# Patient Record
Sex: MALE | Race: WHITE | NOT HISPANIC OR LATINO | Employment: OTHER | URBAN - METROPOLITAN AREA
[De-identification: names, ages, dates, MRNs, and addresses within clinical notes are randomized per-mention and may not be internally consistent; named-entity substitution may affect disease eponyms.]

---

## 2017-01-09 ENCOUNTER — TRANSCRIBE ORDERS (OUTPATIENT)
Dept: ADMINISTRATIVE | Facility: HOSPITAL | Age: 52
End: 2017-01-09

## 2017-01-09 DIAGNOSIS — R91.1 LUNG NODULE: Primary | ICD-10-CM

## 2017-01-13 ENCOUNTER — HOSPITAL ENCOUNTER (OUTPATIENT)
Dept: RADIOLOGY | Age: 52
Discharge: HOME | End: 2017-01-13
Payer: COMMERCIAL

## 2017-01-13 DIAGNOSIS — R91.1 LUNG NODULE: ICD-10-CM

## 2017-01-13 PROCEDURE — 71250 CT THORAX DX C-: CPT

## 2017-05-17 ENCOUNTER — TRANSCRIBE ORDERS (OUTPATIENT)
Dept: ADMINISTRATIVE | Facility: HOSPITAL | Age: 52
End: 2017-05-17

## 2017-05-17 DIAGNOSIS — G43.809 LOWER HALF MIGRAINE: Primary | ICD-10-CM

## 2017-05-21 ENCOUNTER — HOSPITAL ENCOUNTER (OUTPATIENT)
Dept: RADIOLOGY | Facility: HOSPITAL | Age: 52
Discharge: HOME | End: 2017-05-21
Payer: COMMERCIAL

## 2017-05-21 DIAGNOSIS — G43.809 LOWER HALF MIGRAINE: ICD-10-CM

## 2017-05-21 PROCEDURE — 70551 MRI BRAIN STEM W/O DYE: CPT

## 2019-05-14 ENCOUNTER — HOSPITAL ENCOUNTER (OUTPATIENT)
Facility: HOSPITAL | Age: 54
Setting detail: OBSERVATION
Discharge: HOME | End: 2019-05-16
Attending: EMERGENCY MEDICINE | Admitting: STUDENT IN AN ORGANIZED HEALTH CARE EDUCATION/TRAINING PROGRAM
Payer: COMMERCIAL

## 2019-05-14 DIAGNOSIS — I21.4 NSTEMI (NON-ST ELEVATED MYOCARDIAL INFARCTION) (HCC): Primary | ICD-10-CM

## 2019-05-14 DIAGNOSIS — Z72.0 NICOTINE ABUSE: ICD-10-CM

## 2019-05-14 PROCEDURE — 99285 EMERGENCY DEPT VISIT HI MDM: CPT

## 2019-05-15 ENCOUNTER — APPOINTMENT (OUTPATIENT)
Dept: NON INVASIVE DIAGNOSTICS | Facility: HOSPITAL | Age: 54
End: 2019-05-15
Payer: COMMERCIAL

## 2019-05-15 ENCOUNTER — APPOINTMENT (EMERGENCY)
Dept: RADIOLOGY | Facility: HOSPITAL | Age: 54
End: 2019-05-15
Payer: COMMERCIAL

## 2019-05-15 PROBLEM — E87.1 HYPONATREMIA: Status: ACTIVE | Noted: 2019-05-15

## 2019-05-15 PROBLEM — I21.4 NSTEMI (NON-ST ELEVATED MYOCARDIAL INFARCTION) (HCC): Status: ACTIVE | Noted: 2019-05-15

## 2019-05-15 PROBLEM — F31.9 BIPOLAR DISORDER (HCC): Status: ACTIVE | Noted: 2019-05-15

## 2019-05-15 LAB
ALBUMIN SERPL BCP-MCNC: 3.1 G/DL (ref 3.5–5)
ALP SERPL-CCNC: 92 U/L (ref 46–116)
ALT SERPL W P-5'-P-CCNC: 65 U/L (ref 12–78)
ANION GAP SERPL CALCULATED.3IONS-SCNC: 10 MMOL/L (ref 4–13)
ANION GAP SERPL CALCULATED.3IONS-SCNC: 7 MMOL/L (ref 4–13)
AST SERPL W P-5'-P-CCNC: 29 U/L (ref 5–45)
BASOPHILS # BLD AUTO: 0.04 THOUSANDS/ΜL (ref 0–0.1)
BASOPHILS # BLD AUTO: 0.07 THOUSANDS/ΜL (ref 0–0.1)
BASOPHILS NFR BLD AUTO: 1 % (ref 0–1)
BASOPHILS NFR BLD AUTO: 1 % (ref 0–1)
BILIRUB SERPL-MCNC: 0.2 MG/DL (ref 0.2–1)
BUN SERPL-MCNC: 7 MG/DL (ref 5–25)
BUN SERPL-MCNC: 8 MG/DL (ref 5–25)
CALCIUM SERPL-MCNC: 8 MG/DL (ref 8.3–10.1)
CALCIUM SERPL-MCNC: 8.5 MG/DL (ref 8.3–10.1)
CHLORIDE SERPL-SCNC: 101 MMOL/L (ref 100–108)
CHLORIDE SERPL-SCNC: 98 MMOL/L (ref 100–108)
CHOLEST SERPL-MCNC: 178 MG/DL (ref 50–200)
CO2 SERPL-SCNC: 25 MMOL/L (ref 21–32)
CO2 SERPL-SCNC: 25 MMOL/L (ref 21–32)
CREAT SERPL-MCNC: 0.88 MG/DL (ref 0.6–1.3)
CREAT SERPL-MCNC: 1.18 MG/DL (ref 0.6–1.3)
EOSINOPHIL # BLD AUTO: 0.2 THOUSAND/ΜL (ref 0–0.61)
EOSINOPHIL # BLD AUTO: 0.24 THOUSAND/ΜL (ref 0–0.61)
EOSINOPHIL NFR BLD AUTO: 2 % (ref 0–6)
EOSINOPHIL NFR BLD AUTO: 3 % (ref 0–6)
ERYTHROCYTE [DISTWIDTH] IN BLOOD BY AUTOMATED COUNT: 12.8 % (ref 11.6–15.1)
ERYTHROCYTE [DISTWIDTH] IN BLOOD BY AUTOMATED COUNT: 12.9 % (ref 11.6–15.1)
GFR SERPL CREATININE-BSD FRML MDRD: 70 ML/MIN/1.73SQ M
GFR SERPL CREATININE-BSD FRML MDRD: 98 ML/MIN/1.73SQ M
GLUCOSE SERPL-MCNC: 190 MG/DL (ref 65–140)
GLUCOSE SERPL-MCNC: 73 MG/DL (ref 65–140)
HCT VFR BLD AUTO: 42.5 % (ref 36.5–49.3)
HCT VFR BLD AUTO: 45 % (ref 36.5–49.3)
HDLC SERPL-MCNC: 37 MG/DL (ref 40–60)
HGB BLD-MCNC: 13.8 G/DL (ref 12–17)
HGB BLD-MCNC: 14.7 G/DL (ref 12–17)
IMM GRANULOCYTES # BLD AUTO: 0.06 THOUSAND/UL (ref 0–0.2)
IMM GRANULOCYTES # BLD AUTO: 0.06 THOUSAND/UL (ref 0–0.2)
IMM GRANULOCYTES NFR BLD AUTO: 1 % (ref 0–2)
IMM GRANULOCYTES NFR BLD AUTO: 1 % (ref 0–2)
LDLC SERPL CALC-MCNC: 118 MG/DL (ref 0–100)
LYMPHOCYTES # BLD AUTO: 1.64 THOUSANDS/ΜL (ref 0.6–4.47)
LYMPHOCYTES # BLD AUTO: 2.62 THOUSANDS/ΜL (ref 0.6–4.47)
LYMPHOCYTES NFR BLD AUTO: 20 % (ref 14–44)
LYMPHOCYTES NFR BLD AUTO: 28 % (ref 14–44)
MCH RBC QN AUTO: 29.9 PG (ref 26.8–34.3)
MCH RBC QN AUTO: 30 PG (ref 26.8–34.3)
MCHC RBC AUTO-ENTMCNC: 32.5 G/DL (ref 31.4–37.4)
MCHC RBC AUTO-ENTMCNC: 32.7 G/DL (ref 31.4–37.4)
MCV RBC AUTO: 92 FL (ref 82–98)
MCV RBC AUTO: 92 FL (ref 82–98)
MONOCYTES # BLD AUTO: 0.43 THOUSAND/ΜL (ref 0.17–1.22)
MONOCYTES # BLD AUTO: 0.81 THOUSAND/ΜL (ref 0.17–1.22)
MONOCYTES NFR BLD AUTO: 5 % (ref 4–12)
MONOCYTES NFR BLD AUTO: 9 % (ref 4–12)
NEUTROPHILS # BLD AUTO: 5.6 THOUSANDS/ΜL (ref 1.85–7.62)
NEUTROPHILS # BLD AUTO: 6.03 THOUSANDS/ΜL (ref 1.85–7.62)
NEUTS SEG NFR BLD AUTO: 58 % (ref 43–75)
NEUTS SEG NFR BLD AUTO: 71 % (ref 43–75)
NONHDLC SERPL-MCNC: 141 MG/DL
NRBC BLD AUTO-RTO: 0 /100 WBCS
NRBC BLD AUTO-RTO: 0 /100 WBCS
PLATELET # BLD AUTO: 189 THOUSANDS/UL (ref 149–390)
PLATELET # BLD AUTO: 204 THOUSANDS/UL (ref 149–390)
PMV BLD AUTO: 10.1 FL (ref 8.9–12.7)
PMV BLD AUTO: 9.4 FL (ref 8.9–12.7)
POTASSIUM SERPL-SCNC: 3.5 MMOL/L (ref 3.5–5.3)
POTASSIUM SERPL-SCNC: 4 MMOL/L (ref 3.5–5.3)
PROT SERPL-MCNC: 6.7 G/DL (ref 6.4–8.2)
RBC # BLD AUTO: 4.6 MILLION/UL (ref 3.88–5.62)
RBC # BLD AUTO: 4.91 MILLION/UL (ref 3.88–5.62)
SODIUM SERPL-SCNC: 133 MMOL/L (ref 136–145)
SODIUM SERPL-SCNC: 133 MMOL/L (ref 136–145)
TRIGL SERPL-MCNC: 116 MG/DL
TROPONIN I SERPL-MCNC: 0.07 NG/ML
TROPONIN I SERPL-MCNC: 0.08 NG/ML
TROPONIN I SERPL-MCNC: 0.08 NG/ML
WBC # BLD AUTO: 8.4 THOUSAND/UL (ref 4.31–10.16)
WBC # BLD AUTO: 9.4 THOUSAND/UL (ref 4.31–10.16)

## 2019-05-15 PROCEDURE — 84484 ASSAY OF TROPONIN QUANT: CPT | Performed by: STUDENT IN AN ORGANIZED HEALTH CARE EDUCATION/TRAINING PROGRAM

## 2019-05-15 PROCEDURE — 85025 COMPLETE CBC W/AUTO DIFF WBC: CPT | Performed by: EMERGENCY MEDICINE

## 2019-05-15 PROCEDURE — 99219 PR INITIAL OBSERVATION CARE/DAY 50 MINUTES: CPT | Performed by: STUDENT IN AN ORGANIZED HEALTH CARE EDUCATION/TRAINING PROGRAM

## 2019-05-15 PROCEDURE — 99152 MOD SED SAME PHYS/QHP 5/>YRS: CPT | Performed by: INTERNAL MEDICINE

## 2019-05-15 PROCEDURE — 80053 COMPREHEN METABOLIC PANEL: CPT | Performed by: EMERGENCY MEDICINE

## 2019-05-15 PROCEDURE — 84484 ASSAY OF TROPONIN QUANT: CPT | Performed by: EMERGENCY MEDICINE

## 2019-05-15 PROCEDURE — 96361 HYDRATE IV INFUSION ADD-ON: CPT

## 2019-05-15 PROCEDURE — 80048 BASIC METABOLIC PNL TOTAL CA: CPT | Performed by: STUDENT IN AN ORGANIZED HEALTH CARE EDUCATION/TRAINING PROGRAM

## 2019-05-15 PROCEDURE — 36415 COLL VENOUS BLD VENIPUNCTURE: CPT | Performed by: EMERGENCY MEDICINE

## 2019-05-15 PROCEDURE — C1769 GUIDE WIRE: HCPCS

## 2019-05-15 PROCEDURE — 85025 COMPLETE CBC W/AUTO DIFF WBC: CPT | Performed by: STUDENT IN AN ORGANIZED HEALTH CARE EDUCATION/TRAINING PROGRAM

## 2019-05-15 PROCEDURE — 80061 LIPID PANEL: CPT | Performed by: NURSE PRACTITIONER

## 2019-05-15 PROCEDURE — 96374 THER/PROPH/DIAG INJ IV PUSH: CPT

## 2019-05-15 PROCEDURE — C1894 INTRO/SHEATH, NON-LASER: HCPCS

## 2019-05-15 PROCEDURE — 93458 L HRT ARTERY/VENTRICLE ANGIO: CPT

## 2019-05-15 PROCEDURE — 99244 OFF/OP CNSLTJ NEW/EST MOD 40: CPT | Performed by: INTERNAL MEDICINE

## 2019-05-15 PROCEDURE — 93458 L HRT ARTERY/VENTRICLE ANGIO: CPT | Performed by: INTERNAL MEDICINE

## 2019-05-15 PROCEDURE — 71045 X-RAY EXAM CHEST 1 VIEW: CPT

## 2019-05-15 PROCEDURE — 87081 CULTURE SCREEN ONLY: CPT | Performed by: STUDENT IN AN ORGANIZED HEALTH CARE EDUCATION/TRAINING PROGRAM

## 2019-05-15 PROCEDURE — 93005 ELECTROCARDIOGRAM TRACING: CPT

## 2019-05-15 RX ORDER — PALIPERIDONE 9 MG/1
6 TABLET, EXTENDED RELEASE ORAL 2 TIMES DAILY
COMMUNITY
End: 2020-03-05

## 2019-05-15 RX ORDER — RIZATRIPTAN BENZOATE 10 MG/1
10 TABLET ORAL ONCE AS NEEDED
Status: ON HOLD | COMMUNITY
End: 2020-07-16 | Stop reason: CLARIF

## 2019-05-15 RX ORDER — NITROGLYCERIN 0.4 MG/1
0.4 TABLET SUBLINGUAL ONCE
Status: COMPLETED | OUTPATIENT
Start: 2019-05-15 | End: 2019-05-15

## 2019-05-15 RX ORDER — CLONAZEPAM 2 MG/1
2 TABLET ORAL
COMMUNITY
End: 2019-06-20 | Stop reason: SDUPTHER

## 2019-05-15 RX ORDER — HEPARIN SODIUM 1000 [USP'U]/ML
INJECTION, SOLUTION INTRAVENOUS; SUBCUTANEOUS CODE/TRAUMA/SEDATION MEDICATION
Status: COMPLETED | OUTPATIENT
Start: 2019-05-15 | End: 2019-05-15

## 2019-05-15 RX ORDER — PALIPERIDONE 3 MG/1
9 TABLET, EXTENDED RELEASE ORAL 2 TIMES DAILY
Status: DISCONTINUED | OUTPATIENT
Start: 2019-05-15 | End: 2019-05-16 | Stop reason: HOSPADM

## 2019-05-15 RX ORDER — ASPIRIN 81 MG/1
81 TABLET ORAL DAILY
Status: DISCONTINUED | OUTPATIENT
Start: 2019-05-16 | End: 2019-05-16 | Stop reason: HOSPADM

## 2019-05-15 RX ORDER — ASPIRIN 81 MG/1
324 TABLET, CHEWABLE ORAL ONCE
Status: COMPLETED | OUTPATIENT
Start: 2019-05-15 | End: 2019-05-15

## 2019-05-15 RX ORDER — NORTRIPTYLINE HYDROCHLORIDE 25 MG/1
150 CAPSULE ORAL
Status: DISCONTINUED | OUTPATIENT
Start: 2019-05-15 | End: 2019-05-16 | Stop reason: HOSPADM

## 2019-05-15 RX ORDER — ACETAMINOPHEN 325 MG/1
650 TABLET ORAL ONCE AS NEEDED
Status: COMPLETED | OUTPATIENT
Start: 2019-05-15 | End: 2019-05-15

## 2019-05-15 RX ORDER — MIDAZOLAM HYDROCHLORIDE 1 MG/ML
INJECTION INTRAMUSCULAR; INTRAVENOUS CODE/TRAUMA/SEDATION MEDICATION
Status: COMPLETED | OUTPATIENT
Start: 2019-05-15 | End: 2019-05-15

## 2019-05-15 RX ORDER — CLONAZEPAM 1 MG/1
2 TABLET ORAL
Status: DISCONTINUED | OUTPATIENT
Start: 2019-05-15 | End: 2019-05-16 | Stop reason: HOSPADM

## 2019-05-15 RX ORDER — VERAPAMIL HCL 2.5 MG/ML
AMPUL (ML) INTRAVENOUS CODE/TRAUMA/SEDATION MEDICATION
Status: COMPLETED | OUTPATIENT
Start: 2019-05-15 | End: 2019-05-15

## 2019-05-15 RX ORDER — NITROGLYCERIN 20 MG/100ML
INJECTION INTRAVENOUS CODE/TRAUMA/SEDATION MEDICATION
Status: COMPLETED | OUTPATIENT
Start: 2019-05-15 | End: 2019-05-15

## 2019-05-15 RX ORDER — NORTRIPTYLINE HYDROCHLORIDE 75 MG/1
150 CAPSULE ORAL
COMMUNITY
End: 2019-06-11

## 2019-05-15 RX ORDER — NAPROXEN 500 MG/1
500 TABLET ORAL 2 TIMES DAILY WITH MEALS
COMMUNITY
End: 2019-05-23 | Stop reason: ALTCHOICE

## 2019-05-15 RX ORDER — TRAZODONE HYDROCHLORIDE 100 MG/1
300 TABLET ORAL
Status: DISCONTINUED | OUTPATIENT
Start: 2019-05-15 | End: 2019-05-15 | Stop reason: CLARIF

## 2019-05-15 RX ORDER — CLONAZEPAM 1 MG/1
TABLET ORAL AS NEEDED
COMMUNITY
End: 2020-11-02 | Stop reason: SDUPTHER

## 2019-05-15 RX ORDER — OXCARBAZEPINE 150 MG/1
300 TABLET, FILM COATED ORAL 2 TIMES DAILY
Status: DISCONTINUED | OUTPATIENT
Start: 2019-05-15 | End: 2019-05-16 | Stop reason: HOSPADM

## 2019-05-15 RX ORDER — OXCARBAZEPINE 150 MG/1
150 TABLET, FILM COATED ORAL 2 TIMES DAILY
COMMUNITY
End: 2020-11-18 | Stop reason: SDUPTHER

## 2019-05-15 RX ORDER — SODIUM CHLORIDE 9 MG/ML
50 INJECTION, SOLUTION INTRAVENOUS CONTINUOUS
Status: DISPENSED | OUTPATIENT
Start: 2019-05-15 | End: 2019-05-15

## 2019-05-15 RX ORDER — TRAZODONE HYDROCHLORIDE 50 MG/1
300 TABLET ORAL
Status: DISCONTINUED | OUTPATIENT
Start: 2019-05-15 | End: 2019-05-16 | Stop reason: HOSPADM

## 2019-05-15 RX ORDER — CLONAZEPAM 0.5 MG/1
0.5 TABLET ORAL DAILY
Status: DISCONTINUED | OUTPATIENT
Start: 2019-05-15 | End: 2019-05-16 | Stop reason: HOSPADM

## 2019-05-15 RX ORDER — TRAZODONE HYDROCHLORIDE 300 MG/1
300 TABLET ORAL
COMMUNITY
End: 2020-12-09 | Stop reason: SDUPTHER

## 2019-05-15 RX ORDER — HEPARIN SODIUM 5000 [USP'U]/ML
5000 INJECTION, SOLUTION INTRAVENOUS; SUBCUTANEOUS EVERY 8 HOURS SCHEDULED
Status: DISCONTINUED | OUTPATIENT
Start: 2019-05-15 | End: 2019-05-15

## 2019-05-15 RX ORDER — BUTALBITAL, ACETAMINOPHEN AND CAFFEINE 50; 325; 40 MG/1; MG/1; MG/1
1 TABLET ORAL EVERY 4 HOURS PRN
Status: DISCONTINUED | OUTPATIENT
Start: 2019-05-15 | End: 2019-05-16 | Stop reason: HOSPADM

## 2019-05-15 RX ORDER — FENTANYL CITRATE 50 UG/ML
INJECTION, SOLUTION INTRAMUSCULAR; INTRAVENOUS CODE/TRAUMA/SEDATION MEDICATION
Status: COMPLETED | OUTPATIENT
Start: 2019-05-15 | End: 2019-05-15

## 2019-05-15 RX ORDER — BUTALBITAL/ASPIRIN/CAFFEINE 50-325-40
1 CAPSULE ORAL EVERY 4 HOURS PRN
Status: ON HOLD | COMMUNITY
End: 2020-07-16 | Stop reason: CLARIF

## 2019-05-15 RX ORDER — LIDOCAINE HYDROCHLORIDE 10 MG/ML
INJECTION, SOLUTION INFILTRATION; PERINEURAL CODE/TRAUMA/SEDATION MEDICATION
Status: COMPLETED | OUTPATIENT
Start: 2019-05-15 | End: 2019-05-15

## 2019-05-15 RX ORDER — ATORVASTATIN CALCIUM 40 MG/1
40 TABLET, FILM COATED ORAL
Status: DISCONTINUED | OUTPATIENT
Start: 2019-05-15 | End: 2019-05-16 | Stop reason: HOSPADM

## 2019-05-15 RX ORDER — SODIUM CHLORIDE 9 MG/ML
INJECTION, SOLUTION INTRAVENOUS
Status: COMPLETED | OUTPATIENT
Start: 2019-05-15 | End: 2019-05-15

## 2019-05-15 RX ADMIN — NITROGLYCERIN 0.4 MG: 0.4 TABLET SUBLINGUAL at 00:14

## 2019-05-15 RX ADMIN — VERAPAMIL HYDROCHLORIDE 2.5 MG: 2.5 INJECTION, SOLUTION INTRAVENOUS at 15:40

## 2019-05-15 RX ADMIN — SODIUM CHLORIDE 50 ML/HR: 0.9 INJECTION, SOLUTION INTRAVENOUS at 02:19

## 2019-05-15 RX ADMIN — SODIUM CHLORIDE 50 ML/HR: 0.9 INJECTION, SOLUTION INTRAVENOUS at 14:07

## 2019-05-15 RX ADMIN — ACETAMINOPHEN 650 MG: 325 TABLET, FILM COATED ORAL at 05:55

## 2019-05-15 RX ADMIN — METOPROLOL TARTRATE 25 MG: 25 TABLET, FILM COATED ORAL at 21:53

## 2019-05-15 RX ADMIN — ATORVASTATIN CALCIUM 40 MG: 40 TABLET, FILM COATED ORAL at 17:00

## 2019-05-15 RX ADMIN — CLONAZEPAM 2 MG: 1 TABLET ORAL at 21:53

## 2019-05-15 RX ADMIN — NITROGLYCERIN 1 INCH: 20 OINTMENT TOPICAL at 00:52

## 2019-05-15 RX ADMIN — LIDOCAINE HYDROCHLORIDE 2 ML: 10 INJECTION, SOLUTION INFILTRATION; PERINEURAL at 15:35

## 2019-05-15 RX ADMIN — MIDAZOLAM HYDROCHLORIDE 1 MG: 1 INJECTION, SOLUTION INTRAMUSCULAR; INTRAVENOUS at 15:36

## 2019-05-15 RX ADMIN — FENTANYL CITRATE 50 MCG: 50 INJECTION, SOLUTION INTRAMUSCULAR; INTRAVENOUS at 15:36

## 2019-05-15 RX ADMIN — ENOXAPARIN SODIUM 135 MG: 150 INJECTION SUBCUTANEOUS at 01:35

## 2019-05-15 RX ADMIN — MIDAZOLAM HYDROCHLORIDE 1 MG: 1 INJECTION, SOLUTION INTRAMUSCULAR; INTRAVENOUS at 15:42

## 2019-05-15 RX ADMIN — OXCARBAZEPINE 300 MG: 150 TABLET ORAL at 17:00

## 2019-05-15 RX ADMIN — MORPHINE SULFATE 2 MG: 2 INJECTION, SOLUTION INTRAMUSCULAR; INTRAVENOUS at 00:52

## 2019-05-15 RX ADMIN — IOHEXOL 80 ML: 350 INJECTION, SOLUTION INTRAVENOUS at 16:04

## 2019-05-15 RX ADMIN — ASPIRIN 81 MG 324 MG: 81 TABLET ORAL at 00:51

## 2019-05-15 RX ADMIN — TRAZODONE HYDROCHLORIDE 300 MG: 50 TABLET ORAL at 21:54

## 2019-05-15 RX ADMIN — BUTALBITAL, ACETAMINOPHEN AND CAFFEINE 1 TABLET: 50; 325; 40 TABLET ORAL at 09:00

## 2019-05-15 RX ADMIN — NITROGLYCERIN 200 MCG: 20 INJECTION INTRAVENOUS at 15:40

## 2019-05-15 RX ADMIN — TRAZODONE HYDROCHLORIDE 300 MG: 50 TABLET ORAL at 02:47

## 2019-05-15 RX ADMIN — SODIUM CHLORIDE 1000 ML: 0.9 INJECTION, SOLUTION INTRAVENOUS at 00:43

## 2019-05-15 RX ADMIN — HEPARIN SODIUM 300 UNITS: 1000 INJECTION, SOLUTION INTRAVENOUS; SUBCUTANEOUS at 15:40

## 2019-05-15 RX ADMIN — OXCARBAZEPINE 300 MG: 150 TABLET ORAL at 08:45

## 2019-05-15 RX ADMIN — NICOTINE 1 PATCH: 7 PATCH TRANSDERMAL at 08:44

## 2019-05-15 RX ADMIN — FENTANYL CITRATE 50 MCG: 50 INJECTION, SOLUTION INTRAMUSCULAR; INTRAVENOUS at 15:42

## 2019-05-15 RX ADMIN — METOPROLOL TARTRATE 25 MG: 25 TABLET, FILM COATED ORAL at 10:56

## 2019-05-15 RX ADMIN — CLONAZEPAM 0.5 MG: 0.5 TABLET ORAL at 08:45

## 2019-05-15 RX ADMIN — NORTRIPTYLINE HYDROCHLORIDE 150 MG: 25 CAPSULE ORAL at 13:09

## 2019-05-16 VITALS
SYSTOLIC BLOOD PRESSURE: 133 MMHG | OXYGEN SATURATION: 93 % | BODY MASS INDEX: 40.26 KG/M2 | HEIGHT: 72 IN | WEIGHT: 297.2 LBS | DIASTOLIC BLOOD PRESSURE: 74 MMHG | HEART RATE: 79 BPM | RESPIRATION RATE: 18 BRPM | TEMPERATURE: 98.4 F

## 2019-05-16 LAB — MRSA NOSE QL CULT: NORMAL

## 2019-05-16 PROCEDURE — 99217 PR OBSERVATION CARE DISCHARGE MANAGEMENT: CPT | Performed by: INTERNAL MEDICINE

## 2019-05-16 RX ORDER — ASPIRIN 81 MG/1
81 TABLET ORAL DAILY
Qty: 30 TABLET | Refills: 0 | Status: SHIPPED | OUTPATIENT
Start: 2019-05-17 | End: 2020-02-13 | Stop reason: ALTCHOICE

## 2019-05-16 RX ADMIN — CLONAZEPAM 0.5 MG: 0.5 TABLET ORAL at 08:58

## 2019-05-16 RX ADMIN — ASPIRIN 81 MG: 81 TABLET, COATED ORAL at 08:58

## 2019-05-16 RX ADMIN — OXCARBAZEPINE 300 MG: 150 TABLET ORAL at 08:58

## 2019-05-16 RX ADMIN — METOPROLOL TARTRATE 25 MG: 25 TABLET, FILM COATED ORAL at 08:58

## 2019-05-16 RX ADMIN — NICOTINE 1 PATCH: 7 PATCH TRANSDERMAL at 09:00

## 2019-05-17 LAB
ATRIAL RATE: 96 BPM
P AXIS: 24 DEGREES
PR INTERVAL: 148 MS
QRS AXIS: 138 DEGREES
QRSD INTERVAL: 136 MS
QT INTERVAL: 426 MS
QTC INTERVAL: 538 MS
T WAVE AXIS: 69 DEGREES
VENTRICULAR RATE: 96 BPM

## 2019-05-17 PROCEDURE — 93010 ELECTROCARDIOGRAM REPORT: CPT | Performed by: INTERNAL MEDICINE

## 2019-05-23 ENCOUNTER — OFFICE VISIT (OUTPATIENT)
Dept: CARDIOLOGY CLINIC | Facility: CLINIC | Age: 54
End: 2019-05-23
Payer: COMMERCIAL

## 2019-05-23 VITALS
HEIGHT: 72 IN | SYSTOLIC BLOOD PRESSURE: 150 MMHG | WEIGHT: 302.8 LBS | BODY MASS INDEX: 41.01 KG/M2 | DIASTOLIC BLOOD PRESSURE: 90 MMHG | HEART RATE: 101 BPM

## 2019-05-23 DIAGNOSIS — Q24.5 MYOCARDIAL BRIDGE: ICD-10-CM

## 2019-05-23 DIAGNOSIS — E78.5 HYPERLIPIDEMIA, UNSPECIFIED HYPERLIPIDEMIA TYPE: ICD-10-CM

## 2019-05-23 DIAGNOSIS — I25.10 CORONARY ARTERY DISEASE INVOLVING NATIVE HEART WITHOUT ANGINA PECTORIS, UNSPECIFIED VESSEL OR LESION TYPE: Primary | ICD-10-CM

## 2019-05-23 DIAGNOSIS — R07.9 CHEST PAIN, UNSPECIFIED TYPE: ICD-10-CM

## 2019-05-23 DIAGNOSIS — Z72.0 TOBACCO ABUSE: ICD-10-CM

## 2019-05-23 PROCEDURE — 1111F DSCHRG MED/CURRENT MED MERGE: CPT | Performed by: INTERNAL MEDICINE

## 2019-05-23 PROCEDURE — 99214 OFFICE O/P EST MOD 30 MIN: CPT | Performed by: INTERNAL MEDICINE

## 2019-05-23 PROCEDURE — 93000 ELECTROCARDIOGRAM COMPLETE: CPT | Performed by: INTERNAL MEDICINE

## 2019-05-23 RX ORDER — PRAVASTATIN SODIUM 40 MG
40 TABLET ORAL DAILY
Qty: 30 TABLET | Refills: 3 | Status: SHIPPED | OUTPATIENT
Start: 2019-05-23 | End: 2019-06-11

## 2019-05-23 RX ORDER — METOPROLOL SUCCINATE 25 MG/1
25 TABLET, EXTENDED RELEASE ORAL DAILY
Qty: 30 TABLET | Refills: 1 | Status: SHIPPED | OUTPATIENT
Start: 2019-05-23 | End: 2019-07-14 | Stop reason: SDUPTHER

## 2019-06-11 ENCOUNTER — OFFICE VISIT (OUTPATIENT)
Dept: FAMILY MEDICINE CLINIC | Facility: CLINIC | Age: 54
End: 2019-06-11
Payer: COMMERCIAL

## 2019-06-11 VITALS
DIASTOLIC BLOOD PRESSURE: 88 MMHG | WEIGHT: 294 LBS | HEIGHT: 72 IN | TEMPERATURE: 96.7 F | BODY MASS INDEX: 39.82 KG/M2 | RESPIRATION RATE: 16 BRPM | HEART RATE: 64 BPM | SYSTOLIC BLOOD PRESSURE: 120 MMHG

## 2019-06-11 DIAGNOSIS — E66.01 SEVERE OBESITY (BMI 35.0-39.9) WITH COMORBIDITY (HCC): ICD-10-CM

## 2019-06-11 DIAGNOSIS — R91.8 LUNG MASS: ICD-10-CM

## 2019-06-11 DIAGNOSIS — F17.200 NICOTINE DEPENDENCE WITH CURRENT USE: ICD-10-CM

## 2019-06-11 DIAGNOSIS — I25.110 CORONARY ARTERY DISEASE INVOLVING NATIVE CORONARY ARTERY OF NATIVE HEART WITH UNSTABLE ANGINA PECTORIS (HCC): Primary | ICD-10-CM

## 2019-06-11 DIAGNOSIS — E78.2 MIXED HYPERLIPIDEMIA: ICD-10-CM

## 2019-06-11 DIAGNOSIS — I21.4 NSTEMI (NON-ST ELEVATED MYOCARDIAL INFARCTION) (HCC): ICD-10-CM

## 2019-06-11 DIAGNOSIS — E87.1 HYPONATREMIA: ICD-10-CM

## 2019-06-11 DIAGNOSIS — F31.62 BIPOLAR DISORDER, CURRENT EPISODE MIXED, MODERATE (HCC): ICD-10-CM

## 2019-06-11 DIAGNOSIS — E66.9 OBESITY (BMI 35.0-39.9 WITHOUT COMORBIDITY): ICD-10-CM

## 2019-06-11 DIAGNOSIS — G47.33 OSA (OBSTRUCTIVE SLEEP APNEA): ICD-10-CM

## 2019-06-11 PROBLEM — G43.009 MIGRAINE WITHOUT AURA AND WITHOUT STATUS MIGRAINOSUS, NOT INTRACTABLE: Status: ACTIVE | Noted: 2019-06-11

## 2019-06-11 PROBLEM — G43.109 MIGRAINE WITH AURA AND WITHOUT STATUS MIGRAINOSUS, NOT INTRACTABLE: Status: ACTIVE | Noted: 2019-06-11

## 2019-06-11 PROCEDURE — 99214 OFFICE O/P EST MOD 30 MIN: CPT | Performed by: FAMILY MEDICINE

## 2019-06-11 RX ORDER — VENLAFAXINE HYDROCHLORIDE 75 MG/1
150 CAPSULE, EXTENDED RELEASE ORAL DAILY
Refills: 3 | COMMUNITY
Start: 2019-05-29 | End: 2020-02-13 | Stop reason: ALTCHOICE

## 2019-06-11 RX ORDER — ATORVASTATIN CALCIUM 40 MG/1
40 TABLET, FILM COATED ORAL DAILY
Qty: 90 TABLET | Refills: 3 | Status: SHIPPED | OUTPATIENT
Start: 2019-06-11 | End: 2020-03-11

## 2019-06-18 ENCOUNTER — APPOINTMENT (OUTPATIENT)
Dept: LAB | Age: 54
End: 2019-06-18
Payer: COMMERCIAL

## 2019-06-18 ENCOUNTER — TRANSCRIBE ORDERS (OUTPATIENT)
Dept: ADMINISTRATIVE | Age: 54
End: 2019-06-18

## 2019-06-18 DIAGNOSIS — Z79.899 ENCOUNTER FOR LONG-TERM (CURRENT) USE OF OTHER MEDICATIONS: Primary | ICD-10-CM

## 2019-06-18 DIAGNOSIS — R53.83 FATIGUE, UNSPECIFIED TYPE: ICD-10-CM

## 2019-06-18 DIAGNOSIS — E55.9 AVITAMINOSIS D: ICD-10-CM

## 2019-06-18 DIAGNOSIS — Z79.899 ENCOUNTER FOR LONG-TERM (CURRENT) USE OF OTHER MEDICATIONS: ICD-10-CM

## 2019-06-18 DIAGNOSIS — I25.10 CORONARY ARTERY DISEASE INVOLVING NATIVE HEART WITHOUT ANGINA PECTORIS, UNSPECIFIED VESSEL OR LESION TYPE: ICD-10-CM

## 2019-06-18 DIAGNOSIS — Q24.5 MYOCARDIAL BRIDGE: ICD-10-CM

## 2019-06-18 LAB
25(OH)D3 SERPL-MCNC: 11.5 NG/ML (ref 30–100)
ALBUMIN SERPL BCP-MCNC: 3.9 G/DL (ref 3.5–5)
ALP SERPL-CCNC: 75 U/L (ref 46–116)
ALT SERPL W P-5'-P-CCNC: 54 U/L (ref 12–78)
ANION GAP SERPL CALCULATED.3IONS-SCNC: 7 MMOL/L (ref 4–13)
AST SERPL W P-5'-P-CCNC: 26 U/L (ref 5–45)
BASOPHILS # BLD AUTO: 0.06 THOUSANDS/ΜL (ref 0–0.1)
BASOPHILS NFR BLD AUTO: 1 % (ref 0–1)
BILIRUB DIRECT SERPL-MCNC: 0.12 MG/DL (ref 0–0.2)
BILIRUB SERPL-MCNC: 0.37 MG/DL (ref 0.2–1)
BUN SERPL-MCNC: 16 MG/DL (ref 5–25)
CALCIUM SERPL-MCNC: 8.4 MG/DL (ref 8.3–10.1)
CHLORIDE SERPL-SCNC: 101 MMOL/L (ref 100–108)
CHOLEST SERPL-MCNC: 116 MG/DL (ref 50–200)
CO2 SERPL-SCNC: 25 MMOL/L (ref 21–32)
CREAT SERPL-MCNC: 0.83 MG/DL (ref 0.6–1.3)
EOSINOPHIL # BLD AUTO: 0.15 THOUSAND/ΜL (ref 0–0.61)
EOSINOPHIL NFR BLD AUTO: 2 % (ref 0–6)
ERYTHROCYTE [DISTWIDTH] IN BLOOD BY AUTOMATED COUNT: 12.7 % (ref 11.6–15.1)
EST. AVERAGE GLUCOSE BLD GHB EST-MCNC: 114 MG/DL
GFR SERPL CREATININE-BSD FRML MDRD: 100 ML/MIN/1.73SQ M
GLUCOSE P FAST SERPL-MCNC: 72 MG/DL (ref 65–99)
HBA1C MFR BLD: 5.6 % (ref 4.2–6.3)
HCT VFR BLD AUTO: 40.7 % (ref 36.5–49.3)
HDLC SERPL-MCNC: 41 MG/DL (ref 40–60)
HGB BLD-MCNC: 13.5 G/DL (ref 12–17)
IMM GRANULOCYTES # BLD AUTO: 0.04 THOUSAND/UL (ref 0–0.2)
IMM GRANULOCYTES NFR BLD AUTO: 0 % (ref 0–2)
LDLC SERPL CALC-MCNC: 63 MG/DL (ref 0–100)
LYMPHOCYTES # BLD AUTO: 2.31 THOUSANDS/ΜL (ref 0.6–4.47)
LYMPHOCYTES NFR BLD AUTO: 23 % (ref 14–44)
MCH RBC QN AUTO: 30.1 PG (ref 26.8–34.3)
MCHC RBC AUTO-ENTMCNC: 33.2 G/DL (ref 31.4–37.4)
MCV RBC AUTO: 91 FL (ref 82–98)
MONOCYTES # BLD AUTO: 0.75 THOUSAND/ΜL (ref 0.17–1.22)
MONOCYTES NFR BLD AUTO: 8 % (ref 4–12)
NEUTROPHILS # BLD AUTO: 6.6 THOUSANDS/ΜL (ref 1.85–7.62)
NEUTS SEG NFR BLD AUTO: 66 % (ref 43–75)
NONHDLC SERPL-MCNC: 75 MG/DL
NRBC BLD AUTO-RTO: 0 /100 WBCS
PLATELET # BLD AUTO: 198 THOUSANDS/UL (ref 149–390)
PMV BLD AUTO: 10.8 FL (ref 8.9–12.7)
POTASSIUM SERPL-SCNC: 3.6 MMOL/L (ref 3.5–5.3)
PROT SERPL-MCNC: 6.7 G/DL (ref 6.4–8.2)
RBC # BLD AUTO: 4.48 MILLION/UL (ref 3.88–5.62)
SODIUM SERPL-SCNC: 133 MMOL/L (ref 136–145)
T3 SERPL-MCNC: 0.8 NG/ML (ref 0.6–1.8)
T4 SERPL-MCNC: 7.8 UG/DL (ref 4.7–13.3)
TRIGL SERPL-MCNC: 62 MG/DL
TSH SERPL DL<=0.05 MIU/L-ACNC: 1.61 UIU/ML (ref 0.36–3.74)
WBC # BLD AUTO: 9.91 THOUSAND/UL (ref 4.31–10.16)

## 2019-06-18 PROCEDURE — 84480 ASSAY TRIIODOTHYRONINE (T3): CPT

## 2019-06-18 PROCEDURE — 82306 VITAMIN D 25 HYDROXY: CPT

## 2019-06-18 PROCEDURE — 84436 ASSAY OF TOTAL THYROXINE: CPT

## 2019-06-18 PROCEDURE — 84443 ASSAY THYROID STIM HORMONE: CPT

## 2019-06-18 PROCEDURE — 85025 COMPLETE CBC W/AUTO DIFF WBC: CPT

## 2019-06-18 PROCEDURE — 80061 LIPID PANEL: CPT

## 2019-06-18 PROCEDURE — 36415 COLL VENOUS BLD VENIPUNCTURE: CPT

## 2019-06-18 PROCEDURE — 84479 ASSAY OF THYROID (T3 OR T4): CPT

## 2019-06-18 PROCEDURE — 80053 COMPREHEN METABOLIC PANEL: CPT

## 2019-06-18 PROCEDURE — 82248 BILIRUBIN DIRECT: CPT

## 2019-06-18 PROCEDURE — 83036 HEMOGLOBIN GLYCOSYLATED A1C: CPT

## 2019-06-19 LAB — T3RU NFR SERPL: 24 % (ref 24–39)

## 2019-06-20 ENCOUNTER — OFFICE VISIT (OUTPATIENT)
Dept: CARDIOLOGY CLINIC | Facility: CLINIC | Age: 54
End: 2019-06-20
Payer: COMMERCIAL

## 2019-06-20 ENCOUNTER — TELEPHONE (OUTPATIENT)
Dept: CARDIOLOGY CLINIC | Facility: CLINIC | Age: 54
End: 2019-06-20

## 2019-06-20 VITALS
HEART RATE: 76 BPM | HEIGHT: 72 IN | BODY MASS INDEX: 39.82 KG/M2 | SYSTOLIC BLOOD PRESSURE: 118 MMHG | DIASTOLIC BLOOD PRESSURE: 60 MMHG | OXYGEN SATURATION: 94 % | WEIGHT: 294 LBS

## 2019-06-20 DIAGNOSIS — I25.110 CORONARY ARTERY DISEASE INVOLVING NATIVE CORONARY ARTERY OF NATIVE HEART WITH UNSTABLE ANGINA PECTORIS (HCC): ICD-10-CM

## 2019-06-20 DIAGNOSIS — K21.9 GASTROESOPHAGEAL REFLUX DISEASE WITHOUT ESOPHAGITIS: ICD-10-CM

## 2019-06-20 DIAGNOSIS — E66.9 OBESITY (BMI 35.0-39.9 WITHOUT COMORBIDITY): ICD-10-CM

## 2019-06-20 DIAGNOSIS — F17.200 NICOTINE DEPENDENCE WITH CURRENT USE: ICD-10-CM

## 2019-06-20 DIAGNOSIS — G47.33 OSA (OBSTRUCTIVE SLEEP APNEA): ICD-10-CM

## 2019-06-20 DIAGNOSIS — R07.9 CHEST PAIN, UNSPECIFIED TYPE: Primary | ICD-10-CM

## 2019-06-20 PROCEDURE — 99215 OFFICE O/P EST HI 40 MIN: CPT | Performed by: NURSE PRACTITIONER

## 2019-06-20 RX ORDER — ISOSORBIDE MONONITRATE 30 MG/1
30 TABLET, EXTENDED RELEASE ORAL DAILY
Qty: 30 TABLET | Refills: 3 | Status: SHIPPED | OUTPATIENT
Start: 2019-06-20 | End: 2019-10-16 | Stop reason: SDUPTHER

## 2019-06-20 RX ORDER — FAMOTIDINE 40 MG/1
40 TABLET, FILM COATED ORAL
Qty: 30 TABLET | Refills: 3 | Status: SHIPPED | OUTPATIENT
Start: 2019-06-20 | End: 2019-06-20 | Stop reason: SDUPTHER

## 2019-06-20 RX ORDER — NAPROXEN SODIUM 550 MG/1
TABLET ORAL
Refills: 3 | COMMUNITY
Start: 2019-06-12 | End: 2019-09-11 | Stop reason: SDDI

## 2019-06-20 RX ORDER — FAMOTIDINE 40 MG/1
40 TABLET, FILM COATED ORAL DAILY
Qty: 30 TABLET | Refills: 3 | Status: SHIPPED | OUTPATIENT
Start: 2019-06-20 | End: 2019-09-11 | Stop reason: SDDI

## 2019-06-21 ENCOUNTER — HOSPITAL ENCOUNTER (OUTPATIENT)
Dept: NON INVASIVE DIAGNOSTICS | Facility: CLINIC | Age: 54
Discharge: HOME | End: 2019-06-21
Payer: COMMERCIAL

## 2019-06-21 DIAGNOSIS — I25.10 CORONARY ARTERY DISEASE INVOLVING NATIVE HEART WITHOUT ANGINA PECTORIS, UNSPECIFIED VESSEL OR LESION TYPE: ICD-10-CM

## 2019-06-21 DIAGNOSIS — Q24.5 MYOCARDIAL BRIDGE: ICD-10-CM

## 2019-06-21 PROCEDURE — 93306 TTE W/DOPPLER COMPLETE: CPT | Performed by: INTERNAL MEDICINE

## 2019-06-21 PROCEDURE — 93306 TTE W/DOPPLER COMPLETE: CPT

## 2019-07-05 ENCOUNTER — OFFICE VISIT (OUTPATIENT)
Dept: FAMILY MEDICINE CLINIC | Facility: CLINIC | Age: 54
End: 2019-07-05
Payer: COMMERCIAL

## 2019-07-05 ENCOUNTER — TELEPHONE (OUTPATIENT)
Dept: FAMILY MEDICINE CLINIC | Facility: CLINIC | Age: 54
End: 2019-07-05

## 2019-07-05 VITALS
DIASTOLIC BLOOD PRESSURE: 62 MMHG | BODY MASS INDEX: 40.5 KG/M2 | HEART RATE: 72 BPM | RESPIRATION RATE: 16 BRPM | TEMPERATURE: 96.2 F | WEIGHT: 299 LBS | HEIGHT: 72 IN | SYSTOLIC BLOOD PRESSURE: 110 MMHG

## 2019-07-05 DIAGNOSIS — H81.13 BENIGN PAROXYSMAL POSITIONAL VERTIGO DUE TO BILATERAL VESTIBULAR DISORDER: Primary | ICD-10-CM

## 2019-07-05 DIAGNOSIS — M54.6 THORACIC SPINE PAIN: ICD-10-CM

## 2019-07-05 PROCEDURE — 99213 OFFICE O/P EST LOW 20 MIN: CPT | Performed by: FAMILY MEDICINE

## 2019-07-05 RX ORDER — MECLIZINE HYDROCHLORIDE 25 MG/1
25 TABLET ORAL 3 TIMES DAILY PRN
Qty: 30 TABLET | Refills: 1 | Status: ON HOLD | OUTPATIENT
Start: 2019-07-05 | End: 2020-07-16 | Stop reason: CLARIF

## 2019-07-05 RX ORDER — TRAMADOL HYDROCHLORIDE 50 MG/1
50 TABLET ORAL EVERY 6 HOURS PRN
Qty: 20 TABLET | Refills: 0 | OUTPATIENT
Start: 2019-07-05 | End: 2019-07-25 | Stop reason: SDUPTHER

## 2019-07-05 RX ORDER — ERGOCALCIFEROL 1.25 MG/1
50000 CAPSULE ORAL WEEKLY
Refills: 0 | COMMUNITY
Start: 2019-06-19 | End: 2019-08-22 | Stop reason: ALTCHOICE

## 2019-07-05 NOTE — PROGRESS NOTES
Assessment/Plan:    Problem List Items Addressed This Visit        Nervous and Auditory    Benign paroxysmal positional vertigo due to bilateral vestibular disorder - Primary    Relevant Medications    meclizine (ANTIVERT) 25 mg tablet    Other Relevant Orders    Ambulatory referral to Physical Therapy      Other Visit Diagnoses     Thoracic spine pain        Relevant Medications    traMADol (ULTRAM) 50 mg tablet          BMI Counseling: Body mass index is 40 55 kg/m²  Discussed the patient's BMI with him  The BMI is above average  BMI counseling and education was provided to the patient  Nutrition recommendations include reducing portion sizes  There are no Patient Instructions on file for this visit  No follow-ups on file  Subjective:      Patient ID: Whitney Dodd is a 49 y o  male  Chief Complaint   Patient presents with    Dizziness     prcma       Pt is here fopr a same day   Pt states he has vertigo  States this is his first time  Pt states he cant stand up  States he falls down  He has a ringing noise  The ringing started yesterday  No pain in the earif he chnges position the room spins on him    Pt states while he is here he sneezed and he pulled a muscle under his left shoulder blade has been taking 800 mg motrin and that is not helping a lot      The following portions of the patient's history were reviewed and updated as appropriate: allergies, current medications, past family history, past medical history, past social history, past surgical history and problem list     Review of Systems   Constitutional: Negative for activity change, appetite change, chills, diaphoresis, fatigue, fever and unexpected weight change  HENT: Negative for congestion, dental problem, ear pain, mouth sores, sinus pressure, sinus pain, sore throat and trouble swallowing  Eyes: Negative for photophobia, discharge and itching  Respiratory: Negative for apnea, chest tightness and shortness of breath  Cardiovascular: Negative for chest pain, palpitations and leg swelling  Gastrointestinal: Negative for abdominal distention, abdominal pain, blood in stool, nausea and vomiting  Endocrine: Negative for cold intolerance, heat intolerance, polydipsia, polyphagia and polyuria  Genitourinary: Negative for difficulty urinating  Musculoskeletal: Negative for arthralgias  Skin: Negative for color change and wound  Neurological: Positive for dizziness  Negative for syncope, speech difficulty and headaches  Hematological: Negative for adenopathy  Psychiatric/Behavioral: Negative for agitation and behavioral problems           Current Outpatient Medications   Medication Sig Dispense Refill    aspirin (ECOTRIN LOW STRENGTH) 81 mg EC tablet Take 1 tablet (81 mg total) by mouth daily 30 tablet 0    atorvastatin (LIPITOR) 40 mg tablet Take 1 tablet (40 mg total) by mouth daily 90 tablet 3    butalbital-acetaminophen-caffeine-codeine (FIORICET WITH CODEINE) -04-30 MG per capsule Take 1 capsule by mouth every 4 (four) hours as needed for headaches      clonazePAM (KlonoPIN) 1 mg tablet 1 mg daily       ergocalciferol (VITAMIN D2) 50,000 units Take 50,000 Units by mouth once a week  0    famotidine (PEPCID) 40 MG tablet Take 1 tablet (40 mg total) by mouth daily 30 tablet 3    isosorbide mononitrate (IMDUR) 30 mg 24 hr tablet Take 1 tablet (30 mg total) by mouth daily 30 tablet 3    metoprolol succinate (TOPROL-XL) 25 mg 24 hr tablet Take 1 tablet (25 mg total) by mouth daily 30 tablet 1    naproxen sodium (ANAPROX) 550 mg tablet TAKE 1 TABLET BY MOUTH ONCE DAILY AS NEEDED FOR HEADACHE (MAY REPEAT 1 TIME AFTER 4 HOURS)  3    OXcarbazepine (TRILEPTAL) 600 mg tablet Take 300 mg by mouth 2 (two) times a day      paliperidone (INVEGA) 9 MG 24 hr tablet Take 6 mg by mouth 2 (two) times a day       rizatriptan (MAXALT) 10 MG tablet Take 10 mg by mouth once as needed for migraine May repeat in 2 hours if needed      traZODone (DESYREL) 300 MG tablet Take 300 mg by mouth daily at bedtime      venlafaxine (EFFEXOR-XR) 75 mg 24 hr capsule Take 150 mg by mouth daily   3    meclizine (ANTIVERT) 25 mg tablet Take 1 tablet (25 mg total) by mouth 3 (three) times a day as needed for dizziness 30 tablet 1    traMADol (ULTRAM) 50 mg tablet Take 1 tablet (50 mg total) by mouth every 6 (six) hours as needed for moderate pain 20 tablet 0     No current facility-administered medications for this visit  Objective:    /62   Pulse 72   Temp (!) 96 2 °F (35 7 °C)   Resp 16   Ht 6' (1 829 m)   Wt 136 kg (299 lb)   BMI 40 55 kg/m²        Physical Exam   Constitutional: He appears well-developed and well-nourished  No distress  HENT:   Head: Normocephalic and atraumatic  Right Ear: External ear normal    Left Ear: External ear normal    Nose: Nose normal    Mouth/Throat: Oropharynx is clear and moist  No oropharyngeal exudate  Eyes: Pupils are equal, round, and reactive to light  EOM are normal  Right eye exhibits no discharge  Left eye exhibits no discharge  No scleral icterus  Neck: No thyromegaly present  Cardiovascular: Normal rate and normal heart sounds  No murmur heard  Pulmonary/Chest: Effort normal and breath sounds normal  No respiratory distress  He has no wheezes  Abdominal: Soft  Bowel sounds are normal  He exhibits no distension and no mass  There is no tenderness  There is no rebound and no guarding  Musculoskeletal: Normal range of motion  Neurological: He is alert  He displays normal reflexes  Coordination normal    Room spinning with lying down and turning head to the left   Skin: Skin is warm and dry  No rash noted  He is not diaphoretic  No erythema  Psychiatric: He has a normal mood and affect  His behavior is normal    Nursing note and vitals reviewed               Min Butler DO

## 2019-07-06 ENCOUNTER — TELEPHONE (OUTPATIENT)
Dept: FAMILY MEDICINE CLINIC | Facility: CLINIC | Age: 54
End: 2019-07-06

## 2019-07-06 NOTE — TELEPHONE ENCOUNTER
I ran a  report on this patient  He receives Fioricet w/ Codeine for his migraines and Clonazepam, which is managed by psych  No other opioids listed   I think it would be okay to fill this, but pt should be instructed not to take the Fioricet with codeine or benzos (Clonazepam) while taking the Tramadol   Marelma Ahuja

## 2019-07-06 NOTE — TELEPHONE ENCOUNTER
[address] calling regarding tramadol  Wants to know if dr Ivet Gómez checked patient's history and other medications he has been taking  Per Pharmacist looks like he has seen a couple different doctors, some red flags  He wasn't sure if Dr Ivet Gómez looked into this      Can we call [facility] back     Thank you

## 2019-07-08 ENCOUNTER — CLINICAL SUPPORT (OUTPATIENT)
Dept: BARIATRICS | Facility: CLINIC | Age: 54
End: 2019-07-08

## 2019-07-08 VITALS — BODY MASS INDEX: 41.45 KG/M2 | WEIGHT: 296.1 LBS | HEIGHT: 71 IN

## 2019-07-08 DIAGNOSIS — E66.01 MORBID (SEVERE) OBESITY DUE TO EXCESS CALORIES (HCC): Primary | ICD-10-CM

## 2019-07-08 DIAGNOSIS — E66.01 MORBID OBESITY (HCC): Primary | ICD-10-CM

## 2019-07-08 PROCEDURE — RECHECK: Performed by: DIETITIAN, REGISTERED

## 2019-07-08 NOTE — PROGRESS NOTES
Bariatric Behavioral Health Evaluation    Presenting Problem: Patient wants to improve quality of life and wants to prevent/avoid further health issues  Patient had heart attack in May 2019  Is the patient seeking Bariatric Surgery Eval? Yes  If yes how long have you researched this surgery option  Patient has considered bariatric surgery for 2 years  Realizes Post- Op Requirements? No, patient has friend with bariatric surgery  Psychiatric/Psychological Treatment Diagnosis: In  patient diagnosed with bipolar  Patient meets quarterly with psychiatrist      Outpatient Counselor No     Psychiatrist Yes  Dr Anthony Pemberton  Psychiatrist Phone [phone]    Have you had Inpatient Treatment? No    Family Constellation: None reported     Mother  76years old, father is 68years old, 2 brothers   7 years; spouse supports surgery  Patient has 2 children and 2 step-children  Domestic Violence No    Abuse History: None reported     Additional comments/stressors related to family/relationships/peer support: none reported     Physical/Psychological Assessment:     Appearance: appropriate  Sociability: average  Affect: appropriate  Mood: calm  Thought Process: coherent  Speech: normal  Content: no impairment  Orientation: person  Yes   Insight: emotional  good    Risk Assessment:     none    Recommendations: Recommended for surgery  no  Patient must complete psychiatric clearance    Risk of Harm to Self or Others: None reported     Access to weapons yes     Weapons secured by; hunting and personal protection  Patient was a - retired in   Based on the previous information, the client presents the following risk of harm to self or others: low     Note   Completed Behavioral Health Assessment  Provided patient education as needed   Patient admits  to  Axis 1 diagnosis and is currently taking medication prescribed by  Psychiatrist  Recommendation is deferred until psychiatric clearance is received and reviewed   BARIATRIC SURGERY EDUCATION CHECKLIST    I have received education related to my bariatric surgery process and understand:    Patients may be required to complete a psychiatric evaluation and receive clearance for surgery from their psychiatrist     Patients who undergo weight loss surgery are at higher risk of increased mental health concerns and suicide attempts  Patients may be required to complete a full substance abuse evaluation and then complete all treatment recommendations prior to surgery  If diagnosis of abuse/dependence results, patient may be required to remain sober for one (1) year before having bariatric surgery  Patients on psychiatric medications should check with their provider to discuss psychiatric medications and the changes in absorption  Patient should discuss all time release medications with provider and take all medications as prescribed  The recommendation is that there is no use of  any tobacco products, Hookah or  vapes for the bariatric post-operation patient  Bariatric surgery patients should not consume alcohol as a post-operative patient as it may increase risk of numerous health conditions including but not limited to alcohol abuse and ulcers  There is a possibility of weight regain if patient does not follow all program guidelines and recommendations  Bariatric surgery patients should exercise thirty (30) to sixty (60) minutes per day to maintain post-surgical weight loss  Research indicates that bariatric patients are more successful when they see a therapist for up to two (2) years post-op  Patients will follow all medical and dietary recommendations provided  Patient will keep all scheduled appointments and follow up with their physician for a minimum of five (5) years  Patient will take all vitamins as recommended  Post-operative vitamins are life-long      Patient reviewed Bariatric Surgery Education Checklist and agrees they have received education on these issues  Met with patient to complete Behavioral Health Assessment and to identify pre-op weight loss goals  Patient will work on the following goals; complete psychiatric clearance and reduce night time eating  Patient diagnosed with bipolar and is treated by psychiatrist      Patient had heart attack in May 2019 and is motivated to make dietary and behavioral changes  Patient recently quit smoking, transitioned to vaping and has a quit date of 7/16/19  Patient reminded to consult with PCP for support with nicotine/tobacco cessation  Patient is a retired  from [employer] and currently works in [email or web address]

## 2019-07-08 NOTE — PROGRESS NOTES
Bariatric Nutrition Assessment Note    Type of surgery    Preop  Surgery Date: TBD  Surgeon: Dr Halle Molina  49 y o   male     [employer] with BMI of 25: 178 6lbs  Pre-Op Excess Wt: 117 5lbs  Ht 5' 11" (1 803 m)   Wt 134 kg (296 lb 1 6 oz)   BMI 41 30 kg/m²     MifflJoslynen Schoolcraft Equation:  2652 kcal/day= weight maintenance  1652 kcal/day= 2 lb/wk wt loss      Weight History   Onset of Obesity: Adult: 26years old started noticing weight gain  Family history of obesity: Yes  Wt Loss Attempts: High Protein/Low CHO diets (Atkins, Union, etc )  Self Created Diets (Portion Control, Healthy Food Choices, etc )  Patient has tried the above for 6 months or more with insufficient weight loss or weight regain, which is why patient has requested to be evaluated for weight loss surgery today  Maximum Wt Lost: 10-15#      Review of History and Medications   Past Medical History:   Diagnosis Date    Bipolar disorder ([facility] )     NSTEMI (non-ST elevation myocardial infarction) ([facility] )     Umbilical hernia     Resolved 9/23/2015     Ventral hernia     Resolved 9/23/2015      Past Surgical History:   Procedure Laterality Date    SKIN SURGERY      TONSILLECTOMY      TOOTH EXTRACTION       Social History     Socioeconomic History    Marital status: /Civil Union     Spouse name: Not on file    Number of children: Not on file    Years of education: Not on file    Highest education level: Not on file   Occupational History    Not on file   Social Needs    Financial resource strain: Not on file    Food insecurity:     Worry: Not on file     Inability: Not on file    Transportation needs:     Medical: Not on file     Non-medical: Not on file   Tobacco Use    Smoking status: Former Smoker    Smokeless tobacco: Former User    Tobacco comment: pt quit smoking 6 years ago but vapes everyday   Substance and Sexual Activity    Alcohol use: Never     Frequency: Never    Drug use: Never    Sexual activity: Not on file   Lifestyle    Physical activity:     Days per week: Not on file     Minutes per session: Not on file    Stress: Not on file   Relationships    Social connections:     Talks on phone: Not on file     Gets together: Not on file     Attends Jehovah's witness service: Not on file     Active member of club or organization: Not on file     Attends meetings of clubs or organizations: Not on file     Relationship status: Not on file    Intimate partner violence:     Fear of current or ex partner: Not on file     Emotionally abused: Not on file     Physically abused: Not on file     Forced sexual activity: Not on file   Other Topics Concern    Not on file   Social History Narrative    Former smoker - As per [employer]    Full-time employment       Current Outpatient Medications:     aspirin (ECOTRIN LOW STRENGTH) 81 mg EC tablet, Take 1 tablet (81 mg total) by mouth daily, Disp: 30 tablet, Rfl: 0    atorvastatin (LIPITOR) 40 mg tablet, Take 1 tablet (40 mg total) by mouth daily, Disp: 90 tablet, Rfl: 3    butalbital-acetaminophen-caffeine-codeine (FIORICET WITH CODEINE) -76-30 MG per capsule, Take 1 capsule by mouth every 4 (four) hours as needed for headaches, Disp: , Rfl:     clonazePAM (KlonoPIN) 1 mg tablet, 1 mg daily , Disp: , Rfl:     ergocalciferol (VITAMIN D2) 50,000 units, Take 50,000 Units by mouth once a week, Disp: , Rfl: 0    famotidine (PEPCID) 40 MG tablet, Take 1 tablet (40 mg total) by mouth daily, Disp: 30 tablet, Rfl: 3    isosorbide mononitrate (IMDUR) 30 mg 24 hr tablet, Take 1 tablet (30 mg total) by mouth daily, Disp: 30 tablet, Rfl: 3    meclizine (ANTIVERT) 25 mg tablet, Take 1 tablet (25 mg total) by mouth 3 (three) times a day as needed for dizziness, Disp: 30 tablet, Rfl: 1    metoprolol succinate (TOPROL-XL) 25 mg 24 hr tablet, Take 1 tablet (25 mg total) by mouth daily, Disp: 30 tablet, Rfl: 1    naproxen sodium (ANAPROX) 550 mg tablet, TAKE 1 TABLET BY MOUTH ONCE DAILY AS NEEDED FOR HEADACHE (MAY REPEAT 1 TIME AFTER 4 HOURS), Disp: , Rfl: 3    OXcarbazepine (TRILEPTAL) 600 mg tablet, Take 300 mg by mouth 2 (two) times a day, Disp: , Rfl:     paliperidone (INVEGA) 9 MG 24 hr tablet, Take 6 mg by mouth 2 (two) times a day , Disp: , Rfl:     rizatriptan (MAXALT) 10 MG tablet, Take 10 mg by mouth once as needed for migraine May repeat in 2 hours if needed, Disp: , Rfl:     traMADol (ULTRAM) 50 mg tablet, Take 1 tablet (50 mg total) by mouth every 6 (six) hours as needed for moderate pain, Disp: 20 tablet, Rfl: 0    traZODone (DESYREL) 300 MG tablet, Take 300 mg by mouth daily at bedtime, Disp: , Rfl:     venlafaxine (EFFEXOR-XR) 75 mg 24 hr capsule, Take 150 mg by mouth daily , Disp: , Rfl: 3  Food Intake and Lifestyle Assessment   Food Intake Assessment completed via food log brought by patient  Breakfast: 11:00pm: banana  Snack: none   Lunch: 3:00pm: turkey sandwich or leftovers  Snack: none  Dinner: 8:00am: leftovers: porkchops or chicken, vegetable, sometimes rice or PB+J [place]  Snack: 8:00am-12:00pn: chips or plain white bread  Beverage intake: water  Protein supplement: none  Estimated protein intake per day: 30-60g  Estimated fluid intake per day: 40 oz water x 3-4 at work, then 32 oz x 4-5 at home  Meals eaten away from home: less than 1 meal per week  Typical meal pattern: 3 meals per day and several snacks per day  Eating Behaviors: Consumption of high calorie/ high fat foods and Large portion sizes  Food allergies or intolerances: No Known Allergies  Cultural or Quaker considerations: no red meat, no added salt    Physical Assessment  Physical Activity  Types of exercise: work is very physically demanding, sedentary at home  Current physical limitations: none    Psychosocial Assessment   Support systems: spouse friend(s)  Socioeconomic factors: works [employer] 7 days per week      Nutrition Diagnosis  Diagnosis: Overweight / Obesity (NC-3 3) and Excessive energy intake (NI-1 5)  Related to: Physical inactivity and Excessive energy intake  As Evidenced by: BMI >25, Excessive energy intake and Unintentional weight gain     Nutrition Prescription: Recommend the following diet  Regular    Interventions and Teaching   Discussed pre-op and post-op nutrition guidelines  Patient educated and handouts provided  Surgical changes to stomach / GI  Capacity of post-surgery stomach  Diet progression  Adequate hydration  Sugar and fat restriction to decrease "dumping syndrome"  Expected weight loss  Weight loss plateaus/ possibility of weight regain  Exercise  Suggestions for pre-op diet  Nutrition considerations after surgery  Protein supplements  Meal planning and preparation  Appropriate carbohydrate, protein, and fat intake, and food/fluid choices to maximize safe weight loss, nutrient intake, and tolerance   Dietary and lifestyle changes  Possible problems with poor eating habits  Techniques for self monitoring and keeping daily food journal  Potential for food intolerance after surgery, and ways to deal with them including: lactose intolerance, nausea, reflux, vomiting, diarrhea, food intolerance, appetite changes, gas  Vitamin / Mineral supplementation of Multivitamin with minerals and Vitamin D    Patient provided with gym coupon for [employer] Assessment: No    Education provided to: patient    Barriers to learning: No barriers identified  Readiness to change: preparation and action    Prior research on procedure: pre-op class and friends or family    Comprehension: needs reinforcement and verbalizes understanding     Expected Compliance: good  Recommendations  Pt is an appropriate candidate for surgery   Yes    Evaluation / Monitoring  Dietitian to Monitor: Eating pattern as discussed Tolerance of nutrition prescription Body weight Lab values Physical activity    Goals  Food journal, Exercise 30 minutes 5 times per week, Complete lession plans 1-6, Eat 3 meals per day and Eliminate mindless snacking    Time Spent:   1 Hour

## 2019-07-14 DIAGNOSIS — Q24.5 MYOCARDIAL BRIDGE: ICD-10-CM

## 2019-07-15 RX ORDER — METOPROLOL SUCCINATE 25 MG/1
25 TABLET, EXTENDED RELEASE ORAL DAILY
Qty: 90 TABLET | Refills: 1 | Status: SHIPPED | OUTPATIENT
Start: 2019-07-15 | End: 2020-01-14 | Stop reason: SDUPTHER

## 2019-07-25 ENCOUNTER — TELEPHONE (OUTPATIENT)
Dept: FAMILY MEDICINE CLINIC | Facility: CLINIC | Age: 54
End: 2019-07-25

## 2019-07-25 DIAGNOSIS — M54.6 THORACIC SPINE PAIN: ICD-10-CM

## 2019-07-25 RX ORDER — TRAMADOL HYDROCHLORIDE 50 MG/1
50 TABLET ORAL EVERY 6 HOURS PRN
Qty: 20 TABLET | Refills: 0 | Status: CANCELLED | OUTPATIENT
Start: 2019-07-25

## 2019-07-25 RX ORDER — TRAMADOL HYDROCHLORIDE 50 MG/1
50 TABLET ORAL EVERY 6 HOURS PRN
Qty: 20 TABLET | Refills: 0 | OUTPATIENT
Start: 2019-07-25 | End: 2019-07-25 | Stop reason: SDUPTHER

## 2019-07-25 RX ORDER — TRAMADOL HYDROCHLORIDE 50 MG/1
50 TABLET ORAL EVERY 6 HOURS PRN
Qty: 20 TABLET | Refills: 0 | Status: SHIPPED | OUTPATIENT
Start: 2019-07-25 | End: 2019-10-18

## 2019-07-25 NOTE — TELEPHONE ENCOUNTER
[facility] pharmacy manager, will not refill pt's tramadol  States that because pt states clonazepam regularly they will not refill both medications for the pt  They would need to speak with Dr Roseanne Young regarding a treatment plan  Either Dr Roseanne Young calls [facility] to discuss or they will disregard the tramadol and not refill it    Dr Roseanne Young can call [facility] at [phone]  [facility]

## 2019-07-25 NOTE — TELEPHONE ENCOUNTER
Works for me, let the pharmacy to [address] [place] [address] the script   Let pt know that the pharm will not fill the med because of his clonazapam   Pt will need a follow up appt for a further refill and we will need to do a drug database lookup, etc at the point of care

## 2019-07-26 NOTE — TELEPHONE ENCOUNTER
Pt aware of Dr Haven Mccain notes and will call back to make an appointment once he knows his schedule     Fiona Horner MA

## 2019-07-29 ENCOUNTER — OFFICE VISIT (OUTPATIENT)
Dept: CARDIOLOGY CLINIC | Facility: CLINIC | Age: 54
End: 2019-07-29
Payer: COMMERCIAL

## 2019-07-29 VITALS
BODY MASS INDEX: 40.04 KG/M2 | SYSTOLIC BLOOD PRESSURE: 118 MMHG | WEIGHT: 286 LBS | HEART RATE: 86 BPM | HEIGHT: 71 IN | DIASTOLIC BLOOD PRESSURE: 70 MMHG

## 2019-07-29 DIAGNOSIS — E66.01 MORBID OBESITY (HCC): ICD-10-CM

## 2019-07-29 PROCEDURE — 99214 OFFICE O/P EST MOD 30 MIN: CPT | Performed by: INTERNAL MEDICINE

## 2019-07-29 NOTE — PROGRESS NOTES
Cardiology Follow Up    Karishma Page  1965  [id number]  [facility] [place] [address] [address]  [phone]    1  Morbid obesity ([facility] )  Ambulatory referral to Cardiology       Interval History: Followup for nonobstructive cad    DOing well  He now has no angina and feels very well       Problem List     NSTEMI (non-ST elevated myocardial infarction) ([facility])    Bipolar disorder, current episode mixed, moderate (HCC)    Hyponatremia    ROSI (obstructive sleep apnea)    Coronary artery disease involving native coronary artery of native heart with unstable angina pectoris ([facility])    Nicotine dependence with current use    Mixed hyperlipidemia    Migraine with aura and without status migrainosus, not intractable    Benign paroxysmal positional vertigo due to bilateral vestibular disorder        Past Medical History:   Diagnosis Date    [facility] disorder ([facility] )     Continuous positive airway pressure dependence     Heart disease     Hypercholesterolemia     Morbid obesity ([facility] )     [facility]-ST elevation [facility]     Sleep apnea     uses c pap    Umbilical hernia     Resolved 2015     Ventral hernia     Resolved 2015      Social History     Socioeconomic History    Marital status: /Civil Union     Spouse name: Not on file    Number of children: Not on file    Years of education: Not on file    Highest education level: Not on file   Occupational History    Not on file   Social Needs    Financial resource strain: Not on file    Food insecurity:     Worry: Not on file     Inability: Not on file    Transportation needs:     Medical: Not on file     Non-medical: Not on file   Tobacco Use    Smoking status: Former Smoker     Last attempt to quit: 2011     Years since quittin 5    Smokeless tobacco: Former User     Quit date: 2019    Tobacco comment: cigs Substance and Sexual Activity    Alcohol use: Not Currently     Frequency: Never    Drug use: Never    Sexual activity: Not on file   Lifestyle    Physical activity:     Days per week: Not on file     Minutes per session: Not on file    Stress: Not on file   Relationships    Social connections:     Talks on phone: Not on file     Gets together: Not on file     Attends [employer] service: Not on file     Active member of club or organization: Not on file     Attends meetings of clubs or organizations: Not on file     Relationship status: Not on file    Intimate partner violence:     Fear of current or ex partner: Not on file     Emotionally abused: Not on file     Physically abused: Not on file     Forced sexual activity: Not on file   Other Topics Concern    Not on file   Social History Narrative    Former smoker - As per [employer]    Full-time employment      Family History   Problem Relation Age of Onset    Lung cancer Mother     Brain cancer Mother     Diabetes Brother     Bipolar disorder Maternal Grandfather      Past Surgical History:   Procedure Laterality Date    SKIN SURGERY      TONSILLECTOMY      TOOTH EXTRACTION         Current Outpatient Medications:     aspirin (ECOTRIN LOW STRENGTH) 81 mg EC tablet, Take 1 tablet (81 mg total) by mouth daily, Disp: 30 tablet, Rfl: 0    atorvastatin (LIPITOR) 40 mg tablet, Take 1 tablet (40 mg total) by mouth daily, Disp: 90 tablet, Rfl: 3    butalbital-acetaminophen-caffeine-codeine (FIORICET WITH CODEINE) -97-30 MG per capsule, Take 1 capsule by mouth every 4 (four) hours as needed for headaches, Disp: , Rfl:     clonazePAM (KlonoPIN) 1 mg tablet, 1 mg 3 (three) times a day , Disp: , Rfl:     ergocalciferol (VITAMIN D2) 50,000 units, Take 50,000 Units by mouth once a week, Disp: , Rfl: 0    famotidine (PEPCID) 40 MG tablet, Take 1 tablet (40 mg total) by mouth daily, Disp: 30 tablet, Rfl: 3    isosorbide mononitrate (IMDUR) 30 mg 24 hr tablet, Take 1 tablet (30 mg total) by mouth daily, Disp: 30 tablet, Rfl: 3    meclizine (ANTIVERT) 25 mg tablet, Take 1 tablet (25 mg total) by mouth 3 (three) times a day as needed for dizziness, Disp: 30 tablet, Rfl: 1    metoprolol succinate (TOPROL-XL) 25 mg 24 hr tablet, Take 1 tablet (25 mg total) by mouth daily, Disp: 90 tablet, Rfl: 1    naproxen sodium (ANAPROX) 550 mg tablet, TAKE 1 TABLET BY MOUTH ONCE DAILY AS NEEDED FOR HEADACHE (MAY REPEAT 1 TIME AFTER 4 HOURS), Disp: , Rfl: 3    OXcarbazepine (TRILEPTAL) 600 mg tablet, Take 300 mg by mouth 2 (two) times a day, Disp: , Rfl:     paliperidone (INVEGA) 9 MG 24 hr tablet, Take 6 mg by mouth 2 (two) times a day , Disp: , Rfl:     rizatriptan (MAXALT) 10 MG tablet, Take 10 mg by mouth once as needed for migraine May repeat in 2 hours if needed, Disp: , Rfl:     traMADol (ULTRAM) 50 mg tablet, Take 1 tablet (50 mg total) by mouth every 6 (six) hours as needed for moderate pain, Disp: 20 tablet, Rfl: 0    traZODone (DESYREL) 300 MG tablet, Take 300 mg by mouth daily at bedtime, Disp: , Rfl:     venlafaxine (EFFEXOR-XR) 75 mg 24 hr capsule, Take 150 mg by mouth daily , Disp: , Rfl: 3  No Known Allergies    Labs:     Chemistry        Component Value Date/Time    K 3 6 06/18/2019 0750     06/18/2019 0750    CO2 25 06/18/2019 0750    BUN 16 06/18/2019 0750    BUN 17 06/01/2015 1333    CREATININE 0 83 06/18/2019 0750    CREATININE 0 88 06/01/2015 1333        Component Value Date/Time    CALCIUM 8 4 06/18/2019 0750    ALKPHOS 75 06/18/2019 0750    AST 26 06/18/2019 0750    ALT 54 06/18/2019 0750            No results found for: CHOL  Lab Results   Component Value Date    HDL 41 06/18/2019    HDL 37 (L) 05/15/2019     Lab Results   Component Value Date    LDLCALC 63 06/18/2019    LDLCALC 118 (H) 05/15/2019     Lab Results   Component Value Date    TRIG 62 06/18/2019    TRIG 116 05/15/2019     No results found for: CHOLHDL    Imaging: No results found        Review of Systems   Constitution: Negative  HENT: Negative  Eyes: Negative  Cardiovascular: Negative  Respiratory: Negative  Endocrine: Negative  Hematologic/Lymphatic: Negative  Skin: Negative  Musculoskeletal: Negative  Gastrointestinal: Negative  Genitourinary: Negative  Neurological: Negative  Psychiatric/Behavioral: Negative  Allergic/Immunologic: Negative  Vitals:    07/29/19 1108   BP: 118/70   Pulse: 86           Physical Exam   Constitutional: He is oriented to person, place, and time  No distress  HENT:   Mouth/Throat: No oropharyngeal exudate  Eyes: No scleral icterus  Neck: No JVD present  Cardiovascular: Normal rate and regular rhythm  Exam reveals no gallop and no friction rub  No murmur heard  Pulmonary/Chest: Effort normal and breath sounds normal  No stridor  No respiratory distress  He has no wheezes  Abdominal: Soft  Bowel sounds are normal  He exhibits no distension  There is no tenderness  There is no guarding  Musculoskeletal: He exhibits no edema  Neurological: He is alert and oriented to person, place, and time  Skin: Skin is warm and dry  He is not diaphoretic  Psychiatric: He has a normal mood and affect  His behavior is normal        Discussion/Summary:    Nonobstructive cad: He is now asymptomatic  He is on med rx for ? Microvascular dysfunction but he overall is feeling better  LDL is 63  HR and BP are stable  With regards to bariatric surgery, he is asymptomatic and overall doing well  Moderate risk but he is stable and no further preop testing is needed  The patient was counseled regarding diagnostic results, instructions for management, risk factor reductions, impressions  total time of encounter was 25 minutes and 15 minutes was spent counseling

## 2019-08-22 ENCOUNTER — OFFICE VISIT (OUTPATIENT)
Dept: BARIATRICS | Facility: CLINIC | Age: 54
End: 2019-08-22
Payer: COMMERCIAL

## 2019-08-22 ENCOUNTER — OFFICE VISIT (OUTPATIENT)
Dept: BARIATRICS | Facility: CLINIC | Age: 54
End: 2019-08-22

## 2019-08-22 VITALS
WEIGHT: 281.7 LBS | DIASTOLIC BLOOD PRESSURE: 70 MMHG | HEART RATE: 74 BPM | TEMPERATURE: 98.3 F | SYSTOLIC BLOOD PRESSURE: 130 MMHG | HEIGHT: 71 IN | BODY MASS INDEX: 39.44 KG/M2

## 2019-08-22 VITALS — BODY MASS INDEX: 39.29 KG/M2 | WEIGHT: 281.7 LBS

## 2019-08-22 DIAGNOSIS — F17.200 NICOTINE DEPENDENCE WITH CURRENT USE: Primary | ICD-10-CM

## 2019-08-22 DIAGNOSIS — E66.01 MORBID (SEVERE) OBESITY DUE TO EXCESS CALORIES (HCC): ICD-10-CM

## 2019-08-22 DIAGNOSIS — E66.01 MORBID (SEVERE) OBESITY DUE TO EXCESS CALORIES (HCC): Primary | ICD-10-CM

## 2019-08-22 PROCEDURE — RECHECK

## 2019-08-22 PROCEDURE — 99214 OFFICE O/P EST MOD 30 MIN: CPT | Performed by: SURGERY

## 2019-08-22 NOTE — LETTER
August 22, 2019     Valeria Griffin DO  [address] [address]    Patient: Davidson Carrasco   YOB: 1965   Date of Visit: 8/22/2019       Dear Dr Rosy Henning:    Thank you for referring Juan Jose Vann to me for evaluation  Below are my notes for this consultation  If you have questions, please do not hesitate to call me  I look forward to following your patient along with you  Sincerely,        Zacarias Norwood MD        CC: No Recipients  Zacarias Norwood MD  8/22/2019  2:43 PM  Sign at close encounter      BARIATRIC CONSULT-INITIAL - BARIATRIC [address] 49 y o  male MRN: [id number]  Unit/Bed#:  Encounter: [id number]      HPI:  Davidson Carrasco is a 49 y o  male who presents with morbid obesity to discuss weight loss options  Review of Systems   Constitutional: Negative  HENT: Negative  Eyes: Negative  Respiratory: Negative  Cardiovascular: Negative  Gastrointestinal: Negative  Endocrine: Negative  Genitourinary: Negative  Musculoskeletal: Negative  Skin: Negative  Neurological: Negative  Hematological: Negative  Psychiatric/Behavioral: Negative          Historical Information   Past Medical History:   Diagnosis Date    [facility] )     Continuous positive airway pressure dependence     Heart disease     Hypercholesterolemia     Morbid obesity (HCC)     [facility]     Sleep apnea     uses c pap    Umbilical hernia     Resolved 9/23/2015     Ventral hernia     Resolved 9/23/2015      Past Surgical History:   Procedure Laterality Date    SKIN SURGERY      TONSILLECTOMY      TOOTH EXTRACTION       Social History   Social History     Substance and Sexual Activity   Alcohol Use Not Currently    Frequency: Never     Social History     Substance and Sexual Activity   Drug Use Never     Social History     Tobacco Use   Smoking Status Former Smoker    Last attempt to quit: 2011    Years since quittin 6   Smokeless Tobacco Former User    Quit date: 2019   Tobacco Comment    cigs     Family History: non-contributory    Meds/Allergies   all medications and allergies reviewed  No Known Allergies    Objective       Current Vitals:   Blood Pressure: 130/70 (19 1430)  Pulse: 74 (19 1430)  Temperature: 98 3 °F (36 8 °C) (19)  Temp Source: Tympanic (19)  Height: 5' 11" (180 3 cm) (19)  Weight - Scale: 128 kg (281 lb 11 2 oz) (19)  Body mass index is 39 29 kg/m²  Invasive Devices     None                 Physical Exam   Constitutional: He is oriented to person, place, and time  He appears well-developed  HENT:   Head: Normocephalic and atraumatic  Eyes: Conjunctivae and EOM are normal    Neck: Normal range of motion  Neck supple  Cardiovascular: Normal rate, regular rhythm, normal heart sounds and intact distal pulses  Pulmonary/Chest: Effort normal and breath sounds normal    Abdominal: Soft  Bowel sounds are normal    Musculoskeletal: Normal range of motion  Neurological: He is alert and oriented to person, place, and time  He has normal reflexes  Skin: Skin is warm and dry  Psychiatric: He has a normal mood and affect  Lab Results: I have personally reviewed pertinent lab results  Imaging: I have personally reviewed pertinent reports  EKG, Pathology, and Other Studies: I have personally reviewed pertinent reports  Code Status: [unfilled]  Advance Directive and Living Will:      Power of :    POLST:      Assessment/PLAN:            Patient has a long history of morbid obesity and is presenting to discuss the surgical weight loss options  Despite the patient best efforts patient was unable to lose any meaningful or sustainable weight using nonsurgical means  We had a long discussion regarding all the surgical weight-loss options at our disposal at this point and reviewed the risks and benefits of each procedure in details as it relates to her age, BMI and medical conditions  Patient elected to undergo a gastric bypass    Risks and benefits were explained to the patient  We also discussed the importance and need of a preoperative workup to make sure that the patient can undergo the procedure safely  Preoperative workup includes sleep apnea screening, cardiac evaluation, nutrition/psych and preoperative EGD  Risks and benefits of all the preoperative diagnostic tests were discussed with the patient including but not limited to the upper endoscopy  Alternatives to surgery and alternative forms of surgery were also explained  Postsurgical commitment and aftercare programs were discussed and explained to the patient in details   In terms of comorbidities patient suffers mostly of   Past Medical History:   Diagnosis Date    B[facility] disorder ([facility] )     Continuous positive airway pressure dependence     Heart disease     Hypercholesterolemia     [facility]     NSTEMI (non-ST elevation myocardial infarction) ([facility] )     Sleep apnea     uses c pap    Umbilical hernia     Resolved 9/23/2015     Ventral hernia     Resolved 9/23/2015        I informed the patient that the rate of resolution of comorbid conditions following weight loss surgery is between 60 and 90% depending on the severity of the specific medical condition  I discussed and educated the patient regarding the different components of our multidisciplinary program and the importance of compliance and follow-up in the postoperative period  All questions answered  Patient understands risks and benefits  A videotape of the procedure was also shown to the patient  After showing the video we discussed all the technical aspects of the procedure and also the potential complications including but not limited to gastrointestinal perforation, leak, obstruction, stricture and hemorrhage   I spent 30 min with the patient more than 50% of the time was spent educating the patient and coordinating care

## 2019-08-22 NOTE — H&P (VIEW-ONLY)
BARIATRIC CONSULT-INITIAL - BARIATRIC SURGERY  Karishma Chapman 49 y o  male MRN: [id number]  Unit/Bed#:  Encounter: [id number]      HPI:  Karishma Chapman is a 49 y o  male who presents with morbid obesity to discuss weight loss options  Review of Systems   Constitutional: Negative  HENT: Negative  Eyes: Negative  Respiratory: Negative  Cardiovascular: Negative  Gastrointestinal: Negative  Endocrine: Negative  Genitourinary: Negative  Musculoskeletal: Negative  Skin: Negative  Neurological: Negative  Hematological: Negative  Psychiatric/Behavioral: Negative          Historical Information   Past Medical History:   Diagnosis Date    [facility]     Continuous positive airway pressure dependence     Heart disease     Hypercholesterolemia     Morbid obesity (HCC)     NSTEMI (non-ST elevation myocardial infarction) ([facility]     Sleep apnea     uses c pap    Umbilical hernia     Resolved 2015     Ventral hernia     Resolved 2015      Past Surgical History:   Procedure Laterality Date    SKIN SURGERY      TONSILLECTOMY      TOOTH EXTRACTION       Social History   Social History     Substance and Sexual Activity   Alcohol Use Not Currently    Frequency: Never     Social History     Substance and Sexual Activity   Drug Use Never     Social History     Tobacco Use   Smoking Status Former Smoker    Last attempt to quit: 2011    Years since quittin 6   Smokeless Tobacco Former User    Quit date: 2019   Tobacco Comment    cigs     Family History: non-contributory    Meds/Allergies   all medications and allergies reviewed  No Known Allergies    Objective       Current Vitals:   Blood Pressure: 130/70 (19 1430)  Pulse: 74 (19 1430)  Temperature: 98 3 °F (36 8 °C) (19 1430)  Temp Source: Tympanic (19 1430)  Height: 5' 11" (180 3 cm) (19 1430)  Weight - Scale: 128 kg (281 lb 11 2 oz) (19 1430)  Body mass index is 39 29 kg/m²  Invasive Devices     None                 Physical Exam   Constitutional: He is oriented to person, place, and time  He appears well-developed  HENT:   Head: Normocephalic and atraumatic  Eyes: Conjunctivae and EOM are normal    Neck: Normal range of motion  Neck supple  Cardiovascular: Normal rate, regular rhythm, normal heart sounds and intact distal pulses  Pulmonary/Chest: Effort normal and breath sounds normal    Abdominal: Soft  Bowel sounds are normal    Musculoskeletal: Normal range of motion  Neurological: He is alert and oriented to person, place, and time  He has normal reflexes  Skin: Skin is warm and dry  Psychiatric: He has a normal mood and affect  Lab Results: I have personally reviewed pertinent lab results  Imaging: I have personally reviewed pertinent reports  EKG, Pathology, and Other Studies: I have personally reviewed pertinent reports  Code Status: [unfilled]  Advance Directive and Living Will:      Power of :    POLST:      Assessment/PLAN:            Patient has a long history of morbid obesity and is presenting to discuss the surgical weight loss options  Despite the patient best efforts patient was unable to lose any meaningful or sustainable weight using nonsurgical means  We had a long discussion regarding all the surgical weight-loss options at our disposal at this point and reviewed the risks and benefits of each procedure in details as it relates to her age, BMI and medical conditions  Patient elected to undergo a gastric bypass    Risks and benefits were explained to the patient  We also discussed the importance and need of a preoperative workup to make sure that the patient can undergo the procedure safely  Preoperative workup includes sleep apnea screening, cardiac evaluation, nutrition/psych and preoperative EGD   Risks and benefits of all the preoperative diagnostic tests were discussed with the patient including but not limited to the upper endoscopy  Alternatives to surgery and alternative forms of surgery were also explained  Postsurgical commitment and aftercare programs were discussed and explained to the patient in details   In terms of comorbidities patient suffers mostly of   Past Medical History:   Diagnosis Date    [facility] disorder ([facility] )     Continuous positive airway pressure dependence     Heart disease     Hypercholesterolemia     Morbid obesity ([facility] )     NSTEMI (non-ST elevation myocardial infarction) ([facility] )     Sleep apnea     uses c pap    Umbilical hernia     Resolved 9/23/2015     Ventral hernia     Resolved 9/23/2015        I informed the patient that the rate of resolution of comorbid conditions following weight loss surgery is between 60 and 90% depending on the severity of the specific medical condition  I discussed and educated the patient regarding the different components of our multidisciplinary program and the importance of compliance and follow-up in the postoperative period  All questions answered  Patient understands risks and benefits  A videotape of the procedure was also shown to the patient  After showing the video we discussed all the technical aspects of the procedure and also the potential complications including but not limited to gastrointestinal perforation, leak, obstruction, stricture and hemorrhage  I spent 30 min with the patient more than 50% of the time was spent educating the patient and coordinating care

## 2019-08-22 NOTE — PROGRESS NOTES
WT CHK  Patient lost 14 lbs and met his GW for surgery  Patient works night shift as  for [employer]  Very active on job, lots of steps and physical work  Patient drinks adequate water  Son has provided protein drinks; patient has one when he gets home from work; works 11p - 7a, sleeps from 12:30 pm-9:30 pm then goes to work  Wife is providing support by preparing his meals and using portion containers  Reviewed workflow; all complete except labs; will meet with surgeon later today

## 2019-08-22 NOTE — PROGRESS NOTES
BARIATRIC CONSULT-INITIAL - BARIATRIC SURGERY  Jasper Nj 49 y o  male MRN: [id number]  Unit/Bed#:  Encounter: [id number]      HPI:  Jasper Nj is a 49 y o  male who presents with morbid obesity to discuss weight loss options  Review of Systems   Constitutional: Negative  HENT: Negative  Eyes: Negative  Respiratory: Negative  Cardiovascular: Negative  Gastrointestinal: Negative  Endocrine: Negative  Genitourinary: Negative  Musculoskeletal: Negative  Skin: Negative  Neurological: Negative  Hematological: Negative  Psychiatric/Behavioral: Negative          Historical Information   Past Medical History:   Diagnosis Date    [facility]     Continuous positive airway pressure dependence     Heart disease     Hypercholesterolemia     Morbid obesity (HCC)     NSTEMI (non-ST elevation myocardial infarction) ([facility] )     Sleep apnea     uses c pap    Umbilical hernia     Resolved 2015     Ventral hernia     Resolved 2015      Past Surgical History:   Procedure Laterality Date    SKIN SURGERY      TONSILLECTOMY      TOOTH EXTRACTION       Social History   Social History     Substance and Sexual Activity   Alcohol Use Not Currently    Frequency: Never     Social History     Substance and Sexual Activity   Drug Use Never     Social History     Tobacco Use   Smoking Status Former Smoker    Last attempt to quit: 2011    Years since quittin 6   Smokeless Tobacco Former User    Quit date: 2019   Tobacco Comment    cigs     Family History: non-contributory    Meds/Allergies   all medications and allergies reviewed  No Known Allergies    Objective       Current Vitals:   Blood Pressure: 130/70 (19 1430)  Pulse: 74 (19 1430)  Temperature: 98 3 °F (36 8 °C) (19 1430)  Temp Source: Tympanic (19 1430)  Height: 5' 11" (180 3 cm) (19 1430)  Weight - Scale: 128 kg (281 lb 11 2 oz) (19 1430)  Body mass index is 39 29 kg/m²  Invasive Devices     None                 Physical Exam   Constitutional: He is oriented to person, place, and time  He appears well-developed  HENT:   Head: Normocephalic and atraumatic  Eyes: Conjunctivae and EOM are normal    Neck: Normal range of motion  Neck supple  Cardiovascular: Normal rate, regular rhythm, normal heart sounds and intact distal pulses  Pulmonary/Chest: Effort normal and breath sounds normal    Abdominal: Soft  Bowel sounds are normal    Musculoskeletal: Normal range of motion  Neurological: He is alert and oriented to person, place, and time  He has normal reflexes  Skin: Skin is warm and dry  Psychiatric: He has a normal mood and affect  Lab Results: I have personally reviewed pertinent lab results  Imaging: I have personally reviewed pertinent reports  EKG, Pathology, and Other Studies: I have personally reviewed pertinent reports  Code Status: [unfilled]  Advance Directive and Living Will:      Power of :    POLST:      Assessment/PLAN:            Patient has a long history of morbid obesity and is presenting to discuss the surgical weight loss options  Despite the patient best efforts patient was unable to lose any meaningful or sustainable weight using nonsurgical means  We had a long discussion regarding all the surgical weight-loss options at our disposal at this point and reviewed the risks and benefits of each procedure in details as it relates to her age, BMI and medical conditions  Patient elected to undergo a gastric bypass    Risks and benefits were explained to the patient  We also discussed the importance and need of a preoperative workup to make sure that the patient can undergo the procedure safely  Preoperative workup includes sleep apnea screening, cardiac evaluation, nutrition/psych and preoperative EGD   Risks and benefits of all the preoperative diagnostic tests were discussed with the patient including but not limited to the upper endoscopy  Alternatives to surgery and alternative forms of surgery were also explained  Postsurgical commitment and aftercare programs were discussed and explained to the patient in details   In terms of comorbidities patient suffers mostly of   Past Medical History:   Diagnosis Date    [facility]ipolar disorder ([facility] )     Continuous positive airway pressure dependence     Heart disease     Hypercholesterolemia     Morbid obesity ([facility] )     NSTEMI (non-ST elevation myocardial infarction) ([facility] )     Sleep apnea     uses c pap    Umbilical hernia     Resolved 9/23/2015     Ventral hernia     Resolved 9/23/2015        I informed the patient that the rate of resolution of comorbid conditions following weight loss surgery is between 60 and 90% depending on the severity of the specific medical condition  I discussed and educated the patient regarding the different components of our multidisciplinary program and the importance of compliance and follow-up in the postoperative period  All questions answered  Patient understands risks and benefits  A videotape of the procedure was also shown to the patient  After showing the video we discussed all the technical aspects of the procedure and also the potential complications including but not limited to gastrointestinal perforation, leak, obstruction, stricture and hemorrhage  I spent 30 min with the patient more than 50% of the time was spent educating the patient and coordinating care

## 2019-08-31 ENCOUNTER — TRANSCRIBE ORDERS (OUTPATIENT)
Dept: ADMINISTRATIVE | Age: 54
End: 2019-08-31

## 2019-08-31 ENCOUNTER — APPOINTMENT (OUTPATIENT)
Dept: LAB | Age: 54
End: 2019-08-31
Payer: COMMERCIAL

## 2019-08-31 DIAGNOSIS — F17.200 NICOTINE DEPENDENCE WITH CURRENT USE: ICD-10-CM

## 2019-08-31 DIAGNOSIS — E55.9 VITAMIN D DEFICIENCY: ICD-10-CM

## 2019-08-31 DIAGNOSIS — E55.9 VITAMIN D DEFICIENCY: Primary | ICD-10-CM

## 2019-08-31 LAB — 25(OH)D3 SERPL-MCNC: 36.4 NG/ML (ref 30–100)

## 2019-08-31 PROCEDURE — 80323 ALKALOIDS NOS: CPT

## 2019-08-31 PROCEDURE — 82306 VITAMIN D 25 HYDROXY: CPT

## 2019-08-31 PROCEDURE — G0480 DRUG TEST DEF 1-7 CLASSES: HCPCS

## 2019-08-31 PROCEDURE — 36415 COLL VENOUS BLD VENIPUNCTURE: CPT

## 2019-09-04 LAB
COTININE SERPL-MCNC: 2.9 NG/ML
NICOTINE SERPL-MCNC: NORMAL NG/ML

## 2019-09-10 ENCOUNTER — ANESTHESIA EVENT (OUTPATIENT)
Dept: GASTROENTEROLOGY | Facility: HOSPITAL | Age: 54
End: 2019-09-10

## 2019-09-11 ENCOUNTER — ANESTHESIA (OUTPATIENT)
Dept: GASTROENTEROLOGY | Facility: HOSPITAL | Age: 54
End: 2019-09-11

## 2019-09-11 ENCOUNTER — HOSPITAL ENCOUNTER (OUTPATIENT)
Dept: GASTROENTEROLOGY | Facility: HOSPITAL | Age: 54
Setting detail: OUTPATIENT SURGERY
Discharge: HOME | End: 2019-09-11
Attending: SURGERY
Payer: COMMERCIAL

## 2019-09-11 VITALS
RESPIRATION RATE: 20 BRPM | HEART RATE: 68 BPM | DIASTOLIC BLOOD PRESSURE: 63 MMHG | HEIGHT: 72 IN | WEIGHT: 272 LBS | SYSTOLIC BLOOD PRESSURE: 108 MMHG | TEMPERATURE: 98.2 F | BODY MASS INDEX: 36.84 KG/M2 | OXYGEN SATURATION: 94 %

## 2019-09-11 DIAGNOSIS — E66.01 MORBID OBESITY (HCC): ICD-10-CM

## 2019-09-11 PROCEDURE — 88305 TISSUE EXAM BY PATHOLOGIST: CPT | Performed by: PATHOLOGY

## 2019-09-11 PROCEDURE — 88342 IMHCHEM/IMCYTCHM 1ST ANTB: CPT | Performed by: PATHOLOGY

## 2019-09-11 PROCEDURE — 43239 EGD BIOPSY SINGLE/MULTIPLE: CPT | Performed by: SURGERY

## 2019-09-11 RX ORDER — SODIUM CHLORIDE 9 MG/ML
125 INJECTION, SOLUTION INTRAVENOUS CONTINUOUS
Status: DISCONTINUED | OUTPATIENT
Start: 2019-09-11 | End: 2019-09-15 | Stop reason: HOSPADM

## 2019-09-11 RX ORDER — LIDOCAINE HYDROCHLORIDE 20 MG/ML
INJECTION, SOLUTION EPIDURAL; INFILTRATION; INTRACAUDAL; PERINEURAL AS NEEDED
Status: DISCONTINUED | OUTPATIENT
Start: 2019-09-11 | End: 2019-09-11 | Stop reason: SURG

## 2019-09-11 RX ORDER — PROPOFOL 10 MG/ML
INJECTION, EMULSION INTRAVENOUS AS NEEDED
Status: DISCONTINUED | OUTPATIENT
Start: 2019-09-11 | End: 2019-09-11 | Stop reason: SURG

## 2019-09-11 RX ADMIN — PROPOFOL 150 MG: 10 INJECTION, EMULSION INTRAVENOUS at 09:35

## 2019-09-11 RX ADMIN — LIDOCAINE HYDROCHLORIDE 60 MG: 20 INJECTION, SOLUTION EPIDURAL; INFILTRATION; INTRACAUDAL; PERINEURAL at 09:35

## 2019-09-11 RX ADMIN — PROPOFOL 50 MG: 10 INJECTION, EMULSION INTRAVENOUS at 09:38

## 2019-09-11 RX ADMIN — SODIUM CHLORIDE 125 ML/HR: 0.9 INJECTION, SOLUTION INTRAVENOUS at 09:10

## 2019-09-11 RX ADMIN — SODIUM CHLORIDE 125 ML/HR: 0.9 INJECTION, SOLUTION INTRAVENOUS at 07:47

## 2019-09-11 NOTE — ANESTHESIA PREPROCEDURE EVALUATION
Review of Systems/Medical History  Patient summary reviewed  Chart reviewed  No history of anesthetic complications     Cardiovascular  Hyperlipidemia, Past MI , CAD ,   Comment: 2019    LEFT VENTRICLE: Size was normal  Systolic function was normal by visual assessment  Ejection fraction was estimated to be 60 %  There were no regional wall motion abnormalities  Wall thickness was mildly increased  DOPPLER: There was an  increased relative contribution of atrial contraction to ventricular filling  Doppler parameters were consistent with abnormal left ventricular relaxation (grade 1 diastolic dysfunction)      RIGHT VENTRICLE: The size was normal  Systolic function was normal  DOPPLER: Systolic pressure was not estimated      LEFT ATRIUM: Size was normal      RIGHT ATRIUM: Size was normal      MITRAL VALVE: Valve structure was normal  There was normal leaflet separation  DOPPLER: The transmitral velocity was within the normal range  There was no evidence for stenosis  There was no regurgitation      AORTIC VALVE: The valve was trileaflet  Leaflets exhibited normal thickness, mild calcification, and normal cuspal separation  DOPPLER: Transaortic velocity was within the normal range  There was no evidence for stenosis   There was no  regurgitation    ,  Pulmonary  Smoker ex-smoker  , Sleep apnea CPAP,        GI/Hepatic  Negative GI/hepatic ROS          Negative  ROS        Endo/Other    Obesity  morbid obesity   GYN  Negative gynecology ROS          Hematology  Negative hematology ROS      Musculoskeletal    Arthritis     Neurology    Headaches,    Psychology   Psychiatric history, Depression , bipolar disorder,              Physical Exam    Airway    Mallampati score: II  TM Distance: >3 FB  Neck ROM: full     Dental   No notable dental hx     Cardiovascular  Rhythm: regular, Rate: normal, Cardiovascular exam normal    Pulmonary  Pulmonary exam normal Breath sounds clear to auscultation,     Other Findings        Anesthesia Plan  ASA Score- 3     Anesthesia Type- IV sedation with anesthesia and general with ASA Monitors  Additional Monitors:   Airway Plan:         Plan Factors-    Induction-     Postoperative Plan-     Informed Consent- Anesthetic plan and risks discussed with patient

## 2019-09-11 NOTE — INTERVAL H&P NOTE
H&P reviewed  After examining the patient I find no changes in the patients condition since the H&P had been written      Vitals:    09/11/19 0725   BP: 131/61   Pulse: 88   Resp: 16   Temp: 98 2 °F (36 8 °C)   SpO2: 93%

## 2019-09-11 NOTE — PROGRESS NOTES
D/C instructions given and prt verbalizes understanding  Dr Marsical Massed here to talk with pt and wife  OOb to ambulate, gait steady denies dizziness

## 2019-09-11 NOTE — DISCHARGE INSTRUCTIONS
Gastritis   WHAT YOU NEED TO KNOW:   Gastritis is inflammation or irritation of the lining of your stomach  DISCHARGE INSTRUCTIONS:   Call 911 for any of the following:   · You develop chest pain or shortness of breath  Seek care immediately if:   · You vomit blood  · You have black or bloody bowel movements  · You have severe stomach or back pain  Contact your healthcare provider if:   · You have a fever  · You have new or worsening symptoms, even after treatment  · You have questions or concerns about your condition or care  Medicines:   · Medicines  may be given to help treat a bacterial infection or decrease stomach acid  · Take your medicine as directed  Contact your healthcare provider if you think your medicine is not helping or if you have side effects  Tell him or her if you are allergic to any medicine  Keep a list of the medicines, vitamins, and herbs you take  Include the amounts, and when and why you take them  Bring the list or the pill bottles to follow-up visits  Carry your medicine list with you in case of an emergency  Manage or prevent gastritis:   · Do not smoke  Nicotine and other chemicals in cigarettes and cigars can make your symptoms worse and cause lung damage  Ask your healthcare provider for information if you currently smoke and need help to quit  E-cigarettes or smokeless tobacco still contain nicotine  Talk to your healthcare provider before you use these products  · Do not drink alcohol  Alcohol can prevent healing and make your gastritis worse  Talk to your healthcare provider if you need help to stop drinking  · Do not take NSAIDs or aspirin unless directed  These and similar medicines can cause irritation  If your healthcare provider says it is okay to take NSAIDs, take them with food  · Do not eat foods that cause irritation  Foods such as oranges and salsa can cause burning or pain  Eat a variety of healthy foods   Examples include fruits (not citrus), vegetables, low-fat dairy products, beans, whole-grain breads, and lean meats and fish  Try to eat small meals, and drink water with your meals  Do not eat for at least 3 hours before you go to bed  · Find ways to relax and decrease stress  Stress can increase stomach acid and make gastritis worse  Activities such as yoga, meditation, or listening to music can help you relax  Spend time with friends, or do things you enjoy  Follow up with your healthcare provider as directed: You may need ongoing tests or treatment, or referral to a gastroenterologist  Write down your questions so you remember to ask them during your visits  © 2017 [employer] Information is for End User's use only and may not be sold, redistributed or otherwise used for commercial purposes  All illustrations and images included in CareNotes® are the copyrighted property of A D [employer] M , Inc  or [employer]  The above information is an  only  It is not intended as medical advice for individual conditions or treatments  Talk to your doctor, nurse or pharmacist before following any medical regimen to see if it is safe and effective for you  Upper Endoscopy   WHAT YOU NEED TO KNOW:   An upper endoscopy is also called an upper gastrointestinal (GI) endoscopy, or an esophagogastroduodenoscopy (EGD)  You may feel bloated, gassy, or have some abdominal discomfort after your procedure  Your throat may be sore for 24 to 36 hours  You may burp or pass gas from air that is still inside your body  DISCHARGE INSTRUCTIONS:   Call 911 for any of the following:   · You have sudden chest pain or trouble breathing  Seek care immediately if:   · You feel dizzy or faint  · You have trouble swallowing  · Your bowel movements are very dark or black  · Your abdomen is hard and firm and you have severe pain  · You vomit blood    Contact your healthcare provider if:   · You feel full or bloated and cannot burp or pass gas  · You have not had a bowel movement for 3 days after your procedure  · You have neck pain  · You have a fever or chills  · You have nausea or are vomiting  · You have a rash or hives  · You have questions or concerns about your endoscopy  Relieve a sore throat:  Suck on throat lozenges or crushed ice  Gargle with a small amount of warm salt water  Mix 1 teaspoon of salt and 1 cup of warm water to make salt water  Relieve gas and discomfort from bloating:  Lie on your right side with a heating pad on your abdomen  Take short walks to help pass gas  Eat small meals until bloating is relieved  Rest after your procedure: You have been given medicine to relax you  Do not  drive or make important decisions until the day after your procedure  Return to your normal activity as directed  You can usually return to work the day after your procedure  Follow up with your healthcare provider as directed:  Write down your questions so you remember to ask them during your visits  © 2017 [employer] [employer] is for End User's use only and may not be sold, redistributed or otherwise used for commercial purposes  All illustrations and images included in CareNotes® are the copyrighted property of A D [employer] M [employer] Inc  or [employer]  The above information is an  only  It is not intended as medical advice for individual conditions or treatments  Talk to your doctor, nurse or pharmacist before following any medical regimen to see if it is safe and effective for you

## 2019-09-13 ENCOUNTER — OFFICE VISIT (OUTPATIENT)
Dept: FAMILY MEDICINE CLINIC | Facility: CLINIC | Age: 54
End: 2019-09-13
Payer: COMMERCIAL

## 2019-09-13 VITALS
HEIGHT: 72 IN | BODY MASS INDEX: 37.65 KG/M2 | HEART RATE: 68 BPM | WEIGHT: 278 LBS | RESPIRATION RATE: 16 BRPM | OXYGEN SATURATION: 93 % | TEMPERATURE: 97.3 F | DIASTOLIC BLOOD PRESSURE: 90 MMHG | SYSTOLIC BLOOD PRESSURE: 130 MMHG

## 2019-09-13 DIAGNOSIS — L03.116 CELLULITIS OF LEFT LOWER EXTREMITY: Primary | ICD-10-CM

## 2019-09-13 PROCEDURE — 99213 OFFICE O/P EST LOW 20 MIN: CPT | Performed by: FAMILY MEDICINE

## 2019-09-13 RX ORDER — FROVATRIPTAN SUCCINATE 2.5 MG/1
2.5 TABLET, FILM COATED ORAL ONCE AS NEEDED
Status: ON HOLD | COMMUNITY
Start: 2019-09-04 | End: 2020-07-16 | Stop reason: CLARIF

## 2019-09-13 RX ORDER — SULFAMETHOXAZOLE AND TRIMETHOPRIM 800; 160 MG/1; MG/1
1 TABLET ORAL EVERY 12 HOURS SCHEDULED
Qty: 20 TABLET | Refills: 0 | Status: SHIPPED | OUTPATIENT
Start: 2019-09-13 | End: 2019-09-23

## 2019-09-13 NOTE — PROGRESS NOTES
Assessment/Plan:     Diagnoses and all orders for this visit:    Cellulitis of left lower extremity  -     sulfamethoxazole-trimethoprim (BACTRIM DS) 800-160 mg per tablet; Take 1 tablet by mouth every 12 (twelve) hours for 10 days  - Pt told to return if symptoms worsen or do not improve  Subjective:      Patient ID: Anne Pascual is a 49 y o  male  HPI  Denver Marina is a 50 yo M who presents today for evaluation of redness, warmth, tenderness of his left lower extremity  Pt states he has had cellulitis in the past in the same location, occurs approx once a year and he is always treated with antibiotics  Denies fevers, chills, fatigue, weakness, nausea, vomiting, loss of sensation, weakness  The following portions of the patient's history were reviewed and updated as appropriate: allergies, current medications, past family history, past medical history, past social history, past surgical history and problem list     Review of Systems   Constitutional: Negative for activity change, appetite change, chills, diaphoresis, fatigue and fever  HENT: Negative for congestion, postnasal drip, rhinorrhea, sinus pressure, sneezing and sore throat  Eyes: Negative  Respiratory: Negative for cough, choking, chest tightness, shortness of breath and wheezing  Cardiovascular: Negative for chest pain and leg swelling  Gastrointestinal: Negative for abdominal distention, anal bleeding, blood in stool, constipation, diarrhea, nausea and vomiting  Genitourinary: Negative  Musculoskeletal: Negative for arthralgias, gait problem and myalgias  Skin: Positive for color change  Negative for pallor, rash and wound  Neurological: Negative for dizziness, tremors, syncope, weakness, light-headedness, numbness and headaches  Psychiatric/Behavioral: Negative            Objective:      /90   Pulse 68   Temp (!) 97 3 °F (36 3 °C)   Resp 16   Ht 6' (1 829 m)   Wt 126 kg (278 lb)   SpO2 93%   BMI 37 70 kg/m²          Physical Exam   Constitutional: He is oriented to person, place, and time  He appears well-developed and well-nourished  No distress  Cardiovascular: Normal rate, regular rhythm, normal heart sounds and intact distal pulses  Exam reveals no gallop and no friction rub  No murmur heard  Pulmonary/Chest: Effort normal and breath sounds normal  No stridor  No respiratory distress  He has no wheezes  He has no rales  He exhibits no tenderness  Neurological: He is alert and oriented to person, place, and time  Skin: He is not diaphoretic  Erythema, warmth and slight tenderness noted in the left lower extremity from the ankle to the shin  Minimal swelling  No obvious injury or discharge

## 2019-09-19 ENCOUNTER — TELEPHONE (OUTPATIENT)
Dept: FAMILY MEDICINE CLINIC | Facility: CLINIC | Age: 54
End: 2019-09-19

## 2019-09-19 NOTE — TELEPHONE ENCOUNTER
Chandler asking if Dr Oliva Lopez could write a note for his insurance company stating that bariatric surgery is necessary  His insurance company is denying surgery  Is this something dr Oliva Lopez can do for him?

## 2019-09-20 ENCOUNTER — TELEPHONE (OUTPATIENT)
Dept: CARDIOLOGY CLINIC | Facility: CLINIC | Age: 54
End: 2019-09-20

## 2019-09-20 NOTE — TELEPHONE ENCOUNTER
Pt insurance co denying Bariatric surgery  They are appealing it, but pt asking for a letter from cardiology stating how surgery would be beneficial for pt

## 2019-09-25 NOTE — TELEPHONE ENCOUNTER
Pt has called back three times  I explained Dr Laura Ovalles was on vacation and PCP already wrote a letter discussing his heart disease  Pt stated he is asking for a letter from every provider

## 2019-10-04 ENCOUNTER — OFFICE VISIT (OUTPATIENT)
Dept: FAMILY MEDICINE CLINIC | Facility: CLINIC | Age: 54
End: 2019-10-04
Payer: COMMERCIAL

## 2019-10-04 VITALS
TEMPERATURE: 98.1 F | OXYGEN SATURATION: 91 % | WEIGHT: 280 LBS | BODY MASS INDEX: 37.93 KG/M2 | RESPIRATION RATE: 16 BRPM | SYSTOLIC BLOOD PRESSURE: 118 MMHG | HEART RATE: 71 BPM | DIASTOLIC BLOOD PRESSURE: 70 MMHG | HEIGHT: 72 IN

## 2019-10-04 DIAGNOSIS — R32 URINARY INCONTINENCE, UNSPECIFIED TYPE: ICD-10-CM

## 2019-10-04 DIAGNOSIS — I25.110 CORONARY ARTERY DISEASE INVOLVING NATIVE CORONARY ARTERY OF NATIVE HEART WITH UNSTABLE ANGINA PECTORIS (HCC): ICD-10-CM

## 2019-10-04 DIAGNOSIS — M54.12 RADICULITIS INVOLVING UPPER EXTREMITY: ICD-10-CM

## 2019-10-04 DIAGNOSIS — M54.16 LUMBAR RADICULOPATHY: Primary | ICD-10-CM

## 2019-10-04 DIAGNOSIS — G43.109 MIGRAINE WITH AURA AND WITHOUT STATUS MIGRAINOSUS, NOT INTRACTABLE: ICD-10-CM

## 2019-10-04 PROCEDURE — 99214 OFFICE O/P EST MOD 30 MIN: CPT | Performed by: FAMILY MEDICINE

## 2019-10-04 RX ORDER — CYCLOBENZAPRINE HCL 10 MG
10 TABLET ORAL
Qty: 21 TABLET | Refills: 0 | Status: SHIPPED | OUTPATIENT
Start: 2019-10-04 | End: 2019-10-18

## 2019-10-04 RX ORDER — PREDNISONE 20 MG/1
TABLET ORAL
Qty: 32 TABLET | Refills: 0 | Status: SHIPPED | OUTPATIENT
Start: 2019-10-04 | End: 2019-10-18

## 2019-10-04 NOTE — PROGRESS NOTES
Assessment/Plan:    1  Lumbar radiculopathy  -     MRI lumbar spine wo contrast; Future; Expected date: 10/04/2019  -     Ambulatory referral to Physical Therapy; Future  -     predniSONE 20 mg tablet; 4 tabs for three days, 3 tabs for three days, 2 tabs for three days, 1 tab for three days, 1/2 tab for 4 days  -     cyclobenzaprine (FLEXERIL) 10 mg tablet; Take 1 tablet (10 mg total) by mouth daily at bedtime for 21 days    2  Urinary incontinence, unspecified type  -     MRI lumbar spine wo contrast; Future; Expected date: 10/04/2019    3  Radiculitis involving upper extremity  -     XR spine cervical complete 4 or 5 vw non injury; Future; Expected date: 10/04/2019    4  Migraine with aura and without status migrainosus, not intractable  Assessment & Plan:  Pt has called Dr Nayan Schmidt as he has been getting migrains daily and states he is calling him in new meds      5  Coronary artery disease involving native coronary artery of native heart with unstable angina pectoris [facility])  Assessment & Plan:  No chest pain at this time          BMI Counseling: Body mass index is 37 97 kg/m²  Discussed the patient's BMI with him  The BMI is above normal  Nutrition recommendations include reducing portion sizes  There are no Patient Instructions on file for this visit  Return in about 2 weeks (around 10/18/2019) for Recheck  Subjective:      Patient ID: Roger Fontana is a 49 y o  male  Chief Complaint   Patient presents with    Back Pain     vfrma       Pt has an appt for back pain  Pt states he hurt his back at work last Monday night - 2 weeks ago  PT states wed he was sent to the company doc, was placed on restricted duty  Pt states the company feels pt has arthritis in his back and they are saying his back pain is a pre existing condition and they want me to release him tyo go back to work when its time    Pt states he still has back pain as well as tightness across his lower back  PT states when he was seen by the company doc he was not given any meds, he was advised to take tylenol  When he was seen initially he could not walk, he can now walk wiothout dragging his leg, but he still has pain in his lower back wrapping around his Back B/L and goes devon the left leg on the back    Pt states he always has had trouble holding urine and it has been worse since the injury to the back  Pt has injured his back in the past over 30 years ago    Pt states while he is here for the last three weeks he has had numbness and tingling in the fingertips of his rt hand  Feels he may have a pinched nerve  Pt does not recall an inciting injury  This has made his migrains worse  Pt has tried to see david and gabriel get an appt till nov 1st        The following portions of the patient's history were reviewed and updated as appropriate: allergies, current medications, past family history, past medical history, past social history, past surgical history and problem list     Review of Systems   Constitutional: Negative for activity change, appetite change, chills, diaphoresis, fatigue, fever and unexpected weight change  HENT: Negative for congestion, dental problem, ear pain, mouth sores, sinus pressure, sinus pain, sore throat and trouble swallowing  Eyes: Negative for photophobia, discharge and itching  Respiratory: Negative for apnea, chest tightness and shortness of breath  Cardiovascular: Negative for chest pain, palpitations and leg swelling  Gastrointestinal: Negative for abdominal distention, abdominal pain, blood in stool, nausea and vomiting  Endocrine: Negative for cold intolerance, heat intolerance, polydipsia, polyphagia and polyuria  Genitourinary: Negative for difficulty urinating  Incontinance   Musculoskeletal: Positive for back pain, gait problem and myalgias  Negative for arthralgias  Skin: Negative for color change and wound  Neurological: Positive for numbness   Negative for dizziness, syncope, speech difficulty and headaches  Hematological: Negative for adenopathy  Psychiatric/Behavioral: Negative for agitation and behavioral problems           Current Outpatient Medications   Medication Sig Dispense Refill    atorvastatin (LIPITOR) 40 mg tablet Take 1 tablet (40 mg total) by mouth daily 90 tablet 3    butalbital-acetaminophen-caffeine-codeine (FIORICET WITH CODEINE) -50-30 MG per capsule Take 1 capsule by mouth every 4 (four) hours as needed for headaches      clonazePAM (KlonoPIN) 1 mg tablet 1 mg 3 (three) times a day       frovatriptan (FROVA) 2 5 MG tablet       isosorbide mononitrate (IMDUR) 30 mg 24 hr tablet Take 1 tablet (30 mg total) by mouth daily 30 tablet 3    meclizine (ANTIVERT) 25 mg tablet Take 1 tablet (25 mg total) by mouth 3 (three) times a day as needed for dizziness 30 tablet 1    metoprolol succinate (TOPROL-XL) 25 mg 24 hr tablet Take 1 tablet (25 mg total) by mouth daily 90 tablet 1    OXcarbazepine (TRILEPTAL) 600 mg tablet Take 300 mg by mouth 2 (two) times a day      paliperidone (INVEGA) 9 MG 24 hr tablet Take 6 mg by mouth 2 (two) times a day       rizatriptan (MAXALT) 10 MG tablet Take 10 mg by mouth once as needed for migraine May repeat in 2 hours if needed      traZODone (DESYREL) 300 MG tablet Take 300 mg by mouth daily at bedtime      venlafaxine (EFFEXOR-XR) 75 mg 24 hr capsule Take 150 mg by mouth daily   3    aspirin (ECOTRIN LOW STRENGTH) 81 mg EC tablet Take 1 tablet (81 mg total) by mouth daily (Patient not taking: Reported on 9/13/2019) 30 tablet 0    cyclobenzaprine (FLEXERIL) 10 mg tablet Take 1 tablet (10 mg total) by mouth daily at bedtime for 21 days 21 tablet 0    predniSONE 20 mg tablet 4 tabs for three days, 3 tabs for three days, 2 tabs for three days, 1 tab for three days, 1/2 tab for 4 days 32 tablet 0    traMADol (ULTRAM) 50 mg tablet Take 1 tablet (50 mg total) by mouth every 6 (six) hours as needed for moderate pain (Patient not taking: Reported on 10/4/2019) 20 tablet 0     No current facility-administered medications for this visit          Objective:    /70   Pulse 71   Temp 98 1 °F (36 7 °C)   Resp 16   Ht 6' (1 829 m)   Wt 127 kg (280 lb)   SpO2 91%   BMI 37 97 kg/m²        Physical Exam           Tomás Saini DO

## 2019-10-04 NOTE — ASSESSMENT & PLAN NOTE
Pt has called Dr Cheri Perez as he has been getting migrains daily and states he is calling him in new meds

## 2019-10-04 NOTE — LETTER
October 4, 2019     Patient: Deland Boeck   YOB: 1965   Date of Visit: 10/4/2019       To Whom it May Concern:    Meka Cha is under my professional care  He was seen in my office on 10/4/2019  He will remain at this time on the current duty status that the company doc has placed him on  Pt will start physical therapy and medication and will be reevaluated in 2 weeks  If you have any questions or concerns, please don't hesitate to call           Sincerely,          La Elizabeth DO        CC: No Recipients

## 2019-10-16 DIAGNOSIS — K21.9 GASTROESOPHAGEAL REFLUX DISEASE WITHOUT ESOPHAGITIS: ICD-10-CM

## 2019-10-16 RX ORDER — ISOSORBIDE MONONITRATE 30 MG/1
30 TABLET, EXTENDED RELEASE ORAL DAILY
Qty: 90 TABLET | Refills: 1 | OUTPATIENT
Start: 2019-10-16 | End: 2020-04-16 | Stop reason: SDUPTHER

## 2019-10-18 ENCOUNTER — OFFICE VISIT (OUTPATIENT)
Dept: FAMILY MEDICINE CLINIC | Facility: CLINIC | Age: 54
End: 2019-10-18
Payer: COMMERCIAL

## 2019-10-18 VITALS
WEIGHT: 295.2 LBS | RESPIRATION RATE: 18 BRPM | HEIGHT: 72 IN | TEMPERATURE: 98.2 F | HEART RATE: 76 BPM | OXYGEN SATURATION: 94 % | SYSTOLIC BLOOD PRESSURE: 112 MMHG | DIASTOLIC BLOOD PRESSURE: 72 MMHG | BODY MASS INDEX: 39.98 KG/M2

## 2019-10-18 DIAGNOSIS — M54.16 LUMBAR RADICULOPATHY, ACUTE: Primary | ICD-10-CM

## 2019-10-18 DIAGNOSIS — M54.16 LUMBAR RADICULITIS: ICD-10-CM

## 2019-10-18 PROCEDURE — 3008F BODY MASS INDEX DOCD: CPT | Performed by: FAMILY MEDICINE

## 2019-10-18 PROCEDURE — 99213 OFFICE O/P EST LOW 20 MIN: CPT | Performed by: FAMILY MEDICINE

## 2019-10-18 RX ORDER — METAXALONE 800 MG/1
800 TABLET ORAL 3 TIMES DAILY
Qty: 63 TABLET | Refills: 0 | Status: SHIPPED | OUTPATIENT
Start: 2019-10-18 | End: 2020-02-13 | Stop reason: ALTCHOICE

## 2019-10-18 RX ORDER — TRAMADOL HYDROCHLORIDE 50 MG/1
50 TABLET ORAL EVERY 6 HOURS PRN
Qty: 20 TABLET | Refills: 0 | Status: SHIPPED | OUTPATIENT
Start: 2019-10-18 | End: 2020-02-19

## 2019-10-18 RX ORDER — KETOROLAC TROMETHAMINE 10 MG/1
10 TABLET, FILM COATED ORAL EVERY 6 HOURS PRN
Qty: 9 TABLET | Refills: 0 | Status: SHIPPED | OUTPATIENT
Start: 2019-10-18 | End: 2020-02-13 | Stop reason: ALTCHOICE

## 2019-10-18 NOTE — PROGRESS NOTES
Assessment/Plan:    1  Lumbar radiculopathy, acute  -     metaxalone (SKELAXIN) 800 mg tablet; Take 1 tablet (800 mg total) by mouth 3 (three) times a day for 21 days  -     ketorolac (TORADOL) 10 mg tablet; Take 1 tablet (10 mg total) by mouth every 6 (six) hours as needed for moderate pain  -     traMADol (ULTRAM) 50 mg tablet; Take 1 tablet (50 mg total) by mouth every 6 (six) hours as needed for moderate pain    2  Lumbar radiculitis  -     metaxalone (SKELAXIN) 800 mg tablet; Take 1 tablet (800 mg total) by mouth 3 (three) times a day for 21 days  -     ketorolac (TORADOL) 10 mg tablet; Take 1 tablet (10 mg total) by mouth every 6 (six) hours as needed for moderate pain  -     traMADol (ULTRAM) 50 mg tablet; Take 1 tablet (50 mg total) by mouth every 6 (six) hours as needed for moderate pain          BMI Counseling: Body mass index is 40 04 kg/m²  Discussed the patient's BMI with him  The BMI is above normal  Nutrition recommendations include reducing portion sizes  There are no Patient Instructions on file for this visit  No follow-ups on file  Subjective:      Patient ID: Ericka Ramesh is a 49 y o  male  Chief Complaint   Patient presents with    Follow-up     [facility]       Pt states he is here for back pain  States his job told him to come here today  Pt states his back is only slightly better  Pt states he could not take the flexeril as it was prescribed at night because he was having urine accidents at night    Now he is taking it during the day  Because he was/is getting muscle spasms  No loss of bowel or bladder now    Pt did not get MRI pt will be scheduling it  Pt also did not start physical therapy          The following portions of the patient's history were reviewed and updated as appropriate: allergies, current medications, past family history, past medical history, past social history, past surgical history and problem list     Review of Systems   Constitutional: Negative for activity change, appetite change, chills, diaphoresis, fatigue, fever and unexpected weight change  HENT: Negative for congestion, dental problem, ear pain, mouth sores, sinus pressure, sinus pain, sore throat and trouble swallowing  Eyes: Negative for photophobia, discharge and itching  Respiratory: Negative for apnea, chest tightness and shortness of breath  Cardiovascular: Negative for chest pain, palpitations and leg swelling  Gastrointestinal: Negative for abdominal distention, abdominal pain, blood in stool, nausea and vomiting  Endocrine: Negative for cold intolerance, heat intolerance, polydipsia, polyphagia and polyuria  Genitourinary: Negative for difficulty urinating  Musculoskeletal: Positive for back pain and myalgias  Negative for arthralgias  Skin: Negative for color change and wound  Neurological: Negative for dizziness, syncope, speech difficulty and headaches  Hematological: Negative for adenopathy  Psychiatric/Behavioral: Negative for agitation and behavioral problems           Current Outpatient Medications   Medication Sig Dispense Refill    atorvastatin (LIPITOR) 40 mg tablet Take 1 tablet (40 mg total) by mouth daily 90 tablet 3    butalbital-acetaminophen-caffeine-codeine (FIORICET WITH CODEINE) -74-30 MG per capsule Take 1 capsule by mouth every 4 (four) hours as needed for headaches      clonazePAM (KlonoPIN) 1 mg tablet 1 mg 3 (three) times a day       frovatriptan (FROVA) 2 5 MG tablet       isosorbide mononitrate (IMDUR) 30 mg 24 hr tablet Take 1 tablet (30 mg total) by mouth daily 90 tablet 1    meclizine (ANTIVERT) 25 mg tablet Take 1 tablet (25 mg total) by mouth 3 (three) times a day as needed for dizziness 30 tablet 1    metoprolol succinate (TOPROL-XL) 25 mg 24 hr tablet Take 1 tablet (25 mg total) by mouth daily 90 tablet 1    OXcarbazepine (TRILEPTAL) 600 mg tablet Take 300 mg by mouth 2 (two) times a day      paliperidone (INVEGA) 9 MG 24 hr tablet Take 6 mg by mouth 2 (two) times a day       rizatriptan (MAXALT) 10 MG tablet Take 10 mg by mouth once as needed for migraine May repeat in 2 hours if needed      traZODone (DESYREL) 300 MG tablet Take 300 mg by mouth daily at bedtime      venlafaxine (EFFEXOR-XR) 75 mg 24 hr capsule Take 150 mg by mouth daily   3    aspirin (ECOTRIN LOW STRENGTH) 81 mg EC tablet Take 1 tablet (81 mg total) by mouth daily (Patient not taking: Reported on 9/13/2019) 30 tablet 0    ketorolac (TORADOL) 10 mg tablet Take 1 tablet (10 mg total) by mouth every 6 (six) hours as needed for moderate pain 9 tablet 0    metaxalone (SKELAXIN) 800 mg tablet Take 1 tablet (800 mg total) by mouth 3 (three) times a day for 21 days 63 tablet 0    traMADol (ULTRAM) 50 mg tablet Take 1 tablet (50 mg total) by mouth every 6 (six) hours as needed for moderate pain 20 tablet 0     No current facility-administered medications for this visit  Objective:    /72   Pulse 76   Temp 98 2 °F (36 8 °C)   Resp 18   Ht 6' (1 829 m)   Wt 134 kg (295 lb 3 2 oz)   SpO2 94%   BMI 40 04 kg/m²        Physical Exam   Constitutional: He appears well-developed and well-nourished  No distress  HENT:   Head: Normocephalic and atraumatic  Right Ear: External ear normal    Left Ear: External ear normal    Nose: Nose normal    Mouth/Throat: Oropharynx is clear and moist  No oropharyngeal exudate  Eyes: Pupils are equal, round, and reactive to light  EOM are normal  Right eye exhibits no discharge  Left eye exhibits no discharge  No scleral icterus  Neck: No thyromegaly present  Cardiovascular: Normal rate and normal heart sounds  No murmur heard  Pulmonary/Chest: Effort normal and breath sounds normal  No respiratory distress  He has no wheezes  Abdominal: Soft  Bowel sounds are normal  He exhibits no distension and no mass  There is no tenderness  There is no rebound and no guarding     Musculoskeletal: Normal range of motion  PVMS l spine   Neurological: He is alert  He displays normal reflexes  Coordination normal    Skin: Skin is warm and dry  No rash noted  He is not diaphoretic  No erythema  Psychiatric: He has a normal mood and affect  His behavior is normal    Nursing note and vitals reviewed               Bre Richards DO

## 2019-10-21 ENCOUNTER — TELEPHONE (OUTPATIENT)
Dept: FAMILY MEDICINE CLINIC | Facility: CLINIC | Age: 54
End: 2019-10-21

## 2019-10-21 NOTE — TELEPHONE ENCOUNTER
Pharmacy called concerned  With drug interactions between ketorolac & fioricet----and tramadol  & clonazepam--please call pharmacy  At [phone] with instructions--lj

## 2019-10-22 ENCOUNTER — TELEPHONE (OUTPATIENT)
Dept: BARIATRICS | Facility: CLINIC | Age: 54
End: 2019-10-22

## 2019-10-22 NOTE — TELEPHONE ENCOUNTER
Spoke with patient regarding bill he received from his EGD performed on 9/11/19  Patient states he received a bill because it stated it was for bariatric surgery  I let patient know that the only diagnoses I saw were for gastritis and morbid obesity  Per the account maintenance, it looks like insurance paid some of the bill and patient was left with a balance  Patient was directed to [facility] for any further questions

## 2019-12-20 ENCOUNTER — OFFICE VISIT (OUTPATIENT)
Dept: BARIATRICS | Facility: CLINIC | Age: 54
End: 2019-12-20

## 2019-12-20 VITALS — WEIGHT: 294.1 LBS | BODY MASS INDEX: 39.89 KG/M2

## 2019-12-20 DIAGNOSIS — I25.110 CORONARY ARTERY DISEASE INVOLVING NATIVE CORONARY ARTERY OF NATIVE HEART WITH UNSTABLE ANGINA PECTORIS (HCC): ICD-10-CM

## 2019-12-20 DIAGNOSIS — E78.2 MIXED HYPERLIPIDEMIA: ICD-10-CM

## 2019-12-20 DIAGNOSIS — G47.33 OSA (OBSTRUCTIVE SLEEP APNEA): Primary | ICD-10-CM

## 2019-12-20 DIAGNOSIS — F17.200 NICOTINE DEPENDENCE WITH CURRENT USE: ICD-10-CM

## 2019-12-20 DIAGNOSIS — E66.01 MORBID (SEVERE) OBESITY DUE TO EXCESS CALORIES (HCC): ICD-10-CM

## 2019-12-20 PROCEDURE — RECHECK: Performed by: DIETITIAN, REGISTERED

## 2019-12-20 NOTE — PROGRESS NOTES
Bariatric Follow Up Nutrition Note    Preop  Preop Program    Type of surgery  Preop  Surgery Date: TBD  Surgeon: Dr Dilan Esposito  49 y o   male  Wt 133 kg (294 lb 1 6 oz)   BMI 39 89 kg/m²     [facility] Equation:  2652 kcal/day= weight maintenance  1652 kcal/day= 2 lb/wk wt loss  Weight on Day of Weight Loss Surgery: 5%=14 8#= Day of surgery goal of 281 3#  Wt with BMI of 25: 178 6lbs  Pre-Op Excess Wt: 117 5lbs  12 4# wt regain since last office visit in August   Net 2# wt loss since initial visit in  8# wt loss needed yet prior to day of surgery      Review of History and Medications   Past Medical History:   Diagnosis Date    Bipolar disorder ([facility] )     Continuous positive airway pressure dependence     Heart disease     Hypercholesterolemia     [facility]bid obesity [facility] )     NSTEMI (non-ST elevation myocardial infarction) ([facility] )     Sleep apnea     uses c pap    Umbilical hernia     Resolved 2015     Ventral hernia     Resolved 2015      Past Surgical History:   Procedure Laterality Date    CARDIAC CATHETERIZATION      no stents     HERNIA REPAIR      umbilical hernia    SKIN SURGERY      cyst removed from back and thumb    TONSILLECTOMY      TOOTH EXTRACTION       Social History     Socioeconomic History    Marital status: /Civil Union     Spouse name: Not on file    Number of children: Not on file    Years of education: Not on file    Highest education level: Not on file   Occupational History    Not on file   Social Needs    Financial resource strain: Not on file    Food insecurity:     Worry: Not on file     Inability: Not on file    Transportation needs:     Medical: Not on file     Non-medical: Not on file   Tobacco Use    Smoking status: Former Smoker     Types: Cigarettes     Last attempt to quit:      Years since quittin 9    Smokeless tobacco: Current User     Last attempt to quit: 2019    Tobacco comment: quit cigarettes 2011, continues to vape   Substance and Sexual Activity    Alcohol use: Not Currently     Frequency: Never    Drug use: Never    Sexual activity: Not on file   Lifestyle    Physical activity:     Days per week: Not on file     Minutes per session: Not on file    Stress: Not on file   Relationships    Social connections:     Talks on phone: Not on file     Gets together: Not on file     Attends Buddhism service: Not on file     Active member of club or organization: Not on file     Attends meetings of clubs or organizations: Not on file     Relationship status: Not on file    Intimate partner violence:     Fear of current or ex partner: Not on file     Emotionally abused: Not on file     Physically abused: Not on file     Forced sexual activity: Not on file   Other Topics Concern    Not on file   Social History Narrative    Former smoker - As per [facility]    Full-time employment       Current Outpatient Medications:     aspirin (ECOTRIN LOW STRENGTH) 81 mg EC tablet, Take 1 tablet (81 mg total) by mouth daily (Patient not taking: Reported on 9/13/2019), Disp: 30 tablet, Rfl: 0    atorvastatin (LIPITOR) 40 mg tablet, Take 1 tablet (40 mg total) by mouth daily, Disp: 90 tablet, Rfl: 3    butalbital-acetaminophen-caffeine-codeine (FIORICET WITH CODEINE) -65-30 MG per capsule, Take 1 capsule by mouth every 4 (four) hours as needed for headaches, Disp: , Rfl:     clonazePAM (KlonoPIN) 1 mg tablet, 1 mg 3 (three) times a day , Disp: , Rfl:     frovatriptan (FROVA) 2 5 MG tablet, , Disp: , Rfl:     isosorbide mononitrate (IMDUR) 30 mg 24 hr tablet, Take 1 tablet (30 mg total) by mouth daily, Disp: 90 tablet, Rfl: 1    ketorolac (TORADOL) 10 mg tablet, Take 1 tablet (10 mg total) by mouth every 6 (six) hours as needed for moderate pain, Disp: 9 tablet, Rfl: 0    meclizine (ANTIVERT) 25 mg tablet, Take 1 tablet (25 mg total) by mouth 3 (three) times a day as needed for dizziness, Disp: 30 tablet, Rfl: 1    metaxalone (SKELAXIN) 800 mg tablet, Take 1 tablet (800 mg total) by mouth 3 (three) times a day for 21 days, Disp: 63 tablet, Rfl: 0    metoprolol succinate (TOPROL-XL) 25 mg 24 hr tablet, Take 1 tablet (25 mg total) by mouth daily, Disp: 90 tablet, Rfl: 1    OXcarbazepine (TRILEPTAL) 600 mg tablet, Take 300 mg by mouth 2 (two) times a day, Disp: , Rfl:     paliperidone (INVEGA) 9 MG 24 hr tablet, Take 6 mg by mouth 2 (two) times a day , Disp: , Rfl:     rizatriptan (MAXALT) 10 MG tablet, Take 10 mg by mouth once as needed for migraine May repeat in 2 hours if needed, Disp: , Rfl:     traMADol (ULTRAM) 50 mg tablet, Take 1 tablet (50 mg total) by mouth every 6 (six) hours as needed for moderate pain, Disp: 20 tablet, Rfl: 0    traZODone (DESYREL) 300 MG tablet, Take 300 mg by mouth daily at bedtime, Disp: , Rfl:     venlafaxine (EFFEXOR-XR) 75 mg 24 hr capsule, Take 150 mg by mouth daily , Disp: , Rfl: 3    Food Intake and Lifestyle Assessment   Food Intake Assessment completed via usual diet recall  Breakfast: 9:00am:  Protein drink:  Pt purchased whey protein isolate powder and mixes with a bag of frozen fruit and a small amount of water  Snack: 11:00am: Peanut butter and jelly sandwich on rye break  Works 3rd shift  Goes to bed at noon  Lunch: 11:00pm: banana  Snack: 3:00am: home made chili  Dinner: 5:00am:  Peaches or nectarines  Snack: none  Beverage intake: water  Diet texture/stage: regular  Protein supplement: Protein drink daily  Estimated protein intake per day: 60-90g  Estimated fluid intake per day: Pt reports he drinks three to four 40-oz water bottles at work and more fluids at home   80-100oz+ daily  Meals eaten away from home: rare  Typical meal pattern: 2 meals per day and 3 snacks per day  Eating Behaviors: Appropriate portion sizes, Does not drink with meals and waits 30-minutes after meal before resuming drinking and Frequent snacking/ grazing  Pt does not eat lunch during his lunch break at work  Pt waits until he gets home from work to eat  Pt enjoys eating fruit  Food allergies or intolerances: none  Cultural or Alevism considerations: no red meat, no added salt    Physical Assessment  Physical Activity  Types of exercise: work is very physically demanding, sedentary at home  Pt plans on joining his friend's gym  Pt plans on joining tomorrow  Current physical limitations: none     Psychosocial Assessment   Support systems: spouse, friend  Pt has good friend whom has had bariatric surgery at our program and is providing support  Pt reports his wife is Mynor and prepares pasta or potato with every meal   Socioeconomic factors: works [employer] 7 days per week  Pt recently started new job working in [employer]  Pt reports this is not a trigger for him      Nutrition Diagnosis- Continued/improving  Diagnosis: Overweight / Obesity (NC-3 3) and Excessive energy intake (NI-1 5)  Related to: Physical inactivity and Excessive energy intake  As Evidenced by: BMI >25, Excessive energy intake and Unintentional weight gain     Interventions and Teaching   Patient educated on post-op nutrition guidelines  Patient educated and handouts provided  Surgical changes to stomach / GI  Capacity of post-surgery stomach  Diet progression  Adequate hydration  Sugar and fat restriction to decrease "dumping syndrome"  Expected weight loss  Exercise  Suggestions for pre-op diet  Nutrition considerations after surgery  Protein supplements:  Provided pt with Protein Supplements suggestions handout and reviewed with pt  Meal planning and preparation  Appropriate carbohydrate, protein, and fat intake, and food/fluid choices to maximize safe weight loss, nutrient intake, and tolerance:  Brief discussion of two-week liver shrinking pre-op diet and importance of eliminating fruit/fruit juice from protein drink    Dietary and lifestyle changes: Provided pt with new copy of Pre-surgery Goals checklist and instructed pt to review prior to next appointment  Possible problems with poor eating habits  Techniques for self monitoring and keeping daily food journal  Potential for food intolerance after surgery, and ways to deal with them including: lactose intolerance, nausea, reflux, vomiting, diarrhea, food intolerance, appetite changes, gas    Education provided to: patient    Barriers to learning: No barriers identified    Readiness to change: action    Comprehension: demonstrated understanding and verbalizes understanding     Expected Compliance: excellent    Evaluation/Monitoring   Eating pattern as discussed Tolerance of nutrition prescription Body weight Lab values Physical activity    Goals  Food journal, Exercise 30 minutes 5 times per week, Complete lession plans 1-6 and Eat 3 meals per day    Remaining Workflow:  1) Nicotine test:  Completed (Negative) in August   Needs updated prior to surgery  Ordered today  2)  Bloodwork drawn 6/18/19  Also needs updated  Ordered today as well  Pt reports he will gets labs done next Saturday  3)  Weight loss:  12 4# wt regain since last office visit in August 12 8# wt loss needed yet prior to day of surgery  Time Spent:   30 Minutes   Follow-up scheduled for two weeks

## 2019-12-26 ENCOUNTER — TELEPHONE (OUTPATIENT)
Dept: PAIN MEDICINE | Facility: CLINIC | Age: 54
End: 2019-12-26

## 2019-12-26 NOTE — TELEPHONE ENCOUNTER
Lelo Whitt from [employer] called to reschedule patient with Dr De in [facility] for a sooner appointment for patient  Patient was scheduled with Dr Maria on 2/3/20  I rescheduled patient for 1/8/20 with Dr De  I in basket Sharlyne Body with update

## 2020-01-03 ENCOUNTER — OFFICE VISIT (OUTPATIENT)
Dept: BARIATRICS | Facility: CLINIC | Age: 55
End: 2020-01-03

## 2020-01-03 VITALS — WEIGHT: 296 LBS | BODY MASS INDEX: 40.14 KG/M2

## 2020-01-03 DIAGNOSIS — E66.01 MORBID (SEVERE) OBESITY DUE TO EXCESS CALORIES (HCC): Primary | ICD-10-CM

## 2020-01-03 PROCEDURE — RECHECK: Performed by: DIETITIAN, REGISTERED

## 2020-01-03 NOTE — PROGRESS NOTES
Bariatric Follow Up Nutrition Note    Preop  Preop Program    Type of surgery  Preop  Surgery Date: TBD  Surgeon: Dr Mariela Mosley  49 y o   male  Wt 134 kg (296 lb)   BMI 40 14 kg/m²     [address] Equation:  2652 kcal/day= weight maintenance  1652 kcal/day= 2 lb/wk wt loss  Weight on Day of Weight Loss Surgery: 5%=14 8#= Day of surgery goal of 281 3#  Wt with BMI of 25: 178 6lbs  Pre-Op Excess Wt: 117 5lbs  2# wt gain since last office visit  Net 0 1# wt loss since initial visit in  7# wt loss needed yet prior to day of surgery      Review of History and Medications   Past Medical History:   Diagnosis Date    Bipolar disorder ([facility] )     Continuous positive airway pressure dependence     Heart disease     Hypercholesterolemia     Morbid obesity ([facility] )     NSTEMI (non-ST elevation myocardial infarction) ([facility] )     Sleep apnea     uses c pap    Umbilical hernia     Resolved 2015     Ventral hernia     Resolved 2015      Past Surgical History:   Procedure Laterality Date    CARDIAC CATHETERIZATION      no stents     HERNIA REPAIR      umbilical hernia    SKIN SURGERY      cyst removed from back and thumb    TONSILLECTOMY      TOOTH EXTRACTION       Social History     Socioeconomic History    Marital status: /Civil Union     Spouse name: Not on file    Number of children: Not on file    Years of education: Not on file    Highest education level: Not on file   Occupational History    Not on file   Social Needs    Financial resource strain: Not on file    Food insecurity:     Worry: Not on file     Inability: Not on file    Transportation needs:     Medical: Not on file     Non-medical: Not on file   Tobacco Use    Smoking status: Former Smoker     Types: Cigarettes     Last attempt to quit: 2011     Years since quittin 0    Smokeless tobacco: Current User     Last attempt to quit: 2019    Tobacco comment: quit cigarettes 2011, continues to vape   Substance and Sexual Activity    Alcohol use: Not Currently     Frequency: Never    Drug use: Never    Sexual activity: Not on file   Lifestyle    Physical activity:     Days per week: Not on file     Minutes per session: Not on file    Stress: Not on file   Relationships    Social connections:     Talks on phone: Not on file     Gets together: Not on file     Attends Oriental orthodox service: Not on file     Active member of club or organization: Not on file     Attends meetings of clubs or organizations: Not on file     Relationship status: Not on file    Intimate partner violence:     Fear of current or ex partner: Not on file     Emotionally abused: Not on file     Physically abused: Not on file     Forced sexual activity: Not on file   Other Topics Concern    Not on file   Social History Narrative    Former smoker - As per [facility]    Full-time employment       Current Outpatient Medications:     aspirin (ECOTRIN LOW STRENGTH) 81 mg EC tablet, Take 1 tablet (81 mg total) by mouth daily (Patient not taking: Reported on 9/13/2019), Disp: 30 tablet, Rfl: 0    atorvastatin (LIPITOR) 40 mg tablet, Take 1 tablet (40 mg total) by mouth daily, Disp: 90 tablet, Rfl: 3    butalbital-acetaminophen-caffeine-codeine (FIORICET WITH CODEINE) -57-30 MG per capsule, Take 1 capsule by mouth every 4 (four) hours as needed for headaches, Disp: , Rfl:     clonazePAM (KlonoPIN) 1 mg tablet, 1 mg 3 (three) times a day , Disp: , Rfl:     frovatriptan (FROVA) 2 5 MG tablet, , Disp: , Rfl:     isosorbide mononitrate (IMDUR) 30 mg 24 hr tablet, Take 1 tablet (30 mg total) by mouth daily, Disp: 90 tablet, Rfl: 1    ketorolac (TORADOL) 10 mg tablet, Take 1 tablet (10 mg total) by mouth every 6 (six) hours as needed for moderate pain, Disp: 9 tablet, Rfl: 0    meclizine (ANTIVERT) 25 mg tablet, Take 1 tablet (25 mg total) by mouth 3 (three) times a day as needed for dizziness, Disp: 30 tablet, Rfl: 1    metaxalone (SKELAXIN) 800 mg tablet, Take 1 tablet (800 mg total) by mouth 3 (three) times a day for 21 days, Disp: 63 tablet, Rfl: 0    metoprolol succinate (TOPROL-XL) 25 mg 24 hr tablet, Take 1 tablet (25 mg total) by mouth daily, Disp: 90 tablet, Rfl: 1    OXcarbazepine (TRILEPTAL) 600 mg tablet, Take 300 mg by mouth 2 (two) times a day, Disp: , Rfl:     paliperidone (INVEGA) 9 MG 24 hr tablet, Take 6 mg by mouth 2 (two) times a day , Disp: , Rfl:     rizatriptan (MAXALT) 10 MG tablet, Take 10 mg by mouth once as needed for migraine May repeat in 2 hours if needed, Disp: , Rfl:     traMADol (ULTRAM) 50 mg tablet, Take 1 tablet (50 mg total) by mouth every 6 (six) hours as needed for moderate pain, Disp: 20 tablet, Rfl: 0    traZODone (DESYREL) 300 MG tablet, Take 300 mg by mouth daily at bedtime, Disp: , Rfl:     venlafaxine (EFFEXOR-XR) 75 mg 24 hr capsule, Take 150 mg by mouth daily , Disp: , Rfl: 3    Food Intake and Lifestyle Assessment   Food Intake Assessment completed via usual diet recall  Breakfast: 9:00am:  Protein drink:  Pt purchased whey protein isolate powder and mixes with a bag of frozen fruit and a small amount of water  Snack: 11:00am: Peanut butter and jelly sandwich on rye break  Works 3rd shift  Goes to bed at noon  Lunch: 11:00pm: banana  Snack: 3:00am: home made chili  Dinner: 5:00am:  Peaches or nectarines  Snack: none  Beverage intake: water  Diet texture/stage: regular  Protein supplement: Protein drink daily  Estimated protein intake per day: 60-90g  Estimated fluid intake per day: Pt reports he drinks three to four 40-oz water bottles at work and more fluids at home   80-100oz+ daily  Meals eaten away from home: rare  Typical meal pattern: 2 meals per day and 3 snacks per day  Eating Behaviors: Appropriate portion sizes, Does not drink with meals and waits 30-minutes after meal before resuming drinking and Frequent snacking/ grazing  Pt does not eat lunch during his lunch break at work  Pt waits until he gets home from work to eat  Pt enjoys eating fruit  Food allergies or intolerances: none  Cultural or Mormon considerations: no red meat, no added salt    Physical Assessment  Physical Activity  Types of exercise: work is very physically demanding, sedentary at home  Pt plans on joining his friend's gym  Pt plans on joining tomorrow  Current physical limitations: none     Psychosocial Assessment   Support systems: spouse, friend  Pt has good friend whom has had bariatric surgery at our program and is providing support  Pt reports his wife is Mynor and prepares pasta or potato with every meal   Socioeconomic factors: works [employer] 7 days per week  Pt recently started new job working in [employer]  Pt reports this is not a trigger for him      Nutrition Diagnosis- Continued/improving  Diagnosis: Overweight / Obesity (NC-3 3) and Excessive energy intake (NI-1 5)  Related to: Physical inactivity and Excessive energy intake  As Evidenced by: BMI >25, Excessive energy intake and Unintentional weight gain     Interventions and Teaching   Patient educated on post-op nutrition guidelines  Patient educated and handouts provided  Provided pt with updated bariatric manual today and reviewed checklist in ch 2, reviewed Ch 3: Nutrition and Ch 4:Vitamins with pt  Provided pt with pictoral chewable bariatric vitamin handout  Surgical changes to stomach / GI  Capacity of post-surgery stomach  Diet progression:  Reviewed portion size progression with pt  Adequate hydration  Sugar and fat restriction to decrease "dumping syndrome"  Expected weight loss  Exercise  Suggestions for pre-op diet  Nutrition considerations after surgery  Protein supplements:  Previously provided pt with Protein Supplements suggestions handout and reviewed with pt    Meal planning and preparation  Appropriate carbohydrate, protein, and fat intake, and food/fluid choices to maximize safe weight loss, nutrient intake, and tolerance:  Brief discussion of two-week liver shrinking pre-op diet and importance of eliminating fruit/fruit juice from protein drink  Dietary and lifestyle changes:  Previously provided pt with new copy of Pre-surgery Goals checklist and instructed pt to review prior to next appointment  Possible problems with poor eating habits:  Discussed importance of getting adequate protein, fiber, fluids, vitamins/minerals after surgery  Techniques for self monitoring and keeping daily food journal  Potential for food intolerance after surgery, and ways to deal with them including: lactose intolerance, nausea, reflux, vomiting, diarrhea, food intolerance, appetite changes, gas    Education provided to: patient    Barriers to learning: No barriers identified    Readiness to change: action    Comprehension: demonstrated understanding and verbalizes understanding     Expected Compliance: excellent    Evaluation/Monitoring   Eating pattern as discussed Tolerance of nutrition prescription Body weight Lab values Physical activity    Goals  Food journal, Exercise 30 minutes 5 times per week, Complete lession plans 1-6 and Eat 3 meals per day    Session goals:    1) Read new manual prior to next appointment to review  2) Complete bloodwork prior to next appointment  Remaining Workflow:  1) Nicotine test:  Completed (Negative) in August   Needs updated prior to surgery  Ordered last visit  Not yet drawn  2)  Bloodwork drawn 6/18/19  Also needs updated  Ordered last visit as well  Also not yet done  3)  Weight loss: 14 7# wt loss needed yet prior to day of surgery  4)  Cardiac risk assessment: done 7/29/19  Will be outdated as of 1/29/2020  Pt has appt for updated risk assessment on 1/20/2020      Time Spent:   30 Minutes   Pt reports his work shift will be changing permanently on 1/13/2020, and will call me after he knows which shift he will be working to schedule next appointment

## 2020-01-14 DIAGNOSIS — Q24.5 MYOCARDIAL BRIDGE: ICD-10-CM

## 2020-01-14 RX ORDER — METOPROLOL SUCCINATE 25 MG/1
25 TABLET, EXTENDED RELEASE ORAL DAILY
Qty: 90 TABLET | Refills: 2 | Status: SHIPPED | OUTPATIENT
Start: 2020-01-14 | End: 2020-04-24

## 2020-01-20 ENCOUNTER — OFFICE VISIT (OUTPATIENT)
Dept: CARDIOLOGY CLINIC | Facility: CLINIC | Age: 55
End: 2020-01-20
Payer: COMMERCIAL

## 2020-01-20 VITALS
SYSTOLIC BLOOD PRESSURE: 120 MMHG | DIASTOLIC BLOOD PRESSURE: 62 MMHG | HEART RATE: 92 BPM | BODY MASS INDEX: 39.96 KG/M2 | HEIGHT: 72 IN | OXYGEN SATURATION: 94 % | WEIGHT: 295 LBS

## 2020-01-20 DIAGNOSIS — E78.2 MIXED HYPERLIPIDEMIA: ICD-10-CM

## 2020-01-20 DIAGNOSIS — R07.9 CHEST PAIN, UNSPECIFIED TYPE: Primary | ICD-10-CM

## 2020-01-20 PROCEDURE — 99214 OFFICE O/P EST MOD 30 MIN: CPT | Performed by: INTERNAL MEDICINE

## 2020-01-20 NOTE — PROGRESS NOTES
Cardiology Follow Up    Todd Monzon  1965  [id number]  [facility] [place] [address]  [phone]    1  Chest pain, unspecified type     2  Mixed hyperlipidemia         Interval History: He had one episode of chest discomfort a few weeks ago  No other symptoms and doing well  Doing fine with medical therapy       Problem List     NSTEMI (non-ST elevated myocardial infarction) ([facility] )    Bipolar disorder, current episode mixed, moderate (HCC)    Hyponatremia    ROSI (obstructive sleep apnea)    Coronary artery disease involving native coronary artery of native heart with unstable angina pectoris (HCC)    Nicotine dependence with current use    Mixed hyperlipidemia    Migraine with aura and without status migrainosus, not intractable    Benign paroxysmal positional vertigo due to bilateral vestibular disorder        Past Medical History:   Diagnosis Date    Bipolar disorder ([place] [address] )     Continuous positive airway pressure dependence     Heart disease     Hypercholesterolemia     Morbid obesity ([place] [address] )     NSTEMI (non-ST elevation myocardial infarction) ([place] [address] )     Sleep apnea     uses c pap    Umbilical hernia     Resolved 2015     Ventral hernia     Resolved 2015      Social History     Socioeconomic History    Marital status: /Civil Union     Spouse name: Not on file    Number of children: Not on file    Years of education: Not on file    Highest education level: Not on file   Occupational History    Not on file   Social Needs    Financial resource strain: Not on file    Food insecurity:     Worry: Not on file     Inability: Not on file    Transportation needs:     Medical: Not on file     Non-medical: Not on file   Tobacco Use    Smoking status: Former Smoker     Types: Cigarettes     Last attempt to quit:      Years since quittin 0    Smokeless tobacco: Current User     Last attempt to quit: 6/17/2019    Tobacco comment: quit cigarettes 2011, continues to vape   Substance and Sexual Activity    Alcohol use: Not Currently     Frequency: Never    Drug use: Never    Sexual activity: Not on file   Lifestyle    Physical activity:     Days per week: Not on file     Minutes per session: Not on file    Stress: Not on file   Relationships    Social connections:     Talks on phone: Not on file     Gets together: Not on file     Attends Yazdanism service: Not on file     Active member of club or organization: Not on file     Attends meetings of clubs or organizations: Not on file     Relationship status: Not on file    Intimate partner violence:     Fear of current or ex partner: Not on file     Emotionally abused: Not on file     Physically abused: Not on file     Forced sexual activity: Not on file   Other Topics Concern    Not on file   Social History Narrative    Former smoker - As per [employer]    Full-time employment      Family History   Problem Relation Age of Onset    Lung cancer Mother     Brain cancer Mother     Diabetes Brother     Bipolar disorder Maternal Grandfather      Past Surgical History:   Procedure Laterality Date    CARDIAC CATHETERIZATION      no stents     HERNIA REPAIR      umbilical hernia    SKIN SURGERY      cyst removed from back and thumb    TONSILLECTOMY      TOOTH EXTRACTION         Current Outpatient Medications:     atorvastatin (LIPITOR) 40 mg tablet, Take 1 tablet (40 mg total) by mouth daily, Disp: 90 tablet, Rfl: 3    butalbital-acetaminophen-caffeine-codeine (FIORICET WITH CODEINE) -02-30 MG per capsule, Take 1 capsule by mouth every 4 (four) hours as needed for headaches, Disp: , Rfl:     clonazePAM (KlonoPIN) 1 mg tablet, 1 mg 3 (three) times a day , Disp: , Rfl:     isosorbide mononitrate (IMDUR) 30 mg 24 hr tablet, Take 1 tablet (30 mg total) by mouth daily, Disp: 90 tablet, Rfl: 1    metoprolol succinate (TOPROL-XL) 25 mg 24 hr tablet, Take 1 tablet (25 mg total) by mouth daily, Disp: 90 tablet, Rfl: 2    OXcarbazepine (TRILEPTAL) 600 mg tablet, Take 300 mg by mouth 2 (two) times a day, Disp: , Rfl:     paliperidone (INVEGA) 9 MG 24 hr tablet, Take 6 mg by mouth 2 (two) times a day , Disp: , Rfl:     rizatriptan (MAXALT) 10 MG tablet, Take 10 mg by mouth once as needed for migraine May repeat in 2 hours if needed, Disp: , Rfl:     traMADol (ULTRAM) 50 mg tablet, Take 1 tablet (50 mg total) by mouth every 6 (six) hours as needed for moderate pain, Disp: 20 tablet, Rfl: 0    traZODone (DESYREL) 300 MG tablet, Take 300 mg by mouth daily at bedtime, Disp: , Rfl:     venlafaxine (EFFEXOR-XR) 75 mg 24 hr capsule, Take 150 mg by mouth daily , Disp: , Rfl: 3    aspirin (ECOTRIN LOW STRENGTH) 81 mg EC tablet, Take 1 tablet (81 mg total) by mouth daily (Patient not taking: Reported on 9/13/2019), Disp: 30 tablet, Rfl: 0    frovatriptan (FROVA) 2 5 MG tablet, , Disp: , Rfl:     ketorolac (TORADOL) 10 mg tablet, Take 1 tablet (10 mg total) by mouth every 6 (six) hours as needed for moderate pain (Patient not taking: Reported on 1/20/2020), Disp: 9 tablet, Rfl: 0    meclizine (ANTIVERT) 25 mg tablet, Take 1 tablet (25 mg total) by mouth 3 (three) times a day as needed for dizziness (Patient not taking: Reported on 1/20/2020), Disp: 30 tablet, Rfl: 1    metaxalone (SKELAXIN) 800 mg tablet, Take 1 tablet (800 mg total) by mouth 3 (three) times a day for 21 days, Disp: 63 tablet, Rfl: 0  No Known Allergies    Labs:     Chemistry        Component Value Date/Time    K 3 6 06/18/2019 0750     06/18/2019 0750    CO2 25 06/18/2019 0750    BUN 16 06/18/2019 0750    BUN 17 06/01/2015 1333    CREATININE 0 83 06/18/2019 0750    CREATININE 0 88 06/01/2015 1333        Component Value Date/Time    CALCIUM 8 4 06/18/2019 0750    ALKPHOS 75 06/18/2019 0750    AST 26 06/18/2019 0750    ALT 54 06/18/2019 0750            No results found for: CHOL  Lab Results   Component Value Date    HDL 41 06/18/2019    HDL 37 (L) 05/15/2019     Lab Results   Component Value Date    LDLCALC 63 06/18/2019    LDLCALC 118 (H) 05/15/2019     Lab Results   Component Value Date    TRIG 62 06/18/2019    TRIG 116 05/15/2019     No results found for: CHOLHDL    Imaging: No results found  Review of Systems   Constitution: Negative  HENT: Negative  Eyes: Negative  Cardiovascular: Negative  Respiratory: Negative  Endocrine: Negative  Hematologic/Lymphatic: Negative  Skin: Negative  Musculoskeletal: Negative  Gastrointestinal: Negative  Genitourinary: Negative  Neurological: Negative  Psychiatric/Behavioral: Negative  Allergic/Immunologic: Negative  Vitals:    01/20/20 1015   BP: 120/62   Pulse: 92   SpO2: 94%           Physical Exam   Constitutional: He is oriented to person, place, and time  No distress  HENT:   Mouth/Throat: No oropharyngeal exudate  Eyes: No scleral icterus  Neck: No JVD present  Cardiovascular: Normal rate and regular rhythm  Exam reveals no gallop and no friction rub  No murmur heard  Pulmonary/Chest: Effort normal and breath sounds normal  No stridor  No respiratory distress  He has no wheezes  Abdominal: Soft  Bowel sounds are normal  He exhibits no distension  There is no tenderness  Musculoskeletal: He exhibits no edema  Neurological: He is alert and oriented to person, place, and time  Skin: Skin is warm and dry  He is not diaphoretic  Psychiatric: He has a normal mood and affect  His behavior is normal        Discussion/Summary:    Nonobstructive cad: He is now asymptomatic  He is on med rx for Microvascular dysfunction but he overall is feeling better  LDL is 63  HR and BP are stable  The patient was counseled regarding diagnostic results, instructions for management, risk factor reductions, impressions   total time of encounter was 25 minutes and 15 minutes was spent counseling

## 2020-02-07 PROBLEM — E66.01 MORBID OBESITY (HCC): Status: ACTIVE | Noted: 2020-02-07

## 2020-02-07 PROBLEM — E78.00 HYPERCHOLESTEROLEMIA: Status: ACTIVE | Noted: 2020-02-07

## 2020-02-07 PROBLEM — I51.9 HEART DISEASE, UNSPECIFIED: Status: ACTIVE | Noted: 2020-02-07

## 2020-02-07 PROBLEM — G47.30 SLEEP APNEA: Status: ACTIVE | Noted: 2020-02-07

## 2020-02-10 ENCOUNTER — APPOINTMENT (OUTPATIENT)
Dept: LAB | Age: 55
End: 2020-02-10
Payer: COMMERCIAL

## 2020-02-10 DIAGNOSIS — G47.33 OSA (OBSTRUCTIVE SLEEP APNEA): ICD-10-CM

## 2020-02-10 DIAGNOSIS — E66.01 MORBID (SEVERE) OBESITY DUE TO EXCESS CALORIES (HCC): ICD-10-CM

## 2020-02-10 DIAGNOSIS — F17.200 NICOTINE DEPENDENCE WITH CURRENT USE: ICD-10-CM

## 2020-02-10 DIAGNOSIS — E78.2 MIXED HYPERLIPIDEMIA: ICD-10-CM

## 2020-02-10 DIAGNOSIS — I25.110 CORONARY ARTERY DISEASE INVOLVING NATIVE CORONARY ARTERY OF NATIVE HEART WITH UNSTABLE ANGINA PECTORIS (HCC): ICD-10-CM

## 2020-02-10 LAB
ALBUMIN SERPL BCP-MCNC: 3.5 G/DL (ref 3.5–5)
ALP SERPL-CCNC: 86 U/L (ref 46–116)
ALT SERPL W P-5'-P-CCNC: 45 U/L (ref 12–78)
ANION GAP SERPL CALCULATED.3IONS-SCNC: 9 MMOL/L (ref 4–13)
AST SERPL W P-5'-P-CCNC: 20 U/L (ref 5–45)
BILIRUB SERPL-MCNC: 0.29 MG/DL (ref 0.2–1)
BUN SERPL-MCNC: 12 MG/DL (ref 5–25)
CALCIUM SERPL-MCNC: 8.7 MG/DL (ref 8.3–10.1)
CHLORIDE SERPL-SCNC: 105 MMOL/L (ref 100–108)
CO2 SERPL-SCNC: 25 MMOL/L (ref 21–32)
CREAT SERPL-MCNC: 1.03 MG/DL (ref 0.6–1.3)
ERYTHROCYTE [DISTWIDTH] IN BLOOD BY AUTOMATED COUNT: 12.3 % (ref 11.6–15.1)
EST. AVERAGE GLUCOSE BLD GHB EST-MCNC: 120 MG/DL
GFR SERPL CREATININE-BSD FRML MDRD: 82 ML/MIN/1.73SQ M
GLUCOSE SERPL-MCNC: 205 MG/DL (ref 65–140)
HBA1C MFR BLD: 5.8 % (ref 4.2–6.3)
HCT VFR BLD AUTO: 47.9 % (ref 36.5–49.3)
HGB BLD-MCNC: 15.5 G/DL (ref 12–17)
MCH RBC QN AUTO: 30.2 PG (ref 26.8–34.3)
MCHC RBC AUTO-ENTMCNC: 32.4 G/DL (ref 31.4–37.4)
MCV RBC AUTO: 93 FL (ref 82–98)
PLATELET # BLD AUTO: 206 THOUSANDS/UL (ref 149–390)
PMV BLD AUTO: 10 FL (ref 8.9–12.7)
POTASSIUM SERPL-SCNC: 3.5 MMOL/L (ref 3.5–5.3)
PROT SERPL-MCNC: 7 G/DL (ref 6.4–8.2)
RBC # BLD AUTO: 5.14 MILLION/UL (ref 3.88–5.62)
SODIUM SERPL-SCNC: 139 MMOL/L (ref 136–145)
TSH SERPL DL<=0.05 MIU/L-ACNC: 1.88 UIU/ML (ref 0.36–3.74)
WBC # BLD AUTO: 8.09 THOUSAND/UL (ref 4.31–10.16)

## 2020-02-10 PROCEDURE — 80323 ALKALOIDS NOS: CPT

## 2020-02-10 PROCEDURE — G0480 DRUG TEST DEF 1-7 CLASSES: HCPCS

## 2020-02-10 PROCEDURE — 36415 COLL VENOUS BLD VENIPUNCTURE: CPT

## 2020-02-10 PROCEDURE — 83036 HEMOGLOBIN GLYCOSYLATED A1C: CPT

## 2020-02-10 PROCEDURE — 85027 COMPLETE CBC AUTOMATED: CPT

## 2020-02-10 PROCEDURE — 84443 ASSAY THYROID STIM HORMONE: CPT

## 2020-02-10 PROCEDURE — 80053 COMPREHEN METABOLIC PANEL: CPT

## 2020-02-13 ENCOUNTER — OFFICE VISIT (OUTPATIENT)
Dept: BARIATRICS | Facility: CLINIC | Age: 55
End: 2020-02-13
Payer: COMMERCIAL

## 2020-02-13 VITALS
HEART RATE: 84 BPM | BODY MASS INDEX: 41.86 KG/M2 | SYSTOLIC BLOOD PRESSURE: 140 MMHG | DIASTOLIC BLOOD PRESSURE: 80 MMHG | HEIGHT: 71 IN | TEMPERATURE: 98.5 F | WEIGHT: 299 LBS

## 2020-02-13 DIAGNOSIS — E66.01 MORBID (SEVERE) OBESITY DUE TO EXCESS CALORIES (HCC): Primary | ICD-10-CM

## 2020-02-13 PROCEDURE — 3008F BODY MASS INDEX DOCD: CPT | Performed by: SURGERY

## 2020-02-13 PROCEDURE — 99214 OFFICE O/P EST MOD 30 MIN: CPT | Performed by: SURGERY

## 2020-02-13 RX ORDER — HEPARIN SODIUM 5000 [USP'U]/ML
5000 INJECTION, SOLUTION INTRAVENOUS; SUBCUTANEOUS
Status: CANCELLED | OUTPATIENT
Start: 2020-03-02 | End: 2020-03-03

## 2020-02-13 RX ORDER — ACETAMINOPHEN 325 MG/1
975 TABLET ORAL ONCE
Status: CANCELLED | OUTPATIENT
Start: 2020-03-02 | End: 2020-02-13

## 2020-02-13 RX ORDER — CELECOXIB 200 MG/1
200 CAPSULE ORAL ONCE
Status: CANCELLED | OUTPATIENT
Start: 2020-03-02 | End: 2020-02-13

## 2020-02-13 RX ORDER — SCOLOPAMINE TRANSDERMAL SYSTEM 1 MG/1
1 PATCH, EXTENDED RELEASE TRANSDERMAL ONCE
Status: CANCELLED | OUTPATIENT
Start: 2020-03-02 | End: 2020-02-13

## 2020-02-13 RX ORDER — DULOXETIN HYDROCHLORIDE 60 MG/1
60 CAPSULE, DELAYED RELEASE ORAL DAILY
COMMUNITY
Start: 2020-02-05 | End: 2020-03-05

## 2020-02-13 RX ORDER — GABAPENTIN 300 MG/1
600 CAPSULE ORAL ONCE
Status: CANCELLED | OUTPATIENT
Start: 2020-03-02 | End: 2020-02-13

## 2020-02-13 NOTE — H&P (VIEW-ONLY)
BARIATRIC HISTORY AND PHYSICAL - BARIATRIC SURGERY  Claire Boss 48 y o  male MRN: [id number]  Unit/Bed#:  Encounter: [id number]      HPI:  Claire Boss is a 48 y o  male who presents to review her preoperative workup and see if she is a good candidate to undergo a bariatric procedure  Review of Systems   Constitutional: Negative  HENT: Negative  Eyes: Negative  Respiratory: Negative  Cardiovascular: Negative  Gastrointestinal: Negative  Endocrine: Negative  Genitourinary: Negative  Musculoskeletal: Negative  Skin: Negative  Neurological: Negative  Hematological: Negative  Psychiatric/Behavioral: Negative          Historical Information   Past Medical History:   Diagnosis Date    Bipolar disorder ([facility] )     Continuous positive airway pressure dependence     Heart disease     Hypercholesterolemia     Morbid obesity ([facility] )     NSTEMI (non-ST elevation myocardial infarction) ([facility] )     Sleep apnea     uses c pap    Umbilical hernia     Resolved 2015     Ventral hernia     Resolved 2015      Past Surgical History:   Procedure Laterality Date    CARDIAC CATHETERIZATION      no stents     HERNIA REPAIR      umbilical hernia    SKIN SURGERY      cyst removed from back and thumb    TONSILLECTOMY      TOOTH EXTRACTION       Social History   Social History     Substance and Sexual Activity   Alcohol Use Not Currently    Frequency: Never     Social History     Substance and Sexual Activity   Drug Use Never     Social History     Tobacco Use   Smoking Status Former Smoker    Types: Cigarettes    Last attempt to quit: 2011    Years since quittin 1   Smokeless Tobacco Current User    Last attempt to quit: 2019   Tobacco Comment    quit cigarettes , continues to vape     Family History: non-contributory    Meds/Allergies   all medications and allergies reviewed  No Known Allergies    Objective     Current Vitals:   Blood Pressure: 140/80 (02/13/20 1402)  Pulse: 84 (02/13/20 1402)  Temperature: 98 5 °F (36 9 °C) (02/13/20 1402)  Temp Source: Tympanic (02/13/20 1402)  Height: 5' 11" (180 3 cm) (02/13/20 1402)  Weight - Scale: 136 kg (299 lb) (02/13/20 1402)  Body mass index is 41 7 kg/m²  [unfilled]    Invasive Devices     None                 Physical Exam   Constitutional: He is oriented to person, place, and time  He appears well-developed  HENT:   Head: Normocephalic and atraumatic  Eyes: Conjunctivae and EOM are normal    Neck: Normal range of motion  Neck supple  Cardiovascular: Normal rate, regular rhythm, normal heart sounds and intact distal pulses  Pulmonary/Chest: Effort normal and breath sounds normal    Abdominal: Soft  Bowel sounds are normal    Musculoskeletal: Normal range of motion  Neurological: He is alert and oriented to person, place, and time  He has normal reflexes  Skin: Skin is warm and dry  Psychiatric: He has a normal mood and affect  Lab Results: I have personally reviewed pertinent lab results  Imaging: I have personally reviewed pertinent films in PACS  EKG, Pathology, and Other Studies: I have personally reviewed pertinent reports  Code Status: [unfilled]  Advance Directive and Living Will:      Power of :    POLST:      Assessment/Plan:      The patient presented to review the preoperative workup and see if bariatric surgery is appropriate and indicated following the extensive preoperative workup and the enrollment in our weight loss program    Preoperative workup was complete  Results were reviewed with the patient including the blood work results and the endoscopy findings and the biopsy results  We also reviewed the cardiology evaluation  I believe that the patient will be a good candidate for laparoscopic FRITZ-EN-Y GASTRIC BYPASS    Patient will need to start the 2 week liquid diet prior to surgery  Risks and benefits explained one more time to the patient  Alternatives to surgery and alternative forms of surgery were also explained  Post-surgical commitment and after care programs were explained  We also discussed that the [employer] robot may be available to us to use on the day of the patient procedure  and that the procedure may be performed robotically  I discussed in details the advantages and disadvantages of this approach including the potential decrease in postoperative pain because of the remote center technology  I also mentioned the lack of strong evidence for  the use of robot in bariatric patients and the potential disadvantage to patients because of the prolonged operative time  Consent was signed  Questions were answered and concerns were addressed  Patient wishes to proceed  As per  [address] [place] [address] guidelines, I had a discussion with the patient regarding his CODE STATUS in the perioperative period and the patient is level 1 or FULL CODE STATUS

## 2020-02-13 NOTE — PROGRESS NOTES
BARIATRIC HISTORY AND PHYSICAL - BARIATRIC SURGERY  Keyla Barrios 48 y o  male MRN: [id number]  Unit/Bed#:  Encounter: [id number]      HPI:  Keyla Barrios is a 48 y o  male who presents to review her preoperative workup and see if she is a good candidate to undergo a bariatric procedure  Review of Systems   Constitutional: Negative  HENT: Negative  Eyes: Negative  Respiratory: Negative  Cardiovascular: Negative  Gastrointestinal: Negative  Endocrine: Negative  Genitourinary: Negative  Musculoskeletal: Negative  Skin: Negative  Neurological: Negative  Hematological: Negative  Psychiatric/Behavioral: Negative          Historical Information   Past Medical History:   Diagnosis Date    Bipolar disorder ([facility] )     Continuous positive airway pressure dependence     Heart disease     Hypercholesterolemia     Morbid obesity ([facility] )     NSTEMI (non-ST elevation myocardial infarction) ([place] [address] )     Sleep apnea     uses c pap    Umbilical hernia     Resolved 2015     Ventral hernia     Resolved 2015      Past Surgical History:   Procedure Laterality Date    CARDIAC CATHETERIZATION      no stents     HERNIA REPAIR      umbilical hernia    SKIN SURGERY      cyst removed from back and thumb    TONSILLECTOMY      TOOTH EXTRACTION       Social History   Social History     Substance and Sexual Activity   Alcohol Use Not Currently    Frequency: Never     Social History     Substance and Sexual Activity   Drug Use Never     Social History     Tobacco Use   Smoking Status Former Smoker    Types: Cigarettes    Last attempt to quit: 2011    Years since quittin 1   Smokeless Tobacco Current User    Last attempt to quit: 2019   Tobacco Comment    quit cigarettes , continues to vape     Family History: non-contributory    Meds/Allergies   all medications and allergies reviewed  No Known Allergies    Objective     Current Vitals:   Blood Pressure: 140/80 (02/13/20 1402)  Pulse: 84 (02/13/20 1402)  Temperature: 98 5 °F (36 9 °C) (02/13/20 1402)  Temp Source: Tympanic (02/13/20 1402)  Height: 5' 11" (180 3 cm) (02/13/20 1402)  Weight - Scale: 136 kg (299 lb) (02/13/20 1402)  Body mass index is 41 7 kg/m²  [unfilled]    Invasive Devices     None                 Physical Exam   Constitutional: He is oriented to person, place, and time  He appears well-developed  HENT:   Head: Normocephalic and atraumatic  Eyes: Conjunctivae and EOM are normal    Neck: Normal range of motion  Neck supple  Cardiovascular: Normal rate, regular rhythm, normal heart sounds and intact distal pulses  Pulmonary/Chest: Effort normal and breath sounds normal    Abdominal: Soft  Bowel sounds are normal    Musculoskeletal: Normal range of motion  Neurological: He is alert and oriented to person, place, and time  He has normal reflexes  Skin: Skin is warm and dry  Psychiatric: He has a normal mood and affect  Lab Results: I have personally reviewed pertinent lab results  Imaging: I have personally reviewed pertinent films in PACS  EKG, Pathology, and Other Studies: I have personally reviewed pertinent reports  Code Status: [unfilled]  Advance Directive and Living Will:      Power of :    POLST:      Assessment/Plan:      The patient presented to review the preoperative workup and see if bariatric surgery is appropriate and indicated following the extensive preoperative workup and the enrollment in our weight loss program    Preoperative workup was complete  Results were reviewed with the patient including the blood work results and the endoscopy findings and the biopsy results  We also reviewed the cardiology evaluation  I believe that the patient will be a good candidate for laparoscopic FRITZ-EN-Y GASTRIC BYPASS    Patient will need to start the 2 week liquid diet prior to surgery  Risks and benefits explained one more time to the patient  Alternatives to surgery and alternative forms of surgery were also explained  Post-surgical commitment and after care programs were explained  We also discussed that the [employer] robot may be available to us to use on the day of the patient procedure  and that the procedure may be performed robotically  I discussed in details the advantages and disadvantages of this approach including the potential decrease in postoperative pain because of the remote center technology  I also mentioned the lack of strong evidence for  the use of robot in bariatric patients and the potential disadvantage to patients because of the prolonged operative time  Consent was signed  Questions were answered and concerns were addressed  Patient wishes to proceed  As per  [facility] guidelines, I had a discussion with the patient regarding his CODE STATUS in the perioperative period and the patient is level 1 or FULL CODE STATUS

## 2020-02-14 DIAGNOSIS — E66.01 MORBID (SEVERE) OBESITY DUE TO EXCESS CALORIES (HCC): Primary | ICD-10-CM

## 2020-02-14 RX ORDER — OMEPRAZOLE 20 MG/1
20 CAPSULE, DELAYED RELEASE ORAL DAILY
Qty: 90 CAPSULE | Refills: 1 | Status: SHIPPED | OUTPATIENT
Start: 2020-02-14 | End: 2020-03-11

## 2020-02-14 RX ORDER — OXYCODONE HYDROCHLORIDE 5 MG/1
5 TABLET ORAL EVERY 4 HOURS PRN
Qty: 10 TABLET | Refills: 0 | Status: SHIPPED | OUTPATIENT
Start: 2020-02-14 | End: 2020-03-13 | Stop reason: ALTCHOICE

## 2020-02-16 LAB
COTININE SERPL-MCNC: 1.4 NG/ML
NICOTINE SERPL-MCNC: NORMAL NG/ML

## 2020-02-18 ENCOUNTER — ANESTHESIA EVENT (OUTPATIENT)
Dept: PERIOP | Facility: HOSPITAL | Age: 55
DRG: 621 | End: 2020-02-18
Payer: COMMERCIAL

## 2020-02-18 RX ORDER — SODIUM CHLORIDE 9 MG/ML
125 INJECTION, SOLUTION INTRAVENOUS CONTINUOUS
Status: CANCELLED | OUTPATIENT
Start: 2020-03-02

## 2020-02-19 ENCOUNTER — APPOINTMENT (OUTPATIENT)
Dept: PREADMISSION TESTING | Facility: HOSPITAL | Age: 55
DRG: 621 | End: 2020-02-19
Payer: COMMERCIAL

## 2020-02-19 RX ORDER — ACETAMINOPHEN 500 MG
1000 TABLET ORAL EVERY 6 HOURS PRN
COMMUNITY
End: 2020-03-03 | Stop reason: HOSPADM

## 2020-02-19 NOTE — PRE-PROCEDURE INSTRUCTIONS
Pre-Surgery Instructions:   Medication Instructions    acetaminophen (TYLENOL) 500 mg tablet Instructed patient per Anesthesia Guidelines   atorvastatin (LIPITOR) 40 mg tablet Instructed patient per Anesthesia Guidelines   butalbital-acetaminophen-caffeine-codeine (FIORICET WITH CODEINE) -97-30 MG per capsule Instructed patient per Anesthesia Guidelines   clonazePAM (KlonoPIN) 1 mg tablet Instructed patient per Anesthesia Guidelines   DULoxetine (CYMBALTA) 60 mg delayed release capsule Instructed patient per Anesthesia Guidelines   frovatriptan (FROVA) 2 5 MG tablet Instructed patient per Anesthesia Guidelines   isosorbide mononitrate (IMDUR) 30 mg 24 hr tablet Instructed patient per Anesthesia Guidelines   meclizine (ANTIVERT) 25 mg tablet Instructed patient per Anesthesia Guidelines   metoprolol succinate (TOPROL-XL) 25 mg 24 hr tablet Instructed patient per Anesthesia Guidelines   OXcarbazepine (TRILEPTAL) 600 mg tablet Instructed patient per Anesthesia Guidelines   paliperidone (INVEGA) 9 MG 24 hr tablet Instructed patient per Anesthesia Guidelines   rizatriptan (MAXALT) 10 MG tablet Instructed patient per Anesthesia Guidelines   traZODone (DESYREL) 300 MG tablet Instructed patient per Anesthesia Guidelines  Instructed to to take klonopin/ invega and trileptal am of surgery with sip ofw ater per anesthesia DR Lisa Magana

## 2020-02-19 NOTE — ANESTHESIA PREPROCEDURE EVALUATION
Review of Systems/Medical History  Patient summary reviewed  Chart reviewed  No history of anesthetic complications     Cardiovascular  Hyperlipidemia, Past MI , CAD , Angina Unstable,   Comment: 2019    LEFT VENTRICLE: Size was normal  Systolic function was normal by visual assessment  Ejection fraction was estimated to be 60 %  There were no regional wall motion abnormalities  Wall thickness was mildly increased  DOPPLER: There was an  increased relative contribution of atrial contraction to ventricular filling  Doppler parameters were consistent with abnormal left ventricular relaxation (grade 1 diastolic dysfunction)      RIGHT VENTRICLE: The size was normal  Systolic function was normal  DOPPLER: Systolic pressure was not estimated      LEFT ATRIUM: Size was normal      RIGHT ATRIUM: Size was normal      MITRAL VALVE: Valve structure was normal  There was normal leaflet separation  DOPPLER: The transmitral velocity was within the normal range  There was no evidence for stenosis  There was no regurgitation      AORTIC VALVE: The valve was trileaflet  Leaflets exhibited normal thickness, mild calcification, and normal cuspal separation  DOPPLER: Transaortic velocity was within the normal range  There was no evidence for stenosis   There was no  regurgitation    ,  Pulmonary  Smoker ex-smoker  , Sleep apnea CPAP,        GI/Hepatic  Negative GI/hepatic ROS          Negative  ROS        Endo/Other    Obesity  morbid obesity   GYN  Negative gynecology ROS          Hematology  Negative hematology ROS      Musculoskeletal    Arthritis     Neurology    Headaches,    Psychology   Psychiatric history, Depression , bipolar disorder,              Physical Exam    Airway    Mallampati score: III  TM Distance: >3 FB  Neck ROM: full     Dental   No notable dental hx     Cardiovascular  Rhythm: regular, Rate: normal, Cardiovascular exam normal    Pulmonary  Pulmonary exam normal Breath sounds clear to auscultation, Other Findings        Anesthesia Plan  ASA Score- 3     Anesthesia Type- general with ASA Monitors  Additional Monitors:   Airway Plan: ETT  Comment: Saw Dr Richard Rodríguez (Cardiology) in 1/2020  No interventions, one year recheck  Continue long-acting nitrates        Plan Factors-Patient not instructed to abstain from smoking on day of procedure  Patient did not smoke on day of surgery  Induction- intravenous  Postoperative Plan-     Informed Consent- Anesthetic plan and risks discussed with patient

## 2020-02-27 ENCOUNTER — TELEPHONE (OUTPATIENT)
Dept: BARIATRICS | Facility: CLINIC | Age: 55
End: 2020-02-27

## 2020-02-27 NOTE — TELEPHONE ENCOUNTER
Pre-op call was made to patient to follow up on how they are doing and to remind them to continue with all medical and dietary directions that were given at pre-op class regarding liver shrinking diet and hydration  Patient reports that he has lost 20 lbs so far on the liquid diet and reports being tired, SW normalized this  Patient also walking 4-5 miles at work every day  Patient has purchased all vitamins and protein shakes for post op and infomed to begin Miralax three days prior to surgery as directed in Section 6 of their manual   They were reminded of the Ensure Pre-surgery drinks protocol and to bring their completed yellow form with them to surgery as well as their CPAP-BiPAP machine if they use one  Lastly, they were informed that they would be weighed the morning of surgery and to give the office a call if they had any further questions or concerns

## 2020-03-02 ENCOUNTER — ANESTHESIA (OUTPATIENT)
Dept: PERIOP | Facility: HOSPITAL | Age: 55
DRG: 621 | End: 2020-03-02
Payer: COMMERCIAL

## 2020-03-02 ENCOUNTER — TELEPHONE (OUTPATIENT)
Dept: FAMILY MEDICINE CLINIC | Facility: CLINIC | Age: 55
End: 2020-03-02

## 2020-03-02 ENCOUNTER — HOSPITAL ENCOUNTER (INPATIENT)
Facility: HOSPITAL | Age: 55
LOS: 1 days | Discharge: HOME | DRG: 621 | End: 2020-03-03
Attending: SURGERY | Admitting: SURGERY
Payer: COMMERCIAL

## 2020-03-02 DIAGNOSIS — I25.110 CORONARY ARTERY DISEASE INVOLVING NATIVE CORONARY ARTERY OF NATIVE HEART WITH UNSTABLE ANGINA PECTORIS (HCC): ICD-10-CM

## 2020-03-02 DIAGNOSIS — G43.909 MIGRAINE WITHOUT STATUS MIGRAINOSUS, NOT INTRACTABLE, UNSPECIFIED MIGRAINE TYPE: Primary | ICD-10-CM

## 2020-03-02 DIAGNOSIS — I21.4 NSTEMI (NON-ST ELEVATED MYOCARDIAL INFARCTION) (HCC): Primary | ICD-10-CM

## 2020-03-02 DIAGNOSIS — E78.00 HYPERCHOLESTEROLEMIA: ICD-10-CM

## 2020-03-02 DIAGNOSIS — E78.2 MIXED HYPERLIPIDEMIA: ICD-10-CM

## 2020-03-02 DIAGNOSIS — E66.01 MORBID OBESITY (HCC): ICD-10-CM

## 2020-03-02 PROCEDURE — 0D164ZA BYPASS STOMACH TO JEJUNUM, PERCUTANEOUS ENDOSCOPIC APPROACH: ICD-10-PCS | Performed by: SURGERY

## 2020-03-02 PROCEDURE — 0DJ08ZZ INSPECTION OF UPPER INTESTINAL TRACT, VIA NATURAL OR ARTIFICIAL OPENING ENDOSCOPIC: ICD-10-PCS | Performed by: SURGERY

## 2020-03-02 PROCEDURE — 43644 LAP GASTRIC BYPASS/ROUX-EN-Y: CPT | Performed by: SURGERY

## 2020-03-02 PROCEDURE — C9290 INJ, BUPIVACAINE LIPOSOME: HCPCS | Performed by: SURGERY

## 2020-03-02 PROCEDURE — 99253 IP/OBS CNSLTJ NEW/EST LOW 45: CPT | Performed by: INTERNAL MEDICINE

## 2020-03-02 DEVICE — SEAMGUARD STPL REINF ENDO GIA ULTRA UNIV 60 PURPLE: Type: IMPLANTABLE DEVICE | Site: ABDOMEN | Status: FUNCTIONAL

## 2020-03-02 RX ORDER — METOCLOPRAMIDE HYDROCHLORIDE 5 MG/ML
10 INJECTION INTRAMUSCULAR; INTRAVENOUS ONCE AS NEEDED
Status: DISCONTINUED | OUTPATIENT
Start: 2020-03-02 | End: 2020-03-02

## 2020-03-02 RX ORDER — MAGNESIUM HYDROXIDE 1200 MG/15ML
LIQUID ORAL AS NEEDED
Status: DISCONTINUED | OUTPATIENT
Start: 2020-03-02 | End: 2020-03-02 | Stop reason: HOSPADM

## 2020-03-02 RX ORDER — CLONAZEPAM 1 MG/1
1 TABLET ORAL 3 TIMES DAILY
Status: DISCONTINUED | OUTPATIENT
Start: 2020-03-02 | End: 2020-03-02

## 2020-03-02 RX ORDER — OXCARBAZEPINE 300 MG/1
300 TABLET, FILM COATED ORAL 2 TIMES DAILY
Status: DISCONTINUED | OUTPATIENT
Start: 2020-03-02 | End: 2020-03-03 | Stop reason: HOSPADM

## 2020-03-02 RX ORDER — SODIUM CHLORIDE 9 MG/ML
INJECTION, SOLUTION INTRAVENOUS AS NEEDED
Status: DISCONTINUED | OUTPATIENT
Start: 2020-03-02 | End: 2020-03-02 | Stop reason: HOSPADM

## 2020-03-02 RX ORDER — HYDROMORPHONE HCL/PF 1 MG/ML
0.5 SYRINGE (ML) INJECTION EVERY 4 HOURS PRN
Status: DISCONTINUED | OUTPATIENT
Start: 2020-03-02 | End: 2020-03-03 | Stop reason: HOSPADM

## 2020-03-02 RX ORDER — OXYCODONE HYDROCHLORIDE 5 MG/1
5 TABLET ORAL EVERY 4 HOURS PRN
Status: DISCONTINUED | OUTPATIENT
Start: 2020-03-02 | End: 2020-03-03 | Stop reason: HOSPADM

## 2020-03-02 RX ORDER — ONDANSETRON 2 MG/ML
INJECTION INTRAMUSCULAR; INTRAVENOUS AS NEEDED
Status: DISCONTINUED | OUTPATIENT
Start: 2020-03-02 | End: 2020-03-02 | Stop reason: SURG

## 2020-03-02 RX ORDER — CELECOXIB 200 MG/1
200 CAPSULE ORAL ONCE
Status: COMPLETED | OUTPATIENT
Start: 2020-03-02 | End: 2020-03-02

## 2020-03-02 RX ORDER — CLONAZEPAM 1 MG/1
2 TABLET ORAL
Status: DISCONTINUED | OUTPATIENT
Start: 2020-03-02 | End: 2020-03-03 | Stop reason: HOSPADM

## 2020-03-02 RX ORDER — SCOLOPAMINE TRANSDERMAL SYSTEM 1 MG/1
1 PATCH, EXTENDED RELEASE TRANSDERMAL ONCE
Status: DISCONTINUED | OUTPATIENT
Start: 2020-03-02 | End: 2020-03-03 | Stop reason: HOSPADM

## 2020-03-02 RX ORDER — DULOXETIN HYDROCHLORIDE 60 MG/1
60 CAPSULE, DELAYED RELEASE ORAL DAILY
Status: DISCONTINUED | OUTPATIENT
Start: 2020-03-02 | End: 2020-03-03 | Stop reason: HOSPADM

## 2020-03-02 RX ORDER — DEXAMETHASONE SODIUM PHOSPHATE 10 MG/ML
INJECTION, SOLUTION INTRAMUSCULAR; INTRAVENOUS AS NEEDED
Status: DISCONTINUED | OUTPATIENT
Start: 2020-03-02 | End: 2020-03-02 | Stop reason: SURG

## 2020-03-02 RX ORDER — EPHEDRINE SULFATE 50 MG/ML
INJECTION INTRAVENOUS AS NEEDED
Status: DISCONTINUED | OUTPATIENT
Start: 2020-03-02 | End: 2020-03-02 | Stop reason: SURG

## 2020-03-02 RX ORDER — ACETAMINOPHEN 160 MG/5ML
975 SUSPENSION, ORAL (FINAL DOSE FORM) ORAL EVERY 8 HOURS
Status: DISCONTINUED | OUTPATIENT
Start: 2020-03-02 | End: 2020-03-03 | Stop reason: HOSPADM

## 2020-03-02 RX ORDER — MORPHINE SULFATE 4 MG/ML
4 INJECTION, SOLUTION INTRAMUSCULAR; INTRAVENOUS EVERY 2 HOUR PRN
Status: DISCONTINUED | OUTPATIENT
Start: 2020-03-02 | End: 2020-03-03 | Stop reason: HOSPADM

## 2020-03-02 RX ORDER — NEOSTIGMINE METHYLSULFATE 1 MG/ML
INJECTION INTRAVENOUS AS NEEDED
Status: DISCONTINUED | OUTPATIENT
Start: 2020-03-02 | End: 2020-03-02 | Stop reason: SURG

## 2020-03-02 RX ORDER — METOCLOPRAMIDE HYDROCHLORIDE 5 MG/ML
10 INJECTION INTRAMUSCULAR; INTRAVENOUS EVERY 6 HOURS PRN
Status: DISCONTINUED | OUTPATIENT
Start: 2020-03-02 | End: 2020-03-03 | Stop reason: HOSPADM

## 2020-03-02 RX ORDER — SODIUM CHLORIDE 9 MG/ML
125 INJECTION, SOLUTION INTRAVENOUS CONTINUOUS
Status: DISCONTINUED | OUTPATIENT
Start: 2020-03-02 | End: 2020-03-02

## 2020-03-02 RX ORDER — MIDAZOLAM HYDROCHLORIDE 2 MG/2ML
INJECTION, SOLUTION INTRAMUSCULAR; INTRAVENOUS AS NEEDED
Status: DISCONTINUED | OUTPATIENT
Start: 2020-03-02 | End: 2020-03-02 | Stop reason: SURG

## 2020-03-02 RX ORDER — SCOLOPAMINE TRANSDERMAL SYSTEM 1 MG/1
1 PATCH, EXTENDED RELEASE TRANSDERMAL
Status: DISCONTINUED | OUTPATIENT
Start: 2020-03-05 | End: 2020-03-03 | Stop reason: HOSPADM

## 2020-03-02 RX ORDER — LIDOCAINE HYDROCHLORIDE 10 MG/ML
INJECTION, SOLUTION EPIDURAL; INFILTRATION; INTRACAUDAL; PERINEURAL AS NEEDED
Status: DISCONTINUED | OUTPATIENT
Start: 2020-03-02 | End: 2020-03-02 | Stop reason: SURG

## 2020-03-02 RX ORDER — HYDROMORPHONE HCL 110MG/55ML
PATIENT CONTROLLED ANALGESIA SYRINGE INTRAVENOUS AS NEEDED
Status: DISCONTINUED | OUTPATIENT
Start: 2020-03-02 | End: 2020-03-02 | Stop reason: SURG

## 2020-03-02 RX ORDER — BUPIVACAINE HYDROCHLORIDE AND EPINEPHRINE 5; 5 MG/ML; UG/ML
INJECTION, SOLUTION PERINEURAL AS NEEDED
Status: DISCONTINUED | OUTPATIENT
Start: 2020-03-02 | End: 2020-03-02 | Stop reason: HOSPADM

## 2020-03-02 RX ORDER — PROPOFOL 10 MG/ML
INJECTION, EMULSION INTRAVENOUS AS NEEDED
Status: DISCONTINUED | OUTPATIENT
Start: 2020-03-02 | End: 2020-03-02 | Stop reason: SURG

## 2020-03-02 RX ORDER — ISOSORBIDE MONONITRATE 30 MG/1
30 TABLET, EXTENDED RELEASE ORAL DAILY
Status: DISCONTINUED | OUTPATIENT
Start: 2020-03-03 | End: 2020-03-02

## 2020-03-02 RX ORDER — ACETAMINOPHEN 325 MG/1
975 TABLET ORAL ONCE
Status: COMPLETED | OUTPATIENT
Start: 2020-03-02 | End: 2020-03-02

## 2020-03-02 RX ORDER — HYDROMORPHONE HCL/PF 1 MG/ML
0.5 SYRINGE (ML) INJECTION
Status: DISCONTINUED | OUTPATIENT
Start: 2020-03-02 | End: 2020-03-02 | Stop reason: HOSPADM

## 2020-03-02 RX ORDER — FENTANYL CITRATE/PF 50 MCG/ML
25 SYRINGE (ML) INJECTION
Status: DISCONTINUED | OUTPATIENT
Start: 2020-03-02 | End: 2020-03-02 | Stop reason: HOSPADM

## 2020-03-02 RX ORDER — METOPROLOL TARTRATE 5 MG/5ML
5 INJECTION INTRAVENOUS EVERY 6 HOURS
Status: DISCONTINUED | OUTPATIENT
Start: 2020-03-02 | End: 2020-03-03

## 2020-03-02 RX ORDER — ACETAMINOPHEN 325 MG/1
975 TABLET ORAL EVERY 8 HOURS
Status: DISCONTINUED | OUTPATIENT
Start: 2020-03-02 | End: 2020-03-03 | Stop reason: HOSPADM

## 2020-03-02 RX ORDER — HEPARIN SODIUM 5000 [USP'U]/ML
5000 INJECTION, SOLUTION INTRAVENOUS; SUBCUTANEOUS
Status: COMPLETED | OUTPATIENT
Start: 2020-03-02 | End: 2020-03-02

## 2020-03-02 RX ORDER — ATORVASTATIN CALCIUM 40 MG/1
40 TABLET, FILM COATED ORAL
Status: DISCONTINUED | OUTPATIENT
Start: 2020-03-02 | End: 2020-03-03 | Stop reason: HOSPADM

## 2020-03-02 RX ORDER — SODIUM CHLORIDE, SODIUM LACTATE, POTASSIUM CHLORIDE, CALCIUM CHLORIDE 600; 310; 30; 20 MG/100ML; MG/100ML; MG/100ML; MG/100ML
100 INJECTION, SOLUTION INTRAVENOUS CONTINUOUS
Status: DISCONTINUED | OUTPATIENT
Start: 2020-03-02 | End: 2020-03-03 | Stop reason: HOSPADM

## 2020-03-02 RX ORDER — TRAZODONE HYDROCHLORIDE 100 MG/1
300 TABLET ORAL
Status: DISCONTINUED | OUTPATIENT
Start: 2020-03-02 | End: 2020-03-03 | Stop reason: HOSPADM

## 2020-03-02 RX ORDER — SIMETHICONE 80 MG
80 TABLET,CHEWABLE ORAL 4 TIMES DAILY PRN
Status: DISCONTINUED | OUTPATIENT
Start: 2020-03-02 | End: 2020-03-03 | Stop reason: HOSPADM

## 2020-03-02 RX ORDER — ONDANSETRON 2 MG/ML
4 INJECTION INTRAMUSCULAR; INTRAVENOUS ONCE AS NEEDED
Status: DISCONTINUED | OUTPATIENT
Start: 2020-03-02 | End: 2020-03-02 | Stop reason: HOSPADM

## 2020-03-02 RX ORDER — GLYCOPYRROLATE 0.2 MG/ML
INJECTION INTRAMUSCULAR; INTRAVENOUS AS NEEDED
Status: DISCONTINUED | OUTPATIENT
Start: 2020-03-02 | End: 2020-03-02 | Stop reason: SURG

## 2020-03-02 RX ORDER — ONDANSETRON 2 MG/ML
4 INJECTION INTRAMUSCULAR; INTRAVENOUS EVERY 6 HOURS SCHEDULED
Status: DISCONTINUED | OUTPATIENT
Start: 2020-03-02 | End: 2020-03-03 | Stop reason: HOSPADM

## 2020-03-02 RX ORDER — MEPERIDINE HYDROCHLORIDE 50 MG/ML
12.5 INJECTION INTRAMUSCULAR; INTRAVENOUS; SUBCUTANEOUS
Status: DISCONTINUED | OUTPATIENT
Start: 2020-03-02 | End: 2020-03-02 | Stop reason: HOSPADM

## 2020-03-02 RX ORDER — GABAPENTIN 300 MG/1
600 CAPSULE ORAL ONCE
Status: COMPLETED | OUTPATIENT
Start: 2020-03-02 | End: 2020-03-02

## 2020-03-02 RX ORDER — PROMETHAZINE HYDROCHLORIDE 25 MG/ML
12.5 INJECTION, SOLUTION INTRAMUSCULAR; INTRAVENOUS EVERY 6 HOURS PRN
Status: DISCONTINUED | OUTPATIENT
Start: 2020-03-02 | End: 2020-03-03 | Stop reason: HOSPADM

## 2020-03-02 RX ORDER — ROCURONIUM BROMIDE 10 MG/ML
INJECTION, SOLUTION INTRAVENOUS AS NEEDED
Status: DISCONTINUED | OUTPATIENT
Start: 2020-03-02 | End: 2020-03-02 | Stop reason: SURG

## 2020-03-02 RX ORDER — CLONAZEPAM 1 MG/1
1 TABLET ORAL DAILY
Status: DISCONTINUED | OUTPATIENT
Start: 2020-03-02 | End: 2020-03-03 | Stop reason: HOSPADM

## 2020-03-02 RX ADMIN — Medication 2000 MG: at 10:08

## 2020-03-02 RX ADMIN — METRONIDAZOLE 500 MG: 500 INJECTION, SOLUTION INTRAVENOUS at 10:08

## 2020-03-02 RX ADMIN — HEPARIN SODIUM 5000 UNITS: 5000 INJECTION INTRAVENOUS; SUBCUTANEOUS at 09:05

## 2020-03-02 RX ADMIN — ACETAMINOPHEN 975 MG: 325 TABLET ORAL at 08:32

## 2020-03-02 RX ADMIN — SCOPALAMINE 1 PATCH: 1 PATCH, EXTENDED RELEASE TRANSDERMAL at 08:32

## 2020-03-02 RX ADMIN — ROCURONIUM BROMIDE 15 MG: 50 INJECTION, SOLUTION INTRAVENOUS at 11:06

## 2020-03-02 RX ADMIN — SODIUM CHLORIDE: 0.9 INJECTION, SOLUTION INTRAVENOUS at 10:11

## 2020-03-02 RX ADMIN — OXCARBAZEPINE 300 MG: 300 TABLET, FILM COATED ORAL at 20:06

## 2020-03-02 RX ADMIN — PHENYLEPHRINE HYDROCHLORIDE 100 MCG: 10 INJECTION INTRAVENOUS at 10:39

## 2020-03-02 RX ADMIN — FAMOTIDINE 20 MG: 10 INJECTION, SOLUTION INTRAVENOUS at 17:10

## 2020-03-02 RX ADMIN — PHENYLEPHRINE HYDROCHLORIDE 200 MCG: 10 INJECTION INTRAVENOUS at 11:28

## 2020-03-02 RX ADMIN — SODIUM CHLORIDE, SODIUM LACTATE, POTASSIUM CHLORIDE, AND CALCIUM CHLORIDE 100 ML/HR: .6; .31; .03; .02 INJECTION, SOLUTION INTRAVENOUS at 14:18

## 2020-03-02 RX ADMIN — OXYCODONE HYDROCHLORIDE 5 MG: 5 TABLET ORAL at 20:06

## 2020-03-02 RX ADMIN — METRONIDAZOLE 500 MG: 500 INJECTION, SOLUTION INTRAVENOUS at 18:47

## 2020-03-02 RX ADMIN — SODIUM CHLORIDE 125 ML/HR: 0.9 INJECTION, SOLUTION INTRAVENOUS at 08:51

## 2020-03-02 RX ADMIN — CELECOXIB 200 MG: 200 CAPSULE ORAL at 08:32

## 2020-03-02 RX ADMIN — TRAZODONE HYDROCHLORIDE 300 MG: 100 TABLET ORAL at 21:35

## 2020-03-02 RX ADMIN — MIDAZOLAM 2 MG: 1 INJECTION INTRAMUSCULAR; INTRAVENOUS at 10:11

## 2020-03-02 RX ADMIN — EPHEDRINE SULFATE 10 MG: 50 INJECTION, SOLUTION INTRAVENOUS at 11:30

## 2020-03-02 RX ADMIN — NEOSTIGMINE METHYLSULFATE 3 MG: 1 INJECTION, SOLUTION INTRAVENOUS at 12:15

## 2020-03-02 RX ADMIN — METOPROLOL TARTRATE 5 MG: 5 INJECTION, SOLUTION INTRAVENOUS at 17:10

## 2020-03-02 RX ADMIN — PROPOFOL 200 MG: 10 INJECTION, EMULSION INTRAVENOUS at 10:18

## 2020-03-02 RX ADMIN — PHENYLEPHRINE HYDROCHLORIDE 200 MCG: 10 INJECTION INTRAVENOUS at 11:30

## 2020-03-02 RX ADMIN — ROCURONIUM BROMIDE 10 MG: 50 INJECTION, SOLUTION INTRAVENOUS at 11:21

## 2020-03-02 RX ADMIN — GLYCOPYRROLATE 0.4 MG: 0.2 INJECTION INTRAMUSCULAR; INTRAVENOUS at 12:15

## 2020-03-02 RX ADMIN — GABAPENTIN 600 MG: 300 CAPSULE ORAL at 08:32

## 2020-03-02 RX ADMIN — CEFAZOLIN SODIUM 2000 MG: 10 INJECTION, POWDER, FOR SOLUTION INTRAVENOUS at 17:49

## 2020-03-02 RX ADMIN — LIDOCAINE HYDROCHLORIDE 50 MG: 10 INJECTION, SOLUTION EPIDURAL; INFILTRATION; INTRACAUDAL; PERINEURAL at 10:18

## 2020-03-02 RX ADMIN — EPHEDRINE SULFATE 10 MG: 50 INJECTION, SOLUTION INTRAVENOUS at 10:50

## 2020-03-02 RX ADMIN — ROCURONIUM BROMIDE 50 MG: 50 INJECTION, SOLUTION INTRAVENOUS at 10:18

## 2020-03-02 RX ADMIN — DEXAMETHASONE SODIUM PHOSPHATE 4 MG: 10 INJECTION, SOLUTION INTRAMUSCULAR; INTRAVENOUS at 10:34

## 2020-03-02 RX ADMIN — SODIUM CHLORIDE: 0.9 INJECTION, SOLUTION INTRAVENOUS at 12:23

## 2020-03-02 RX ADMIN — ONDANSETRON 4 MG: 2 INJECTION INTRAMUSCULAR; INTRAVENOUS at 10:34

## 2020-03-02 RX ADMIN — ACETAMINOPHEN 975 MG: 160 SUSPENSION ORAL at 17:09

## 2020-03-02 RX ADMIN — HYDROMORPHONE HYDROCHLORIDE 2 MG: 2 INJECTION, SOLUTION INTRAMUSCULAR; INTRAVENOUS; SUBCUTANEOUS at 10:14

## 2020-03-02 RX ADMIN — ONDANSETRON 4 MG: 2 INJECTION INTRAMUSCULAR; INTRAVENOUS at 17:10

## 2020-03-02 RX ADMIN — EPHEDRINE SULFATE 10 MG: 50 INJECTION, SOLUTION INTRAVENOUS at 11:26

## 2020-03-02 RX ADMIN — EPHEDRINE SULFATE 5 MG: 50 INJECTION, SOLUTION INTRAVENOUS at 11:54

## 2020-03-02 RX ADMIN — METOPROLOL TARTRATE 5 MG: 5 INJECTION, SOLUTION INTRAVENOUS at 21:39

## 2020-03-02 RX ADMIN — CLONAZEPAM 2 MG: 1 TABLET ORAL at 20:06

## 2020-03-02 RX ADMIN — MORPHINE SULFATE 4 MG: 4 INJECTION INTRAVENOUS at 17:48

## 2020-03-02 RX ADMIN — SIMETHICONE CHEW TAB 80 MG 80 MG: 80 TABLET ORAL at 20:07

## 2020-03-02 NOTE — CONSULTS
Inpatient Medical Consultation - [facility]    Patient Information: Richard Quigley 48 y o  male MRN: [id number]  Unit/Bed#: E5 -01 Encounter: [id number]  PCP: Any Matute DO  Date of Admission:  3/2/2020  Date of Consultation: 03/02/20  Requesting Physician: Dmitry Edmonds MD    Reason For Consultation:   Medical management    Assessment/Plan:    · Morbid obesity/BMI 38 51:  Status post elective laparoscopic Marii-en-Y gastric bypass and intraoperative EGD  Postoperative day # 0  Continue management per primary team, bariatrics  Counseled on need to avoid NSAIDs  · Bipolar disorder:  Home regimen of Cymbalta 60 mg delayed release capsule, Trileptal 300 mg b i d , trazodone 300 mg daily at bedtime  Also on invega but this was just switched to monthly injectables  Cymbalta comes in a capsule and can be sprinkled on food rather than swallowing entire capsule whole  · History of migraines:  Take Frovatriptan and Fioricet as needed  Also on metoprolol succinate 25 mg daily  Given patient underwent gastric bypass surgery will no longer be able to be on XL medication  Will followup with neurologist to further determine treatment  Switching to [facility]  · Hyperlipidemia:  Continue statin  Patient will be continued on atorvastatin 40 mg daily given his history of MI x2  Most recent being in spring of 2019 and prior in 2016  · Essential hypertension:  Home regimen of metoprolol succinate 25 mg daily, isosorbide mononitrate 30 mg-24 hour tablet  Both will need to be stopped as they are long-acting  BPs here stable 119/65, 113/60  VTE Prophylaxis: Per primary team-bariatrics    Counseling / Coordination of Care Time: 30 minutes  Greater than 50% of total time spent on patient counseling and coordination of care      History of Present Illness:    Richard Quigley is a 48 y o  male who is originally admitted to the bariatric service on 3/2/2020 due to inability to lose meaningful sustainable weight loss to conservative measures  Patient presents for elective laparoscopic Marii-en-Y gastric bypass with intraoperative EGD  Patient tolerated procedure well no complications and minimal blood loss  We are consulted for postoperative medical management  Patient's past medical history of morbid obesity, migraines, bipolar disorder, hyperlipidemia, ROSI, history of MI  Prior to the surgery pt lost a total of 20 lbs  He also stopped taking his metoprolol XL for prophylaxis of migraines  Additional history was obtained through wife at bedside as patient is quite drowsy after procedure  He has not felt, passed gas, or urinated yet  Was told he can start sips of clears later tonight  Complains of some right upper quadrant abdominal pain  Review of Systems:    Review of Systems   Constitutional: Negative for chills and fever  Respiratory: Negative for cough, chest tightness and shortness of breath  Cardiovascular: Negative for chest pain, palpitations and leg swelling  Gastrointestinal: Positive for abdominal pain  Genitourinary: Negative for dysuria  Neurological: Negative for numbness and headaches  Psychiatric/Behavioral: Negative for agitation         Past Medical and Surgical History:     Past Medical History:   Diagnosis Date    Arthritis     in low back    Bipolar disorder (HCC)     Continuous positive airway pressure dependence     Coronary artery disease     CPAP (continuous positive airway pressure) dependence     Hearing loss of aging     Hypercholesterolemia     Migraines     Morbid obesity ([address] )     NSTEMI (non-ST elevation myocardial infarction) [facility])     april 2019    Seasonal allergies     Sleep apnea     uses c pap    Wears glasses     reading       Past Surgical History:   Procedure Laterality Date    CARDIAC CATHETERIZATION      no stents     COLONOSCOPY      EGD      HERNIA REPAIR      umbilical hernia    SKIN SURGERY cyst removed from back and thumb    TONSILLECTOMY      TOOTH EXTRACTION         Meds/Allergies:    all medications and allergies reviewed    Allergies: No Known Allergies    Social History:     Marital Status: /Civil Union    Substance Use History:   Social History     Substance and Sexual Activity   Alcohol Use Not Currently    Frequency: Never     Social History     Tobacco Use   Smoking Status Former Smoker    Types: Cigarettes    Last attempt to quit:     Years since quittin 1   Smokeless Tobacco Former User    Quit date: 2014   Tobacco Comment    quit cigarettes      Social History     Substance and Sexual Activity   Drug Use Never       Family History:    non-contributory    Physical Exam:     Vitals:   Blood Pressure: 113/60 (20 1435)  Pulse: 85 (20 1435)  Temperature: 97 6 °F (36 4 °C) (20 1335)  Temp Source: Tympanic (20 0808)  Respirations: 18 (20 1435)  Height: 6' (182 9 cm) (20 4171)  Weight - Scale: 129 kg (283 lb 15 2 oz) (20 0808)  SpO2: 93 % (20 1435)    Physical Exam   Constitutional: He is oriented to person, place, and time  He appears well-developed and well-nourished  No distress  HENT:   Head: Normocephalic and atraumatic  Cardiovascular: Normal rate, regular rhythm and normal heart sounds  Pulmonary/Chest: Effort normal and breath sounds normal  No stridor  No respiratory distress  He has no wheezes  He has no rales  He exhibits no tenderness  Abdominal: Soft  Bowel sounds are normal  He exhibits no distension and no mass  There is no tenderness  There is no rebound and no guarding  No hernia  Abdominal incisions clean dry and intact  Neurological: He is alert and oriented to person, place, and time  Skin: Skin is warm and dry  He is not diaphoretic  Nursing note and vitals reviewed  Additional Data:     Lab Results: I have personally reviewed pertinent reports        Imaging: I have personally reviewed pertinent reports  No results found  ** Please Note: This note has been constructed using a voice recognition system   **

## 2020-03-02 NOTE — ANESTHESIA POSTPROCEDURE EVALUATION
Post-Op Assessment Note    CV Status:  Stable    Pain management: adequate     Mental Status:  Alert and awake   Hydration Status:  Euvolemic   PONV Controlled:  Controlled   Airway Patency:  Patent   Post Op Vitals Reviewed: Yes      Staff: Anesthesiologist           /56 (03/02/20 1335)    Temp 97 6 °F (36 4 °C) (03/02/20 1335)    Pulse 83 (03/02/20 1335)   Resp 18 (03/02/20 1335)    SpO2 92 % (03/02/20 1335)

## 2020-03-02 NOTE — OP NOTE
OPERATIVE REPORT  PATIENT NAME: Keyla Barrios    :  1965  MRN: [id number]  Pt Location: [facility] ROOM 07    SURGERY DATE: 3/2/2020    Surgeon(s) and Role:     * Louisa Dye MD - Primary     * Kayla Sanchez MD - Fellow    Preop Diagnosis:  Morbid obesity ([place] [address] ) [E66 01]Body mass index is 38 51 kg/m²  Sleep apnea [G47 30]  Heart disease, unspecified [I51 9]  Hypercholesterolemia [E78 00]    Post-Op Diagnosis Codes:     * Morbid obesity ([facility] ) [E66 01]     * Sleep apnea [G47 30]     * Heart disease, unspecified [I51 9]     * Hypercholesterolemia [E78 00]    Procedure(s) (LRB):  LAPAROSCOPIC FRITZ-EN-Y GASTRIC BYPASS AND INTRAOPERATIVE EGD (N/A)    Specimen(s):  * No specimens in log *    Estimated Blood Loss:   20 ml    Drains:  * No LDAs found *    Anesthesia Type:   General    Operative Indications: Morbid obesity ([place] [address] ) [E66 01]  Sleep apnea [G47 30]  Heart disease, unspecified [I51 9]  Hypercholesterolemia [E78 00]      Operative Findings:  Normal Findings    Complications:   None    Procedure and Technique:  Laparoscopic RYGB with intraop EGD   I was present for the entire procedure    Patient Disposition:  hemodynamically stable     No qualified resident available to assist  The presence of an assistant was necessary for camera holding, traction and counter traction and for help with suturing and stapling in addition to performing the intraop-EGD    Indication:   Patient suffers from morbid obesity and associated co-morbidities  and failed to achieve any meaningful or sustainable weight loss and was therefore consented to undergo a laparoscopic gastric bypass  Risks and benefits were explained to the patient, patient consented for the procedure  Description of the procedure: The patient was placed in a supine position  The patient received a dose of IV antibiotics and a dose of subcutaneous heparin prior to the procedure    The patient was induced and intubated under general endotracheal nonabsorbable suture in a figure of eight fashion  At that point, a Brolin suture was placed to approximate the BP limb and the Barbara limb in a way to prevent kinking of the Barbara limb using 2-0 Vicryl suture  At that point, we turned our attention to the upper portion of the abdomen  Keon Grmaryan liver retractor was placed through a stab incision in the subxiphoid space, and the left lobe of the liver was retracted medially  An OG tube was placed in the stomach to decompress the stomach and was then removed  The angle of His was dissected with a combination of sharp dissection and blunt dissection  At that point, the pars flaccida was opened and a gastric pouch was created with serial firings of the endo LOGAN stapler with a purple cartridge reinforced with Seamguard  After the formation of the gastric pouch, the staple lines of the pouch and the gastric remnant were inspected and appeared to be well formed and also appeared to be hemostatic  At that point, a 22 OrVil was brought through the mouth and, after making a gastrotomy with electrocautery, the NG tube connected to the OrVil was brought through the pouch gastrotomy and removed from the abdominal cavity through the 12-mm trocar on the left side of the abdominal wall  At that point, the anvil appeared to be well positioned in the pouch  The omentum was then split in half with the harmonic scalpel to avoid tension on the barbara limb  The end of the Barbara limb was opened with a Harmonic scalpel  The 12-mm trocar site on the upper  right side of the abdominal wall was dilated, and the 25 circular stapler was brought through that incision into the abdominal cavity and placed inside the Barbara limb  At that point, the stapler was engaged and connected to the anvil  There was no twisting of the pouch or the Barbara limb  The stapler was fired and then removed from the abdominal cavity    The site of the mesentery of the overhang of the Barbara limb was transected with the Harmonic scalpel, and then the overhang was transected using an endo LOGAN stapler with a white load  The gastrojejunostomy anastomosis was then reinforced with 2-0 Vicryl sutures placed at 3 oclock and 9 oclock to prevent any tension on the anastomosis  Lindy Lopez space was closed with a figure-of-eight suture using 0 Ethibond suture  At that point, a bowel clamp was placed around the Marii limb and an upper endoscopy was performed to check for hemostasis and airtightness  When the endoscopy was performed, the staple line of the pouch and the gastrojejunostomy anastomosis appeared to be hemostatic  At that point, the patient was placed in a supine position  The abdominal cavity was filled with saline and then the pouch and the Marii limb were distended with air and, at that point, we did not visualize any air bubbles suggestive of air leak  The bowel clamp was then removed  The gastrojejunostomy was covered with an omental flap that was secured in place with an absorbable suture in a simple fashion  The small bowel segment was removed from the abdominal cavity through the right upper trocar site using an endo-catch bag  The [facility] liver retractor was removed  sponge count was completed and was correct  The right upper quadrant incision was irrigated with saline  The right upper quadrant trocar incision and the midline incision were both closed with 1-0 Vicryl in a simple fashion  All the skin edges of the trocar sites were approximated with 4-0 Monocryl in a subcuticular inverted fashion  The patient was extubated and transferred to the PACU in stable condition           SIGNATURE: Gisselle Vee MD  DATE: March 2, 2020  TIME: 12:17 PM

## 2020-03-02 NOTE — PLAN OF CARE
Problem: PAIN - ADULT  Goal: Verbalizes/displays adequate comfort level or baseline comfort level  Description  Interventions:  - Encourage patient to monitor pain and request assistance  - Assess pain using appropriate pain scale  - Administer analgesics based on type and severity of pain and evaluate response  - Implement non-pharmacological measures as appropriate and evaluate response  - Consider cultural and social influences on pain and pain management  - Notify physician/advanced practitioner if interventions unsuccessful or patient reports new pain  Outcome: Progressing     Problem: INFECTION - ADULT  Goal: Absence or prevention of progression during hospitalization  Description  INTERVENTIONS:  - Assess and monitor for signs and symptoms of infection  - Monitor lab/diagnostic results  - Monitor all insertion sites, i e  indwelling lines, tubes, and drains  - Monitor endotracheal if appropriate and nasal secretions for changes in amount and color  - [place] appropriate cooling/warming therapies per order  - Administer medications as ordered  - Instruct and encourage patient and family to use good hand hygiene technique  - Identify and instruct in appropriate isolation precautions for identified infection/condition  Outcome: Progressing  Goal: Absence of fever/infection during neutropenic period  Description  INTERVENTIONS:  - Monitor WBC    Outcome: Progressing     Problem: SAFETY ADULT  Goal: Patient will remain free of falls  Description  INTERVENTIONS:  - Assess patient frequently for physical needs  -  Identify cognitive and physical deficits and behaviors that affect risk of falls    -  [place] fall precautions as indicated by assessment   - Educate patient/family on patient safety including physical limitations  - Instruct patient to call for assistance with activity based on assessment  - Modify environment to reduce risk of injury  - Consider OT/PT consult to assist with strengthening/mobility  Outcome: Progressing  Goal: Maintain or return to baseline ADL function  Description  INTERVENTIONS:  -  Assess patient's ability to carry out ADLs; assess patient's baseline for ADL function and identify physical deficits which impact ability to perform ADLs (bathing, care of mouth/teeth, toileting, grooming, dressing, etc )  - Assess/evaluate cause of self-care deficits   - Assess range of motion  - Assess patient's mobility; develop plan if impaired  - Assess patient's need for assistive devices and provide as appropriate  - Encourage maximum independence but intervene and supervise when necessary  - Involve family in performance of ADLs  - Assess for home care needs following discharge   - Consider OT consult to assist with ADL evaluation and planning for discharge  - Provide patient education as appropriate  Outcome: Progressing  Goal: Maintain or return mobility status to optimal level  Description  INTERVENTIONS:  - Assess patient's baseline mobility status (ambulation, transfers, stairs, etc )    - Identify cognitive and physical deficits and behaviors that affect mobility  - Identify mobility aids required to assist with transfers and/or ambulation (gait belt, sit-to-stand, lift, walker, cane, etc )  - [place] fall precautions as indicated by assessment  - Record patient progress and toleration of activity level on Mobility SBAR; progress patient to next Phase/Stage  - Instruct patient to call for assistance with activity based on assessment  - Consider rehabilitation consult to assist with strengthening/weightbearing, etc   Outcome: Progressing     Problem: DISCHARGE PLANNING  Goal: Discharge to home or other facility with appropriate resources  Description  INTERVENTIONS:  - Identify barriers to discharge w/patient and caregiver  - Arrange for needed discharge resources and transportation as appropriate  - Identify discharge learning needs (meds, wound care, etc )  - Arrange for interpretive services to assist at discharge as needed  - Refer to Case Management Department for coordinating discharge planning if the patient needs post-hospital services based on physician/advanced practitioner order or complex needs related to functional status, cognitive ability, or social support system  Outcome: Progressing     Problem: Knowledge Deficit  Goal: Patient/family/caregiver demonstrates understanding of disease process, treatment plan, medications, and discharge instructions  Description  Complete learning assessment and assess knowledge base    Interventions:  - Provide teaching at level of understanding  - Provide teaching via preferred learning methods  Outcome: Progressing

## 2020-03-02 NOTE — INTERVAL H&P NOTE
H&P reviewed  After examining the patient I find no changes in the patients condition since the H&P had been written      Vitals:    03/02/20 0808   BP: 115/57   Pulse: 80   Resp: 18   Temp: 98 °F (36 7 °C)   SpO2: 92%

## 2020-03-03 VITALS
TEMPERATURE: 98.5 F | DIASTOLIC BLOOD PRESSURE: 70 MMHG | SYSTOLIC BLOOD PRESSURE: 141 MMHG | BODY MASS INDEX: 38.46 KG/M2 | HEART RATE: 67 BPM | HEIGHT: 72 IN | OXYGEN SATURATION: 92 % | WEIGHT: 283.95 LBS | RESPIRATION RATE: 18 BRPM

## 2020-03-03 PROBLEM — F17.211 CIGARETTE NICOTINE DEPENDENCE IN REMISSION: Status: ACTIVE | Noted: 2019-06-11

## 2020-03-03 LAB
ANION GAP SERPL CALCULATED.3IONS-SCNC: 8 MMOL/L (ref 4–13)
BUN SERPL-MCNC: 11 MG/DL (ref 5–25)
CALCIUM SERPL-MCNC: 8.1 MG/DL (ref 8.3–10.1)
CHLORIDE SERPL-SCNC: 101 MMOL/L (ref 100–108)
CO2 SERPL-SCNC: 27 MMOL/L (ref 21–32)
CREAT SERPL-MCNC: 0.79 MG/DL (ref 0.6–1.3)
ERYTHROCYTE [DISTWIDTH] IN BLOOD BY AUTOMATED COUNT: 12.3 % (ref 11.6–15.1)
GFR SERPL CREATININE-BSD FRML MDRD: 102 ML/MIN/1.73SQ M
GLUCOSE SERPL-MCNC: 120 MG/DL (ref 65–140)
HCT VFR BLD AUTO: 46.3 % (ref 36.5–49.3)
HGB BLD-MCNC: 14.9 G/DL (ref 12–17)
MCH RBC QN AUTO: 30.3 PG (ref 26.8–34.3)
MCHC RBC AUTO-ENTMCNC: 32.2 G/DL (ref 31.4–37.4)
MCV RBC AUTO: 94 FL (ref 82–98)
PLATELET # BLD AUTO: 202 THOUSANDS/UL (ref 149–390)
PMV BLD AUTO: 10.5 FL (ref 8.9–12.7)
POTASSIUM SERPL-SCNC: 4.2 MMOL/L (ref 3.5–5.3)
RBC # BLD AUTO: 4.91 MILLION/UL (ref 3.88–5.62)
SODIUM SERPL-SCNC: 136 MMOL/L (ref 136–145)
WBC # BLD AUTO: 10.74 THOUSAND/UL (ref 4.31–10.16)

## 2020-03-03 PROCEDURE — 80048 BASIC METABOLIC PNL TOTAL CA: CPT | Performed by: SURGERY

## 2020-03-03 PROCEDURE — NC001 PR NO CHARGE: Performed by: SURGERY

## 2020-03-03 PROCEDURE — 99232 SBSQ HOSP IP/OBS MODERATE 35: CPT | Performed by: INTERNAL MEDICINE

## 2020-03-03 PROCEDURE — 85027 COMPLETE CBC AUTOMATED: CPT | Performed by: SURGERY

## 2020-03-03 PROCEDURE — 99024 POSTOP FOLLOW-UP VISIT: CPT | Performed by: SURGERY

## 2020-03-03 RX ORDER — METOPROLOL SUCCINATE 25 MG/1
25 TABLET, EXTENDED RELEASE ORAL DAILY
Status: DISCONTINUED | OUTPATIENT
Start: 2020-03-03 | End: 2020-03-03 | Stop reason: HOSPADM

## 2020-03-03 RX ORDER — ACETAMINOPHEN 325 MG/1
975 TABLET ORAL EVERY 8 HOURS
Qty: 30 TABLET | Refills: 0 | Status: SHIPPED | OUTPATIENT
Start: 2020-03-03

## 2020-03-03 RX ADMIN — METOPROLOL TARTRATE 5 MG: 5 INJECTION, SOLUTION INTRAVENOUS at 10:26

## 2020-03-03 RX ADMIN — FAMOTIDINE 20 MG: 10 INJECTION, SOLUTION INTRAVENOUS at 08:19

## 2020-03-03 RX ADMIN — OXYCODONE HYDROCHLORIDE 5 MG: 5 TABLET ORAL at 00:14

## 2020-03-03 RX ADMIN — SODIUM CHLORIDE, SODIUM LACTATE, POTASSIUM CHLORIDE, AND CALCIUM CHLORIDE 100 ML/HR: .6; .31; .03; .02 INJECTION, SOLUTION INTRAVENOUS at 01:56

## 2020-03-03 RX ADMIN — ONDANSETRON 4 MG: 2 INJECTION INTRAMUSCULAR; INTRAVENOUS at 00:11

## 2020-03-03 RX ADMIN — ACETAMINOPHEN 975 MG: 160 SUSPENSION ORAL at 00:10

## 2020-03-03 RX ADMIN — CLONAZEPAM 1 MG: 1 TABLET ORAL at 08:16

## 2020-03-03 RX ADMIN — DULOXETINE HYDROCHLORIDE 60 MG: 60 CAPSULE, DELAYED RELEASE ORAL at 08:16

## 2020-03-03 RX ADMIN — METRONIDAZOLE 500 MG: 500 INJECTION, SOLUTION INTRAVENOUS at 01:56

## 2020-03-03 RX ADMIN — CEFAZOLIN SODIUM 2000 MG: 10 INJECTION, POWDER, FOR SOLUTION INTRAVENOUS at 02:45

## 2020-03-03 RX ADMIN — OXCARBAZEPINE 300 MG: 300 TABLET, FILM COATED ORAL at 08:17

## 2020-03-03 RX ADMIN — OXYCODONE HYDROCHLORIDE 5 MG: 5 TABLET ORAL at 11:34

## 2020-03-03 RX ADMIN — METOPROLOL TARTRATE 5 MG: 5 INJECTION, SOLUTION INTRAVENOUS at 04:39

## 2020-03-03 RX ADMIN — OXYCODONE HYDROCHLORIDE 5 MG: 5 TABLET ORAL at 04:53

## 2020-03-03 RX ADMIN — ONDANSETRON 4 MG: 2 INJECTION INTRAMUSCULAR; INTRAVENOUS at 04:58

## 2020-03-03 RX ADMIN — ACETAMINOPHEN 975 MG: 160 SUSPENSION ORAL at 08:15

## 2020-03-03 NOTE — PROGRESS NOTES
Progress Note - Analia Arenas 48 y o  male MRN: [id number]    Unit/Bed#: E5 -01 Encounter: [id number]      Subjective: The patient is feeling pretty well  His pain is adequately controlled  He has no nausea  He denies chest pain or shortness of breath  He is tolerating liquids  He has been up walking several times  Physical Exam:   Temp:  [97 4 °F (36 3 °C)-99 °F (37 2 °C)] 98 5 °F (36 9 °C)  HR:  [65-91] 67  Resp:  [10-18] 18  BP: (108-151)/(56-77) 137/71    Gen:  Well-developed, obese, in no distress  Neck:  Supple  No lymphadenopathy, goiter, or bruit  Heart:  Regular rhythm  No murmur, gallop, or rub  Lungs:  Clear to auscultation and percussion  No wheezing, rales, or rhonchi    Abd:  Soft with active bowel sounds  No unusual tenderness  No mass or organomegaly  Extremities:  No clubbing, cyanosis, or edema  No calf tenderness  Neuro:  Alert and oriented  No focal sign  Skin:  Warm and dry      LABS:   CBC:   Lab Results   Component Value Date    WBC 10 74 (H) 03/03/2020    HGB 14 9 03/03/2020    HCT 46 3 03/03/2020    MCV 94 03/03/2020     03/03/2020    MCH 30 3 03/03/2020    MCHC 32 2 03/03/2020    RDW 12 3 03/03/2020    MPV 10 5 03/03/2020   , CMP:   Lab Results   Component Value Date    SODIUM 136 03/03/2020    K 4 2 03/03/2020     03/03/2020    CO2 27 03/03/2020    BUN 11 03/03/2020    CREATININE 0 79 03/03/2020    CALCIUM 8 1 (L) 03/03/2020    EGFR 102 03/03/2020         Assessment/Plan:  1  Obesity, status post gastric bypass  2  Hypertension  3  Hypercholesterolemia  4  Obstructive sleep apnea  5  Nonobstructive coronary artery disease with previous MI  6  Bipolar affective disorder  7  Migraine     The patient is doing well  His blood pressure is adequately controlled  He has had no angina  His medications were reviewed  He will continue his usual therapy for hypercholesterolemia, hypertension, and coronary artery disease    He appears stable for discharge from a medical perspective      VTE Pharmacologic Prophylaxis: Sequential compression device (Venodyne)   VTE Mechanical Prophylaxis: sequential compression device

## 2020-03-03 NOTE — DISCHARGE INSTR - AVS FIRST PAGE
your pain medications from [address] in [address]   Take Tylenol as instructed  Stay hydrated and follow your discharge diet progression   Mild nausea is ok as long as you can drink fluids  Take your omeprazole daily  Crush or cut your pills and open capsules, mix with liquid to drink    Follow up with Dr Kaiser [place] and your PCP within the next week

## 2020-03-03 NOTE — DISCHARGE INSTRUCTIONS
Bariatric/Weight Loss Surgery  Hospital Discharge Instructions  1  ACTIVITY:  a  Progress as feels comfortable - a good rule is:  if you are doing something and it begins to hurt, stop doing the activity  Walk every hour while at home  b  Doretha Seen may walk stairs if you do so slowly  c  You may shower 48 hours after surgery  d  Use your incentive spirometer 10 times per hour while awake for 1 week  e  Do NOT drive for 48 hours after surgery  No driving 24 hours after taking certain prescription pain medications   Examples of such medication are Percocet, Darvocet, Oxycodone, Tylenol #3, and Tylenol with Codeine  2  DIET  a  Stay on a liquid diet for 7 days after your surgery date, sipping slowly  Refer to your manual for examples of choices  Remember to keep your liquids sugar free or low calorie  You may have protein drinks  Make sure to drink 48 to 64 ounces per day of fluids  b  Doretha Seen may advance to a pureed diet one week after surgery as instructed by your diet progression pamphlet  Once you get approval from your surgeon at your first post operative visit you may advance to the soft diet  3  MEDICATIONS:  a  The abdominal nerve block will wear off during the first 1-2 days that you are home, and you may become sore  Continue to take your Tylenol and your pain medication as instructed  b  Start vitamins and minerals when you get home  c  Anti-acid Medication as per prescription  d  Other medications as indicated on the Physician Patient Discharge Instructions form given to you at the time of discharge  e  Make sure that you are splitting your pill or tablet medications in halves or fourths or even crushing them before you take them  Capsules should be opened and mixed with water or jello  You need to do this for at least 4 weeks after surgery  Eventually you will be able to take your medications the regular way as they were prescribed     f  Doretha Seen will need to consult with your Family Doctor in regards to all your prescribed medication, particularly those for blood pressure and diabetes  As you lose weight, medical conditions may change, requiring an alteration or elimination of the drug dose  g  DO NOT TAKE BIRTH CONTROL(BC) MEDICATIONS, INSERT BC VAGINAL RINGS, OR PLACE IUD OR ANY OTHER BC METHODS UNTIL 31 DAYS FROM DAY OF DISCHARGE FROM HOSPITAL  THIS PLACES YOU AT HIGH RISK FOR A POTENTIALLY LIFE THREATENING BLOOD CLOT  Remember to always use barrier methods for birth control and speak to your GYN about using two forms of birth control to start 31 days after surgery  It is very important to avoid pregnancy until at least 18-24 months after surgery  4  INCISION CARE  a  You may shower and get incisions wet 2 days after surgery  No soaking tub baths or swimming for 30 days after surgery  Keep abdominal area and incisions clean  Use soap and water to create a good lather and rinse off  Do not scrub incisions  b  If you have a drain, empty the drain as the nurses instructed  5  FOLLOW-UP APPOINTMENT should be made for one week after discharge  Call surgeons office at [phone] to schedule an appointment      6  CALL YOUR DOCTOR FOR:  pain not controlled by pain medications, a temperature greater than 101 5° F, any increase or change in drainage or redness from any incision, any vomiting or inability to keep liquids down, shortness of breath, shoulder pain, or bleeding

## 2020-03-03 NOTE — PLAN OF CARE
Problem: PAIN - ADULT  Goal: Verbalizes/displays adequate comfort level or baseline comfort level  Description  Interventions:  - Encourage patient to monitor pain and request assistance  - Assess pain using appropriate pain scale  - Administer analgesics based on type and severity of pain and evaluate response  - Implement non-pharmacological measures as appropriate and evaluate response  - Consider cultural and social influences on pain and pain management  - Notify physician/advanced practitioner if interventions unsuccessful or patient reports new pain  Outcome: Progressing     Problem: INFECTION - ADULT  Goal: Absence or prevention of progression during hospitalization  Description  INTERVENTIONS:  - Assess and monitor for signs and symptoms of infection  - Monitor lab/diagnostic results  - Monitor all insertion sites, i e  indwelling lines, tubes, and drains  - Monitor endotracheal if appropriate and nasal secretions for changes in amount and color  - [place] appropriate cooling/warming therapies per order  - Administer medications as ordered  - Instruct and encourage patient and family to use good hand hygiene technique  - Identify and instruct in appropriate isolation precautions for identified infection/condition  Outcome: Progressing  Goal: Absence of fever/infection during neutropenic period  Description  INTERVENTIONS:  - Monitor WBC    Outcome: Progressing     Problem: SAFETY ADULT  Goal: Patient will remain free of falls  Description  INTERVENTIONS:  - Assess patient frequently for physical needs  -  Identify cognitive and physical deficits and behaviors that affect risk of falls    -  [place] fall precautions as indicated by assessment   - Educate patient/family on patient safety including physical limitations  - Instruct patient to call for assistance with activity based on assessment  - Modify environment to reduce risk of injury  - Consider OT/PT consult to assist with strengthening/mobility  Outcome: Progressing  Goal: Maintain or return to baseline ADL function  Description  INTERVENTIONS:  -  Assess patient's ability to carry out ADLs; assess patient's baseline for ADL function and identify physical deficits which impact ability to perform ADLs (bathing, care of mouth/teeth, toileting, grooming, dressing, etc )  - Assess/evaluate cause of self-care deficits   - Assess range of motion  - Assess patient's mobility; develop plan if impaired  - Assess patient's need for assistive devices and provide as appropriate  - Encourage maximum independence but intervene and supervise when necessary  - Involve family in performance of ADLs  - Assess for home care needs following discharge   - Consider OT consult to assist with ADL evaluation and planning for discharge  - Provide patient education as appropriate  Outcome: Progressing  Goal: Maintain or return mobility status to optimal level  Description  INTERVENTIONS:  - Assess patient's baseline mobility status (ambulation, transfers, stairs, etc )    - Identify cognitive and physical deficits and behaviors that affect mobility  - Identify mobility aids required to assist with transfers and/or ambulation (gait belt, sit-to-stand, lift, walker, cane, etc )  - [place] fall precautions as indicated by assessment  - Record patient progress and toleration of activity level on Mobility SBAR; progress patient to next Phase/Stage  - Instruct patient to call for assistance with activity based on assessment  - Consider rehabilitation consult to assist with strengthening/weightbearing, etc   Outcome: Progressing     Problem: DISCHARGE PLANNING  Goal: Discharge to home or other facility with appropriate resources  Description  INTERVENTIONS:  - Identify barriers to discharge w/patient and caregiver  - Arrange for needed discharge resources and transportation as appropriate  - Identify discharge learning needs (meds, wound care, etc )  - Arrange for interpretive services to assist at discharge as needed  - Refer to Case Management Department for coordinating discharge planning if the patient needs post-hospital services based on physician/advanced practitioner order or complex needs related to functional status, cognitive ability, or social support system  Outcome: Progressing     Problem: Knowledge Deficit  Goal: Patient/family/caregiver demonstrates understanding of disease process, treatment plan, medications, and discharge instructions  Description  Complete learning assessment and assess knowledge base  Interventions:  - Provide teaching at level of understanding  - Provide teaching via preferred learning methods  Outcome: Progressing     Problem: Potential for Falls  Goal: Patient will remain free of falls  Description  INTERVENTIONS:  - Assess patient frequently for physical needs  -  Identify cognitive and physical deficits and behaviors that affect risk of falls    -  [place] fall precautions as indicated by assessment   - Educate patient/family on patient safety including physical limitations  - Instruct patient to call for assistance with activity based on assessment  - Modify environment to reduce risk of injury  - Consider OT/PT consult to assist with strengthening/mobility  Outcome: Progressing     Problem: CHANGE IN BODY IMAGE  Goal: Pt/Family communicate acceptance of loss or change in body image and expresses psychological comfort and peace  Description  INTERVENTIONS:  - Assess patient/family anxiety and grief process related to change in body image, loss of functional status and loss of sense of self  - Assess patient/family's coping skills and provide emotional, spiritual and psychosocial support  - Provide information about the patient's health status with consideration of family and cultural values  - Communicate willingness to discuss loss and facilitate grief process with patient/family as appropriate  - Emphasize sustaining relationships within family system and community, or miller/spiritual traditions  - Refer to community support groups as appropriate  - [id number], Pastoral care or other ancillary consults as needed  Outcome: Progressing     Problem: GASTROINTESTINAL - ADULT  Goal: Minimal or absence of nausea and/or vomiting  Description  INTERVENTIONS:  - Administer IV fluids if ordered to ensure adequate hydration  - Maintain NPO status until nausea and vomiting are resolved  - Nasogastric tube if ordered  - Administer ordered antiemetic medications as needed  - Provide nonpharmacologic comfort measures as appropriate  - Advance diet as tolerated, if ordered  - Consider nutrition services referral to assist patient with adequate nutrition and appropriate food choices  Outcome: Progressing  Goal: Maintains or returns to baseline bowel function  Description  INTERVENTIONS:  - Assess bowel function  - Encourage oral fluids to ensure adequate hydration  - Administer IV fluids if ordered to ensure adequate hydration  - Administer ordered medications as needed  - Encourage mobilization and activity  - Consider nutritional services referral to assist patient with adequate nutrition and appropriate food choices  Outcome: Progressing  Goal: Maintains adequate nutritional intake  Description  INTERVENTIONS:  - Monitor percentage of each meal consumed  - Identify factors contributing to decreased intake, treat as appropriate  - Assist with meals as needed  - Monitor I&O, weight, and lab values if indicated  - Obtain nutrition services referral as needed  Outcome: Progressing     Problem: METABOLIC, FLUID AND ELECTROLYTES - ADULT  Goal: Electrolytes maintained within normal limits  Description  INTERVENTIONS:  - Monitor labs and assess patient for signs and symptoms of electrolyte imbalances  - Administer electrolyte replacement as ordered  - Monitor response to electrolyte replacements, including repeat lab results as appropriate  - Instruct patient on fluid and nutrition as appropriate  Outcome: Progressing  Goal: Fluid balance maintained  Description  INTERVENTIONS:  - Monitor labs   - Monitor I/O and WT  - Instruct patient on fluid and nutrition as appropriate  - Assess for signs & symptoms of volume excess or deficit  Outcome: Progressing     Problem: SKIN/TISSUE INTEGRITY - ADULT  Goal: Skin integrity remains intact  Description  INTERVENTIONS  - Identify patients at risk for skin breakdown  - Assess and monitor skin integrity  - Assess and monitor nutrition and hydration status  - Monitor labs (i e  albumin)  - Assess for incontinence   - Turn and reposition patient  - Assist with mobility/ambulation  - Relieve pressure over bony prominences  - Avoid friction and shearing  - Provide appropriate hygiene as needed including keeping skin clean and dry  - Evaluate need for skin moisturizer/barrier cream  - Collaborate with interdisciplinary team (i e  Nutrition, Rehabilitation, etc )   - Patient/family teaching  Outcome: Progressing  Goal: Incision(s), wounds(s) or drain site(s) healing without S/S of infection  Description  INTERVENTIONS  - Assess and document risk factors for skin impairment   - Assess and document dressing, incision, wound bed, drain sites and surrounding tissue  - Consider nutrition services referral as needed  - Oral mucous membranes remain intact  - Provide patient/ family education  Outcome: Progressing  Goal: Oral mucous membranes remain intact  Description  INTERVENTIONS  - Assess oral mucosa and hygiene practices  - Implement preventative oral hygiene regimen  - Implement oral medicated treatments as ordered  - Initiate Nutrition services referral as needed  Outcome: Progressing     Problem: HEMATOLOGIC - ADULT  Goal: Maintains hematologic stability  Description  INTERVENTIONS  - Assess for signs and symptoms of bleeding or hemorrhage  - Monitor labs  - Administer supportive blood products/factors as ordered and appropriate  Outcome: Progressing

## 2020-03-03 NOTE — UTILIZATION REVIEW
Initial Clinical Review    Elective inpatient surgical procedure  Age/Sex: 48 y o  male  Surgery Date: 3/2/20  Procedure: LAPAROSCOPIC FRITZ-EN-Y GASTRIC BYPASS AND INTRAOPERATIVE EGD   Anesthesia: general    POD#1 Progress Note: Medicine consulted due to comorbidities of bipolar disorder, hyperlipidemia, migraines, jones, and MI  Medications reviewed with usual therapies continued  Pain is controlled without nausea, has appropriate abdominal tenderness,  tolerating liquids and ambulating  Anticipating dc later today       Admission Orders: Date/Time/Statement: Admission Orders (From admission, onward)     Ordered        03/02/20 1326  Inpatient Admission  Once                   Orders Placed This Encounter   Procedures    Inpatient Admission     Standing Status:   Standing     Number of Occurrences:   1     Order Specific Question:   Admitting Physician     Answer:   BE Mcnamara [[id number]]     Order Specific Question:   Level of Care     Answer:   Med Surg [16]     Order Specific Question:   Bed Type     Answer:   Bariatric [1]     Order Specific Question:   Estimated length of stay     Answer:   Inpatient Only Surgery     Vital Signs: /70 (BP Location: Right arm)   Pulse 67   Temp 98 5 °F (36 9 °C) (Temporal)   Resp 18   Ht 6' (1 829 m)   Wt 129 kg (283 lb 15 2 oz)   SpO2 92%   BMI 38 51 kg/m²    On tele  Diet: bariatric clear liquids  Mobility: ambulatory  DVT Prophylaxis: scd's, ambulatory  Medications/Pain Control:   Medications:  Acetaminophen suspension 975 mg Oral Q8H   atorvastatin 40 mg Oral Daily With Dinner   clonazePAM 1 mg Oral Daily   clonazePAM 2 mg Oral HS   DULoxetine 60 mg Oral Daily   famotidine 20 mg Intravenous BID   metoprolol succinate 25 mg Oral Daily   ondansetron 4 mg Intravenous Q6H [facility]   OXcarbazepine 300 mg Oral BID   scopolamine 1 patch Transdermal Once   [START ON 3/5/2020] scopolamine 1 patch Transdermal Q72H   traZODone 300 mg Oral HS   ceFAZolin (ANCEF) 2 G in sodium chloride 0 9% 50 ml IVPB (CMPD ORDER)   Dose: 2,000 mg  Freq: Once Route: IV  Start: 03/02/20 0815 End: 03/02/20 1008  metroNIDAZOLE (FLAGYL) IVPB (premix) 500 mg   Dose: 500 mg  Freq: Once Route: IV  Start: 03/02/20 0815 End: 03/02/20 1008    ceFAZolin (ANCEF) 2,000 mg in sodium chloride 0 9 % 50 mL IVPB   Dose: 2,000 mg  Freq: Every 8 hours Route: IV  Last Dose: Stopped (03/03/20 0345)  Start: 03/02/20 1830 End: 03/03/20 0345    metroNIDAZOLE (FLAGYL) IVPB (premix) 500 mg   Dose: 500 mg  Freq: Every 8 hours Route: IV  Indications of Use: PROPHYLAXIS  Indications Comment: surgical prophylaxis  Last Dose: Stopped (03/03/20 0245)  Start: 03/02/20 1830 End: 03/03/20 0245    metoprolol (LOPRESSOR) injection 5 mg   Dose: 5 mg  Freq: Every 6 hours Route: IV  Start: 03/02/20 1530 End: 03/03/20 1122    ondansetron (ZOFRAN) injection 4 mg   Dose: 4 mg  Freq: Every 6 hours scheduled Route: IV  Start: 03/02/20 1800    Continuous IV Infusions:  lactated ringers 100 mL/hr Intravenous Continuous   sodium chloride 0 9 % infusion   Rate: 125 mL/hr Dose: 125 mL/hr  Freq: Continuous Route: IV  Indications of Use: IV Hydration  Last Dose: 125 mL/hr (03/02/20 0851)  Start: 03/02/20 0815 End: 03/02/20 1540    PRN Meds:  HYDROmorphone 0 5 mg Intravenous Q4H PRN   metoclopramide 10 mg Intravenous Q6H PRN   morphine injection 4 mg Intravenous Q2H PRN x 1 on 3/2   oxyCODONE 5 mg Oral Q4H PRN x 1 on 3/2, x 3 on 3/3   phenol 2 spray Mouth/Throat Q2H PRN   promethazine 12 5 mg Intravenous Q6H PRN   simethicone 80 mg Oral 4x Daily PRN   trimethobenzamide 200 mg Intramuscular Once PRN         Network Utilization Review Department  [email or web address]  ATTENTION: Please call with any questions or concerns to [phone] and carefully listen to the prompts so that you are directed to the right person   All voicemails are confidential   Salud Steve all requests for admission clinical reviews, approved or denied determinations and any other requests to dedicated fax number below belonging to the campus where the patient is receiving treatment   List of dedicated fax numbers for the Facilities:  [address] [place] [address] DENIALS (Administrative/Medical Necessity) [phone]   [address]  (Maternity/NICU/Pediatrics) [phone]   Franchesca Goode [phone]   [address] [phone]   [address] [place] [address] [phone]   [address] [place] [address]  [phone]   [address] [place] [address] [phone]   [facility]  [phone]   [address] [facility]  [address] [address] [address] [facility] [phone]

## 2020-03-03 NOTE — PROGRESS NOTES
Progress Note - Bariatric Surgery   Chaparro Mandel 48 y o  male MRN: [id number]  Unit/Bed#: E5 -01 Encounter: [id number]      Subjective/Objective     SUBJECTIVE     Patient with morbid obesity, POD 1 s/p lap RYGB  Seen and examined at the bedside this AM   Incisional pain adequately controlled w/oral/IV pain medication  Tolerating liquid diet with no nausea or vomiting  Voiding adequately postop  Denies flatus or BMs  Ambulating without assistance and using the incentive spirometer  OBJECTIVE    /70 (BP Location: Right arm)   Pulse 67   Temp 98 5 °F (36 9 °C) (Temporal)   Resp 18   Ht 6' (1 829 m)   Wt 129 kg (283 lb 15 2 oz)   SpO2 92%   BMI 38 51 kg/m²       Intake/Output Summary (Last 24 hours) at 3/3/2020 1118  Last data filed at 3/3/2020 0831  Gross per 24 hour   Intake 2590 ml   Output 1225 ml   Net 1365 ml       Invasive Devices     Peripheral Intravenous Line            Peripheral IV 03/02/20 Left Hand 1 day                ROS: 10-point system completed  All negative except see HPI  Physical Exam    General Appearance:    Alert, cooperative, no distress, appears stated age   Head:    Normocephalic, without obvious abnormality, atraumatic   Lungs:     Respirations unlabored   Heart:    Regular rate and rhythm   Abdomen:     Soft, appropriate tenderness to palpation,  no masses, no organomegaly, mildly distended  Incisions are clean, dry and intact  Abdomen is benign with no peritoneal findings  Extremities:   Extremities normal, atraumatic, no cyanosis or edema   Neurologic:  Psych:   Normal strength and sensation      Normal mood and affect       Lab, Imaging and other studies:  I have personally reviewed pertinent lab results    , CBC:   Lab Results   Component Value Date    WBC 10 74 (H) 03/03/2020    HGB 14 9 03/03/2020    HCT 46 3 03/03/2020    MCV 94 03/03/2020     03/03/2020    MCH 30 3 03/03/2020    MCHC 32 2 03/03/2020    RDW 12 3 03/03/2020    MPV 10 5 03/03/2020   , CMP:   Lab Results   Component Value Date    SODIUM 136 03/03/2020    K 4 2 03/03/2020     03/03/2020    CO2 27 03/03/2020    BUN 11 03/03/2020    CREATININE 0 79 03/03/2020    CALCIUM 8 1 (L) 03/03/2020    EGFR 102 03/03/2020        Latest radiology: No results found  VTE Mechanical Prophylaxis: sequential compression device    ASSESSMENT  48 y o  male, POD#1 from lap RYGB for morbid obesity; currently undergoing a stable post-op course  PLAN  1) Diet/IVF  Encourage PO fluid intake  2) GI/DVT prophylaxis w/SCDs  Encourage ambulation  3) Pain and nausea management  4) Finish periop IV abx  5) Home medications reviewed and restarted as appropriate  6) Aggressive incentive spirometry, and encouraged ambulation    Dispo: Anticipate discharge home this afternoon       Plan of care was discussed with patient and patient's nurse  Care plan discussed with Dr Kandy Wild MD  Bariatric Surgery Fellow  3/3/2020  11:18 AM

## 2020-03-03 NOTE — DISCHARGE SUMMARY
Discharge Summary - Fawn Husain 48 y o  male MRN: [id number]    Unit/Bed#: E5 -01 Encounter: [id number]      Pre-Operative Diagnosis: Pre-Op Diagnosis Codes:     * Morbid obesity ([facility] ) [E66 01]     * Sleep apnea [G47 30]     * Heart disease, unspecified [I51 9]     * Hypercholesterolemia [E78 00]    Post-Operative Diagnosis: Post-Op Diagnosis Codes:     * Morbid obesity ([facility] ) [E66 01]     * Sleep apnea [G47 30]     * Heart disease, unspecified [I51 9]     * Hypercholesterolemia [E78 00]    Procedures Performed:  Procedure(s):  LAPAROSCOPIC FRITZ-EN-Y GASTRIC BYPASS AND INTRAOPERATIVE EGD    Surgeon: Tracie Willis MD    See H & P for full details of admission and Operative Note for full details of operations performed  Patient tolerated surgery well without complications  In the morning postoperative Day 1, the patient had mild nausea and abdominal pain  Tolerated a clear liquid diet without vomiting  Able to ambulate and voiding independently  Patient was deemed ready for discharge home  Patient was seen and examined prior to discharge  Provisions for Follow-Up Care:  See After Visit Summary/Discharge Instructions for information related to follow-up care and home orders  Disposition: Home, in stable condition  Planned Readmission: No    Discharge Medications:  See After Visit Summary/Discharge Instructions for reconciled discharge medications provided to patient and family  Post Operative instructions: Reviewed with patient and/or family      Signature:   Rossana Leon PA-C  Date: 3/3/2020 Time: 11:46 AM

## 2020-03-04 ENCOUNTER — TELEPHONE (OUTPATIENT)
Dept: MEDSURG UNIT | Facility: HOSPITAL | Age: 55
End: 2020-03-04

## 2020-03-04 ENCOUNTER — TRANSITIONAL CARE MANAGEMENT (OUTPATIENT)
Dept: FAMILY MEDICINE CLINIC | Facility: CLINIC | Age: 55
End: 2020-03-04

## 2020-03-04 NOTE — TELEPHONE ENCOUNTER
Post op follow up phone call completed  Pt is sipping liquids, using IS as instructed, reinforced importance of using IS to help prevent pneumonia  Ambulating about home without difficulty, took walk outside  Pain present  Using tylenol q8h and using oxycodone intermittently as well  Reaffirmed examples of liquid diet over the next week  Pt stated understanding about discharge instructions and medication adjustments  Follow up appt with surgeon scheduled for next week  Instructed to call with any additional questions or concerns

## 2020-03-05 ENCOUNTER — OFFICE VISIT (OUTPATIENT)
Dept: FAMILY MEDICINE CLINIC | Facility: CLINIC | Age: 55
End: 2020-03-05
Payer: COMMERCIAL

## 2020-03-05 VITALS
HEART RATE: 82 BPM | SYSTOLIC BLOOD PRESSURE: 118 MMHG | OXYGEN SATURATION: 95 % | TEMPERATURE: 97 F | BODY MASS INDEX: 38.33 KG/M2 | RESPIRATION RATE: 16 BRPM | WEIGHT: 283 LBS | HEIGHT: 72 IN | DIASTOLIC BLOOD PRESSURE: 62 MMHG

## 2020-03-05 DIAGNOSIS — Z98.84 S/P BARIATRIC SURGERY: Primary | ICD-10-CM

## 2020-03-05 DIAGNOSIS — Z11.4 SCREENING FOR HIV (HUMAN IMMUNODEFICIENCY VIRUS): ICD-10-CM

## 2020-03-05 DIAGNOSIS — F31.62 BIPOLAR DISORDER, CURRENT EPISODE MIXED, MODERATE (HCC): ICD-10-CM

## 2020-03-05 DIAGNOSIS — F51.01 PRIMARY INSOMNIA: ICD-10-CM

## 2020-03-05 DIAGNOSIS — I21.4 NSTEMI (NON-ST ELEVATED MYOCARDIAL INFARCTION) (HCC): ICD-10-CM

## 2020-03-05 DIAGNOSIS — I25.110 CORONARY ARTERY DISEASE INVOLVING NATIVE CORONARY ARTERY OF NATIVE HEART WITH UNSTABLE ANGINA PECTORIS (HCC): ICD-10-CM

## 2020-03-05 DIAGNOSIS — H81.13 BENIGN PAROXYSMAL POSITIONAL VERTIGO DUE TO BILATERAL VESTIBULAR DISORDER: ICD-10-CM

## 2020-03-05 DIAGNOSIS — E66.01 SEVERE OBESITY (BMI 35.0-39.9) WITH COMORBIDITY (HCC): ICD-10-CM

## 2020-03-05 DIAGNOSIS — E78.00 HYPERCHOLESTEROLEMIA: ICD-10-CM

## 2020-03-05 DIAGNOSIS — Z87.891 FORMER SMOKER: ICD-10-CM

## 2020-03-05 PROCEDURE — 99495 TRANSJ CARE MGMT MOD F2F 14D: CPT | Performed by: FAMILY MEDICINE

## 2020-03-05 PROCEDURE — 1111F DSCHRG MED/CURRENT MED MERGE: CPT | Performed by: FAMILY MEDICINE

## 2020-03-05 RX ORDER — VENLAFAXINE 75 MG/1
75 TABLET ORAL DAILY
COMMUNITY
Start: 2020-03-03 | End: 2020-08-25 | Stop reason: SDUPTHER

## 2020-03-05 NOTE — PROGRESS NOTES
Assessment/Plan:    1  S/P bariatric surgery    2  Severe obesity (BMI 35 0-39  9) with comorbidity ([facility] )  -     CBC; Future    3  BMI 38 0-38 9,adult    4  Benign paroxysmal positional vertigo due to bilateral vestibular disorder  Assessment & Plan:  Pt states he still gets some bouts of vertigo and will be taking his antivert today      5  Hypercholesterolemia  Assessment & Plan:  Pt is still on lipitor    Orders:  -     Comprehensive metabolic panel; Future  -     Lipid Panel with Direct LDL reflex; Future    6  Former smoker    7  Coronary artery disease involving native coronary artery of native heart with unstable angina pectoris ([facility] )    8  NSTEMI (non-ST elevated myocardial infarction) ([facility] )    9  Bipolar disorder, current episode mixed, moderate (HCC)  Assessment & Plan:  Takes trileptal      10  Primary insomnia  Assessment & Plan:  Takes trazodone        11  Screening for HIV (human immunodeficiency virus)  -     [facility] HIV 1/2 AG-AB combo; Future          Patient Instructions     Obesity   AMBULATORY CARE:   Obesity  is when your body mass index (BMI) is greater than 30  Your healthcare provider will use your height and weight to measure your BMI  The risks of obesity include  many health problems, such as injuries or physical disability  You may need tests to check for the following:  · Diabetes     · High blood pressure or high cholesterol     · Heart disease     · Gallbladder or liver disease     · Cancer of the colon, breast, prostate, liver, or kidney     · Sleep apnea     · Arthritis or gout  Seek care immediately if:   · You have a severe headache, confusion, or difficulty speaking  · You have weakness on one side of your body  · You have chest pain, sweating, or shortness of breath  Contact your healthcare provider if:   · You have symptoms of gallbladder or liver disease, such as pain in your upper abdomen  · You have knee or hip pain and discomfort while walking       · You have symptoms of diabetes, such as intense hunger and thirst, and frequent urination  · You have symptoms of sleep apnea, such as snoring or daytime sleepiness  · You have questions or concerns about your condition or care  Treatment for obesity  focuses on helping you lose weight to improve your health  Even a small decrease in BMI can reduce the risk for many health problems  Your healthcare provider will help you set a weight-loss goal   · Lifestyle changes  are the first step in treating obesity  These include making healthy food choices and getting regular physical activity  Your healthcare provider may suggest a weight-loss program that involves coaching, education, and therapy  · Medicine  may help you lose weight when it is used with a healthy diet and physical activity  · Surgery  can help you lose weight if you are very obese and have other health problems  There are several types of weight-loss surgery  Ask your healthcare provider for more information  Be successful losing weight:   · Set small, realistic goals  An example of a small goal is to walk for 20 minutes 5 days a week  Anther goal is to lose 5% of your body weight  · Tell friends, family members, and coworkers about your goals  and ask for their support  Ask a friend to lose weight with you, or join a weight-loss support group  · Identify foods or triggers that may cause you to overeat , and find ways to avoid them  Remove tempting high-calorie foods from your home and workplace  Place a bowl of fresh fruit on your kitchen counter  If stress causes you to eat, then find other ways to cope with stress  · Keep a diary to track what you eat and drink  Also write down how many minutes of physical activity you do each day  Weigh yourself once a week and record it in your diary  Eating changes: You will need to eat 500 to 1,000 fewer calories each day than you currently eat to lose 1 to 2 pounds a week   The following changes will help you cut calories:  · Eat smaller portions  Use small plates, no larger than 9 inches in diameter  Fill your plate half full of fruits and vegetables  Measure your food using measuring cups until you know what a serving size looks like  · Eat 3 meals and 1 or 2 snacks each day  Plan your meals in advance  Tena Bocanegra and eat at home most of the time  Eat slowly  · Eat fruits and vegetables at every meal   They are low in calories and high in fiber, which makes you feel full  Do not add butter, margarine, or cream sauce to vegetables  Use herbs to season steamed vegetables  · Eat less fat and fewer fried foods  Eat more baked or grilled chicken and fish  These protein sources are lower in calories and fat than red meat  Limit fast food  Dress your salads with olive oil and vinegar instead of bottled dressing  · Limit the amount of sugar you eat  Do not drink sugary beverages  Limit alcohol  Activity changes:  Physical activity is good for your body in many ways  It helps you burn calories and build strong muscles  It decreases stress and depression, and improves your mood  It can also help you sleep better  Talk to your healthcare provider before you begin an exercise program   · Exercise for at least 30 minutes 5 days a week  Start slowly  Set aside time each day for physical activity that you enjoy and that is convenient for you  It is best to do both weight training and an activity that increases your heart rate, such as walking, bicycling, or swimming  · Find ways to be more active  Do yard work and housecleaning  Walk up the stairs instead of using elevators  Spend your leisure time going to events that require walking, such as outdoor festivals or fairs  This extra physical activity can help you lose weight and keep it off  Follow up with your healthcare provider as directed: You may need to meet with a dietitian   Write down your questions so you remember to ask them during your visits  © 2017 [address] [employer] Information is for End User's use only and may not be sold, redistributed or otherwise used for commercial purposes  All illustrations and images included in CareNotes® are the copyrighted property of A D A M , Inc  or Toby Kennedy  The above information is an  only  It is not intended as medical advice for individual conditions or treatments  Talk to your doctor, nurse or pharmacist before following any medical regimen to see if it is safe and effective for you  Return in about 3 months (around 6/5/2020) for Recheck  Subjective:      Patient ID: Tanvir King is a 48 y o  male  Chief Complaint   Patient presents with    Transition of Care Management     [facility]       Pt is here for a TCM appt  Nurses TCM note appreciated  Bariatic surgery  - bypass surgery    Pt reports some abd soreness since discharge  No SOB since discharge  Pt has not had a BM yet - pt states he went 2-3 times before the surgery states he normally does not go for two weeks or so  No blood in urine  Pt has been using his spirometer    Since the surgery pt has lost 5 lbs      The following portions of the patient's history were reviewed and updated as appropriate: allergies, current medications, past family history, past medical history, past social history, past surgical history and problem list     Review of Systems   Constitutional: Negative for activity change, appetite change, chills, diaphoresis, fatigue, fever and unexpected weight change  HENT: Negative for congestion, dental problem, ear pain, mouth sores, sinus pressure, sinus pain, sore throat and trouble swallowing  Eyes: Negative for photophobia, discharge and itching  Respiratory: Negative for apnea, chest tightness and shortness of breath  Cardiovascular: Negative for chest pain, palpitations and leg swelling     Gastrointestinal: Negative for abdominal distention, abdominal pain, blood in stool, nausea and vomiting  Endocrine: Negative for cold intolerance, heat intolerance, polydipsia, polyphagia and polyuria  Genitourinary: Negative for difficulty urinating  Musculoskeletal: Negative for arthralgias  Skin: Negative for color change and wound  Neurological: Negative for dizziness, syncope, speech difficulty and headaches  Hematological: Negative for adenopathy  Psychiatric/Behavioral: Negative for agitation and behavioral problems           Current Outpatient Medications   Medication Sig Dispense Refill    acetaminophen (TYLENOL) 325 mg tablet Take 3 tablets (975 mg total) by mouth every 8 (eight) hours 30 tablet 0    atorvastatin (LIPITOR) 40 mg tablet Take 1 tablet (40 mg total) by mouth daily 90 tablet 3    butalbital-acetaminophen-caffeine-codeine (FIORICET WITH CODEINE) -34-30 MG per capsule Take 1 capsule by mouth every 4 (four) hours as needed for headaches      clonazePAM (KlonoPIN) 1 mg tablet 1 mg 3 (three) times a day       frovatriptan (FROVA) 2 5 MG tablet Take 2 5 mg by mouth once as needed       isosorbide mononitrate (IMDUR) 30 mg 24 hr tablet Take 1 tablet (30 mg total) by mouth daily (Patient taking differently: Take 30 mg by mouth daily 4 pm) 90 tablet 1    meclizine (ANTIVERT) 25 mg tablet Take 1 tablet (25 mg total) by mouth 3 (three) times a day as needed for dizziness 30 tablet 1    metoprolol succinate (TOPROL-XL) 25 mg 24 hr tablet Take 1 tablet (25 mg total) by mouth daily (Patient taking differently: Take 25 mg by mouth daily 4 pm) 90 tablet 2    omeprazole (PriLOSEC) 20 mg delayed release capsule Take 1 capsule (20 mg total) by mouth daily 90 capsule 1    OXcarbazepine (TRILEPTAL) 600 mg tablet Take 300 mg by mouth 2 (two) times a day      rizatriptan (MAXALT) 10 MG tablet Take 10 mg by mouth once as needed for migraine May repeat in 2 hours if needed      traZODone (DESYREL) 300 MG tablet Take 300 mg by mouth daily at bedtime      venlafaxine (EFFEXOR) 75 mg tablet       oxyCODONE (ROXICODONE) 5 mg immediate release tablet Take 1 tablet (5 mg total) by mouth every 4 (four) hours as needed for moderate pain For continuing therapyMax Daily Amount: 30 mg (Patient not taking: Reported on 3/5/2020) 10 tablet 0     No current facility-administered medications for this visit  Objective:    /62   Pulse 82   Temp (!) 97 °F (36 1 °C)   Resp 16   Ht 6' (1 829 m)   Wt 128 kg (283 lb)   SpO2 95%   BMI 38 38 kg/m²        Physical Exam   Constitutional: He appears well-developed and well-nourished  No distress  HENT:   Head: Normocephalic and atraumatic  Right Ear: External ear normal    Left Ear: External ear normal    Nose: Nose normal    Mouth/Throat: Oropharynx is clear and moist  No oropharyngeal exudate  Eyes: Pupils are equal, round, and reactive to light  EOM are normal  Right eye exhibits no discharge  Left eye exhibits no discharge  No scleral icterus  Neck: No thyromegaly present  Cardiovascular: Normal rate and normal heart sounds  No murmur heard  Pulmonary/Chest: Effort normal and breath sounds normal  No respiratory distress  He has no wheezes  Abdominal: Soft  Bowel sounds are normal  He exhibits no distension and no mass  There is no tenderness  There is no rebound and no guarding  Musculoskeletal: Normal range of motion  Neurological: He is alert  He displays normal reflexes  Coordination normal    Skin: Skin is warm and dry  No rash noted  He is not diaphoretic  No erythema  Incisions on abd are clean and dry   Psychiatric: He has a normal mood and affect  His behavior is normal    Nursing note and vitals reviewed             TCM Call (since 2/3/2020)     Date and time call was made  3/4/2020 10:16 AM    Hospital care reviewed  Records reviewed    Patient was hospitialized at  [facility] - CLOSED    Date of Admission  03/02/20    Date of discharge  03/03/20    Diagnosis  Morbid obesity ([facility]     Disposition  Home    Were the patients medications reviewed and updated  Yes    Current Symptoms  None      TCM Call (since 2/3/2020)     Post hospital issues  None    Should patient be enrolled in anticoag monitoring? No    Scheduled for follow up? Yes    Did you obtain your prescribed medications  Yes    Do you need help managing your prescriptions or medications  No    Is transportation to your appointment needed  Yes    Specify why  son driving due to surgery- has to be cleared to drive    I have advised the patient to call PCP with any new or worsening symptoms  [address] or [employer] other    Support System  Family; Friends    The type of support provided  Emotional; Financial; Physical    Do you have social support  Yes, as much as I need    Are you recieving any outpatient services  No    Are you recieving home care services  No    Are you using any community resources  No    Current waiver services  No    Have you fallen in the last 12 months  No    Interperter language line needed  No    Counseling  Family    Counseling topics  Activities of daily living    Comments  wmcma            Tim Blackwell,   BMI Counseling: Body mass index is 38 38 kg/m²  The BMI is above normal  Nutrition recommendations include reducing portion sizes

## 2020-03-05 NOTE — PATIENT INSTRUCTIONS
Obesity   AMBULATORY CARE:   Obesity  is when your body mass index (BMI) is greater than 30  Your healthcare provider will use your height and weight to measure your BMI  The risks of obesity include  many health problems, such as injuries or physical disability  You may need tests to check for the following:  · Diabetes     · High blood pressure or high cholesterol     · Heart disease     · Gallbladder or liver disease     · Cancer of the colon, breast, prostate, liver, or kidney     · Sleep apnea     · Arthritis or gout  Seek care immediately if:   · You have a severe headache, confusion, or difficulty speaking  · You have weakness on one side of your body  · You have chest pain, sweating, or shortness of breath  Contact your healthcare provider if:   · You have symptoms of gallbladder or liver disease, such as pain in your upper abdomen  · You have knee or hip pain and discomfort while walking  · You have symptoms of diabetes, such as intense hunger and thirst, and frequent urination  · You have symptoms of sleep apnea, such as snoring or daytime sleepiness  · You have questions or concerns about your condition or care  Treatment for obesity  focuses on helping you lose weight to improve your health  Even a small decrease in BMI can reduce the risk for many health problems  Your healthcare provider will help you set a weight-loss goal   · Lifestyle changes  are the first step in treating obesity  These include making healthy food choices and getting regular physical activity  Your healthcare provider may suggest a weight-loss program that involves coaching, education, and therapy  · Medicine  may help you lose weight when it is used with a healthy diet and physical activity  · Surgery  can help you lose weight if you are very obese and have other health problems  There are several types of weight-loss surgery  Ask your healthcare provider for more information    Be successful losing weight:   · Set small, realistic goals  An example of a small goal is to walk for 20 minutes 5 days a week  Anther goal is to lose 5% of your body weight  · Tell friends, family members, and coworkers about your goals  and ask for their support  Ask a friend to lose weight with you, or join a weight-loss support group  · Identify foods or triggers that may cause you to overeat , and find ways to avoid them  Remove tempting high-calorie foods from your home and workplace  Place a bowl of fresh fruit on your kitchen counter  If stress causes you to eat, then find other ways to cope with stress  · Keep a diary to track what you eat and drink  Also write down how many minutes of physical activity you do each day  Weigh yourself once a week and record it in your diary  Eating changes: You will need to eat 500 to 1,000 fewer calories each day than you currently eat to lose 1 to 2 pounds a week  The following changes will help you cut calories:  · Eat smaller portions  Use small plates, no larger than 9 inches in diameter  Fill your plate half full of fruits and vegetables  Measure your food using measuring cups until you know what a serving size looks like  · Eat 3 meals and 1 or 2 snacks each day  Plan your meals in advance  [employer] and eat at home most of the time  Eat slowly  · Eat fruits and vegetables at every meal   They are low in calories and high in fiber, which makes you feel full  Do not add butter, margarine, or cream sauce to vegetables  Use herbs to season steamed vegetables  · Eat less fat and fewer fried foods  Eat more baked or grilled chicken and fish  These protein sources are lower in calories and fat than red meat  Limit fast food  Dress your salads with olive oil and vinegar instead of bottled dressing  · Limit the amount of sugar you eat  Do not drink sugary beverages  Limit alcohol  Activity changes:  Physical activity is good for your body in many ways   It helps you burn calories and build strong muscles  It decreases stress and depression, and improves your mood  It can also help you sleep better  Talk to your healthcare provider before you begin an exercise program   · Exercise for at least 30 minutes 5 days a week  Start slowly  Set aside time each day for physical activity that you enjoy and that is convenient for you  It is best to do both weight training and an activity that increases your heart rate, such as walking, bicycling, or swimming  · Find ways to be more active  Do yard work and housecleaning  Walk up the stairs instead of using elevators  Spend your leisure time going to events that require walking, such as outdoor festivals or fairs  This extra physical activity can help you lose weight and keep it off  Follow up with your healthcare provider as directed: You may need to meet with a dietitian  Write down your questions so you remember to ask them during your visits  © 2017 [employer] Information is for End User's use only and may not be sold, redistributed or otherwise used for commercial purposes  All illustrations and images included in CareNotes® are the copyrighted property of A D [employer] M [employer]  or [employer]y Kennedy  The above information is an  only  It is not intended as medical advice for individual conditions or treatments  Talk to your doctor, nurse or pharmacist before following any medical regimen to see if it is safe and effective for you

## 2020-03-05 NOTE — ASSESSMENT & PLAN NOTE
Patient was started on Doxycycline on 6/11/17 for infection- mom states it has been causing some stomach pain in the upper rib cage.  Was nauseated when he was at the , but no nausea or vomiting since.  Is not sure if it is just part of the infection or medication- advise?   Takes trazodone

## 2020-03-11 DIAGNOSIS — I25.110 CORONARY ARTERY DISEASE INVOLVING NATIVE CORONARY ARTERY OF NATIVE HEART WITH UNSTABLE ANGINA PECTORIS (HCC): ICD-10-CM

## 2020-03-11 DIAGNOSIS — E78.2 MIXED HYPERLIPIDEMIA: ICD-10-CM

## 2020-03-11 DIAGNOSIS — E66.01 MORBID (SEVERE) OBESITY DUE TO EXCESS CALORIES (HCC): Primary | ICD-10-CM

## 2020-03-11 RX ORDER — ATORVASTATIN CALCIUM 40 MG/1
TABLET, FILM COATED ORAL
Qty: 90 TABLET | Refills: 0 | Status: SHIPPED | OUTPATIENT
Start: 2020-03-11 | End: 2020-06-12

## 2020-03-11 RX ORDER — PANTOPRAZOLE SODIUM 20 MG/1
20 TABLET, DELAYED RELEASE ORAL DAILY
Qty: 30 TABLET | Refills: 2 | OUTPATIENT
Start: 2020-03-11 | End: 2020-08-20 | Stop reason: SDUPTHER

## 2020-03-13 ENCOUNTER — OFFICE VISIT (OUTPATIENT)
Dept: BARIATRICS | Facility: CLINIC | Age: 55
End: 2020-03-13

## 2020-03-13 VITALS
SYSTOLIC BLOOD PRESSURE: 130 MMHG | HEART RATE: 82 BPM | DIASTOLIC BLOOD PRESSURE: 86 MMHG | HEIGHT: 72 IN | WEIGHT: 273.5 LBS | BODY MASS INDEX: 37.05 KG/M2 | TEMPERATURE: 96.3 F

## 2020-03-13 DIAGNOSIS — Z98.84 S/P BARIATRIC SURGERY: Primary | ICD-10-CM

## 2020-03-13 DIAGNOSIS — G47.33 OSA (OBSTRUCTIVE SLEEP APNEA): ICD-10-CM

## 2020-03-13 DIAGNOSIS — K91.2 POSTSURGICAL MALABSORPTION: ICD-10-CM

## 2020-03-13 DIAGNOSIS — E66.01 MORBID (SEVERE) OBESITY DUE TO EXCESS CALORIES (HCC): Primary | ICD-10-CM

## 2020-03-13 PROCEDURE — 99024 POSTOP FOLLOW-UP VISIT: CPT | Performed by: SURGERY

## 2020-03-13 PROCEDURE — RECHECK: Performed by: DIETITIAN, REGISTERED

## 2020-03-13 PROCEDURE — 3008F BODY MASS INDEX DOCD: CPT | Performed by: SURGERY

## 2020-03-13 PROCEDURE — 1111F DSCHRG MED/CURRENT MED MERGE: CPT | Performed by: SURGERY

## 2020-03-13 NOTE — PROGRESS NOTES
Assessment/Plan:     Patient ID: Sean Ortega is a 48 y o  male  Diagnoses and all orders for this visit:    Morbid (severe) obesity due to excess calories ([address] )  Postsurgical malabsorption  - Status post Marii-En-Y Gastric Bypass with Dr Laura Monroy on 3/2/2020  Patient is presenting for the first postoperative visit  Patient's hospital stay was uneventful without any complications  Patient is doing well, has no complaints, and is taking vitamins and omeprazole as instructed  Currently tolerating the blenderized diet, will advance to soft diet  Patient will also be meeting with our dietician today to review her vitamin and mineral supplements and also go over her diet and emphasize postoperative commitment and compliance  The patient was also instructed to start exercising on a regular basis  F/U in 4 weeks  - Call our office if you have any problems with abdominal pain especially associated with fever, chills, nausea, vomiting or any other concerns  - All  Post-bariatric surgery patients should be aware that very small quantities of any alcohol can cause impairment and it is very possible not to feel the effect  The effect can be in the system for several hours  It is also a stomach irritant  - It is advised to AVOID alcohol, Nonsteroidal antiinflammatory drugs (NSAIDS) and nicotine of all forms   Any of these can cause stomach irritation/pain  Keep up the good work! ROSI (obstructive sleep apnea)  - Continue wearing CPAP machine   - Follow up with sleep medicine as needed for mask adjustments as you lose weight  - We recommend a 1 year repeat sleep study     Subjective:      Patient ID: Sean Ortega is a 48 y o  male  -s/p Marii-En-Y Gastric Bypass with Dr Laura Monroy on 3/2/2020  Presents to the office today for first post operative visit  Tolerating diet without issues; denies N/V, dysphagia, reflux  Overall doing Well  Current BMI is Body mass index is 37 09 kg/m²      The following portions of the patient's history were reviewed and updated as appropriate: allergies, current medications, past family history, past medical history, past social history, past surgical history and problem list     Review of Systems   Constitutional: Negative for fever and unexpected weight change  HENT: Negative for trouble swallowing          - heartburn   Gastrointestinal: Positive for constipation  Negative for abdominal distention, abdominal pain, diarrhea, nausea and vomiting  Skin: Positive for wound  Negative for rash  Psychiatric/Behavioral: Negative for agitation and behavioral problems  Objective:    /86 (BP Location: Right arm, Patient Position: Sitting)   Pulse 82   Temp (!) 96 3 °F (35 7 °C) (Tympanic)   Ht 6' (1 829 m)   Wt 124 kg (273 lb 8 oz)   BMI 37 09 kg/m²      Physical Exam   Constitutional: He is oriented to person, place, and time  He appears well-developed and well-nourished  No distress  HENT:   Head: Normocephalic and atraumatic  Eyes: Conjunctivae and EOM are normal  No scleral icterus  Neck: Normal range of motion  Neck supple  Cardiovascular: Normal rate  Murmur heard  Pulmonary/Chest: Effort normal  No respiratory distress  Abdominal: Soft  He exhibits no distension and no mass  There is no tenderness  There is no rebound and no guarding  No hernia  Surgical incisions clean, dry, well-healed; no incisional hernias appreciated    Musculoskeletal: Normal range of motion  Neurological: He is alert and oriented to person, place, and time  Skin: Skin is warm and dry  Psychiatric: He has a normal mood and affect  His behavior is normal    Vitals reviewed          Scribe Attestation    I,:   Divina Marti PA-C am acting as a scribe while in the presence of the attending physician :        I,:   Shannan Bales MD personally performed the services described in this documentation    as scribed in my presence :

## 2020-03-16 ENCOUNTER — TELEPHONE (OUTPATIENT)
Dept: BARIATRICS | Facility: CLINIC | Age: 55
End: 2020-03-16

## 2020-03-16 NOTE — TELEPHONE ENCOUNTER
Reviewed post op diet progression - soft diet , protein shakes and vitamin/minerals   Patient was able to verbalize understanding

## 2020-03-16 NOTE — PROGRESS NOTES
Called patient as unable to see after first post op appointment     Diagnosis: Morbid Obesity    Bariatric Surgeon: Dr Lugo Cons    Surgery: Gastric Bypass Laparoscopic    Class: first post op note    Topics discussed today include:     fluid goals post op, protein goals post op, constipation, chew food well, diet progression, protein supplems and vitamin/mineral supplements    Patient was able to verbalize basic diet (protein, fluid, vitamin and mineral) recommendations and possible nutrition-related complications   Yes

## 2020-03-26 ENCOUNTER — TELEPHONE (OUTPATIENT)
Dept: BARIATRICS | Facility: CLINIC | Age: 55
End: 2020-03-26

## 2020-03-26 NOTE — TELEPHONE ENCOUNTER
Patient left a message on our voicemail requesting a call back from [place]  Message forwarded to her

## 2020-03-26 NOTE — TELEPHONE ENCOUNTER
Pt called, c/o some nausea and lightheadedness  Pt reports not getting his protein drinks in every day, occasionally skipping breakfast, difficulty getting enough fluids in while he is working  Reviewed hydration goals and protein goals and reviewed more soft die food options  Discussed signs and symptoms of dehydration, experimenting with temperature of fluids, sipping small sips  Recommended pt try warm decaf tea and some sugar-free sports drinks  Encouraged pt to focus on getting all fluids, protein drinks, and all three 1/4 cup meals per day

## 2020-04-14 ENCOUNTER — TELEPHONE (OUTPATIENT)
Dept: BARIATRICS | Facility: CLINIC | Age: 55
End: 2020-04-14

## 2020-04-15 ENCOUNTER — OFFICE VISIT (OUTPATIENT)
Dept: BARIATRICS | Facility: CLINIC | Age: 55
End: 2020-04-15

## 2020-04-15 VITALS — WEIGHT: 245 LBS | BODY MASS INDEX: 33.23 KG/M2

## 2020-04-15 DIAGNOSIS — E66.01 MORBID OBESITY (HCC): Primary | ICD-10-CM

## 2020-04-15 DIAGNOSIS — E78.00 HYPERCHOLESTEROLEMIA: ICD-10-CM

## 2020-04-15 DIAGNOSIS — Z98.84 S/P BARIATRIC SURGERY: ICD-10-CM

## 2020-04-15 PROCEDURE — RECHECK: Performed by: DIETITIAN, REGISTERED

## 2020-04-16 DIAGNOSIS — K21.9 GASTROESOPHAGEAL REFLUX DISEASE WITHOUT ESOPHAGITIS: ICD-10-CM

## 2020-04-16 RX ORDER — ISOSORBIDE MONONITRATE 30 MG/1
30 TABLET, EXTENDED RELEASE ORAL DAILY
Qty: 90 TABLET | Refills: 1 | Status: SHIPPED | OUTPATIENT
Start: 2020-04-16 | End: 2020-04-23

## 2020-04-23 ENCOUNTER — TELEPHONE (OUTPATIENT)
Dept: CARDIOLOGY CLINIC | Facility: CLINIC | Age: 55
End: 2020-04-23

## 2020-04-24 ENCOUNTER — TELEPHONE (OUTPATIENT)
Dept: CARDIOLOGY CLINIC | Facility: CLINIC | Age: 55
End: 2020-04-24

## 2020-05-16 ENCOUNTER — TELEPHONE (OUTPATIENT)
Dept: OTHER | Facility: OTHER | Age: 55
End: 2020-05-16

## 2020-06-08 ENCOUNTER — TELEPHONE (OUTPATIENT)
Dept: FAMILY MEDICINE CLINIC | Facility: CLINIC | Age: 55
End: 2020-06-08

## 2020-06-08 ENCOUNTER — OFFICE VISIT (OUTPATIENT)
Dept: FAMILY MEDICINE CLINIC | Facility: CLINIC | Age: 55
End: 2020-06-08
Payer: COMMERCIAL

## 2020-06-08 VITALS
WEIGHT: 218 LBS | SYSTOLIC BLOOD PRESSURE: 102 MMHG | TEMPERATURE: 99.1 F | RESPIRATION RATE: 16 BRPM | HEART RATE: 92 BPM | BODY MASS INDEX: 29.53 KG/M2 | OXYGEN SATURATION: 96 % | DIASTOLIC BLOOD PRESSURE: 68 MMHG | HEIGHT: 72 IN

## 2020-06-08 DIAGNOSIS — M21.961 KNEE DEFORMITY, ACQUIRED, RIGHT: Primary | ICD-10-CM

## 2020-06-08 PROCEDURE — 99213 OFFICE O/P EST LOW 20 MIN: CPT | Performed by: FAMILY MEDICINE

## 2020-06-08 PROCEDURE — 1036F TOBACCO NON-USER: CPT | Performed by: FAMILY MEDICINE

## 2020-06-08 PROCEDURE — 3008F BODY MASS INDEX DOCD: CPT | Performed by: FAMILY MEDICINE

## 2020-06-12 DIAGNOSIS — I25.110 CORONARY ARTERY DISEASE INVOLVING NATIVE CORONARY ARTERY OF NATIVE HEART WITH UNSTABLE ANGINA PECTORIS (HCC): ICD-10-CM

## 2020-06-12 DIAGNOSIS — E78.2 MIXED HYPERLIPIDEMIA: ICD-10-CM

## 2020-06-12 RX ORDER — ATORVASTATIN CALCIUM 40 MG/1
TABLET, FILM COATED ORAL
Qty: 90 TABLET | Refills: 0 | Status: SHIPPED | OUTPATIENT
Start: 2020-06-12 | End: 2020-09-19 | Stop reason: SDUPTHER

## 2020-06-16 ENCOUNTER — TELEMEDICINE (OUTPATIENT)
Dept: BARIATRICS | Facility: CLINIC | Age: 55
End: 2020-06-16
Payer: COMMERCIAL

## 2020-06-16 VITALS — HEIGHT: 72 IN | BODY MASS INDEX: 28.44 KG/M2 | WEIGHT: 210 LBS

## 2020-06-16 DIAGNOSIS — R42 LIGHTHEADEDNESS: Primary | ICD-10-CM

## 2020-06-16 DIAGNOSIS — K91.2 POSTSURGICAL MALABSORPTION: ICD-10-CM

## 2020-06-16 DIAGNOSIS — Z48.815 ENCOUNTER FOR SURGICAL AFTERCARE FOLLOWING SURGERY OF DIGESTIVE SYSTEM: ICD-10-CM

## 2020-06-16 DIAGNOSIS — Z72.0 TOBACCO USE: ICD-10-CM

## 2020-06-16 DIAGNOSIS — G47.33 OSA (OBSTRUCTIVE SLEEP APNEA): ICD-10-CM

## 2020-06-16 DIAGNOSIS — E78.00 HYPERCHOLESTEROLEMIA: ICD-10-CM

## 2020-06-16 DIAGNOSIS — I25.110 CORONARY ARTERY DISEASE INVOLVING NATIVE CORONARY ARTERY OF NATIVE HEART WITH UNSTABLE ANGINA PECTORIS (HCC): ICD-10-CM

## 2020-06-16 DIAGNOSIS — E66.3 OVERWEIGHT: ICD-10-CM

## 2020-06-16 DIAGNOSIS — R73.03 PRE-DIABETES: ICD-10-CM

## 2020-06-16 DIAGNOSIS — Z98.84 BARIATRIC SURGERY STATUS: ICD-10-CM

## 2020-06-16 PROBLEM — E66.01 MORBID OBESITY (HCC): Status: RESOLVED | Noted: 2020-02-07 | Resolved: 2020-06-16

## 2020-06-16 PROCEDURE — 99214 OFFICE O/P EST MOD 30 MIN: CPT | Performed by: PHYSICIAN ASSISTANT

## 2020-06-22 ENCOUNTER — APPOINTMENT (OUTPATIENT)
Dept: LAB | Age: 55
End: 2020-06-22
Payer: COMMERCIAL

## 2020-06-22 DIAGNOSIS — E66.01 SEVERE OBESITY (BMI 35.0-39.9) WITH COMORBIDITY (HCC): ICD-10-CM

## 2020-06-22 DIAGNOSIS — R73.03 PRE-DIABETES: ICD-10-CM

## 2020-06-22 DIAGNOSIS — I25.110 CORONARY ARTERY DISEASE INVOLVING NATIVE CORONARY ARTERY OF NATIVE HEART WITH UNSTABLE ANGINA PECTORIS (HCC): ICD-10-CM

## 2020-06-22 DIAGNOSIS — G47.33 OSA (OBSTRUCTIVE SLEEP APNEA): ICD-10-CM

## 2020-06-22 DIAGNOSIS — E78.00 HYPERCHOLESTEROLEMIA: ICD-10-CM

## 2020-06-22 DIAGNOSIS — Z48.815 ENCOUNTER FOR SURGICAL AFTERCARE FOLLOWING SURGERY OF DIGESTIVE SYSTEM: ICD-10-CM

## 2020-06-22 DIAGNOSIS — K91.2 POSTSURGICAL MALABSORPTION: ICD-10-CM

## 2020-06-22 DIAGNOSIS — Z11.4 SCREENING FOR HIV (HUMAN IMMUNODEFICIENCY VIRUS): ICD-10-CM

## 2020-06-22 DIAGNOSIS — E66.3 OVERWEIGHT: ICD-10-CM

## 2020-06-22 DIAGNOSIS — R42 LIGHTHEADEDNESS: ICD-10-CM

## 2020-06-22 LAB
25(OH)D3 SERPL-MCNC: 39.7 NG/ML (ref 30–100)
ALBUMIN SERPL BCP-MCNC: 3.3 G/DL (ref 3.5–5)
ALP SERPL-CCNC: 91 U/L (ref 46–116)
ALT SERPL W P-5'-P-CCNC: 39 U/L (ref 12–78)
ANION GAP SERPL CALCULATED.3IONS-SCNC: 4 MMOL/L (ref 4–13)
AST SERPL W P-5'-P-CCNC: 17 U/L (ref 5–45)
BILIRUB SERPL-MCNC: 0.56 MG/DL (ref 0.2–1)
BUN SERPL-MCNC: 7 MG/DL (ref 5–25)
CALCIUM SERPL-MCNC: 9 MG/DL (ref 8.3–10.1)
CHLORIDE SERPL-SCNC: 111 MMOL/L (ref 100–108)
CHOLEST SERPL-MCNC: 79 MG/DL (ref 50–200)
CO2 SERPL-SCNC: 28 MMOL/L (ref 21–32)
CREAT SERPL-MCNC: 0.66 MG/DL (ref 0.6–1.3)
ERYTHROCYTE [DISTWIDTH] IN BLOOD BY AUTOMATED COUNT: 13.2 % (ref 11.6–15.1)
EST. AVERAGE GLUCOSE BLD GHB EST-MCNC: 111 MG/DL
FERRITIN SERPL-MCNC: 201 NG/ML (ref 8–388)
FOLATE SERPL-MCNC: 15.2 NG/ML (ref 3.1–17.5)
GFR SERPL CREATININE-BSD FRML MDRD: 110 ML/MIN/1.73SQ M
GLUCOSE P FAST SERPL-MCNC: 87 MG/DL (ref 65–99)
HBA1C MFR BLD: 5.5 %
HCT VFR BLD AUTO: 43.6 % (ref 36.5–49.3)
HDLC SERPL-MCNC: 34 MG/DL
HGB BLD-MCNC: 14.2 G/DL (ref 12–17)
IRON SATN MFR SERPL: 44 %
IRON SERPL-MCNC: 105 UG/DL (ref 65–175)
LDLC SERPL CALC-MCNC: 35 MG/DL (ref 0–100)
MCH RBC QN AUTO: 30.2 PG (ref 26.8–34.3)
MCHC RBC AUTO-ENTMCNC: 32.6 G/DL (ref 31.4–37.4)
MCV RBC AUTO: 93 FL (ref 82–98)
PLATELET # BLD AUTO: 229 THOUSANDS/UL (ref 149–390)
PMV BLD AUTO: 11.1 FL (ref 8.9–12.7)
POTASSIUM SERPL-SCNC: 3.8 MMOL/L (ref 3.5–5.3)
PROT SERPL-MCNC: 6.1 G/DL (ref 6.4–8.2)
PTH-INTACT SERPL-MCNC: 32.6 PG/ML (ref 18.4–80.1)
RBC # BLD AUTO: 4.7 MILLION/UL (ref 3.88–5.62)
SODIUM SERPL-SCNC: 143 MMOL/L (ref 136–145)
TIBC SERPL-MCNC: 240 UG/DL (ref 250–450)
TRIGL SERPL-MCNC: 52 MG/DL
VIT B12 SERPL-MCNC: 533 PG/ML (ref 100–900)
WBC # BLD AUTO: 8.54 THOUSAND/UL (ref 4.31–10.16)

## 2020-06-22 PROCEDURE — 85027 COMPLETE CBC AUTOMATED: CPT

## 2020-06-22 PROCEDURE — 36415 COLL VENOUS BLD VENIPUNCTURE: CPT

## 2020-06-22 PROCEDURE — 82746 ASSAY OF FOLIC ACID SERUM: CPT

## 2020-06-22 PROCEDURE — 80053 COMPREHEN METABOLIC PANEL: CPT

## 2020-06-22 PROCEDURE — 82607 VITAMIN B-12: CPT

## 2020-06-22 PROCEDURE — 83036 HEMOGLOBIN GLYCOSYLATED A1C: CPT

## 2020-06-22 PROCEDURE — 83970 ASSAY OF PARATHORMONE: CPT

## 2020-06-22 PROCEDURE — 84425 ASSAY OF VITAMIN B-1: CPT

## 2020-06-22 PROCEDURE — 84630 ASSAY OF ZINC: CPT

## 2020-06-22 PROCEDURE — 84590 ASSAY OF VITAMIN A: CPT

## 2020-06-22 PROCEDURE — 82728 ASSAY OF FERRITIN: CPT

## 2020-06-22 PROCEDURE — 82306 VITAMIN D 25 HYDROXY: CPT

## 2020-06-22 PROCEDURE — 87389 HIV-1 AG W/HIV-1&-2 AB AG IA: CPT

## 2020-06-22 PROCEDURE — 80061 LIPID PANEL: CPT

## 2020-06-22 PROCEDURE — 83540 ASSAY OF IRON: CPT

## 2020-06-22 PROCEDURE — 83550 IRON BINDING TEST: CPT

## 2020-06-23 LAB — HIV 1+2 AB+HIV1 P24 AG SERPL QL IA: NORMAL

## 2020-06-24 LAB — ZINC SERPL-MCNC: 89 UG/DL (ref 56–134)

## 2020-06-26 LAB — VIT B1 BLD-SCNC: 123.2 NMOL/L (ref 66.5–200)

## 2020-06-27 LAB — VIT A SERPL-MCNC: 32 UG/DL (ref 20.1–62)

## 2020-07-08 ENCOUNTER — TELEPHONE (OUTPATIENT)
Dept: FAMILY MEDICINE CLINIC | Facility: CLINIC | Age: 55
End: 2020-07-08

## 2020-07-08 ENCOUNTER — OFFICE VISIT (OUTPATIENT)
Dept: FAMILY MEDICINE CLINIC | Facility: CLINIC | Age: 55
End: 2020-07-08
Payer: COMMERCIAL

## 2020-07-08 VITALS
WEIGHT: 212 LBS | SYSTOLIC BLOOD PRESSURE: 80 MMHG | BODY MASS INDEX: 28.71 KG/M2 | DIASTOLIC BLOOD PRESSURE: 50 MMHG | RESPIRATION RATE: 16 BRPM | HEART RATE: 88 BPM | HEIGHT: 72 IN | TEMPERATURE: 96 F | OXYGEN SATURATION: 96 %

## 2020-07-08 DIAGNOSIS — F31.62 BIPOLAR DISORDER, CURRENT EPISODE MIXED, MODERATE (HCC): ICD-10-CM

## 2020-07-08 DIAGNOSIS — I95.1 ORTHOSTATIC HYPOTENSION: Primary | ICD-10-CM

## 2020-07-08 DIAGNOSIS — R42 LIGHTHEADEDNESS: ICD-10-CM

## 2020-07-08 DIAGNOSIS — I25.110 CORONARY ARTERY DISEASE INVOLVING NATIVE CORONARY ARTERY OF NATIVE HEART WITH UNSTABLE ANGINA PECTORIS (HCC): ICD-10-CM

## 2020-07-08 PROCEDURE — 3008F BODY MASS INDEX DOCD: CPT | Performed by: FAMILY MEDICINE

## 2020-07-08 PROCEDURE — 1036F TOBACCO NON-USER: CPT | Performed by: FAMILY MEDICINE

## 2020-07-08 PROCEDURE — 99214 OFFICE O/P EST MOD 30 MIN: CPT | Performed by: FAMILY MEDICINE

## 2020-07-08 RX ORDER — FLUDROCORTISONE ACETATE 0.1 MG/1
0.1 TABLET ORAL DAILY
Qty: 30 TABLET | Refills: 1 | Status: SHIPPED | OUTPATIENT
Start: 2020-07-08 | End: 2020-07-13 | Stop reason: SDUPTHER

## 2020-07-08 NOTE — PATIENT INSTRUCTIONS
Please drink plenty of fluids, you may liberalize salt, consider wearing compression stockings  You can monitor your BP at home to make sure it stays below 140/90  Florinef 0 1mg 1x/d can be started to stabilize your blood pressure with position changes

## 2020-07-08 NOTE — TELEPHONE ENCOUNTER
Pt states that earlier he got dizzy and lightheaded, he had to lean against the wall  He did not fall but felt like he would  Pt states this has happened before  He is feeling okay now however has sat down and drank pedialyte since the incident   Pt coming in to see dr Steve Elizabeth at 3:15  [facility]

## 2020-07-08 NOTE — TELEPHONE ENCOUNTER
Had gastric bypass recently  He is having spells of almost passing out, dizziness  He states he shakes like he is having a seizure  It happened twice today      triage

## 2020-07-08 NOTE — PROGRESS NOTES
Assessment/Plan:    No problem-specific Assessment & Plan notes found for this encounter  Advised increase fluids, liberalize dietary salt, fall precautions    Consider asking his psychiatrist if any dosage reduction of his psych meds could be done that cause/contribute to hypotension but only if mood would remain stable    Start florinef, consider compression stockings, monitor home bp, f/u in future for titration, watch for resting hypertension and edema    CAD and bipolar are stable     Diagnoses and all orders for this visit:    Orthostatic hypotension  -     fludrocortisone (FLORINEF) 0 1 mg tablet; Take 1 tablet (0 1 mg total) by mouth daily    Lightheadedness    Bipolar disorder, current episode mixed, moderate (HCC)    Coronary artery disease involving native coronary artery of native heart with unstable angina pectoris ([facility] [address] )        Return in about 1 week (around 7/15/2020) for Recheck BP with Dr Rasta Mills  Subjective:      Patient ID: Hossein Gates is a 48 y o  male      Chief Complaint   Patient presents with    Dizziness     patient says hes been having dizzy spells omn and off for a long time now   [id number]        HPI  invega injection from Dr Yao Boards  Had today but has had for several months    Dizzy for about 1m  Even before injection  No syncope but presyncope upon standing lasting few seconds    Has CAD  No stents  Cardio is Dr Nkechi Long    S/p GBS  Lost wt  Nitroglycerin and bp meds reduced about 1m    No cp or palp  No sudden wt gain  No leg swelling    No new meds in past month  No supplements or herbals  Takes vitamins post GBS    Drinks plenty of fluids  Urine mostly lightly yellow  Urinates every 2-3hrs    Supine 96/60    Sitting 86/60    Stand 80/60    The following portions of the patient's history were reviewed and updated as appropriate: allergies, current medications, past family history, past medical history, past social history, past surgical history and problem list     Review of Systems   Constitutional: Negative for fever  Respiratory: Negative for shortness of breath  Cardiovascular: Negative for chest pain, palpitations and leg swelling  Neurological: Positive for dizziness  Current Outpatient Medications   Medication Sig Dispense Refill    atorvastatin (LIPITOR) 40 mg tablet Take 1 tablet by mouth once daily 90 tablet 0    butalbital-acetaminophen-caffeine-codeine (FIORICET WITH CODEINE) -55-30 MG per capsule Take 1 capsule by mouth every 4 (four) hours as needed for headaches      clonazePAM (KlonoPIN) 1 mg tablet 1 mg 3 (three) times a day       OXcarbazepine (TRILEPTAL) 600 mg tablet Take 150 mg by mouth 2 (two) times a day       rizatriptan (MAXALT) 10 MG tablet Take 10 mg by mouth once as needed for migraine May repeat in 2 hours if needed      traZODone (DESYREL) 300 MG tablet Take 300 mg by mouth daily at bedtime      acetaminophen (TYLENOL) 325 mg tablet Take 3 tablets (975 mg total) by mouth every 8 (eight) hours (Patient not taking: Reported on 7/8/2020) 30 tablet 0    fludrocortisone (FLORINEF) 0 1 mg tablet Take 1 tablet (0 1 mg total) by mouth daily 30 tablet 1    frovatriptan (FROVA) 2 5 MG tablet Take 2 5 mg by mouth once as needed       meclizine (ANTIVERT) 25 mg tablet Take 1 tablet (25 mg total) by mouth 3 (three) times a day as needed for dizziness (Patient not taking: Reported on 7/8/2020) 30 tablet 1    pantoprazole (PROTONIX) 20 mg tablet Take 1 tablet (20 mg total) by mouth daily (Patient not taking: Reported on 7/8/2020) 30 tablet 2    venlafaxine (EFFEXOR) 75 mg tablet        No current facility-administered medications for this visit  Objective:    BP (!) 80/50   Pulse 88   Temp (!) 96 °F (35 6 °C)   Resp 16   Ht 6' (1 829 m)   Wt 96 2 kg (212 lb)   SpO2 96%   BMI 28 75 kg/m²        Physical Exam   Constitutional: He appears well-developed  No distress  HENT:   Head: Normocephalic     Right Ear: External ear normal    Left Ear: External ear normal    Nose: Nose normal    Mouth/Throat: Oropharynx is clear and moist  No oropharyngeal exudate  Eyes: Conjunctivae are normal  No scleral icterus  Neck: Neck supple  No JVD present  Carotid bruit is not present  Cardiovascular: Normal rate, regular rhythm, normal heart sounds and intact distal pulses  Pulmonary/Chest: Effort normal and breath sounds normal  No respiratory distress  He has no wheezes  He has no rales  Abdominal: Soft  Bowel sounds are normal  He exhibits no distension  There is no tenderness  Musculoskeletal: He exhibits no edema or deformity  Neurological: He is alert  No cranial nerve deficit  He exhibits normal muscle tone  Skin: Skin is warm and dry  No pallor  Psychiatric: His behavior is normal  Thought content normal    Nursing note and vitals reviewed               Maribel Gandara DO

## 2020-07-09 ENCOUNTER — TELEPHONE (OUTPATIENT)
Dept: FAMILY MEDICINE CLINIC | Facility: CLINIC | Age: 55
End: 2020-07-09

## 2020-07-09 NOTE — TELEPHONE ENCOUNTER
Patient was seen yesterday for dizziness, he says he isnt feeling better   I offered him a virtual appt but patient refused  He wants to know if theres anything else he can do for the vertigo  Please advise   Josefa Haywood MA

## 2020-07-09 NOTE — TELEPHONE ENCOUNTER
Pt called stating that he is still feeling very lightheaded and tired  He wants to know if the Florinef can be increased   Pls advise

## 2020-07-10 NOTE — TELEPHONE ENCOUNTER
If he wants, he can be given a note from the day I saw him on 7/8/20 until he is seen by his pcp Dr Antonietta Gaffney on 7/13/20

## 2020-07-10 NOTE — TELEPHONE ENCOUNTER
Patient wants to know if its okay to go to work  He says he went yesterday but left because he was very dizzy  He says hes worried for his safety  He wants to know if he should not go, and if so, he will need a letter   Renee Booker MA

## 2020-07-10 NOTE — TELEPHONE ENCOUNTER
Please let him know he should stay on it for at least one week before decision is made to increase the dose  He has a scheduled f/u appt with Dr Mioses Ponce in about 4 days

## 2020-07-13 ENCOUNTER — OFFICE VISIT (OUTPATIENT)
Dept: FAMILY MEDICINE CLINIC | Facility: CLINIC | Age: 55
End: 2020-07-13
Payer: COMMERCIAL

## 2020-07-13 VITALS
RESPIRATION RATE: 18 BRPM | WEIGHT: 213.2 LBS | SYSTOLIC BLOOD PRESSURE: 102 MMHG | HEART RATE: 108 BPM | BODY MASS INDEX: 28.88 KG/M2 | TEMPERATURE: 96 F | HEIGHT: 72 IN | DIASTOLIC BLOOD PRESSURE: 70 MMHG

## 2020-07-13 DIAGNOSIS — I95.1 ORTHOSTATIC HYPOTENSION: Primary | ICD-10-CM

## 2020-07-13 DIAGNOSIS — R25.1 SHAKES: ICD-10-CM

## 2020-07-13 DIAGNOSIS — R42 LIGHTHEADEDNESS: ICD-10-CM

## 2020-07-13 DIAGNOSIS — F31.62 BIPOLAR DISORDER, CURRENT EPISODE MIXED, MODERATE (HCC): ICD-10-CM

## 2020-07-13 PROCEDURE — 99214 OFFICE O/P EST MOD 30 MIN: CPT | Performed by: FAMILY MEDICINE

## 2020-07-13 PROCEDURE — 3008F BODY MASS INDEX DOCD: CPT | Performed by: FAMILY MEDICINE

## 2020-07-13 PROCEDURE — 1036F TOBACCO NON-USER: CPT | Performed by: FAMILY MEDICINE

## 2020-07-13 RX ORDER — FLUDROCORTISONE ACETATE 0.1 MG/1
0.2 TABLET ORAL DAILY
Qty: 60 TABLET | Refills: 0 | Status: SHIPPED | OUTPATIENT
Start: 2020-07-13 | End: 2020-08-11 | Stop reason: SDUPTHER

## 2020-07-13 RX ORDER — PANTOPRAZOLE SODIUM 20 MG/1
20 TABLET, DELAYED RELEASE ORAL DAILY
COMMUNITY
Start: 2020-07-10 | End: 2020-07-14 | Stop reason: SDUPTHER

## 2020-07-13 NOTE — PROGRESS NOTES
Assessment/Plan:    1  Orthostatic hypotension  -     fludrocortisone (FLORINEF) 0 1 mg tablet; Take 2 tablets (0 2 mg total) by mouth daily  -     Ambulatory referral to Cardiology; Future  -     EEG Routine and awake; Future    2  Shakes  -     EEG Routine and awake; Future    3  Bipolar disorder, current episode mixed, moderate ([address] )    4  Lightheadedness    Pt advised on seeing Psychiatry to see if any of his meds need to be adjusted - pt already had trileptal lowered  Pt advised on seeing cardio  I will increase the florinef to 0 2 a day        There are no Patient Instructions on file for this visit  Return in about 1 week (around 7/20/2020) for Recheck BP and dizziness  Subjective:      Patient ID: Gideon Sewell is a 48 y o  male  Chief Complaint   Patient presents with    Follow-up     bp chk  aline       Pt states he does not feel weel, states he is still getting dizzy  States it happens when he gets up and moves  States yesterday he had a bigger attack, states he gotb up and states he started balcking out  States when he starts to come back he starts convulsing and shaking  Has had some improvement he is not getting as dizzy as often    No edema since starting the florinef      The following portions of the patient's history were reviewed and updated as appropriate: allergies, current medications, past family history, past medical history, past social history, past surgical history and problem list     Review of Systems   Constitutional: Negative for activity change, appetite change, chills, diaphoresis, fatigue, fever and unexpected weight change  HENT: Negative for congestion, dental problem, ear pain, mouth sores, sinus pressure, sinus pain, sore throat and trouble swallowing  Eyes: Negative for photophobia, discharge and itching  Respiratory: Negative for apnea, chest tightness and shortness of breath  Cardiovascular: Negative for chest pain, palpitations and leg swelling  Gastrointestinal: Negative for abdominal distention, abdominal pain, blood in stool, nausea and vomiting  Endocrine: Negative for cold intolerance, heat intolerance, polydipsia, polyphagia and polyuria  Genitourinary: Negative for difficulty urinating  Musculoskeletal: Negative for arthralgias  Skin: Negative for color change and wound  Neurological: Positive for dizziness  Negative for syncope, speech difficulty and headaches  Shakes   Hematological: Negative for adenopathy  Psychiatric/Behavioral: Negative for agitation and behavioral problems           Current Outpatient Medications   Medication Sig Dispense Refill    atorvastatin (LIPITOR) 40 mg tablet Take 1 tablet by mouth once daily 90 tablet 0    butalbital-acetaminophen-caffeine-codeine (FIORICET WITH CODEINE) -88-30 MG per capsule Take 1 capsule by mouth every 4 (four) hours as needed for headaches      clonazePAM (KlonoPIN) 1 mg tablet 1 mg 3 (three) times a day       fludrocortisone (FLORINEF) 0 1 mg tablet Take 2 tablets (0 2 mg total) by mouth daily 60 tablet 0    frovatriptan (FROVA) 2 5 MG tablet Take 2 5 mg by mouth once as needed       OXcarbazepine (TRILEPTAL) 600 mg tablet Take 150 mg by mouth 2 (two) times a day       pantoprazole (PROTONIX) 20 mg tablet Take 20 mg by mouth daily      rizatriptan (MAXALT) 10 MG tablet Take 10 mg by mouth once as needed for migraine May repeat in 2 hours if needed      traZODone (DESYREL) 300 MG tablet Take 300 mg by mouth daily at bedtime      venlafaxine (EFFEXOR) 75 mg tablet       acetaminophen (TYLENOL) 325 mg tablet Take 3 tablets (975 mg total) by mouth every 8 (eight) hours (Patient not taking: Reported on 7/8/2020) 30 tablet 0    meclizine (ANTIVERT) 25 mg tablet Take 1 tablet (25 mg total) by mouth 3 (three) times a day as needed for dizziness (Patient not taking: Reported on 7/8/2020) 30 tablet 1    pantoprazole (PROTONIX) 20 mg tablet Take 1 tablet (20 mg total) by mouth daily (Patient not taking: Reported on 7/8/2020) 30 tablet 2     No current facility-administered medications for this visit  Objective:    /70   Pulse (!) 108   Temp (!) 96 °F (35 6 °C)   Resp 18   Ht 6' (1 829 m)   Wt 96 7 kg (213 lb 3 2 oz)   BMI 28 92 kg/m²        Physical Exam   Constitutional: He appears well-developed and well-nourished  No distress  HENT:   Head: Normocephalic and atraumatic  Right Ear: External ear normal    Left Ear: External ear normal    Nose: Nose normal    Mouth/Throat: Oropharynx is clear and moist  No oropharyngeal exudate  Eyes: Pupils are equal, round, and reactive to light  EOM are normal  Right eye exhibits no discharge  Left eye exhibits no discharge  No scleral icterus  Neck: No thyromegaly present  Cardiovascular: Normal rate and normal heart sounds  No murmur heard  Pulmonary/Chest: Effort normal and breath sounds normal  No respiratory distress  He has no wheezes  Abdominal: Soft  Bowel sounds are normal  He exhibits no distension and no mass  There is no tenderness  There is no rebound and no guarding  Musculoskeletal: Normal range of motion  Neurological: He is alert  He displays normal reflexes  Coordination normal    No dizziness with lying flat and turing his head one way or another   Skin: Skin is warm and dry  No rash noted  He is not diaphoretic  No erythema  Psychiatric: He has a normal mood and affect  His behavior is normal    Nursing note and vitals reviewed               Neha Cuevas DO

## 2020-07-14 ENCOUNTER — OFFICE VISIT (OUTPATIENT)
Dept: CARDIOLOGY CLINIC | Facility: CLINIC | Age: 55
End: 2020-07-14
Payer: COMMERCIAL

## 2020-07-14 VITALS
HEIGHT: 72 IN | OXYGEN SATURATION: 98 % | TEMPERATURE: 96.9 F | BODY MASS INDEX: 28.77 KG/M2 | WEIGHT: 212.4 LBS | SYSTOLIC BLOOD PRESSURE: 88 MMHG | DIASTOLIC BLOOD PRESSURE: 56 MMHG | HEART RATE: 93 BPM

## 2020-07-14 DIAGNOSIS — R07.89 ATYPICAL CHEST PAIN: ICD-10-CM

## 2020-07-14 DIAGNOSIS — R00.2 PALPITATIONS: ICD-10-CM

## 2020-07-14 DIAGNOSIS — I95.1 ORTHOSTATIC HYPOTENSION: Primary | ICD-10-CM

## 2020-07-14 PROCEDURE — 1036F TOBACCO NON-USER: CPT | Performed by: NURSE PRACTITIONER

## 2020-07-14 PROCEDURE — 3008F BODY MASS INDEX DOCD: CPT | Performed by: NURSE PRACTITIONER

## 2020-07-14 PROCEDURE — 99214 OFFICE O/P EST MOD 30 MIN: CPT | Performed by: NURSE PRACTITIONER

## 2020-07-14 PROCEDURE — 93000 ELECTROCARDIOGRAM COMPLETE: CPT | Performed by: NURSE PRACTITIONER

## 2020-07-14 NOTE — PROGRESS NOTES
Cardiology Follow Up    Lucy Lackey  1965  [id number]  [facility] [place] [address] [id number] [address]  [phone]    1  Orthostatic hypotension  Ambulatory referral to Cardiology       Interval History:  Mr Jose Luis Mckee was seen by DR Moises Ponce on 7/13/20 with dizziness when standing  He was found to continued orthostatic hypotension  Florinef was increased to 0 1mg daily to 0 2mg daily  His PCP has advised Mr Mihai Dove to follow up with Psychiatry for possible medication adjustment to improve his orthostatic hypotension  Mr Jose Luis Mckee presents to our office due to orthostatic hypotension  He is not on cardiac medications  Mr Edward Hernández admits to lightheadedness and dizziness with standing  He admits to pre syncopal symptoms without syncope  He is eating well and drinking up to 64 oz of fluid daily  Mr Edward Hernández admits to fluttering in his chest every other day  He admits to left sided upper chest tightness with rest, 5/10, lasting 5 minutes, non radiating, occasionally associated with SOB  According to Mr Edward Hernández his hypotension started in May  He underwent Gastric Bypass in March  He is not using his CPAP after losing weight  He continues to work as an   HPI:  Chest pain  ROSI  Orthostatic hypotension   6/21/19 LVEF 60%, no Regional wall motion abnormality    Wall thickness mildly increased, grade 1 diastolic dysfunction, no  Significant valvular disease  Patient Active Problem List   Diagnosis    NSTEMI (non-ST elevated myocardial infarction) ([facility] [address] )    Bipolar disorder, current episode mixed, moderate (HCC)    ROSI (obstructive sleep apnea)    Coronary artery disease involving native coronary artery of native heart with unstable angina pectoris ([facility] [address] )    Former smoker    Migraine with aura and without status migrainosus, not intractable    Benign paroxysmal positional vertigo due to bilateral vestibular disorder    Hypercholesterolemia    Bariatric surgery status    Primary insomnia    Overweight    Encounter for surgical aftercare following surgery of digestive system    Postsurgical malabsorption    Lightheadedness    Pre-diabetes    Tobacco use    Orthostatic hypotension     Past Medical History:   Diagnosis Date    Bariatric surgery status     CPAP (continuous positive airway pressure) dependence     Hearing loss of aging     Hypercholesterolemia     Morbid obesity (HCC)     NSTEMI (non-ST elevation myocardial infarction) [facility])     2019    Postsurgical malabsorption      Social History     Socioeconomic History    Marital status: /Civil Union     Spouse name: Not on file    Number of children: Not on file    Years of education: Not on file    Highest education level: Not on file   Occupational History    Not on file   Social Needs    Financial resource strain: Not on file    Food insecurity:     Worry: Not on file     Inability: Not on file    Transportation needs:     Medical: Not on file     Non-medical: Not on file   Tobacco Use    Smoking status: Former Smoker     Types: Cigarettes     Last attempt to quit:      Years since quittin 5    Smokeless tobacco: Former User     Quit date: 2014    Tobacco comment: quit cigarettes    Substance and Sexual Activity    Alcohol use: Not Currently     Frequency: Never    Drug use: Never    Sexual activity: Not on file   Lifestyle    Physical activity:     Days per week: Not on file     Minutes per session: Not on file    Stress: Not on file   Relationships    Social connections:     Talks on phone: Not on file     Gets together: Not on file     Attends [employer] service: Not on file     Active member of club or organization: Not on file     Attends meetings of clubs or organizations: Not on file     Relationship status: Not on file    Intimate partner violence:     Fear of current or ex partner: Not on file     Emotionally abused: Not on file     Physically abused: Not on file     Forced sexual activity: Not on file   Other Topics Concern    Not on file   Social History Narrative    Former smoker - As per [employer]    Full-time employment        · Do you currently or have you served in the [employer]:   No    As per [employer]       Family History   Problem Relation Age of Onset    Lung cancer Mother     Brain cancer Mother     Diabetes Brother     Bipolar disorder Maternal Grandfather      Past Surgical History:   Procedure Laterality Date    CARDIAC CATHETERIZATION      no stents     COLONOSCOPY      EGD      HERNIA REPAIR      umbilical hernia    PA LAP GASTRIC BYPASS/FRITZ-EN-Y N/A 3/2/2020    Procedure: LAPAROSCOPIC FRITZ-EN-Y GASTRIC BYPASS AND INTRAOPERATIVE EGD;  Surgeon: Carlos Suh MD;  Location: [facility];  Service: Bariatrics    SKIN SURGERY      cyst removed from back and thumb    TONSILLECTOMY      TOOTH EXTRACTION         Current Outpatient Medications:     atorvastatin (LIPITOR) 40 mg tablet, Take 1 tablet by mouth once daily, Disp: 90 tablet, Rfl: 0    clonazePAM (KlonoPIN) 1 mg tablet, as needed , Disp: , Rfl:     fludrocortisone (FLORINEF) 0 1 mg tablet, Take 2 tablets (0 2 mg total) by mouth daily, Disp: 60 tablet, Rfl: 0    OXcarbazepine (TRILEPTAL) 600 mg tablet, Take 150 mg by mouth 2 (two) times a day , Disp: , Rfl:     traZODone (DESYREL) 300 MG tablet, Take 300 mg by mouth daily at bedtime, Disp: , Rfl:     venlafaxine (EFFEXOR) 75 mg tablet, 75 mg daily , Disp: , Rfl:     acetaminophen (TYLENOL) 325 mg tablet, Take 3 tablets (975 mg total) by mouth every 8 (eight) hours (Patient not taking: Reported on 7/8/2020), Disp: 30 tablet, Rfl: 0    butalbital-acetaminophen-caffeine-codeine (FIORICET WITH CODEINE) -17-30 MG per capsule, Take 1 capsule by mouth every 4 (four) hours as needed for headaches, Disp: , Rfl:     frovatriptan (FROVA) 2 5 MG tablet, Take 2 5 mg by mouth once as needed , Disp: , Rfl:     meclizine (ANTIVERT) 25 mg tablet, Take 1 tablet (25 mg total) by mouth 3 (three) times a day as needed for dizziness (Patient not taking: Reported on 7/8/2020), Disp: 30 tablet, Rfl: 1    pantoprazole (PROTONIX) 20 mg tablet, Take 1 tablet (20 mg total) by mouth daily (Patient not taking: Reported on 7/8/2020), Disp: 30 tablet, Rfl: 2    rizatriptan (MAXALT) 10 MG tablet, Take 10 mg by mouth once as needed for migraine May repeat in 2 hours if needed, Disp: , Rfl:   No Known Allergies    Labs:  Appointment on 06/22/2020   Component Date Value    HIV-1/HIV-2 Ab 06/22/2020 Non-Reactive     WBC 06/22/2020 8 54     RBC 06/22/2020 4 70     Hemoglobin 06/22/2020 14 2     Hematocrit 06/22/2020 43 6     MCV 06/22/2020 93     MCH 06/22/2020 30 2     MCHC 06/22/2020 32 6     RDW 06/22/2020 13 2     Platelets 50/16/5599 229     MPV 06/22/2020 11 1     Sodium 06/22/2020 143     Potassium 06/22/2020 3 8     Chloride 06/22/2020 111*    CO2 06/22/2020 28     ANION GAP 06/22/2020 4     BUN 06/22/2020 7     Creatinine 06/22/2020 0 66     Glucose, Fasting 06/22/2020 87     Calcium 06/22/2020 9 0     AST 06/22/2020 17     ALT 06/22/2020 39     Alkaline Phosphatase 06/22/2020 91     Total Protein 06/22/2020 6 1*    Albumin 06/22/2020 3 3*    Total Bilirubin 06/22/2020 0 56     eGFR 06/22/2020 110     Cholesterol 06/22/2020 79     Triglycerides 06/22/2020 52     HDL, Direct 06/22/2020 34*    LDL Calculated 06/22/2020 35     Ferritin 06/22/2020 201     Folate 06/22/2020 15 2     Hemoglobin A1C 06/22/2020 5 5     EAG 06/22/2020 111     PTH 06/22/2020 32 6     Vitamin A 06/22/2020 32 0     Vitamin B1, Whole Blood 06/22/2020 123 2     Vitamin B-12 06/22/2020 533     Vit D, 25-Hydroxy 06/22/2020 39 7     Zinc 06/22/2020 89     Iron Saturation 06/22/2020 44     TIBC 06/22/2020 240*    Iron 06/22/2020 105      Imaging: No results found  Review of Systems:  Review of Systems   Cardiovascular: Positive for chest pain and palpitations  Negative for leg swelling  Neurological: Positive for dizziness and light-headedness  All other systems reviewed and are negative  Physical Exam:  Physical Exam   Constitutional: He is oriented to person, place, and time  He appears well-developed  HENT:   Head: Normocephalic  Eyes: Pupils are equal, round, and reactive to light  Neck: Normal range of motion  Neck supple  Cardiovascular: Normal rate, regular rhythm and normal heart sounds  Pulmonary/Chest: Effort normal and breath sounds normal    Abdominal: Soft  Bowel sounds are normal    Musculoskeletal: Normal range of motion  He exhibits no edema  Neurological: He is alert and oriented to person, place, and time  Skin: Skin is warm and dry  Capillary refill takes less than 2 seconds  Psychiatric: He has a normal mood and affect  Vitals reviewed  Discussion/Summary:  1  Orthostatic Hypotension RUE sitting 80/54, standing 70/50, he is not on cardiac medications  Florinef was increased yesterday to 0 2mg daily  Mr Aditya [employer] is drinking at least 64 oz of fluid daily, Wear compression stockings daily and remove at night  2 D echocardiogram evaluate wall function and valvular function, evaluate for possible amyloidosis contributing to hypotension  2  Palpitations- occurring daily to every other day, 48 hour Holter monitor evaluate Rate and rhythm  3  Chest pain- appears atypical, occurring left upper chest area with rest   2 D echocardiogram evaluate wall function

## 2020-07-14 NOTE — PATIENT INSTRUCTIONS
Keep Hydrated  Wear compression stockings daily, remove at bedtime  48 Hour Holter monitor  2 D echcoardiogram

## 2020-07-15 ENCOUNTER — APPOINTMENT (EMERGENCY)
Dept: RADIOLOGY | Facility: HOSPITAL | Age: 55
DRG: 312 | End: 2020-07-15
Payer: COMMERCIAL

## 2020-07-15 ENCOUNTER — HOSPITAL ENCOUNTER (INPATIENT)
Facility: HOSPITAL | Age: 55
LOS: 1 days | Discharge: HOME | DRG: 312 | End: 2020-07-16
Attending: EMERGENCY MEDICINE | Admitting: INTERNAL MEDICINE
Payer: COMMERCIAL

## 2020-07-15 ENCOUNTER — TELEPHONE (OUTPATIENT)
Dept: BARIATRICS | Facility: CLINIC | Age: 55
End: 2020-07-15

## 2020-07-15 DIAGNOSIS — I25.2 HX OF NON-ST ELEVATION MYOCARDIAL INFARCTION (NSTEMI): ICD-10-CM

## 2020-07-15 DIAGNOSIS — I95.1 ORTHOSTATIC HYPOTENSION: ICD-10-CM

## 2020-07-15 DIAGNOSIS — I95.9 HYPOTENSION: Primary | ICD-10-CM

## 2020-07-15 DIAGNOSIS — Z98.84 HX OF BARIATRIC SURGERY: ICD-10-CM

## 2020-07-15 DIAGNOSIS — I21.4 NSTEMI (NON-ST ELEVATED MYOCARDIAL INFARCTION) (HCC): ICD-10-CM

## 2020-07-15 PROBLEM — G25.3 MYOCLONIC JERKING: Status: ACTIVE | Noted: 2020-07-15

## 2020-07-15 PROBLEM — R55 SYNCOPE: Status: ACTIVE | Noted: 2020-07-15

## 2020-07-15 LAB
ANION GAP SERPL CALCULATED.3IONS-SCNC: 9 MMOL/L (ref 4–13)
BASOPHILS # BLD AUTO: 0.05 THOUSANDS/ΜL (ref 0–0.1)
BASOPHILS NFR BLD AUTO: 1 % (ref 0–1)
BUN SERPL-MCNC: 8 MG/DL (ref 5–25)
CALCIUM SERPL-MCNC: 9 MG/DL (ref 8.3–10.1)
CHLORIDE SERPL-SCNC: 103 MMOL/L (ref 100–108)
CO2 SERPL-SCNC: 24 MMOL/L (ref 21–32)
CREAT SERPL-MCNC: 0.76 MG/DL (ref 0.6–1.3)
EOSINOPHIL # BLD AUTO: 0.12 THOUSAND/ΜL (ref 0–0.61)
EOSINOPHIL NFR BLD AUTO: 1 % (ref 0–6)
ERYTHROCYTE [DISTWIDTH] IN BLOOD BY AUTOMATED COUNT: 12.9 % (ref 11.6–15.1)
GFR SERPL CREATININE-BSD FRML MDRD: 103 ML/MIN/1.73SQ M
GLUCOSE SERPL-MCNC: 134 MG/DL (ref 65–140)
HCT VFR BLD AUTO: 38.7 % (ref 36.5–49.3)
HGB BLD-MCNC: 13 G/DL (ref 12–17)
IMM GRANULOCYTES # BLD AUTO: 0.03 THOUSAND/UL (ref 0–0.2)
IMM GRANULOCYTES NFR BLD AUTO: 0 % (ref 0–2)
LYMPHOCYTES # BLD AUTO: 1.47 THOUSANDS/ΜL (ref 0.6–4.47)
LYMPHOCYTES NFR BLD AUTO: 16 % (ref 14–44)
MCH RBC QN AUTO: 30.6 PG (ref 26.8–34.3)
MCHC RBC AUTO-ENTMCNC: 33.6 G/DL (ref 31.4–37.4)
MCV RBC AUTO: 91 FL (ref 82–98)
MONOCYTES # BLD AUTO: 0.68 THOUSAND/ΜL (ref 0.17–1.22)
MONOCYTES NFR BLD AUTO: 7 % (ref 4–12)
NEUTROPHILS # BLD AUTO: 7.15 THOUSANDS/ΜL (ref 1.85–7.62)
NEUTS SEG NFR BLD AUTO: 75 % (ref 43–75)
NRBC BLD AUTO-RTO: 0 /100 WBCS
PLATELET # BLD AUTO: 203 THOUSANDS/UL (ref 149–390)
PMV BLD AUTO: 10.8 FL (ref 8.9–12.7)
POTASSIUM SERPL-SCNC: 3.7 MMOL/L (ref 3.5–5.3)
RBC # BLD AUTO: 4.25 MILLION/UL (ref 3.88–5.62)
SARS-COV-2 RNA RESP QL NAA+PROBE: NEGATIVE
SODIUM SERPL-SCNC: 136 MMOL/L (ref 136–145)
TROPONIN I SERPL-MCNC: 0.05 NG/ML
TROPONIN I SERPL-MCNC: 0.06 NG/ML
WBC # BLD AUTO: 9.5 THOUSAND/UL (ref 4.31–10.16)

## 2020-07-15 PROCEDURE — 80048 BASIC METABOLIC PNL TOTAL CA: CPT | Performed by: EMERGENCY MEDICINE

## 2020-07-15 PROCEDURE — 99285 EMERGENCY DEPT VISIT HI MDM: CPT | Performed by: EMERGENCY MEDICINE

## 2020-07-15 PROCEDURE — 71045 X-RAY EXAM CHEST 1 VIEW: CPT

## 2020-07-15 PROCEDURE — 85025 COMPLETE CBC W/AUTO DIFF WBC: CPT | Performed by: EMERGENCY MEDICINE

## 2020-07-15 PROCEDURE — 99285 EMERGENCY DEPT VISIT HI MDM: CPT

## 2020-07-15 PROCEDURE — 84484 ASSAY OF TROPONIN QUANT: CPT | Performed by: EMERGENCY MEDICINE

## 2020-07-15 PROCEDURE — 93005 ELECTROCARDIOGRAM TRACING: CPT

## 2020-07-15 PROCEDURE — 99223 1ST HOSP IP/OBS HIGH 75: CPT | Performed by: INTERNAL MEDICINE

## 2020-07-15 PROCEDURE — 36415 COLL VENOUS BLD VENIPUNCTURE: CPT | Performed by: EMERGENCY MEDICINE

## 2020-07-15 PROCEDURE — U0003 INFECTIOUS AGENT DETECTION BY NUCLEIC ACID (DNA OR RNA); SEVERE ACUTE RESPIRATORY SYNDROME CORONAVIRUS 2 (SARS-COV-2) (CORONAVIRUS DISEASE [COVID-19]), AMPLIFIED PROBE TECHNIQUE, MAKING USE OF HIGH THROUGHPUT TECHNOLOGIES AS DESCRIBED BY CMS-2020-01-R: HCPCS | Performed by: EMERGENCY MEDICINE

## 2020-07-15 RX ORDER — SUMATRIPTAN 25 MG/1
25 TABLET, FILM COATED ORAL ONCE AS NEEDED
Status: DISCONTINUED | OUTPATIENT
Start: 2020-07-15 | End: 2020-07-16 | Stop reason: HOSPADM

## 2020-07-15 RX ORDER — MECLIZINE HYDROCHLORIDE 25 MG/1
25 TABLET ORAL 3 TIMES DAILY PRN
Status: DISCONTINUED | OUTPATIENT
Start: 2020-07-15 | End: 2020-07-16 | Stop reason: HOSPADM

## 2020-07-15 RX ORDER — SENNOSIDES 8.6 MG
1 TABLET ORAL DAILY
Status: DISCONTINUED | OUTPATIENT
Start: 2020-07-16 | End: 2020-07-16 | Stop reason: HOSPADM

## 2020-07-15 RX ORDER — OXCARBAZEPINE 150 MG/1
150 TABLET, FILM COATED ORAL 2 TIMES DAILY
Status: DISCONTINUED | OUTPATIENT
Start: 2020-07-15 | End: 2020-07-16 | Stop reason: HOSPADM

## 2020-07-15 RX ORDER — ACETAMINOPHEN 325 MG/1
650 TABLET ORAL EVERY 6 HOURS PRN
Status: DISCONTINUED | OUTPATIENT
Start: 2020-07-15 | End: 2020-07-16

## 2020-07-15 RX ORDER — VENLAFAXINE 37.5 MG/1
75 TABLET ORAL 2 TIMES DAILY WITH MEALS
Status: DISCONTINUED | OUTPATIENT
Start: 2020-07-15 | End: 2020-07-16 | Stop reason: HOSPADM

## 2020-07-15 RX ORDER — MAGNESIUM HYDROXIDE/ALUMINUM HYDROXICE/SIMETHICONE 120; 1200; 1200 MG/30ML; MG/30ML; MG/30ML
30 SUSPENSION ORAL EVERY 6 HOURS PRN
Status: DISCONTINUED | OUTPATIENT
Start: 2020-07-15 | End: 2020-07-16 | Stop reason: HOSPADM

## 2020-07-15 RX ORDER — PANTOPRAZOLE SODIUM 20 MG/1
20 TABLET, DELAYED RELEASE ORAL
Status: DISCONTINUED | OUTPATIENT
Start: 2020-07-16 | End: 2020-07-16 | Stop reason: HOSPADM

## 2020-07-15 RX ORDER — MIDODRINE HYDROCHLORIDE 2.5 MG/1
2.5 TABLET ORAL
Status: DISCONTINUED | OUTPATIENT
Start: 2020-07-15 | End: 2020-07-16 | Stop reason: HOSPADM

## 2020-07-15 RX ORDER — CALCIUM CARBONATE 200(500)MG
1000 TABLET,CHEWABLE ORAL DAILY PRN
Status: DISCONTINUED | OUTPATIENT
Start: 2020-07-15 | End: 2020-07-16 | Stop reason: HOSPADM

## 2020-07-15 RX ORDER — TRAZODONE HYDROCHLORIDE 100 MG/1
300 TABLET ORAL
Status: DISCONTINUED | OUTPATIENT
Start: 2020-07-15 | End: 2020-07-16 | Stop reason: HOSPADM

## 2020-07-15 RX ORDER — NICOTINE 21 MG/24HR
14 PATCH, TRANSDERMAL 24 HOURS TRANSDERMAL DAILY
Status: DISCONTINUED | OUTPATIENT
Start: 2020-07-15 | End: 2020-07-16 | Stop reason: HOSPADM

## 2020-07-15 RX ORDER — CLONAZEPAM 0.5 MG/1
0.25 TABLET ORAL DAILY PRN
Status: DISCONTINUED | OUTPATIENT
Start: 2020-07-16 | End: 2020-07-16 | Stop reason: HOSPADM

## 2020-07-15 RX ORDER — ONDANSETRON 2 MG/ML
4 INJECTION INTRAMUSCULAR; INTRAVENOUS EVERY 6 HOURS PRN
Status: DISCONTINUED | OUTPATIENT
Start: 2020-07-15 | End: 2020-07-16 | Stop reason: HOSPADM

## 2020-07-15 RX ORDER — CLONAZEPAM 0.5 MG/1
0.25 TABLET ORAL DAILY
Status: DISCONTINUED | OUTPATIENT
Start: 2020-07-16 | End: 2020-07-15

## 2020-07-15 RX ORDER — ACETAMINOPHEN 325 MG/1
975 TABLET ORAL EVERY 8 HOURS
Status: DISCONTINUED | OUTPATIENT
Start: 2020-07-15 | End: 2020-07-16

## 2020-07-15 RX ORDER — ATORVASTATIN CALCIUM 40 MG/1
40 TABLET, FILM COATED ORAL DAILY
Status: DISCONTINUED | OUTPATIENT
Start: 2020-07-16 | End: 2020-07-16 | Stop reason: HOSPADM

## 2020-07-15 RX ORDER — DOCUSATE SODIUM 100 MG/1
100 CAPSULE, LIQUID FILLED ORAL 2 TIMES DAILY
Status: DISCONTINUED | OUTPATIENT
Start: 2020-07-15 | End: 2020-07-16 | Stop reason: HOSPADM

## 2020-07-15 RX ORDER — FLUDROCORTISONE ACETATE 0.1 MG/1
0.2 TABLET ORAL DAILY
Status: DISCONTINUED | OUTPATIENT
Start: 2020-07-16 | End: 2020-07-16 | Stop reason: HOSPADM

## 2020-07-15 RX ADMIN — NICOTINE 14 MG: 14 PATCH TRANSDERMAL at 21:23

## 2020-07-15 RX ADMIN — VENLAFAXINE 75 MG: 37.5 TABLET ORAL at 18:07

## 2020-07-15 RX ADMIN — CARBIDOPA AND LEVODOPA 2.5 MG: 50; 200 TABLET, EXTENDED RELEASE ORAL at 18:07

## 2020-07-15 RX ADMIN — OXCARBAZEPINE 150 MG: 150 TABLET, FILM COATED ORAL at 18:07

## 2020-07-15 RX ADMIN — TRAZODONE HYDROCHLORIDE 300 MG: 100 TABLET ORAL at 21:16

## 2020-07-15 NOTE — ASSESSMENT & PLAN NOTE
Patient has bariatric surgery, since May he has Orthostatic hypotension  He has multiple occasion syncopal episodes with OI  He was placed on fludrocortisone without help  Would need ECHO  Need to have EEG look for myoclonic spells  Start midodrine and continue fludrocortisone pills  Monitor closely  PT/OT recommendations for orthostatic hypotension

## 2020-07-15 NOTE — ASSESSMENT & PLAN NOTE
Patient has orthostatic hypotension for multiple reason possible  Patient has bariatric surgery and would need to see if he has post prandial hypotension vs  Patient might have reflex syncope, impaired sympathetic efferent activity, medication effect and/or volume effect  Other things to look for is myocarditis, valve abnormalities and possible amyloid  Risk of bariatric surgery  Patient has been drinking enough fluids  His PMD already started him on fludrocortisone low dose and was increase and monitor  Would start low dose midodrine for now and see if this helps  Need to consult cardiology to see if this is related to cardiac, metoprolol is on hold  Close monitoring of orthostatics and electrolyte imbalance  Educated patient he should get up slowly from lying down to sitting position, sitting down to standing up let his ulises receptors adjust  He should have help or have a stick handy,  before sudden ambulation avoid    Telemetry monitoring for arrhythmias

## 2020-07-15 NOTE — ASSESSMENT & PLAN NOTE
Status post elective laparoscopic Mraii-en-Y gastric bypass and intraoperative EGD  Postoperative day # 0  Continue management per primary team, bariatrics  Counseled on need to avoid NSAIDs

## 2020-07-15 NOTE — ED ATTENDING ATTESTATION
7/15/2020  I, Idamae Spatz, MD, saw and evaluated the patient  I have discussed the patient with the resident/non-physician practitioner and agree with the resident's/non-physician practitioner's findings, Plan of Care, and MDM as documented in the resident's/non-physician practitioner's note, except where noted  All available labs and Radiology studies were reviewed  I was present for key portions of any procedure(s) performed by the resident/non-physician practitioner and I was immediately available to provide assistance  At this point I agree with the current assessment done in the Emergency Department  I have conducted an independent evaluation of this patient a history and physical is as follows:  Pt for last 3 - 4 weeks has had near syncopal episode  Pt has noted to have low blood pressure at time yesterday  Assocoiated staring spell  Pt had been taken off blood pressure meds Pt was seen by cardiology yesterday They felt due to gastric bypass surgery Pt has lost 100 pounds since march  Pt called Gi today and they sent him to Ed Pt is on florinef and still with symptoms   PE: alert nad heart reg lungs clear abd soft nontender ext nad neuro nonfocal MDM: will eval admit obs  ED Course         Critical Care Time  Procedures

## 2020-07-15 NOTE — TELEPHONE ENCOUNTER
Pt called stating that his blood pressure is 80/50, this was taken at the cardiologist yesterday  He was told to call us by his pcp who he saw on Monday for low blood pressure and dizziness  They told him that they felt it was related to his surgery he had in April  I spoke with frank and she recommended that he go to the er asap and to be driven by someone due to his low blood pressure   This was relayed to the patient and he agreed~cd

## 2020-07-15 NOTE — ASSESSMENT & PLAN NOTE
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

## 2020-07-15 NOTE — LETTER
[address]la [address]  [address] [address]  Dept: [phone]    July 16, 2020     Patient: Ena Delgadillo   YOB: 1965   Date of Visit: 7/15/2020       To Whom it May Concern:    Donna Hartman is under my professional care  He was seen in the hospital from 7/15/2020   to 07/17/20  He may return to work on After seen by his PCP scheduled 7/20/20  without limitations  If you have any questions or concerns, please don't hesitate to call           Sincerely,          Roderick Yi MD

## 2020-07-15 NOTE — ED PROVIDER NOTES
Final Diagnosis:  1  Hypotension    2  Hx of bariatric surgery    3  Hx of non-ST elevation myocardial infarction (NSTEMI)    4  NSTEMI (non-ST elevated myocardial infarction) ([address] )    5  Orthostatic hypotension      ED Course as of Jul 17 1023   Wed Jul 15, 2020   1103 Hemoglobin: 13 0   1105 Stable to baseline a year ago   Troponin I(!): 0 06     Chief Complaint   Patient presents with    Hypotension     pt sent from pcp and cardiology for hypotension  pt reports feeling dizzy and weak  intermittent CP      Procedure Note: EKG  Date/Time: 07/15/2020 1300  Interpreted by: Jay Mcgraw  Indications / Diagnosis: CP  ECG reviewed by me, the ED Provider: yes   The EKG demonstrates:  Rhythm: normal sinus with occasional PVCs; maybe a BBB  Intervals: normal intervals  Axis: normal axis  QRS/Blocks: normal QRS  ST Changes: No acute ST Changes, no STD/ALIDA  ASSESSMENT + PLAN:   - Nursing note reviewed  1  Near syncope 2/2 Hypotension episodes   -unclear etiology at this time  Patient being worked up by Cardiology and sent for echocardiogram and Holter monitor   -cardiology also believes this may be caused from recent gastric bypass surgery  Patient has lost about 100 lb since March   -patient is on Florinef 0 2 mg  -will evaluate the patient with cardiac workup  -normal size aorta on point of care ultrasound   -BP trending down  Still having symptomatic dizziness/near syncope  -Will admit for further workup        Final Dispo   Patient was reassessed  Patient was updated with information regarding the tests/imaging done today  I answered all of the patient's and/or family's questions  Patient and/or family vocalizes understanding  After discussion and given the test results, the patient will be admitted to the hospital  Patient and/or family are agreeable to the plan   They will be admitted to [address] [place] [address] who will further manage their care in the hospital       Medications - No data to display  Time reflects when diagnosis was documented in both MDM as applicable and the Disposition within this note     Time User Action Codes Description Comment    7/15/2020 12:09 PM Paterson Simon Add [I95 9] Hypotension     7/15/2020 12:10 PM Maury Simon Add [Z98 84] Hx of bariatric surgery     7/15/2020 12:10 PM Itz Cotto Add [I25 2] Hx of non-ST elevation myocardial infarction (NSTEMI)     7/15/2020  5:00 PM Daniel Cr Add [I21 4] NSTEMI (non-ST elevated myocardial infarction) ([facility] [address] )     7/16/2020  4:53 PM Dave Paez Add [I95 1] Orthostatic hypotension       ED Disposition     ED Disposition Condition Date/Time Comment    Admit Stable Wed Jul 15, 2020 12:09 PM Case was discussed with FARIBA and the patient's admission status was agreed to be Admission Status: inpatient status to the service of Dr Luis Angel Fonseca           Follow-up Information     Follow up With Specialties Details Why Contact Info    Monica Kathleen DO Family Medicine, Wound Care Schedule an appointment as soon as possible for a visit in 3 day(s)  [address] [place] [address] [facility]      Gable Leventhal, MD Cardiology Schedule an appointment as soon as possible for a visit in 5 day(s)  [place] [address] [address] [facility]  [phone]          Discharge Medication List as of 7/16/2020  5:30 PM      START taking these medications    Details   midodrine (PROAMATINE) 2 5 mg tablet Take 1 tablet (2 5 mg total) by mouth 3 (three) times a day before meals, Starting Thu 7/16/2020, Normal         CONTINUE these medications which have NOT CHANGED    Details   paliperidone palmitate ER (Invega Sustenna) 234 mg/1 5 mL IM injection Inject 234 mg into a muscle every 30 (thirty) days, Historical Med      acetaminophen (TYLENOL) 325 mg tablet Take 3 tablets (975 mg total) by mouth every 8 (eight) hours, Starting Tue 3/3/2020, Normal      atorvastatin (LIPITOR) 40 mg tablet Take 1 tablet by mouth once daily, Normal      clonazePAM (KlonoPIN) 1 mg tablet as needed , Historical Med      fludrocortisone (FLORINEF) 0 1 mg tablet Take 2 tablets (0 2 mg total) by mouth daily, Starting Mon 7/13/2020, Normal      OXcarbazepine (TRILEPTAL) 150 mg tablet Take 150 mg by mouth 2 (two) times a day , Historical Med      pantoprazole (PROTONIX) 20 mg tablet Take 1 tablet (20 mg total) by mouth daily, Starting Wed 3/11/2020, Until Tue 6/16/2020, Phone In      traZODone (DESYREL) 300 MG tablet Take 300 mg by mouth daily at bedtime, Historical Med      venlafaxine (EFFEXOR) 75 mg tablet 75 mg daily , Starting Tue 3/3/2020, Historical Med         STOP taking these medications       frovatriptan (FROVA) 2 5 MG tablet Comments:   Reason for Stopping:         meclizine (ANTIVERT) 25 mg tablet Comments:   Reason for Stopping:             Outpatient Discharge Orders   Discharge Diet     Activity as tolerated     Call provider for:  persistent dizziness or light-headedness     Prior to Admission Medications   Prescriptions Last Dose Informant Patient Reported? Taking?    OXcarbazepine (TRILEPTAL) 150 mg tablet  Self Yes No   Sig: Take 150 mg by mouth 2 (two) times a day    acetaminophen (TYLENOL) 325 mg tablet Not Taking at Unknown time Self No No   Sig: Take 3 tablets (975 mg total) by mouth every 8 (eight) hours   atorvastatin (LIPITOR) 40 mg tablet  Self No No   Sig: Take 1 tablet by mouth once daily   clonazePAM (KlonoPIN) 1 mg tablet  Self Yes No   Sig: as needed    fludrocortisone (FLORINEF) 0 1 mg tablet  Self No No   Sig: Take 2 tablets (0 2 mg total) by mouth daily   paliperidone palmitate ER (Invega Sustenna) 234 mg/1 5 mL IM injection  Self Yes Yes   Sig: Inject 234 mg into a muscle every 30 (thirty) days   pantoprazole (PROTONIX) 20 mg tablet   No No   Sig: Take 1 tablet (20 mg total) by mouth daily   traZODone (DESYREL) 300 MG tablet  Self Yes No   Sig: Take 300 mg by mouth daily at bedtime   venlafaxine (EFFEXOR) 75 mg tablet  Self Yes No   Sig: Take 75 mg by mouth daily Facility-Administered Medications: None       History of Present Illness: This is a 48 y o  male presenting today for evaluation of near syncopal episodes  Patient has had 3-4 weeks of near syncope  His wife describes them as him nearly passing out and then having some myoclonic jerking  He does not have postictal confusion afterwards  He does not have any incontinence or tongue biting  He has seen Cardiology and at that time yesterday he had a very low blood pressure  His blood pressure was 80 systolic while standing and 90 systolic while sitting  He is on 0 2 mg of Florinef  He said he is eating and drinking well  He also does have some chest tightness/chest pain  Of note, the patient did have a gastric bypass in March and he has lost about 100 lb  His last echocardiogram showed 29% with diastolic dysfunction    - No language barrier    - History obtained from patient and chart   - There are no limitations to the history obtained  - Previous charting was reviewed  Some data reviewed included below for ease of access whether or not it is relevant to this patient encounter  Past Medical, Past Surgical History:    has a past medical history of Bariatric surgery status, CPAP (continuous positive airway pressure) dependence, Hearing loss of aging, Hypercholesterolemia, Morbid obesity ([address] ), NSTEMI (non-ST elevation myocardial infarction) ([facility] [address] ), and Postsurgical malabsorption  has a past surgical history that includes Tonsillectomy; Tooth extraction; Skin surgery; Hernia repair; Cardiac catheterization; EGD; Colonoscopy; and pr lap gastric bypass/barbara-en-y (N/A, 3/2/2020)       Allergies:   No Known Allergies    Social and Family History:     Social History     Substance and Sexual Activity   Alcohol Use Not Currently    Frequency: Never     Social History     Tobacco Use   Smoking Status Former Smoker    Types: Cigarettes    Last attempt to quit:     Years since quittin 5   Smokeless Tobacco Former User    Quit date: 6/17/2014   Tobacco Comment    quit cigarettes 2011     Social History     Substance and Sexual Activity   Drug Use Never       Review of Systems:   Review of Systems   Constitutional: Negative for chills, diaphoresis and fever  100 lb weight loss; expected   HENT: Negative  Eyes: Negative  Negative for visual disturbance  Respiratory: Negative  Negative for shortness of breath  Cardiovascular: Negative  Negative for chest pain  Gastrointestinal: Negative  Negative for abdominal pain, nausea and vomiting  Endocrine: Negative  Genitourinary: Negative  Musculoskeletal: Negative  Negative for myalgias  Skin: Negative  Negative for rash  Allergic/Immunologic: Negative  Neurological: Positive for dizziness and light-headedness  Negative for numbness  Near syncope   Hematological: Negative  Psychiatric/Behavioral: Negative  All other systems reviewed and are negative  Physical Examination     Vitals:    07/16/20 0709 07/16/20 1046 07/16/20 1047 07/16/20 1048   BP: 105/64 97/62 98/62 (!) 83/55   Pulse: 65 86 91 (!) 110   Resp: 16 18     Temp: 98 3 °F (36 8 °C) 98 3 °F (36 8 °C) 98 3 °F (36 8 °C) 98 3 °F (36 8 °C)   TempSrc:       SpO2: 93% 93% 95% 95%   Weight:       Height:         Vitals reviewed by me  Physical Exam   Constitutional: He is oriented to person, place, and time  He appears well-developed and well-nourished  No distress  HENT:   Head: Normocephalic and atraumatic  Mouth/Throat: Oropharynx is clear and moist    Eyes: Conjunctivae are normal    Neck: Normal range of motion  Neck supple  Cardiovascular:   Occasional tachycardia   Pulmonary/Chest: Effort normal and breath sounds normal    Abdominal: Soft  He exhibits no distension  There is no tenderness  Musculoskeletal: Normal range of motion  Neurological: He is alert and oriented to person, place, and time  Skin: Skin is warm and dry     Psychiatric: He has a normal mood and affect  Nursing note and vitals reviewed  ED Course as of Jul 17 1023   Wed Jul 15, 2020   1103 Hemoglobin: 13 0   1105 Stable to baseline a year ago   Troponin I(!): 0 06     XR chest portable   Final Result      No acute cardiopulmonary disease  Workstation performed: [id number]           Orders Placed This Encounter   Procedures    Novel Coronavirus (Covid-19),PCR [facility]    XR chest portable    Basic metabolic panel    CBC and differential    Troponin I    TSH, 3rd generation    Discharge Diet    Notify admitting physician    Notify admitting physician on arrival    Insert peripheral IV    Activity as tolerated    Call provider for:  persistent dizziness or light-headedness    Inpatient consult to Neurology    Inpatient consult to Cardiology    OT eval and treat    PT eval and treat    EKG RESULTS    ECG 12 lead    ECG 12 lead    Echo complete with contrast if indicated    Inpatient Admission       Labs:     Labs Reviewed   TROPONIN I - Abnormal       Result Value Ref Range Status    Troponin I 0 06 (*) <=0 04 ng/mL Final    Comment:   Siemens Chemistry analyzer 99% cutoff is > 0 04 ng/mL in network labs     o cTnI 99% cutoff is useful only when applied to patients in the clinical setting of myocardial ischemia   o cTnI 99% cutoff should be interpreted in the context of clinical history, ECG findings and possibly cardiac imaging to establish correct diagnosis  o cTnI 99% cutoff may be suggestive but clearly not indicative of a coronary event without the clinical setting of myocardial ischemia      Results indicate test should be repeated on new specimen collected within 4-6 hours of the original   TROPONIN I - Abnormal    Troponin I 0 05 (*) <=0 04 ng/mL Final    Comment:   Siemens Chemistry analyzer 99% cutoff is > 0 04 ng/mL in network labs     o cTnI 99% cutoff is useful only when applied to patients in the clinical setting of myocardial ischemia   o cTnI 99% cutoff should be interpreted in the context of clinical history, ECG findings and possibly cardiac imaging to establish correct diagnosis  o cTnI 99% cutoff may be suggestive but clearly not indicative of a coronary event without the clinical setting of myocardial ischemia  NOVEL CORONAVIRUS (COVID-19), PCR [facility] - Normal    SARS-CoV-2 Negative  Negative Final    Narrative: The specimen collection materials, transport medium, and/or testing methodology utilized in the production of these test results have been proven to be reliable in a limited validation with an abbreviated program under the Emergency Utilization Authorization provided by the FDA  Testing reported as "Presumptive positive" will be confirmed with secondary testing with a reference laboratory to ensure result accuracy  Clinical caution and judgement should be used with the interpretation of these results with consideration of the clinical impression and other laboratory testing  Testing reported as "Positive" or "Negative" has been proven to be accurate according to standard laboratory validation requirements  All testing is performed with control materials showing appropriate reactivity at standard intervals  BASIC METABOLIC PANEL    Sodium 643  136 - 145 mmol/L Final    Potassium 3 7  3 5 - 5 3 mmol/L Final    Comment: Slightly Hemolyzed; Results May be Affected&XA&Slightly Hemolyzed; Results May be Affected    Chloride 103  100 - 108 mmol/L Final    CO2 24  21 - 32 mmol/L Final    ANION GAP 9  4 - 13 mmol/L Final    BUN 8  5 - 25 mg/dL Final    Creatinine 0 76  0 60 - 1 30 mg/dL Final    Comment: Standardized to IDMS reference method    Glucose 134  65 - 140 mg/dL Final    Comment:   If the patient is fasting, the ADA then defines impaired fasting glucose as > 100 mg/dL and diabetes as > or equal to 123 mg/dL    Specimen collection should occur prior to Sulfasalazine administration due to the potential for falsely depressed results  Specimen collection should occur prior to Sulfapyridine administration due to the potential for falsely elevated results  Calcium 9 0  8 3 - 10 1 mg/dL Final    eGFR 103  ml/min/1 73sq m Final    Narrative:     [place] guidelines for Chronic Kidney Disease (CKD):     Stage 1 with normal or high GFR (GFR > 90 mL/min/1 73 square meters)    Stage 2 Mild CKD (GFR = 60-89 mL/min/1 73 square meters)    Stage 3A Moderate CKD (GFR = 45-59 mL/min/1 73 square meters)    Stage 3B Moderate CKD (GFR = 30-44 mL/min/1 73 square meters)    Stage 4 Severe CKD (GFR = 15-29 mL/min/1 73 square meters)    Stage 5 End Stage CKD (GFR <15 mL/min/1 73 square meters)  Note: GFR calculation is accurate only with a steady state creatinine   CBC AND DIFFERENTIAL    WBC 9 50  4 31 - 10 16 Thousand/uL Final    RBC 4 25  3 88 - 5 62 Million/uL Final    Hemoglobin 13 0  12 0 - 17 0 g/dL Final    Hematocrit 38 7  36 5 - 49 3 % Final    MCV 91  82 - 98 fL Final    MCH 30 6  26 8 - 34 3 pg Final    MCHC 33 6  31 4 - 37 4 g/dL Final    RDW 12 9  11 6 - 15 1 % Final    MPV 10 8  8 9 - 12 7 fL Final    Platelets 025  335 - 390 Thousands/uL Final    nRBC 0  /100 WBCs Final    Neutrophils Relative 75  43 - 75 % Final    Immat GRANS % 0  0 - 2 % Final    Lymphocytes Relative 16  14 - 44 % Final    Monocytes Relative 7  4 - 12 % Final    Eosinophils Relative 1  0 - 6 % Final    Basophils Relative 1  0 - 1 % Final    Neutrophils Absolute 7 15  1 85 - 7 62 Thousands/µL Final    Immature Grans Absolute 0 03  0 00 - 0 20 Thousand/uL Final    Lymphocytes Absolute 1 47  0 60 - 4 47 Thousands/µL Final    Monocytes Absolute 0 68  0 17 - 1 22 Thousand/µL Final    Eosinophils Absolute 0 12  0 00 - 0 61 Thousand/µL Final    Basophils Absolute 0 05  0 00 - 0 10 Thousands/µL Final       Imaging:   No results found  Final Diagnosis:  1  Hypotension    2  Hx of bariatric surgery    3   Hx of non-ST elevation myocardial infarction (NSTEMI)    4  NSTEMI (non-ST elevated myocardial infarction) ([facility] [address] )    5  Orthostatic hypotension        Code Status: Prior    Portions of the record may have been created with voice recognition software  Occasional wrong word or "sound a like" substitutions may have occurred due to the inherent limitations of voice recognition software  Read the chart carefully and recognize, using context, where substitutions have occurred  Thank you for allowing me to take part in this patient's care  It was and always is both a privilege and an honor to get to work with him      Electronically signed by:  Reyes Simas, PGY 2, MD Mallorie Patel MD  07/17/20 [id number]

## 2020-07-15 NOTE — H&P
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  Will obtain EEG routine for now  Neurology consult     Orthostatic hypotension  Assessment & Plan  Patient has orthostatic hypotension for multiple reason possible  Patient has bariatric surgery and would need to see if he has post prandial hypotension vs  Patient might have reflex syncope, impaired sympathetic efferent activity, medication effect and/or volume effect  Other things to look for is myocarditis, valve abnormalities and possible amyloid  Risk of bariatric surgery  Patient has been drinking enough fluids  His PMD already started him on fludrocortisone low dose and was increase and monitor  Would start low dose midodrine for now and see if this helps  Need to consult cardiology to see if this is related to cardiac, metoprolol is on hold  Close monitoring of orthostatics and electrolyte imbalance  Educated patient he should get up slowly from lying down to sitting position, sitting down to standing up let his ulises receptors adjust  He should have help or have a stick handy,  before sudden ambulation avoid  Telemetry monitoring for arrhythmias    Lightheadedness  Assessment & Plan  Related to his ortho stasis  Monitor closely     Bariatric surgery status  Assessment & Plan  Status post elective laparoscopic Marii-en-Y gastric bypass and intraoperative EGD  Postoperative day # 0  Continue management per primary team, bariatrics  Counseled on need to avoid NSAIDs      Hypercholesterolemia  Assessment & Plan  Continue with lipitor    Migraine with aura and without status migrainosus, not intractable  Assessment & Plan  Continue with tripatan as needed    Coronary artery disease involving native coronary artery of native heart with unstable angina pectoris [facility])  Assessment & Plan  Patient has history had cath done in the past   He follows with Dr Eliazar Macias  ROSI (obstructive sleep apnea)  Assessment & Plan  Consider overnight use    Bipolar disorder, current episode mixed, moderate (HCC)  Assessment & Plan  Patient has bipolar disorder  Continue with mood stabilizer, trazodone, trileptal and Effexor     NSTEMI (non-ST elevated myocardial infarction) [facility])  Assessment & Plan  Patient has recent NSTEMI, s/p cath and no     * Syncope  Assessment & Plan  Patient has bariatric surgery, since May he has Orthostatic hypotension  He has multiple occasion syncopal episodes with OI  He was placed on fludrocortisone without help  Would need ECHO  Need to have EEG look for myoclonic spells  Start midodrine and continue fludrocortisone pills  Monitor closely  PT/OT recommendations for orthostatic hypotension  VTE Prophylaxis: Pharmacologic VTE Prophylaxis contraindicated due to ambulate patient   / sequential compression device   Code Status: full code   POLST: POLST is not applicable to this patient  Discussion with family: wife on the phone     Anticipated Length of Stay:  Patient will be admitted on an Inpatient basis with an anticipated length of stay of  More than 2 midnights  Justification for Hospital Stay: due to orthostatic hypotension work up vs  syncope    Total Time for Visit, including Counseling / Coordination of Care: 45 minutes  Greater than 50% of this total time spent on direct patient counseling and coordination of care  Chief Complaint:   syncope    History of Present Illness:    Luis Antonio Jose is a 48 y o  male who presents with syncope presented with orthostatic hypotension  He is having syncope as he is has drop in his blood pressure   He was noted to have myoclonic ayesha,where he is moving all of his limbs after the syncope  He was noted to have low blood pressures noted by cardiology as well  He was started on fludrocortisone for improvement a week ago  He has another episode and came to ED  He needs EEG and ECHO work up for amyloidosis, myocarditis possible after bariatric surgery  We will need cardiology and neurology input to help manage orthostatic hypotension  Review of Systems:    Review of Systems   Constitutional: Positive for activity change and fatigue  HENT: Negative  Eyes: Negative  Negative for visual disturbance  Respiratory: Negative  Cardiovascular: Negative  Gastrointestinal: Negative  Endocrine: Negative  Musculoskeletal: Negative  Skin: Negative  Neurological: Positive for seizures and syncope  Hematological: Negative  Psychiatric/Behavioral: Negative  Past Medical and Surgical History:     Past Medical History:   Diagnosis Date    Bariatric surgery status     CPAP (continuous positive airway pressure) dependence     Hearing loss of aging     Hypercholesterolemia     Morbid obesity ([facility] [address] )     NSTEMI (non-ST elevation myocardial infarction) [facility])     april 2019    Postsurgical malabsorption        Past Surgical History:   Procedure Laterality Date    CARDIAC CATHETERIZATION      no stents     COLONOSCOPY      EGD      HERNIA REPAIR      umbilical hernia    CO LAP GASTRIC BYPASS/FRITZ-EN-Y N/A 3/2/2020    Procedure: LAPAROSCOPIC FRITZ-EN-Y GASTRIC BYPASS AND INTRAOPERATIVE EGD;  Surgeon: Chris Gilbert MD;  Location: [facility];  Service: Bariatrics    SKIN SURGERY      cyst removed from back and thumb    TONSILLECTOMY      TOOTH EXTRACTION         Meds/Allergies:    Prior to Admission medications    Medication Sig Start Date End Date Taking?  Authorizing Provider   acetaminophen (TYLENOL) 325 mg tablet Take 3 tablets (975 mg total) by mouth every 8 (eight) hours  Patient not taking: Reported on 7/8/2020 3/3/20   Blayne Spivey PA-C   atorvastatin (LIPITOR) 40 mg tablet Take 1 tablet by mouth once daily 6/12/20   Wesley Likes, DO   butalbital-acetaminophen-caffeine-codeine (FIORICET WITH CODEINE) -03-29 MG per capsule Take 1 capsule by mouth every 4 (four) hours as needed for headaches    Historical Provider, MD   clonazePAM (KlonoPIN) 1 mg tablet as needed     Historical Provider, MD   fludrocortisone (FLORINEF) 0 1 mg tablet Take 2 tablets (0 2 mg total) by mouth daily 7/13/20   Wesley Likes, DO   frovatriptan (FROVA) 2 5 MG tablet Take 2 5 mg by mouth once as needed  9/4/19   Historical Provider, MD   meclizine (ANTIVERT) 25 mg tablet Take 1 tablet (25 mg total) by mouth 3 (three) times a day as needed for dizziness  Patient not taking: Reported on 7/8/2020 7/5/19   Wesley Fortune, DO   OXcarbazepine (TRILEPTAL) 600 mg tablet Take 150 mg by mouth 2 (two) times a day     Historical Provider, MD   pantoprazole (PROTONIX) 20 mg tablet Take 1 tablet (20 mg total) by mouth daily  Patient not taking: Reported on 7/8/2020 3/11/20 6/16/20  Blayne Spivey PA-C   rizatriptan (MAXALT) 10 MG tablet Take 10 mg by mouth once as needed for migraine May repeat in 2 hours if needed    Historical Provider, MD   traZODone (DESYREL) 300 MG tablet Take 300 mg by mouth daily at bedtime    Historical Provider, MD   venlafaxine [facility]) 75 mg tablet 75 mg daily  3/3/20   Historical Provider, MD ASHLEY have reviewed home medications with patient personally      Allergies: No Known Allergies    Social History:     Marital Status: /Civil Union   Occupation:  electrition  Patient Pre-hospital Living Situation: at home   Patient Pre-hospital Level of Mobility: able to ambulate   Patient Pre-hospital Diet Restrictions: regular he says  Substance Use History:   Social History     Substance and Sexual Activity   Alcohol Use Not Currently    Frequency: Never     Social History     Tobacco Use   Smoking Status Former Smoker    Types: Cigarettes    Last attempt to quit:     Years since quittin 5   Smokeless Tobacco Former User    Quit date: 2014   Tobacco Comment    quit cigarettes      Social History     Substance and Sexual Activity   Drug Use Never       Family History:    Family History   Problem Relation Age of Onset    Lung cancer Mother     Brain cancer Mother     Diabetes Brother     Bipolar disorder Maternal Grandfather        Physical Exam:     Vitals:   Blood Pressure: 109/66 (07/15/20 1510)  Pulse: 71 (07/15/20 1510)  Temperature: 98 6 °F (37 °C) (07/15/20 1510)  Temp Source: Oral (07/15/20 1087)  Respirations: 20 (07/15/20 1510)  Weight - Scale: 95 8 kg (211 lb 3 2 oz) (07/15/20 151)  SpO2: 95 % (07/15/20 151)    Physical Exam   Constitutional: He is oriented to person, place, and time  He appears well-developed and well-nourished  No distress  HENT:   Head: Normocephalic and atraumatic  Right Ear: External ear normal    Left Ear: External ear normal    Eyes: Pupils are equal, round, and reactive to light  Conjunctivae and EOM are normal  Right eye exhibits no discharge  Left eye exhibits no discharge  No scleral icterus  Neck: Normal range of motion  Neck supple  No thyromegaly present  Cardiovascular: Normal rate, regular rhythm, normal heart sounds and intact distal pulses  Exam reveals no gallop and no friction rub  No murmur heard  Pulmonary/Chest: Effort normal and breath sounds normal  No stridor  No respiratory distress  Abdominal: Soft  Bowel sounds are normal  He exhibits no distension and no mass  There is no tenderness  There is no guarding  Musculoskeletal: Normal range of motion  He exhibits no edema, tenderness or deformity  Neurological: He is alert and oriented to person, place, and time  He displays normal reflexes  No cranial nerve deficit or sensory deficit  He exhibits normal muscle tone  Coordination normal    Skin: Skin is warm and dry  No rash noted  He is not diaphoretic  No erythema  Psychiatric: He has a normal mood and affect  His behavior is normal    Nursing note and vitals reviewed  Additional Data:     Lab Results: I have personally reviewed pertinent reports  Results from last 7 days   Lab Units 07/15/20  1037   WBC Thousand/uL 9 50   HEMOGLOBIN g/dL 13 0   HEMATOCRIT % 38 7   PLATELETS Thousands/uL 203   NEUTROS PCT % 75   LYMPHS PCT % 16   MONOS PCT % 7   EOS PCT % 1     Results from last 7 days   Lab Units 07/15/20  1037   SODIUM mmol/L 136   POTASSIUM mmol/L 3 7   CHLORIDE mmol/L 103   CO2 mmol/L 24   BUN mg/dL 8   CREATININE mg/dL 0 76   ANION GAP mmol/L 9   CALCIUM mg/dL 9 0   GLUCOSE RANDOM mg/dL 134                       Imaging: I have personally reviewed pertinent reports  XR chest portable   Final Result by Rica Kearney MD (07/15 3065)      No acute cardiopulmonary disease  Workstation performed: [id number]             EKG, Pathology, and Other Studies Reviewed on Admission:   · EKG: ventricular rate of 83 bmp, NSR, prolonged QTc    Allscripts / Epic Records Reviewed: Yes     ** Please Note: This note has been constructed using a voice recognition system   **

## 2020-07-16 ENCOUNTER — APPOINTMENT (INPATIENT)
Dept: NON INVASIVE DIAGNOSTICS | Facility: HOSPITAL | Age: 55
DRG: 312 | End: 2020-07-16
Payer: COMMERCIAL

## 2020-07-16 VITALS
HEIGHT: 72 IN | OXYGEN SATURATION: 95 % | BODY MASS INDEX: 28.1 KG/M2 | TEMPERATURE: 98.3 F | RESPIRATION RATE: 18 BRPM | HEART RATE: 110 BPM | WEIGHT: 207.45 LBS | DIASTOLIC BLOOD PRESSURE: 55 MMHG | SYSTOLIC BLOOD PRESSURE: 83 MMHG

## 2020-07-16 PROBLEM — R77.8 ELEVATED TROPONIN: Status: ACTIVE | Noted: 2019-05-15

## 2020-07-16 PROBLEM — R79.89 ELEVATED TROPONIN: Status: ACTIVE | Noted: 2019-05-15

## 2020-07-16 LAB
ATRIAL RATE: 86 BPM
P AXIS: 37 DEGREES
PR INTERVAL: 170 MS
QRS AXIS: 73 DEGREES
QRSD INTERVAL: 120 MS
QT INTERVAL: 336 MS
QTC INTERVAL: 402 MS
T WAVE AXIS: 57 DEGREES
TSH SERPL DL<=0.05 MIU/L-ACNC: 0.88 UIU/ML (ref 0.36–3.74)
VENTRICULAR RATE: 86 BPM

## 2020-07-16 PROCEDURE — 97162 PT EVAL MOD COMPLEX 30 MIN: CPT

## 2020-07-16 PROCEDURE — RECHECK: Performed by: INTERNAL MEDICINE

## 2020-07-16 PROCEDURE — 93306 TTE W/DOPPLER COMPLETE: CPT

## 2020-07-16 PROCEDURE — 99254 IP/OBS CNSLTJ NEW/EST MOD 60: CPT | Performed by: PSYCHIATRY & NEUROLOGY

## 2020-07-16 PROCEDURE — 84443 ASSAY THYROID STIM HORMONE: CPT | Performed by: NURSE PRACTITIONER

## 2020-07-16 PROCEDURE — 99238 HOSP IP/OBS DSCHRG MGMT 30/<: CPT | Performed by: INTERNAL MEDICINE

## 2020-07-16 PROCEDURE — 99254 IP/OBS CNSLTJ NEW/EST MOD 60: CPT | Performed by: INTERNAL MEDICINE

## 2020-07-16 PROCEDURE — 93010 ELECTROCARDIOGRAM REPORT: CPT | Performed by: INTERNAL MEDICINE

## 2020-07-16 RX ORDER — MIDODRINE HYDROCHLORIDE 2.5 MG/1
2.5 TABLET ORAL
Qty: 30 TABLET | Refills: 0 | Status: SHIPPED | OUTPATIENT
Start: 2020-07-16 | End: 2020-07-25 | Stop reason: SDUPTHER

## 2020-07-16 RX ORDER — ACETAMINOPHEN 325 MG/1
975 TABLET ORAL EVERY 8 HOURS PRN
Status: DISCONTINUED | OUTPATIENT
Start: 2020-07-16 | End: 2020-07-16 | Stop reason: HOSPADM

## 2020-07-16 RX ADMIN — OXCARBAZEPINE 150 MG: 150 TABLET, FILM COATED ORAL at 09:20

## 2020-07-16 RX ADMIN — ATORVASTATIN CALCIUM 40 MG: 40 TABLET, FILM COATED ORAL at 09:20

## 2020-07-16 RX ADMIN — FLUDROCORTISONE ACETATE 0.2 MG: 0.1 TABLET ORAL at 09:21

## 2020-07-16 RX ADMIN — CARBIDOPA AND LEVODOPA 2.5 MG: 50; 200 TABLET, EXTENDED RELEASE ORAL at 11:51

## 2020-07-16 RX ADMIN — VENLAFAXINE 75 MG: 37.5 TABLET ORAL at 09:20

## 2020-07-16 RX ADMIN — PANTOPRAZOLE SODIUM 20 MG: 20 TABLET, DELAYED RELEASE ORAL at 06:21

## 2020-07-16 RX ADMIN — CARBIDOPA AND LEVODOPA 2.5 MG: 50; 200 TABLET, EXTENDED RELEASE ORAL at 17:35

## 2020-07-16 RX ADMIN — CARBIDOPA AND LEVODOPA 2.5 MG: 50; 200 TABLET, EXTENDED RELEASE ORAL at 06:21

## 2020-07-16 NOTE — PROGRESS NOTES
Progress Note Salud Durbin 1965, 48 y o  male MRN: [id number]    Unit/Bed#: -01 Encounter: [id number]    Primary Care Provider: Jackie Quezada DO   Date and time admitted to hospital: 7/15/2020  9:45 AM        * Near syncope  Assessment & Plan  Yes is is of Present with upper extremities jerking movement lasted for seconds upon changing his position from sitting to standing noticed by his wife  Never lost consciousness  Likely secondary to orthostatic hypotension versus vasovagal response versus less likely seizures  Orthostatic blood pressure not quite meeting criteria for orthostatic hypotension  EKG no conduction abnormalities  Patient has bariatric surgery, since May he has Orthostatic hypotension  Fludrocortisone dose increased recently to 0 2 mg QD  Discussed with cardiology if echo normal can be discharged home for outpatient workup  Neurology No further work up needed from their standpoint  recommendation No need for EEG  Continue midodrine 2 5 mg t i d  and continue fludrocortisone 0 2 mg QD  Monitor closely  PT/OT recommendation to return home with previous environment  ROSI (obstructive sleep apnea)  Assessment & Plan  Was not using his CPAP  Continue to monitor    Elevated troponin  Assessment & Plan  History of nonobstructive coronary artery disease without intervention  Mild troponin elevation likely non MI elevation due to intermittent tachycardia  EKG no acute ischemic changes  Awaiting echocardiogram  Cardiology likely non MI  Myoclonic jerking  Assessment & Plan  Upper extremities drinking 4 seconds noted by his wife  Patient denied losing consciousness   Likely secondary to orthostatic hypotension versus vasovagal response  Neurology No further work up needed from Neuro standpoint  Orthostatic hypotension  Assessment & Plan  Patient has orthostatic hypotension for multiple reason possible   Patient has bariatric surgery and would need to see if he has post prandial hypotension vs  Patient might have reflex syncope, impaired sympathetic efferent activity, medication effect and/or volume effect  Orthostatic blood pressure does not meet criteria for orthostatic hypotension although close enough  Continue fludrocortisone 0 2 mg daily  Continue low dose midodrine   Discussed with cardiology today if echo appears normal can be discharged home with compression stocking  Educated the patient and father at bedside about the physiology of orthostatic hypotension  Awaiting Neurology input  Telemetry monitoring for arrhythmias No acute events    Bariatric surgery status  Assessment & Plan  Status post elective laparoscopic Marii-en-Y gastric bypass and intraoperative EGD  March 2020  Outpatient management    Hypercholesterolemia  Assessment & Plan  Continue with lipitor    Migraine with aura and without status migrainosus, not intractable  Assessment & Plan  Not in acute exacerbation  Outpatient management  Tylenol 975 mg PRN     Coronary artery disease involving native coronary artery of native heart with unstable angina pectoris [facility])  Assessment & Plan  He follows with Dr Nkechi Vega  Continue atorvastatin 40 mg q h s  Bipolar disorder, current episode mixed, moderate ([address] )  Assessment & Plan  Patient has bipolar disorder  Continue with mood stabilizer, trazodone, trileptal and Effexor           VTE Pharmacologic Prophylaxis:   Pharmacologic: Patient is ambulatory and need for pharmacologic prophylaxis  Mechanical VTE Prophylaxis in Place: Yes    Patient Centered Rounds: I have performed bedside rounds with nursing staff today  Discussions with Specialists or Other Care Team Provider:  Cardiology  Education and Discussions with Family / Patient:  patient and father at bedside  Time Spent for Care: 20 minutes  More than 50% of total time spent on counseling and coordination of care as described above      Current Length of Stay: 1 day(s)    Current Patient Status: Inpatient   Certification Statement: The patient will continue to require additional inpatient hospital stay due to Awaiting echocardiogram     Discharge Plan:  Can discharge home if echo is normal     Code Status: Level 1 - Full Code      Subjective:   Patient is seen and examined at bedside  He offered no complain  He denied having current dizziness or shortness breath  Objective:     Vitals:   Temp (24hrs), Av 5 °F (36 9 °C), Min:98 3 °F (36 8 °C), Max:98 9 °F (37 2 °C)    Temp:  [98 3 °F (36 8 °C)-98 9 °F (37 2 °C)] 98 3 °F (36 8 °C)  HR:  [] 110  Resp:  [16-18] 18  BP: ()/(51-80) 83/55  SpO2:  [93 %-95 %] 95 %  Body mass index is 28 14 kg/m²  Input and Output Summary (last 24 hours): Intake/Output Summary (Last 24 hours) at 2020 1514  Last data filed at 2020 6537  Gross per 24 hour   Intake 480 ml   Output 825 ml   Net -345 ml       Physical Exam:     Physical Exam   Constitutional: He is oriented to person, place, and time  No distress  HENT:   Head: Normocephalic and atraumatic  Mouth/Throat: No oropharyngeal exudate  Eyes: EOM are normal  Right eye exhibits no discharge  Left eye exhibits no discharge  Neck: Neck supple  Cardiovascular: Normal rate, regular rhythm and normal heart sounds  Exam reveals no friction rub  No murmur heard  Pulmonary/Chest: Effort normal and breath sounds normal  No stridor  No respiratory distress  He has no wheezes  Abdominal: Soft  Bowel sounds are normal  He exhibits no distension  There is no tenderness  There is no guarding  Neurological: He is alert and oriented to person, place, and time  No cranial nerve deficit or sensory deficit  He exhibits normal muscle tone  Skin: He is not diaphoretic  Multiple tattoos noted in his skin   Psychiatric: He has a normal mood and affect  His behavior is normal    Vitals reviewed        Additional Data:     Labs:    Results from last 7 days   Lab Units 07/15/20  1037   WBC Thousand/uL 9 50   HEMOGLOBIN g/dL 13 0   HEMATOCRIT % 38 7   PLATELETS Thousands/uL 203   NEUTROS PCT % 75   LYMPHS PCT % 16   MONOS PCT % 7   EOS PCT % 1     Results from last 7 days   Lab Units 07/15/20  1037   SODIUM mmol/L 136   POTASSIUM mmol/L 3 7   CHLORIDE mmol/L 103   CO2 mmol/L 24   BUN mg/dL 8   CREATININE mg/dL 0 76   ANION GAP mmol/L 9   CALCIUM mg/dL 9 0   GLUCOSE RANDOM mg/dL 134                           * I Have Reviewed All Lab Data Listed Above  * Additional Pertinent Lab Tests Reviewed: All Labs Within Last 24 Hours Reviewed    Imaging:    Imaging Reports Reviewed Today Include: No imaging today  Imaging Personally Reviewed by Myself Includes:  CXR no acute cardiopulmonary disease      Recent Cultures (last 7 days):           Last 24 Hours Medication List:     Current Facility-Administered Medications:  acetaminophen 650 mg Oral Q6H PRN Mindy Sweet MD   acetaminophen 975 mg Oral Q8H Mindy Sweet MD   aluminum-magnesium hydroxide-simethicone 30 mL Oral Q6H PRN Mindy Sweet MD   atorvastatin 40 mg Oral Daily Mindy Sweet MD   calcium carbonate 1,000 mg Oral Daily PRN Mindy Sweet MD   clonazePAM 0 25 mg Oral Daily PRN Mindy Sweet MD   docusate sodium 100 mg Oral BID Mindy Sweet MD   fludrocortisone 0 2 mg Oral Daily Mindy Sweet MD   meclizine 25 mg Oral TID PRN Mindy Sweet MD   midodrine 2 5 mg Oral TID AC Mindy Sweet MD   nicotine 14 mg Transdermal Daily Holden Fitzgerald MD   ondansetron 4 mg Intravenous Q6H PRN Mindy Sweet MD   OXcarbazepine 150 mg Oral BID Mindy Sweet MD   pantoprazole 20 mg Oral Early Morning Mindy Sweet MD   senna 1 tablet Oral Daily Mindy Sweet MD   SUMAtriptan 25 mg Oral Once PRN Mindy Sweet MD   traZODone 300 mg Oral HS Mindy Sweet MD   venlafaxine 75 mg Oral BID With Bella German MD        Today, Patient Was Seen By: Robert Tolbert MD    ** Please Note: Dictation voice to text software may have been used in the creation of this document   **      Casey Vega MD  [facility]  Available on [employer]

## 2020-07-16 NOTE — DISCHARGE INSTRUCTIONS
Dizziness   WHAT YOU NEED TO KNOW:   Dizziness is a feeling of being off balance or unsteady  Common causes of dizziness are an inner ear fluid imbalance or a lack of oxygen in your blood  Dizziness may be acute (lasts 3 days or less) or chronic (lasts longer than 3 days)  You may have dizzy spells that last from seconds to a few hours  DISCHARGE INSTRUCTIONS:   Return to the emergency department if:   · You have a headache and a stiff neck  · You have shaking chills and a fever  · You vomit over and over with no relief  · Your vomit or bowel movements are red or black  · You have pain in your chest, back, or abdomen  · You have numbness, especially in your face, arms, or legs  · You have trouble moving your arms or legs  · You are confused  Contact your healthcare provider if:   · You have a fever  · Your symptoms do not get better with treatment  · You have questions or concerns about your condition or care  Manage your symptoms:   · Do not drive  or operate heavy machinery when you are dizzy  · Get up slowly  from sitting or lying down  · Drink plenty of liquids  Liquids help prevent dehydration  Ask how much liquid to drink each day and which liquids are best for you  Follow up with your healthcare provider as directed:  Write down your questions so you remember to ask them during your visits  © 2017 [address] Information is for End User's use only and may not be sold, redistributed or otherwise used for commercial purposes  All illustrations and images included in CareNotes® are the copyrighted property of A D A M , Inc  or Toby Kennedy  The above information is an  only  It is not intended as medical advice for individual conditions or treatments  Talk to your doctor, nurse or pharmacist before following any medical regimen to see if it is safe and effective for you    Near Syncope   WHAT YOU NEED TO KNOW:   Near syncope, also called presyncope, is the feeling that you may faint (lose consciousness), but you do not  You can control some health conditions that cause near syncope  Your healthcare providers can help you create a plan to manage near syncope and prevent episodes  DISCHARGE INSTRUCTIONS:   Return to the emergency department if:   · You have sudden chest pain  · You have trouble breathing or shortness of breath  · You have vision changes, are sweating, and have nausea while you are sitting or lying down  · You feel dizzy or flushed and your heart is fluttering  · You lose consciousness  · You cannot use your arm, hand, foot, or leg, or it feels weak  · You have trouble speaking or understanding others when they speak  Contact your healthcare provider if:   · You have new or worsening symptoms  · Your heart beats faster or slower than usual      · You have questions or concerns about your condition or care  Follow up with your healthcare provider as directed: You may need more tests to help find the cause of your near syncope episodes  The tests will help healthcare providers plan the best treatment for you  Write down your questions so you remember to ask them during your visits  Manage near syncope:   · Sit or lie down when needed  This includes when you feel dizzy, your throat is getting tight, and your vision changes  Raise your legs above the level of your heart  · Take slow, deep breaths if you start to breathe faster with anxiety or fear  This can help decrease dizziness and the feeling that you might faint  · Keep a record of your near syncope episodes  Include your symptoms and your activity before and after the episode  The record can help your healthcare provider find the cause of your near syncope and help you manage episodes  Prevent a near syncope episode:   · Move slowly and let yourself get used to one position before you move to another position    This is very important when you change from a lying or sitting position to a standing position  Take some deep breaths before you stand up from a lying position  Stand up slowly  Sudden movements may cause a fainting spell  Sit on the side of the bed or couch for a few minutes before you stand up  If you are on bedrest, try to be upright for about 2 hours each day, or as directed  Do not lock your legs if you are standing for a long period of time  Move your legs and bend your knees to keep blood flowing  · Follow your healthcare provider's recommendations  Your provider may  recommend that you drink more liquids to prevent dehydration  You may also need to have more salt to keep your blood pressure from dropping too low and causing syncope  Your provider will tell you how much liquid and sodium to have each day  · Watch for signs of low blood sugar  These include hunger, nervousness, sweating, and fast or fluttery heartbeats  Talk with your healthcare provider about ways to keep your blood sugar level steady  · Check your blood pressure often  This is important if you take medicine to lower your blood pressure  Check your blood pressure when you are lying down and when you are standing  Ask how often to check during the day  Keep a record of your blood pressure numbers  Your healthcare provider may use the record to help plan your treatment  · Do not strain if you are constipated  You may faint if you strain to have a bowel movement  Walking is the best way to get your bowels moving  Eat foods high in fiber to make it easier to have a bowel movement  Good examples are high-fiber cereals, beans, vegetables, and whole-grain breads  Prune juice may help make bowel movements softer  · Do not exercise outside on a hot day  The combination of physical activity and heat can lead to dehydration  This can cause syncope    © 2017 [address] Information is for End User's use only and may not be sold, redistributed or otherwise used for commercial purposes  All illustrations and images included in CareNotes® are the copyrighted property of A [employer] M , Inc  or Toby Kennedy  The above information is an  only  It is not intended as medical advice for individual conditions or treatments  Talk to your doctor, nurse or pharmacist before following any medical regimen to see if it is safe and effective for you

## 2020-07-16 NOTE — ASSESSMENT & PLAN NOTE
Patient has orthostatic hypotension for multiple reason possible  Patient has bariatric surgery and would need to see if he has post prandial hypotension vs  Patient might have reflex syncope, impaired sympathetic efferent activity, medication effect and/or volume effect  Orthostatic blood pressure does not meet criteria for orthostatic hypotension although close enough    Continue fludrocortisone 0 2 mg daily  Continue low dose midodrine   Discussed with cardiology today if echo appears normal can be discharged home with compression stocking  Educated the patient and father at bedside about the physiology of orthostatic hypotension  Awaiting Neurology input  Telemetry monitoring for arrhythmias No acute events

## 2020-07-16 NOTE — CONSULTS
NEUROLOGY RESIDENCY CONSULT NOTE     Name: Eliazar Blanca   Age & Sex: 48 y o  male   MRN: [id number]  Unit/Bed#: -01   Encounter: [id number]  Length of Stay: 1    ASSESSMENT & PLAN     Myoclonic jerking  Assessment & Plan  Assessment:   Eliazar Blanca is a 48 y o  male  with a PMHx: Bariatric surgery, CPAP dependence who presents due to recent onset of myoclonic jerking  First episode started 1 month ago, He denies any aura , LOS, post ictal state or any tongue biting or incontinence  Activity lasts for about 4-5 seconds, patient recalls entire episode and however is unable to control it  Given underlying hypotension and episodes only occurring postional, etiology seems more cardiac related  No recent change in medications, patient reports he has weaned down on Trileptal given that he has lost 100lbs since gastric bypass in march 2020  Plan:   At this time do not think etiology of episodes is seizure related   No recommendation for EEG at this time   Continue neuro checks, please notify with any change   Agree with Cardiology management for hypotension   Medical management per primary team   Consult appreciated, will sign off, please call/text with questions          Orthostatic hypotension  Assessment & Plan  · Management per primary team        SUBJECTIVE     Reason for Consult / Principal Problem: Myotonic Jerks  Hx and PE limited by: None    HPI: Eliazar Blanca is a 48 y o  right handed male with a past medical history significant for Bariatric surgery, CPAP dependence, prior MI x2 no hx of stenting or CABG, who presented to the ED on 7/15 with syncope and orthostatic hypotension  History obtained by patient  He states he has had near syncopal episodes for the last 1 month which he describes as feeling lightheaded  It is described as the patient nearly passing out and then having myoclonic jerking, He reports they occur bilaterally in his upper extremities and last for 2-3 second   It has only occurs 4-5 times in the past month and every time they occur the patient reports he is changing positions, he denies having actually loc episodes  He does not have postictal confusion afterwards  He has seen Cardiology and at that time yesterday he had a very low blood pressure  His blood pressure was 80 systolic while standing and 90 systolic while sitting  He is on 0 2 mg of Florinef  HE denies any loss of urine incontinence or tongue biting  Patient was noted to have a drop in his blood pressure and was noted to have myogenic jerking movements of all his limbs after the syncopal episode  He was noted to have low blood pressures noted by cardiology  He was recently started on a fludrocortisone  Neurology consulted for further assessment of myoclonic jerking  Patient called his gastric bypass office and was told to immediately report to the ED  Currently he denies any medication changes  He denies previous hx of seizure disorder           Inpatient consult to Neurology     Date/Time 7/16/2020 7:52 AM     Performed by  Lakesha Whitmore MD     Authorized by Amilcar Garcia MD              Historical Information   Past Medical History:   Diagnosis Date    Bariatric surgery status     CPAP (continuous positive airway pressure) dependence     Hearing loss of aging     Hypercholesterolemia     Morbid obesity ([address] )     NSTEMI (non-ST elevation myocardial infarction) [facility])     april 2019    Postsurgical malabsorption      Past Surgical History:   Procedure Laterality Date    CARDIAC CATHETERIZATION      no stents     COLONOSCOPY      EGD      HERNIA REPAIR      umbilical hernia    MT LAP GASTRIC BYPASS/FRITZ-EN-Y N/A 3/2/2020    Procedure: LAPAROSCOPIC FRITZ-EN-Y GASTRIC BYPASS AND INTRAOPERATIVE EGD;  Surgeon: Merly Kraus MD;  Location: [facility];  Service: Bariatrics    SKIN SURGERY      cyst removed from back and thumb    TONSILLECTOMY      TOOTH EXTRACTION       Social History   Social History Substance and Sexual Activity   Alcohol Use Not Currently    Frequency: Never     Social History     Substance and Sexual Activity   Drug Use Never     E-Cigarette/Vaping    E-Cigarette Use Current Every Day User      E-Cigarette/Vaping Substances    Nicotine Yes     Flavoring Yes      Social History     Tobacco Use   Smoking Status Former Smoker    Types: Cigarettes    Last attempt to quit:     Years since quittin 5   Smokeless Tobacco Former User    Quit date: 2014   Tobacco Comment    quit cigarettes      Family History:   Family History   Problem Relation Age of Onset    Lung cancer Mother     Brain cancer Mother     Diabetes Brother     Bipolar disorder Maternal Grandfather      Meds/Allergies   all current active meds have been reviewed, current meds:   Current Facility-Administered Medications   Medication Dose Route Frequency    acetaminophen (TYLENOL) tablet 650 mg  650 mg Oral Q6H PRN    acetaminophen (TYLENOL) tablet 975 mg  975 mg Oral Q8H    aluminum-magnesium hydroxide-simethicone (MYLANTA) 200-200-20 mg/5 mL oral suspension 30 mL  30 mL Oral Q6H PRN    atorvastatin (LIPITOR) tablet 40 mg  40 mg Oral Daily    calcium carbonate (TUMS) chewable tablet 1,000 mg  1,000 mg Oral Daily PRN    clonazePAM (KlonoPIN) tablet 0 25 mg  0 25 mg Oral Daily PRN    docusate sodium (COLACE) capsule 100 mg  100 mg Oral BID    fludrocortisone (FLORINEF) tablet 0 2 mg  0 2 mg Oral Daily    meclizine (ANTIVERT) tablet 25 mg  25 mg Oral TID PRN    midodrine (PROAMATINE) tablet 2 5 mg  2 5 mg Oral TID AC    nicotine (NICODERM CQ) 14 mg/24hr TD 24 hr patch 14 mg  14 mg Transdermal Daily    ondansetron (ZOFRAN) injection 4 mg  4 mg Intravenous Q6H PRN    OXcarbazepine (TRILEPTAL) tablet 150 mg  150 mg Oral BID    pantoprazole (PROTONIX) EC tablet 20 mg  20 mg Oral Early Morning    senna (SENOKOT) tablet 8 6 mg  1 tablet Oral Daily    SUMAtriptan (IMITREX) tablet 25 mg  25 mg Oral Once PRN    traZODone (DESYREL) tablet 300 mg  300 mg Oral HS    venlafaxine (EFFEXOR) tablet 75 mg  75 mg Oral BID With Meals    and PTA meds:   Prior to Admission Medications   Prescriptions Last Dose Informant Patient Reported? Taking? OXcarbazepine (TRILEPTAL) 600 mg tablet  Self Yes No   Sig: Take 150 mg by mouth 2 (two) times a day    acetaminophen (TYLENOL) 325 mg tablet Not Taking at Unknown time Self No No   Sig: Take 3 tablets (975 mg total) by mouth every 8 (eight) hours   Patient not taking: Reported on 2020   atorvastatin (LIPITOR) 40 mg tablet  Self No No   Sig: Take 1 tablet by mouth once daily   butalbital-acetaminophen-caffeine-codeine (FIORICET WITH CODEINE) -51-30 MG per capsule Not Taking at Unknown time Self Yes No   Sig: Take 1 capsule by mouth every 4 (four) hours as needed for headaches   clonazePAM (KlonoPIN) 1 mg tablet  Self Yes No   Sig: as needed    fludrocortisone (FLORINEF) 0 1 mg tablet  Self No No   Sig: Take 2 tablets (0 2 mg total) by mouth daily   frovatriptan (FROVA) 2 5 MG tablet  Self Yes No   Sig: Take 2 5 mg by mouth once as needed    meclizine (ANTIVERT) 25 mg tablet Not Taking at Unknown time Self No No   Sig: Take 1 tablet (25 mg total) by mouth 3 (three) times a day as needed for dizziness   Patient not taking: Reported on 2020   pantoprazole (PROTONIX) 20 mg tablet   No No   Sig: Take 1 tablet (20 mg total) by mouth daily   Patient not taking: Reported on 2020   rizatriptan (MAXALT) 10 MG tablet  Self Yes No   Sig: Take 10 mg by mouth once as needed for migraine May repeat in 2 hours if needed   traZODone (DESYREL) 300 MG tablet  Self Yes No   Sig: Take 300 mg by mouth daily at bedtime   venlafaxine (EFFEXOR) 75 mg tablet  Self Yes No   Si mg daily       Facility-Administered Medications: None     No Known Allergies    Review of previous medical records was  completed         Review of Systems   Constitutional: Negative for chills, fatigue and fever    Respiratory: Negative for chest tightness, shortness of breath and wheezing  Cardiovascular: Negative for chest pain, palpitations and leg swelling  Gastrointestinal: Negative for abdominal distention, abdominal pain, anal bleeding, blood in stool, constipation, diarrhea, nausea and vomiting  Genitourinary: Negative for difficulty urinating and hematuria  Neurological: Negative for dizziness, tremors, seizures, speech difficulty, weakness, light-headedness, numbness and headaches  All other systems reviewed and are negative  OBJECTIVE     Patient ID: Lance Gastelum is a 48 y o  male  Vitals:   Vitals:    20 0709 20 1046 20 1047 20 1048   BP: 105/64 97/62 98/62 (!) 83/55   Pulse: 65 86 91 (!) 110   Resp: 16 18     Temp: 98 3 °F (36 8 °C) 98 3 °F (36 8 °C) 98 3 °F (36 8 °C) 98 3 °F (36 8 °C)   TempSrc:       SpO2: 93% 93% 95% 95%   Weight:       Height:          Body mass index is 28 14 kg/m²  Intake/Output Summary (Last 24 hours) at 2020 1354  Last data filed at 2020 6400  Gross per 24 hour   Intake 480 ml   Output 825 ml   Net -345 ml       Temperature:   Temp (24hrs), Av 5 °F (36 9 °C), Min:98 3 °F (36 8 °C), Max:98 9 °F (37 2 °C)    Temperature: 98 3 °F (36 8 °C)    Invasive Devices: Invasive Devices     Peripheral Intravenous Line            Peripheral IV 07/15/20 Right Hand less than 1 day                Physical Exam   Constitutional: He is oriented to person, place, and time  He appears well-developed and well-nourished  No distress  HENT:   Head: Normocephalic and atraumatic  Eyes: Pupils are equal, round, and reactive to light  EOM are normal    Pulmonary/Chest: No respiratory distress  He exhibits no tenderness  Neurological: He is alert and oriented to person, place, and time  He has normal strength   He has a normal Finger-Nose-Finger Test    Reflex Scores:       Tricep reflexes are 2+ on the right side and 2+ on the left side        Patellar reflexes are 2+ on the right side and 2+ on the left side  Achilles reflexes are 2+ on the right side and 2+ on the left side  Skin: Skin is warm and dry  He is not diaphoretic  Psychiatric: His speech is normal    Nursing note and vitals reviewed  Neurologic Exam     Mental Status   Oriented to person, place, and time  Follows 2 step commands  Attention: normal    Speech: speech is normal   Level of consciousness: alert  Knowledge: good  Able to perform simple calculations  Able to name object  Normal comprehension  Cranial Nerves   Cranial nerves II through XII intact  CN III, IV, VI   Pupils are equal, round, and reactive to light  Extraocular motions are normal      Motor Exam   Muscle bulk: normal  Overall muscle tone: normal  Right arm tone: normal  Left arm tone: normal  Right arm pronator drift: absent  Left arm pronator drift: absent  Right leg tone: normal  Left leg tone: normal    Strength   Strength 5/5 throughout  Sensory Exam   Light touch normal      Gait, Coordination, and Reflexes     Coordination   Finger to nose coordination: normal    Tremor   Resting tremor: absent  Intention tremor: absent  Action tremor: absent    Reflexes   Reflexes 2+ except as noted  Right triceps: 2+  Left triceps: 2+  Right patellar: 2+  Left patellar: 2+  Right achilles: 2+  Left achilles: 2+  Right plantar: normal  Left plantar: normal       LABORATORY DATA     Labs: I have personally reviewed pertinent reports     and I have personally reviewed pertinent films in PACS  Results from last 7 days   Lab Units 07/15/20  1037   WBC Thousand/uL 9 50   HEMOGLOBIN g/dL 13 0   HEMATOCRIT % 38 7   PLATELETS Thousands/uL 203   NEUTROS PCT % 75   MONOS PCT % 7      Results from last 7 days   Lab Units 07/15/20  1037   POTASSIUM mmol/L 3 7   CHLORIDE mmol/L 103   CO2 mmol/L 24   BUN mg/dL 8   CREATININE mg/dL 0 76   CALCIUM mg/dL 9 0                      Results from last 7 days   Lab Units 07/15/20  1319 07/15/20  1037   TROPONIN I ng/mL 0 05* 0 06*       IMAGING & DIAGNOSTIC TESTING     Radiology Results: I have personally reviewed pertinent reports  and I have personally reviewed pertinent films in PACS  XR chest portable   Final Result by Georgia Acuña MD (07/15 1225)      No acute cardiopulmonary disease  Workstation performed: [id number]             Other Diagnostic Testing: I have personally reviewed pertinent reports  and I have personally reviewed pertinent films in PACS    ACTIVE MEDICATIONS     Current Facility-Administered Medications   Medication Dose Route Frequency    acetaminophen (TYLENOL) tablet 650 mg  650 mg Oral Q6H PRN    acetaminophen (TYLENOL) tablet 975 mg  975 mg Oral Q8H    aluminum-magnesium hydroxide-simethicone (MYLANTA) 200-200-20 mg/5 mL oral suspension 30 mL  30 mL Oral Q6H PRN    atorvastatin (LIPITOR) tablet 40 mg  40 mg Oral Daily    calcium carbonate (TUMS) chewable tablet 1,000 mg  1,000 mg Oral Daily PRN    clonazePAM (KlonoPIN) tablet 0 25 mg  0 25 mg Oral Daily PRN    docusate sodium (COLACE) capsule 100 mg  100 mg Oral BID    fludrocortisone (FLORINEF) tablet 0 2 mg  0 2 mg Oral Daily    meclizine (ANTIVERT) tablet 25 mg  25 mg Oral TID PRN    midodrine (PROAMATINE) tablet 2 5 mg  2 5 mg Oral TID AC    nicotine (NICODERM CQ) 14 mg/24hr TD 24 hr patch 14 mg  14 mg Transdermal Daily    ondansetron (ZOFRAN) injection 4 mg  4 mg Intravenous Q6H PRN    OXcarbazepine (TRILEPTAL) tablet 150 mg  150 mg Oral BID    pantoprazole (PROTONIX) EC tablet 20 mg  20 mg Oral Early Morning    senna (SENOKOT) tablet 8 6 mg  1 tablet Oral Daily    SUMAtriptan (IMITREX) tablet 25 mg  25 mg Oral Once PRN    traZODone (DESYREL) tablet 300 mg  300 mg Oral HS    venlafaxine (EFFEXOR) tablet 75 mg  75 mg Oral BID With Meals       Prior to Admission medications    Medication Sig Start Date End Date Taking?  Authorizing Provider acetaminophen (TYLENOL) 325 mg tablet Take 3 tablets (975 mg total) by mouth every 8 (eight) hours  Patient not taking: Reported on 7/8/2020 3/3/20   Vikki Mccoy PA-C   atorvastatin (LIPITOR) 40 mg tablet Take 1 tablet by mouth once daily 6/12/20   Rafy Dior DO   butalbital-acetaminophen-caffeine-codeine (FIORICET WITH CODEINE) -96-35 MG per capsule Take 1 capsule by mouth every 4 (four) hours as needed for headaches    Historical Provider, MD   clonazePAM (KlonoPIN) 1 mg tablet as needed     Historical Provider, MD   fludrocortisone (FLORINEF) 0 1 mg tablet Take 2 tablets (0 2 mg total) by mouth daily 7/13/20   Rafy Dior DO   frovatriptan (FROVA) 2 5 MG tablet Take 2 5 mg by mouth once as needed  9/4/19   Historical Provider, MD   meclizine (ANTIVERT) 25 mg tablet Take 1 tablet (25 mg total) by mouth 3 (three) times a day as needed for dizziness  Patient not taking: Reported on 7/8/2020 7/5/19   Rafy Dior DO   OXcarbazepine (TRILEPTAL) 600 mg tablet Take 150 mg by mouth 2 (two) times a day     Historical Provider, MD   pantoprazole (PROTONIX) 20 mg tablet Take 1 tablet (20 mg total) by mouth daily  Patient not taking: Reported on 7/8/2020 3/11/20 6/16/20  Vikki Mccoy PA-C   rizatriptan (MAXALT) 10 MG tablet Take 10 mg by mouth once as needed for migraine May repeat in 2 hours if needed    Historical Provider, MD   traZODone (DESYREL) 300 MG tablet Take 300 mg by mouth daily at bedtime    Historical Provider, MD   venlafaxine [facility]) 75 mg tablet 75 mg daily  3/3/20   Historical Provider, MD         CODE STATUS & ADVANCED DIRECTIVES     Code Status: Level 1 - Full Code  Advance Directive and Living Will:      Power of :    POLST:        VTE Pharmacologic Prophylaxis: Low score  VTE Mechanical Prophylaxis: sequential compression device    ==  MD Brandi Lao's Neurology Residency, PGY-2

## 2020-07-16 NOTE — SOCIAL WORK
CM met with pt at bedside and introduced self/role with dcp  Pt reports he resides with his wife, Bhumika Gibson, and 3 adult children  Pt home is 3 stories with 3 [address] and bedroom/bathroom on 2nd floor  No half bathroom on first  Pt reports independent with ADLs and ambulation PTA  Pt works and drives  No prior hx of VNA or STR  No hx of drug/alcohol abuse  Pt reports he is [employer] and has a psychiatrist  Pt denies IP MH tx  Pharmacy is [address] [facility]  No POA or AD  Pt reports his wife is his primary contact  CM to follow  CM reviewed d/c planning process including the following: identifying help at home, patient preference for d/c planning needs, Discharge Lounge, [facility] Meds to Bed program, availability of treatment team to discuss questions or concerns patient and/or family may have regarding understanding medications and recognizing signs and symptoms once discharged  CM also encouraged patient to follow up with all recommended appointments after discharge  Patient advised of importance for patient and family to participate in managing patients medical well being

## 2020-07-16 NOTE — DISCHARGE INSTR - AVS FIRST PAGE
Please discuss the result of the echo with your primary doctor and cardiologist   Please wear thigh-high stocking 12 hours Aday  Please keep well hydrated minimum 65 Oz  of fluid  Please do not go back to work until you see your primary doctor and cleared to work

## 2020-07-16 NOTE — OCCUPATIONAL THERAPY NOTE
Occupational Therapy Screen    Orders received and chart reviewed  OT spoke to medical team, per PT, Pt currently functioning at an IND level for all self care and mobility during current hospital admit  Pt reports having adequate social support and w/ no concerns or DME needed upon D/C home  Pt appears to be functioning close to baseline levels  Recommend continued engagement in daily activities w/ nursing staff during current hospital stay  Will D/C OT at this time  Please re-consult if appropriate         Pallavi Mabry, MS, OTR/L

## 2020-07-16 NOTE — ASSESSMENT & PLAN NOTE
Yes is is of Present with upper extremities jerking movement lasted for seconds upon changing his position from sitting to standing noticed by his wife  Never lost consciousness  Likely secondary to orthostatic hypotension versus vasovagal response versus less likely seizures  Orthostatic blood pressure not quite meeting criteria for orthostatic hypotension  EKG no conduction abnormalities  Patient has bariatric surgery, since May he has Orthostatic hypotension  Fludrocortisone dose increased recently to 0 2 mg QD  Discussed with cardiology if echo normal can be discharged home for outpatient workup  Neurology No further work up needed from their standpoint  recommendation No need for EEG  Continue midodrine 2 5 mg t i d  and continue fludrocortisone 0 2 mg QD  Monitor closely  PT/OT recommendation to return home with previous environment  Patient would like to leave home before results of echocardiogram advised patient to discuss results of echo with his PCP on Monday 07/20/2020 and make appointment with his cardiologist Dr Sanchez

## 2020-07-16 NOTE — ASSESSMENT & PLAN NOTE
Not in acute exacerbation  Reported does not take Fioricet or Triptan any more    Tylenol 975 mg PRN

## 2020-07-16 NOTE — ASSESSMENT & PLAN NOTE
Assessment:   Dylan Bower is a 48 y o  male  with a PMHx: Bariatric surgery, CPAP dependence who presents due to recent onset of myoclonic jerking  First episode started 1 month ago, He denies any aura , LOS, post ictal state or any tongue biting or incontinence  Activity lasts for about 4-5 seconds, patient recalls entire episode and however is unable to control it  Given underlying hypotension and episodes only occurring postional, etiology seems more cardiac related  No recent change in medications, patient reports he has weaned down on Trileptal given that he has lost 100lbs since gastric bypass in march 2020  Plan:   At this time do not think etiology of episodes is seizure related     No recommendation for EEG at this time   Continue neuro checks, please notify with any change   Agree with Cardiology management for hypotension   Medical management per primary team   Consult appreciated, will sign off, please call/text with questions

## 2020-07-16 NOTE — DISCHARGE SUMMARY
Discharge- Nancy Saab 1965, 48 y o  male MRN: [id number]    Unit/Bed#: -01 Encounter: [id number]    Primary Care Provider: Jin Luu DO   Date and time admitted to hospital: 7/15/2020  9:45 AM        * Near syncope  Assessment & Plan  Yes is is of Present with upper extremities jerking movement lasted for seconds upon changing his position from sitting to standing noticed by his wife  Never lost consciousness  Likely secondary to orthostatic hypotension versus vasovagal response versus less likely seizures  Orthostatic blood pressure not quite meeting criteria for orthostatic hypotension  EKG no conduction abnormalities  Patient has bariatric surgery, since May he has Orthostatic hypotension  Fludrocortisone dose increased recently to 0 2 mg QD  Discussed with cardiology if echo normal can be discharged home for outpatient workup  Neurology No further work up needed from their standpoint  recommendation No need for EEG  Continue midodrine 2 5 mg t i d  and continue fludrocortisone 0 2 mg QD  Monitor closely  PT/OT recommendation to return home with previous environment  Patient would like to leave home before results of echocardiogram advised patient to discuss results of echo with his PCP on Monday 07/20/2020 and make appointment with his cardiologist Dr Sanchez  Elevated troponin  Assessment & Plan  History of nonobstructive coronary artery disease without intervention  Mild troponin elevation likely non MI elevation due to intermittent tachycardia  EKG no acute ischemic changes  Echo to be done prior to discharge patient will discuss result of echo with his PCP  Cardiology likely non MI  Myoclonic jerking  Assessment & Plan  Upper extremities drinking 4 seconds noted by his wife  Patient denied losing consciousness   Likely secondary to orthostatic hypotension versus vasovagal response  Neurology No further work up needed from Neuro standpoint       Orthostatic hypotension  Assessment & Plan  Patient has orthostatic hypotension for multiple reason possible  Patient has bariatric surgery and would need to see if he has post prandial hypotension vs  Patient might have reflex syncope, impaired sympathetic efferent activity, medication effect and/or volume effect  Orthostatic blood pressure does not meet criteria for orthostatic hypotension although close enough  Continue fludrocortisone 0 2 mg daily  Continue low dose midodrine   Patient reported does not take meclizine  Discussed with cardiology today if echo appears normal can be discharged home with compression stocking  Educated the patient and father at bedside about the physiology of orthostatic hypotension  Neurology no further workup needed from their end  Telemetry monitoring for arrhythmias No acute events    Migraine with aura and without status migrainosus, not intractable  Assessment & Plan  Not in acute exacerbation  Reported does not take Fioricet or Triptan any more  Tylenol 975 mg PRN     Coronary artery disease involving native coronary artery of native heart with unstable angina pectoris [facility])  Assessment & Plan  He follows with Dr Melissa Montes  Continue atorvastatin 40 mg q h s      Bipolar disorder, current episode mixed, moderate ([address] )  Assessment & Plan  Patient has bipolar disorder  On monthly injection of 234 mg Invega  Continue with mood stabilizer, trazodone, trileptal and Effexor           Discharging Physician / Practitioner: Roderick Yi MD  PCP: Luciana Sullivan DO  Admission Date:   Admission Orders (From admission, onward)     Ordered        07/15/20 1210  Inpatient Admission  Once                   Discharge Date: 07/16/20    Resolved Problems  Date Reviewed: 7/16/2020    None          Consultations During Hospital Stay:  · Cardiology Cecelia Feng DO   · Neurology   Luis Lopez MD     Procedures Performed:   · Echocardiogram     Significant Findings / Test Results:   · No  Incidental Findings:   · No      Test Results Pending at Discharge (will require follow up): · Echocardiogram       Outpatient Tests Requested:  · No      Complications:  No      Reason for Admission: Near syncope    Hospital Course:     Felipa Alvarado is a 48 y o  male patient who originally presented to the hospital on 7/15/2020 due to near-syncope and upper extremities jerking movement  Was evaluated by Cardiology who thought his symptoms might be related to orthostatic hypotension and recommended to review his echocardiogram and thigh-high compression stocking and to follow-up with his cardiologist complete outpatient workup  Neurology felt no neurological explanation of his symptoms and no further workup warranted  Patient decided to be discharged home prior to results of his echocardiogram and explained to him if there is abnormalities he may need to come back to the hospital and he agreed  Patient reported he has appointment in the next 3 days with his PCP and I instructed him to make appointment with his cardiologist to follow-up on his symptoms  Telemetry showed no events  He understood his diagnosis and was in agreement  Patient reported his wife will finish work at 6:00 p m  And will pick him up  The patient, initially admitted to the hospital as inpatient, was discharged earlier than expected given the following: Patient symptoms resolved and Cardiology recommended outpatient workup and to review his echo  Patient elected to leave the hospital and follow-up with his PCP  Edgar Mendez Please see above list of diagnoses and related plan for additional information  Condition at Discharge: good     Discharge Day Visit / Exam:     * Please refer to separate progress note for these details *    Discussion with Family:  Patient  Discharge instructions/Information to patient and family:   See after visit summary for information provided to patient and family        Provisions for Follow-Up Care:  See after visit summary for information related to follow-up care and any pertinent home health orders  Disposition:     Home    For Discharges to [facility]F:   · Not Applicable to this Patient - Not Applicable to this Patient    Planned Readmission: No       Discharge Statement:  I spent 20 minutes discharging the patient  This time was spent on the day of discharge  I had direct contact with the patient on the day of discharge  Greater than 50% of the total time was spent examining patient, answering all patient questions, arranging and discussing plan of care with patient as well as directly providing post-discharge instructions  Additional time then spent on discharge activities  Discharge Medications:  See after visit summary for reconciled discharge medications provided to patient and family        ** Please Note: This note has been constructed using a voice recognition system **          Sergo Cortes MD  [facility]  Available on "[employer]

## 2020-07-16 NOTE — ASSESSMENT & PLAN NOTE
History of nonobstructive coronary artery disease without intervention  Mild troponin elevation likely non MI elevation due to intermittent tachycardia  EKG no acute ischemic changes  Echo to be done prior to discharge patient will discuss result of echo with his PCP    Cardiology likely non MI

## 2020-07-16 NOTE — ASSESSMENT & PLAN NOTE
Status post elective laparoscopic Marii-en-Y gastric bypass and intraoperative EGD  March 2020    Outpatient management

## 2020-07-16 NOTE — ASSESSMENT & PLAN NOTE
Patient has orthostatic hypotension for multiple reason possible  Patient has bariatric surgery and would need to see if he has post prandial hypotension vs  Patient might have reflex syncope, impaired sympathetic efferent activity, medication effect and/or volume effect  Orthostatic blood pressure does not meet criteria for orthostatic hypotension although close enough  Continue fludrocortisone 0 2 mg daily  Continue low dose midodrine   Patient reported does not take meclizine  Discussed with cardiology today if echo appears normal can be discharged home with compression stocking  Educated the patient and father at bedside about the physiology of orthostatic hypotension  Neurology no further workup needed from their end    Telemetry monitoring for arrhythmias No acute events

## 2020-07-16 NOTE — ASSESSMENT & PLAN NOTE
Yes is is of Present with upper extremities jerking movement lasted for seconds upon changing his position from sitting to standing noticed by his wife  Never lost consciousness  Likely secondary to orthostatic hypotension versus vasovagal response versus less likely seizures  Orthostatic blood pressure not quite meeting criteria for orthostatic hypotension  EKG no conduction abnormalities  Patient has bariatric surgery, since May he has Orthostatic hypotension  Fludrocortisone dose increased recently to 0 2 mg QD  Discussed with cardiology if echo normal can be discharged home for outpatient workup  Neurology No further work up needed from their standpoint  recommendation No need for EEG  Continue midodrine 2 5 mg t i d  and continue fludrocortisone 0 2 mg QD  Monitor closely  PT/OT recommendation to return home with previous environment

## 2020-07-16 NOTE — CONSULTS
Consultation - Cardiology Team One  Arna Najjar 48 y o  male MRN: [id number]  Unit/Bed#: -01 Encounter: [id number]    Inpatient consult to Cardiology  Consult performed by: EMRE Watts  Consult ordered by: David Chan MD          Physician Requesting Consult: Zeb Herman MD     Reason for Consult / Principal Problem: orthostatic hypotension      Assessment/ Plan:    1  Dizziness likely due to Orthostatic hypotension: Seen in our office; will check ortho BPs here Q shift  He was recently placed on florinef and dose increased 3 days ago; would continue 0 2mg daily  Agree with adding low dose midodrine 2 5mg TID; he has not had any supine hypertension  Check echo  Disscused slow position changes and orthostatic precautions with patient; may need JACQUI stockings  Liberate Na in diet  Check TSH  Will await neuro input as well  Would also consider evaluation of pych meds to see if they would contribute; trazadone can cause ortho hypotension although he takes this at HS  2  Elevated troponin: 0 06 --> 0 05; unclear clinical significance; no need to trend any further  He has not had any chest pain; ekg without ischemic changes; will be getting echo as above  3  S/p Marii-En-Y Gastric Bypass 3/2/2020: with wt loss of 100lbs; he has come off BP meds and no longer uses his Cpap  4  Non-obstructive CAD: with mid LAD myocardial bridging; off BB due to #1; continue statin; no ASA assumed due to #3  5  Palpitations: Has hx of PVCs per patient; has been getting some intermittent brief palpitations; not associated with his dizziness; no events on telemetry or PVCs noted; electrolytes wnl; continue telemetry here    History of Present Illness      HPI: Arna Najjar is a 48y o  year old male who has a history of orthostatic hypotension,non-obstructive CAD, former tobacco use, benign positional vertigo, hx of ROSI, s/p bariatric surgery 3/2020 with 100lbs wt loss   He follows with cardiologist   [place] presented to ED with complaints of dizziness and hypotension  He tells me his symptoms started about one month ago when he noticed he was dizzy with position changes to standing  Does not happen every time he stands but multiple times per day typically  He has not had any visual cahnges or syncope but has had a few episodes where he thought he may pass out  He was seen by PCP who started him on florinef on 7/10; he was also advised to see psychiatry to see if meds needed to be adjusted and cardiology  He had been taking florinef for about one week and 2 days ago dose was increased to 0 2mg daily; he has not noted any improvement in his symptoms  Saw cardiology 7/14; he was hypotensive in office with BP sitting 80/50 and standing 70/50mmhg  An echo and holter was ordered  He had gastric bypass surgery done 3/2020 and had over 100lbs weight loss; he reported his sytmptoms to bariatric surgery office yesterday and was strongly advised to come to ED for evaluation  Here in ED EKG showed sinus rhythm without any ST/twave abnormalities  Supine BPs have been in range of 105/64 to 127/67  No electrolytes abnormalities, CBC unremarkable  Troponin 0 06 and 0 05    Pt was admitted for workup and cardiology and neurology asked ot evaluated  At time of my exam he reports feeling fine laying in bed  He still gets dizzy upon standing as above  He has not had associated chest pain, visual changes or palpitations  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  He does consciously drink at least 64-80 ounces of fluid per day consisting of water and Gatorade/poweraide  He drinks at most 24 ounces of coffee per day  Does vape and has been for 10 years  No increased symptoms with vaping       He carries hx of CAD; has chest pain in past and underwent cardiac cath 5/2019 that showed mild non-obstructive disease with mid LAD myocardial bridging with mild systolic compression  He does take statin but no ASA given his gastric bypass and was on metoprolol but this was stopped due to his labile BPs post gastric bypass  Underwent cardiac cath 5/2019 that showed mild non-obstructive disease; he did have myocardial bridging of mid LAD with mild systolic compression  Echo 6/2019: LVEF 60% without RWMA, trace TR, trace MR     EKG reviewed personally:  7/15/2020  Sinus rhythm  Vent  rate 86 BPM  RI interval 170 ms  QRS duration 120 ms  QT/QTc 336/402 ms    Telemetry reviewed personally:   Sinus rhythm, no significant events    Review of Systems   Constitution: Negative for decreased appetite and fever  Cardiovascular: Negative for chest pain, dyspnea on exertion, leg swelling, orthopnea and syncope  Respiratory: Negative for cough, shortness of breath and wheezing  Gastrointestinal: Negative for abdominal pain, nausea and vomiting  Genitourinary: Negative for dysuria  Neurological: Negative for dizziness and light-headedness  Psychiatric/Behavioral: Negative for altered mental status  All other systems reviewed and are negative       Historical Information   Past Medical History:   Diagnosis Date    Bariatric surgery status     CPAP (continuous positive airway pressure) dependence     Hearing loss of aging     Hypercholesterolemia     Morbid obesity ([address] )     NSTEMI (non-ST elevation myocardial infarction) [facility])     april 2019    Postsurgical malabsorption      Past Surgical History:   Procedure Laterality Date    CARDIAC CATHETERIZATION      no stents     COLONOSCOPY      EGD      HERNIA REPAIR      umbilical hernia    RI LAP GASTRIC BYPASS/FRITZ-EN-Y N/A 3/2/2020    Procedure: LAPAROSCOPIC FRITZ-EN-Y GASTRIC BYPASS AND INTRAOPERATIVE EGD;  Surgeon: Te Sorenson MD;  Location: [facility];  Service: Bariatrics    SKIN SURGERY      cyst removed from back and thumb    TONSILLECTOMY      TOOTH EXTRACTION       Social History     Substance and Sexual Activity   Alcohol Use Not Currently    Frequency: Never     Social History     Substance and Sexual Activity   Drug Use Never     Social History     Tobacco Use   Smoking Status Former Smoker    Types: Cigarettes    Last attempt to quit: 2011    Years since quittin 5   Smokeless Tobacco Former User    Quit date: 2014   Tobacco Comment    quit cigarettes      Family History:   Family History   Problem Relation Age of Onset    Lung cancer Mother     Brain cancer Mother     Diabetes Brother     Bipolar disorder Maternal Grandfather        Meds/Allergies   current meds:   Current Facility-Administered Medications   Medication Dose Route Frequency    acetaminophen (TYLENOL) tablet 650 mg  650 mg Oral Q6H PRN    acetaminophen (TYLENOL) tablet 975 mg  975 mg Oral Q8H    aluminum-magnesium hydroxide-simethicone (MYLANTA) 200-200-20 mg/5 mL oral suspension 30 mL  30 mL Oral Q6H PRN    atorvastatin (LIPITOR) tablet 40 mg  40 mg Oral Daily    calcium carbonate (TUMS) chewable tablet 1,000 mg  1,000 mg Oral Daily PRN    clonazePAM (KlonoPIN) tablet 0 25 mg  0 25 mg Oral Daily PRN    docusate sodium (COLACE) capsule 100 mg  100 mg Oral BID    fludrocortisone (FLORINEF) tablet 0 2 mg  0 2 mg Oral Daily    meclizine (ANTIVERT) tablet 25 mg  25 mg Oral TID PRN    midodrine (PROAMATINE) tablet 2 5 mg  2 5 mg Oral TID AC    nicotine (NICODERM CQ) 14 mg/24hr TD 24 hr patch 14 mg  14 mg Transdermal Daily    ondansetron (ZOFRAN) injection 4 mg  4 mg Intravenous Q6H PRN    OXcarbazepine (TRILEPTAL) tablet 150 mg  150 mg Oral BID    pantoprazole (PROTONIX) EC tablet 20 mg  20 mg Oral Early Morning    senna (SENOKOT) tablet 8 6 mg  1 tablet Oral Daily    SUMAtriptan (IMITREX) tablet 25 mg  25 mg Oral Once PRN    traZODone (DESYREL) tablet 300 mg  300 mg Oral HS    venlafaxine (EFFEXOR) tablet 75 mg  75 mg Oral BID With Meals          No Known Allergies    Objective   Vitals: Blood pressure 105/64, pulse 65, temperature 98 3 °F (36 8 °C), resp  rate 16, height 6' (1 829 m), weight 94 1 kg (207 lb 7 3 oz), SpO2 93 %  ,     Body mass index is 28 14 kg/m²  ,     Systolic (58ZLR), BEW:997 , Min:104 , ZTJ:734     Diastolic (50GVL), VMB:16, Min:51, Max:80      Intake/Output Summary (Last 24 hours) at 7/16/2020 0818  Last data filed at 7/16/2020 3620  Gross per 24 hour   Intake 480 ml   Output 2150 ml   Net -1670 ml     Weight (last 2 days)     Date/Time   Weight    07/16/20 0600   94 1 (207 45)    07/15/20 15:10:19   95 8 (211 2)    07/15/20 0951   96 2 (212)            Invasive Devices     Peripheral Intravenous Line            Peripheral IV 07/15/20 Right Hand less than 1 day              Physical Exam   Constitutional: He is oriented to person, place, and time  No distress  Pt sitting up in bed in NAD; alert and cooperative   HENT:   Head: Normocephalic and atraumatic  Neck: No JVD present  Cardiovascular: Normal rate, regular rhythm, S1 normal and S2 normal    No murmur heard  No LE edema   Pulmonary/Chest: Effort normal and breath sounds normal  He has no wheezes  He has no rales  Abdominal: Soft  Musculoskeletal: He exhibits no edema or deformity  Neurological: He is alert and oriented to person, place, and time  Skin: Skin is warm and dry  He is not diaphoretic  Psychiatric: He has a normal mood and affect  His behavior is normal    Nursing note and vitals reviewed      LABORATORY RESULTS:  Results from last 7 days   Lab Units 07/15/20  1319 07/15/20  1037   TROPONIN I ng/mL 0 05* 0 06*     CBC with diff:   Results from last 7 days   Lab Units 07/15/20  1037   WBC Thousand/uL 9 50   HEMOGLOBIN g/dL 13 0   HEMATOCRIT % 38 7   MCV fL 91   PLATELETS Thousands/uL 203   MCH pg 30 6   MCHC g/dL 33 6   RDW % 12 9   MPV fL 10 8   NRBC AUTO /100 WBCs 0       CMP:  Results from last 7 days   Lab Units 07/15/20  1037   POTASSIUM mmol/L 3 7   CHLORIDE mmol/L 103   CO2 mmol/L 24   BUN mg/dL 8   CREATININE mg/dL 0 76   CALCIUM mg/dL 9 0   EGFR ml/min/1 73sq m 103     BMP:  Results from last 7 days   Lab Units 07/15/20  1037   POTASSIUM mmol/L 3 7   CHLORIDE mmol/L 103   CO2 mmol/L 24   BUN mg/dL 8   CREATININE mg/dL 0 76   CALCIUM mg/dL 9 0         Lipid Profile:   No results found for: CHOL  Lab Results   Component Value Date    HDL 34 (L) 2020    HDL 41 2019    HDL 37 (L) 05/15/2019     Lab Results   Component Value Date    LDLCALC 35 2020    LDLCALC 63 2019    LDLCALC 118 (H) 05/15/2019     Lab Results   Component Value Date    TRIG 52 2020    TRIG 62 2019    TRIG 116 05/15/2019     Cardiac testing:   Results for orders placed during the hospital encounter of 19   Echo complete with contrast if indicated    Narrative [facility] [address]    Transthoracic Echocardiogram  2D, M-mode, Doppler, and Color Doppler    Study date:  2019    Patient: Charles Good  MR number: [id number]  Account number: [de-identified]  : [id number]  Age: 49 years  Gender: Male  Status: Outpatient  Location: [address] [place] [address] [facility] [place]  Height: 72 in  Weight: 293 5 lb  BP: 120/ 88 mmHg    Indications: CAD  Diagnoses: I25 10 - Atherosclerotic heart disease of native coronary artery without angina pectoris, Q24 5 - Malformation of coronary vessels    Sonographer:  EILEEN Sosa RDCS RVT  Primary Physician:  Amy Landry DO  Referring Physician:  ERME Mcgregor  Group:  [facility]  Interpreting Physician:  Daisy Valencia MD    SUMMARY    LEFT VENTRICLE:  Systolic function was normal by visual assessment  Ejection fraction was estimated to be 60 %  There were no regional wall motion abnormalities  Wall thickness was mildly increased  Doppler parameters were consistent with abnormal left ventricular relaxation (grade 1 diastolic dysfunction)      TRICUSPID VALVE:  There was trace regurgitation  PULMONIC VALVE:  There was trace regurgitation  HISTORY: PRIOR HISTORY: NSTEMI  Risk factors: hypertension and medication-treated hypercholesterolemia  PRIOR PROCEDURES: Diagnostic cath  PROCEDURE: The study was performed in the [facility]  This was a routine study  The transthoracic approach was used  The study included complete 2D imaging, M-mode, complete spectral Doppler, and color Doppler  Images were obtained from the parasternal, apical, subcostal, and suprasternal notch acoustic windows  Echocardiographic views were limited due to decreased penetration  This was a technically difficult study  LEFT VENTRICLE: Size was normal  Systolic function was normal by visual assessment  Ejection fraction was estimated to be 60 %  There were no regional wall motion abnormalities  Wall thickness was mildly increased  DOPPLER: There was an  increased relative contribution of atrial contraction to ventricular filling  Doppler parameters were consistent with abnormal left ventricular relaxation (grade 1 diastolic dysfunction)  RIGHT VENTRICLE: The size was normal  Systolic function was normal  DOPPLER: Systolic pressure was not estimated  LEFT ATRIUM: Size was normal     RIGHT ATRIUM: Size was normal     MITRAL VALVE: Valve structure was normal  There was normal leaflet separation  DOPPLER: The transmitral velocity was within the normal range  There was no evidence for stenosis  There was no regurgitation  AORTIC VALVE: The valve was trileaflet  Leaflets exhibited normal thickness, mild calcification, and normal cuspal separation  DOPPLER: Transaortic velocity was within the normal range  There was no evidence for stenosis  There was no  regurgitation  TRICUSPID VALVE: The valve structure was normal  There was normal leaflet separation  DOPPLER: The transtricuspid velocity was within the normal range  There was no evidence for stenosis   There was trace regurgitation  PULMONIC VALVE: Leaflets exhibited normal thickness, no calcification, and normal cuspal separation  DOPPLER: The transpulmonic velocity was within the normal range  There was trace regurgitation  PERICARDIUM: There was no pericardial effusion  AORTA: The root exhibited normal size  SYSTEMIC VEINS: IVC: The inferior vena cava was normal in size and course  Respirophasic changes were normal     SYSTEM MEASUREMENT TABLES    2D  %FS: 29 42 %  Ao Diam: 3 73 cm  EDV(Teich): 125 27 ml  EF(Teich): 56 03 %  ESV(Cube): 47 35 ml  ESV(Teich): 55 07 ml  IVSd: 1 18 cm  LA Area: 17 85 cm2  LA Diam: 3 94 cm  LVEDV MOD A4C: 144 15 ml  LVEF MOD A4C: 61 81 %  LVESV MOD A4C: 55 05 ml  LVIDd: 5 13 cm  LVIDs: 3 62 cm  LVLd A4C: 8 45 cm  LVLs A4C: 6 94 cm  LVPWd: 1 25 cm  RA Area: 14 85 cm2  RV Diam : 3 11 cm  SI(Cube): 34 79 ml/m2  SI(Teich): 27 97 ml/m2  SV MOD A4C: 89 1 ml  SV(Cube): 87 33 ml  SV(Teich): 70 19 ml    MM  TAPSE: 2 11 cm    PW  E': 0 08 m/s  E/E': 9 32  MV A Maxi: 0 91 m/s  MV Dec [place]: 4 m/s2  MV DecT: 189 57 ms  MV E Maxi: 0 76 m/s  MV E/A Ratio: 0 84    [facility] Accredited [facility]    Prepared and electronically signed by    Lebron Costello MD  Signed 21-Jun-2019 10:48:15       Imaging: I have personally reviewed pertinent reports  Xr Chest Portable    Result Date: 7/15/2020  Narrative: CHEST INDICATION:   cp  COMPARISON:  Chest radiograph from 5/15/2019 and chest CT from 1/13/2017  EXAM PERFORMED/VIEWS:  XR CHEST PORTABLE FINDINGS: Cardiomediastinal silhouette appears unremarkable  The lungs are clear  No pneumothorax or pleural effusion  Osseous structures appear within normal limits for patient age  Impression: No acute cardiopulmonary disease   Workstation performed: [id number]     Assessment  Principal Problem:    Syncope  Active Problems:    NSTEMI (non-ST elevated myocardial infarction) (HCC)    Bipolar disorder, current episode mixed, moderate ([facility] [address] )    ROSI (obstructive sleep apnea)    Coronary artery disease involving native coronary artery of native heart with unstable angina pectoris (HCC)    Migraine with aura and without status migrainosus, not intractable    Hypercholesterolemia    Bariatric surgery status    Lightheadedness    Orthostatic hypotension    Myoclonic jerking    Thank you for allowing us to participate in this patient's care  This pt will follow up with Dr Ramsey Agosto once discharged  Counseling / Coordination of Care  Total floor / unit time spent today 45 minutes  Greater than 50% of total time was spent with the patient and / or family counseling and / or coordination of care  A description of the counseling / coordination of care: Review of history, current assessment, development of a plan  Code Status: Level 1 - Full Code    ** Please Note: Dragon 360 Dictation voice to text software may have been used in the creation of this document   **

## 2020-07-16 NOTE — ASSESSMENT & PLAN NOTE
History of nonobstructive coronary artery disease without intervention  Mild troponin elevation likely non MI elevation due to intermittent tachycardia  EKG no acute ischemic changes  Awaiting echocardiogram  Cardiology likely non MI

## 2020-07-16 NOTE — UTILIZATION REVIEW
Initial Clinical Review    Admission: Date/Time/Statement: Admission Orders (From admission, onward)     Ordered        07/15/20 1210  Inpatient Admission  Once                   Orders Placed This Encounter   Procedures    Inpatient Admission     Standing Status:   Standing     Number of Occurrences:   1     Order Specific Question:   Admitting Physician     Answer:   Grace Cabral [[id number]]     Order Specific Question:   Level of Care     Answer:   Med Surg [[id number]]     Order Specific Question:   Estimated length of stay     Answer:   More than 2 Midnights     Order Specific Question:   Certification     Answer:   I certify that inpatient services are medically necessary for this patient for a duration of greater than two midnights  See H&P and MD Progress Notes for additional information about the patient's course of treatment  ED Arrival Information     Expected Arrival Acuity Means of Arrival Escorted By Service Admission Type    - 7/15/2020 09:41 Urgent Walk-In Self Hospitalist Urgent    Arrival Complaint    hypotensive        Chief Complaint   Patient presents with    Hypotension     pt sent from pcp and cardiology for hypotension  pt reports feeling dizzy and weak  intermittent CP      Assessment/Plan: 48year old male, presented to the ED @ [facility] from home via walk in  Admitted as  Inpatient due to Syncope  Patient has bariatric surgery, since May he has Orthostatic hypotension  Placed on fludrocortisone 1 week ago without help  Had another episode and came to ED  Consult Cardio  Monitor Orthostatics  Monitor Electrolytes  Telemetry  Myoclonic jerking: EEG  Consult Neuro  07/16/2020  Consult Neurology: Onset of myoclonic jerking  First episode started 1 month ago, He denies any aura , LOS, post ictal state or any tongue biting or incontinence  Activity lasts for about 4-5 seconds, patient recalls entire episode and however is unable to control it   Given underlying hypotension and episodes only occurring postional, etiology seems more cardiac related  Neuro Checks  07/16/2020  Consult Cardio:  Dizziness likely due to Orthostatic hypotension: Seen in our office; will check ortho BPs here Q shift  He was recently placed on florinef and dose increased 3 days ago; would continue 0 2mg daily  Agree with adding low dose midodrine 2 5mg TID; he has not had any supine hypertension  Check echo  Disscused slow position changes and orthostatic precautions with patient; may need JACQUI stockings  Liberate Na in diet  Check TSH      VTE Prophylaxis: Pharmacologic VTE Prophylaxis contraindicated due to ambulate patient   / sequential compression device     ED Triage Vitals   Temperature Pulse Respirations Blood Pressure SpO2   07/15/20 0952 07/15/20 0951 07/15/20 0950 07/15/20 0951 07/15/20 0951   97 7 °F (36 5 °C) 98 20 127/67 97 %      Temp Source Heart Rate Source Patient Position - Orthostatic VS BP Location FiO2 (%)   07/15/20 0952 07/15/20 0950 07/16/20 1046 -- --   Oral Monitor Lying - Orthostatic VS        Pain Score       07/15/20 1510       No Pain        Wt Readings from Last 1 Encounters:   07/16/20 94 1 kg (207 lb 7 3 oz)     Additional Vital Signs:   Date/Time  Temp  Pulse  Resp  BP  MAP (mmHg)  SpO2  O2 Device  Patient Position - Orthostatic VS   07/16/20 10:48:47  98 3 °F (36 8 °C)  110Abnormal     83/55Abnormal   64  95 %    Standing - Orthostatic VS   07/16/20 10:47:49  98 3 °F (36 8 °C)  91    98/62  74  95 %    Sitting - Orthostatic VS   07/16/20 10:46:48  98 3 °F (36 8 °C)  86  18  97/62  74  93 %    Lying - Orthostatic VS   07/16/20 07:09:50  98 3 °F (36 8 °C)  65  16  105/64  78  93 %       07/16/20 06:24:07    84    104/71  82  94 %       07/16/20 0252              None (Room air)     07/16/20 02:49:20  98 5 °F (36 9 °C)  79  18  104/69  81  94 %       07/15/20 23:05:49  98 7 °F (37 1 °C)  66  18  111/51  71  95 %       07/15/20 18:45:57  98 9 °F (37 2 °C) 74  17  123/80  94  93 %  None (Room air)     07/15/20 15:10:19  98 6 °F (37 °C)  71  20  109/66  80  95 %  None (Room air)     07/15/20 1430    72  24Abnormal   110/63  78  94 %       07/15/20 1330    74  20  111/69  85  95 %       07/15/20 1230    74  16  117/69  88  94 %       07/15/20 1200    70  20  116/69  88  94 %       07/15/20 1130    82  22  114/67  85  95 %       07/15/20 1100    74  16  109/64  81  95 %         Date and Time Eye Opening Best Verbal Response Best Motor Response [place] Coma Scale Score   20 0800 4 5 6 15   20 0252 4 5 6 15   07/15/20 2000 4 5 6 15   07/15/20 1515 4 5 6 15   07/15/20 1005 4 5 6 15     07/15/2020 @ 1225  Chest X:  No acute cardiopulmonary disease      2020 @ 1557  EC, NSR, Prolonged QTc 486    Pertinent Labs/Diagnostic Test Results:   Results from last 7 days   Lab Units 07/15/20  1046   SARS-COV-2  Negative     Results from last 7 days   Lab Units 07/15/20  1037   WBC Thousand/uL 9 50   HEMOGLOBIN g/dL 13 0   HEMATOCRIT % 38 7   PLATELETS Thousands/uL 203   NEUTROS ABS Thousands/µL 7 15     Results from last 7 days   Lab Units 07/15/20  1037   SODIUM mmol/L 136   POTASSIUM mmol/L 3 7   CHLORIDE mmol/L 103   CO2 mmol/L 24   ANION GAP mmol/L 9   BUN mg/dL 8   CREATININE mg/dL 0 76   EGFR ml/min/1 73sq m 103   CALCIUM mg/dL 9 0     Results from last 7 days   Lab Units 07/15/20  1037   GLUCOSE RANDOM mg/dL 134     Results from last 7 days   Lab Units 07/15/20  1319 07/15/20  1037   TROPONIN I ng/mL 0 05* 0 06*     ED Treatment:   Medication Administration from 07/15/2020 0940 to 07/15/2020 1508     None        Past Medical History:   Diagnosis Date    Bariatric surgery status     CPAP (continuous positive airway pressure) dependence     Hearing loss of aging     Hypercholesterolemia     Morbid obesity ([facility] )     NSTEMI (non-ST elevation myocardial infarction) [facility])     2019    Postsurgical malabsorption      Present on Admission:   Orthostatic hypotension   ROSI (obstructive sleep apnea)   NSTEMI (non-ST elevated myocardial infarction) (HCC)   Migraine with aura and without status migrainosus, not intractable   Lightheadedness   Bipolar disorder, current episode mixed, moderate ([address] )   Coronary artery disease involving native coronary artery of native heart with unstable angina pectoris (HCC)   Hypercholesterolemia   Myoclonic jerking   Syncope    Admitting Diagnosis: Hypotension [I95 9]  Hx of bariatric surgery [Z98 84]  Hx of non-ST elevation myocardial infarction (NSTEMI) [I25 2]  Age/Sex: 48 y o  male  Admission Orders:  Scheduled Medications:  Medications:  acetaminophen 975 mg Oral Q8H   atorvastatin 40 mg Oral Daily   docusate sodium 100 mg Oral BID   fludrocortisone 0 2 mg Oral Daily   midodrine 2 5 mg Oral TID AC   nicotine 14 mg Transdermal Daily   OXcarbazepine 150 mg Oral BID   pantoprazole 20 mg Oral Early Morning   senna 1 tablet Oral Daily   traZODone 300 mg Oral HS   venlafaxine 75 mg Oral BID With Meals   Continuous IV Infusions:   PRN Meds:  acetaminophen 650 mg Oral Q6H PRN   aluminum-magnesium hydroxide-simethicone 30 mL Oral Q6H PRN   calcium carbonate 1,000 mg Oral Daily PRN   clonazePAM 0 25 mg Oral Daily PRN   meclizine 25 mg Oral TID PRN   ondansetron 4 mg Intravenous Q6H PRN   SUMAtriptan 25 mg Oral Once PRN     Telemetry  Apply knee high compression stockings  IP CONSULT TO NEUROLOGY  IP CONSULT TO CARDIOLOGY    Network Utilization Review Department  [email or web address]  ATTENTION: Please call with any questions or concerns to [phone] and carefully listen to the prompts so that you are directed to the right person  All voicemails are confidential   Ty Limb all requests for admission clinical reviews, approved or denied determinations and any other requests to dedicated fax number below belonging to the campus where the patient is receiving treatment   List of dedicated fax numbers for the Facilities:  [address] [place] [address] DENIALS (Administrative/Medical Necessity) [phone]   [address]  (Maternity/NICU/Pediatrics) [phone]   Ivanna Flatten [phone]   Rodrigo Agudelo [phone]   Shania Solorzanojudy [phone]   Jignesh Wang [address] [phone]   [facility]  [phone]   [address] [place] [address], [address]  [address] [facility] [phone]

## 2020-07-16 NOTE — ASSESSMENT & PLAN NOTE
Patient has bipolar disorder  On monthly injection of 234 mg Invega  Continue with mood stabilizer, trazodone, trileptal and Effexor

## 2020-07-16 NOTE — PHYSICAL THERAPY NOTE
Physical Therapy Evaluation     Patient's Name: Genny Chase    Admitting Diagnosis  Hypotension [I95 9]  Hx of bariatric surgery [Z98 84]  Hx of non-ST elevation myocardial infarction (NSTEMI) [I25 2]    Problem List  Patient Active Problem List   Diagnosis    [facility]    Bipolar disorder, current episode mixed, moderate ([facility] )    ROSI (obstructive sleep apnea)    Coronary artery disease involving native coronary artery of native heart with unstable angina pectoris ([facility] )    Former smoker    Migraine with aura and without status migrainosus, not intractable    Benign paroxysmal positional vertigo due to bilateral vestibular disorder    Hypercholesterolemia    Bariatric surgery status    Primary insomnia    Overweight    Encounter for surgical aftercare following surgery of digestive system    Postsurgical malabsorption    Lightheadedness    Pre-diabetes    Tobacco use    Orthostatic hypotension    Myoclonic jerking    Syncope       Past Medical History  Past Medical History:   Diagnosis Date    Bariatric surgery status     CPAP (continuous positive airway pressure) dependence     Hearing loss of aging     Hypercholesterolemia     Morbid obesity ([facility] )     NSTEMI (non-ST elevation myocardial infarction) [facility])     april 2019    Postsurgical malabsorption        Past Surgical History  Past Surgical History:   Procedure Laterality Date    CARDIAC CATHETERIZATION      no stents     COLONOSCOPY      EGD      HERNIA REPAIR      umbilical hernia    [place] LAP GASTRIC BYPASS/FRITZ-EN-Y N/A 3/2/2020    Procedure: LAPAROSCOPIC FRITZ-EN-Y GASTRIC BYPASS AND INTRAOPERATIVE EGD;  Surgeon: Paddy Taveras MD;  [facility];  Service: Bariatrics    SKIN SURGERY      cyst removed from back and thumb    TONSILLECTOMY      TOOTH EXTRACTION          07/16/20 1030   Note Type   Note type Eval only   Pain Assessment   Pain Assessment Tool Pain Assessment not indicated - pt denies pain   Home Living   Type of [address]  (3STE)   Bathroom Shower/Tub Tub/shower unit   Bathroom Toilet Standard   Bathroom Equipment   (denies)   Home Equipment   (denies)   Prior Function   Level of [place] Independent with ADLs and functional mobility   Lives With Spouse; Other (Comment)  (3 adult children)   ADL Assistance Independent   IADLs Independent   Falls in the last 6 months 0   Vocational Full time employment   Restrictions/Precautions   Weight Bearing Precautions Per Order No   Other Precautions Seizure   General   Family/Caregiver Present No   Cognition   Overall Cognitive Status WFL   Arousal/Participation Alert   Orientation Level Oriented X4   Memory Within functional limits   Following Commands Follows all commands and directions without difficulty   RLE Assessment   RLE Assessment WFL   LLE Assessment   LLE Assessment WFL   Coordination   Movements are Fluid and Coordinated 1   Bed Mobility   Supine to Sit 7  Independent   Additional Comments Supine in bed upon PT arrival   BP stable with all change of positions, pt denies light headedness/dizziness  Pt left upright in bedside chair with all needs in reach at end of therapy session  Transfers   Sit to Stand 7  Independent   Stand to Sit 7  Independent   Additional Comments Tranfers without AD     Ambulation/Elevation   Gait pattern WNL   Gait Assistance 5  Supervision   Assistive Device None   Distance 150 ft x 2   Balance   Static Sitting Good   Dynamic Sitting Fair +   Static Standing Fair +   Dynamic Standing Fair +   Ambulatory Fair +   Endurance Deficit   Endurance Deficit No   Activity Tolerance   Activity Tolerance Patient tolerated treatment well   Medical Staff Made Aware SPT Eliana   Nurse Made Aware RN cleared pt to be seen by PT   Assessment   Prognosis Good   Assessment Pt seen for moderate complexity PT evaluation due to decrease in functional mobility status compared to baseline  Pt with active PT eval/treat and ambulate pt orders at this time  Pt is a 48 y o  yo M who presented to [facility] with syncope and orthostatic hypotension on 7/15/20  Pt with multiple near syncopal events recently  Pt  has a past medical history of Bariatric surgery status, CPAP (continuous positive airway pressure) dependence, Hearing loss of aging, Hypercholesterolemia, [facility] ), NSTEMI (non-ST elevation myocardial infarction) ([facility] ), and Postsurgical malabsorption  [facility]  Pt requires supervision for ambulation without AD and is independent with transfers and bed mobility at this time  Pt left upright in bedside chair with all needs in reach  Pt will benefit from skilled therapy in order to address current impairments and functional limitations  PT to DC pt as pt has no further acute skilled PT needs and recommending continued participation in functional mobility with staff for safety and home with family support once medically cleared     Barriers to Discharge None   Goals   Patient Goals to go home   Recommendation   PT Discharge Recommendation Return to previous environment with social support   Equipment Recommended   (none)   PT - OK to Discharge Yes  (once medically cleared)   Modified [place] Scale   Modified [place] Scale 2           Candy Martínez, PT, DPT    Physical Therapy Evaluation     Patient's Name: Sarika Hines    Admitting Diagnosis  Hypotension [I95 9]  Hx of bariatric surgery [Z98 84]  Hx of non-ST elevation myocardial infarction (NSTEMI) [I25 2]    Problem List  Patient Active Problem List   Diagnosis    NSTEM[facility] (non-ST elevated myocardial infarction) [facility]    Bipolar disorder, current episode mixed, moderate ([facility] )    ROSI (obstructive sleep apnea)    Coronary artery disease involving native coronary artery of native heart with unstable angina pectoris ([facility] )    Former smoker    Migraine with aura and without status migrainosus, not intractable    Benign paroxysmal positional vertigo due to bilateral vestibular disorder    Hypercholesterolemia    Bariatric surgery status    Primary insomnia    Overweight    Encounter for surgical aftercare following surgery of digestive system    Postsurgical malabsorption    Lightheadedness    Pre-diabetes    Tobacco use    Orthostatic hypotension    Myoclonic jerking    Syncope       Past Medical History  Past Medical History:   Diagnosis Date    Bariatric surgery status     CPAP (continuous positive airway pressure) dependence     Hearing loss of aging     Hypercholesterolemia     Morbid obesity ([address] )     NSTEMI (non-ST elevation myocardial infarction) [facility])     april 2019    Postsurgical malabsorption        Past Surgical History  Past Surgical History:   Procedure Laterality Date    CARDIAC CATHETERIZATION      no stents     COLONOSCOPY      EGD      HERNIA REPAIR      umbilical hernia    [place] LAP GASTRIC BYPASS/FRITZ-EN-Y N/A 3/2/2020    Procedure: LAPAROSCOPIC FRITZ-EN-Y GASTRIC BYPASS AND INTRAOPERATIVE EGD;  Surgeon: Ramirez Valentino MD;  [facility];  Service: Bariatrics    SKIN SURGERY      cyst removed from back and thumb    TONSILLECTOMY      TOOTH EXTRACTION          07/16/20 1030   Note Type   Note type Eval only   Pain Assessment   Pain Assessment Tool Pain Assessment not indicated - pt denies pain   Home Living   Type of Home House   Home Layout Multi-level  (3STE)   Bathroom Shower/Tub Tub/shower unit   Bathroom Toilet Standard   Bathroom Equipment   (denies)   Home Equipment   (denies)   Prior Function   Level of [place] Independent with ADLs and functional mobility   Lives With Spouse; Other (Comment)  (3 adult children)   ADL Assistance Independent   IADLs Independent   Falls in the last 6 months 0   Vocational Full time employment   Restrictions/Precautions   Weight Bearing Precautions Per Order No   Other Precautions Seizure   General   Family/Caregiver Present No   Cognition   Overall Cognitive Status WFL   Arousal/Participation Alert   Orientation Level Oriented X4   Memory Within functional limits   Following Commands Follows all commands and directions without difficulty   RLE Assessment   RLE Assessment WFL   LLE Assessment   LLE Assessment WFL   Coordination   Movements are Fluid and Coordinated 1   Bed Mobility   Supine to Sit 7  Independent   Additional Comments Supine in bed upon PT arrival   BP stable with all change of positions, pt denies light headedness/dizziness  Pt left upright in bedside chair with all needs in reach at end of therapy session  Transfers   Sit to Stand 7  Independent   Stand to Sit 7  Independent   Additional Comments Tranfers without AD  Ambulation/Elevation   Gait pattern WNL   Gait Assistance 5  Supervision   Assistive Device None   Distance 150 ft x 2   Balance   Static Sitting Good   Dynamic Sitting Fair +   Static Standing Fair +   Dynamic Standing Fair +   Ambulatory Fair +   Endurance Deficit   Endurance Deficit No   Activity Tolerance   Activity Tolerance Patient tolerated treatment well   Medical Staff Made Aware SPT Eliana   Nurse Made Aware RN cleared pt to be seen by PT   Assessment   Prognosis Good   Assessment Pt seen for moderate complexity PT evaluation due to decrease in functional mobility status compared to baseline  Pt with active PT eval/treat and ambulate pt orders at this time  Pt is a 48 y o  yo M who presented to [facility] with syncope and orthostatic hypotension on 7/15/20  Pt with multiple near syncopal events recently  Pt  has a past medical history of Bariatric surgery status, CPAP (continuous positive airway pressure) dependence, Hearing loss of aging, Hypercholesterolemia, Morbid obesity ([address] ), NSTEMI (non-ST elevation myocardial infarction) ([address] ), and Postsurgical malabsorption    [facility]  Pt requires supervision for ambulation without AD and is independent with transfers and bed mobility at this time  Pt left upright in bedside chair with all needs in reach  Pt denies any concerns regarding mobility upon DC  PT to DC pt as pt has no further acute skilled PT needs and recommending continued participation in functional mobility with staff for safety and home with family support once medically cleared     Barriers to Discharge None   Goals   Patient Goals to go home   Recommendation   PT Discharge Recommendation Return to previous environment with social support   Equipment Recommended   (none)   PT - OK to Discharge Yes  (once medically cleared)   Modified [place] Scale   Modified [place] Scale 2           Ginette Martínez, PT, DPT

## 2020-07-16 NOTE — ASSESSMENT & PLAN NOTE
Upper extremities drinking 4 seconds noted by his wife  Patient denied losing consciousness   Likely secondary to orthostatic hypotension versus vasovagal response  Neurology No further work up needed from Neuro standpoint

## 2020-07-16 NOTE — PLAN OF CARE
Problem: Potential for Falls  Goal: Patient will remain free of falls  Description  INTERVENTIONS:  - Assess patient frequently for physical needs  -  Identify cognitive and physical deficits and behaviors that affect risk of falls    -  [place] fall precautions as indicated by assessment   - Educate patient/family on patient safety including physical limitations  - Instruct patient to call for assistance with activity based on assessment  - Modify environment to reduce risk of injury  - Consider OT/PT consult to assist with strengthening/mobility  Outcome: Progressing     Problem: NEUROSENSORY - ADULT  Goal: Achieves stable or improved neurological status  Description  INTERVENTIONS  - Monitor and report changes in neurological status  - Monitor vital signs such as temperature, blood pressure, glucose, and any other labs ordered   - Initiate measures to prevent increased intracranial pressure  - Monitor for seizure activity and implement precautions if appropriate      Outcome: Progressing     Problem: CARDIOVASCULAR - ADULT  Goal: Maintains optimal cardiac output and hemodynamic stability  Description  INTERVENTIONS:  - Monitor I/O, vital signs and rhythm  - Monitor for S/S and trends of decreased cardiac output  - Administer and titrate ordered vasoactive medications to optimize hemodynamic stability  - Assess quality of pulses, skin color and temperature  - Assess for signs of decreased coronary artery perfusion  - Instruct patient to report change in severity of symptoms  Outcome: Progressing  Goal: Absence of cardiac dysrhythmias or at baseline rhythm  Description  INTERVENTIONS:  - Continuous cardiac monitoring, vital signs, obtain 12 lead EKG if ordered  - Administer antiarrhythmic and heart rate control medications as ordered  - Monitor electrolytes and administer replacement therapy as ordered  Outcome: Progressing     Problem: GASTROINTESTINAL - ADULT  Goal: Maintains adequate nutritional intake  Description  INTERVENTIONS:  - Monitor percentage of each meal consumed  - Identify factors contributing to decreased intake, treat as appropriate  - Assist with meals as needed  - Monitor I&O, weight, and lab values if indicated  - Obtain nutrition services referral as needed  Outcome: Progressing     Problem: METABOLIC, FLUID AND ELECTROLYTES - ADULT  Goal: Electrolytes maintained within normal limits  Description  INTERVENTIONS:  - Monitor labs and assess patient for signs and symptoms of electrolyte imbalances  - Administer electrolyte replacement as ordered  - Monitor response to electrolyte replacements, including repeat lab results as appropriate  - Instruct patient on fluid and nutrition as appropriate  Outcome: Progressing  Goal: Fluid balance maintained  Description  INTERVENTIONS:  - Monitor labs   - Monitor I/O and WT  - Instruct patient on fluid and nutrition as appropriate  - Assess for signs & symptoms of volume excess or deficit  Outcome: Progressing     Problem: HEMATOLOGIC - ADULT  Goal: Maintains hematologic stability  Description  INTERVENTIONS  - Assess for signs and symptoms of bleeding or hemorrhage  - Monitor labs  - Administer supportive blood products/factors as ordered and appropriate  Outcome: Progressing

## 2020-07-16 NOTE — UTILIZATION REVIEW
Notification of Inpatient Admission/Inpatient Authorization Request   This is a Notification of Inpatient Admission for [address]  Be advised that this patient was admitted to our facility under Inpatient Status  Contact Chandan Shukla at [phone] for additional admission information  [facility]  DEDICATED [phone]  Patient Name:   Leodan Gregory   YOB: 1965       [address]   [address]:   H[place] James  Tax ID: [id number]  NPI: [id number] Attending Provider/NPI:  Phone:  Address: Glenny Bassett [[id number]]  [phone]  Same as Facility   Place of Service Code: 24 Place of Service Name:  [address] [place] [address]   Start Date: 7/15/20 1210 Discharge Date & Time: No discharge date for patient encounter  Type of Admission: Inpatient Status Discharge Disposition (if discharged): Home/Self Care   Patient Diagnoses: Hypotension [I95 9]  Hx of bariatric surgery [Z98 84]  Hx of non-ST elevation myocardial infarction (NSTEMI) [I25 2]     Orders: Admission Orders (From admission, onward)     Ordered        07/15/20 1210  Inpatient Admission  Once                    Assigned Utilization Review Contact: Chandan Shukla  Utilization ,   Network Utilization Review Department  Phone: [phone]; Fax [phone]   Email: [email or web address]   ATTENTION PAYERS: Please call the assigned Utilization  directly with any questions or concerns ALL voicemails in the department are confidential  Send all requests for admission clinical reviews, approved or denied determinations and any other requests to dedicated fax number belonging to the campus where the patient is receiving treatment

## 2020-07-17 ENCOUNTER — TRANSITIONAL CARE MANAGEMENT (OUTPATIENT)
Dept: FAMILY MEDICINE CLINIC | Facility: CLINIC | Age: 55
End: 2020-07-17

## 2020-07-17 PROCEDURE — 93306 TTE W/DOPPLER COMPLETE: CPT | Performed by: INTERNAL MEDICINE

## 2020-07-20 ENCOUNTER — OFFICE VISIT (OUTPATIENT)
Dept: FAMILY MEDICINE CLINIC | Facility: CLINIC | Age: 55
End: 2020-07-20

## 2020-07-20 VITALS
DIASTOLIC BLOOD PRESSURE: 68 MMHG | TEMPERATURE: 96.3 F | OXYGEN SATURATION: 95 % | SYSTOLIC BLOOD PRESSURE: 100 MMHG | WEIGHT: 206 LBS | RESPIRATION RATE: 16 BRPM | HEART RATE: 102 BPM | BODY MASS INDEX: 27.9 KG/M2 | HEIGHT: 72 IN

## 2020-07-20 DIAGNOSIS — F31.62 BIPOLAR DISORDER, CURRENT EPISODE MIXED, MODERATE (HCC): ICD-10-CM

## 2020-07-20 DIAGNOSIS — I95.1 ORTHOSTATIC HYPOTENSION: ICD-10-CM

## 2020-07-20 DIAGNOSIS — R55 NEAR SYNCOPE: Primary | ICD-10-CM

## 2020-07-20 DIAGNOSIS — K91.2 POSTSURGICAL MALABSORPTION: ICD-10-CM

## 2020-07-20 DIAGNOSIS — I25.110 CORONARY ARTERY DISEASE INVOLVING NATIVE CORONARY ARTERY OF NATIVE HEART WITH UNSTABLE ANGINA PECTORIS (HCC): ICD-10-CM

## 2020-07-20 PROCEDURE — 1111F DSCHRG MED/CURRENT MED MERGE: CPT | Performed by: NURSE PRACTITIONER

## 2020-07-20 PROCEDURE — 99495 TRANSJ CARE MGMT MOD F2F 14D: CPT | Performed by: NURSE PRACTITIONER

## 2020-07-20 NOTE — ASSESSMENT & PLAN NOTE
Orthostatic BP measurements are normal   There is no significant decrease in his BP, although he is mildly hypotensive at rest   He has a f/u with cardiology tomorrow

## 2020-07-20 NOTE — PROGRESS NOTES
Assessment/Plan:    1  Near syncope    2  Postsurgical malabsorption  Assessment & Plan:  Has f/u with his bariatric surgery in September      3  Coronary artery disease involving native coronary artery of native heart with unstable angina pectoris [facility])  Assessment & Plan:  Managed by cardiology  On Atorvastatin 40mg      4  Bipolar disorder, current episode mixed, moderate ([address] )  Assessment & Plan:  As per psychiatry  There have been no recent medication adjustments      5  Orthostatic hypotension  Assessment & Plan:  Orthostatic BP measurements are normal   There is no significant decrease in his BP, although he is mildly hypotensive at rest   He has a f/u with cardiology tomorrow            There are no Patient Instructions on file for this visit  Return in about 1 month (around 8/20/2020) for Annual physical     Subjective:      Patient ID: Peng Porter is a 48 y o  male  Chief Complaint   Patient presents with    Transition of Care Management     TCM for hypotension [id number]        Here today for hospital follow up  He was admitted for orthostatic hypotension  He has lost 100 pounds since his bariatric surgery in March of this year  He has been experiencing dizziness with positional changes associated with near syncopal episodes  He has never lost consciousness during one of these events  He was admitted to hospital for observation and testing  He was started on Fludrocortisone in addition to Midodrine  He has been monitoring his BP at home and SBP has been running around 100  He has occasional episodes of dizziness, although significantly improved  He has an appt with cardiology for tomorrow           TCM Call (since 6/19/2020)     Date and time call was made  7/17/2020  9:47 AM    Hospital care reviewed  Records reviewed    Patient was hospitialized at  [facility]    Date of Admission  07/15/20    Date of discharge  07/16/20    Diagnosis  Near Syncope    Disposition  Home Were the patients medications reviewed and updated  Yes    Current Symptoms  Dizziness    Dizziness severity  Mild    Episode pattern  Intermittent      TCM Call (since 6/19/2020)     Post hospital issues  None    Should patient be enrolled in anticoag monitoring? No    Scheduled for follow up? Yes    Patients specialists  Cardiologist    Cardiologist name  Murray Van MD (Cardiology) in 5 days (7/21/2020)     Did you obtain your prescribed medications  Yes    Do you need help managing your prescriptions or medications  No    Is transportation to your appointment needed  No    I have advised the patient to call PCP with any new or worsening symptoms  [facility] other    Support System  Family    The type of support provided  Physical; Emotional    Do you have social support  Yes, as much as I need    Are you recieving any outpatient services  No    Are you recieving home care services  No    Interperter language line needed  No    Counseling  Patient    Counseling topics  Activities of daily living; Importance of RX compliance; patient and family education; instructions for management; Risk factor reduction    Comments  Darío Nino states that he is doing fine  He has mild intermittent dizziness but feels over all improvement  He knows to go to the ER if any chest pain, dyspnea, weakness, etc Taylor Art LPN        The following portions of the patient's history were reviewed and updated as appropriate: allergies, current medications, past family history, past medical history, past social history, past surgical history and problem list     Review of Systems   Constitutional: Negative for chills, fatigue and fever  Respiratory: Negative for cough, shortness of breath and wheezing  Cardiovascular: Negative for chest pain, palpitations and leg swelling  Gastrointestinal: Negative for abdominal pain, diarrhea, nausea and vomiting  Skin: Negative for rash  Neurological: Positive for dizziness and light-headedness  Negative for headaches  Current Outpatient Medications   Medication Sig Dispense Refill    acetaminophen (TYLENOL) 325 mg tablet Take 3 tablets (975 mg total) by mouth every 8 (eight) hours 30 tablet 0    atorvastatin (LIPITOR) 40 mg tablet Take 1 tablet by mouth once daily 90 tablet 0    clonazePAM (KlonoPIN) 1 mg tablet as needed       fludrocortisone (FLORINEF) 0 1 mg tablet Take 2 tablets (0 2 mg total) by mouth daily 60 tablet 0    midodrine (PROAMATINE) 2 5 mg tablet Take 1 tablet (2 5 mg total) by mouth 3 (three) times a day before meals 30 tablet 0    OXcarbazepine (TRILEPTAL) 150 mg tablet Take 150 mg by mouth 2 (two) times a day       paliperidone palmitate ER (Invega Sustenna) 234 mg/1 5 mL IM injection Inject 234 mg into a muscle every 30 (thirty) days      pantoprazole (PROTONIX) 20 mg tablet Take 1 tablet (20 mg total) by mouth daily 30 tablet 2    traZODone (DESYREL) 300 MG tablet Take 300 mg by mouth daily at bedtime      venlafaxine (EFFEXOR) 75 mg tablet Take 75 mg by mouth daily        No current facility-administered medications for this visit  Objective:    /68   Pulse 102   Temp (!) 96 3 °F (35 7 °C)   Resp 16   Ht 6' (1 829 m)   Wt 93 4 kg (206 lb)   SpO2 95%   BMI 27 94 kg/m²        Physical Exam   Constitutional: He appears well-developed and well-nourished  HENT:   Head: Normocephalic and atraumatic  Right Ear: Tympanic membrane, external ear and ear canal normal    Left Ear: Tympanic membrane, external ear and ear canal normal    Nose: No mucosal edema or rhinorrhea  Mouth/Throat: Uvula is midline, oropharynx is clear and moist and mucous membranes are normal    Eyes: Conjunctivae are normal    Neck: Neck supple  No edema present  No thyromegaly present  Cardiovascular: Normal rate, regular rhythm, normal heart sounds and intact distal pulses  No murmur heard    Pulmonary/Chest: Effort normal and breath sounds normal    Abdominal: Bowel sounds are normal  He exhibits no distension  There is no splenomegaly or hepatomegaly  There is no tenderness  Lymphadenopathy:        Right cervical: No superficial cervical adenopathy present  Left cervical: No superficial cervical adenopathy present  Skin: Skin is warm, dry and intact  No rash noted  Psychiatric: He has a normal mood and affect  Nursing note and vitals reviewed               Evlyn Wendy

## 2020-07-21 ENCOUNTER — OFFICE VISIT (OUTPATIENT)
Dept: CARDIOLOGY CLINIC | Facility: CLINIC | Age: 55
End: 2020-07-21
Payer: COMMERCIAL

## 2020-07-21 VITALS
BODY MASS INDEX: 28.5 KG/M2 | WEIGHT: 210.4 LBS | OXYGEN SATURATION: 96 % | DIASTOLIC BLOOD PRESSURE: 60 MMHG | TEMPERATURE: 97 F | HEIGHT: 72 IN | SYSTOLIC BLOOD PRESSURE: 108 MMHG | HEART RATE: 86 BPM

## 2020-07-21 DIAGNOSIS — E78.5 HYPERLIPIDEMIA, UNSPECIFIED HYPERLIPIDEMIA TYPE: ICD-10-CM

## 2020-07-21 DIAGNOSIS — R00.2 PALPITATIONS: ICD-10-CM

## 2020-07-21 DIAGNOSIS — I95.89 OTHER SPECIFIED HYPOTENSION: Primary | ICD-10-CM

## 2020-07-21 PROCEDURE — 3008F BODY MASS INDEX DOCD: CPT | Performed by: NURSE PRACTITIONER

## 2020-07-21 PROCEDURE — 1111F DSCHRG MED/CURRENT MED MERGE: CPT | Performed by: NURSE PRACTITIONER

## 2020-07-21 PROCEDURE — 99214 OFFICE O/P EST MOD 30 MIN: CPT | Performed by: NURSE PRACTITIONER

## 2020-07-21 NOTE — PATIENT INSTRUCTIONS
Ensure you are drinking up to 2 liters of fluid daily, liberate salt intake  Wear thigh high compression stockings    Call if SBP < 100 or worsening lightheadedness or dizziness

## 2020-07-21 NOTE — LETTER
July 21, 2020     Patient: Twyla Child   YOB: 1965   Date of Visit: 7/21/2020       To Whom it May Concern:    Harsh Pena is under my professional care  He was seen in my office on 7/21/2020  He may return to work on 7/23/20   If you have any questions or concerns, please don't hesitate to call           Sincerely,          EMRE Sheppard        CC: No Recipients

## 2020-07-23 DIAGNOSIS — I95.1 ORTHOSTATIC HYPOTENSION: ICD-10-CM

## 2020-07-23 RX ORDER — MIDODRINE HYDROCHLORIDE 2.5 MG/1
2.5 TABLET ORAL
Qty: 30 TABLET | Refills: 0 | OUTPATIENT
Start: 2020-07-23

## 2020-07-25 DIAGNOSIS — I95.1 ORTHOSTATIC HYPOTENSION: ICD-10-CM

## 2020-07-25 RX ORDER — MIDODRINE HYDROCHLORIDE 2.5 MG/1
2.5 TABLET ORAL
Qty: 30 TABLET | Refills: 0 | Status: SHIPPED | OUTPATIENT
Start: 2020-07-25 | End: 2020-08-05 | Stop reason: SDUPTHER

## 2020-07-27 ENCOUNTER — TELEPHONE (OUTPATIENT)
Dept: BEHAVIORAL/MENTAL HEALTH CLINIC | Facility: CLINIC | Age: 55
End: 2020-07-27

## 2020-07-31 ENCOUNTER — TELEPHONE (OUTPATIENT)
Dept: FAMILY MEDICINE CLINIC | Facility: CLINIC | Age: 55
End: 2020-07-31

## 2020-07-31 ENCOUNTER — OFFICE VISIT (OUTPATIENT)
Dept: PSYCHIATRY | Facility: CLINIC | Age: 55
End: 2020-07-31
Payer: COMMERCIAL

## 2020-07-31 DIAGNOSIS — F31.89: Primary | ICD-10-CM

## 2020-07-31 PROCEDURE — 99214 OFFICE O/P EST MOD 30 MIN: CPT | Performed by: NURSE PRACTITIONER

## 2020-07-31 PROCEDURE — 1111F DSCHRG MED/CURRENT MED MERGE: CPT | Performed by: NURSE PRACTITIONER

## 2020-07-31 NOTE — PSYCH
Invega Sustenna 234 mg IM was administered to the R deltoid, No complications or side effects noted  Follow up in one month    Subjective:     Patient ID: Lucy Lackey is a 48 y o  male  was seen today  Invega Sustenna 234 mg IM was administer  No site reaction or other side effects notes  HPI ROS Appetite Changes and Sleep: normal appetite    Review Of Systems:     Mood Normal   Behavior Normal    Thought Content Normal   General Normal    Personality Normal   Other Psych Symptoms Normal   Constitutional Negative   ENT Hearing loss   Cardiovascular Hx of MI   Respiratory Negative   Gastrointestinal bariatric surgery, hyperlipidemia   Genitourinary Negative   Musculoskeletal Negative   Integumentary Negative   Neurological Negative   Endocrine Normal    Other Symptoms post surgical malabsorption             Laboratory Results: No results found for this or any previous visit      Substance Abuse History:  Social History     Substance and Sexual Activity   Drug Use Never       Family Psychiatric History:   Family History   Problem Relation Age of Onset    Lung cancer Mother     Brain cancer Mother     Diabetes Brother     Bipolar disorder Maternal Grandfather        The following portions of the patient's history were reviewed and updated as appropriate: allergies, current medications, past family history, past medical history, past social history, past surgical history and problem list     Social History     Socioeconomic History    Marital status: /Civil Union     Spouse name: Not on file    Number of children: Not on file    Years of education: Not on file    Highest education level: Not on file   Occupational History    Not on file   Social Needs    Financial resource strain: Not on file    Food insecurity:     Worry: Not on file     Inability: Not on file    Transportation needs:     Medical: Not on file     Non-medical: Not on file   Tobacco Use    Smoking status: Former Smoker Types: Cigarettes     Last attempt to quit:      Years since quittin 5    Smokeless tobacco: Current User     Types: Chew     Last attempt to quit: 2014    Tobacco comment: quit cigarettes    Substance and Sexual Activity    Alcohol use: Not Currently     Frequency: Never    Drug use: Never    Sexual activity: Not on file   Lifestyle    Physical activity:     Days per week: Not on file     Minutes per session: Not on file    Stress: Not on file   Relationships    Social connections:     Talks on phone: Not on file     Gets together: Not on file     Attends Christianity service: Not on file     Active member of club or organization: Not on file     Attends meetings of clubs or organizations: Not on file     Relationship status: Not on file    Intimate partner violence:     Fear of current or ex partner: Not on file     Emotionally abused: Not on file     Physically abused: Not on file     Forced sexual activity: Not on file   Other Topics Concern    Not on file   Social History Narrative    Former smoker - As per [employer]    Full-time employment        · Do you currently or have you served in [employer] [facility]:   No    As per [address] History     Social History Narrative    Former smoker - As per [employer]    Full-time employment        · Do you currently or have you served in [employer] [facility] 57:   No    As per [facility]        Objective:       Mental status:  Appearance calm and cooperative , adequate hygiene and grooming and good eye contact    Mood euthymic   Affect affect appropriate    Speech a normal rate   Thought Processes normal thought processes   Hallucinations no hallucinations present    Thought Content no delusions   Abnormal Thoughts no suicidal thoughts  and no homicidal thoughts    Orientation  oriented to person and place and time   Remote Memory short term memory intact   Attention Span concentration intact   Intellect Appears to be of Average Intelligence Insight Insight intact   Judgement judgment was intact   Muscle Strength Muscle strength and tone were normal   Language no difficulty naming common objects and no difficulty repeating a phrase    Fund of Knowledge displays adequate knowledge of current events, adequate fund of knowledge regarding past history and adequate fund of knowledge regarding vocabulary    Pain none   Pain Scale 0       Assessment/Plan:       Diagnoses and all orders for this visit:    Manic affective disorder, recurrent episode, moderate degree ([facility] [address] )            Treatment Recommendations- Risks Benefits      Immediate Medical/Psychiatric/Psychotherapy Treatments and Any Precautions: Continue present medications as prescribed    Goals discussed in session:Maintain current level of functioning

## 2020-07-31 NOTE — BH TREATMENT PLAN
TREATMENT PLAN (Medication Management Only)        [place]    Name and Date of Birth:  Hossein Gates 48 y o  1965  Date of Treatment Plan: July 31, 2020  Diagnosis/Diagnoses:    1  Manic affective disorder, recurrent episode, moderate degree (HCC)      Strengths/Personal Resources for Self-Care: supportive family, supportive friends, taking medications as prescribed, ability to communicate needs  Area/Areas of need (in own words): anxiety symptoms, depressive symptoms, mood swings  1  Long Term Goal: maintain mood stability  Target Date: 2 months - 9/30/2020  Person/Persons responsible for completion of goal: Chandler  2  Short Term Objective (s) - How will we reach this goal?:   A  Provider new recommended medication/dosage changes and/or continue medication(s): continue current medications as prescribed  B  N/A   C  N/A  Target Date: 3 months - 10/31/2020  Person/Persons Responsible for Completion of Goal: Chandler  Progress Towards Goals: continuing treatment  Treatment Modality: medication management every 1 months  Review due 90 to 120 days from date of this plan: 3 months - 10/31/2020  Expected length of service: ongoing treatment  My Physician/PA/NP and I have developed this plan together and I agree to work on the goals and objectives  I understand the treatment goals that were developed for my treatment

## 2020-07-31 NOTE — TELEPHONE ENCOUNTER
Patient called with BP reading for Dr Josesito Canchola  He was told to call, he stated this is high for him      Reading was taken at this morning (7/31) 8:45am 138/78

## 2020-08-05 DIAGNOSIS — I95.1 ORTHOSTATIC HYPOTENSION: ICD-10-CM

## 2020-08-05 RX ORDER — MIDODRINE HYDROCHLORIDE 2.5 MG/1
2.5 TABLET ORAL
Qty: 90 TABLET | Refills: 1 | Status: SHIPPED | OUTPATIENT
Start: 2020-08-05 | End: 2020-09-28 | Stop reason: SDUPTHER

## 2020-08-06 ENCOUNTER — TELEPHONE (OUTPATIENT)
Dept: FAMILY MEDICINE CLINIC | Facility: CLINIC | Age: 55
End: 2020-08-06

## 2020-08-06 NOTE — TELEPHONE ENCOUNTER
Patient says he is wearing his compression stockings  Patient also says he is drinking plenty of water  He requested an appt to come in for BP check  Patient scheduled for a follow up/BP check for Tuesday night(08/11/20) with Dr ELIN LEOS needed at this time [place], [place]

## 2020-08-06 NOTE — TELEPHONE ENCOUNTER
[facility]    Patient stated he is still having some dizziness when he gets up and down  He said his blood pressure has been running 138/82  He wanted to know what he should do? He was put on bp medication in the hospital for low bp  Please advise

## 2020-08-06 NOTE — TELEPHONE ENCOUNTER
I suppose we can evaluate him however that is a good BP  The problem is he has orthostatic BP so when he is in the action of getting up it drops  Is he wearing his thiogh high compression stockings and he is also to drink a minimum of 2 l of water a day  I would want to know what cardio thinks but I think he is going to have some symptoms upon getting up sometimes  He should be getting up slow to compensate, but the water and the thigh high compression stockings are key    He may also want to increase salt in his diet

## 2020-08-11 ENCOUNTER — OFFICE VISIT (OUTPATIENT)
Dept: FAMILY MEDICINE CLINIC | Facility: CLINIC | Age: 55
End: 2020-08-11
Payer: COMMERCIAL

## 2020-08-11 VITALS
RESPIRATION RATE: 16 BRPM | HEIGHT: 72 IN | HEART RATE: 71 BPM | DIASTOLIC BLOOD PRESSURE: 60 MMHG | SYSTOLIC BLOOD PRESSURE: 100 MMHG | OXYGEN SATURATION: 94 % | WEIGHT: 212 LBS | BODY MASS INDEX: 28.71 KG/M2 | TEMPERATURE: 98.5 F

## 2020-08-11 DIAGNOSIS — I95.1 ORTHOSTATIC HYPOTENSION: Primary | ICD-10-CM

## 2020-08-11 DIAGNOSIS — Z98.84 BARIATRIC SURGERY STATUS: ICD-10-CM

## 2020-08-11 DIAGNOSIS — F31.62 BIPOLAR DISORDER, CURRENT EPISODE MIXED, MODERATE (HCC): ICD-10-CM

## 2020-08-11 PROCEDURE — 99214 OFFICE O/P EST MOD 30 MIN: CPT | Performed by: FAMILY MEDICINE

## 2020-08-11 PROCEDURE — 1111F DSCHRG MED/CURRENT MED MERGE: CPT | Performed by: FAMILY MEDICINE

## 2020-08-11 PROCEDURE — 1036F TOBACCO NON-USER: CPT | Performed by: FAMILY MEDICINE

## 2020-08-11 PROCEDURE — 3008F BODY MASS INDEX DOCD: CPT | Performed by: FAMILY MEDICINE

## 2020-08-11 RX ORDER — FLUDROCORTISONE ACETATE 0.1 MG/1
0.4 TABLET ORAL DAILY
Qty: 120 TABLET | Refills: 0 | Status: SHIPPED | OUTPATIENT
Start: 2020-08-11 | End: 2020-09-19 | Stop reason: SDUPTHER

## 2020-08-11 NOTE — PROGRESS NOTES
Assessment/Plan:    No problem-specific Assessment & Plan notes found for this encounter  Bipolar stable  Cardiac evaluation reviewed in record and normal    Orthostatic symptoms with hypotension, continue fluids/sodium but agreeable to increase florinef 0 2 to 0 3mg and possibly to 0 4mg in 1w if symptomatic and low bp w/o edema    Cont midodrine but could increase in future    Pt understands instructions, has bp machine at home, f/u in office with PCP     Diagnoses and all orders for this visit:    Orthostatic hypotension  -     fludrocortisone (FLORINEF) 0 1 mg tablet; Take 4 tablets (0 4 mg total) by mouth daily    Bipolar disorder, current episode mixed, moderate (HCC)    Bariatric surgery status        Return in about 2 weeks (around 8/25/2020) for Recheck with Dr Carla Ricardo  Subjective:      Patient ID: Martina Patel is a 48 y o  male  Chief Complaint   Patient presents with    Blood Pressure Check     wmcma    Dizziness     started this morning when standing up and moving around       HPI  Still dizzy with movt    Linda position changes, linda kneel to stand  Ok with sit to stand  Drinking water  Convent with salt  Psych did adjust down some meds, mood still good    Was in [facility]  Saw his cardio Dr Glen Bailey  Had echo and holter  Florinef was increased  Midodrine was added    The following portions of the patient's history were reviewed and updated as appropriate: allergies, current medications, past family history, past medical history, past social history, past surgical history and problem list     Review of Systems   Cardiovascular: Negative for chest pain, palpitations and leg swelling  Neurological: Positive for dizziness           Current Outpatient Medications   Medication Sig Dispense Refill    acetaminophen (TYLENOL) 325 mg tablet Take 3 tablets (975 mg total) by mouth every 8 (eight) hours 30 tablet 0    atorvastatin (LIPITOR) 40 mg tablet Take 1 tablet by mouth once daily 90 tablet 0    clonazePAM (KlonoPIN) 1 mg tablet as needed       fludrocortisone (FLORINEF) 0 1 mg tablet Take 4 tablets (0 4 mg total) by mouth daily 120 tablet 0    midodrine (PROAMATINE) 2 5 mg tablet Take 1 tablet (2 5 mg total) by mouth 3 (three) times a day before meals 90 tablet 1    OXcarbazepine (TRILEPTAL) 150 mg tablet Take 150 mg by mouth 2 (two) times a day       paliperidone palmitate ER (Invega Sustenna) 234 mg/1 5 mL IM injection Inject 234 mg into a muscle every 30 (thirty) days      traZODone (DESYREL) 300 MG tablet Take 300 mg by mouth daily at bedtime      venlafaxine (EFFEXOR) 75 mg tablet Take 75 mg by mouth daily       pantoprazole (PROTONIX) 20 mg tablet Take 1 tablet (20 mg total) by mouth daily 30 tablet 2     No current facility-administered medications for this visit  Objective:    /60   Pulse 71   Temp 98 5 °F (36 9 °C)   Resp 16   Ht 6' (1 829 m)   Wt 96 2 kg (212 lb)   SpO2 94%   BMI 28 75 kg/m²     100/60 sit  104/60 stand     Physical Exam  Vitals signs and nursing note reviewed  Constitutional:       Appearance: Normal appearance  He is well-developed  He is not ill-appearing or diaphoretic  HENT:      Head: Normocephalic  Right Ear: Tympanic membrane normal       Left Ear: Tympanic membrane normal       Nose: No rhinorrhea  Mouth/Throat:      Pharynx: No posterior oropharyngeal erythema  Eyes:      Conjunctiva/sclera: Conjunctivae normal    Neck:      Musculoskeletal: Neck supple  Vascular: No carotid bruit  Cardiovascular:      Rate and Rhythm: Normal rate and regular rhythm  Pulmonary:      Effort: Pulmonary effort is normal  No respiratory distress  Breath sounds: No rales  Abdominal:      General: There is no distension  Palpations: Abdomen is soft  Musculoskeletal:         General: No deformity  Right lower leg: No edema  Left lower leg: No edema  Skin:     General: Skin is warm and dry     Neurological: Mental Status: He is alert  Psychiatric:         Behavior: Behavior normal          Thought Content:  Thought content normal                 Meka Rodriguez, DO

## 2020-08-11 NOTE — PATIENT INSTRUCTIONS
Please increase florinef from 0 2mg to 0 3mg per day  If you low blood pressure and dizziness persists after one week, then increase the florinef again to a total of 0 4mg per day  Continue the midodrine 2 5mg 3x/d for now

## 2020-08-14 ENCOUNTER — TELEPHONE (OUTPATIENT)
Dept: CARDIOLOGY CLINIC | Facility: CLINIC | Age: 55
End: 2020-08-14

## 2020-08-14 NOTE — TELEPHONE ENCOUNTER
Pt called stating he was having chest pain about 2-3 weeks ago but it went away  Today he stated it started up again and he took the Metoprolol and the pain is starting to ease up  Pt stated he stopped Metoprolol back in April due to low BP  He wants to know should he continue taking? Please advise, thank you

## 2020-08-18 ENCOUNTER — TELEPHONE (OUTPATIENT)
Dept: CARDIOLOGY CLINIC | Facility: CLINIC | Age: 55
End: 2020-08-18

## 2020-08-18 ENCOUNTER — TELEPHONE (OUTPATIENT)
Dept: FAMILY MEDICINE CLINIC | Facility: CLINIC | Age: 55
End: 2020-08-18

## 2020-08-18 ENCOUNTER — HOSPITAL ENCOUNTER (INPATIENT)
Facility: HOSPITAL | Age: 55
LOS: 1 days | Discharge: HOME | DRG: 312 | End: 2020-08-19
Attending: EMERGENCY MEDICINE | Admitting: INTERNAL MEDICINE
Payer: COMMERCIAL

## 2020-08-18 ENCOUNTER — APPOINTMENT (INPATIENT)
Dept: RADIOLOGY | Facility: HOSPITAL | Age: 55
DRG: 312 | End: 2020-08-18
Payer: COMMERCIAL

## 2020-08-18 ENCOUNTER — APPOINTMENT (EMERGENCY)
Dept: RADIOLOGY | Facility: HOSPITAL | Age: 55
DRG: 312 | End: 2020-08-18
Payer: COMMERCIAL

## 2020-08-18 DIAGNOSIS — I21.4 NSTEMI (NON-ST ELEVATED MYOCARDIAL INFARCTION) (HCC): Primary | ICD-10-CM

## 2020-08-18 DIAGNOSIS — R55 SYNCOPE: ICD-10-CM

## 2020-08-18 DIAGNOSIS — I95.1 ORTHOSTATIC HYPOTENSION: ICD-10-CM

## 2020-08-18 PROBLEM — R91.8: Status: ACTIVE | Noted: 2020-08-18

## 2020-08-18 PROBLEM — E87.6 HYPOKALEMIA: Status: ACTIVE | Noted: 2020-08-18

## 2020-08-18 LAB
ANION GAP SERPL CALCULATED.3IONS-SCNC: 7 MMOL/L (ref 4–13)
ANION GAP SERPL CALCULATED.3IONS-SCNC: 8 MMOL/L (ref 4–13)
APTT PPP: 29 SECONDS (ref 23–37)
ATRIAL RATE: 77 BPM
BASOPHILS # BLD AUTO: 0.06 THOUSANDS/ΜL (ref 0–0.1)
BASOPHILS NFR BLD AUTO: 1 % (ref 0–1)
BILIRUB UR QL STRIP: NEGATIVE
BUN SERPL-MCNC: 5 MG/DL (ref 5–25)
BUN SERPL-MCNC: 5 MG/DL (ref 5–25)
CALCIUM SERPL-MCNC: 7.7 MG/DL (ref 8.3–10.1)
CALCIUM SERPL-MCNC: 7.9 MG/DL (ref 8.3–10.1)
CHLORIDE SERPL-SCNC: 108 MMOL/L (ref 100–108)
CHLORIDE SERPL-SCNC: 110 MMOL/L (ref 100–108)
CLARITY UR: CLEAR
CO2 SERPL-SCNC: 28 MMOL/L (ref 21–32)
CO2 SERPL-SCNC: 29 MMOL/L (ref 21–32)
COLOR UR: YELLOW
CREAT SERPL-MCNC: 0.67 MG/DL (ref 0.6–1.3)
CREAT SERPL-MCNC: 0.84 MG/DL (ref 0.6–1.3)
EOSINOPHIL # BLD AUTO: 0.14 THOUSAND/ΜL (ref 0–0.61)
EOSINOPHIL NFR BLD AUTO: 2 % (ref 0–6)
ERYTHROCYTE [DISTWIDTH] IN BLOOD BY AUTOMATED COUNT: 12.6 % (ref 11.6–15.1)
GFR SERPL CREATININE-BSD FRML MDRD: 109 ML/MIN/1.73SQ M
GFR SERPL CREATININE-BSD FRML MDRD: 99 ML/MIN/1.73SQ M
GLUCOSE SERPL-MCNC: 134 MG/DL (ref 65–140)
GLUCOSE SERPL-MCNC: 83 MG/DL (ref 65–140)
GLUCOSE UR STRIP-MCNC: NEGATIVE MG/DL
HCT VFR BLD AUTO: 35.5 % (ref 36.5–49.3)
HGB BLD-MCNC: 12 G/DL (ref 12–17)
HGB UR QL STRIP.AUTO: NEGATIVE
IMM GRANULOCYTES # BLD AUTO: 0.02 THOUSAND/UL (ref 0–0.2)
IMM GRANULOCYTES NFR BLD AUTO: 0 % (ref 0–2)
INR PPP: 1.2 (ref 0.84–1.19)
KETONES UR STRIP-MCNC: NEGATIVE MG/DL
LEUKOCYTE ESTERASE UR QL STRIP: NEGATIVE
LYMPHOCYTES # BLD AUTO: 2.35 THOUSANDS/ΜL (ref 0.6–4.47)
LYMPHOCYTES NFR BLD AUTO: 26 % (ref 14–44)
MAGNESIUM SERPL-MCNC: 2.2 MG/DL (ref 1.6–2.6)
MCH RBC QN AUTO: 30.9 PG (ref 26.8–34.3)
MCHC RBC AUTO-ENTMCNC: 33.8 G/DL (ref 31.4–37.4)
MCV RBC AUTO: 92 FL (ref 82–98)
MONOCYTES # BLD AUTO: 0.68 THOUSAND/ΜL (ref 0.17–1.22)
MONOCYTES NFR BLD AUTO: 7 % (ref 4–12)
NEUTROPHILS # BLD AUTO: 5.98 THOUSANDS/ΜL (ref 1.85–7.62)
NEUTS SEG NFR BLD AUTO: 64 % (ref 43–75)
NITRITE UR QL STRIP: NEGATIVE
NRBC BLD AUTO-RTO: 0 /100 WBCS
P AXIS: 65 DEGREES
PH UR STRIP.AUTO: 7 [PH]
PLATELET # BLD AUTO: 201 THOUSANDS/UL (ref 149–390)
PMV BLD AUTO: 11 FL (ref 8.9–12.7)
POTASSIUM SERPL-SCNC: 2.5 MMOL/L (ref 3.5–5.3)
POTASSIUM SERPL-SCNC: 2.8 MMOL/L (ref 3.5–5.3)
PR INTERVAL: 168 MS
PROT UR STRIP-MCNC: NEGATIVE MG/DL
PROTHROMBIN TIME: 15.2 SECONDS (ref 11.6–14.5)
QRS AXIS: 125 DEGREES
QRSD INTERVAL: 120 MS
QT INTERVAL: 360 MS
QTC INTERVAL: 407 MS
RBC # BLD AUTO: 3.88 MILLION/UL (ref 3.88–5.62)
SODIUM SERPL-SCNC: 144 MMOL/L (ref 136–145)
SODIUM SERPL-SCNC: 146 MMOL/L (ref 136–145)
SP GR UR STRIP.AUTO: 1.01 (ref 1–1.03)
T WAVE AXIS: 55 DEGREES
TROPONIN I SERPL-MCNC: 0.98 NG/ML
TROPONIN I SERPL-MCNC: 1.04 NG/ML
UROBILINOGEN UR QL STRIP.AUTO: 1 E.U./DL
VENTRICULAR RATE: 77 BPM
WBC # BLD AUTO: 9.23 THOUSAND/UL (ref 4.31–10.16)

## 2020-08-18 PROCEDURE — 99285 EMERGENCY DEPT VISIT HI MDM: CPT | Performed by: EMERGENCY MEDICINE

## 2020-08-18 PROCEDURE — 96375 TX/PRO/DX INJ NEW DRUG ADDON: CPT

## 2020-08-18 PROCEDURE — 80048 BASIC METABOLIC PNL TOTAL CA: CPT | Performed by: INTERNAL MEDICINE

## 2020-08-18 PROCEDURE — 99223 1ST HOSP IP/OBS HIGH 75: CPT | Performed by: INTERNAL MEDICINE

## 2020-08-18 PROCEDURE — 85730 THROMBOPLASTIN TIME PARTIAL: CPT | Performed by: INTERNAL MEDICINE

## 2020-08-18 PROCEDURE — 80048 BASIC METABOLIC PNL TOTAL CA: CPT | Performed by: STUDENT IN AN ORGANIZED HEALTH CARE EDUCATION/TRAINING PROGRAM

## 2020-08-18 PROCEDURE — 99285 EMERGENCY DEPT VISIT HI MDM: CPT

## 2020-08-18 PROCEDURE — 85025 COMPLETE CBC W/AUTO DIFF WBC: CPT | Performed by: STUDENT IN AN ORGANIZED HEALTH CARE EDUCATION/TRAINING PROGRAM

## 2020-08-18 PROCEDURE — 84484 ASSAY OF TROPONIN QUANT: CPT | Performed by: INTERNAL MEDICINE

## 2020-08-18 PROCEDURE — 71045 X-RAY EXAM CHEST 1 VIEW: CPT

## 2020-08-18 PROCEDURE — 36415 COLL VENOUS BLD VENIPUNCTURE: CPT | Performed by: STUDENT IN AN ORGANIZED HEALTH CARE EDUCATION/TRAINING PROGRAM

## 2020-08-18 PROCEDURE — 96374 THER/PROPH/DIAG INJ IV PUSH: CPT

## 2020-08-18 PROCEDURE — 81003 URINALYSIS AUTO W/O SCOPE: CPT | Performed by: STUDENT IN AN ORGANIZED HEALTH CARE EDUCATION/TRAINING PROGRAM

## 2020-08-18 PROCEDURE — 93308 TTE F-UP OR LMTD: CPT | Performed by: EMERGENCY MEDICINE

## 2020-08-18 PROCEDURE — 85610 PROTHROMBIN TIME: CPT | Performed by: STUDENT IN AN ORGANIZED HEALTH CARE EDUCATION/TRAINING PROGRAM

## 2020-08-18 PROCEDURE — 93005 ELECTROCARDIOGRAM TRACING: CPT

## 2020-08-18 PROCEDURE — 83735 ASSAY OF MAGNESIUM: CPT | Performed by: INTERNAL MEDICINE

## 2020-08-18 PROCEDURE — 96361 HYDRATE IV INFUSION ADD-ON: CPT

## 2020-08-18 PROCEDURE — 93010 ELECTROCARDIOGRAM REPORT: CPT | Performed by: INTERNAL MEDICINE

## 2020-08-18 PROCEDURE — 76775 US EXAM ABDO BACK WALL LIM: CPT | Performed by: EMERGENCY MEDICINE

## 2020-08-18 PROCEDURE — 85730 THROMBOPLASTIN TIME PARTIAL: CPT | Performed by: STUDENT IN AN ORGANIZED HEALTH CARE EDUCATION/TRAINING PROGRAM

## 2020-08-18 PROCEDURE — 84484 ASSAY OF TROPONIN QUANT: CPT | Performed by: STUDENT IN AN ORGANIZED HEALTH CARE EDUCATION/TRAINING PROGRAM

## 2020-08-18 RX ORDER — HEPARIN SODIUM 1000 [USP'U]/ML
4000 INJECTION, SOLUTION INTRAVENOUS; SUBCUTANEOUS ONCE
Status: COMPLETED | OUTPATIENT
Start: 2020-08-18 | End: 2020-08-18

## 2020-08-18 RX ORDER — POTASSIUM CHLORIDE 14.9 MG/ML
20 INJECTION INTRAVENOUS ONCE
Status: COMPLETED | OUTPATIENT
Start: 2020-08-18 | End: 2020-08-18

## 2020-08-18 RX ORDER — CLONAZEPAM 1 MG/1
1 TABLET ORAL 2 TIMES DAILY PRN
Status: DISCONTINUED | OUTPATIENT
Start: 2020-08-18 | End: 2020-08-19 | Stop reason: HOSPADM

## 2020-08-18 RX ORDER — FLUDROCORTISONE ACETATE 0.1 MG/1
0.4 TABLET ORAL DAILY
Status: DISCONTINUED | OUTPATIENT
Start: 2020-08-19 | End: 2020-08-19 | Stop reason: HOSPADM

## 2020-08-18 RX ORDER — ASPIRIN 81 MG/1
81 TABLET, CHEWABLE ORAL DAILY
Status: DISCONTINUED | OUTPATIENT
Start: 2020-08-19 | End: 2020-08-19 | Stop reason: HOSPADM

## 2020-08-18 RX ORDER — OXCARBAZEPINE 150 MG/1
150 TABLET, FILM COATED ORAL 2 TIMES DAILY
Status: DISCONTINUED | OUTPATIENT
Start: 2020-08-18 | End: 2020-08-19 | Stop reason: HOSPADM

## 2020-08-18 RX ORDER — ACETAMINOPHEN 325 MG/1
650 TABLET ORAL EVERY 6 HOURS PRN
Status: DISCONTINUED | OUTPATIENT
Start: 2020-08-18 | End: 2020-08-19 | Stop reason: HOSPADM

## 2020-08-18 RX ORDER — ISOSORBIDE MONONITRATE 30 MG/1
30 TABLET, EXTENDED RELEASE ORAL DAILY
Status: DISCONTINUED | OUTPATIENT
Start: 2020-08-19 | End: 2020-08-19

## 2020-08-18 RX ORDER — METOPROLOL SUCCINATE 25 MG/1
25 TABLET, EXTENDED RELEASE ORAL DAILY
Status: DISCONTINUED | OUTPATIENT
Start: 2020-08-19 | End: 2020-08-19

## 2020-08-18 RX ORDER — SODIUM CHLORIDE, SODIUM LACTATE, POTASSIUM CHLORIDE, CALCIUM CHLORIDE 600; 310; 30; 20 MG/100ML; MG/100ML; MG/100ML; MG/100ML
125 INJECTION, SOLUTION INTRAVENOUS CONTINUOUS
Status: DISPENSED | OUTPATIENT
Start: 2020-08-18 | End: 2020-08-19

## 2020-08-18 RX ORDER — NITROGLYCERIN 0.4 MG/1
0.4 TABLET SUBLINGUAL
Status: DISCONTINUED | OUTPATIENT
Start: 2020-08-18 | End: 2020-08-19 | Stop reason: HOSPADM

## 2020-08-18 RX ORDER — NICOTINE 21 MG/24HR
1 PATCH, TRANSDERMAL 24 HOURS TRANSDERMAL DAILY
Status: DISCONTINUED | OUTPATIENT
Start: 2020-08-19 | End: 2020-08-19 | Stop reason: HOSPADM

## 2020-08-18 RX ORDER — HEPARIN SODIUM 1000 [USP'U]/ML
4000 INJECTION, SOLUTION INTRAVENOUS; SUBCUTANEOUS
Status: DISCONTINUED | OUTPATIENT
Start: 2020-08-18 | End: 2020-08-19

## 2020-08-18 RX ORDER — HEPARIN SODIUM 1000 [USP'U]/ML
2000 INJECTION, SOLUTION INTRAVENOUS; SUBCUTANEOUS
Status: DISCONTINUED | OUTPATIENT
Start: 2020-08-18 | End: 2020-08-19

## 2020-08-18 RX ORDER — POTASSIUM CHLORIDE 20 MEQ/1
40 TABLET, EXTENDED RELEASE ORAL ONCE
Status: COMPLETED | OUTPATIENT
Start: 2020-08-18 | End: 2020-08-18

## 2020-08-18 RX ORDER — VENLAFAXINE 37.5 MG/1
75 TABLET ORAL DAILY
Status: DISCONTINUED | OUTPATIENT
Start: 2020-08-19 | End: 2020-08-19 | Stop reason: HOSPADM

## 2020-08-18 RX ORDER — HEPARIN SODIUM 10000 [USP'U]/100ML
3-20 INJECTION, SOLUTION INTRAVENOUS
Status: DISCONTINUED | OUTPATIENT
Start: 2020-08-18 | End: 2020-08-19

## 2020-08-18 RX ORDER — TRAZODONE HYDROCHLORIDE 100 MG/1
300 TABLET ORAL
Status: DISCONTINUED | OUTPATIENT
Start: 2020-08-18 | End: 2020-08-19 | Stop reason: HOSPADM

## 2020-08-18 RX ORDER — ATORVASTATIN CALCIUM 40 MG/1
40 TABLET, FILM COATED ORAL DAILY
Status: DISCONTINUED | OUTPATIENT
Start: 2020-08-19 | End: 2020-08-19 | Stop reason: HOSPADM

## 2020-08-18 RX ORDER — METOPROLOL SUCCINATE 25 MG/1
25 TABLET, EXTENDED RELEASE ORAL DAILY
COMMUNITY
End: 2020-08-19 | Stop reason: HOSPADM

## 2020-08-18 RX ORDER — MIDODRINE HYDROCHLORIDE 2.5 MG/1
2.5 TABLET ORAL
Status: DISCONTINUED | OUTPATIENT
Start: 2020-08-19 | End: 2020-08-19 | Stop reason: HOSPADM

## 2020-08-18 RX ORDER — ISOSORBIDE MONONITRATE 30 MG/1
30 TABLET, EXTENDED RELEASE ORAL DAILY
COMMUNITY
End: 2020-08-19 | Stop reason: HOSPADM

## 2020-08-18 RX ORDER — PANTOPRAZOLE SODIUM 20 MG/1
20 TABLET, DELAYED RELEASE ORAL
Status: DISCONTINUED | OUTPATIENT
Start: 2020-08-19 | End: 2020-08-19 | Stop reason: HOSPADM

## 2020-08-18 RX ORDER — ASPIRIN 325 MG
325 TABLET ORAL ONCE
Status: COMPLETED | OUTPATIENT
Start: 2020-08-18 | End: 2020-08-18

## 2020-08-18 RX ADMIN — OXCARBAZEPINE 150 MG: 150 TABLET, FILM COATED ORAL at 21:04

## 2020-08-18 RX ADMIN — SODIUM CHLORIDE 1000 ML: 0.9 INJECTION, SOLUTION INTRAVENOUS at 15:01

## 2020-08-18 RX ADMIN — SODIUM CHLORIDE, SODIUM LACTATE, POTASSIUM CHLORIDE, AND CALCIUM CHLORIDE 125 ML/HR: .6; .31; .03; .02 INJECTION, SOLUTION INTRAVENOUS at 21:04

## 2020-08-18 RX ADMIN — ASPIRIN 325 MG ORAL TABLET 325 MG: 325 PILL ORAL at 15:57

## 2020-08-18 RX ADMIN — POTASSIUM CHLORIDE 40 MEQ: 1500 TABLET, EXTENDED RELEASE ORAL at 16:17

## 2020-08-18 RX ADMIN — NITROGLYCERIN 0.4 MG: 0.4 TABLET SUBLINGUAL at 20:03

## 2020-08-18 RX ADMIN — POTASSIUM CHLORIDE 20 MEQ: 14.9 INJECTION, SOLUTION INTRAVENOUS at 16:18

## 2020-08-18 RX ADMIN — HEPARIN SODIUM 4000 UNITS: 1000 INJECTION INTRAVENOUS; SUBCUTANEOUS at 15:57

## 2020-08-18 RX ADMIN — HEPARIN SODIUM AND DEXTROSE 11.1 UNITS/KG/HR: 10000; 5 INJECTION INTRAVENOUS at 15:57

## 2020-08-18 RX ADMIN — TRAZODONE HYDROCHLORIDE 300 MG: 100 TABLET ORAL at 21:22

## 2020-08-18 NOTE — ED PROCEDURE NOTE
PROCEDURE  FAST Ultrasound    Date/Time: 8/18/2020 3:50 PM  Performed by: Kimber Garcia MD  Authorized by: Kimber Garcia MD     Patient location:  ED  Procedure details:     Exam Type:  Diagnostic    Indications: hypotension      Assess for:  Intra-abdominal fluid    Technique: FAST      Views obtained:  Heart - Pericardial sac, RUQ - Springer's Pouch, LUQ - Splenorenal space, Suprapubic - Pouch of Clint, Right thorax and Left thorax    Image quality: diagnostic      Image availability:  Images available in PACS  FAST Findings:     RUQ (Hepatorenal) free fluid: absent      LUQ (Splenorenal) free fluid: absent      Suprapubic free fluid: absent      Cardiac wall motion: identified      Pericardial effusion: absent    extended FAST (Pulmonary) findings:     Left lung sliding: Present      Right lung sliding: Present      Left pleural effusion: Absent      Right pleural effusion: Absent    Interpretation:     Impressions: negative           Kimber Garcia MD  08/18/20 1551

## 2020-08-18 NOTE — ED PROVIDER NOTES
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  After pushing himself up, but still on his knees, the patient subsequently lost consciousness and fell backwards into a bush  He states he does not remember passing out, and a co-worker who was nearby had to explain to him what happened  He is unsure of how long he was out for and did not ask his friend either  He denies any loss of bowel or bladder control  He did not bite his tongue  He was slightly confused after the fall, but only briefly  Associated symptoms include dizziness when standing and a mild, nonradiating, substernal chest pain  He denies any fever, chills, shortness of breath, vision changes, or any other new symptoms  Patient states he was seen here about a month ago for similar symptoms  However, he did not pass out at that time  History provided by:  Patient   used: No    Syncope   Episode history:  Single  Most recent episode: Today  Progression:  Resolved  Chronicity:  New  Context: standing up    Witnessed: yes    Ineffective treatments:  None tried  Associated symptoms: chest pain and dizziness    Associated symptoms: no confusion, no fever, no headaches, no nausea, no shortness of breath and no vomiting        Prior to Admission Medications   Prescriptions Last Dose Informant Patient Reported? Taking?    OXcarbazepine (TRILEPTAL) 150 mg tablet  Self Yes No   Sig: Take 150 mg by mouth 2 (two) times a day    acetaminophen (TYLENOL) 325 mg tablet Self No No   Sig: Take 3 tablets (975 mg total) by mouth every 8 (eight) hours   atorvastatin (LIPITOR) 40 mg tablet  Self No No   Sig: Take 1 tablet by mouth once daily   clonazePAM (KlonoPIN) 1 mg tablet  Self Yes No   Sig: as needed    fludrocortisone (FLORINEF) 0 1 mg tablet   No No   Sig: Take 4 tablets (0 4 mg total) by mouth daily   isosorbide mononitrate (IMDUR) 30 mg 24 hr tablet  Self Yes No   Sig: Take 30 mg by mouth daily   metoprolol succinate (TOPROL-XL) 25 mg 24 hr tablet  Self Yes No   Sig: Take 25 mg by mouth daily   midodrine (PROAMATINE) 2 5 mg tablet   No No   Sig: Take 1 tablet (2 5 mg total) by mouth 3 (three) times a day before meals   paliperidone palmitate ER (Invega Sustenna) 234 mg/1 5 mL IM injection  Self Yes No   Sig: Inject 234 mg into a muscle every 30 (thirty) days   pantoprazole (PROTONIX) 20 mg tablet  Self No No   Sig: Take 1 tablet (20 mg total) by mouth daily   traZODone (DESYREL) 300 MG tablet  Self Yes No   Sig: Take 300 mg by mouth daily at bedtime   venlafaxine (EFFEXOR) 75 mg tablet  Self Yes No   Sig: Take 75 mg by mouth daily       Facility-Administered Medications: None       Past Medical History:   Diagnosis Date    Bariatric surgery status     CPAP (continuous positive airway pressure) dependence     Hearing loss of aging     Hypercholesterolemia     Morbid obesity (HCC)     [facility]STEMI [facility] infarction[facility])     april 2019    Postsurgical malabsorption        Past Surgical History:   Procedure Laterality Date    CARDIAC CATHETERIZATION      no stents     COLONOSCOPY      EGD      HERNIA REPAIR      umbilical hernia    UT LAP GASTRIC BYPASS/FRITZ-EN-Y N/A 3/2/2020    Procedure: LAPAROSCOPIC FRITZ-EN-Y GASTRIC BYPASS AND INTRAOPERATIVE EGD;  Surgeon: Sarah Goetz MD;  Location: [facility];  Service: Bariatrics    SKIN SURGERY      cyst removed from back and thumb    TONSILLECTOMY      TOOTH EXTRACTION         Family History   Problem Relation Age of Onset    Lung cancer Mother     Brain cancer Mother     Diabetes Brother     Bipolar disorder Maternal Grandfather      I have reviewed and agree with the history as documented  E-Cigarette/Vaping    E-Cigarette Use Current Every Day User      E-Cigarette/Vaping Substances    Nicotine Yes     Flavoring Yes      Social History     Tobacco Use    Smoking status: Former Smoker     Types: Cigarettes     Last attempt to quit:      Years since quittin 6    Smokeless tobacco: Current User     Types: Chew     Last attempt to quit: 2014    Tobacco comment: quit cigarettes    Substance Use Topics    Alcohol use: Not Currently     Frequency: Never    Drug use: Never        Review of Systems   Constitutional: Negative for chills and fever  HENT: Negative for sore throat and trouble swallowing  Eyes: Negative for redness and itching  Respiratory: Negative for cough and shortness of breath  Cardiovascular: Positive for chest pain and syncope  Negative for leg swelling  Gastrointestinal: Negative for abdominal pain, diarrhea, nausea and vomiting  Genitourinary: Negative for difficulty urinating, dysuria and hematuria  Musculoskeletal: Negative for back pain, neck pain and neck stiffness  Skin: Negative for rash and wound  Neurological: Positive for dizziness  Negative for headaches  Psychiatric/Behavioral: Negative for agitation and confusion  All other systems reviewed and are negative        Physical Exam  ED Triage Vitals   Temperature Pulse Respirations Blood Pressure SpO2   20 1439 20 1439 20 1439 20 1439 20 1439   98 6 °F (37 °C) 81 18 109/59 95 %      Temp Source Heart Rate Source Patient Position - Orthostatic VS BP Location FiO2 (%)   20 1439 20 1545 20 1439 20 1439 --   Oral Monitor Sitting Left arm       Pain Score       20 1439       3             Orthostatic Vital Signs  Vitals: 20 1439 20 1545 20 1630 20 1728   BP: 109/59 112/57 111/58 109/63   Pulse: 81 70 64 (!) 54   Patient Position - Orthostatic VS: Sitting Lying Lying Lying       Physical Exam  Vitals signs and nursing note reviewed  Constitutional:       General: He is not in acute distress  Appearance: He is well-developed  He is not diaphoretic  HENT:      Head: Normocephalic and atraumatic  Right Ear: External ear normal       Left Ear: External ear normal       Nose: Nose normal    Eyes:      General: Lids are normal  No scleral icterus  Extraocular Movements: Extraocular movements intact  Neck:      Musculoskeletal: Normal range of motion and neck supple  Cardiovascular:      Rate and Rhythm: Normal rate and regular rhythm  Pulses:           Carotid pulses are 2+ on the right side and 2+ on the left side  Radial pulses are 2+ on the right side and 2+ on the left side  Femoral pulses are 2+ on the right side and 2+ on the left side  Popliteal pulses are 2+ on the right side and 2+ on the left side  Dorsalis pedis pulses are 2+ on the right side and 2+ on the left side  Posterior tibial pulses are 2+ on the right side and 2+ on the left side  Heart sounds: Murmur present  Systolic murmur present with a grade of 3/6  No friction rub  No gallop  Pulmonary:      Effort: Pulmonary effort is normal  No respiratory distress  Breath sounds: Normal breath sounds  No wheezing or rales  Abdominal:      General: Bowel sounds are normal       Palpations: Abdomen is soft  Tenderness: There is no abdominal tenderness  There is no guarding or rebound  Musculoskeletal: Normal range of motion  General: No deformity  Right lower le+ Edema present  Left lower le+ Edema present  Skin:     General: Skin is warm and dry  Capillary Refill: Capillary refill takes less than 2 seconds     Neurological:      General: No focal deficit present  Mental Status: He is alert and oriented to person, place, and time  Mental status is at baseline  Cranial Nerves: No cranial nerve deficit  Sensory: No sensory deficit  Motor: No weakness        Gait: Gait normal    Psychiatric:         Behavior: Behavior normal          ED Medications  Medications   heparin (porcine) 25,000 units in 250 mL infusion (premix) (11 1 Units/kg/hr × 90 kg (Order-Specific) Intravenous New Bag 8/18/20 1557)   heparin (porcine) injection 4,000 Units (has no administration in time range)   heparin (porcine) injection 2,000 Units (has no administration in time range)   atorvastatin (LIPITOR) tablet 40 mg (has no administration in time range)   clonazePAM (KlonoPIN) tablet 1 mg (has no administration in time range)   acetaminophen (TYLENOL) tablet 650 mg (has no administration in time range)   fludrocortisone (FLORINEF) tablet 0 4 mg (has no administration in time range)   isosorbide mononitrate (IMDUR) 24 hr tablet 30 mg (has no administration in time range)   metoprolol succinate (TOPROL-XL) 24 hr tablet 25 mg (has no administration in time range)   midodrine (PROAMATINE) tablet 2 5 mg (has no administration in time range)   OXcarbazepine (TRILEPTAL) tablet 150 mg (has no administration in time range)   pantoprazole (PROTONIX) EC tablet 20 mg (has no administration in time range)   venlafaxine (EFFEXOR) tablet 75 mg (has no administration in time range)   traZODone (DESYREL) tablet 300 mg (has no administration in time range)   sodium chloride 0 9 % bolus 1,000 mL (0 mL Intravenous Stopped 8/18/20 1606)   aspirin tablet 325 mg (325 mg Oral Given 8/18/20 1557)   heparin (porcine) injection 4,000 Units (4,000 Units Intravenous Given 8/18/20 1557)   potassium chloride (K-DUR,KLOR-CON) CR tablet 40 mEq (40 mEq Oral Given 8/18/20 1617)   potassium chloride 20 mEq IVPB (premix) (0 mEq Intravenous Stopped 8/18/20 1859)       Diagnostic Studies  Results Reviewed     Procedure Component Value Units Date/Time    Magnesium [[id number]]     Lab Status:  No result Specimen:  Blood     Basic metabolic panel [[id number]]     Lab Status:  No result Specimen:  Blood     UA w Reflex to Microscopic w Reflex to Culture [[id number]] Collected:  08/18/20 1609    Lab Status:  Final result Specimen:  Urine, Clean Catch Updated:  08/18/20 1737     Color, UA Yellow     Clarity, UA Clear     Specific Gravity, UA 1 006     pH, UA 7 0     Leukocytes, UA Negative     Nitrite, UA Negative     Protein, UA Negative mg/dl      Glucose, UA Negative mg/dl      Ketones, UA Negative mg/dl      Urobilinogen, UA 1 0 E U /dl      Bilirubin, UA Negative     Blood, UA Negative    APTT six (6) hours after Heparin bolus/drip initiation or dosing change [[id number]]  (Normal) Collected:  08/18/20 1556    Lab Status:  Final result Specimen:  Blood from Arm, Right Updated:  08/18/20 1652     PTT 29 seconds     Protime-INR [[id number]]  (Abnormal) Collected:  08/18/20 1556    Lab Status:  Final result Specimen:  Blood from Arm, Right Updated:  08/18/20 1652     Protime 15 2 seconds      INR 5 91    Basic metabolic panel [[id number]]  (Abnormal) Collected:  08/18/20 1501    Lab Status:  Final result Specimen:  Blood from Arm, Right Updated:  08/18/20 1552     Sodium 144 mmol/L      Potassium 2 5 mmol/L      Chloride 108 mmol/L      CO2 28 mmol/L      ANION GAP 8 mmol/L      BUN 5 mg/dL      Creatinine 0 84 mg/dL      Glucose 134 mg/dL      Calcium 7 9 mg/dL      eGFR 99 ml/min/1 73sq m     Narrative:       [place] guidelines for Chronic Kidney Disease (CKD):     Stage 1 with normal or high GFR (GFR > 90 mL/min/1 73 square meters)    Stage 2 Mild CKD (GFR = 60-89 mL/min/1 73 square meters)    Stage 3A Moderate CKD (GFR = 45-59 mL/min/1 73 square meters)    Stage 3B Moderate CKD (GFR = 30-44 mL/min/1 73 square meters)    Stage 4 Severe CKD (GFR = 15-29 mL/min/1 73 square meters)   Stage 5 End Stage CKD (GFR <15 mL/min/1 73 square meters)  Note: GFR calculation is accurate only with a steady state creatinine    Troponin I [[id number]]  (Abnormal) Collected:  08/18/20 1501    Lab Status:  Final result Specimen:  Blood from Arm, Right Updated:  08/18/20 1532     Troponin I 1 04 ng/mL     CBC and differential [[id number]]  (Abnormal) Collected:  08/18/20 1501    Lab Status:  Final result Specimen:  Blood from Arm, Right Updated:  08/18/20 1516     WBC 9 23 Thousand/uL      RBC 3 88 Million/uL      Hemoglobin 12 0 g/dL      Hematocrit 35 5 %      MCV 92 fL      MCH 30 9 pg      MCHC 33 8 g/dL      RDW 12 6 %      MPV 11 0 fL      Platelets 646 Thousands/uL      nRBC 0 /100 WBCs      Neutrophils Relative 64 %      Immat GRANS % 0 %      Lymphocytes Relative 26 %      Monocytes Relative 7 %      Eosinophils Relative 2 %      Basophils Relative 1 %      Neutrophils Absolute 5 98 Thousands/µL      Immature Grans Absolute 0 02 Thousand/uL      Lymphocytes Absolute 2 35 Thousands/µL      Monocytes Absolute 0 68 Thousand/µL      Eosinophils Absolute 0 14 Thousand/µL      Basophils Absolute 0 06 Thousands/µL                  XR chest 1 view portable   Final Result by Anika Ceballos MD (08/18 1628)      Ill-defined appearance of the right cardiac border which may indicate some atelectasis or infiltrate of the right middle lobe  Consider follow-up lateral view for further workup  The study was marked in [facility] for immediate notification              Workstation performed: [id number]         XR chest 1 view    (Results Pending)         Procedures  ECG 12 Lead Documentation Only    Date/Time: 8/18/2020 2:49 PM  Performed by: Delfina Preciado DO  Authorized by: Delfina Preciado DO     ECG reviewed by me, the ED Provider: yes    Patient location:  ED  Previous ECG:     Previous ECG:  Compared to current    Comparison ECG info:  15-July-2020    Similarity:  Changes noted  Interpretation:     Interpretation: abnormal    Rate:     ECG rate:  77    ECG rate assessment: normal    Rhythm:     Rhythm: sinus rhythm    QRS:     QRS axis:  Right    QRS intervals: Wide  Conduction:     Conduction: normal    ST segments:     ST segments:  Non-specific  Other findings:     Other findings: U wave      Other findings comment:  V2 and V3  Comments:      RBBB          ED Course  ED Course as of Aug 18 1935   Tue Aug 18, 2020   1516 CBC and differential(!)   1517 Bedside echo being done now      1532 Troponin I(!): 1 04   1540 Spoke with cardiology  EKG changes are non-specific  They will see patient as a consult      1554 Potassium(!!): 2 5   1556 40 of oral potassium and 20 of IV potassium ordered      1557 Discussed case with [facility]  They will admit                HEART Risk Score      Most Recent Value   Heart Score Risk Calculator   History  1 Filed at: 08/18/2020 1719   ECG  1 Filed at: 08/18/2020 1719   Age  1 Filed at: 08/18/2020 1719   Risk Factors  1 Filed at: 08/18/2020 1719   Troponin  2 Filed at: 08/18/2020 1719   HEART Score  6 Filed at: 08/18/2020 1719                                    MDM  Number of Diagnoses or Management Options  NSTEMI (non-ST elevated myocardial infarction) [facility]): new and requires workup  Orthostatic hypotension: established and worsening  Syncope: new and requires workup  Diagnosis management comments: Patient is a 47 YO M who presented to the ED after a syncopal episode while at work  Pt stated it occurred as he was rising from his knees  He subsequently took his blood pressure at home which was low at 77/53  In the ED, patient's blood pressure sitting was 109/53  His only complaint was constant, nonradiating, substernal chest pain that was similar to his prior anginal pains  He was not tachycardic, and appeared nontoxic  On exam, he had 2+ pitting edema of his lower extremities bilaterally  He had a systolic ejection murmur      Differential includes but is not limited to orthostatic hypertension, electrolyte abnormalities, ACS  Cardiac workup started  Troponin came back elevated at 1 04  Potassium came back low at 2 4  Discussed case with Cardiology  EKG findings showed nonspecific changes, so they would see patient as a consult  Discussions results and findings with patient  Will admit  Patient understood and agreed plan of care  Heparin and ASA ordered  Potassium will be repleted with 40 mEq orally and 20 mEq IV  Discussed case with [facility]  They will admit         Amount and/or Complexity of Data Reviewed  Clinical lab tests: ordered and reviewed  Tests in the radiology section of CPT®: ordered and reviewed  Tests in the medicine section of CPT®: ordered and reviewed  Review and summarize past medical records: yes  Discuss the patient with other providers: yes  Independent visualization of images, tracings, or specimens: yes    Risk of Complications, Morbidity, and/or Mortality  Presenting problems: high  Diagnostic procedures: low  Management options: low    Patient Progress  Patient progress: stable        Disposition  Final diagnoses:   Orthostatic hypotension   Syncope   NSTEMI (non-ST elevated myocardial infarction) ([facility] )     Time reflects when diagnosis was documented in both MDM as applicable and the Disposition within this note     Time User Action Codes Description Comment    8/18/2020  3:25 PM Demi Dates Add [I95 1] Orthostatic hypotension     8/18/2020  3:26 PM Demi Dates Add [R55] Syncope     8/18/2020  3:52 PM Demi Dates Add [I21 4] NSTEMI (non-ST elevated myocardial infarction) ([facility] )     8/18/2020  3:52 PM Demi Dates Modify [I95 1] Orthostatic hypotension     8/18/2020  3:52 PM Demi Dates Modify [I21 4] NSTEMI (non-ST elevated myocardial infarction) [facility])       ED Disposition     ED Disposition Condition Date/Time Comment    Admit Stable Tue Aug 18, 2020  3:59 PM Case was discussed with Dr Beulah Silver and the patient's admission status was agreed to be Admission Status: inpatient status to the service of Dr Wesley Jimenes   Follow-up Information    None         Current Discharge Medication List      CONTINUE these medications which have NOT CHANGED    Details   acetaminophen (TYLENOL) 325 mg tablet Take 3 tablets (975 mg total) by mouth every 8 (eight) hours  Qty: 30 tablet, Refills: 0    Associated Diagnoses: Morbid obesity (HCC)      atorvastatin (LIPITOR) 40 mg tablet Take 1 tablet by mouth once daily  Qty: 90 tablet, Refills: 0    Associated Diagnoses: Coronary artery disease involving native coronary artery of native heart with unstable angina pectoris ([address] ); Mixed hyperlipidemia      clonazePAM (KlonoPIN) 1 mg tablet as needed       fludrocortisone (FLORINEF) 0 1 mg tablet Take 4 tablets (0 4 mg total) by mouth daily  Qty: 120 tablet, Refills: 0    Associated Diagnoses: Orthostatic hypotension      isosorbide mononitrate (IMDUR) 30 mg 24 hr tablet Take 30 mg by mouth daily      metoprolol succinate (TOPROL-XL) 25 mg 24 hr tablet Take 25 mg by mouth daily      midodrine (PROAMATINE) 2 5 mg tablet Take 1 tablet (2 5 mg total) by mouth 3 (three) times a day before meals  Qty: 90 tablet, Refills: 1    Associated Diagnoses: Orthostatic hypotension      OXcarbazepine (TRILEPTAL) 150 mg tablet Take 150 mg by mouth 2 (two) times a day       paliperidone palmitate ER (Invega Sustenna) 234 mg/1 5 mL IM injection Inject 234 mg into a muscle every 30 (thirty) days      pantoprazole (PROTONIX) 20 mg tablet Take 1 tablet (20 mg total) by mouth daily  Qty: 30 tablet, Refills: 2    Associated Diagnoses: Morbid (severe) obesity due to excess calories (HCC)      traZODone (DESYREL) 300 MG tablet Take 300 mg by mouth daily at bedtime      venlafaxine (EFFEXOR) 75 mg tablet Take 75 mg by mouth daily            No discharge procedures on file      PDMP Review       Value Time User    PDMP Reviewed  Yes 8/18/2020  5:28 PM Anmol Elise DO           ED Provider  Attending physically available and evaluated Gosia Lawton I managed the patient along with the ED Attending      Electronically Signed by         Devin Colon DO  08/18/20 1936

## 2020-08-18 NOTE — ASSESSMENT & PLAN NOTE
As noted on portable chest x-ray  There is questionable haziness in the right middle lobe concerning for possible infiltrate  Patient however denies cough, shortness of breath, fevers, chills  No sick complains at this time aside from what was noted before    WBC count normal   · Will follow-up lateral view x-ray  · Hold off antibiotic therapy for now

## 2020-08-18 NOTE — TELEPHONE ENCOUNTER
Patient called stating that he just passed out at a job because his bp is low  He states that he is unable to take his physical bp because he is at someone's house and he does not have a bp cuff or anything with him, but he is treated by Dr Geri Davis and Dr Fabrice Raza for low bp  He wanted to finish working and come in later this afternoon after 4 for evaluation  I advised him that he needed to go to the ER for evaluation right away and have blood work done, EKG, possible fluids, etc done and he would need to be driven there  Patient states he understands and he is going to call his wife and he has a debbi on the job with him that can take him to the hospital   He states he will try and go as soon as possible for eval and treat  Sending to Dr Fabrice Raza and Dr Geri Davis for review  No further action needed at this time    Obed Johnson MA

## 2020-08-18 NOTE — ED NOTES
Called floor and they don't have tele for the pt  They are going to call around       [facility] LU Salomon  08/18/20 3544

## 2020-08-18 NOTE — ED PROCEDURE NOTE
PROCEDURE  POC Cardiac US    Date/Time: 8/18/2020 3:49 PM  Performed by: Tania Costello MD  Authorized by: Tania Costello MD     Patient location:  ED  Procedure details:     Exam Type:  Educational    Indications: hypotension and suspected volume depletion      Assessment / Evaluation for: cardiac function, pericardial effusion, inferior vena cava for fluid responsiveness, intravascular volume status and right heart strain (suspected pulmonary embolism)      Exam Type: initial exam      Image quality: diagnostic      Image availability:  Images available in PACS  Patient Details:     Cardiac Rhythm:  Regular    Mechanical ventilation: No    Cardiac findings:     Echo technique: limited 2D      Views obtained: parasternal long axis, parasternal short axis and apical      Pericardial effusion: absent      Tamponade physiology: absent      Wall motion: normal      LV systolic function: normal      RV dilation: none    Pulmonary findings:     Left Lung Findings: left lung sliding      Right lung findings: right lung sliding    IVC findings:     IVC Size comment:  2 cm    IVC Inspiratory Collapse: normal    Interpretation:     Fluid Status:  Euvolemic     Normal cardiac function without tamponade         Tania Costello MD  08/18/20 1557

## 2020-08-18 NOTE — ASSESSMENT & PLAN NOTE
· Continue aspirin, statin, Imdur, metoprolol  · Follow-up cardiology recommendations  · See plan for NSTEMI

## 2020-08-18 NOTE — ASSESSMENT & PLAN NOTE
Patient with initial troponin of 1 04, elevated from previous baseline  He does have a history of CAD, underwent cardiac catheterization in May of 2019 which showed mild nonobstructive disease in the mid LAD  Prior echocardiogram had shown EF 55%  · Continue metoprolol, Imdur daily  · Continue statin, start aspirin  · Continue monitor monitor  · Repeat troponins and EKGs  · Continue heparin infusion  · Nitro p r n    · Will make NPO for now in case patient requires urgent catheterization  · Cardiology consult

## 2020-08-18 NOTE — ED ATTENDING ATTESTATION
8/18/2020  IViktoria MD, saw and evaluated the patient  I have discussed the patient with the resident/non-physician practitioner and agree with the resident's/non-physician practitioner's findings, Plan of Care, and MDM as documented in the resident's/non-physician practitioner's note, except where noted  All available labs and Radiology studies were reviewed  I was present for key portions of any procedure(s) performed by the resident/non-physician practitioner and I was immediately available to provide assistance  At this point I agree with the current assessment done in the Emergency Department  I have conducted an independent evaluation of this patient a history and physical is as follows:  Patient is here for hypotension  Patient was diagnosed with orthostatic hypotension a few months ago  At that time, he had a near syncopal event  He also had a positive troponin and was admitted to the hospital, or he underwent an evaluation including ECHO and labs  Patient has since been on medications for orthostasis  Today, the patient was kneeling when he had a syncopal event  He did not have a prodrome  The patient states he has never fainted before, and typically does not get or the states is just from kneeling  The patient denies chest pain or shortness of breath  He denies fevers or chills  No cough  He denies abdominal pain  The patient's review of systems otherwise negative in 12 systems reviewed  On exam the patient is hypotensive with a blood pressure in the 80s  He is awake, alert, interactive with normal work of breathing and normal perfusion  He states he is still dizzy  Pupils are round reactive to light  Face is symmetric  His extraocular movements are intact  His heart is regular  I hear a faint systolic murmur best heard at the left sternal border  His lungs are clear bilaterally with good air movement    His abdomen is soft and nontender with no masses, rebound, or guarding  Extremities are intact with some mild bilateral symmetric pitting edema  He is neurologically nonfocal with normal skin and back exam   Impression:  Hypotension, may be related to volume or cardiac etiology  Will plan to do a bedside echo  Will give IV fluids  Will check labs  Will do cardiac evaluation  His face admission the hospital for symptomatic hypotension  Echo with normal EF  Patient with positive troponin    Will admit for hypotension, and STEMI  ED Course         Critical Care Time  CriticalCare Time  Performed by: Laura Guevara MD  Authorized by: Laura Guevara MD     Critical care provider statement:     Critical care was necessary to treat or prevent imminent or life-threatening deterioration of the following conditions:  Circulatory failure    Critical care was time spent personally by me on the following activities:  Blood draw for specimens, obtaining history from patient or surrogate, development of treatment plan with patient or surrogate, discussions with consultants, discussions with primary provider, evaluation of patient's response to treatment, examination of patient, review of old charts, re-evaluation of patient's condition, ordering and review of radiographic studies, ordering and review of laboratory studies, ordering and performing treatments and interventions and interpretation of cardiac output measurements

## 2020-08-18 NOTE — ASSESSMENT & PLAN NOTE
Initial potassium 2 5    Patient received 20 mEq IV and 40 mEq oral   · Follow-up repeat potassium level tonight  · Monitor electrolytes  · obtain magnesium level

## 2020-08-18 NOTE — H&P
H&P- Lo Luzmaria 1965, 48 y o  male MRN: [id number]    Unit/Bed#: ED 29 Encounter: [id number]    Primary Care Provider: Sosa Jordan DO   Date and time admitted to hospital: 8/18/2020  2:35 PM        Hypokalemia  Assessment & Plan  Initial potassium 2 5  Patient received 20 mEq IV and 40 mEq oral   · Follow-up repeat potassium level tonight  · Monitor electrolytes  · obtain magnesium level    Infiltrate of middle lobe of right lung present on imaging study  Assessment & Plan  As noted on portable chest x-ray  There is questionable haziness in the right middle lobe concerning for possible infiltrate  Patient however denies cough, shortness of breath, fevers, chills  No sick complains at this time aside from what was noted before  WBC count normal   · Will follow-up lateral view x-ray  · Hold off antibiotic therapy for now    Syncope and collapse  Assessment & Plan  Possibly secondary to hypotension versus ACS or combination of the 2   · Continue midodrine and fludrocortisone  · Continue blood pressure checks  · IV hydration  · Telemetry monitoring    Orthostatic hypotension  Assessment & Plan  Apparently this has been a recurrent issue  He was admitted in July of this year for similar issue, although last admission he did not actually syncopize  Now presents with recurrent hypotension and possible NSTEMI  · Will need to continue beta-blocker and Imdur due to acute coronary syndrome  · Continue IV hydration  · Continue fludrocortisone and midodrine  · Continue close blood pressure monitoring    Tobacco use  Assessment & Plan  · Patient uses chewing tobacco  · Will continue nicotine patch inpatient    Bariatric surgery status  Assessment & Plan  · Historically documented    · Continue supportive care, regular diet  · Outpatient follow-up    Coronary artery disease involving native coronary artery of native heart with unstable angina pectoris ([facility] [address] )  Assessment & Plan  · Continue aspirin, statin, Imdur, metoprolol  · Follow-up cardiology recommendations  · See plan for NSTEMI    Bipolar disorder, current episode mixed, moderate ([address] )  Assessment & Plan  · Will continue trazodone, Effexor  · Patient also receives monthly paliperidone injections    * NSTEMI (non-ST elevated myocardial infarction) [facility])  Assessment & Plan  Patient with initial troponin of 1 04, elevated from previous baseline  He does have a history of CAD, underwent cardiac catheterization in May of 2019 which showed mild nonobstructive disease in the mid LAD  Prior echocardiogram had shown EF 55%  · Continue metoprolol, Imdur daily  · Continue statin, start aspirin  · Continue monitor monitor  · Repeat troponins and EKGs  · Continue heparin infusion  · Nitro p r n  · Will make NPO for now in case patient requires urgent catheterization  · Cardiology consult      History and Physical - [facility]    Patient Information: Vivian Amador 48 y o  male MRN: [id number]  Unit/Bed#: ED 29 Encounter: [id number]  Admitting Physician: Donna Carcamo DO  PCP: Chato Forde DO  Date of Admission:  08/18/20      VTE Prophylaxis: Heparin Drip  / sequential compression device   Code Status: Level 1 - Full Code   POLST: POLST form is not discussed and not completed at this time  Anticipated Length of Stay:  Patient will be admitted on an Inpatient basis with an anticipated length of stay of > 2 midnights  Justification for Hospital Stay: Please see detailed plans noted above  Total Time for Visit, including Counseling / Coordination of Care: 45 minutes  Greater than 50% of this total time spent on direct patient counseling and coordination of care  Chief Complaint:    Syncope, chest pain    History of Present Illness:    Vivian mAador is a 48 y o  male who presents with syncope, chest pain  He has a past medical history of CAD, bipolar disorder, and orthostatic hypotension    He was recently admitted in July of this year for presyncope and hypotension, was maintained on fludrocortisone and midodrine  He also has a history of catheterization in May of 2019 which showed only mildly occlusive disease in the LAD  He now presents to the hospital today after passing out this morning around 10:30 a m  He works as an , states that he was kneeling down when he began feeling lightheaded  Next thing he knew, he was on his back  This was witnessed by a co-worker  Is not sure how long he was unconscious  No tongue biting, lip smacking, or bowel/bladder incontinence  Had only brief confusion afterward  States that he had a blood pressure of 77/53 when standing and 109/53 when sitting  He also notes that around when his syncope occurred, he experienced chest pain similar to previous episodes of angina  In the ED, his blood pressure trending the 999 systolic, however, is noted to have a troponin of 1 04  In addition, his potassium was 2 5  He received 60 mg once in the ED  He was started on heparin infusion  Case was discussed with Cardiology, recommended continuing heparin infusion for now  Currently, patient is in no distress  No chest pain currently  He has history of tobacco use, using mainly chewing tobacco     Review of Systems:    Review of Systems   Constitutional: Negative for activity change, chills and fever  HENT: Negative  Negative for congestion, hearing loss, sore throat and trouble swallowing  Eyes: Negative for visual disturbance  Respiratory: Negative for cough, shortness of breath and wheezing  Cardiovascular: Positive for chest pain  Negative for palpitations and leg swelling  Gastrointestinal: Negative for abdominal distention, abdominal pain, blood in stool, constipation, diarrhea, nausea and vomiting  Endocrine: Negative  Genitourinary: Negative for dysuria, frequency and hematuria  Musculoskeletal: Negative for arthralgias, joint swelling and myalgias     Skin: Negative  Allergic/Immunologic: Negative for immunocompromised state  Neurological: Positive for dizziness, syncope and light-headedness  Negative for facial asymmetry, speech difficulty, weakness, numbness and headaches  Hematological: Negative  Psychiatric/Behavioral: Negative for behavioral problems and confusion  Past Medical and Surgical History:     Past Medical History:   Diagnosis Date    Bariatric surgery status     CPAP (continuous positive airway pressure) dependence     Hearing loss of aging     Hypercholesterolemia     Morbid obesity ([address] )     NSTEMI (non-ST elevation [facility])     april 2019    Postsurgical malabsorption        Past Surgical History:   Procedure Laterality Date    CARDIAC CATHETERIZATION      no stents     COLONOSCOPY      EGD      HERNIA REPAIR      umbilical hernia    ND LAP GASTRIC BYPASS/FRITZ-EN-Y N/A 3/2/2020    Procedure: LAPAROSCOPIC FRITZ-EN-Y GASTRIC BYPASS AND INTRAOPERATIVE EGD;  Surgeon: Julio Mcmanus MD;  Location: [facility];  Service: Bariatrics    SKIN SURGERY      cyst removed from back and thumb    TONSILLECTOMY      TOOTH EXTRACTION         Meds/Allergies:    Prior to Admission medications    Medication Sig Start Date End Date Taking?  Authorizing Provider   acetaminophen (TYLENOL) 325 mg tablet Take 3 tablets (975 mg total) by mouth every 8 (eight) hours 3/3/20   Juno Torers PA-C   atorvastatin (LIPITOR) 40 mg tablet Take 1 tablet by mouth once daily 6/12/20   Celestino Xavier,    clonazePAM (KlonoPIN) 1 mg tablet as needed     Historical Provider, MD   fludrocortisone (FLORINEF) 0 1 mg tablet Take 4 tablets (0 4 mg total) by mouth daily 8/11/20   Tessa Monet,    isosorbide mononitrate (IMDUR) 30 mg 24 hr tablet Take 30 mg by mouth daily    Historical Provider, MD   metoprolol succinate (TOPROL-XL) 25 mg 24 hr tablet Take 25 mg by mouth daily    Historical Provider, MD   midodrine (PROAMATINE) 2 5 mg tablet Take 1 tablet (2 5 mg total) by mouth 3 (three) times a day before meals 20   Samantha Wick DO   OXcarbazepine (TRILEPTAL) 150 mg tablet Take 150 mg by mouth 2 (two) times a day     Historical Provider, MD   paliperidone palmitate ER Virgle Slim Sustenna) 234 mg/1 5 mL IM injection Inject 234 mg into a muscle every 30 (thirty) days    Historical Provider, MD   pantoprazole (PROTONIX) 20 mg tablet Take 1 tablet (20 mg total) by mouth daily 3/11/20 7/21/20  Alex Park PA-C   traZODone (DESYREL) 300 MG tablet Take 300 mg by mouth daily at bedtime    Historical Provider, MD   venlafaxine [facility]) 75 mg tablet Take 75 mg by mouth daily  3/3/20   Historical Provider, MD     I have reviewed home medications with patient personally      Allergies: No Known Allergies    Social History:     Marital Status: /Civil Union   Occupation:   Patient Pre-hospital Level of Mobility: ambulatory  Patient Pre-hospital Diet Restrictions: N/a  Substance Use History:   Social History     Substance and Sexual Activity   Alcohol Use Not Currently    Frequency: Never     Social History     Tobacco Use   Smoking Status Former Smoker    Types: Cigarettes    Last attempt to quit:     Years since quittin 6   Smokeless Tobacco Current User    Types: Jerene Hem Last attempt to quit: 2014   Tobacco Comment    quit cigarettes      Social History     Substance and Sexual Activity   Drug Use Never       Family History:    Family History   Problem Relation Age of Onset    Lung cancer Mother     Brain cancer Mother     Diabetes Brother     Bipolar disorder Maternal Grandfather        Physical Exam:     Vitals:   Blood Pressure: 109/63 (20 1728)  Pulse: (!) 54 (20 1728)  Temperature: 98 6 °F (37 °C) (20 143)  Temp Source: Oral (20 143)  Respirations: 20 (20 1728)  Height: 6' (182 9 cm) (20)  Weight - Scale: 90 7 kg (200 lb) (20)  SpO2: 96 % (08/18/20 2428)    Physical Exam  Vitals signs reviewed  Constitutional:       General: He is not in acute distress  Appearance: Normal appearance  He is well-developed  He is not diaphoretic  HENT:      Head: Normocephalic and atraumatic  Nose: Nose normal    Eyes:      Extraocular Movements: Extraocular movements intact  Conjunctiva/sclera: Conjunctivae normal       Pupils: Pupils are equal, round, and reactive to light  Neck:      Musculoskeletal: Normal range of motion and neck supple  Thyroid: No thyromegaly  Vascular: No JVD  Trachea: No tracheal deviation  Cardiovascular:      Rate and Rhythm: Normal rate and regular rhythm  Pulses: Normal pulses  Heart sounds: Normal heart sounds  Pulmonary:      Effort: Pulmonary effort is normal  No respiratory distress  Breath sounds: Normal breath sounds  Abdominal:      General: Abdomen is flat  There is no distension  Palpations: Abdomen is soft  Tenderness: There is no abdominal tenderness  There is no guarding  Musculoskeletal: Normal range of motion  General: No swelling, tenderness or deformity  Right lower leg: No edema  Left lower leg: No edema  Lymphadenopathy:      Cervical: No cervical adenopathy  Upper Body:      Right upper body: No supraclavicular adenopathy  Left upper body: No supraclavicular adenopathy  Skin:     General: Skin is warm and dry  Findings: No rash  Neurological:      General: No focal deficit present  Mental Status: He is alert and oriented to person, place, and time  Mental status is at baseline  Cranial Nerves: No cranial nerve deficit  Sensory: No sensory deficit  Motor: No tremor, abnormal muscle tone or seizure activity  Psychiatric:         Mood and Affect: Mood normal          Behavior: Behavior normal            Additional Data:     Lab Results: I have personally reviewed pertinent reports       Results from last 7 days   Lab Units 08/18/20  1501   WBC Thousand/uL 9 23   HEMOGLOBIN g/dL 12 0   HEMATOCRIT % 35 5*   PLATELETS Thousands/uL 201   NEUTROS PCT % 64   LYMPHS PCT % 26   MONOS PCT % 7   EOS PCT % 2     Results from last 7 days   Lab Units 08/18/20  1501   POTASSIUM mmol/L 2 5*   CHLORIDE mmol/L 108   CO2 mmol/L 28   BUN mg/dL 5   CREATININE mg/dL 0 84   CALCIUM mg/dL 7 9*     Results from last 7 days   Lab Units 08/18/20  1556   INR  1 20*       Imaging: I have personally reviewed pertinent reports  and I have personally reviewed pertinent films in PACS    Xr Chest 1 View Portable    Result Date: 8/18/2020  Narrative: CHEST INDICATION:   hypotension  COMPARISON:  July 15, 2020 EXAM PERFORMED/VIEWS:  XR CHEST PORTABLE FINDINGS: Heart size appears normal   The right cardiac border appears very slightly ill-defined  This could indicate some minimal atelectasis or infiltrate in the right middle lobe  A lateral view might be helpful for further workup  Otherwise, the lungs are clear  No pleural fluid or pneumothorax  Osseous structures appear within normal limits for patient age  Impression: Ill-defined appearance of the right cardiac border which may indicate some atelectasis or infiltrate of the right middle lobe  Consider follow-up lateral view for further workup  The study was marked in [facility] for immediate notification  Workstation performed: [id number]     Us Bedside Procedure    Result Date: 8/18/2020  Narrative: 5 [id number]  [id number]      EKG, Pathology, and Other Studies Reviewed on Admission:   · EKG: Sinus rhythm    Allscripts / Epic Records Reviewed: Yes    Portions of the record may have been created with voice recognition software  Occasional wrong word or "sound a like" substitutions may have occurred due to the inherent limitations of voice recognition software  Read the chart carefully and recognize, using context, where substitutions have occurred

## 2020-08-18 NOTE — ASSESSMENT & PLAN NOTE
Possibly secondary to hypotension versus ACS or combination of the 2   · Continue midodrine and fludrocortisone  · Continue blood pressure checks  · IV hydration  · Telemetry monitoring

## 2020-08-18 NOTE — TELEPHONE ENCOUNTER
P/c'd states he passed out at work this am  Feel in the bushes  Not the best historian as to what medications he is taking  Advised he go to the ER, states he will after he is done working  States he did not eat yet today, but that is his normal and has been taking in fluids

## 2020-08-18 NOTE — TELEPHONE ENCOUNTER
Pt called stating that he was to call back with update to medication list:    Metoprolol ER 25mg daily  Isosorbide Mono 30mg daily      He will be proceeding to ER as recommended by PCP and cardiology

## 2020-08-18 NOTE — ASSESSMENT & PLAN NOTE
Apparently this has been a recurrent issue  He was admitted in July of this year for similar issue, although last admission he did not actually syncopize  Now presents with recurrent hypotension and possible NSTEMI    · Will need to continue beta-blocker and Imdur due to acute coronary syndrome  · Continue IV hydration  · Continue fludrocortisone and midodrine  · Continue close blood pressure monitoring

## 2020-08-18 NOTE — ED PROCEDURE NOTE
PROCEDURE  POC AAA US    Date/Time: 8/18/2020 3:51 PM  Performed by: Latonya Packer MD  Authorized by: Latonya Packer MD     Patient location:  ED  Performing Provider:  Attending  Procedure details:     Exam Type:  Educational    Indications: hypotension      Views Obtained:  Transverse proximal, transverse mid view, transverse distal view and sagittal (longitudinal) view    Image quality: diagnostic      Image availability:  Images available in PACS  Findings:     Abdominal Aorta Findings: normal      Aneurysm (if present): no abdominal aortic aneurysm    Interpretation:     Aortic ultrasound impression: aorta normal           Latonya Packer MD  08/18/20 1559

## 2020-08-19 ENCOUNTER — TELEPHONE (OUTPATIENT)
Dept: CARDIOLOGY CLINIC | Facility: CLINIC | Age: 55
End: 2020-08-19

## 2020-08-19 ENCOUNTER — APPOINTMENT (INPATIENT)
Dept: NON INVASIVE DIAGNOSTICS | Facility: HOSPITAL | Age: 55
DRG: 312 | End: 2020-08-19
Payer: COMMERCIAL

## 2020-08-19 VITALS
RESPIRATION RATE: 18 BRPM | TEMPERATURE: 99.4 F | WEIGHT: 200 LBS | BODY MASS INDEX: 27.09 KG/M2 | HEIGHT: 72 IN | HEART RATE: 70 BPM | OXYGEN SATURATION: 97 % | DIASTOLIC BLOOD PRESSURE: 71 MMHG | SYSTOLIC BLOOD PRESSURE: 116 MMHG

## 2020-08-19 PROBLEM — R77.8 ELEVATED TROPONIN: Status: ACTIVE | Noted: 2020-08-19

## 2020-08-19 PROBLEM — R79.89 ELEVATED TROPONIN: Status: ACTIVE | Noted: 2020-08-19

## 2020-08-19 LAB
ANION GAP SERPL CALCULATED.3IONS-SCNC: 4 MMOL/L (ref 4–13)
APTT PPP: 55 SECONDS (ref 23–37)
APTT PPP: 71 SECONDS (ref 23–37)
ATRIAL RATE: 48 BPM
ATRIAL RATE: 52 BPM
ATRIAL RATE: 56 BPM
BUN SERPL-MCNC: 4 MG/DL (ref 5–25)
CALCIUM SERPL-MCNC: 8.2 MG/DL (ref 8.3–10.1)
CHLORIDE SERPL-SCNC: 111 MMOL/L (ref 100–108)
CO2 SERPL-SCNC: 30 MMOL/L (ref 21–32)
CORTIS SERPL-MCNC: 5 UG/DL
CREAT SERPL-MCNC: 0.58 MG/DL (ref 0.6–1.3)
CREAT UR-MCNC: 82.2 MG/DL
GFR SERPL CREATININE-BSD FRML MDRD: 116 ML/MIN/1.73SQ M
GLUCOSE SERPL-MCNC: 78 MG/DL (ref 65–140)
P AXIS: 15 DEGREES
P AXIS: 33 DEGREES
P AXIS: 55 DEGREES
POTASSIUM SERPL-SCNC: 2.9 MMOL/L (ref 3.5–5.3)
POTASSIUM UR-SCNC: 5.1 MMOL/L
PR INTERVAL: 128 MS
PR INTERVAL: 164 MS
PR INTERVAL: 190 MS
PROCALCITONIN SERPL-MCNC: 0.05 NG/ML
QRS AXIS: 37 DEGREES
QRS AXIS: 69 DEGREES
QRS AXIS: 94 DEGREES
QRSD INTERVAL: 118 MS
QRSD INTERVAL: 126 MS
QRSD INTERVAL: 132 MS
QT INTERVAL: 452 MS
QT INTERVAL: 462 MS
QT INTERVAL: 482 MS
QTC INTERVAL: 420 MS
QTC INTERVAL: 430 MS
QTC INTERVAL: 445 MS
SODIUM SERPL-SCNC: 145 MMOL/L (ref 136–145)
T WAVE AXIS: 31 DEGREES
T WAVE AXIS: 44 DEGREES
T WAVE AXIS: 81 DEGREES
TROPONIN I SERPL-MCNC: 0.97 NG/ML
TSH SERPL DL<=0.05 MIU/L-ACNC: 0.9 UIU/ML (ref 0.36–3.74)
VENTRICULAR RATE: 48 BPM
VENTRICULAR RATE: 52 BPM
VENTRICULAR RATE: 56 BPM

## 2020-08-19 PROCEDURE — 93005 ELECTROCARDIOGRAM TRACING: CPT

## 2020-08-19 PROCEDURE — 80048 BASIC METABOLIC PNL TOTAL CA: CPT | Performed by: PHYSICIAN ASSISTANT

## 2020-08-19 PROCEDURE — 93010 ELECTROCARDIOGRAM REPORT: CPT | Performed by: INTERNAL MEDICINE

## 2020-08-19 PROCEDURE — 93308 TTE F-UP OR LMTD: CPT | Performed by: INTERNAL MEDICINE

## 2020-08-19 PROCEDURE — 82570 ASSAY OF URINE CREATININE: CPT | Performed by: INTERNAL MEDICINE

## 2020-08-19 PROCEDURE — 84244 ASSAY OF RENIN: CPT | Performed by: PHYSICIAN ASSISTANT

## 2020-08-19 PROCEDURE — 84133 ASSAY OF URINE POTASSIUM: CPT | Performed by: INTERNAL MEDICINE

## 2020-08-19 PROCEDURE — 99254 IP/OBS CNSLTJ NEW/EST MOD 60: CPT | Performed by: INTERNAL MEDICINE

## 2020-08-19 PROCEDURE — 93321 DOPPLER ECHO F-UP/LMTD STD: CPT | Performed by: INTERNAL MEDICINE

## 2020-08-19 PROCEDURE — 82088 ASSAY OF ALDOSTERONE: CPT | Performed by: PHYSICIAN ASSISTANT

## 2020-08-19 PROCEDURE — 82533 TOTAL CORTISOL: CPT | Performed by: INTERNAL MEDICINE

## 2020-08-19 PROCEDURE — 93325 DOPPLER ECHO COLOR FLOW MAPG: CPT | Performed by: INTERNAL MEDICINE

## 2020-08-19 PROCEDURE — 85730 THROMBOPLASTIN TIME PARTIAL: CPT | Performed by: INTERNAL MEDICINE

## 2020-08-19 PROCEDURE — 99239 HOSP IP/OBS DSCHRG MGMT >30: CPT | Performed by: PHYSICIAN ASSISTANT

## 2020-08-19 PROCEDURE — 93308 TTE F-UP OR LMTD: CPT

## 2020-08-19 PROCEDURE — 84443 ASSAY THYROID STIM HORMONE: CPT | Performed by: INTERNAL MEDICINE

## 2020-08-19 PROCEDURE — 84145 PROCALCITONIN (PCT): CPT | Performed by: PHYSICIAN ASSISTANT

## 2020-08-19 RX ORDER — POTASSIUM CHLORIDE 20 MEQ/1
20 TABLET, EXTENDED RELEASE ORAL ONCE
Status: COMPLETED | OUTPATIENT
Start: 2020-08-19 | End: 2020-08-19

## 2020-08-19 RX ORDER — RANOLAZINE 500 MG/1
500 TABLET, EXTENDED RELEASE ORAL EVERY 12 HOURS SCHEDULED
Status: DISCONTINUED | OUTPATIENT
Start: 2020-08-19 | End: 2020-08-19

## 2020-08-19 RX ORDER — POTASSIUM CHLORIDE 20 MEQ/1
40 TABLET, EXTENDED RELEASE ORAL ONCE
Status: COMPLETED | OUTPATIENT
Start: 2020-08-19 | End: 2020-08-19

## 2020-08-19 RX ORDER — POTASSIUM CHLORIDE 14.9 MG/ML
20 INJECTION INTRAVENOUS ONCE
Status: COMPLETED | OUTPATIENT
Start: 2020-08-19 | End: 2020-08-19

## 2020-08-19 RX ORDER — ASPIRIN 81 MG/1
81 TABLET, CHEWABLE ORAL DAILY
Qty: 30 TABLET | Refills: 0
Start: 2020-08-20

## 2020-08-19 RX ADMIN — VENLAFAXINE 75 MG: 37.5 TABLET ORAL at 08:41

## 2020-08-19 RX ADMIN — CARBIDOPA AND LEVODOPA 2.5 MG: 50; 200 TABLET, EXTENDED RELEASE ORAL at 06:01

## 2020-08-19 RX ADMIN — OXCARBAZEPINE 150 MG: 150 TABLET, FILM COATED ORAL at 08:41

## 2020-08-19 RX ADMIN — POTASSIUM CHLORIDE 20 MEQ: 14.9 INJECTION, SOLUTION INTRAVENOUS at 00:57

## 2020-08-19 RX ADMIN — ASPIRIN 81 MG 81 MG: 81 TABLET ORAL at 08:41

## 2020-08-19 RX ADMIN — ATORVASTATIN CALCIUM 40 MG: 40 TABLET, FILM COATED ORAL at 08:40

## 2020-08-19 RX ADMIN — POTASSIUM CHLORIDE 20 MEQ: 1500 TABLET, EXTENDED RELEASE ORAL at 14:14

## 2020-08-19 RX ADMIN — METOPROLOL SUCCINATE 25 MG: 25 TABLET, EXTENDED RELEASE ORAL at 08:41

## 2020-08-19 RX ADMIN — NITROGLYCERIN 0.4 MG: 0.4 TABLET SUBLINGUAL at 00:30

## 2020-08-19 RX ADMIN — ISOSORBIDE MONONITRATE 30 MG: 30 TABLET, EXTENDED RELEASE ORAL at 08:41

## 2020-08-19 RX ADMIN — CARBIDOPA AND LEVODOPA 2.5 MG: 50; 200 TABLET, EXTENDED RELEASE ORAL at 12:39

## 2020-08-19 RX ADMIN — HEPARIN SODIUM 2000 UNITS: 1000 INJECTION INTRAVENOUS; SUBCUTANEOUS at 00:31

## 2020-08-19 RX ADMIN — FLUDROCORTISONE ACETATE 0.4 MG: 0.1 TABLET ORAL at 08:40

## 2020-08-19 RX ADMIN — POTASSIUM CHLORIDE 20 MEQ: 14.9 INJECTION, SOLUTION INTRAVENOUS at 12:39

## 2020-08-19 RX ADMIN — POTASSIUM CHLORIDE 40 MEQ: 1500 TABLET, EXTENDED RELEASE ORAL at 12:39

## 2020-08-19 RX ADMIN — POTASSIUM CHLORIDE 40 MEQ: 1500 TABLET, EXTENDED RELEASE ORAL at 00:57

## 2020-08-19 RX ADMIN — PANTOPRAZOLE SODIUM 20 MG: 20 TABLET, DELAYED RELEASE ORAL at 06:01

## 2020-08-19 NOTE — ASSESSMENT & PLAN NOTE
· Hx of CAD with non-urgent cath in May 2019     · Continue home medications: aspirin, statin  · Discontinue Imdur, metoprolol  · Cardiology input appreciated   · Outpatient ischemic workup

## 2020-08-19 NOTE — ASSESSMENT & PLAN NOTE
· Apparently this has been a recurrent issue since approx May 2020  He was admitted in July 2020 for similar issue, although last admission he did not actually syncopize  Yesterday, patient did indeed syncopize from kneeling position without his "usual" symptom of lightheadedness    · Continue home medications, fludrocortisone and midodrine  · Ortho vitals today are negative

## 2020-08-19 NOTE — TELEPHONE ENCOUNTER
P/c'd states he saw you in the hospital today  You said he could go back to work Monday  He needs a note  Any restrictions?       Please advise

## 2020-08-19 NOTE — PLAN OF CARE
Problem: Potential for Falls  Goal: Patient will remain free of falls  Description: INTERVENTIONS:  - Assess patient frequently for physical needs  -  Identify cognitive and physical deficits and behaviors that affect risk of falls  -  [place] fall precautions as indicated by assessment   - Educate patient/family on patient safety including physical limitations  - Instruct patient to call for assistance with activity based on assessment  - Modify environment to reduce risk of injury  - Consider OT/PT consult to assist with strengthening/mobility  Outcome: Progressing     Problem: SAFETY ADULT  Goal: Patient will remain free of falls  Description: INTERVENTIONS:  - Assess patient frequently for physical needs  -  Identify cognitive and physical deficits and behaviors that affect risk of falls    -  [place] fall precautions as indicated by assessment   - Educate patient/family on patient safety including physical limitations  - Instruct patient to call for assistance with activity based on assessment  - Modify environment to reduce risk of injury  - Consider OT/PT consult to assist with strengthening/mobility  Outcome: Progressing  Goal: Maintain or return to baseline ADL function  Description: INTERVENTIONS:  -  Assess patient's ability to carry out ADLs; assess patient's baseline for ADL function and identify physical deficits which impact ability to perform ADLs (bathing, care of mouth/teeth, toileting, grooming, dressing, etc )  - Assess/evaluate cause of self-care deficits   - Assess range of motion  - Assess patient's mobility; develop plan if impaired  - Assess patient's need for assistive devices and provide as appropriate  - Encourage maximum independence but intervene and supervise when necessary  - Involve family in performance of ADLs  - Assess for home care needs following discharge   - Consider OT consult to assist with ADL evaluation and planning for discharge  - Provide patient education as appropriate  Outcome: Progressing  Goal: Maintain or return mobility status to optimal level  Description: INTERVENTIONS:  - Assess patient's baseline mobility status (ambulation, transfers, stairs, etc )    - Identify cognitive and physical deficits and behaviors that affect mobility  - Identify mobility aids required to assist with transfers and/or ambulation (gait belt, sit-to-stand, lift, walker, cane, etc )  - [place] fall precautions as indicated by assessment  - Record patient progress and toleration of activity level on Mobility SBAR; progress patient to next Phase/Stage  - Instruct patient to call for assistance with activity based on assessment  - Consider rehabilitation consult to assist with strengthening/weightbearing, etc   Outcome: Progressing     Problem: DISCHARGE PLANNING  Goal: Discharge to home or other facility with appropriate resources  Description: INTERVENTIONS:  - Identify barriers to discharge w/patient and caregiver  - Arrange for needed discharge resources and transportation as appropriate  - Identify discharge learning needs (meds, wound care, etc )  - Arrange for interpretive services to assist at discharge as needed  - Refer to Case Management Department for coordinating discharge planning if the patient needs post-hospital services based on physician/advanced practitioner order or complex needs related to functional status, cognitive ability, or social support system  Outcome: Progressing     Problem: Knowledge Deficit  Goal: Patient/family/caregiver demonstrates understanding of disease process, treatment plan, medications, and discharge instructions  Description: Complete learning assessment and assess knowledge base    Interventions:  - Provide teaching at level of understanding  - Provide teaching via preferred learning methods  Outcome: Progressing     Problem: Nutrition/Hydration-ADULT  Goal: Nutrient/Hydration intake appropriate for improving, restoring or maintaining nutritional needs  Description: Monitor and assess patient's nutrition/hydration status for malnutrition  Collaborate with interdisciplinary team and initiate plan and interventions as ordered  Monitor patient's weight and dietary intake as ordered or per policy  Utilize nutrition screening tool and intervene as necessary  Determine patient's food preferences and provide high-protein, high-caloric foods as appropriate       INTERVENTIONS:  - Monitor oral intake, urinary output, labs, and treatment plans  - Assess nutrition and hydration status and recommend course of action  - Evaluate amount of meals eaten  - Assist patient with eating if necessary   - Allow adequate time for meals  - Recommend/ encourage appropriate diets, oral nutritional supplements, and vitamin/mineral supplements  - Order, calculate, and assess calorie counts as needed  - Recommend, monitor, and adjust tube feedings and TPN/PPN based on assessed needs  - Assess need for intravenous fluids  - Provide specific nutrition/hydration education as appropriate  - Include patient/family/caregiver in decisions related to nutrition  Outcome: Progressing

## 2020-08-19 NOTE — PLAN OF CARE
Problem: Potential for Falls  Goal: Patient will remain free of falls  Description: INTERVENTIONS:  - Assess patient frequently for physical needs  -  Identify cognitive and physical deficits and behaviors that affect risk of falls  -  [place] fall precautions as indicated by assessment   - Educate patient/family on patient safety including physical limitations  - Instruct patient to call for assistance with activity based on assessment  - Modify environment to reduce risk of injury  - Consider OT/PT consult to assist with strengthening/mobility  Outcome: Progressing     Problem: SAFETY ADULT  Goal: Patient will remain free of falls  Description: INTERVENTIONS:  - Assess patient frequently for physical needs  -  Identify cognitive and physical deficits and behaviors that affect risk of falls    -  [place] fall precautions as indicated by assessment   - Educate patient/family on patient safety including physical limitations  - Instruct patient to call for assistance with activity based on assessment  - Modify environment to reduce risk of injury  - Consider OT/PT consult to assist with strengthening/mobility  Outcome: Progressing  Goal: Maintain or return to baseline ADL function  Description: INTERVENTIONS:  -  Assess patient's ability to carry out ADLs; assess patient's baseline for ADL function and identify physical deficits which impact ability to perform ADLs (bathing, care of mouth/teeth, toileting, grooming, dressing, etc )  - Assess/evaluate cause of self-care deficits   - Assess range of motion  - Assess patient's mobility; develop plan if impaired  - Assess patient's need for assistive devices and provide as appropriate  - Encourage maximum independence but intervene and supervise when necessary  - Involve family in performance of ADLs  - Assess for home care needs following discharge   - Consider OT consult to assist with ADL evaluation and planning for discharge  - Provide patient education as appropriate  Outcome: Progressing  Goal: Maintain or return mobility status to optimal level  Description: INTERVENTIONS:  - Assess patient's baseline mobility status (ambulation, transfers, stairs, etc )    - Identify cognitive and physical deficits and behaviors that affect mobility  - Identify mobility aids required to assist with transfers and/or ambulation (gait belt, sit-to-stand, lift, walker, cane, etc )  - [place] fall precautions as indicated by assessment  - Record patient progress and toleration of activity level on Mobility SBAR; progress patient to next Phase/Stage  - Instruct patient to call for assistance with activity based on assessment  - Consider rehabilitation consult to assist with strengthening/weightbearing, etc   Outcome: Progressing     Problem: DISCHARGE PLANNING  Goal: Discharge to home or other facility with appropriate resources  Description: INTERVENTIONS:  - Identify barriers to discharge w/patient and caregiver  - Arrange for needed discharge resources and transportation as appropriate  - Identify discharge learning needs (meds, wound care, etc )  - Arrange for interpretive services to assist at discharge as needed  - Refer to Case Management Department for coordinating discharge planning if the patient needs post-hospital services based on physician/advanced practitioner order or complex needs related to functional status, cognitive ability, or social support system  Outcome: Progressing     Problem: Knowledge Deficit  Goal: Patient/family/caregiver demonstrates understanding of disease process, treatment plan, medications, and discharge instructions  Description: Complete learning assessment and assess knowledge base    Interventions:  - Provide teaching at level of understanding  - Provide teaching via preferred learning methods  Outcome: Progressing     Problem: Nutrition/Hydration-ADULT  Goal: Nutrient/Hydration intake appropriate for improving, restoring or maintaining nutritional needs  Description: Monitor and assess patient's nutrition/hydration status for malnutrition  Collaborate with interdisciplinary team and initiate plan and interventions as ordered  Monitor patient's weight and dietary intake as ordered or per policy  Utilize nutrition screening tool and intervene as necessary  Determine patient's food preferences and provide high-protein, high-caloric foods as appropriate       INTERVENTIONS:  - Monitor oral intake, urinary output, labs, and treatment plans  - Assess nutrition and hydration status and recommend course of action  - Evaluate amount of meals eaten  - Assist patient with eating if necessary   - Allow adequate time for meals  - Recommend/ encourage appropriate diets, oral nutritional supplements, and vitamin/mineral supplements  - Order, calculate, and assess calorie counts as needed  - Recommend, monitor, and adjust tube feedings and TPN/PPN based on assessed needs  - Assess need for intravenous fluids  - Provide specific nutrition/hydration education as appropriate  - Include patient/family/caregiver in decisions related to nutrition  Outcome: Progressing

## 2020-08-19 NOTE — UTILIZATION REVIEW
Notification of Inpatient Admission/Inpatient Authorization Request   This is a Notification of Inpatient Admission for [facility]  Be advised that this patient was admitted to our facility under Inpatient Status  Contact Dylon King at [phone] for additional admission information  Jeremiah Menon  DEPT  DEDICATED -[phone]  Patient Name:   Leodan Gregory   YOB: 1965       [address]   [address]:   [place]  Tax ID: [id number]  NPI: [id number] Attending Provider/NPI:  Phone:  Address: Glenny Chou [[id number]]  [phone]  Same as Facility   Place of Service Code: 24 Place of Service Name:  [address] [place] [address]   Start Date: 8/18/20 1600 Discharge Date & Time: No discharge date for patient encounter  Type of Admission: Inpatient Status Discharge Disposition (if discharged): Home/Self Care   Patient Diagnoses: Orthostatic hypotension [I95 1]  Syncope [R55]  Hypotension [I95 9]  NSTEMI (non-ST elevated myocardial infarction) [facility]) [I21 4]     Orders: Admission Orders (From admission, onward)     Ordered        08/18/20 1600  Inpatient Admission  Once                    Assigned Utilization Review Contact: Dylon King  Utilization   Network Utilization Review Department  Phone: [phone]; Fax [phone]  Email: [email or web address]   ATTENTION PAYERS: Please call the assigned Utilization  directly with any questions or concerns ALL voicemails in the department are confidential  Send all requests for admission clinical reviews, approved or denied determinations and any other requests to dedicated fax number belonging to the campus where the patient is receiving treatment

## 2020-08-19 NOTE — DISCHARGE INSTRUCTIONS
Syncope   WHAT YOU NEED TO KNOW:   Syncope is also called fainting or passing out  Syncope is a sudden, temporary loss of consciousness, followed by a fall from a standing or sitting position  Syncope ranges from not serious to a sign of a more serious condition that needs to be treated  You can control some health conditions that cause syncope  Your healthcare providers can help you create a plan to manage syncope and prevent episodes  DISCHARGE INSTRUCTIONS:   Seek care immediately if:   · You are bleeding because you hit your head when you fainted       · You suddenly have double vision, difficulty speaking, numbness, and cannot move your arms or legs      · You have chest pain and trouble breathing      · You vomit blood or material that looks like coffee grounds      · You see blood in your bowel movement  Contact your healthcare provider if:   · You have new or worsening symptoms      · You have another syncope episode      · You have a headache, fast heartbeat, or feel too dizzy to stand up      · You have questions or concerns about your condition or care  Follow up with your healthcare provider as directed: Write down your questions so you remember to ask them during your visits  Manage syncope:   · Keep a record of your syncope episodes  Include your symptoms and your activity before and after the episode  The record can help your healthcare provider find the cause of your syncope and help you manage episodes      · Sit or lie down when needed  This includes when you feel dizzy, your throat is getting tight, and your vision changes  Raise your legs above the level of your heart      · Take slow, deep breaths if you start to breathe faster with anxiety or fear  This can help decrease dizziness and the feeling that you might faint       · Check your blood pressure often  This is important if you take medicine to lower your blood pressure   Check your blood pressure when you are lying down and when you are standing  Ask how often to check during the day  Keep a record of your blood pressure numbers  Your healthcare provider may use the record to help plan your treatment         Prevent a syncope episode:   · Move slowly and let yourself get used to one position before you move to another position  This is very important when you change from a lying or sitting position to a standing position  Take some deep breaths before you stand up from a lying position  Stand up slowly  Sudden movements may cause a fainting spell  Sit on the side of the bed or couch for a few minutes before you stand up  If you are on bedrest, try to be upright for about 2 hours each day, or as directed  Do not lock your legs if you are standing for a long period of time  Move your legs and bend your knees to keep blood flowing      · Follow your healthcare provider's recommendations  Your provider may recommend that you drink more liquids to prevent dehydration  You may also need to have more salt to keep your blood pressure from dropping too low and causing syncope  Your provider will tell you how much liquid and sodium to have each day      · Watch for signs of low blood sugar  These include hunger, nervousness, sweating, and fast or fluttery heartbeats  Talk with your healthcare provider about ways to keep your blood sugar level steady      · Do not strain if you are constipated  You may faint if you strain to have a bowel movement  Walking is the best way to get your bowels moving  Eat foods high in fiber to make it easier to have a bowel movement  Good examples are high-fiber cereals, beans, vegetables, and whole-grain breads  Prune juice may help make bowel movements softer      · Be careful in hot weather  Heat can cause a syncope episode  Limit activity done outside on hot days  Physical activity in hot weather can lead to dehydration  This can cause an episode    © 2017 [employer] Information is for End User's use only and may not be sold, redistributed or otherwise used for commercial purposes  All illustrations and images included in CareNotes® are the copyrighted property of A [employer] A M [employer] Inc  or Toby Kennedy  The above information is an  only  It is not intended as medical advice for individual conditions or treatments   Talk to your doctor, nurse or pharmacist before following any medical regimen to see if it is safe and effective for you

## 2020-08-19 NOTE — ASSESSMENT & PLAN NOTE
· Patient with troponin of 1 04, 0 98, 0 97 (elevated from baseline 0 05)  Likely chronic elevation  · Repeat EKG is still abnormal with Non-specific ST segment change in Lateral lead with sinus bradycardia in 50s last night  · He does have a history of CAD, underwent cardiac catheterization in May of 2019 which showed mild nonobstructive disease in the mid LAD      · Metoprolol discontinued  · Heparin drip discontinued  · Echocardiogram shows LVEF 55% stable from prior, no regional wall motion abnormality  · Has been experiencing chest pain in the outpatient setting, metoprolol an indoor started however now discontinued in the setting of syncope and hypotension  · Outpatient ischemic workup with primary cardiologist

## 2020-08-19 NOTE — ASSESSMENT & PLAN NOTE
· As noted on portable chest x-ray  There is questionable haziness in the right middle lobe concerning for possible infiltrate  Patient however denies cough, shortness of breath, fevers, chills  No sick complains at this time aside from what was noted before      · WBC count normal  · Hold off antibiotic therapy for now  · Procalcitonin negative

## 2020-08-19 NOTE — ASSESSMENT & PLAN NOTE
· Possibly secondary to hypotension in the setting of underlying orthostatic hypotension  · Continue home medication, midodrine and fludrocortisone  · Repeat orthostatic vitals are negative  · Status post IV fluid hydration  · Of note, metoprolol and Imdur were recently resumed in the outpatient setting by his outpatient cardiologist due to chest pain  · Consider possibility of bradycardic syncope and cardiology recommends outpatient event recorder  · Discontinue metoprolol and Imdur  · Continue oral hydration with at least 2 L a day

## 2020-08-19 NOTE — RESTORATIVE TECHNICIAN NOTE
Restorative Specialist Mobility Note       Activity: Ambulate in peguero, Ambulate in room, Bathroom privileges, Chair, Dangle, Stand at bedside(Educated/encouraged pt to ambulate with assistance 3-4 x's/day  Bed alarm on   Pt callbell, phone/tray within reach )     Assistive Device: None            Joss ARELLANO, Restorative Technician, [facility]

## 2020-08-19 NOTE — CONSULTS
Cardiology   Lance Gastelum 48 y o  male MRN: [id number]  Unit/Bed#: [facility] 727-01 Encounter: [id number]        Reason for Consult / Principal Problem: Tropnin elevation , syncope    Physician Requesting Consult:  Maru Valenzuela MD    Cardiologist: Dr Portillo    PCP: Waldemar Padron DO        Assessment/Plan:    >> Syncope:  Possibly related to bradycardia versus orthostatic hypotension  >> Orthostatic hypotension  >> Chronic troponin elevation:  Non MI    Plan:    - discontinue heparin this is not ACS  - check orthostatic vital  - discontinue nitroglycerin/beta blocker/isosorbide mononitrate, if chest pain recurs start Ranexa  - can consider outpatient ischemic evaluation by his cardiologist  - monitor on telemetry  - consider amyloidosis workup outpatient  - event monitor as an outpatient to rule out bradycardic syncope  - if orthostatics negative after receiving IV fluid may be discharged      CC:  Syncope    HPI  48 y o  male with history of orthostatic hypotension presented with syncope  He was in his usual state of health, working at his job as an   Was kneeling down working on some wires on the ground  He suddenly passed out and woke up on the ground after about a minute  He was oriented and did not have any seizure-like activity according to a bystander  He was brought to the emergency room  He has had longstanding orthostatic hypotension of unclear etiology  Possibly related to his multiple psychiatric medications  He has been on Florinef and midodrine for the same  He also has nonobstructive coronary artery disease and possible microvascular angina  For this he was intermittently on metoprolol and isosorbide mononitrate  On Friday he  restarted both of these medications  Although he has had multiple episodes of lightheadedness in the past upon standing from a seated or lying down position, he has never actually had syncope until now            Physical exam  Objective Vitals: Blood pressure 117/73, pulse 74, temperature 98 9 °F (37 2 °C), temperature source Oral, resp  rate 19, height 6' (1 829 m), weight 90 7 kg (200 lb), SpO2 95 %  Orthostatic Blood Pressures      Most Recent Value   Blood Pressure  117/73 filed at 2020 1215   Patient Position - Orthostatic VS  Standing filed at 2020 1215        General:  AO x3, no acute distress  Cardiac:  S1-S2 normal  No murmurs, rubs or gallops, JVP: none  Lungs:  Clear to auscultation bilaterally, no wheezing or crackles  Abdomen:  Soft, nontender, nondistended  Extremities:  Warm, well perfused, pulses palpable, no ulcers or rashes  Edema:none  Neuro: Grossly nonfocal        ======================================================  TREADMILL STRESS  No results found for this or any previous visit    ----------------------------------------------------------------------------------------------  NUCLEAR STRESS TEST: No results found for this or any previous visit  No results found for this or any previous visit     --------------------------------------------------------------------------------  CATH:  No results found for this or any previous visit   --------------------------------------------------------------------------------  ECHO:   Results for orders placed during the hospital encounter of 07/15/20   Echo complete with contrast if indicated    Narrative [address]  [facility], [address] [facility]  [phone]    Transthoracic Echocardiogram  2D, M-mode, Doppler, and Color Doppler    Study date:  2020    Patient: Stacia Kahn  MR number: [id number]  Account number: [de-identified]  : [id number]  Age: 48 years  Gender: Male  Status: Inpatient  Location: Bedside  Height: 72 in  Weight: 206 6 lb  BP: 83/ 55 mmHg    Indications: Hypotension  Diagnoses: I95 9 - Hypotension, unspecified    Sonographer:  AASHISH Wilkins  Primary Physician:  Jordan Pedraza,   Referring Physician: EMRE Barfield  Group:  [facility]  Interpreting Physician:  Jeffery Dueñas DO    SUMMARY    LEFT VENTRICLE:  Systolic function was normal  Ejection fraction was estimated to 55 %  There were no regional wall motion abnormalities  Wall thickness was mildly increased  Left ventricular diastolic function parameters were normal     RIGHT VENTRICLE:  The size was normal   Systolic function was normal     LEFT ATRIUM:  The atrium was mildly dilated  HISTORY: PRIOR HISTORY: CAD, HLD, ROSI, migraine, former smoker, bipolar disorder, pre-syncope  PROCEDURE: The procedure was performed at the bedside  This was a routine study  The transthoracic approach was used  The study included complete 2D imaging, M-mode, complete spectral Doppler, and color Doppler  The heart rate was 63 bpm,  at the start of the study  Image quality was adequate  LEFT VENTRICLE: Size was normal  Systolic function was normal  Ejection fraction was estimated to 55 %  There were no regional wall motion abnormalities  Wall thickness was mildly increased  DOPPLER: Left ventricular diastolic function  parameters were normal     RIGHT VENTRICLE: The size was normal  Systolic function was normal  Wall thickness was normal     LEFT ATRIUM: The atrium was mildly dilated  RIGHT ATRIUM: Size was normal     MITRAL VALVE: Valve structure was normal  There was normal leaflet separation  DOPPLER: The transmitral velocity was within the normal range  There was no evidence for stenosis  There was trace regurgitation  AORTIC VALVE: The valve was trileaflet  Leaflets exhibited normal thickness and normal cuspal separation  DOPPLER: Transaortic velocity was within the normal range  There was no evidence for stenosis  There was no regurgitation  TRICUSPID VALVE: The valve structure was normal  There was normal leaflet separation  DOPPLER: The transtricuspid velocity was within the normal range   There was no evidence for stenosis  There was trace regurgitation  The tricuspid jet  envelope definition was inadequate for estimation of RV systolic pressure  There are no indirect findings (abnormal RV volume or geometry, altered pulmonary flow velocity profile, or leftward septal displacement) which would suggest  moderate or severe pulmonary hypertension  PULMONIC VALVE: Leaflets exhibited normal thickness, no calcification, and normal cuspal separation  DOPPLER: The transpulmonic velocity was within the normal range  There was no regurgitation  PERICARDIUM: There was no pericardial effusion  The pericardium was normal in appearance  AORTA: The root exhibited normal size  SYSTEMIC VEINS: IVC: The inferior vena cava was normal in size  Respirophasic changes were blunted (less than 50% variation)      SYSTEM MEASUREMENT TABLES    2D  %FS: 29 32 %  Ao Diam: 3 18 cm  EDV(Teich): 154 53 ml  EF(Teich): 55 57 %  ESV(Teich): 68 66 ml  IVSd: 0 66 cm  LA Area: 19 43 cm2  LA Diam: 4 16 cm  LVEDV MOD A4C: 179 56 ml  LVEF MOD A4C: 54 55 %  LVESV MOD A4C: 81 61 ml  LVIDd: 5 61 cm  LVIDs: 3 97 cm  LVLd A4C: 9 4 cm  LVLs A4C: 7 41 cm  LVPWd: 0 78 cm  RA Area: 17 17 cm2  RVIDd: 3 83 cm  SV MOD A4C: 97 95 ml  SV(Teich): 85 86 ml    CW  TR Vmax: 2 14 m/s  TR maxP 29 mmHg    MM  TAPSE: 1 81 cm    PW  E': 0 1 m/s  E/E': 8 59  MV A Maxi: 0 81 m/s  MV Dec [place]: 4 21 m/s2  MV DecT: 212 27 ms  MV E Maxi: 0 89 m/s  MV E/A Ratio: 1 11  MV PHT: 61 56 ms  MVA By PHT: 3 57 cm2    [facility]redited [facility]    Prepared and electronically signed by    Nena Severe, DO  Signed 2020 07:48:26       No results found for this or any previous visit   --------------------------------------------------------------------------------  HOLTER  No results found for this or any previous visit   --------------------------------------------------------------------------------  CAROTIDS  No results found for this or any previous visit  [unfilled]   ======================================================          Review of Systems  ROS as noted above, otherwise 12 point review of systems was performed and is negative       Historical Information   Past Medical History:   Diagnosis Date    Bariatric surgery status     CPAP (continuous positive airway pressure) dependence     Hearing loss of aging     Hypercholesterolemia     Morbid obesity (HCC)     NSTEMI (non-ST elevation myocardial infarction) [facility])     2019    Postsurgical malabsorption      Past Surgical History:   Procedure Laterality Date    CARDIAC CATHETERIZATION      no stents     COLONOSCOPY      EGD      HERNIA REPAIR      umbilical hernia    VT LAP GASTRIC BYPASS/FRITZ-EN-Y N/A 3/2/2020    Procedure: LAPAROSCOPIC FRITZ-EN-Y GASTRIC BYPASS AND INTRAOPERATIVE EGD;  Surgeon: Saida Diaz MD;  Location: [facility];  Service: Bariatrics    SKIN SURGERY      cyst removed from back and thumb    TONSILLECTOMY      TOOTH EXTRACTION       Social History     Substance and Sexual Activity   Alcohol Use Not Currently    Frequency: Never     Social History     Substance and Sexual Activity   Drug Use Never     Social History     Tobacco Use   Smoking Status Former Smoker    Types: Cigarettes    Last attempt to quit:     Years since quittin 6   Smokeless Tobacco Current User    Types: [place] Last attempt to quit: 2014   Tobacco Comment    quit cigarettes      Family History   Problem Relation Age of Onset    Lung cancer Mother     Brain cancer Mother     Diabetes Brother     Bipolar disorder Maternal Grandfather        Meds/Allergies   Hospital Medications:   Current Facility-Administered Medications   Medication Dose Route Frequency    acetaminophen (TYLENOL) tablet 650 mg  650 mg Oral Q6H PRN    aspirin chewable tablet 81 mg  81 mg Oral Daily    atorvastatin (LIPITOR) tablet 40 mg  40 mg Oral Daily    clonazePAM (KlonoPIN) tablet 1 mg  1 mg Oral BID PRN    fludrocortisone (FLORINEF) tablet 0 4 mg  0 4 mg Oral Daily    midodrine (PROAMATINE) tablet 2 5 mg  2 5 mg Oral TID AC    nicotine (NICODERM CQ) 21 mg/24 hr TD 24 hr patch 1 patch  1 patch Transdermal Daily    nitroglycerin (NITROSTAT) SL tablet 0 4 mg  0 4 mg Sublingual Q5 Min PRN    OXcarbazepine (TRILEPTAL) tablet 150 mg  150 mg Oral BID    pantoprazole (PROTONIX) EC tablet 20 mg  20 mg Oral Early Morning    potassium chloride 20 mEq IVPB (premix)  20 mEq Intravenous Once    traZODone (DESYREL) tablet 300 mg  300 mg Oral HS    venlafaxine (EFFEXOR) tablet 75 mg  75 mg Oral Daily     Home Medications:   Medications Prior to Admission   Medication    acetaminophen (TYLENOL) 325 mg tablet    atorvastatin (LIPITOR) 40 mg tablet    clonazePAM (KlonoPIN) 1 mg tablet    fludrocortisone (FLORINEF) 0 1 mg tablet    isosorbide mononitrate (IMDUR) 30 mg 24 hr tablet    metoprolol succinate (TOPROL-XL) 25 mg 24 hr tablet    midodrine (PROAMATINE) 2 5 mg tablet    OXcarbazepine (TRILEPTAL) 150 mg tablet    paliperidone palmitate ER (Invega Sustenna) 234 mg/1 5 mL IM injection    pantoprazole (PROTONIX) 20 mg tablet    traZODone (DESYREL) 300 MG tablet    venlafaxine (EFFEXOR) 75 mg tablet       No Known Allergies      Portions of the record may have been created with voice recognition software  Occasional wrong words or "sound a like" substitutions may have occurred due to the inherent limitations of voice recognition software  Read the chart carefully and recognize, using context, where substitutions have occurred

## 2020-08-19 NOTE — PLAN OF CARE
Problem: Potential for Falls  Goal: Patient will remain free of falls  Description: INTERVENTIONS:  - Assess patient frequently for physical needs  -  Identify cognitive and physical deficits and behaviors that affect risk of falls  -  [place] fall precautions as indicated by assessment   - Educate patient/family on patient safety including physical limitations  - Instruct patient to call for assistance with activity based on assessment  - Modify environment to reduce risk of injury  - Consider OT/PT consult to assist with strengthening/mobility  Outcome: Completed     Problem: SAFETY ADULT  Goal: Patient will remain free of falls  Description: INTERVENTIONS:  - Assess patient frequently for physical needs  -  Identify cognitive and physical deficits and behaviors that affect risk of falls    -  [place] fall precautions as indicated by assessment   - Educate patient/family on patient safety including physical limitations  - Instruct patient to call for assistance with activity based on assessment  - Modify environment to reduce risk of injury  - Consider OT/PT consult to assist with strengthening/mobility  Outcome: Completed  Goal: Maintain or return to baseline ADL function  Description: INTERVENTIONS:  -  Assess patient's ability to carry out ADLs; assess patient's baseline for ADL function and identify physical deficits which impact ability to perform ADLs (bathing, care of mouth/teeth, toileting, grooming, dressing, etc )  - Assess/evaluate cause of self-care deficits   - Assess range of motion  - Assess patient's mobility; develop plan if impaired  - Assess patient's need for assistive devices and provide as appropriate  - Encourage maximum independence but intervene and supervise when necessary  - Involve family in performance of ADLs  - Assess for home care needs following discharge   - Consider OT consult to assist with ADL evaluation and planning for discharge  - Provide patient education as appropriate  Outcome: Completed  Goal: Maintain or return mobility status to optimal level  Description: INTERVENTIONS:  - Assess patient's baseline mobility status (ambulation, transfers, stairs, etc )    - Identify cognitive and physical deficits and behaviors that affect mobility  - Identify mobility aids required to assist with transfers and/or ambulation (gait belt, sit-to-stand, lift, walker, cane, etc )  - [place] fall precautions as indicated by assessment  - Record patient progress and toleration of activity level on Mobility SBAR; progress patient to next Phase/Stage  - Instruct patient to call for assistance with activity based on assessment  - Consider rehabilitation consult to assist with strengthening/weightbearing, etc   Outcome: Completed     Problem: DISCHARGE PLANNING  Goal: Discharge to home or other facility with appropriate resources  Description: INTERVENTIONS:  - Identify barriers to discharge w/patient and caregiver  - Arrange for needed discharge resources and transportation as appropriate  - Identify discharge learning needs (meds, wound care, etc )  - Arrange for interpretive services to assist at discharge as needed  - Refer to Case Management Department for coordinating discharge planning if the patient needs post-hospital services based on physician/advanced practitioner order or complex needs related to functional status, cognitive ability, or social support system  Outcome: Completed     Problem: Knowledge Deficit  Goal: Patient/family/caregiver demonstrates understanding of disease process, treatment plan, medications, and discharge instructions  Description: Complete learning assessment and assess knowledge base    Interventions:  - Provide teaching at level of understanding  - Provide teaching via preferred learning methods  Outcome: Completed     Problem: Nutrition/Hydration-ADULT  Goal: Nutrient/Hydration intake appropriate for improving, restoring or maintaining nutritional needs  Description: Monitor and assess patient's nutrition/hydration status for malnutrition  Collaborate with interdisciplinary team and initiate plan and interventions as ordered  Monitor patient's weight and dietary intake as ordered or per policy  Utilize nutrition screening tool and intervene as necessary  Determine patient's food preferences and provide high-protein, high-caloric foods as appropriate       INTERVENTIONS:  - Monitor oral intake, urinary output, labs, and treatment plans  - Assess nutrition and hydration status and recommend course of action  - Evaluate amount of meals eaten  - Assist patient with eating if necessary   - Allow adequate time for meals  - Recommend/ encourage appropriate diets, oral nutritional supplements, and vitamin/mineral supplements  - Order, calculate, and assess calorie counts as needed  - Recommend, monitor, and adjust tube feedings and TPN/PPN based on assessed needs  - Assess need for intravenous fluids  - Provide specific nutrition/hydration education as appropriate  - Include patient/family/caregiver in decisions related to nutrition  Outcome: Completed

## 2020-08-19 NOTE — DISCHARGE SUMMARY
Discharge- Eliazar Blanca 1965, 48 y o  male MRN: [id number]  [facility] Encounter: [id number]  Primary Care Provider: Daniel Casillas DO   Date and time admitted to hospital: 8/18/2020  2:35 PM    Syncope and collapse  Assessment & Plan  · Possibly secondary to hypotension in the setting of underlying orthostatic hypotension  · Continue home medication, midodrine and fludrocortisone  · Repeat orthostatic vitals are negative  · Status post IV fluid hydration  · Of note, metoprolol and Imdur were recently resumed in the outpatient setting by his outpatient cardiologist due to chest pain  · Consider possibility of bradycardic syncope and cardiology recommends outpatient event recorder  · Discontinue metoprolol and Imdur  · Continue oral hydration with at least 2 L a day    Elevated troponin  Assessment & Plan  · Patient with troponin of 1 04, 0 98, 0 97 (elevated from baseline 0 05)  Likely chronic elevation  · Repeat EKG is still abnormal with Non-specific ST segment change in Lateral lead with sinus bradycardia in 50s last night  · He does have a history of CAD, underwent cardiac catheterization in May of 2019 which showed mild nonobstructive disease in the mid LAD  · Metoprolol discontinued  · Heparin drip discontinued  · Echocardiogram shows LVEF 55% stable from prior, no regional wall motion abnormality  · Has been experiencing chest pain in the outpatient setting, metoprolol an indoor started however now discontinued in the setting of syncope and hypotension  · Outpatient ischemic workup with primary cardiologist    Bariatric surgery status  Assessment & Plan  · Historically documented  · Continue supportive care, regular diet  · Outpatient follow-up    Hypokalemia  Assessment & Plan  · Initial potassium 2 5  Patient received 20 mEq IV and 40 mEq oral   · This morning, K is 2 9  Replete again today with another 40 mEq oral and 20meq IV    · Mag is normal     Infiltrate of middle lobe of right lung present on imaging study  Assessment & Plan  · As noted on portable chest x-ray  There is questionable haziness in the right middle lobe concerning for possible infiltrate  Patient however denies cough, shortness of breath, fevers, chills  No sick complains at this time aside from what was noted before  · WBC count normal  · Hold off antibiotic therapy for now  · Procalcitonin negative     Orthostatic hypotension  Assessment & Plan  · Apparently this has been a recurrent issue since approx May 2020  He was admitted in July 2020 for similar issue, although last admission he did not actually syncopize  Yesterday, patient did indeed syncopize from kneeling position without his "usual" symptom of lightheadedness  · Continue home medications, fludrocortisone and midodrine  · Ortho vitals today are negative    Tobacco use  Assessment & Plan  · Patient uses chewing tobacco  · Will continue nicotine patch inpatient    Coronary artery disease involving native coronary artery of native heart with unstable angina pectoris [facility])  Assessment & Plan  · Hx of CAD with non-urgent cath in May 2019     · Continue home medications: aspirin, statin  · Discontinue Imdur, metoprolol  · Cardiology input appreciated   · Outpatient ischemic workup    Bipolar disorder, current episode mixed, moderate ([facility] [address] )  Assessment & Plan  · Will continue trazodone, Effexor  · Patient also receives monthly paliperidone injections    Discharging Physician / Practitioner: Marc Magallanes PA-C  PCP: Haseeb Pino DO  Admission Date:   Admission Orders (From admission, onward)     Ordered        08/18/20 1600  Inpatient Admission  Once                   Discharge Date: 08/19/20    Resolved Problems  Date Reviewed: 8/19/2020    None        Consultations During Hospital Stay:  · Cardiology    Procedures Performed:   · None    Significant Findings / Test Results:   · Bradycardia  · Elevated troponin, likely chronic  · Syncope and collapse  · Hypokalemia  · Echocardiogram LVEF 55%, no regional Wall abnormality  · Chest x-ray ill-defined appearance of the right cardiac border which may indicate some atelectasis or infiltrate of the right middle lobe  Consider follow-up lateral view for further workup  · Procalcitonin normal  · TSH normal    Incidental Findings:   · None    Test Results Pending at Discharge (will require follow up):   · chest x-ray     Outpatient Tests Requested:  · Follow-up with PCP, Cardiology    Complications:  None    Reason for Admission:  syncope    Hospital Course:     Janay Hinojosa is a 48 y o  male patient with past medical history significant for bipolar disorder, CAD, history of bariatric surgery, tobacco abuse, orthostatic hypotension on midodrine and Florinef, who originally presented to the hospital on 8/18/2020 due to syncope and collapse from a kneeling position  While in the ER patient was found to have bradycardia in the 50s and was admitted for further cardiac monitoring and workup  Troponin was elevated above patient's baseline and Cardiology was consulted  It was found that patient was taking his metoprolol and Imdur and etiology for syncopal episode likely due to hypotension as he has underlying orthostatic hypotension  Orthostatic vitals during hospitalization were negative  Metoprolol and Imdur were discontinued  He was also recommended to follow-up with primary cardiologist for event recorder as well as ischemic workup  Aspirin was started  He is medically stable for discharge home with close outpatient follow-up  He expressed understanding and agreed with plan  The patient, initially admitted to the hospital as inpatient, was discharged earlier than expected given the following: Clinical improvement sooner than initially anticipated  Please see above list of diagnoses and related plan for additional information       Condition at Discharge: stable     Discharge Day Visit / Exam: Subjective:  Patient seen examined at bedside  Feels well  Tells me his cardiologist saw him and told him he can go home  Had episode of chest pain overnight  Resolved with nitro  Vitals: Blood Pressure: 117/73 (08/19/20 1215)  Pulse: 74 (08/19/20 1215)  Temperature: 98 9 °F (37 2 °C) (08/19/20 1057)  Temp Source: Oral (08/19/20 1057)  Respirations: 19 (08/19/20 1215)  Height: 6' (182 9 cm) (08/18/20 1439)  Weight - Scale: 90 7 kg (200 lb) (08/18/20 1439)  SpO2: 95 % (08/19/20 1215)    Exam:   Physical Exam  Constitutional:       Appearance: Normal appearance  He is obese  HENT:      Head: Normocephalic and atraumatic  Mouth/Throat:      Mouth: Mucous membranes are moist    Eyes:      Extraocular Movements: Extraocular movements intact  Neck:      Musculoskeletal: Normal range of motion and neck supple  Cardiovascular:      Rate and Rhythm: Regular rhythm  Bradycardia present  Pulmonary:      Effort: Pulmonary effort is normal       Breath sounds: Normal breath sounds  Abdominal:      General: Abdomen is flat  Palpations: Abdomen is soft  Musculoskeletal: Normal range of motion  General: No swelling  Skin:     General: Skin is warm and dry  Neurological:      General: No focal deficit present  Mental Status: He is alert  Psychiatric:         Mood and Affect: Mood normal          Behavior: Behavior normal          Discussion with Family: pt declined     Discharge instructions/Information to patient and family:   See after visit summary for information provided to patient and family  Provisions for Follow-Up Care:  See after visit summary for information related to follow-up care and any pertinent home health orders  Disposition:     Home    For Discharges to [facility] SNF:   · Not Applicable to this Patient - Not Applicable to this Patient    Planned Readmission: none     Discharge Statement:  I spent 45 minutes discharging the patient   This time was spent on the day of discharge  I had direct contact with the patient on the day of discharge  Greater than 50% of the total time was spent examining patient, answering all patient questions, arranging and discussing plan of care with patient as well as directly providing post-discharge instructions  Additional time then spent on discharge activities  Discharge Medications:  See after visit summary for reconciled discharge medications provided to patient and family        ** Please Note: This note has been constructed using a voice recognition system **

## 2020-08-19 NOTE — ASSESSMENT & PLAN NOTE
· Initial potassium 2 5  Patient received 20 mEq IV and 40 mEq oral   · This morning, K is 2 9  Replete again today with another 40 mEq oral and 20meq IV    · Mag is normal

## 2020-08-19 NOTE — UTILIZATION REVIEW
Initial Clinical Review    Admission: Date/Time/Statement:   Admission Orders (From admission, onward)     Ordered        08/18/20 1600  Inpatient Admission  Once                   Orders Placed This Encounter   Procedures    Inpatient Admission     Standing Status:   Standing     Number of Occurrences:   1     Order Specific Question:   Admitting Physician     Answer:   Gideon Grigsby [[id number]]     Order Specific Question:   Level of Care     Answer:   Med Surg [[id number]]     Order Specific Question:   Estimated length of stay     Answer:   More than 2 Midnights     Order Specific Question:   Certification     Answer:   I certify that inpatient services are medically necessary for this patient for a duration of greater than two midnights  See H&P and MD Progress Notes for additional information about the patient's course of treatment  ED Arrival Information     Expected Arrival Acuity Means of Arrival Escorted By Service Admission Type    - 8/18/2020 14:29 Urgent Walk-In Self Hospitalist Urgent    Arrival Complaint    low blood pressure        Chief Complaint   Patient presents with    Hypotension     pt states having a low BP of 77/53 when he was standing up and 109/53 when sitting  Pt also states he passed out this morning and has been feeling very dizzy  Pt states a twig hit his head when he passed out -thinners     Assessment/Plan:       48year old male presents to ed from home for evaluation and treatment of orthostatic hypotension  He passed at work earlier today  He was briefly confused after the fall  He reports dizziness associated with standing and non radiating substernal chest pain  PMHX:  CPAP , bariatric surgery 3-2-20, NSTEMI  Clinical assessment significant for2+ LLE edema, potassium 2 5,troponin 1 04,  Chest x ray shows possible right middle lobe infiltrate, EKG shows non specific ST , RBBB  Treated in ed , iv heparin gtt, iv kcl 20 meq, aspirin, iv nacl 0 9% 1L bolus    Admit to inpatient medical surgical for NSTEMI, hypokalemia, hypotension  RML pneumonia, syncopy  Plan includes : observe off antibiotics, monitor electrolytes, replete electrolytes, obtain orthostatic blood pressure, telemetry, iv hydration, continue heparin infusion, consult cardiology, trend troponin with EKG, ECHO, npo for possible cardiac cath  8-19  Telemetry shows bradycardia   Hr 50     ED Triage Vitals   08/18/20 1439 08/18/20 1439 08/18/20 1439 08/18/20 1439 08/18/20 1439   98 6 °F (37 °C) 81 18 109/59 95 %      Oral Monitor         Pain Score       3          08/18/20 90 7 kg (200 lb)     Additional Vital Signs:      Date/Time   Temp   Pulse   Resp   BP   MAP (mmHg)   SpO2   O2 Device     08/19/20 1215      74   19   117/73   93   95 %   None (Room air)     08/19/20 1214      65   18   116/73   96   92 %   None (Room air)     08/19/20 1213      60   18   115/70   90   95 %   None (Room air)     08/19/20 10:57:23   98 9 °F (37 2 °C)   58   18   122/76   91   95 %   None (Room air)     08/19/20 07:04:16   98 9 °F (37 2 °C)   64   18   123/76   92   94 %        08/19/20 03:12:39      58      126/77   93   93 %        08/19/20 00:11:45   98 7 °F (37 1 °C)   55      129/79   96   96 %        08/18/20 22:24:07   98 °F (36 7 °C)   55   16   132/80   97   96 %        08/18/20 1955                  96 %   None (Room air)     08/18/20 1728      54Abnormal     20   109/63      96 %   None (Room air)     08/18/20 1630      64   20   111/58   77   99 %   None (Room air)     08/18/20 1545      70   20   112/57   79   95 %   None (Room air)         Pertinent Labs/Diagnostic Test Results:       XR chest 1 view portable   Final (08/18 1628)      Ill-defined appearance of the right cardiac border which may indicate some atelectasis or infiltrate of the right middle lobe  Consider follow-up lateral view for further workup           XR chest 1 view    (Results Pending)       Collection Time  Result Time  Vent R  Atrial R  IL Int  QRSD Int   QT Int   QTC Int    P Axis  QRS Axis  T Wave Ax     08/18/20 14:49:27  08/18/20 19:41:51  77  77  168  120  360  407  65  125  55            Sinus rhythm with Possible Premature atrial complexes with Aberrant conduction   Right axis deviation   Possible Right ventricular hypertrophy   Nonspecific T wave abnormality   Abnormal ECG   When compared with ECG of 15-JUL-2020 09:56,   Aberrant conduction is now Present   Nonspecific T wave abnormality now evident in Inferior leads   Nonspecific T wave abnormality now evident in Lateral leads                   Results from last 7 days   Lab Units 08/18/20  1501   WBC Thousand/uL 9 23   HEMOGLOBIN g/dL 12 0   HEMATOCRIT % 35 5*   PLATELETS Thousands/uL 201   NEUTROS ABS Thousands/µL 5 98         Results from last 7 days   Lab Units 08/19/20  0532 08/18/20  2019 08/18/20  1501   SODIUM mmol/L 145 146* 144   POTASSIUM mmol/L 2 9* 2 8* 2 5*   CHLORIDE mmol/L 111* 110* 108   CO2 mmol/L 30 29 28   ANION GAP mmol/L 4 7 8   BUN mg/dL 4* 5 5   CREATININE mg/dL 0 58* 0 67 0 84   EGFR ml/min/1 73sq m 116 109 99   CALCIUM mg/dL 8 2* 7 7* 7 9*   MAGNESIUM mg/dL  --  2 2  --              Results from last 7 days   Lab Units 08/19/20  0532 08/18/20  2019 08/18/20  1501   GLUCOSE RANDOM mg/dL 78 83 134     Results from last 7 days   Lab Units 08/18/20  2344 08/18/20  2019 08/18/20  1501   TROPONIN I ng/mL 0 97* 0 98* 1 04*         Results from last 7 days   Lab Units 08/19/20  0736 08/18/20  2344 08/18/20  1556   PROTIME seconds  --   --  15 2*   INR   --   --  1 20*   PTT seconds 71* 55* 29     Results from last 7 days   Lab Units 08/19/20  0532   TSH 3RD GENERATON uIU/mL 0 895     Results from last 7 days   Lab Units 08/19/20  1158   PROCALCITONIN ng/ml 0 05     Results from last 7 days   Lab Units 08/19/20  1034 08/18/20  1609   CLARITY UA   --  Clear   COLOR UA   --  Yellow   SPEC GRAV UA   --  1 006   PH UA   --  7 0   GLUCOSE UA mg/dl  --  Negative KETONES UA mg/dl  --  Negative   BLOOD UA   --  Negative   PROTEIN UA mg/dl  --  Negative   NITRITE UA   --  Negative   BILIRUBIN UA   --  Negative   UROBILINOGEN UA E U /dl  --  1 0   LEUKOCYTES UA   --  Negative   CREATININE UR mg/dL 82 2  --        ED Treatment:   Medication Administration from 08/18/2020 1429 to 08/18/2020 1928       Date/Time Order Dose Route Action     08/18/2020 1501 sodium chloride 0 9 % bolus 1,000 mL 1,000 mL Intravenous New Bag     08/18/2020 1557 aspirin tablet 325 mg 325 mg Oral Given     08/18/2020 1557 heparin (porcine) injection 4,000 Units 4,000 Units Intravenous Given     08/18/2020 1557 heparin (porcine) 25,000 units in 250 mL infusion (premix) 11 1 Units/kg/hr Intravenous New Bag     08/18/2020 1617 potassium chloride (K-DUR,KLOR-CON) CR tablet 40 mEq 40 mEq Oral Given     08/18/2020 1618 potassium chloride 20 mEq IVPB (premix) 20 mEq Intravenous New Bag        Past Medical History:   Diagnosis Date    Bariatric surgery status     CPAP (continuous positive airway pressure) dependence     Hearing loss of aging     Hypercholesterolemia     Morbid obesity ([facility] [address] )     NSTEMI (non-ST elevation myocardial infarction) [facility])     april 2019    Postsurgical malabsorption      Present on Admission:   NSTEMI (non-ST elevated myocardial infarction) ([facility] [address] )   Bipolar disorder, current episode mixed, moderate ([facility] [address] )   Coronary artery disease involving native coronary artery of native heart with unstable angina pectoris ([facility] [address] )   Orthostatic hypotension   Syncope and collapse   Infiltrate of middle lobe of right lung present on imaging study   Tobacco use   Hypokalemia      Admitting Diagnosis:     Orthostatic hypotension [I95 1]  Syncope [R55]  Hypotension [I95 9]  NSTEMI (non-ST elevated myocardial infarction) ([facility] [address] ) [I21 4]    Age/Sex: 48 y o  male     Admission Orders:      Scheduled Medications:    aspirin, 81 mg, Oral, Daily  atorvastatin, 40 mg, Oral, Daily  fludrocortisone, 0 4 mg, Oral, Daily  midodrine, 2 5 mg, Oral, TID AC  nicotine, 1 patch, Transdermal, Daily  OXcarbazepine, 150 mg, Oral, BID  pantoprazole, 20 mg, Oral, Early Morning  potassium chloride, 20 mEq, Oral, Once  potassium chloride, 20 mEq, Intravenous, Once  traZODone, 300 mg, Oral, HS  venlafaxine, 75 mg, Oral, Daily      Continuous IV Infusions:    heparin (porcine) 25,000 units in 250 mL infusion (premix)    Rate: 2 7-18 mL/hr Dose: 3-20 Units/kg/hr  Weight Dosing Info: 90 kg (Order-Specific)  Freq: Titrated Route: IV  Last Dose: Stopped (08/19/20 1056)  Start: 08/18/20 1600 End: 08/19/20 0958     lactated ringers infusion    Rate: 125 mL/hr Dose: 125 mL/hr  Freq: Continuous Route: IV  Indications of Use: IV Hydration  Last Dose: Stopped (08/19/20 1056)     PRN Meds:  acetaminophen, 650 mg, Oral, Q6H PRN  clonazePAM, 1 mg, Oral, BID PRN  nitroglycerin, 0 4 mg, Sublingual, Q5 Min PRN   8-18 2003 8-19  0039           IP CONSULT TO CARDIOLOGY    Network Utilization Review Department  [email or web address]  ATTENTION: Please call with any questions or concerns to [phone] and carefully listen to the prompts so that you are directed to the right person  All voicemails are confidential   [facility] all requests for admission clinical reviews, approved or denied determinations and any other requests to dedicated fax number below belonging to the campus where the patient is receiving treatment   List of dedicated fax numbers for the Facilities:  [address] DENIALS (Administrative/Medical Necessity) [phone]   [address] (Maternity/NICU/Pediatrics) [phone] [address] [facility] [address] [facility] [phone]   Particia Notice [phone]   Marco A Rain [phone]   [address] [place] [address] [phone] [facility] [phone]   [address] [phone]

## 2020-08-19 NOTE — ASSESSMENT & PLAN NOTE
Patient with troponin of 1 04, 0 98, 0 97 (elevated from baseline 0 05)  EKG in ED show possible NSTEMI and last night repeat EKG is still abnormal with Non-specific ST segment change in Lateral lead with sinus bradycardia in 50s last night  · Cardiology has been consulted  · Continue home medications, metoprolol, Imdur daily, statin, aspirin  · Continue heart monitor  · Continue heparin infusion  · Nitro prn for chest pain  BP is acceptable in 120-30s/70s over night  He does have a history of CAD, underwent cardiac catheterization in May of 2019 which showed mild nonobstructive disease in the mid LAD  Prior echocardiogram had shown EF 55%  · Repeat echo today

## 2020-08-20 ENCOUNTER — TRANSITIONAL CARE MANAGEMENT (OUTPATIENT)
Dept: FAMILY MEDICINE CLINIC | Facility: CLINIC | Age: 55
End: 2020-08-20

## 2020-08-20 ENCOUNTER — TELEPHONE (OUTPATIENT)
Dept: CARDIOLOGY CLINIC | Facility: CLINIC | Age: 55
End: 2020-08-20

## 2020-08-20 DIAGNOSIS — E66.01 MORBID (SEVERE) OBESITY DUE TO EXCESS CALORIES (HCC): ICD-10-CM

## 2020-08-20 DIAGNOSIS — R55 SYNCOPE AND COLLAPSE: Primary | ICD-10-CM

## 2020-08-20 DIAGNOSIS — Z71.89 COMPLEX CARE COORDINATION: Primary | ICD-10-CM

## 2020-08-20 RX ORDER — PANTOPRAZOLE SODIUM 20 MG/1
20 TABLET, DELAYED RELEASE ORAL DAILY
Qty: 90 TABLET | Refills: 3 | OUTPATIENT
Start: 2020-08-20 | End: 2021-02-02

## 2020-08-20 RX ORDER — PANTOPRAZOLE SODIUM 20 MG/1
20 TABLET, DELAYED RELEASE ORAL DAILY
Qty: 90 TABLET | Refills: 3 | Status: CANCELLED | OUTPATIENT
Start: 2020-08-20 | End: 2020-11-18

## 2020-08-20 NOTE — UTILIZATION REVIEW
Attestation signed by Juan Manuel Bansal MD at 8/20/2020 12:21 AM      Co-Sign Only (Services provided by AP)     I was the supervising/collaborating physician on 8/19/20  I acknowledge the AP's documentation and services provided  I was available by phone, if needed, for consultation    Beau Lloyd MD           Show:Clear all  [x]Manual[x]Template[x]Copied    Added by:  [x]Daniel Schulz PA-C    []Ailyn for details     Discharge- Mayda Molina 1965, 48 y o  male MRN: [id number]  Unit/Bed#: [facility]PHP 727-01 Encounter: [id number]  Primary Care Provider: Lonnie Taylor DO   Date and time admitted to hospital: 8/18/2020  2:35 PM     Syncope and collapse  Assessment & Plan  · Possibly secondary to hypotension in the setting of underlying orthostatic hypotension  · Continue home medication, midodrine and fludrocortisone  · Repeat orthostatic vitals are negative  · Status post IV fluid hydration  · Of note, metoprolol and Imdur were recently resumed in the outpatient setting by his outpatient cardiologist due to chest pain  · Consider possibility of bradycardic syncope and cardiology recommends outpatient event recorder  · Discontinue metoprolol and Imdur  · Continue oral hydration with at least 2 L a day     Elevated troponin  Assessment & Plan  · Patient with troponin of 1 04, 0 98, 0 97 (elevated from baseline 0 05)  Likely chronic elevation  · Repeat EKG is still abnormal with Non-specific ST segment change in Lateral lead with sinus bradycardia in 50s last night  · He does have a history of CAD, underwent cardiac catheterization in May of 2019 which showed mild nonobstructive disease in the mid LAD      · Metoprolol discontinued  · Heparin drip discontinued  · Echocardiogram shows LVEF 55% stable from prior, no regional wall motion abnormality  · Has been experiencing chest pain in the outpatient setting, metoprolol an indoor started however now discontinued in the setting of syncope and hypotension  · Outpatient ischemic workup with primary cardiologist     Bariatric surgery status  Assessment & Plan  · Historically documented  · Continue supportive care, regular diet  · Outpatient follow-up     Hypokalemia  Assessment & Plan  · Initial potassium 2 5  Patient received 20 mEq IV and 40 mEq oral   · This morning, K is 2 9  Replete again today with another 40 mEq oral and 20meq IV  · Mag is normal      Infiltrate of middle lobe of right lung present on imaging study  Assessment & Plan  · As noted on portable chest x-ray  There is questionable haziness in the right middle lobe concerning for possible infiltrate  Patient however denies cough, shortness of breath, fevers, chills  No sick complains at this time aside from what was noted before  · WBC count normal  · Hold off antibiotic therapy for now  · Procalcitonin negative      Orthostatic hypotension  Assessment & Plan  · Apparently this has been a recurrent issue since approx May 2020  He was admitted in July 2020 for similar issue, although last admission he did not actually syncopize  Yesterday, patient did indeed syncopize from kneeling position without his "usual" symptom of lightheadedness  · Continue home medications, fludrocortisone and midodrine  · Ortho vitals today are negative     Tobacco use  Assessment & Plan  · Patient uses chewing tobacco  · Will continue nicotine patch inpatient     Coronary artery disease involving native coronary artery of native heart with unstable angina pectoris [facility])  Assessment & Plan  · Hx of CAD with non-urgent cath in May 2019     · Continue home medications: aspirin, statin  · Discontinue Imdur, metoprolol  · Cardiology input appreciated   · Outpatient ischemic workup     Bipolar disorder, current episode mixed, moderate ([facility] [address] )  Assessment & Plan  · Will continue trazodone, Effexor  · Patient also receives monthly paliperidone injections    Discharging Physician / Practitioner: Cindi Erickson VERONICA  PCP: Faye Noyola DO  Admission Date:       Admission Orders (From admission, onward)              Ordered          08/18/20 1600   Inpatient Admission  Once                      Discharge Date: 08/19/20          Resolved Problems  Date Reviewed: 8/19/2020     None         Consultations During Hospital Stay:  · Cardiology     Procedures Performed:   · None     Significant Findings / Test Results:   · Bradycardia  · Elevated troponin, likely chronic  · Syncope and collapse  · Hypokalemia  · Echocardiogram LVEF 55%, no regional Wall abnormality  · Chest x-ray ill-defined appearance of the right cardiac border which may indicate some atelectasis or infiltrate of the right middle lobe   Consider follow-up lateral view for further workup  · Procalcitonin normal  · TSH normal     Incidental Findings:   · None     Test Results Pending at Discharge (will require follow up):   · chest x-ray     Outpatient Tests Requested:  · Follow-up with PCP, Cardiology     Complications:  None     Reason for Admission:  syncope     Hospital Course:      Chino Oliva is a 48 y o  male patient with past medical history significant for bipolar disorder, CAD, history of bariatric surgery, tobacco abuse, orthostatic hypotension on midodrine and Florinef, who originally presented to the hospital on 8/18/2020 due to syncope and collapse from a kneeling position  While in the ER patient was found to have bradycardia in the 50s and was admitted for further cardiac monitoring and workup  Troponin was elevated above patient's baseline and Cardiology was consulted  It was found that patient was taking his metoprolol and Imdur and etiology for syncopal episode likely due to hypotension as he has underlying orthostatic hypotension  Orthostatic vitals during hospitalization were negative  Metoprolol and Imdur were discontinued    He was also recommended to follow-up with primary cardiologist for event recorder as well as ischemic workup  Aspirin was started  He is medically stable for discharge home with close outpatient follow-up  He expressed understanding and agreed with plan       The patient, initially admitted to the hospital as inpatient, was discharged earlier than expected given the following: Clinical improvement sooner than initially anticipated      Please see above list of diagnoses and related plan for additional information       Condition at Discharge: stable      Discharge Day Visit / Exam:      Subjective:  Patient seen examined at bedside  Feels well  Tells me his cardiologist saw him and told him he can go home  Had episode of chest pain overnight  Resolved with nitro      Vitals: Blood Pressure: 117/73 (08/19/20 1215)  Pulse: 74 (08/19/20 1215)  Temperature: 98 9 °F (37 2 °C) (08/19/20 1057)  Temp Source: Oral (08/19/20 1057)  Respirations: 19 (08/19/20 1215)  Height: 6' (182 9 cm) (08/18/20 1439)  Weight - Scale: 90 7 kg (200 lb) (08/18/20 1439)  SpO2: 95 % (08/19/20 1215)     Exam:   Physical Exam  Constitutional:       Appearance: Normal appearance  He is obese  HENT:      Head: Normocephalic and atraumatic  Mouth/Throat:      Mouth: Mucous membranes are moist    Eyes:      Extraocular Movements: Extraocular movements intact  Neck:      Musculoskeletal: Normal range of motion and neck supple  Cardiovascular:      Rate and Rhythm: Regular rhythm  Bradycardia present  Pulmonary:      Effort: Pulmonary effort is normal       Breath sounds: Normal breath sounds  Abdominal:      General: Abdomen is flat  Palpations: Abdomen is soft  Musculoskeletal: Normal range of motion  General: No swelling  Skin:     General: Skin is warm and dry  Neurological:      General: No focal deficit present  Mental Status: He is alert     Psychiatric:         Mood and Affect: Mood normal          Behavior: Behavior normal             Discussion with Family: pt declined      Discharge instructions/Information to patient and family:   See after visit summary for information provided to patient and family        Provisions for Follow-Up Care:  See after visit summary for information related to follow-up care and any pertinent home health orders        Disposition:      Home     For Discharges to [address] [facility]:   · Not Applicable to this Patient - Not Applicable to this Patient     Planned Readmission: none     Discharge Statement:  I spent 45 minutes discharging the patient  This time was spent on the day of discharge  I had direct contact with the patient on the day of discharge  Greater than 50% of the total time was spent examining patient, answering all patient questions, arranging and discussing plan of care with patient as well as directly providing post-discharge instructions  Additional time then spent on discharge activities      Discharge Medications:  See after visit summary for reconciled discharge medications provided to patient and family        ** Please Note: This note has been constructed using a voice recognition system **        Notification of Discharge  This is a Notification of Discharge from our facility [address]  Please be advised that this patient has been discharge from our facility  Below you will find the admission and discharge date and time including the patients disposition  PRESENTATION DATE: 8/18/2020  2:35 PM    IP ADMISSION DATE: 8/18/20 1600   DISCHARGE DATE: 8/19/2020  4:00 PM  DISPOSITION: Home/Self Care Home/Self Care   Admission Orders listed below:  Admission Orders (From admission, onward)     Ordered        08/18/20 1600  Inpatient Admission  Once                   Please contact the UR Department if additional information is required to close this patient's authorization/case  Christiano Choe  [facility] Utilization Review Department  Main: [phone] x carefully listen to the prompts   All voicemails are confidential   [email or web address]  Send all requests for admission clinical reviews, approved or denied determinations and any other requests to dedicated fax number below belonging to the campus where the patient is receiving treatment   List of dedicated fax numbers:  [address] DENIALS (Administrative/Medical Necessity) [phone]   [address]  (Maternity/NICU/Pediatrics) [phone]   Karolina Fines [phone]   [facility] [phone]   [facility] [phone]   Chika Chu [address] [facility] [phone]   [address] [place] [address] 1000 [facility] [phone]

## 2020-08-20 NOTE — TELEPHONE ENCOUNTER
Per [facility] Admin ,pt does not need pre-auth for Zio patch   Ref # [id number] none

## 2020-08-21 ENCOUNTER — PATIENT OUTREACH (OUTPATIENT)
Dept: CASE MANAGEMENT | Facility: OTHER | Age: 55
End: 2020-08-21

## 2020-08-21 ENCOUNTER — OFFICE VISIT (OUTPATIENT)
Dept: FAMILY MEDICINE CLINIC | Facility: CLINIC | Age: 55
End: 2020-08-21
Payer: COMMERCIAL

## 2020-08-21 VITALS
OXYGEN SATURATION: 98 % | RESPIRATION RATE: 16 BRPM | DIASTOLIC BLOOD PRESSURE: 70 MMHG | SYSTOLIC BLOOD PRESSURE: 120 MMHG | HEART RATE: 77 BPM | WEIGHT: 205 LBS | BODY MASS INDEX: 27.77 KG/M2 | TEMPERATURE: 97.5 F | HEIGHT: 72 IN

## 2020-08-21 DIAGNOSIS — I25.110 CORONARY ARTERY DISEASE INVOLVING NATIVE CORONARY ARTERY OF NATIVE HEART WITH UNSTABLE ANGINA PECTORIS (HCC): ICD-10-CM

## 2020-08-21 DIAGNOSIS — I21.4 NSTEMI (NON-ST ELEVATED MYOCARDIAL INFARCTION) (HCC): ICD-10-CM

## 2020-08-21 DIAGNOSIS — I95.1 ORTHOSTATIC HYPOTENSION: Primary | ICD-10-CM

## 2020-08-21 DIAGNOSIS — E87.6 HYPOKALEMIA: ICD-10-CM

## 2020-08-21 DIAGNOSIS — R55 SYNCOPE AND COLLAPSE: ICD-10-CM

## 2020-08-21 PROCEDURE — 99495 TRANSJ CARE MGMT MOD F2F 14D: CPT | Performed by: FAMILY MEDICINE

## 2020-08-21 NOTE — PROGRESS NOTES
Assessment/Plan:    1  Orthostatic hypotension    2  Syncope and collapse    3  Coronary artery disease involving native coronary artery of native heart with unstable angina pectoris ([facility] [address] )    4  NSTEMI (non-ST elevated myocardial infarction) ([facility] [address] )    5  Hypokalemia  -     Comprehensive metabolic panel; Future; Expected date: 08/21/2020    looking at hospital course it looks like they are postulating this may be related to his cardiac meds  These were stopped at this admission  Stefany will be seeing pt next week, sounds like they may be doing a monitor on him  Pt has had no episodes since discharge  There are no Patient Instructions on file for this visit  Return for Next scheduled follow up  Subjective:      Patient ID: Sarika Hines is a 48 y o  male  Chief Complaint   Patient presents with    Transition of Care Management     wmcma       Pt is here for a TCM appt  Nurses tcm note appreciated    Pt states he was kneiling down working on [employer]  States next thing he knows he was falling through a bush and was on the floor  HE states he was down and out for a minute  This was witnessed by his partner  Went in his truck he got something to drink>  Webnt back to work  Came back and he went to the hospital   Pt was dischrged 2 days ago  Pt states since then he had no further episodes  It is of note pt was restated on his metoprolol and imdur prior to this due to his cardiac issues  These were stopped now on this admission  Pt has a follow up with cardio on Tuesday    Pt was on a lot of potassium while he was in the hsopital;, states he was not sent on any    He is asking if he should be on it now          The following portions of the patient's history were reviewed and updated as appropriate: allergies, current medications, past family history, past medical history, past social history, past surgical history and problem list     Review of Systems   Constitutional: Negative for activity change, appetite change, chills, diaphoresis, fatigue, fever and unexpected weight change  HENT: Negative for congestion, dental problem, ear pain, mouth sores, sinus pressure, sinus pain, sore throat and trouble swallowing  Eyes: Negative for photophobia, discharge and itching  Respiratory: Negative for apnea, chest tightness and shortness of breath  Cardiovascular: Negative for chest pain, palpitations and leg swelling  Gastrointestinal: Negative for abdominal distention, abdominal pain, blood in stool, nausea and vomiting  Endocrine: Negative for cold intolerance, heat intolerance, polydipsia, polyphagia and polyuria  Genitourinary: Negative for difficulty urinating  Musculoskeletal: Negative for arthralgias  Skin: Negative for color change and wound  Neurological: Negative for dizziness, syncope, speech difficulty and headaches  Hematological: Negative for adenopathy  Psychiatric/Behavioral: Negative for agitation and behavioral problems           Current Outpatient Medications   Medication Sig Dispense Refill    acetaminophen (TYLENOL) 325 mg tablet Take 3 tablets (975 mg total) by mouth every 8 (eight) hours 30 tablet 0    aspirin 81 mg chewable tablet Chew 1 tablet (81 mg total) daily 30 tablet 0    atorvastatin (LIPITOR) 40 mg tablet Take 1 tablet by mouth once daily 90 tablet 0    clonazePAM (KlonoPIN) 1 mg tablet as needed       fludrocortisone (FLORINEF) 0 1 mg tablet Take 4 tablets (0 4 mg total) by mouth daily 120 tablet 0    midodrine (PROAMATINE) 2 5 mg tablet Take 1 tablet (2 5 mg total) by mouth 3 (three) times a day before meals 90 tablet 1    OXcarbazepine (TRILEPTAL) 150 mg tablet Take 150 mg by mouth 2 (two) times a day       paliperidone palmitate ER (Invega Sustenna) 234 mg/1 5 mL IM injection Inject 234 mg into a muscle every 30 (thirty) days      pantoprazole (PROTONIX) 20 mg tablet Take 1 tablet (20 mg total) by mouth daily 90 tablet 3    traZODone (DESYREL) 300 MG tablet Take 300 mg by mouth daily at bedtime      venlafaxine (EFFEXOR) 75 mg tablet Take 75 mg by mouth daily        No current facility-administered medications for this visit  Objective:    /70   Pulse 77   Temp 97 5 °F (36 4 °C)   Resp 16   Ht 6' (1 829 m)   Wt 93 kg (205 lb)   SpO2 98%   BMI 27 80 kg/m²        Physical Exam  Vitals signs and nursing note reviewed  Constitutional:       General: He is not in acute distress  Appearance: He is well-developed  He is not diaphoretic  HENT:      Head: Normocephalic and atraumatic  Right Ear: External ear normal       Left Ear: External ear normal       Nose: Nose normal       Mouth/Throat:      Pharynx: No oropharyngeal exudate  Eyes:      General: No scleral icterus  Right eye: No discharge  Left eye: No discharge  Pupils: Pupils are equal, round, and reactive to light  Neck:      Thyroid: No thyromegaly  Cardiovascular:      Rate and Rhythm: Normal rate  Heart sounds: Normal heart sounds  No murmur  Pulmonary:      Effort: Pulmonary effort is normal  No respiratory distress  Breath sounds: Normal breath sounds  No wheezing  Abdominal:      General: Bowel sounds are normal  There is no distension  Palpations: Abdomen is soft  There is no mass  Tenderness: There is no abdominal tenderness  There is no guarding or rebound  Musculoskeletal: Normal range of motion  Skin:     General: Skin is warm and dry  Findings: No erythema or rash  Neurological:      Mental Status: He is alert        Coordination: Coordination normal       Deep Tendon Reflexes: Reflexes normal    Psychiatric:         Behavior: Behavior normal               TCM Call (since 7/21/2020)     Date and time call was made  8/20/2020  8:11 AM    Hospital care reviewed  Records reviewed    Patient was hospitial[facility] at  [facility]    Date of Admission  08/18/20    Date of discharge 08/19/20    Diagnosis  NSTEMI (non-ST elevated myocardial infarction    Disposition  Home    Were the patients medications reviewed and updated  Yes    Current Symptoms  -- (Comment)  Slight edema in hands and feet which he noticed when he woke up this am  He will elevate his legs and monitor for increase in swelling and call immediately if any worsening edema       TCM Call (since 7/21/2020)     Post hospital issues  None    Should patient be enrolled in anticoag monitoring? No    Scheduled for follow up? Yes    Patients specialists  Cardiologist    Cardiologist name  Radhika Beltrán MD (Cardiology)    Did you obtain your prescribed medications  No    Why were you unable to obtain your medications  No new medications    Do you need help managing your prescriptions or medications  No    Is transportation to your appointment needed  No    I have advised the patient to call PCP with any new or worsening symptoms  [facility] [address] [place] [address] or [facility] other    Support System  Family    The type of support provided  Physical; Emotional    Do you have social support  Yes, as much as I need    Are you recieving any outpatient services  No    Are you recieving home care services  No    Interperter language line needed  No    Counseling  Patient    Counseling topics  Activities of daily living; Importance of RX compliance; patient and family education; instructions for management; Risk factor reduction    Comments  Alisia Dunlap states that he is doing fine since he came home  No chest pain, fever or dyspnea  He has very mild edema noted in his hands and feet which he noted when he woke up this am  He will elevate his legs and watch for any increase in edema  He knows to call if this worsens   He knows to go to the ER if any chest pain, dyspnea, weakness, etc and he will call for his cardiology follow up appt [email or web address]        Recent Results (from the past 168 hour(s))   ECG 12 lead    Collection Time: 08/18/20  2:49 PM   Result Value Ref Range    Ventricular Rate 77 BPM    Atrial Rate 77 BPM    WA Interval 168 ms    QRSD Interval 120 ms    QT Interval 360 ms    QTC Interval 407 ms    P Axis 65 degrees    QRS Axis 125 degrees    T Wave Axis 55 degrees   Troponin I    Collection Time: 08/18/20  3:01 PM   Result Value Ref Range    Troponin I 1 04 (H) <=0 04 ng/mL   Basic metabolic panel    Collection Time: 08/18/20  3:01 PM   Result Value Ref Range    Sodium 144 136 - 145 mmol/L    Potassium 2 5 (LL) 3 5 - 5 3 mmol/L    Chloride 108 100 - 108 mmol/L    CO2 28 21 - 32 mmol/L    ANION GAP 8 4 - 13 mmol/L    BUN 5 5 - 25 mg/dL    Creatinine 0 84 0 60 - 1 30 mg/dL    Glucose 134 65 - 140 mg/dL    Calcium 7 9 (L) 8 3 - 10 1 mg/dL    eGFR 99 ml/min/1 73sq m   CBC and differential    Collection Time: 08/18/20  3:01 PM   Result Value Ref Range    WBC 9 23 4 31 - 10 16 Thousand/uL    RBC 3 88 3 88 - 5 62 Million/uL    Hemoglobin 12 0 12 0 - 17 0 g/dL    Hematocrit 35 5 (L) 36 5 - 49 3 %    MCV 92 82 - 98 fL    MCH 30 9 26 8 - 34 3 pg    MCHC 33 8 31 4 - 37 4 g/dL    RDW 12 6 11 6 - 15 1 %    MPV 11 0 8 9 - 12 7 fL    Platelets 029 494 - 761 Thousands/uL    nRBC 0 /100 WBCs    Neutrophils Relative 64 43 - 75 %    Immat GRANS % 0 0 - 2 %    Lymphocytes Relative 26 14 - 44 %    Monocytes Relative 7 4 - 12 %    Eosinophils Relative 2 0 - 6 %    Basophils Relative 1 0 - 1 %    Neutrophils Absolute 5 98 1 85 - 7 62 Thousands/µL    Immature Grans Absolute 0 02 0 00 - 0 20 Thousand/uL    Lymphocytes Absolute 2 35 0 60 - 4 47 Thousands/µL    Monocytes Absolute 0 68 0 17 - 1 22 Thousand/µL    Eosinophils Absolute 0 14 0 00 - 0 61 Thousand/µL    Basophils Absolute 0 06 0 00 - 0 10 Thousands/µL   APTT six (6) hours after Heparin bolus/drip initiation or dosing change    Collection Time: 08/18/20  3:56 PM   Result Value Ref Range    PTT 29 23 - 37 seconds   Protime-INR    Collection Time: 08/18/20  3:56 PM   Result Value Ref Range Protime 15 2 (H) 11 6 - 14 5 seconds    INR 1 20 (H) 0 84 - 1 19   UA w Reflex to Microscopic w Reflex to Culture    Collection Time: 08/18/20  4:09 PM    Specimen: Urine, Clean Catch   Result Value Ref Range    Color, UA Yellow     Clarity, UA Clear     Specific Gravity, UA 1 006 1 003 - 1 030    pH, UA 7 0 4 5, 5 0, 5 5, 6 0, 6 5, 7 0, 7 5, 8 0    Leukocytes, UA Negative Negative    Nitrite, UA Negative Negative    Protein, UA Negative Negative mg/dl    Glucose, UA Negative Negative mg/dl    Ketones, UA Negative Negative mg/dl    Urobilinogen, UA 1 0 0 2, 1 0 E U /dl E U /dl    Bilirubin, UA Negative Negative    Blood, UA Negative Negative   ECG 12 lead    Collection Time: 08/18/20  7:30 PM   Result Value Ref Range    Ventricular Rate 48 BPM    Atrial Rate 48 BPM    SD Interval 190 ms    QRSD Interval 126 ms    QT Interval 482 ms    QTC Interval 430 ms    P Axis 55 degrees    QRS Axis 94 degrees    T Wave Axis 81 degrees   Basic metabolic panel    Collection Time: 08/18/20  8:19 PM   Result Value Ref Range    Sodium 146 (H) 136 - 145 mmol/L    Potassium 2 8 (L) 3 5 - 5 3 mmol/L    Chloride 110 (H) 100 - 108 mmol/L    CO2 29 21 - 32 mmol/L    ANION GAP 7 4 - 13 mmol/L    BUN 5 5 - 25 mg/dL    Creatinine 0 67 0 60 - 1 30 mg/dL    Glucose 83 65 - 140 mg/dL    Calcium 7 7 (L) 8 3 - 10 1 mg/dL    eGFR 109 ml/min/1 73sq m   Magnesium    Collection Time: 08/18/20  8:19 PM   Result Value Ref Range    Magnesium 2 2 1 6 - 2 6 mg/dL   Troponin I    Collection Time: 08/18/20  8:19 PM   Result Value Ref Range    Troponin I 0 98 (H) <=0 04 ng/mL   ECG 12 lead    Collection Time: 08/18/20 10:53 PM   Result Value Ref Range    Ventricular Rate 52 BPM    Atrial Rate 52 BPM    SD Interval 164 ms    QRSD Interval 118 ms    QT Interval 452 ms    QTC Interval 420 ms    P Axis 15 degrees    QRS Axis 69 degrees    T Wave Axis 44 degrees   Troponin I    Collection Time: 08/18/20 11:44 PM   Result Value Ref Range    Troponin I 0 97 (H) <=0 04 ng/mL   APTT    Collection Time: 08/18/20 11:44 PM   Result Value Ref Range    PTT 55 (H) 23 - 37 seconds   Basic metabolic panel    Collection Time: 08/19/20  5:32 AM   Result Value Ref Range    Sodium 145 136 - 145 mmol/L    Potassium 2 9 (L) 3 5 - 5 3 mmol/L    Chloride 111 (H) 100 - 108 mmol/L    CO2 30 21 - 32 mmol/L    ANION GAP 4 4 - 13 mmol/L    BUN 4 (L) 5 - 25 mg/dL    Creatinine 0 58 (L) 0 60 - 1 30 mg/dL    Glucose 78 65 - 140 mg/dL    Calcium 8 2 (L) 8 3 - 10 1 mg/dL    eGFR 116 ml/min/1 73sq m   TSH, 3rd generation with Free T4 reflex    Collection Time: 08/19/20  5:32 AM   Result Value Ref Range    TSH 3RD GENERATON 0 895 0 358 - 3 740 uIU/mL   APTT    Collection Time: 08/19/20  7:36 AM   Result Value Ref Range    PTT 71 (H) 23 - 37 seconds   ECG 12 lead    Collection Time: 08/19/20  9:37 AM   Result Value Ref Range    Ventricular Rate 56 BPM    Atrial Rate 56 BPM    NM Interval 128 ms    QRSD Interval 132 ms    QT Interval 462 ms    QTC Interval 445 ms    P Axis 33 degrees    QRS Axis 37 degrees    T Wave Axis 31 degrees   Creatinine, urine, random    Collection Time: 08/19/20 10:34 AM   Result Value Ref Range    Creatinine, Ur 82 2 mg/dL   Potassium, urine, random    Collection Time: 08/19/20 10:34 AM   Result Value Ref Range    Potassium Urine  5 1 mmol/L   Cortisol    Collection Time: 08/19/20 11:58 AM   Result Value Ref Range    Cortisol, Random 5 0 ug/dL   Procalcitonin    Collection Time: 08/19/20 11:58 AM   Result Value Ref Range    Procalcitonin 0 05 <=0 25 ng/ml         Robert Ty DO

## 2020-08-21 NOTE — PROGRESS NOTES
[facility] referral     Patient states he has all meds  Stopped the isosorbide & metoprolol  He denies lightheadedness, dizziness, syncope, or near syncope  He is trying to improve his food & fluid intake  He has follow up appointments scheduled & does not need transportation  Briefly discussed quitting chewing tobacco, but patient does not indicate an interest in cessation or resources  He is not interested in outpatient care management   OP CM removed from the care team

## 2020-08-22 ENCOUNTER — APPOINTMENT (OUTPATIENT)
Dept: LAB | Age: 55
End: 2020-08-22
Payer: COMMERCIAL

## 2020-08-22 DIAGNOSIS — E87.6 HYPOKALEMIA: ICD-10-CM

## 2020-08-22 LAB
ALBUMIN SERPL BCP-MCNC: 3 G/DL (ref 3.5–5)
ALP SERPL-CCNC: 116 U/L (ref 46–116)
ALT SERPL W P-5'-P-CCNC: 29 U/L (ref 12–78)
ANION GAP SERPL CALCULATED.3IONS-SCNC: 8 MMOL/L (ref 4–13)
AST SERPL W P-5'-P-CCNC: 16 U/L (ref 5–45)
BILIRUB SERPL-MCNC: 0.61 MG/DL (ref 0.2–1)
BUN SERPL-MCNC: 4 MG/DL (ref 5–25)
CALCIUM SERPL-MCNC: 8.7 MG/DL (ref 8.3–10.1)
CHLORIDE SERPL-SCNC: 110 MMOL/L (ref 100–108)
CO2 SERPL-SCNC: 27 MMOL/L (ref 21–32)
CREAT SERPL-MCNC: 0.88 MG/DL (ref 0.6–1.3)
GFR SERPL CREATININE-BSD FRML MDRD: 97 ML/MIN/1.73SQ M
GLUCOSE P FAST SERPL-MCNC: 136 MG/DL (ref 65–99)
POTASSIUM SERPL-SCNC: 2.9 MMOL/L (ref 3.5–5.3)
PROT SERPL-MCNC: 6.2 G/DL (ref 6.4–8.2)
SODIUM SERPL-SCNC: 145 MMOL/L (ref 136–145)

## 2020-08-22 PROCEDURE — 80053 COMPREHEN METABOLIC PANEL: CPT

## 2020-08-22 PROCEDURE — 36415 COLL VENOUS BLD VENIPUNCTURE: CPT

## 2020-08-24 ENCOUNTER — TELEPHONE (OUTPATIENT)
Dept: FAMILY MEDICINE CLINIC | Facility: CLINIC | Age: 55
End: 2020-08-24

## 2020-08-24 DIAGNOSIS — E87.6 HYPOKALEMIA: Primary | ICD-10-CM

## 2020-08-24 LAB — ALDOST SERPL-MCNC: <1 NG/DL (ref 0–30)

## 2020-08-24 RX ORDER — POTASSIUM CHLORIDE 20 MEQ/1
20 TABLET, EXTENDED RELEASE ORAL DAILY
Qty: 30 TABLET | Refills: 5 | Status: SHIPPED | OUTPATIENT
Start: 2020-08-24 | End: 2021-03-05 | Stop reason: SDUPTHER

## 2020-08-24 NOTE — TELEPHONE ENCOUNTER
Called pt to discuss the low K  I started him on potassium  Please let him know I ordered a cmp to be drawn in a week    Please let him it is up front for him to     NFA

## 2020-08-25 ENCOUNTER — TELEPHONE (OUTPATIENT)
Dept: FAMILY MEDICINE CLINIC | Facility: CLINIC | Age: 55
End: 2020-08-25

## 2020-08-25 ENCOUNTER — OFFICE VISIT (OUTPATIENT)
Dept: CARDIOLOGY CLINIC | Facility: CLINIC | Age: 55
End: 2020-08-25
Payer: COMMERCIAL

## 2020-08-25 VITALS
TEMPERATURE: 97.3 F | BODY MASS INDEX: 27.98 KG/M2 | DIASTOLIC BLOOD PRESSURE: 60 MMHG | WEIGHT: 206.6 LBS | HEIGHT: 72 IN | OXYGEN SATURATION: 97 % | SYSTOLIC BLOOD PRESSURE: 98 MMHG | HEART RATE: 73 BPM

## 2020-08-25 DIAGNOSIS — I95.1 ORTHOSTATIC HYPOTENSION: Primary | ICD-10-CM

## 2020-08-25 DIAGNOSIS — F41.9 ANXIETY AND DEPRESSION: Primary | ICD-10-CM

## 2020-08-25 DIAGNOSIS — E87.6 HYPOKALEMIA: ICD-10-CM

## 2020-08-25 DIAGNOSIS — R55 SYNCOPE AND COLLAPSE: ICD-10-CM

## 2020-08-25 DIAGNOSIS — E78.5 HYPERLIPIDEMIA, UNSPECIFIED HYPERLIPIDEMIA TYPE: ICD-10-CM

## 2020-08-25 DIAGNOSIS — F32.A ANXIETY AND DEPRESSION: Primary | ICD-10-CM

## 2020-08-25 DIAGNOSIS — G47.33 OSA (OBSTRUCTIVE SLEEP APNEA): ICD-10-CM

## 2020-08-25 LAB — RENIN PLAS-CCNC: <0.167 NG/ML/HR (ref 0.17–5.38)

## 2020-08-25 PROCEDURE — 3008F BODY MASS INDEX DOCD: CPT | Performed by: INTERNAL MEDICINE

## 2020-08-25 PROCEDURE — 1111F DSCHRG MED/CURRENT MED MERGE: CPT | Performed by: INTERNAL MEDICINE

## 2020-08-25 PROCEDURE — 3008F BODY MASS INDEX DOCD: CPT | Performed by: FAMILY MEDICINE

## 2020-08-25 PROCEDURE — 99214 OFFICE O/P EST MOD 30 MIN: CPT | Performed by: INTERNAL MEDICINE

## 2020-08-25 NOTE — TELEPHONE ENCOUNTER
Pt states he just left the cardiologist and his blood pressure was 98/60  Pt would like to know what the next step should be?  Pls advise

## 2020-08-25 NOTE — PROGRESS NOTES
Cardiology Follow Up    Janay Hinojosa  1965  [id number]  [facility] [place] [id number] [phone]    NSTEMI    Interval History:   Mr Cami Pham was admitted to [facility] on 8/18 - 8/19/20 with NSTEMI  He presented to the ED with syncope and collapse from a kneeling position  In the ED he was found to be bradycardic HR in the 50's  Troponin elevated  Cardiology was consulted  Metoprolol and Imdur stopped  TTE showed LVEF 55%,  No regional wall motion abnormalities, mild concentric hypertrophy  RV size systolic function normal   He was found to have a elevated troponin to 1 04  Orthostatic BP improved and were negative after receiving IV Fluids  Extended Holter monitor ordered  ASA started  Mr Kimball Paci office for recent hospitalization follow-up visit  He denies chest pain palpitations dyspnea with minimal moderate exertion  He admits to lightheadedness and dizziness with standing  He is not wearing compression stockings  According to Narayan he is hydrating  He has received the extended Holter monitor in the mail  He will place it tonight          HPI:  3/02/20 sp Marii En - Y Gastric Bypass with Dr Jasmin Rodriguez  Chest pain  ROSI not using CPAP after weight loss  Orthostatic hypotension   6/21/19 LVEF 60%, no Regional wall motion abnormality   Wall thickness mildly increased, grade 1 diastolic dysfunction, no  Significant valvular disease    5/15/19 [facility] mild non obstructive disease   Patient Active Problem List   Diagnosis    NSTEMI (non-ST elevated myocardial infarction) ([facility] [address] )    Bipolar disorder, current episode mixed, moderate ([facility] [address] )    ROSI (obstructive sleep apnea)    Coronary artery disease involving native coronary artery of native heart with unstable angina pectoris ([facility] [address] )    Former smoker    Migraine with aura and without status migrainosus, not intractable    Benign paroxysmal positional vertigo due to bilateral vestibular disorder    Hypercholesterolemia    Bariatric surgery status    Primary insomnia    Overweight    Encounter for surgical aftercare following surgery of digestive system    Postsurgical malabsorption    Pre-diabetes    Tobacco use    Orthostatic hypotension    Myoclonic jerking    Syncope and collapse    Infiltrate of middle lobe of right lung present on imaging study    Hypokalemia    Elevated troponin     Past Medical History:   Diagnosis Date    Bariatric surgery status     CPAP (continuous positive airway pressure) dependence     Hearing loss of aging     Hypercholesterolemia     Morbid obesity (HCC)     NSTEMI (non-ST elevation myocardial infarction) [facility])     2019    Postsurgical malabsorption      Social History     Socioeconomic History    Marital status: /Civil Union     Spouse name: Not on file    Number of children: Not on file    Years of education: Not on file    Highest education level: Not on file   Occupational History    Not on file   Social Needs    Financial resource strain: Not on file    Food insecurity     Worry: Not on file     Inability: Not on file    Transportation needs     Medical: Not on file     Non-medical: Not on file   Tobacco Use    Smoking status: Former Smoker     Types: Cigarettes     Last attempt to quit:      Years since quittin 6    Smokeless tobacco: Current User     Types: Chew     Last attempt to quit: 2014    Tobacco comment: quit cigarettes    Substance and Sexual Activity    Alcohol use: Not Currently     Frequency: Never    Drug use: Never    Sexual activity: Not on file   Lifestyle    Physical activity     Days per week: Not on file     Minutes per session: Not on file    Stress: Not on file   Relationships    Social connections     Talks on phone: Not on file     Gets together: Not on file     Attends Jew service: Not on file     Active member of club or organization: Not on file     Attends meetings of clubs or organizations: Not on file     Relationship status: Not on file    Intimate partner violence     Fear of current or ex partner: Not on file     Emotionally abused: Not on file     Physically abused: Not on file     Forced sexual activity: Not on file   Other Topics Concern    Not on file   Social History Narrative    Former smoker - As per [employer]    Full-time employment        · Do you currently or have you served in [employer]:   No    As per [id number] [facility] History   Problem Relation Age of Onset    Lung cancer Mother     Brain cancer Mother     Diabetes Brother     Bipolar disorder Maternal Grandfather      Past Surgical History:   Procedure Laterality Date    CARDIAC CATHETERIZATION      no stents     COLONOSCOPY      EGD      HERNIA REPAIR      umbilical hernia    NV LAP GASTRIC BYPASS/FRITZ-EN-Y N/A 3/2/2020    Procedure: LAPAROSCOPIC FRITZ-EN-Y GASTRIC BYPASS AND INTRAOPERATIVE EGD;  Surgeon: Lorena Sanches MD;  Location: [facility];  Service: Bariatrics    SKIN SURGERY      cyst removed from back and thumb    TONSILLECTOMY      TOOTH EXTRACTION         Current Outpatient Medications:     acetaminophen (TYLENOL) 325 mg tablet, Take 3 tablets (975 mg total) by mouth every 8 (eight) hours, Disp: 30 tablet, Rfl: 0    aspirin 81 mg chewable tablet, Chew 1 tablet (81 mg total) daily, Disp: 30 tablet, Rfl: 0    atorvastatin (LIPITOR) 40 mg tablet, Take 1 tablet by mouth once daily, Disp: 90 tablet, Rfl: 0    clonazePAM (KlonoPIN) 1 mg tablet, as needed , Disp: , Rfl:     fludrocortisone (FLORINEF) 0 1 mg tablet, Take 4 tablets (0 4 mg total) by mouth daily, Disp: 120 tablet, Rfl: 0    midodrine (PROAMATINE) 2 5 mg tablet, Take 1 tablet (2 5 mg total) by mouth 3 (three) times a day before meals, Disp: 90 tablet, Rfl: 1    OXcarbazepine (TRILEPTAL) 150 mg tablet, Take 150 mg by mouth 2 (two) times a day , Disp: , Rfl:     paliperidone palmitate ER (Invega Sustenna) 234 mg/1 5 mL IM injection, Inject 234 mg into a muscle every 30 (thirty) days, Disp: , Rfl:     pantoprazole (PROTONIX) 20 mg tablet, Take 1 tablet (20 mg total) by mouth daily, Disp: 90 tablet, Rfl: 3    potassium chloride (K-DUR,KLOR-CON) 20 mEq tablet, Take 1 tablet (20 mEq total) by mouth daily, Disp: 30 tablet, Rfl: 5    traZODone (DESYREL) 300 MG tablet, Take 300 mg by mouth daily at bedtime, Disp: , Rfl:     venlafaxine (EFFEXOR) 75 mg tablet, Take 75 mg by mouth daily , Disp: , Rfl:   No Known Allergies    Labs:  Appointment on 08/22/2020   Component Date Value    Sodium 08/22/2020 145     Potassium 08/22/2020 2 9*    Chloride 08/22/2020 110*    CO2 08/22/2020 27     ANION GAP 08/22/2020 8     BUN 08/22/2020 4*    Creatinine 08/22/2020 0 88     Glucose, Fasting 08/22/2020 136*    Calcium 08/22/2020 8 7     AST 08/22/2020 16     ALT 08/22/2020 29     Alkaline Phosphatase 08/22/2020 116     Total Protein 08/22/2020 6 2*    Albumin 08/22/2020 3 0*    Total Bilirubin 08/22/2020 0 61     eGFR 08/22/2020 97    Admission on 08/18/2020, Discharged on 08/19/2020   Component Date Value    Troponin I 08/18/2020 1 04*    Sodium 08/18/2020 144     Potassium 08/18/2020 2 5*    Chloride 08/18/2020 108     CO2 08/18/2020 28     ANION GAP 08/18/2020 8     BUN 08/18/2020 5     Creatinine 08/18/2020 0 84     Glucose 08/18/2020 134     Calcium 08/18/2020 7 9*    eGFR 08/18/2020 99     WBC 08/18/2020 9 23     RBC 08/18/2020 3 88     Hemoglobin 08/18/2020 12 0     Hematocrit 08/18/2020 35 5*    MCV 08/18/2020 92     MCH 08/18/2020 30 9     MCHC 08/18/2020 33 8     RDW 08/18/2020 12 6     MPV 08/18/2020 11 0     Platelets 70/03/0202 201     nRBC 08/18/2020 0     Neutrophils Relative 08/18/2020 64     Immat GRANS % 08/18/2020 0     Lymphocytes Relative 08/18/2020 26     Monocytes Relative 08/18/2020 7     Eosinophils Relative 08/18/2020 2     Basophils Relative 08/18/2020 1     Neutrophils Absolute 08/18/2020 5 98     Immature Grans Absolute 08/18/2020 0 02     Lymphocytes Absolute 08/18/2020 2 35     Monocytes Absolute 08/18/2020 0 68     Eosinophils Absolute 08/18/2020 0 14     Basophils Absolute 08/18/2020 0 06     Color, UA 08/18/2020 Yellow     Clarity, UA 08/18/2020 Clear     Specific Gravity, UA 08/18/2020 1 006     pH, UA 08/18/2020 7 0     Leukocytes, UA 08/18/2020 Negative     Nitrite, UA 08/18/2020 Negative     Protein, UA 08/18/2020 Negative     Glucose, UA 08/18/2020 Negative     Ketones, UA 08/18/2020 Negative     Urobilinogen, UA 08/18/2020 1 0     Bilirubin, UA 08/18/2020 Negative     Blood, UA 08/18/2020 Negative     PTT 08/18/2020 29     Protime 08/18/2020 15 2*    INR 08/18/2020 1 20*    Sodium 08/18/2020 146*    Potassium 08/18/2020 2 8*    Chloride 08/18/2020 110*    CO2 08/18/2020 29     ANION GAP 08/18/2020 7     BUN 08/18/2020 5     Creatinine 08/18/2020 0 67     Glucose 08/18/2020 83     Calcium 08/18/2020 7 7*    eGFR 08/18/2020 109     Magnesium 08/18/2020 2 2     Troponin I 08/18/2020 0 98*    Ventricular Rate 08/18/2020 77     Atrial Rate 08/18/2020 77     NM Interval 08/18/2020 168     QRSD Interval 08/18/2020 120     QT Interval 08/18/2020 360     QTC Interval 08/18/2020 407     P Axis 08/18/2020 65     QRS Axis 08/18/2020 125     T Wave Axis 08/18/2020 55     Troponin I 08/18/2020 0 97*    PTT 08/18/2020 55*    Renin 08/19/2020 <0 167*    Aldosterone 08/19/2020 <1 0     Sodium 08/19/2020 145     Potassium 08/19/2020 2 9*    Chloride 08/19/2020 111*    CO2 08/19/2020 30     ANION GAP 08/19/2020 4     BUN 08/19/2020 4*    Creatinine 08/19/2020 0 58*    Glucose 08/19/2020 78     Calcium 08/19/2020 8 2*    eGFR 08/19/2020 116     Ventricular Rate 08/18/2020 52     Atrial Rate 08/18/2020 52     NM Interval 08/18/2020 164     QRSD Interval 08/18/2020 118     QT Interval 08/18/2020 452     QTC Interval 08/18/2020 420     P Axis 08/18/2020 15     QRS Axis 08/18/2020 69     T Wave Axis 08/18/2020 44     Ventricular Rate 08/18/2020 48     Atrial Rate 08/18/2020 48     MI Interval 08/18/2020 190     QRSD Interval 08/18/2020 126     QT Interval 08/18/2020 482     QTC Interval 08/18/2020 430     P Axis 08/18/2020 55     QRS Axis 08/18/2020 94     T Wave Axis 08/18/2020 81     PTT 08/19/2020 71*    TSH 3RD GENERATON 08/19/2020 0 895     Creatinine, Ur 08/19/2020 82 2     Potassium Urine  08/19/2020 5 1     Cortisol, Random 08/19/2020 5 0     Ventricular Rate 08/19/2020 56     Atrial Rate 08/19/2020 56     MI Interval 08/19/2020 128     QRSD Interval 08/19/2020 132     QT Interval 08/19/2020 462     QTC Interval 08/19/2020 445     P Axis 08/19/2020 33     QRS Axis 08/19/2020 37     T Wave Axis 08/19/2020 31     Procalcitonin 08/19/2020 0 05    Admission on 07/15/2020, Discharged on 07/16/2020   Component Date Value    Sodium 07/15/2020 136     Potassium 07/15/2020 3 7     Chloride 07/15/2020 103     CO2 07/15/2020 24     ANION GAP 07/15/2020 9     BUN 07/15/2020 8     Creatinine 07/15/2020 0 76     Glucose 07/15/2020 134     Calcium 07/15/2020 9 0     eGFR 07/15/2020 103     WBC 07/15/2020 9 50     RBC 07/15/2020 4 25     Hemoglobin 07/15/2020 13 0     Hematocrit 07/15/2020 38 7     MCV 07/15/2020 91     MCH 07/15/2020 30 6     MCHC 07/15/2020 33 6     RDW 07/15/2020 12 9     MPV 07/15/2020 10 8     Platelets 92/30/0385 203     nRBC 07/15/2020 0     Neutrophils Relative 07/15/2020 75     Immat GRANS % 07/15/2020 0     Lymphocytes Relative 07/15/2020 16     Monocytes Relative 07/15/2020 7     Eosinophils Relative 07/15/2020 1     Basophils Relative 07/15/2020 1     Neutrophils Absolute 07/15/2020 7 15     Immature Grans Absolute 07/15/2020 0 03     Lymphocytes Absolute 07/15/2020 1 47     Monocytes Absolute 07/15/2020 0 68     Eosinophils Absolute 07/15/2020 0 12     Basophils Absolute 07/15/2020 0 05     Troponin I 07/15/2020 0 06*    SARS-CoV-2 07/15/2020 Negative     Troponin I 07/15/2020 0 05*    TSH 3RD GENERATON 07/16/2020 0 875     Ventricular Rate 07/15/2020 86     Atrial Rate 07/15/2020 86     CA Interval 07/15/2020 170     QRSD Interval 07/15/2020 120     QT Interval 07/15/2020 336     QTC Interval 07/15/2020 402     P Axis 07/15/2020 37     QRS Axis 07/15/2020 73     T Wave Axis 07/15/2020 57      Imaging: Xr Chest 1 View Portable    Result Date: 8/18/2020  Narrative: CHEST INDICATION:   hypotension  COMPARISON:  July 15, 2020 EXAM PERFORMED/VIEWS:  XR CHEST PORTABLE FINDINGS: Heart size appears normal   The right cardiac border appears very slightly ill-defined  This could indicate some minimal atelectasis or infiltrate in the right middle lobe  A lateral view might be helpful for further workup  Otherwise, the lungs are clear  No pleural fluid or pneumothorax  Osseous structures appear within normal limits for patient age  Impression: Ill-defined appearance of the right cardiac border which may indicate some atelectasis or infiltrate of the right middle lobe  Consider follow-up lateral view for further workup  The study was marked in [facility] for immediate notification  Workstation performed: [id number]     Xr Chest 1 View    Result Date: 8/19/2020  Narrative: CHEST INDICATION:   Question of right lower lobe opacity on a prior frontal chest radiograph  COMPARISON: Frontal chest radiograph from same day  EXAM PERFORMED/VIEWS:  XR CHEST 1 VIEW FINDINGS: Cardiomediastinal silhouette appears unremarkable on lateral view  The lungs are clear  No pneumothorax or pleural effusion  The findings described on the prior frontal radiograph are likely artifactual  Mild degenerative changes of the thoracic spine       Impression: No acute cardiopulmonary disease  The findings described on the prior frontal radiograph are likely artifactual  Workstation performed: [id number]     Us Bedside Procedure    Result Date: 8/18/2020  Narrative: 5 2 [phone] [id number]  [id number]      Review of Systems:  Review of Systems   Neurological: Positive for dizziness and light-headedness  Psychiatric/Behavioral: Positive for agitation  All other systems reviewed and are negative  Physical Exam:  Physical Exam  Vitals signs reviewed  Constitutional:       Appearance: Normal appearance  HENT:      Head: Normocephalic  Mouth/Throat:      Mouth: Mucous membranes are moist    Neck:      Musculoskeletal: Normal range of motion  Cardiovascular:      Rate and Rhythm: Normal rate and regular rhythm  Pulmonary:      Effort: Pulmonary effort is normal       Breath sounds: Normal breath sounds  Abdominal:      General: Bowel sounds are normal       Palpations: Abdomen is soft  Musculoskeletal: Normal range of motion  General: No swelling  Skin:     General: Skin is warm and dry  Capillary Refill: Capillary refill takes less than 2 seconds  Neurological:      General: No focal deficit present  Mental Status: He is alert  Psychiatric:         Mood and Affect: Mood normal          Discussion/Summary:  1  Orthostatic Hypotension- 3/02/20 sp Marii En Y Gastric Bypass by Dr Kalli Mota, possible autonomic insufficiency post Gastric Bypass  Continue on midodrine 2 5 mg t i d  and Florinef 0 4 mg daily,instructed on hydration and wearing darlin LE compression stockings daily   2  Syncope- Extended Holter monitor  Evaluate rate and rhythm   3  ROSI- not treated post gastric bypass and weight loss  4  Hyperlipidemia- 6/22/20 TC 79, TG 52, HDL 34, LDL 35  - continue on Lipitor 40mg daily   5   Hypokalemia persistent K+ 2 9,- continues on K dur 20meq daily follow up with PCP

## 2020-08-26 RX ORDER — VENLAFAXINE 75 MG/1
75 TABLET ORAL 3 TIMES DAILY
Qty: 270 TABLET | Refills: 0 | Status: SHIPPED | OUTPATIENT
Start: 2020-08-26 | End: 2020-12-15 | Stop reason: SDUPTHER

## 2020-08-26 NOTE — TELEPHONE ENCOUNTER
Looking through cardiology note it looks like they want him to wear the holter, he was advised to cont with his midodrine and florinef and use his compressions stockings and hydrate

## 2020-09-17 ENCOUNTER — TELEMEDICINE (OUTPATIENT)
Dept: BARIATRICS | Facility: CLINIC | Age: 55
End: 2020-09-17
Payer: COMMERCIAL

## 2020-09-17 VITALS — WEIGHT: 194 LBS | BODY MASS INDEX: 26.31 KG/M2

## 2020-09-17 DIAGNOSIS — K91.2 POSTSURGICAL MALABSORPTION: ICD-10-CM

## 2020-09-17 DIAGNOSIS — I95.1 ORTHOSTATIC HYPOTENSION: ICD-10-CM

## 2020-09-17 DIAGNOSIS — G47.33 OSA (OBSTRUCTIVE SLEEP APNEA): ICD-10-CM

## 2020-09-17 DIAGNOSIS — Z98.84 BARIATRIC SURGERY STATUS: ICD-10-CM

## 2020-09-17 DIAGNOSIS — Z48.815 ENCOUNTER FOR SURGICAL AFTERCARE FOLLOWING SURGERY OF DIGESTIVE SYSTEM: ICD-10-CM

## 2020-09-17 DIAGNOSIS — I25.110 CORONARY ARTERY DISEASE INVOLVING NATIVE CORONARY ARTERY OF NATIVE HEART WITH UNSTABLE ANGINA PECTORIS (HCC): ICD-10-CM

## 2020-09-17 DIAGNOSIS — Z72.0 TOBACCO USE: ICD-10-CM

## 2020-09-17 DIAGNOSIS — E66.3 OVERWEIGHT: Primary | ICD-10-CM

## 2020-09-17 PROBLEM — R73.03 PRE-DIABETES: Status: RESOLVED | Noted: 2020-06-16 | Resolved: 2020-09-17

## 2020-09-17 PROCEDURE — 99443 PR PHYS/QHP TELEPHONE EVALUATION 21-30 MIN: CPT | Performed by: PHYSICIAN ASSISTANT

## 2020-09-17 NOTE — PATIENT INSTRUCTIONS
It was good to talk with you again today  If lightheadedness gets worse/call 911/get to ER for evaluation  Follow cardiologist's advise  You may benefit from using diet gatoraide or diet propel water for part of your liquid/also protein drink/milk along with your water-follow volume recommendations per your cardiologist and keep follow-up with them  Avoid tobacco and ibuprofen/nsaids except your baby aspirin  The tobacco and nsaids may lead to an ulcer    Follow-up in 6 months  We kindly ask that your arrive 15 minutes before your scheduled appointment time with your provider to allow our staff to room you, get your vital signs and update your chart  We thank you for your patience at your visit  Your appointment time is reserved only for you  If you are unable to make your scheduled visit, we would request that you call to cancel and reschedule it at your earliest convenience  Follow diet as discussed  Get lab work done prior to next office visit  It is recommended to check with your insurance BEFORE getting labs done to make sure they are covered by your policy  Make sure to HOLD any multivitamins that may contain biotin and any biotin supplements FOR 5 DAYS before any labs since it can affect the results  IMPORTANT if you have a [facility] [employer]" account, you will receive a letter of your vitamin/mineral results through the computer  Please watch for an update to your chart-since recommendations for supplement adjustments will be sent to you this way  Follow vitamin  and mineral recommendations as reviewed with you  Bariatric vitamins are highly recommended  It is important to take vitamins for a life-time  Low levels can lead to deficiencies which can lead to other medical problems    Exercise as tolerated    Call our office if you have any problems with abdominal pain especially associated with fever, chills, nausea, vomiting or any other concerns          All  Post-bariatric surgery patients should be aware that very small quantities of any alcohol  can cause impairment and it is very possible not to feel the effect  The effect can be in the system for several hours  It is also a stomach irritant  It is advised to AVOID alcohol, Nonsteroidal antiinflammatory drugs (NSAIDS) and nicotine of all forms   Any of these can cause stomach irritation/pain

## 2020-09-17 NOTE — ASSESSMENT & PLAN NOTE
He stopped using Cpap around 1 1/2 months post-op  Denies snoring or morning or mid-day fatigue-advised we recommend continued use for up to one year and it may also be beneficial to his heart  Encouraged him to continue to wear but may need adjustments in same   He indicated he will restart tonight  Encouraged repeat sleep study to assure resolution

## 2020-09-17 NOTE — ASSESSMENT & PLAN NOTE
Followed by Cardiologist  He denies chest pain, shortness of breath  He his having further cardiac work-up for recent syncope and collapse with noted nstemi      He is on baby ASA, statin  He is chewing tobacco and encouraged to abstain from all tobacco products  Encouraged his planned follow-up with cardiologist

## 2020-09-17 NOTE — ASSESSMENT & PLAN NOTE
-s/p Marii-En-Y Gastric Bypass with Dr Chandana Malin on 3 2/2020  Overall doing Well  Initial:296 1 lb  Current: 194 lb    Luis:Current  Current BMI is Body mass index is 26 31 kg/m²       Patient notes he has been trying to regain weight now and adding some high calorie and high sugar foods in diet  Advised to aim for weight maintenance for now since he is at an excellent weight and we discussed eating a couple healthy snacks in diet for same -advised after the first year post-op he will likely regain some weight and currently should see weight loss slow or stop soon    I am referring him back to RD for further diet education and help with healthy food choices-he is agreeable to the plan    Tolerating a regular diet-yes  Eating at least 60 grams of protein per day-yes  Following 30/60 minute rule with liquids-yes  Drinking at least 64 ounces of fluid per day-yes  Drinking carbonated beverages-yes  Sufficient exercise-yes  Using NSAIDs regularly-yes on baby ASA and take an ibuprofen several days a week-advised on effect and risk of ulcer and encouraged to abstain  Using nicotine-yes/chewing tobacco/advised that this could also lead to abdominal pain/ulcer and encouraged cessation of same  Using alcohol-no    Colonsocopy/colon cancer screening is up-to-date as reported by patient

## 2020-09-17 NOTE — PROGRESS NOTES
Virtual Brief Visit    Assessment/Plan:    Problem List Items Addressed This Visit        Digestive    Postsurgical malabsorption     Malabsorption- patient is at risk for malabsorption of vitamins/minerals secondary to malabsorption from her procedure and restriction of intakes  Reviewed current supplements and advised on same    He is taking 4 bariatric fusion and one calcium with D per day  Based on his June labs-reviewed with him again-he is maintaining her vitamin/mineral labs-will recheck prior to his annual visit-lab slip being sent to him for same  Respiratory    ROSI (obstructive sleep apnea)     He stopped using Cpap around 1 1/2 months post-op  Denies snoring or morning or mid-day fatigue-advised we recommend continued use for up to one year and it may also be beneficial to his heart  Encouraged him to continue to wear but may need adjustments in same  He indicated he will restart tonight  Encouraged repeat sleep study to assure resolution            Cardiovascular and Mediastinum    Coronary artery disease involving native coronary artery of native heart with unstable angina pectoris ([facility] [address] )     Followed by Cardiologist  He denies chest pain, shortness of breath  He his having further cardiac work-up for recent syncope and collapse with noted nstemi  He is on baby ASA, statin  He is chewing tobacco and encouraged to abstain from all tobacco products  Encouraged his planned follow-up with cardiologist         Orthostatic hypotension     Noted that he had syncope and collapse in August /NSTEMI/Followed by cardiologist-per patient bp remains low at 97/58 yesterday as reported by patient on midodrine and florinef-has not checked bp today-but had  further cardiac testing and awaiting results   Per their note they indicated possible autonomic insufficiency after gastric bypass surgery-he is on medication and encouraged him to keep follow-up with his specialist and PCP  Notes he still has some lightheadedness but is working his job ok/out of old job with high heat environment    Encouraged good fluid intakes-notes he is drinking around one gallon of water /day and "monster drinks"-advised that the monster drinks may be high in carbohydrate /simple sugars and if so-should avoid  He notes he was advised to increase salt intake by cardiologist too so advised to add some diet electrolyte replacements unless cardiology advises otherwise            Other    Bariatric surgery status    Overweight - Primary     Improved from bmi of 29 3 to bmi of 26         Encounter for surgical aftercare following surgery of digestive system     -s/p Marii-En-Y Gastric Bypass with Dr Bob Lopez on 3 2/2020  Overall doing Well  Initial:296 1 lb  Current: 194 lb    Luis:Current  Current BMI is Body mass index is 26 31 kg/m²       Patient notes he has been trying to regain weight now and adding some high calorie and high sugar foods in diet  Advised to aim for weight maintenance for now since he is at an excellent weight and we discussed eating a couple healthy snacks in diet for same -advised after the first year post-op he will likely regain some weight and currently should see weight loss slow or stop soon    I am referring him back to RD for further diet education and help with healthy food choices-he is agreeable to the plan    Tolerating a regular diet-yes  Eating at least 60 grams of protein per day-yes  Following 30/60 minute rule with liquids-yes  Drinking at least 64 ounces of fluid per day-yes  Drinking carbonated beverages-yes  Sufficient exercise-yes  Using NSAIDs regularly-yes on baby ASA and take an ibuprofen several days a week-advised on effect and risk of ulcer and encouraged to abstain  Using nicotine-yes/chewing tobacco/advised that this could also lead to abdominal pain/ulcer and encouraged cessation of same  Using alcohol-no    Colonsocopy/colon cancer screening is up-to-date as reported by patient Tobacco use                Reason for visit is   Chief Complaint   Patient presents with    Virtual Brief Visit        Encounter provider Dannielle Gottron, PA-C    Provider located at [address] [place] [id number]    Recent Visits  No visits were found meeting these conditions  Showing recent visits within past 7 days and meeting all other requirements     Today's Visits  Date Type Provider Dept   09/17/20 Telemedicine Dannielle Gottron, PA-C [facility]   Showing today's visits and meeting all other requirements     Future Appointments  No visits were found meeting these conditions  Showing future appointments within next 150 days and meeting all other requirements        After connecting through telephone, the patient was identified by name and date of birth  Luis Antonio Jose was informed that this is a telemedicine visit and that the visit is being conducted through telephone  My office door was closed  The patient was notified the following individuals were present in the room our bariatric fellow   He acknowledged consent and understanding of privacy and security of the platform  The patient has agreed to participate and understands he can discontinue the visit at any time  Patient is aware this is a billable service  Subjective    Luis Antonio Jose is a 48 y o  male is status post laparoscopic barbara-en-y gastirc bypass surgery by DR Bob Lopez March 2020  He is here via phone for his 6 month post-op visit after his laparoscopic barbara-en-y gastric bypass surgery by Dr Bob Lopez 3/2/2020  He notes he had 2 hospitalizations for orthostatic hypotension and noted to have syncope and collapse with NSTEMI on August hospitalization  He had been seen by cardiologist and has just completed testing with them and is awaiting some results   He is drinking up to one gallon of water and some "monster drinks" Notes bp is tending toward high 90's-105 over high 50's to 60's  Still has some lightheadedness but is doing ok with his job-now out of his hot environment  He has lost weight and is "trying to gain more weight back now"  He has a physical job in construction and is taking bariatric vitamins         Past Medical History:   Diagnosis Date    Bariatric surgery status     CPAP (continuous positive airway pressure) dependence     Hearing loss of aging     Hypercholesterolemia     Morbid obesity ([address] )     NSTEMI (non-ST elevation [facility] infarction[facility])     april 2019    Postsurgical malabsorption        Past Surgical History:   Procedure Laterality Date    CARDIAC CATHETERIZATION      no stents     COLONOSCOPY      EGD      HERNIA REPAIR      umbilical hernia    [place] LAP GASTRIC BYPASS/FRITZ-EN-Y N/A 3/2/2020    Procedure: LAPAROSCOPIC FRITZ-EN-Y GASTRIC BYPASS AND INTRAOPERATIVE EGD;  Surgeon: Kenia Cai MD;  Location: [facility];  Service: Bariatrics    SKIN SURGERY      cyst removed from back and thumb    TONSILLECTOMY      TOOTH EXTRACTION         Current Outpatient Medications   Medication Sig Dispense Refill    acetaminophen (TYLENOL) 325 mg tablet Take 3 tablets (975 mg total) by mouth every 8 (eight) hours 30 tablet 0    aspirin 81 mg chewable tablet Chew 1 tablet (81 mg total) daily 30 tablet 0    atorvastatin (LIPITOR) 40 mg tablet Take 1 tablet by mouth once daily 90 tablet 0    clonazePAM (KlonoPIN) 1 mg tablet as needed       fludrocortisone (FLORINEF) 0 1 mg tablet Take 4 tablets (0 4 mg total) by mouth daily 120 tablet 0    midodrine (PROAMATINE) 2 5 mg tablet Take 1 tablet (2 5 mg total) by mouth 3 (three) times a day before meals 90 tablet 1    OXcarbazepine (TRILEPTAL) 150 mg tablet Take 150 mg by mouth 2 (two) times a day       pantoprazole (PROTONIX) 20 mg tablet Take 1 tablet (20 mg total) by mouth daily 90 tablet 3    potassium chloride (K-DUR,KLOR-CON) 20 mEq tablet Take 1 tablet (20 mEq total) by mouth daily 30 tablet 5    traZODone (DESYREL) 300 MG tablet Take 300 mg by mouth daily at bedtime      venlafaxine (EFFEXOR) 75 mg tablet Take 1 tablet (75 mg total) by mouth 3 (three) times a day 270 tablet 0    paliperidone palmitate ER (Invega Sustenna) 234 mg/1 5 mL IM injection Inject 234 mg into a muscle every 30 (thirty) days       No current facility-administered medications for this visit  No Known Allergies    Review of Systems   Constitutional: Negative for chills and fever  Unexpected weight change: planned weight loss  Respiratory: Negative for shortness of breath and wheezing  Cardiovascular: Negative for chest pain and palpitations  Gastrointestinal: Negative for abdominal pain, constipation, diarrhea, nausea and vomiting  Neurological: Positive for light-headedness  Psychiatric/Behavioral: Suicidal ideas: no complait of anxiety or depression  Vitals:    09/17/20 1448   Weight: 88 kg (194 lb)         I spent 30 minutes with patient today in which greater than 50% of the time was spent in counseling/coordination of care regarding diet/avoidance of nsaids except baby ASA and avoidance of tobacoo    VIRTUAL VISIT DISCLAIMER    Nancy Saab acknowledges that he has consented to an online visit or consultation  He understands that the online visit is based solely on information provided by him, and that, in the absence of a face-to-face physical evaluation by the physician, the diagnosis he receives is both limited and provisional in terms of accuracy and completeness  This is not intended to replace a full medical face-to-face evaluation by the physician  Nancy Saab understands and accepts these terms

## 2020-09-17 NOTE — ASSESSMENT & PLAN NOTE
Noted that he had syncope and collapse in August /NSTEMI/Followed by cardiologist-per patient bp remains low at 97/58 yesterday as reported by patient on midodrine and florinef-has not checked bp today-but had  further cardiac testing and awaiting results   Per their note they indicated possible autonomic insufficiency after gastric bypass surgery-he is on medication and encouraged him to keep follow-up with his specialist and PCP  Notes he still has some lightheadedness but is working his job ok/out of old job with high heat environment    Encouraged good fluid intakes-notes he is drinking around one gallon of water /day and "monster drinks"-advised that the monster drinks may be high in carbohydrate /simple sugars and if so-should avoid  He notes he was advised to increase salt intake by cardiologist too so advised to add some diet electrolyte replacements unless cardiology advises otherwise

## 2020-09-17 NOTE — ASSESSMENT & PLAN NOTE
Malabsorption- patient is at risk for malabsorption of vitamins/minerals secondary to malabsorption from her procedure and restriction of intakes  Reviewed current supplements and advised on same    He is taking 4 bariatric fusion and one calcium with D per day  Based on his June labs-reviewed with him again-he is maintaining her vitamin/mineral labs-will recheck prior to his annual visit-lab slip being sent to him for same

## 2020-09-19 DIAGNOSIS — E78.2 MIXED HYPERLIPIDEMIA: ICD-10-CM

## 2020-09-19 DIAGNOSIS — I25.110 CORONARY ARTERY DISEASE INVOLVING NATIVE CORONARY ARTERY OF NATIVE HEART WITH UNSTABLE ANGINA PECTORIS (HCC): ICD-10-CM

## 2020-09-19 DIAGNOSIS — F39 MOOD DISORDER (HCC): Primary | ICD-10-CM

## 2020-09-19 DIAGNOSIS — I95.1 ORTHOSTATIC HYPOTENSION: ICD-10-CM

## 2020-09-19 RX ORDER — ATORVASTATIN CALCIUM 40 MG/1
40 TABLET, FILM COATED ORAL DAILY
Qty: 90 TABLET | Refills: 0 | Status: SHIPPED | OUTPATIENT
Start: 2020-09-19 | End: 2020-12-30 | Stop reason: SDUPTHER

## 2020-09-19 RX ORDER — FLUDROCORTISONE ACETATE 0.1 MG/1
0.4 TABLET ORAL DAILY
Qty: 120 TABLET | Refills: 0 | Status: SHIPPED | OUTPATIENT
Start: 2020-09-19 | End: 2020-10-13 | Stop reason: SDUPTHER

## 2020-09-19 RX ORDER — PALIPERIDONE 6 MG/1
6 TABLET, EXTENDED RELEASE ORAL 2 TIMES DAILY
Qty: 60 TABLET | Refills: 3 | Status: SHIPPED | OUTPATIENT
Start: 2020-09-19 | End: 2020-10-05

## 2020-09-23 ENCOUNTER — CLINICAL SUPPORT (OUTPATIENT)
Dept: CARDIOLOGY CLINIC | Facility: CLINIC | Age: 55
End: 2020-09-23
Payer: COMMERCIAL

## 2020-09-23 ENCOUNTER — TELEPHONE (OUTPATIENT)
Dept: CARDIOLOGY CLINIC | Facility: CLINIC | Age: 55
End: 2020-09-23

## 2020-09-23 DIAGNOSIS — R55 SYNCOPE AND COLLAPSE: ICD-10-CM

## 2020-09-23 PROCEDURE — 0298T PR EXT ECG > 48HR TO 21 DAY REVIEW AND INTERPRETATN: CPT | Performed by: INTERNAL MEDICINE

## 2020-09-23 NOTE — TELEPHONE ENCOUNTER
Pt called requesting the results of the Zio Patch  Results are in Epic for your review  Please advise with any further follow up needed  Thank you

## 2020-09-28 ENCOUNTER — OFFICE VISIT (OUTPATIENT)
Dept: FAMILY MEDICINE CLINIC | Facility: CLINIC | Age: 55
End: 2020-09-28
Payer: COMMERCIAL

## 2020-09-28 ENCOUNTER — TELEPHONE (OUTPATIENT)
Dept: FAMILY MEDICINE CLINIC | Facility: CLINIC | Age: 55
End: 2020-09-28

## 2020-09-28 VITALS
BODY MASS INDEX: 27.36 KG/M2 | HEIGHT: 72 IN | SYSTOLIC BLOOD PRESSURE: 110 MMHG | WEIGHT: 202 LBS | HEART RATE: 92 BPM | TEMPERATURE: 98.8 F | OXYGEN SATURATION: 95 % | RESPIRATION RATE: 16 BRPM | DIASTOLIC BLOOD PRESSURE: 70 MMHG

## 2020-09-28 DIAGNOSIS — I95.1 ORTHOSTATIC HYPOTENSION: Primary | ICD-10-CM

## 2020-09-28 PROCEDURE — 93000 ELECTROCARDIOGRAM COMPLETE: CPT | Performed by: FAMILY MEDICINE

## 2020-09-28 PROCEDURE — 99214 OFFICE O/P EST MOD 30 MIN: CPT | Performed by: FAMILY MEDICINE

## 2020-09-28 RX ORDER — MIDODRINE HYDROCHLORIDE 5 MG/1
5 TABLET ORAL
Qty: 90 TABLET | Refills: 3 | Status: SHIPPED | OUTPATIENT
Start: 2020-09-28 | End: 2020-09-30 | Stop reason: SDUPTHER

## 2020-09-28 NOTE — TELEPHONE ENCOUNTER
Patient states he is at work and he is treated by Dr Stan Riojas for blood pressure issues    Currently, he stated that when he stands up he gets very dizzy and blurry vision    Triaged to [facility]

## 2020-09-28 NOTE — TELEPHONE ENCOUNTER
Spoke with pt, stated he was feeling dizzy and when he moves to fast he gets blurred vision  States his bp and hr go real low and he feels like this often  Pt has been following with cardiology however nothing has been changed thus far  Spoke with Dr Gerald Barry, stated pt should not be at work as he is getting dizzy  Pt stated he cannot afford to leave work, he does have a partner with him so he has someone there watching him  Advised pt to increase his sodium today, switch from water to a gatorade or powerade, pt stated he got a monster drink which has high levels of sodium  Pt stated he will watch his symptoms and rest when needed, will get electrolytes and increase the sodium in his diet today  He will call back if he wants an appt as he cannot afford to leave work for a visit   Pt stated he goes to the cardiologist on the 12th   [facility]

## 2020-09-30 ENCOUNTER — TELEPHONE (OUTPATIENT)
Dept: FAMILY MEDICINE CLINIC | Facility: CLINIC | Age: 55
End: 2020-09-30

## 2020-09-30 DIAGNOSIS — I95.1 ORTHOSTATIC HYPOTENSION: ICD-10-CM

## 2020-09-30 RX ORDER — MIDODRINE HYDROCHLORIDE 10 MG/1
10 TABLET ORAL
Qty: 90 TABLET | Refills: 0 | Status: SHIPPED | OUTPATIENT
Start: 2020-09-30 | End: 2020-11-10 | Stop reason: SDUPTHER

## 2020-10-02 ENCOUNTER — TELEPHONE (OUTPATIENT)
Dept: FAMILY MEDICINE CLINIC | Facility: CLINIC | Age: 55
End: 2020-10-02

## 2020-10-05 ENCOUNTER — OFFICE VISIT (OUTPATIENT)
Dept: CARDIOLOGY CLINIC | Facility: CLINIC | Age: 55
End: 2020-10-05
Payer: COMMERCIAL

## 2020-10-05 ENCOUNTER — OFFICE VISIT (OUTPATIENT)
Dept: PSYCHIATRY | Facility: CLINIC | Age: 55
End: 2020-10-05
Payer: COMMERCIAL

## 2020-10-05 ENCOUNTER — TELEPHONE (OUTPATIENT)
Dept: CARDIOLOGY CLINIC | Facility: CLINIC | Age: 55
End: 2020-10-05

## 2020-10-05 VITALS
OXYGEN SATURATION: 95 % | HEART RATE: 95 BPM | BODY MASS INDEX: 27.5 KG/M2 | DIASTOLIC BLOOD PRESSURE: 64 MMHG | HEIGHT: 72 IN | SYSTOLIC BLOOD PRESSURE: 114 MMHG | WEIGHT: 203 LBS

## 2020-10-05 DIAGNOSIS — I95.1 ORTHOSTATIC HYPOTENSION: Primary | ICD-10-CM

## 2020-10-05 DIAGNOSIS — K91.2 POSTSURGICAL MALABSORPTION: ICD-10-CM

## 2020-10-05 DIAGNOSIS — I25.118 CORONARY ARTERY DISEASE OF NATIVE ARTERY OF NATIVE HEART WITH STABLE ANGINA PECTORIS (HCC): ICD-10-CM

## 2020-10-05 DIAGNOSIS — F31.62 BIPOLAR DISORDER, CURRENT EPISODE MIXED, MODERATE (HCC): Primary | ICD-10-CM

## 2020-10-05 PROCEDURE — 99213 OFFICE O/P EST LOW 20 MIN: CPT | Performed by: NURSE PRACTITIONER

## 2020-10-05 PROCEDURE — 99204 OFFICE O/P NEW MOD 45 MIN: CPT | Performed by: INTERNAL MEDICINE

## 2020-10-05 RX ORDER — RANOLAZINE 500 MG/1
500 TABLET, EXTENDED RELEASE ORAL 2 TIMES DAILY
Qty: 60 TABLET | Refills: 5 | Status: SHIPPED | OUTPATIENT
Start: 2020-10-05 | End: 2020-10-21 | Stop reason: DRUGHIGH

## 2020-10-09 DIAGNOSIS — I21.4 NSTEMI (NON-ST ELEVATED MYOCARDIAL INFARCTION) (HCC): Primary | ICD-10-CM

## 2020-10-13 DIAGNOSIS — I95.1 ORTHOSTATIC HYPOTENSION: ICD-10-CM

## 2020-10-13 RX ORDER — FLUDROCORTISONE ACETATE 0.1 MG/1
0.4 TABLET ORAL DAILY
Qty: 120 TABLET | Refills: 0 | Status: SHIPPED | OUTPATIENT
Start: 2020-10-13 | End: 2020-11-19 | Stop reason: SDUPTHER

## 2020-10-21 ENCOUNTER — TELEPHONE (OUTPATIENT)
Dept: CARDIOLOGY CLINIC | Facility: CLINIC | Age: 55
End: 2020-10-21

## 2020-10-21 RX ORDER — RANOLAZINE 500 MG/1
1000 TABLET, EXTENDED RELEASE ORAL 2 TIMES DAILY
COMMUNITY
End: 2020-12-01 | Stop reason: SDUPTHER

## 2020-10-27 ENCOUNTER — TELEPHONE (OUTPATIENT)
Dept: CARDIOLOGY CLINIC | Facility: CLINIC | Age: 55
End: 2020-10-27

## 2020-10-30 ENCOUNTER — SOCIAL WORK (OUTPATIENT)
Dept: BEHAVIORAL/MENTAL HEALTH CLINIC | Facility: CLINIC | Age: 55
End: 2020-10-30
Payer: COMMERCIAL

## 2020-10-30 DIAGNOSIS — F31.62 BIPOLAR DISORDER, CURRENT EPISODE MIXED, MODERATE (HCC): Primary | ICD-10-CM

## 2020-10-30 PROCEDURE — 90791 PSYCH DIAGNOSTIC EVALUATION: CPT | Performed by: COUNSELOR

## 2020-11-02 ENCOUNTER — OFFICE VISIT (OUTPATIENT)
Dept: PSYCHIATRY | Facility: CLINIC | Age: 55
End: 2020-11-02
Payer: COMMERCIAL

## 2020-11-02 DIAGNOSIS — F32.A ANXIETY AND DEPRESSION: Primary | ICD-10-CM

## 2020-11-02 DIAGNOSIS — F41.9 ANXIETY AND DEPRESSION: Primary | ICD-10-CM

## 2020-11-02 PROCEDURE — 99214 OFFICE O/P EST MOD 30 MIN: CPT | Performed by: NURSE PRACTITIONER

## 2020-11-02 RX ORDER — CLONAZEPAM 1 MG/1
1 TABLET ORAL 3 TIMES DAILY
Qty: 270 TABLET | Refills: 0 | Status: SHIPPED | OUTPATIENT
Start: 2020-11-02 | End: 2021-02-01 | Stop reason: SDUPTHER

## 2020-11-09 ENCOUNTER — SOCIAL WORK (OUTPATIENT)
Dept: BEHAVIORAL/MENTAL HEALTH CLINIC | Facility: CLINIC | Age: 55
End: 2020-11-09
Payer: COMMERCIAL

## 2020-11-09 DIAGNOSIS — F31.62 BIPOLAR DISORDER, CURRENT EPISODE MIXED, MODERATE (HCC): Primary | ICD-10-CM

## 2020-11-09 PROCEDURE — 90834 PSYTX W PT 45 MINUTES: CPT | Performed by: COUNSELOR

## 2020-11-10 DIAGNOSIS — I95.1 ORTHOSTATIC HYPOTENSION: ICD-10-CM

## 2020-11-10 RX ORDER — MIDODRINE HYDROCHLORIDE 10 MG/1
10 TABLET ORAL
Qty: 90 TABLET | Refills: 0 | Status: ON HOLD | OUTPATIENT
Start: 2020-11-10 | End: 2021-03-15

## 2020-11-12 ENCOUNTER — TELEPHONE (OUTPATIENT)
Dept: CARDIOLOGY CLINIC | Facility: CLINIC | Age: 55
End: 2020-11-12

## 2020-11-12 ENCOUNTER — OFFICE VISIT (OUTPATIENT)
Dept: FAMILY MEDICINE CLINIC | Facility: CLINIC | Age: 55
End: 2020-11-12
Payer: COMMERCIAL

## 2020-11-12 ENCOUNTER — OFFICE VISIT (OUTPATIENT)
Dept: PSYCHIATRY | Facility: CLINIC | Age: 55
End: 2020-11-12
Payer: COMMERCIAL

## 2020-11-12 VITALS
HEIGHT: 72 IN | SYSTOLIC BLOOD PRESSURE: 90 MMHG | WEIGHT: 200 LBS | RESPIRATION RATE: 16 BRPM | HEART RATE: 101 BPM | OXYGEN SATURATION: 95 % | TEMPERATURE: 97.5 F | DIASTOLIC BLOOD PRESSURE: 50 MMHG | BODY MASS INDEX: 27.09 KG/M2

## 2020-11-12 DIAGNOSIS — R55 SYNCOPE AND COLLAPSE: ICD-10-CM

## 2020-11-12 DIAGNOSIS — F31.62 BIPOLAR DISORDER, CURRENT EPISODE MIXED, MODERATE (HCC): Primary | ICD-10-CM

## 2020-11-12 DIAGNOSIS — I95.1 ORTHOSTATIC HYPOTENSION: Primary | ICD-10-CM

## 2020-11-12 PROCEDURE — 99213 OFFICE O/P EST LOW 20 MIN: CPT | Performed by: FAMILY MEDICINE

## 2020-11-12 PROCEDURE — 99214 OFFICE O/P EST MOD 30 MIN: CPT | Performed by: NURSE PRACTITIONER

## 2020-11-16 ENCOUNTER — TELEMEDICINE (OUTPATIENT)
Dept: BEHAVIORAL/MENTAL HEALTH CLINIC | Facility: CLINIC | Age: 55
End: 2020-11-16
Payer: COMMERCIAL

## 2020-11-16 DIAGNOSIS — F31.62 BIPOLAR DISORDER, CURRENT EPISODE MIXED, MODERATE (HCC): Primary | ICD-10-CM

## 2020-11-16 PROCEDURE — 90834 PSYTX W PT 45 MINUTES: CPT | Performed by: COUNSELOR

## 2020-11-18 DIAGNOSIS — F31.0 BIPOLAR AFFECTIVE DISORDER, CURRENT EPISODE HYPOMANIC (HCC): Primary | ICD-10-CM

## 2020-11-18 RX ORDER — OXCARBAZEPINE 150 MG/1
150 TABLET, FILM COATED ORAL 2 TIMES DAILY
Qty: 60 TABLET | Refills: 3 | Status: SHIPPED | OUTPATIENT
Start: 2020-11-18 | End: 2020-12-01

## 2020-11-19 DIAGNOSIS — I95.1 ORTHOSTATIC HYPOTENSION: ICD-10-CM

## 2020-11-19 RX ORDER — FLUDROCORTISONE ACETATE 0.1 MG/1
0.4 TABLET ORAL DAILY
Qty: 120 TABLET | Refills: 10 | Status: ON HOLD | OUTPATIENT
Start: 2020-11-19 | End: 2021-03-15

## 2020-12-01 ENCOUNTER — TELEMEDICINE (OUTPATIENT)
Dept: PSYCHIATRY | Facility: CLINIC | Age: 55
End: 2020-12-01
Payer: COMMERCIAL

## 2020-12-01 DIAGNOSIS — I25.118 CORONARY ARTERY DISEASE OF NATIVE ARTERY OF NATIVE HEART WITH STABLE ANGINA PECTORIS (HCC): Primary | ICD-10-CM

## 2020-12-01 DIAGNOSIS — G43.109 MIGRAINE WITH AURA AND WITHOUT STATUS MIGRAINOSUS, NOT INTRACTABLE: ICD-10-CM

## 2020-12-01 DIAGNOSIS — F31.81 BIPOLAR II DISORDER (HCC): Primary | ICD-10-CM

## 2020-12-01 PROCEDURE — 99214 OFFICE O/P EST MOD 30 MIN: CPT | Performed by: NURSE PRACTITIONER

## 2020-12-01 RX ORDER — RANOLAZINE 500 MG/1
1000 TABLET, EXTENDED RELEASE ORAL 2 TIMES DAILY
Qty: 180 TABLET | Refills: 3 | Status: SHIPPED | OUTPATIENT
Start: 2020-12-01 | End: 2021-03-03 | Stop reason: SDUPTHER

## 2020-12-01 RX ORDER — OXCARBAZEPINE 150 MG/1
150 TABLET, FILM COATED ORAL 3 TIMES DAILY
COMMUNITY
End: 2020-12-16

## 2020-12-02 DIAGNOSIS — F31.62 BIPOLAR DISORDER, CURRENT EPISODE MIXED, MODERATE (HCC): Primary | ICD-10-CM

## 2020-12-07 ENCOUNTER — TELEPHONE (OUTPATIENT)
Dept: BEHAVIORAL/MENTAL HEALTH CLINIC | Facility: CLINIC | Age: 55
End: 2020-12-07

## 2020-12-07 DIAGNOSIS — F31.11 BIPOLAR AFFECTIVE DISORDER, CURRENTLY MANIC, MILD (HCC): Primary | ICD-10-CM

## 2020-12-07 RX ORDER — PALIPERIDONE 3 MG/1
3 TABLET, EXTENDED RELEASE ORAL EVERY MORNING
Qty: 30 TABLET | Refills: 5 | Status: SHIPPED | OUTPATIENT
Start: 2020-12-07 | End: 2021-01-13

## 2020-12-07 NOTE — TELEPHONE ENCOUNTER
Fely Ku is having more manic episodes since the medication adjustment  They are happening almost everyday  They last any where from 1 hour to 5 hours

## 2020-12-09 ENCOUNTER — LAB (OUTPATIENT)
Dept: LAB | Age: 55
End: 2020-12-09
Payer: COMMERCIAL

## 2020-12-09 DIAGNOSIS — F31.62 BIPOLAR DISORDER, CURRENT EPISODE MIXED, MODERATE (HCC): ICD-10-CM

## 2020-12-09 DIAGNOSIS — I95.1 ORTHOSTATIC HYPOTENSION: ICD-10-CM

## 2020-12-09 DIAGNOSIS — F31.0 BIPOLAR AFFECTIVE DISORDER, CURRENT EPISODE HYPOMANIC (HCC): Primary | ICD-10-CM

## 2020-12-09 DIAGNOSIS — F51.01 PRIMARY INSOMNIA: ICD-10-CM

## 2020-12-09 LAB
ALBUMIN SERPL BCP-MCNC: 3.5 G/DL (ref 3.5–5)
ALP SERPL-CCNC: 80 U/L (ref 46–116)
ALT SERPL W P-5'-P-CCNC: 30 U/L (ref 12–78)
ANION GAP SERPL CALCULATED.3IONS-SCNC: 3 MMOL/L (ref 4–13)
AST SERPL W P-5'-P-CCNC: 15 U/L (ref 5–45)
BASOPHILS # BLD AUTO: 0.06 THOUSANDS/ΜL (ref 0–0.1)
BASOPHILS NFR BLD AUTO: 1 % (ref 0–1)
BILIRUB DIRECT SERPL-MCNC: 0.14 MG/DL (ref 0–0.2)
BILIRUB SERPL-MCNC: 0.42 MG/DL (ref 0.2–1)
BUN SERPL-MCNC: 6 MG/DL (ref 5–25)
CALCIUM SERPL-MCNC: 9.3 MG/DL (ref 8.3–10.1)
CHLORIDE SERPL-SCNC: 106 MMOL/L (ref 100–108)
CO2 SERPL-SCNC: 32 MMOL/L (ref 21–32)
CREAT SERPL-MCNC: 0.86 MG/DL (ref 0.6–1.3)
EOSINOPHIL # BLD AUTO: 0.24 THOUSAND/ΜL (ref 0–0.61)
EOSINOPHIL NFR BLD AUTO: 3 % (ref 0–6)
ERYTHROCYTE [DISTWIDTH] IN BLOOD BY AUTOMATED COUNT: 12.7 % (ref 11.6–15.1)
EST. AVERAGE GLUCOSE BLD GHB EST-MCNC: 94 MG/DL
GFR SERPL CREATININE-BSD FRML MDRD: 98 ML/MIN/1.73SQ M
GLUCOSE P FAST SERPL-MCNC: 82 MG/DL (ref 65–99)
HBA1C MFR BLD: 4.9 %
HCT VFR BLD AUTO: 43 % (ref 36.5–49.3)
HGB BLD-MCNC: 13.7 G/DL (ref 12–17)
IMM GRANULOCYTES # BLD AUTO: 0.03 THOUSAND/UL (ref 0–0.2)
IMM GRANULOCYTES NFR BLD AUTO: 0 % (ref 0–2)
LYMPHOCYTES # BLD AUTO: 2.03 THOUSANDS/ΜL (ref 0.6–4.47)
LYMPHOCYTES NFR BLD AUTO: 24 % (ref 14–44)
MCH RBC QN AUTO: 30.1 PG (ref 26.8–34.3)
MCHC RBC AUTO-ENTMCNC: 31.9 G/DL (ref 31.4–37.4)
MCV RBC AUTO: 95 FL (ref 82–98)
MONOCYTES # BLD AUTO: 0.6 THOUSAND/ΜL (ref 0.17–1.22)
MONOCYTES NFR BLD AUTO: 7 % (ref 4–12)
NEUTROPHILS # BLD AUTO: 5.41 THOUSANDS/ΜL (ref 1.85–7.62)
NEUTS SEG NFR BLD AUTO: 65 % (ref 43–75)
NRBC BLD AUTO-RTO: 0 /100 WBCS
PLATELET # BLD AUTO: 271 THOUSANDS/UL (ref 149–390)
PMV BLD AUTO: 10 FL (ref 8.9–12.7)
POTASSIUM SERPL-SCNC: 4 MMOL/L (ref 3.5–5.3)
PROT SERPL-MCNC: 6.3 G/DL (ref 6.4–8.2)
RBC # BLD AUTO: 4.55 MILLION/UL (ref 3.88–5.62)
SODIUM SERPL-SCNC: 141 MMOL/L (ref 136–145)
WBC # BLD AUTO: 8.37 THOUSAND/UL (ref 4.31–10.16)

## 2020-12-09 PROCEDURE — 85025 COMPLETE CBC W/AUTO DIFF WBC: CPT

## 2020-12-09 PROCEDURE — 83036 HEMOGLOBIN GLYCOSYLATED A1C: CPT

## 2020-12-09 PROCEDURE — 82248 BILIRUBIN DIRECT: CPT

## 2020-12-09 PROCEDURE — 80053 COMPREHEN METABOLIC PANEL: CPT

## 2020-12-09 PROCEDURE — 80183 DRUG SCRN QUANT OXCARBAZEPIN: CPT

## 2020-12-09 PROCEDURE — 36415 COLL VENOUS BLD VENIPUNCTURE: CPT

## 2020-12-09 RX ORDER — TRAZODONE HYDROCHLORIDE 300 MG/1
300 TABLET ORAL
Qty: 90 TABLET | Refills: 1 | Status: SHIPPED | OUTPATIENT
Start: 2020-12-09 | End: 2021-04-26 | Stop reason: SDUPTHER

## 2020-12-09 RX ORDER — OXCARBAZEPINE 600 MG/1
600 TABLET, FILM COATED ORAL EVERY 12 HOURS SCHEDULED
Qty: 180 TABLET | Refills: 1 | Status: SHIPPED | OUTPATIENT
Start: 2020-12-09 | End: 2020-12-16

## 2020-12-09 RX ORDER — OXCARBAZEPINE 600 MG/1
600 TABLET, FILM COATED ORAL EVERY 12 HOURS SCHEDULED
COMMUNITY
End: 2020-12-09 | Stop reason: SDUPTHER

## 2020-12-11 LAB — OXCARBAZEPINE SERPL-MCNC: 13 UG/ML (ref 10–35)

## 2020-12-15 ENCOUNTER — TELEPHONE (OUTPATIENT)
Dept: CARDIOLOGY CLINIC | Facility: CLINIC | Age: 55
End: 2020-12-15

## 2020-12-15 DIAGNOSIS — F41.9 ANXIETY AND DEPRESSION: ICD-10-CM

## 2020-12-15 DIAGNOSIS — F32.A ANXIETY AND DEPRESSION: ICD-10-CM

## 2020-12-15 DIAGNOSIS — I95.1 ORTHOSTATIC HYPOTENSION: Primary | ICD-10-CM

## 2020-12-15 RX ORDER — SODIUM CHLORIDE 1000 MG
1 TABLET, SOLUBLE MISCELLANEOUS 3 TIMES DAILY
Qty: 90 TABLET | Refills: 3 | Status: SHIPPED | OUTPATIENT
Start: 2020-12-15 | End: 2021-03-30

## 2020-12-15 RX ORDER — SODIUM CHLORIDE 1000 MG
1 TABLET, SOLUBLE MISCELLANEOUS 3 TIMES DAILY
COMMUNITY
End: 2020-12-15 | Stop reason: SDUPTHER

## 2020-12-15 RX ORDER — VENLAFAXINE 75 MG/1
75 TABLET ORAL 3 TIMES DAILY
Qty: 270 TABLET | Refills: 3 | Status: SHIPPED | OUTPATIENT
Start: 2020-12-15 | End: 2021-03-22

## 2020-12-16 ENCOUNTER — TELEMEDICINE (OUTPATIENT)
Dept: PSYCHIATRY | Facility: CLINIC | Age: 55
End: 2020-12-16
Payer: COMMERCIAL

## 2020-12-16 DIAGNOSIS — F31.62 BIPOLAR DISORDER, CURRENT EPISODE MIXED, MODERATE (HCC): Primary | ICD-10-CM

## 2020-12-16 DIAGNOSIS — F31.0 BIPOLAR AFFECTIVE DISORDER, CURRENT EPISODE HYPOMANIC (HCC): ICD-10-CM

## 2020-12-16 PROCEDURE — 99214 OFFICE O/P EST MOD 30 MIN: CPT | Performed by: NURSE PRACTITIONER

## 2020-12-16 RX ORDER — OXCARBAZEPINE 600 MG/1
600 TABLET, FILM COATED ORAL 4 TIMES DAILY
Qty: 180 TABLET | Refills: 5 | Status: SHIPPED | OUTPATIENT
Start: 2020-12-16 | End: 2021-01-26 | Stop reason: SDUPTHER

## 2020-12-16 RX ORDER — OXCARBAZEPINE 600 MG/1
600 TABLET, FILM COATED ORAL EVERY 12 HOURS SCHEDULED
Qty: 180 TABLET | Refills: 1 | Status: CANCELLED | OUTPATIENT
Start: 2020-12-16

## 2020-12-17 DIAGNOSIS — F31.11 BIPOLAR 1 DISORDER, MANIC, MILD (HCC): Primary | ICD-10-CM

## 2020-12-17 RX ORDER — DIVALPROEX SODIUM 250 MG/1
250 TABLET, DELAYED RELEASE ORAL EVERY 12 HOURS SCHEDULED
Qty: 60 TABLET | Refills: 3 | Status: SHIPPED | OUTPATIENT
Start: 2020-12-17 | End: 2021-02-22

## 2020-12-17 NOTE — PROGRESS NOTES
Started dose of Depakote (250 mg bid) to be titrated as necessary and tolerated to control his manic episodes

## 2020-12-21 ENCOUNTER — TELEPHONE (OUTPATIENT)
Dept: NEUROLOGY | Facility: CLINIC | Age: 55
End: 2020-12-21

## 2020-12-22 ENCOUNTER — OFFICE VISIT (OUTPATIENT)
Dept: CARDIOLOGY CLINIC | Facility: CLINIC | Age: 55
End: 2020-12-22
Payer: COMMERCIAL

## 2020-12-22 VITALS
SYSTOLIC BLOOD PRESSURE: 88 MMHG | WEIGHT: 194 LBS | DIASTOLIC BLOOD PRESSURE: 58 MMHG | HEIGHT: 72 IN | HEART RATE: 107 BPM | OXYGEN SATURATION: 97 % | BODY MASS INDEX: 26.28 KG/M2

## 2020-12-22 DIAGNOSIS — I95.1 ORTHOSTATIC HYPOTENSION: Primary | ICD-10-CM

## 2020-12-22 DIAGNOSIS — R55 SYNCOPE AND COLLAPSE: ICD-10-CM

## 2020-12-22 DIAGNOSIS — G47.33 OSA (OBSTRUCTIVE SLEEP APNEA): ICD-10-CM

## 2020-12-22 DIAGNOSIS — I25.118 CORONARY ARTERY DISEASE OF NATIVE ARTERY OF NATIVE HEART WITH STABLE ANGINA PECTORIS (HCC): ICD-10-CM

## 2020-12-22 DIAGNOSIS — K91.2 POSTSURGICAL MALABSORPTION: ICD-10-CM

## 2020-12-22 PROCEDURE — 93000 ELECTROCARDIOGRAM COMPLETE: CPT | Performed by: INTERNAL MEDICINE

## 2020-12-22 PROCEDURE — 99214 OFFICE O/P EST MOD 30 MIN: CPT | Performed by: INTERNAL MEDICINE

## 2020-12-23 ENCOUNTER — TELEPHONE (OUTPATIENT)
Dept: BARIATRICS | Facility: CLINIC | Age: 55
End: 2020-12-23

## 2020-12-30 DIAGNOSIS — E78.2 MIXED HYPERLIPIDEMIA: ICD-10-CM

## 2020-12-30 DIAGNOSIS — I25.110 CORONARY ARTERY DISEASE INVOLVING NATIVE CORONARY ARTERY OF NATIVE HEART WITH UNSTABLE ANGINA PECTORIS (HCC): ICD-10-CM

## 2020-12-30 RX ORDER — ATORVASTATIN CALCIUM 40 MG/1
40 TABLET, FILM COATED ORAL DAILY
Qty: 90 TABLET | Refills: 1 | Status: SHIPPED | OUTPATIENT
Start: 2020-12-30 | End: 2021-07-06 | Stop reason: SDUPTHER

## 2021-01-06 DIAGNOSIS — F31.81 BIPOLAR 2 DISORDER (HCC): Primary | ICD-10-CM

## 2021-01-08 ENCOUNTER — OFFICE VISIT (OUTPATIENT)
Dept: BARIATRICS | Facility: CLINIC | Age: 56
End: 2021-01-08

## 2021-01-08 ENCOUNTER — OFFICE VISIT (OUTPATIENT)
Dept: BARIATRICS | Facility: CLINIC | Age: 56
End: 2021-01-08
Payer: COMMERCIAL

## 2021-01-08 VITALS
HEART RATE: 85 BPM | HEIGHT: 71 IN | WEIGHT: 186 LBS | SYSTOLIC BLOOD PRESSURE: 110 MMHG | BODY MASS INDEX: 26.04 KG/M2 | TEMPERATURE: 98.2 F | DIASTOLIC BLOOD PRESSURE: 62 MMHG | RESPIRATION RATE: 20 BRPM

## 2021-01-08 VITALS — BODY MASS INDEX: 25.94 KG/M2 | WEIGHT: 186 LBS

## 2021-01-08 DIAGNOSIS — K91.2 POSTSURGICAL MALABSORPTION: Primary | ICD-10-CM

## 2021-01-08 DIAGNOSIS — Z98.84 BARIATRIC SURGERY STATUS: ICD-10-CM

## 2021-01-08 DIAGNOSIS — Z98.84 BARIATRIC SURGERY STATUS: Primary | ICD-10-CM

## 2021-01-08 PROCEDURE — RECHECK: Performed by: DIETITIAN, REGISTERED

## 2021-01-08 PROCEDURE — 99213 OFFICE O/P EST LOW 20 MIN: CPT | Performed by: SURGERY

## 2021-01-08 NOTE — PROGRESS NOTES
Bariatric Follow Up Nutrition Note    Type of surgery  Gastric bypass: laparoscopic  Surgery Date: 3/2/2020  10 months  post-op  Surgeon: Dr Evelyn Burch  48 y o   male  Wt 84 4 kg (186 lb)   BMI 25 94 kg/m²     [address] [facility] Equation:     Weight maintenance= 2047 kcal/day  Estimated calories for weight loss 1547 kcal/day (1# per wk wt loss - sedentary )  Estimated protein needs 81 2-97 g/day (1 0-1 2 gms/kg IBW )   Estimated fluid needs 3594-6523 mL/day (81-95 oz/day)(30-35 ml/kg IBW )      Initial office eflzta=834 1lbs July 2019  Weight on Day of Weight Loss Surgery: 283 4lbs  Weight in (lb) to have BMI = 25: 178 6lbs  Pre-Op Excess Wt: 117 5lbs  Post-Op Wt Loss: 110#/ 94% EBWL in 1 5 year(s) (since start of program)  97 4# wt loss since surgery x 10 months  Pt reports his current weight is stable and he is happy with his current weight      Review of History and Medications   Past Medical History:   Diagnosis Date    Bariatric surgery status     CPAP (continuous positive airway pressure) dependence     Hearing loss of aging     Hypercholesterolemia     Morbid obesity ([address] )     NSTEMI (non-ST elevation myocardial infarction) [facility])     april 2019    Postsurgical malabsorption      Past Surgical History:   Procedure Laterality Date    CARDIAC CATHETERIZATION      no stents     COLONOSCOPY      EGD      HERNIA REPAIR      umbilical hernia    [place] LAP GASTRIC BYPASS/FRITZ-EN-Y N/A 3/2/2020    Procedure: LAPAROSCOPIC FRITZ-EN-Y GASTRIC BYPASS AND INTRAOPERATIVE EGD;  Surgeon: Raulito Xiong MD;  [facility];  Service: Bariatrics    SKIN SURGERY      cyst removed from back and thumb    TONSILLECTOMY      TOOTH EXTRACTION       Social History     Socioeconomic History    Marital status: /Civil Union     Spouse name: Not on file    Number of children: Not on file    Years of education: Not on file    Highest education level: Not on file Occupational History    Not on file   Social Needs    Financial resource strain: Not on file    Food insecurity     Worry: Not on file     Inability: Not on file    Transportation needs     Medical: Not on file     Non-medical: Not on file   Tobacco Use    Smoking status: Former Smoker     Types: Cigarettes     Quit date: 2011     Years since quitting: 10 0    Smokeless tobacco: Current User     Types: Chew     Last attempt to quit: 6/17/2014    Tobacco comment: quit cigarettes 2011   Substance and Sexual Activity    Alcohol use: Not Currently     Frequency: Never    Drug use: Never    Sexual activity: Not on file   Lifestyle    Physical activity     Days per week: Not on file     Minutes per session: Not on file    Stress: Not on file   Relationships    Social connections     Talks on phone: Not on file     Gets together: Not on file     Attends Congregation service: Not on file     Active member of club or organization: Not on file     Attends meetings of clubs or organizations: Not on file     Relationship status: Not on file    Intimate partner violence     Fear of current or ex partner: Not on file     Emotionally abused: Not on file     Physically abused: Not on file     Forced sexual activity: Not on file   Other Topics Concern    Not on file   Social History Narrative    Former smoker - As per [facility]    Full-time employment        · Do you currently or have you served in [employer]:   No    As per [place]        Current Outpatient Medications:     acetaminophen (TYLENOL) 325 mg tablet, Take 3 tablets (975 mg total) by mouth every 8 (eight) hours, Disp: 30 tablet, Rfl: 0    aspirin 81 mg chewable tablet, Chew 1 tablet (81 mg total) daily, Disp: 30 tablet, Rfl: 0    atorvastatin (LIPITOR) 40 mg tablet, Take 1 tablet (40 mg total) by mouth daily, Disp: 90 tablet, Rfl: 1    clonazePAM (KlonoPIN) 1 mg tablet, Take 1 tablet (1 mg total) by mouth 3 (three) times a day, Disp: 270 tablet, Rfl: 0    divalproex sodium (DEPAKOTE) 250 mg EC tablet, Take 1 tablet (250 mg total) by mouth every 12 (twelve) hours, Disp: 60 tablet, Rfl: 3    fludrocortisone (FLORINEF) 0 1 mg tablet, Take 4 tablets (0 4 mg total) by mouth daily, Disp: 120 tablet, Rfl: 10    midodrine (PROAMATINE) 10 MG tablet, Take 1 tablet (10 mg total) by mouth 3 (three) times a day before meals, Disp: 90 tablet, Rfl: 0    OXcarbazepine (TRILEPTAL) 600 mg tablet, Take 1 tablet (600 mg total) by mouth 4 (four) times a day (Patient taking differently: Take 600 mg by mouth every 12 (twelve) hours ), Disp: 180 tablet, Rfl: 5    paliperidone (INVEGA) 3 mg 24 hr tablet, Take 1 tablet (3 mg total) by mouth every morning, Disp: 30 tablet, Rfl: 5    paliperidone (INVEGA) 9 MG 24 hr tablet, Take 1 tablet (9 mg total) by mouth 2 (two) times a day, Disp: 180 tablet, Rfl: 0    paliperidone palmitate ER (INVEGA) 234 mg/1 5 mL IM injection, Inject 1 5 mL (234 mg total) into a muscle every 28 days, Disp: 1 Syringe, Rfl: 3    pantoprazole (PROTONIX) 20 mg tablet, Take 1 tablet (20 mg total) by mouth daily, Disp: 90 tablet, Rfl: 3    potassium chloride (K-DUR,KLOR-CON) 20 mEq tablet, Take 1 tablet (20 mEq total) by mouth daily, Disp: 30 tablet, Rfl: 5    ranolazine (RANEXA) 500 mg 12 hr tablet, Take 2 tablets (1,000 mg total) by mouth 2 (two) times a day, Disp: 180 tablet, Rfl: 3    sodium chloride 1 g tablet, Take 1 tablet (1 g total) by mouth 3 (three) times a day, Disp: 90 tablet, Rfl: 3    traZODone (DESYREL) 300 MG tablet, Take 1 tablet (300 mg total) by mouth daily at bedtime, Disp: 90 tablet, Rfl: 1    venlafaxine (EFFEXOR) 75 mg tablet, Take 1 tablet (75 mg total) by mouth 3 (three) times a day, Disp: 270 tablet, Rfl: 3    Food Intake and Lifestyle Assessment   Food Intake Assessment completed via 24 hour recall  Wakes 8-8:30  Water  Roasted almonds occasionally (about 2 handfuls)  Breakfast: 1 cup beef stew 10-11 am (approx 29 g pro)  Snack: one orange, some crackers   Lunch: large serving of steamed asparagus  Snack: none  Dinner: 1 5 cups seafood dennis: crab, shrimp, pasta, cream sauce (approx 30 g pro)  Snack: none  Beverage intake: water: four to five large (approx 48 oz) cups  Diet texture/stage: regular  Protein supplement: none for quite a while  Estimated protein intake per day: 60-70g  Estimated fluid intake per day: 190-240 oz/day  Meals eaten away from home: rare  Typical meal pattern: 2 meals per day and 0-3 snacks per day  Eating Behaviors: Appropriate portion sizes and Frequent snacking/ grazing  Pt does not like to cook  Pt's wife works 10-hr days and does not like to [employer] when she comes home  Pt instead picks grab-n-go things when he gets hungry  Pt reports he will eat almost anything, except dislikes sweet potatoes  Pt's son who lives with them is very health conscious, goes to the gym frequently, prepares pre-made lean protein meals for himself  Food allergies or intolerances: nkfa  Cultural or Orthodox considerations: none    Physical Assessment  Nutrition Related Findings  Dizziness and lightheaded, lower blood pressures, reports he has fainted/passed out several times  Pt reports he passed out at work and got laid off because of this  Physical Activity  Types of exercise: None  Current physical limitations: lightheadedness/dizziness    Psychosocial Assessment   Support systems: spouse and children  Socioeconomic factors: currently laid off/disability    Nutrition Diagnosis  Diagnosis: Altered GI function (NC-1 4) and inadequate calorie, carbohydrate, and protein intake  Related to: Food and nutrition-related knowledge deficit, Limited adherence to nutrition-related recommendations, Altered GI function and Inability or lack of desire to manage self-care  As Evidenced by: pt's self-reported food intake, 94lb wt loss x 10 months, pt reports frequent lightheadedness       Interventions and Teaching   Patient educated on post-op nutrition guidelines  Patient educated and handouts provided  Provided pt with sample 400-500 kcal lunch and dinner meal suggestions  Capacity of post-surgery stomach  Adequate hydration  Nutrition considerations after surgery  Protein supplements  Meal planning and preparation  Appropriate carbohydrate, protein, and fat intake, and food/fluid choices to maximize safe weight loss, nutrient intake, and tolerance   Dietary and lifestyle changes  Possible problems with poor eating habits    Education provided to: patient and spouse    Barriers to learning: No barriers identified    Readiness to change: action    Comprehension: needs reinforcement and verbalizes understanding     Expected Compliance: good    Evaluation/Monitoring   Eating pattern as discussed Tolerance of nutrition prescription Body weight Lab values Physical activity    Goals  Eat four meals per day  Three to four ounces lean protein at each meal   1/4 cup carbohydrate at each meal   Add some sugar-free sports drinks       Time Spent:   30 Minutes

## 2021-01-08 NOTE — PROGRESS NOTES
OFFICE VISIT - BARIATRIC SURGERY  Young Cowart 48 y o  male MRN: [id number]  Unit/Bed#:  Encounter: [id number]      HPI:  Young Cowart is a 48 y o  male status post laparoscopic Marii-en-Y gastric bypass by Dr Lorena Ramos on 03/02/2020  Comes to the office today to discuss reversal of the Marii-en-Y gastric bypass  Subjective     The patient has been complaining of dizziness and orthostatic hypotension  He was seen by Neurology and Cardiology who optimized his medication  He is currently on high dose of midodrine and Florinef  However he is still having orthostatic hypotension with systolics in the 42L and 39W  His cardiologist and PCP recommended discussing reversal of the Marii-en-Y gastric bypass  On further questioning, the patient has not been following with our dietitian  He reports eating 2-3 meals per day  At the time of her surgery the patient was 283, and was able to drop down as low as 186  Today, the weight is 186 with a BMI of 25 9  The EWL is 94%  Review of Systems   Constitutional: Negative for chills and fever  HENT: Negative for ear pain and sore throat  Eyes: Negative for pain and visual disturbance  Respiratory: Negative for cough and shortness of breath  Cardiovascular: Negative for chest pain and palpitations  Gastrointestinal: Negative for abdominal pain and vomiting  Genitourinary: Negative for dysuria and hematuria  Musculoskeletal: Negative for arthralgias and back pain  Skin: Negative for color change and rash  Neurological: Positive for dizziness and light-headedness  Negative for seizures and syncope  All other systems reviewed and are negative        Historical Information   Past Medical History:   Diagnosis Date    Bariatric surgery status     CPAP (continuous positive airway pressure) dependence     Hearing loss of aging     Hypercholesterolemia     Morbid obesity ([facility] )     NSTEMI (non-ST elevation myocardial infarction) ([facility] ) april 2019    Postsurgical malabsorption      Past Surgical History:   Procedure Laterality Date    CARDIAC CATHETERIZATION      no stents     COLONOSCOPY      EGD      HERNIA REPAIR      umbilical hernia    ID LAP GASTRIC BYPASS/FRITZ-EN-Y N/A 3/2/2020    Procedure: LAPAROSCOPIC FRITZ-EN-Y GASTRIC BYPASS AND INTRAOPERATIVE EGD;  Surgeon: Kandice Penn MD;  Location: [facility];  Service: Bariatrics    SKIN SURGERY      cyst removed from back and thumb    TONSILLECTOMY      TOOTH EXTRACTION       Social History   Social History     Substance and Sexual Activity   Alcohol Use Not Currently    Frequency: Never     Social History     Substance and Sexual Activity   Drug Use Never     Social History     Tobacco Use   Smoking Status Former Smoker    Types: Cigarettes    Quit date: 2011    Years since quitting: 10 0   Smokeless Tobacco Current User    Types: Sheila Layc Last attempt to quit: 6/17/2014   Tobacco Comment    quit cigarettes 2011       Objective       Current Vitals:   Blood Pressure: 110/62 (01/08/21 0814)  Pulse: 85 (01/08/21 0814)  Temperature: 98 2 °F (36 8 °C) (01/08/21 0814)  Temp Source: Temporal (01/08/21 0814)  Respirations: 20 (01/08/21 0814)  Height: 5' 11" (180 3 cm) (01/08/21 0814)  Weight - Scale: 84 4 kg (186 lb) (01/08/21 0814)    Invasive Devices     None                 Physical Exam  Constitutional:       Appearance: Normal appearance  HENT:      Head: Normocephalic and atraumatic  Eyes:      Pupils: Pupils are equal, round, and reactive to light  Neck:      Musculoskeletal: Normal range of motion  Cardiovascular:      Rate and Rhythm: Normal rate and regular rhythm  Pulmonary:      Effort: Pulmonary effort is normal       Breath sounds: Normal breath sounds  Abdominal:      General: Abdomen is flat  There is no distension  Palpations: Abdomen is soft  Tenderness: There is no abdominal tenderness  There is no guarding or rebound     Musculoskeletal: Normal range of motion  Neurological:      General: No focal deficit present  Mental Status: He is alert and oriented to person, place, and time  Psychiatric:         Mood and Affect: Mood normal            Pathology, and Other Studies: I have personally reviewed pertinent reports  Assessment/PLAN:    Fawn Husain is a 48 y o  male status post  laparoscopic Marii-en-Y gastric bypass by Dr Lee Angry on 03/02/2020  Patient is having orthostatic hypotension resistant to medication (he is on Midodrin and Fludrocortisone)  His cardiology and PCP recommended discussion of reversal of the RYGB  We discussed with the patient the reasons of his hypotension  We think it is related to the rapid weight loss and insufficient food intake  We told him he should be eating more frequently, 4-6 small meals a day instead of just 2 or 3  We will refer him to our dietician to adjust his diet and we will get a nutrion panel  We will also schedule him for an EGD  We told him reversal of the RYGB will be our last resort if all the conservative measures fail  We will see him again in the office after the EGD               Loyce Crigler MD  Bariatric Surgery Fellow  1/8/2021  8:50 AM

## 2021-01-08 NOTE — H&P (VIEW-ONLY)
OFFICE VISIT - BARIATRIC SURGERY  Jadiel Campos 48 y o  male MRN: [id number]  Unit/Bed#:  Encounter: [id number]      HPI:  Jadiel Campos is a 48 y o  male status post laparoscopic Marii-en-Y gastric bypass by Dr Amy Madden on 03/02/2020  Comes to the office today to discuss reversal of the Marii-en-Y gastric bypass  Subjective     The patient has been complaining of dizziness and orthostatic hypotension  He was seen by Neurology and Cardiology who optimized his medication  He is currently on high dose of midodrine and Florinef  However he is still having orthostatic hypotension with systolics in the 71R and 30J  His cardiologist and PCP recommended discussing reversal of the Marii-en-Y gastric bypass  On further questioning, the patient has not been following with our dietitian  He reports eating 2-3 meals per day  At the time of her surgery the patient was 283, and was able to drop down as low as 186  Today, the weight is 186 with a BMI of 25 9  The EWL is 94%  Review of Systems   Constitutional: Negative for chills and fever  HENT: Negative for ear pain and sore throat  Eyes: Negative for pain and visual disturbance  Respiratory: Negative for cough and shortness of breath  Cardiovascular: Negative for chest pain and palpitations  Gastrointestinal: Negative for abdominal pain and vomiting  Genitourinary: Negative for dysuria and hematuria  Musculoskeletal: Negative for arthralgias and back pain  Skin: Negative for color change and rash  Neurological: Positive for dizziness and light-headedness  Negative for seizures and syncope  All other systems reviewed and are negative        Historical Information   Past Medical History:   Diagnosis Date    Bariatric surgery status     CPAP (continuous positive airway pressure) dependence     Hearing loss of aging     Hypercholesterolemia     Morbid obesity ([facility] )     NSTEMI (non-ST elevation myocardial infarction) ([facility] ) april 2019    Postsurgical malabsorption      Past Surgical History:   Procedure Laterality Date    CARDIAC CATHETERIZATION      no stents     COLONOSCOPY      EGD      HERNIA REPAIR      umbilical hernia    MD LAP GASTRIC BYPASS/FRITZ-EN-Y N/A 3/2/2020    Procedure: LAPAROSCOPIC FRITZ-EN-Y GASTRIC BYPASS AND INTRAOPERATIVE EGD;  Surgeon: Heladio Reich MD;  Location: [facility];  Service: Bariatrics    SKIN SURGERY      cyst removed from back and thumb    TONSILLECTOMY      TOOTH EXTRACTION       Social History   Social History     Substance and Sexual Activity   Alcohol Use Not Currently    Frequency: Never     Social History     Substance and Sexual Activity   Drug Use Never     Social History     Tobacco Use   Smoking Status Former Smoker    Types: Cigarettes    Quit date: 2011    Years since quitting: 10 0   Smokeless Tobacco Current User    Types: Max Freed Last attempt to quit: 6/17/2014   Tobacco Comment    quit cigarettes 2011       Objective       Current Vitals:   Blood Pressure: 110/62 (01/08/21 0814)  Pulse: 85 (01/08/21 0814)  Temperature: 98 2 °F (36 8 °C) (01/08/21 0814)  Temp Source: Temporal (01/08/21 0814)  Respirations: 20 (01/08/21 0814)  Height: 5' 11" (180 3 cm) (01/08/21 0814)  Weight - Scale: 84 4 kg (186 lb) (01/08/21 0814)    Invasive Devices     None                 Physical Exam  Constitutional:       Appearance: Normal appearance  HENT:      Head: Normocephalic and atraumatic  Eyes:      Pupils: Pupils are equal, round, and reactive to light  Neck:      Musculoskeletal: Normal range of motion  Cardiovascular:      Rate and Rhythm: Normal rate and regular rhythm  Pulmonary:      Effort: Pulmonary effort is normal       Breath sounds: Normal breath sounds  Abdominal:      General: Abdomen is flat  There is no distension  Palpations: Abdomen is soft  Tenderness: There is no abdominal tenderness  There is no guarding or rebound     Musculoskeletal: Normal range of motion  Neurological:      General: No focal deficit present  Mental Status: He is alert and oriented to person, place, and time  Psychiatric:         Mood and Affect: Mood normal            Pathology, and Other Studies: I have personally reviewed pertinent reports  Assessment/PLAN:    Blayne Iniguez is a 48 y o  male status post  laparoscopic Marii-en-Y gastric bypass by Dr Savana Red on 03/02/2020  Patient is having orthostatic hypotension resistant to medication (he is on Midodrin and Fludrocortisone)  His cardiology and PCP recommended discussion of reversal of the RYGB  We discussed with the patient the reasons of his hypotension  We think it is related to the rapid weight loss and insufficient food intake  We told him he should be eating more frequently, 4-6 small meals a day instead of just 2 or 3  We will refer him to our dietician to adjust his diet and we will get a nutrion panel  We will also schedule him for an EGD  We told him reversal of the RYGB will be our last resort if all the conservative measures fail  We will see him again in the office after the EGD               Rivear Love MD  Bariatric Surgery Fellow  1/8/2021  8:50 AM

## 2021-01-13 ENCOUNTER — TELEPHONE (OUTPATIENT)
Dept: NEUROLOGY | Facility: CLINIC | Age: 56
End: 2021-01-13

## 2021-01-13 ENCOUNTER — OFFICE VISIT (OUTPATIENT)
Dept: PSYCHIATRY | Facility: CLINIC | Age: 56
End: 2021-01-13
Payer: COMMERCIAL

## 2021-01-13 DIAGNOSIS — F31.62 BIPOLAR DISORDER, CURRENT EPISODE MIXED, MODERATE (HCC): Primary | ICD-10-CM

## 2021-01-13 PROCEDURE — 99212 OFFICE O/P EST SF 10 MIN: CPT | Performed by: NURSE PRACTITIONER

## 2021-01-13 NOTE — TELEPHONE ENCOUNTER
Called and spoke to patient - New patient confirmed apt with Dr Iraj Noonan  Labs done and no recent MRI completed

## 2021-01-13 NOTE — BH TREATMENT PLAN
TREATMENT PLAN (Medication Management Only)        [facility]    Name and Date of Birth:  Latrice Davis 48 y o  1965  Date of Treatment Plan: January 13, 2021  Diagnosis/Diagnoses:    1  Bipolar disorder, current episode mixed, moderate ([address] )      Strengths/Personal Resources for Self-Care: supportive family, supportive friends, taking medications as prescribed, motivation for treatment  Area/Areas of need (in own words): mood swings  1  Long Term Goal: continue improvement in mood stability  Target Date:3 months - 4/13/2021  Person/Persons responsible for completion of goal: Chandler  2  Short Term Objective (s) - How will we reach this goal?:   A  Provider new recommended medication/dosage changes and/or continue medication(s): continue current medications as prescribed  B  N/A   C  N/A  Target Date:3 months - 4/13/2021  Person/Persons Responsible for Completion of Goal: Chandler  Progress Towards Goals: continuing treatment  Treatment Modality: medication management every 3 months  Review due 180 days from date of this plan: 3 months - 4/13/2021  Expected length of service: ongoing treatment  My Physician/PA/NP and I have developed this plan together and I agree to work on the goals and objectives  I understand the treatment goals that were developed for my treatment

## 2021-01-16 ENCOUNTER — LAB (OUTPATIENT)
Dept: LAB | Age: 56
End: 2021-01-16
Payer: COMMERCIAL

## 2021-01-16 DIAGNOSIS — Z98.84 BARIATRIC SURGERY STATUS: ICD-10-CM

## 2021-01-16 PROCEDURE — 36415 COLL VENOUS BLD VENIPUNCTURE: CPT

## 2021-01-16 PROCEDURE — 82525 ASSAY OF COPPER: CPT

## 2021-01-19 LAB — COPPER SERPL-MCNC: 86 UG/DL (ref 69–132)

## 2021-01-20 ENCOUNTER — ANESTHESIA EVENT (OUTPATIENT)
Dept: GASTROENTEROLOGY | Facility: HOSPITAL | Age: 56
End: 2021-01-20

## 2021-01-20 ENCOUNTER — HOSPITAL ENCOUNTER (OUTPATIENT)
Dept: GASTROENTEROLOGY | Facility: HOSPITAL | Age: 56
Setting detail: OUTPATIENT SURGERY
Discharge: HOME | End: 2021-01-20
Attending: SURGERY | Admitting: SURGERY
Payer: COMMERCIAL

## 2021-01-20 ENCOUNTER — ANESTHESIA (OUTPATIENT)
Dept: GASTROENTEROLOGY | Facility: HOSPITAL | Age: 56
End: 2021-01-20

## 2021-01-20 VITALS
WEIGHT: 180 LBS | TEMPERATURE: 98.1 F | HEIGHT: 71 IN | SYSTOLIC BLOOD PRESSURE: 111 MMHG | DIASTOLIC BLOOD PRESSURE: 67 MMHG | RESPIRATION RATE: 17 BRPM | HEART RATE: 59 BPM | OXYGEN SATURATION: 95 % | BODY MASS INDEX: 25.2 KG/M2

## 2021-01-20 VITALS — HEART RATE: 74 BPM

## 2021-01-20 DIAGNOSIS — Z98.84 BARIATRIC SURGERY STATUS: ICD-10-CM

## 2021-01-20 PROCEDURE — 43235 EGD DIAGNOSTIC BRUSH WASH: CPT | Performed by: SURGERY

## 2021-01-20 RX ORDER — PROPOFOL 10 MG/ML
INJECTION, EMULSION INTRAVENOUS AS NEEDED
Status: DISCONTINUED | OUTPATIENT
Start: 2021-01-20 | End: 2021-01-20

## 2021-01-20 RX ORDER — SODIUM CHLORIDE 9 MG/ML
125 INJECTION, SOLUTION INTRAVENOUS CONTINUOUS
Status: DISCONTINUED | OUTPATIENT
Start: 2021-01-20 | End: 2021-01-24 | Stop reason: HOSPADM

## 2021-01-20 RX ADMIN — PROPOFOL 150 MG: 10 INJECTION, EMULSION INTRAVENOUS at 10:05

## 2021-01-20 RX ADMIN — SODIUM CHLORIDE 125 ML/HR: 0.9 INJECTION, SOLUTION INTRAVENOUS at 09:54

## 2021-01-20 NOTE — DISCHARGE INSTRUCTIONS
Upper Endoscopy   WHAT YOU NEED TO KNOW:   An upper endoscopy is also called an upper gastrointestinal (GI) endoscopy, or an esophagogastroduodenoscopy (EGD)  You may feel bloated, gassy, or have some abdominal discomfort after your procedure  Your throat may be sore for 24 to 36 hours  You may burp or pass gas from air that is still inside your body  DISCHARGE INSTRUCTIONS:   Call 911 if:   · You have sudden chest pain or trouble breathing  Seek care immediately if:   · You feel dizzy or faint  · You have trouble swallowing  · You have severe throat pain  · Your bowel movements are very dark or black  · Your abdomen is hard and firm and you have severe pain  · You vomit blood  Contact your healthcare provider if:   · You feel full or bloated and cannot burp or pass gas  · You have not had a bowel movement for 3 days after your procedure  · You have neck pain  · You have a fever or chills  · You have nausea or are vomiting  · You have a rash or hives  · You have questions or concerns about your endoscopy  Relieve a sore throat:  Suck on throat lozenges or crushed ice  Gargle with a small amount of warm salt water  Mix 1 teaspoon of salt and 1 cup of warm water to make salt water  Relieve gas and discomfort from bloating:  Lie on your right side with a heating pad on your abdomen  Take short walks to help pass gas  Eat small meals until bloating is relieved  Rest after your procedure:  Do not drive or make important decisions until the day after your procedure  Return to your normal activity as directed  You can usually return to work the day after your procedure  Follow up with your healthcare provider as directed:  Write down your questions so you remember to ask them during your visits  © Copyright [address] Information is for End User's use only and may not be sold, redistributed or otherwise used for commercial purposes   All illustrations and images included in CareNotes® are the copyrighted property of [employer]  or Link Castillo   The above information is an  only  It is not intended as medical advice for individual conditions or treatments  Talk to your doctor, nurse or pharmacist before following any medical regimen to see if it is safe and effective for you  How to Stop Smoking   WHAT YOU NEED TO KNOW:   Why should I stop smoking? You will improve your health and the health of others around you if you stop smoking  Your risk for heart and lung disease, cancer, stroke, heart attack, and vision problems will also decrease  Your adolescent can help prevent or stop harm to his or her brain or body  This will help him or her become a healthy adult  You can benefit from quitting no matter how long you have smoked  How can I prepare to stop smoking? Nicotine is a highly addictive drug found in cigarettes  Withdrawal symptoms can happen when you stop smoking and make it hard to quit  These include anxiety, depression, irritability, trouble sleeping, and increased appetite  You increase your chances of success if you prepare to quit  · Set a quit date  Gerald Cuevas a date that is within the next 2 weeks  Do not pick a day that you think may be stressful or busy  Write down the day or Ruby it on your calender  · Tell friends and family that you plan to quit  Explain that you may have withdrawal symptoms when you try to quit  Ask them to support you  They may be able to encourage you and help reduce your stress to make it easier for you to quit  · Make a list of your reasons for quitting  Put the list somewhere you will see it every day, such as your refrigerator  You can look at the list when you have a craving  · Remove all tobacco and nicotine products from your home, car, and workplace  Also, remove anything else that will tempt you to smoke, such as lighters, matches, or ashtrays   Clean your car, home, and places at work that smell like smoke  The smell of smoke can trigger a craving  · Identify triggers that make you want to smoke  This may include activities, feelings, or people  Also write down 1 way you can deal with each of your triggers  For example, if you want to smoke as soon as you wake up, plan another activity during this time, such as exercise  · Make a plan for how you will quit  Learn about the tools that can help you quit, such as medicine, counseling, or nicotine replacement therapy  Choose at least 2 options to help you quit  · Help your adolescent make a plan to quit  The plan will be more successful if your adolescent makes his or her own decisions  Do not try to pressure him or her to quit immediately or in a certain way  Be supportive and offer help if needed  What are some tools to help me stop smoking? · Counseling  from a trained healthcare provider can provide you with support and skills to quit smoking  The provider will also teach you to manage your withdrawal symptoms and cravings  You may receive counseling from one counselor, in group therapy, or through phone therapy called a quit line  · Nicotine replacement therapy (NRT)  such as nicotine patches, gum, or lozenges may help reduce your nicotine cravings  You may get these without a doctor's order  Do not use e-cigarettes or smokeless tobacco in place of cigarettes or to help you quit  They still contain nicotine  · Prescription medicines  such as nasal sprays or nicotine inhalers may help reduce your withdrawal symptoms  Other medicines may also be used to reduce your urge to smoke  Ask your healthcare provider about these medicines  You may need to start certain medicines 2 weeks before your quit date for them to work well  · Hypnosis  is a practice that helps guide you through thoughts and feelings  Hypnosis may help decrease your cravings and make you more willing to quit      · Acupuncture therapy  uses very thin needles to balance energy channels in the body  This is thought to help decrease cravings and symptoms of nicotine withdrawal     · Support groups  let you talk to others who are trying to quit or have already quit  It may be helpful to speak with others about how they quit  How can I manage my cravings? · Avoid situations, people, and places that tempt you to smoke  Go to nonsmoking places, such as libraries or restaurants  Understand what tempts you and try to avoid these things  · Keep your hands busy  Hold things such as a stress ball or pen  · Put candy or toothpicks in your mouth  Keep lollipops, sugarless gum, or toothpicks with you at all times  · Do not have alcohol or caffeine  These drinks may tempt you to smoke  Drink healthy liquids such as water or juice instead  · Reward yourself when you resist your cravings  Rewards will motivate you and help you stay positive  · Do an activity that distracts you from your craving  Examples include cleaning, creating art, or gardening  What should I know about weight gain after I quit? You may gain a few pounds after you quit smoking  It is healthier for you to gain a few pounds than to continue to smoke  The following can help you prevent weight gain:  · Eat healthy foods  These include fruits, vegetables, whole-grain breads, low-fat dairy products, beans, lean meats, and fish  Eat healthy snacks, such as low-fat yogurt, if you get hungry between meals  · Drink water before, during, and between meals  This will make your stomach feel full and help prevent you from overeating  Ask your healthcare provider how much liquid to drink each day and which liquids are best for you  · Be physically active  Activity may help reduce your cravings and reduce stress  Take a walk or do some kind of physical activity every day  Ask your healthcare provider which activities are right for you         Where can I find support and more information? · Smokefree  gov  Phone: 3- 669 - 367-4979  Web Address: www smokefree  gov    CARE AGREEMENT:   You have the right to help plan your care  Learn about your health condition and how it may be treated  Discuss treatment options with your healthcare providers to decide what care you want to receive  You always have the right to refuse treatment  The above information is an  only  It is not intended as medical advice for individual conditions or treatments  Talk to your doctor, nurse or pharmacist before following any medical regimen to see if it is safe and effective for you  © Copyright [address] [place] [address] Information is for End User's use only and may not be sold, redistributed or otherwise used for commercial purposes   All illustrations and images included in CareNotes® are the copyrighted property of [employer]  or [address] [place] [address]

## 2021-01-20 NOTE — ANESTHESIA PREPROCEDURE EVALUATION
Procedure:  EGD    Relevant Problems   CARDIO   (+) Coronary artery disease of native artery of native heart with stable angina pectoris (HCC)   (+) Hypercholesterolemia   (+) NSTEMI (non-ST elevated myocardial infarction) (HCC)      GI/HEPATIC   (+) Bariatric surgery status      PULMONARY   (+) ROSI (obstructive sleep apnea)        Physical Exam    Airway    Mallampati score: I  TM Distance: >3 FB  Neck ROM: full     Dental   No notable dental hx     Cardiovascular  Rhythm: regular, Rate: normal, Cardiovascular exam normal    Pulmonary  Pulmonary exam normal Breath sounds clear to auscultation,     Other Findings        Anesthesia Plan  ASA Score- 2     Anesthesia Type- IV sedation with anesthesia with ASA Monitors  Additional Monitors:   Airway Plan:           Plan Factors-    Chart reviewed  Patient summary reviewed  Patient is not a current smoker  Patient instructed to abstain from smoking on day of procedure  Patient did not smoke on day of surgery  Induction- intravenous  Postoperative Plan-     Informed Consent- Anesthetic plan and risks discussed with patient and spouse

## 2021-01-20 NOTE — INTERVAL H&P NOTE
H&P reviewed  After examining the patient I find no changes in the patients condition since the H&P had been written      Vitals:    01/20/21 0949   BP: 115/67   Pulse: 74   Resp: 16   Temp: 98 1 °F (36 7 °C)   SpO2: 97%

## 2021-01-21 ENCOUNTER — TELEPHONE (OUTPATIENT)
Dept: BARIATRICS | Facility: CLINIC | Age: 56
End: 2021-01-21

## 2021-01-21 NOTE — TELEPHONE ENCOUNTER
Discussed pt at [facility]: Pt needs more F/U for diet, hydration, chewing tobacco, lost job recently    Called and spoke to pt, provided more diet suggestions, and scheduled RD+SW F/U apt for 2/3/2021

## 2021-01-26 DIAGNOSIS — F31.62 BIPOLAR DISORDER, CURRENT EPISODE MIXED, MODERATE (HCC): ICD-10-CM

## 2021-01-27 RX ORDER — OXCARBAZEPINE 600 MG/1
600 TABLET, FILM COATED ORAL 2 TIMES DAILY
Qty: 180 TABLET | Refills: 0 | Status: SHIPPED | OUTPATIENT
Start: 2021-01-27 | End: 2021-05-03 | Stop reason: SDUPTHER

## 2021-01-28 DIAGNOSIS — F31.11 BIPOLAR 1 DISORDER, MANIC, MILD (HCC): ICD-10-CM

## 2021-02-01 ENCOUNTER — TELEPHONE (OUTPATIENT)
Dept: NEUROLOGY | Facility: CLINIC | Age: 56
End: 2021-02-01

## 2021-02-01 DIAGNOSIS — F41.9 ANXIETY AND DEPRESSION: ICD-10-CM

## 2021-02-01 DIAGNOSIS — F32.A ANXIETY AND DEPRESSION: ICD-10-CM

## 2021-02-01 RX ORDER — CLONAZEPAM 1 MG/1
1 TABLET ORAL 3 TIMES DAILY
Qty: 270 TABLET | Refills: 0 | Status: SHIPPED | OUTPATIENT
Start: 2021-02-01 | End: 2021-03-29 | Stop reason: SDUPTHER

## 2021-02-01 NOTE — TELEPHONE ENCOUNTER
Spoke with patient to convert 2/2 visit with Dr Sheila Delgadillo to virtual  Patient agreeable but reports he always has trouble connecting to TEAMS  Requesting Facetime, I suggested DOXIMITY, he is agreeable  Advised patient to look for text message from provider 15 mins before 9am appt       Reg Complete

## 2021-02-02 ENCOUNTER — TELEMEDICINE (OUTPATIENT)
Dept: NEUROLOGY | Facility: CLINIC | Age: 56
End: 2021-02-02
Payer: COMMERCIAL

## 2021-02-02 DIAGNOSIS — I95.1 ORTHOSTATIC HYPOTENSION: ICD-10-CM

## 2021-02-02 DIAGNOSIS — R55 SYNCOPE AND COLLAPSE: Primary | ICD-10-CM

## 2021-02-02 DIAGNOSIS — G25.3 MYOCLONIC JERKING: ICD-10-CM

## 2021-02-02 DIAGNOSIS — R56.9 OBSERVED SEIZURE-LIKE ACTIVITY (HCC): ICD-10-CM

## 2021-02-02 DIAGNOSIS — G90.8 DYSAUTONOMIA-LIKE DISORDER: ICD-10-CM

## 2021-02-02 PROBLEM — H81.13 BENIGN PAROXYSMAL POSITIONAL VERTIGO DUE TO BILATERAL VESTIBULAR DISORDER: Status: RESOLVED | Noted: 2019-07-05 | Resolved: 2021-02-02

## 2021-02-02 PROBLEM — G90.89 DYSAUTONOMIA-LIKE DISORDER: Status: ACTIVE | Noted: 2021-02-02

## 2021-02-02 PROCEDURE — 99215 OFFICE O/P EST HI 40 MIN: CPT | Performed by: PSYCHIATRY & NEUROLOGY

## 2021-02-02 PROCEDURE — 99354 PR PROLONGED SVC OUTPATIENT SETTING 1ST HOUR: CPT | Performed by: PSYCHIATRY & NEUROLOGY

## 2021-02-02 NOTE — PATIENT INSTRUCTIONS
Plan:   1 - continue with supportive treatment for orthostatic hypotension with midodrine and Florinef, hydration, compression stockings, and salt tablets; follow-up with PCP and cardiology for further recommendations  2 - rule out seizures with MRI brain w/wo contrast study, EEG study, followed by inpatient video EEG monitoring study to characterize these clinical events, along with tilt table testing  3 - referral to dysautonomia specialist   4 - follow-up in a couple of weeks to complete physical examination (sensory and reflex testing needs to be completed)

## 2021-02-02 NOTE — PROGRESS NOTES
Virtual Regular Visit         Reason for visit is syncope with body twitching activity    Provider located at [address]  [facility] [address]    Recent Visits  Date Type Provider Dept   21 Telephone Pelon Carney MD [facility]   Showing recent visits within past 7 days and meeting all other requirements     Today's Visits  Date Type Provider Dept   21 Telemedicine Pelon Carney MD [facility]   Showing today's visits and meeting all other requirements     Future Appointments  No visits were found meeting these conditions  Showing future appointments within next 150 days and meeting all other requirements         The patient was identified by name and date of birth  Todd Monzon was informed that this is a telemedicine visit and that the visit is being conducted through [facility] and patient was informed that this is a secure, HIPAA-compliant platform  He agrees to proceed     My office door was closed  No one else was in the room  He acknowledged consent and understanding of privacy and security of the video platform  The patient has agreed to participate and understands he can discontinue the visit at any time  Patient is aware this is a billable service  VIRTUAL VISIT DISCLAIMER    Todd Monzon acknowledges that he has consented to an online visit or consultation  He understands that the online visit is based solely on information provided by him, and that, in the absence of a face-to-face physical evaluation by the physician, the diagnosis he receives is both limited and provisional in terms of accuracy and completeness  This is not intended to replace a full medical face-to-face evaluation by the physician  Todd Monzon understands and accepts these terms       [address] [facility]  Patient's Name: Todd Monzon   Patient's : 1965   Visit Type: consultation  Referring MD / PCP:  La Elizabeth DO    Assessment:  Mr Deland Boeck is a 55 y o  man who has been experiencing multiple recurrent episodes of postural related lightheadedness, period of appearing "disconnected", and brief generalized body shaking that may progress to loss of consciousness  These symptoms are likely due to orthostatic hypotension or orthostatic intolerance  These symptoms started a couple of months after Marii-en-Y gastric bypass surgery and have been associated with periods of hypotension  There have been no changes to his other medications (notably with respect to his psychiatric medications, which he had tolerated for years)  It is less likely that these events are due to an underlying seizure disorder  The symptoms of myoclonic jerk associated with hypotension is not uncommon, due to brainstem activity during periods of hypoperfusion  Other movements can include rigidity and tremors, along with cognitive impairment  Although, his symptoms are likely related to post-gastric bypass orthostatic intolerance, we should rule out other treatable conditions such as seizures  I will recommend an EEG study along with an MRI brain study for structural causes of seizures  If the diagnosis is uncertain, then inpatient video EEG monitoring to capture and characterize the events, along with HR and BP assessment during the clinical event may be useful  We can try to obtain a tilt table study during the hospitalization, along with withholding Florinef and midodrine in a safe setting (avoid provoking loss of consciousness in an outpatient facility)  To better assess if there is an underlying dysautonomia, he should be referred to a dysautonomia specialist and facility with appropriate testing (example QSART testing and heart rate variability testing)    The closest provider that I am aware of is at [facility] in [place], [place] (I'll place a referral to Dr Can Cedillo Howard Schilder)  Other than chronic constipation, there are no other symptoms of autonomic dysfunction  Plan:   1 - continue with supportive treatment for orthostatic hypotension with midodrine and Florinef, hydration, compression stockings, and salt tablets; follow-up with PCP and cardiology for further recommendations  2 - rule out seizures with MRI brain w/wo contrast study, EEG study, followed by inpatient video EEG monitoring study to characterize these clinical events, along with tilt table testing  3 - referral to dysautonomia specialist   4 - follow-up in a couple of weeks to complete physical examination (sensory and reflex testing needs to be completed)    Problem List Items Addressed This Visit        Cardiovascular and Mediastinum    Orthostatic hypotension    Relevant Orders    Ambulatory referral to Neurology    Syncope and collapse - Primary    Relevant Orders    EEG awake or drowsy routine    MRI brain with and without contrast       Nervous and Auditory    Dysautonomia-like disorder    Relevant Orders    Ambulatory referral to Neurology       Other    Myoclonic jerking    Relevant Orders    EEG awake or drowsy routine    MRI brain with and without contrast    Observed seizure-like activity ([address] )    Relevant Orders    EEG awake or drowsy routine    MRI brain with and without contrast          Chief Complaint:    Chief Complaint   Patient presents with    Neurologic Problem     loss of consciousness and seizure like activity    Virtual Regular Visit      HPI:      Sean Ortega is a 55 y o  right handed male here for consultation evaluation of passing out episodes  He was previously evaluated by [facility]  The following is from interviewing the patient and review of the available office/hospital notes  Intake History 2/2/2021  Patient was seen in the hospital on 7/16/2020 by the neurohospitalist service on consultation regarding syncope    Prior to his presentation he was complaining of episodes of "near syncope" and lightheaded with myoclonic jerking for about 1 month  He reports having myoclonic jerking for 2-3 seconds that occurs 4-5 times a month, more so while he changes positions  He was found to have low blood pressures with SBP 80 while standing and SBP 90 while sitting  He was previously on metoprolol, which was put on hold  He was put on midodrine and eventually Florinef  He had gastric bypass surgery in March 2020  He had a ZIO patch to evaluate for arrhythmias, none was found  He has had more episodes of orthostasis and passed out at work  He is employed as an , using ladders, and often he has to squat and stand  Patient's history:  About 6-7 months ago he started to have episodes of passing out  He gets really lightheaded and just goes goes out  There are days when he may pass out 2-3 times a day and sometimes he just feels lightheaded  There are no symptoms if he is sitting or lying down  These are usually triggered when he stand up  These symptoms can happened when he stands up, which has been consistent  Symptoms include a sensation of lightheaded, then shaking like he is going to have a seizure, more like twitching all over his body  These symptoms last 30-60 seconds  If he sits down the twitching goes away, but he still feels lightheaded  He may lose consciousness if he does not sit down  He has not lost consciousness for the past 1-2 weeks  He has good days and bad days  His wife mentioned that he may appear unresponsive  His wife reports he has a glazed look over his eyes, there is a period of disconnect for a couple seconds, looks freezes up, there is shaking in his hands (his son feels that he shakes all over, sometimes the shaking progresses up to the shoulder), when he passes out there is loss of color in his face, he would be unconscious for 30-60 seconds, sometimes longer    These episodes have become more frequent  He had bariatric surgery in 2020, about 1-2 months later he had these symptoms  He was previously on blood pressure medications, he does not remember the name of the medications, but his medications were discontinued  Despite using Florinef and midodrine and drink 4-5 48 oz of water a day  He has had issues with constipation since his surgery  There have been no episodes of diarrhea  He has normal amount of sweat, no problems with dry eyes or dry mouth, no problems with urination, he is able to have an erection  He has issues with his balance since his bariatric surgery  There have been no tingling or loss of sensation in his hands  There is no weakness in his hands  He has episodes of hypomanic state, when he looks like a "happy drunk", he appears to ramble alone  He feels that he rambles every day  He has been on the same dose of trazodone, oxcarbazepine, venlafaxine, and clonazepam for 8-9 years for bipolar disorder, divalproex was discontinued due to double vision  He has been on these medications without complaint of syncopal symptoms for many years  There are no family members with seizures, orthostatic hypotension, or Parkinson's disease  AED/side effects/compliance:  None for management of seizures disorder  Oxcarbazepine 600-600 by psychiatry for bipolar disorder    Event/Seizure semiology:  1  Typically provoked upon standing, lightheaded, sense of movement, wife/son reports a period of disconnected appearance and generalized shaking, mostly hands, then head/upper body, may progress to loss of consciousness      Prior Epilepsy History:  x    Epilepsy Risk Factors:  Abnormal pregnancy: No  Abnormal birth/: No  Abnormal Development: No  Febrile seizures, simple: No  Febrile seizures, complex: No  CNS infection: No  Mental retardation: No  Cerebral palsy: No  Head injury (moderate/severe): No  CNS neoplasm: No  CNS malformation: No  Neurosurgical procedure: No  Stroke: No  CNS autoimmune disorder: No  Alcohol abuse: No  Drug abuse: No  Family history Sz/epilepsy: No    Prior AEDs:  medication Max dose Time used Reason to stop                 Prior workup:  x  Imaging:  No brain imaging study in the last year    EEGs:  None available    Labs:  Component      Latest Ref Rng & Units 6/22/2020 12/9/2020 1/16/2021   WBC      4 31 - 10 16 Thousand/uL  8 37    Red Blood Cell Count      3 88 - 5 62 Million/uL  4 55    Hemoglobin      12 0 - 17 0 g/dL  13 7    HCT      36 5 - 49 3 %  43 0    Platelet Count      479 - 390 Thousands/uL  271    Sodium      136 - 145 mmol/L  141    Potassium      3 5 - 5 3 mmol/L  4 0    Chloride      100 - 108 mmol/L  106    CO2      21 - 32 mmol/L  32    Anion Gap      4 - 13 mmol/L  3 (L)    BUN      5 - 25 mg/dL  6    Creatinine      0 60 - 1 30 mg/dL  0 86    GLUCOSE FASTING      65 - 99 mg/dL  82    Calcium      8 3 - 10 1 mg/dL  9 3    AST      5 - 45 U/L  15    ALT      12 - 78 U/L  30    Alkaline Phosphatase      46 - 116 U/L  80    Total Protein      6 4 - 8 2 g/dL  6 3 (L)    Albumin      3 5 - 5 0 g/dL  3 5    TOTAL BILIRUBIN      0 20 - 1 00 mg/dL  0 42    eGFR      ml/min/1 73sq m  98    Iron Saturation      % 44     TIBC      250 - 450 ug/dL 240 (L)     Iron      65 - 175 ug/dL 105     Hemoglobin A1C      Normal 3 8-5 6%; PreDiabetic 5 7-6 4%;  Diabetic >=6 5%; Glycemic control for adults with diabetes <7 0% %  4 9    eAG, EST AVG Glucose      mg/dl  94    Folate      3 1 - 17 5 ng/mL 15 2     VITAMIN A LEVEL      20 1 - 62 0 ug/dL 32 0     VITAMIN B1, WHOLE BLOOD      66 5 - 200 0 nmol/L 123 2     Vitamin B-12      100 - 900 pg/mL 533     Vit D, 25-Hydroxy      30 0 - 100 0 ng/mL 39 7     ZINC      56 - 134 ug/dL 89     COPPER      69 - 132 ug/dL   86     Review of prior BPs and HRs:  Vitals with Age-Percentiles Systolic Diastolic Pulse   8/73/0549 110 70 92   10/5/2020 114 64 95   11/12/2020 90 50 101   12/22/2020 88 58 107 1/8/2021 110 62 85   1/20/2021 115 67 74   1/20/2021 108 57 62   1/20/2021 111 67 59     Video Exam  General exam   There were no vitals taken for this visit     Appearance: normally developed, appears well  Carotids: unable to assess  Cardiovascular: unable to assess due to virtual visit  Pulmonary: not assessed due to virtual visit    HEENT: anicteric and moist mucus membranes / oral cavity   Fundoscopy: not assessed due to virtual visit    Mental status  Orientation: alert and oriented to name, place, time  Fund of Knowledge: intact   Attention and Concentration: able to spell HOUSE forwards and backwards  Current and Remote Memory:recalled 3/3 words after five minutes  Language: spontaneous speech is normal, comprehension is intact, repetition is intact and naming is intact    Cranial Nerves  CN 1: not tested  CN 2: unable to assess due to virtual visit   CN 3, 4, 6: EOMI, no nystagmus  CN 5:sensation intact to all distribution V1, V2, V3  CN 7:muscles of facial expression are symmetric  CN 8:unable to assess due to virtual visit  CN 9, 10:no dysarthria present  CN 11:symmetric SCM with head turns  CN 12:tongue is midline    Motor:  Bulk, Tone: not assessed due to virtual visit  Pronation: no pronator drift  Strength: Symmetric strength of the arms and legs, no lateralizing weakness   Abnormal movements: no abnormal movements are present    Sensory:  Lighttouch: not assessed due to virtual visit  Romberg:normal    Coordination:  FNF:FNF bilaterally intact  LUCIA:intact  FFM:intact  Gait/Station:normal gait and normal tandem gait    Reflexes:  Not assessed due to virtual visit    Past Medical/Surgical History:  Patient Active Problem List   Diagnosis    NSTEMI (non-ST elevated myocardial infarction) [facility] [address] [facility]    Bipolar disorder, current episode mixed, moderate ([address] )    ROSI (obstructive sleep apnea)    Coronary artery disease of native artery of native heart with stable angina pectoris ([address] )    Former smoker    Migraine with aura and without status migrainosus, not intractable    Hypercholesterolemia    Bariatric surgery status    Primary insomnia    Overweight    Encounter for surgical aftercare following surgery of digestive system    Postsurgical malabsorption    Tobacco use    Orthostatic hypotension    Myoclonic jerking    Syncope and collapse    Infiltrate of middle lobe of right lung present on imaging study    Hypokalemia    Elevated troponin    Observed seizure-like activity (HCC)    Dysautonomia-like disorder     Past Medical History:   Diagnosis Date    Bariatric surgery status     CPAP (continuous positive airway pressure) dependence     Hearing loss of aging     Hypercholesterolemia     Morbid obesity ([address] )     NSTEMI (non-ST elevation myocardial infarction) [facility])     april 2019    Postsurgical malabsorption      Past Surgical History:   Procedure Laterality Date    CARDIAC CATHETERIZATION      no stents     COLONOSCOPY      EGD      HERNIA REPAIR      umbilical hernia    [place] LAP GASTRIC BYPASS/FRITZ-EN-Y N/A 3/2/2020    Procedure: LAPAROSCOPIC FRITZ-EN-Y GASTRIC BYPASS AND INTRAOPERATIVE EGD;  Surgeon: Vera Marlow MD;  [facility];  Service: Bariatrics    SKIN SURGERY      cyst removed from back and thumb    TONSILLECTOMY      TOOTH EXTRACTION       Past Psychiatric History:  Depression: Bipolar disorder  Anxiety: No  Psychosis: No  No prior behavioral health hospital    Medications:    Current Outpatient Medications:     aspirin 81 mg chewable tablet, Chew 1 tablet (81 mg total) daily, Disp: 30 tablet, Rfl: 0    atorvastatin (LIPITOR) 40 mg tablet, Take 1 tablet (40 mg total) by mouth daily, Disp: 90 tablet, Rfl: 1    clonazePAM (KlonoPIN) 1 mg tablet, Take 1 tablet (1 mg total) by mouth 3 (three) times a day, Disp: 270 tablet, Rfl: 0    fludrocortisone (FLORINEF) 0 1 mg tablet, Take 4 tablets (0 4 mg total) by mouth daily, Disp: 120 tablet, Rfl: 10    midodrine (PROAMATINE) 10 MG tablet, Take 1 tablet (10 mg total) by mouth 3 (three) times a day before meals, Disp: 90 tablet, Rfl: 0    OXcarbazepine (TRILEPTAL) 600 mg tablet, Take 1 tablet (600 mg total) by mouth 2 (two) times a day, Disp: 180 tablet, Rfl: 0    paliperidone palmitate ER (INVEGA) 234 mg/1 5 mL IM injection, Inject 1 5 mL (234 mg total) into a muscle every 28 days, Disp: 1 Syringe, Rfl: 3    potassium chloride (K-DUR,KLOR-CON) 20 mEq tablet, Take 1 tablet (20 mEq total) by mouth daily, Disp: 30 tablet, Rfl: 5    ranolazine (RANEXA) 500 mg 12 hr tablet, Take 2 tablets (1,000 mg total) by mouth 2 (two) times a day, Disp: 180 tablet, Rfl: 3    sodium chloride 1 g tablet, Take 1 tablet (1 g total) by mouth 3 (three) times a day, Disp: 90 tablet, Rfl: 3    traZODone (DESYREL) 300 MG tablet, Take 1 tablet (300 mg total) by mouth daily at bedtime, Disp: 90 tablet, Rfl: 1    venlafaxine (EFFEXOR) 75 mg tablet, Take 1 tablet (75 mg total) by mouth 3 (three) times a day, Disp: 270 tablet, Rfl: 3    acetaminophen (TYLENOL) 325 mg tablet, Take 3 tablets (975 mg total) by mouth every 8 (eight) hours, Disp: 30 tablet, Rfl: 0    divalproex sodium (DEPAKOTE) 250 mg EC tablet, Take 1 tablet (250 mg total) by mouth every 12 (twelve) hours (Patient not taking: Reported on 2/2/2021), Disp: 60 tablet, Rfl: 3    Allergies:  No Known Allergies    Family history:  Family History   Problem Relation Age of Onset    Lung cancer Mother     Brain cancer Mother     Diabetes Brother     Bipolar disorder Maternal Grandfather      There is no family history of seizure, epilepsy or developmental delay  Social History  Living situation:  Lives with wife and children  Work:  Completed some college, , commercial/industrial work  Driving:  Yes   reports that he quit smoking about 10 years ago  His smoking use included cigarettes  His smokeless tobacco use includes chew  He reports previous alcohol use   He reports that he does not use drugs  The total amount of time spent with the patient along with pre-chart and post-chart preparation was 75 minutes on the calendar day of the date of service  This included history taking, physical exam, review of ancillary testing, counseling provided to the patient regarding diagnosis, medications, treatment, and risk management, and other communication to the patient's providers and/or family    Start time: 9AM  End time: 10:15AM

## 2021-02-03 ENCOUNTER — TELEPHONE (OUTPATIENT)
Dept: BARIATRICS | Facility: CLINIC | Age: 56
End: 2021-02-03

## 2021-02-03 ENCOUNTER — TELEPHONE (OUTPATIENT)
Dept: NEUROLOGY | Facility: CLINIC | Age: 56
End: 2021-02-03

## 2021-02-03 NOTE — TELEPHONE ENCOUNTER
----- Message from Megan Harper MD sent at 2/2/2021 11:06 AM EST -----  Regarding: sooner follow-up to complete physical examination  Víctor Sigala,   Please see if the patient can come to the office on 2/22 or 2/23 at [address] [place] [address] in the [facility] office to complete physical examination  I need to complete sensory and reflex testing  Please help arrange EEG and MRI brain imaging test     I also placed a referral for the patient to see  Dr Hanny Flores for autonomic evaluation    [address] [place] [address]  [phone]  O: [phone] F: [phone]    Fernanda Murphy

## 2021-02-03 NOTE — TELEPHONE ENCOUNTER
----- Message from Pelon Carney MD sent at 2/2/2021 11:06 AM EST -----  Regarding: sooner follow-up to complete physical examination  Deana Velasquez,   Please see if the patient can come to the office on 2/22 or 2/23 at [address] [place] [address] in the [facility] office to complete physical examination  I need to complete sensory and reflex testing  Please help arrange EEG and MRI brain imaging test     I also placed a referral for the patient to see  Dr Mai Estrada for autonomic evaluation    [address] [place] [address],[place] [address]  [phone]  O: [phone] F: [phone]    Mandi Orr

## 2021-02-03 NOTE — TELEPHONE ENCOUNTER
Is there a way of checking if he can have a prior authorization for evaluation by Dr Alexandr Isabel at [facility] neurology in [facility] because the diagnostic testing and evaluation is not available in [place] [address]  We do not have a dysautonomia specialist and no autonomic testing other than tilt table is available  We need a place that does QSART and heart rate variability testing with Valsalva maneuver

## 2021-02-03 NOTE — TELEPHONE ENCOUNTER
----- Message from Kira Joseph MD sent at 2/2/2021 11:06 AM EST -----  Regarding: sooner follow-up to complete physical examination  Boaz Melendez,   Please see if the patient can come to the office on 2/22 or 2/23 at [address] [place] [address] in the [facility] office to complete physical examination  I need to complete sensory and reflex testing  Please help arrange EEG and MRI brain imaging test     I also placed a referral for the patient to see  Dr Gavino Dela Cruz for autonomic evaluation    [address] [place] [address]  [phone]  O: [phone] F: [phone]    Rockie Klinefelter

## 2021-02-03 NOTE — TELEPHONE ENCOUNTER
Called pt regarding today's missed appointment at 8:30am   Left voicemail message with contact # for pt to call back to reschedule

## 2021-02-03 NOTE — TELEPHONE ENCOUNTER
I called and spoke with the patient  His appointment is for 2/22/21 @ 4 pm with Dr Daren Salazar  I gave the patient the number to central scheduling to setup the EEG and his MRI so he can schedule both of the testing according to his availability  His wife just had surgery  I also informed the patient that Dr Daren Salazar referred him to Dr Abelardo landeros in [place] for a autonomic evaluation  Patient is concerned that his insurance which is [employer] will not cover the visit with Dr Abelardo Wei  He will call his insurance company and Dr Sailaja [employer] office  Patient stated he needed to call me back to get the number for Dr Sailaja [employer] office because he did not have paper and a pen to write down the information

## 2021-02-08 ENCOUNTER — DOCUMENTATION (OUTPATIENT)
Dept: BEHAVIORAL/MENTAL HEALTH CLINIC | Facility: CLINIC | Age: 56
End: 2021-02-08

## 2021-02-08 DIAGNOSIS — F31.62 BIPOLAR DISORDER, CURRENT EPISODE MIXED, MODERATE (HCC): Primary | ICD-10-CM

## 2021-02-08 NOTE — PROGRESS NOTES
Assessment/Plan:      Diagnoses and all orders for this visit:    Bipolar disorder, current episode mixed, moderate (HCC)          Subjective:     Patient ID: Fawn Husain is a 55 y o  male  Outpatient Discharge Summary:   Admission Date: 10/30/2020  Nicol Sesay was referred by self  Discharge Date: 11/16/2020    Discharge Diagnosis:    1  Bipolar disorder, current episode mixed, moderate ([address] )         Treating Physician: Taz Chung  Treatment Complications: Unable to stabilize mood  Presenting Problem: Bipolar symptoms  Course of treatment includes:    psychoeducation and individual therapy   Treatment Progress: fair  Criteria for Discharge: completed treatment goals and objectives and is no longer in need of services  Aftercare recommendations include continue medication management    Discharge Medications include:  Current Outpatient Medications:     acetaminophen (TYLENOL) 325 mg tablet, Take 3 tablets (975 mg total) by mouth every 8 (eight) hours, Disp: 30 tablet, Rfl: 0    aspirin 81 mg chewable tablet, Chew 1 tablet (81 mg total) daily, Disp: 30 tablet, Rfl: 0    atorvastatin (LIPITOR) 40 mg tablet, Take 1 tablet (40 mg total) by mouth daily, Disp: 90 tablet, Rfl: 1    clonazePAM (KlonoPIN) 1 mg tablet, Take 1 tablet (1 mg total) by mouth 3 (three) times a day, Disp: 270 tablet, Rfl: 0    divalproex sodium (DEPAKOTE) 250 mg EC tablet, Take 1 tablet (250 mg total) by mouth every 12 (twelve) hours (Patient not taking: Reported on 2/2/2021), Disp: 60 tablet, Rfl: 3    fludrocortisone (FLORINEF) 0 1 mg tablet, Take 4 tablets (0 4 mg total) by mouth daily, Disp: 120 tablet, Rfl: 10    midodrine (PROAMATINE) 10 MG tablet, Take 1 tablet (10 mg total) by mouth 3 (three) times a day before meals, Disp: 90 tablet, Rfl: 0    OXcarbazepine (TRILEPTAL) 600 mg tablet, Take 1 tablet (600 mg total) by mouth 2 (two) times a day, Disp: 180 tablet, Rfl: 0    paliperidone palmitate ER (INVEGA) 234 mg/1 5 mL IM injection, Inject 1 5 mL (234 mg total) into a muscle every 28 days, Disp: 1 Syringe, Rfl: 3    potassium chloride (K-DUR,KLOR-CON) 20 mEq tablet, Take 1 tablet (20 mEq total) by mouth daily, Disp: 30 tablet, Rfl: 5    ranolazine (RANEXA) 500 mg 12 hr tablet, Take 2 tablets (1,000 mg total) by mouth 2 (two) times a day, Disp: 180 tablet, Rfl: 3    sodium chloride 1 g tablet, Take 1 tablet (1 g total) by mouth 3 (three) times a day, Disp: 90 tablet, Rfl: 3    traZODone (DESYREL) 300 MG tablet, Take 1 tablet (300 mg total) by mouth daily at bedtime, Disp: 90 tablet, Rfl: 1    venlafaxine (EFFEXOR) 75 mg tablet, Take 1 tablet (75 mg total) by mouth 3 (three) times a day, Disp: 270 tablet, Rfl: 3    Prognosis: fair

## 2021-02-09 ENCOUNTER — OFFICE VISIT (OUTPATIENT)
Dept: PSYCHIATRY | Facility: CLINIC | Age: 56
End: 2021-02-09
Payer: COMMERCIAL

## 2021-02-09 DIAGNOSIS — F31.62 BIPOLAR DISORDER, CURRENT EPISODE MIXED, MODERATE (HCC): Primary | ICD-10-CM

## 2021-02-09 PROCEDURE — 99212 OFFICE O/P EST SF 10 MIN: CPT | Performed by: NURSE PRACTITIONER

## 2021-02-09 NOTE — TELEPHONE ENCOUNTER
Out of Network authorization initiated for Dr Ardon with [facility]  Faxed form and clinicals to Lore Alcala

## 2021-02-10 ENCOUNTER — OFFICE VISIT (OUTPATIENT)
Dept: BARIATRICS | Facility: CLINIC | Age: 56
End: 2021-02-10

## 2021-02-10 VITALS — BODY MASS INDEX: 26.14 KG/M2 | WEIGHT: 187.4 LBS

## 2021-02-10 DIAGNOSIS — Z98.84 BARIATRIC SURGERY STATUS: ICD-10-CM

## 2021-02-10 DIAGNOSIS — K91.2 POSTSURGICAL MALABSORPTION: Primary | ICD-10-CM

## 2021-02-10 PROCEDURE — RECHECK: Performed by: DIETITIAN, REGISTERED

## 2021-02-10 NOTE — PROGRESS NOTES
Bariatric Follow Up Nutrition Note    Type of surgery  Gastric bypass: laparoscopic  Surgery Date: 3/2/2020  11 months  post-op  Surgeon: Dr Lorn Gosselin  55 y o   male  Wt 85 kg (187 lb 6 4 oz)   BMI 26 14 kg/m²     [place] [facility] Equation:     Weight maintenance= 2047 kcal/day  Estimated calories for weight loss 1547 kcal/day (1# per wk wt loss - sedentary )  Estimated protein needs 81 2-97 g/day (1 0-1 2 gms/kg IBW )   Estimated fluid needs 5117-8855 mL/day (81-95 oz/day)(30-35 ml/kg IBW )      Initial office tqcxzc=747 1lbs July 2019  Weight on Day of Weight Loss Surgery: 283 4lbs  Weight in (lb) to have BMI = 25: 178 6lbs  Pre-Op Excess Wt: 117 5lbs  Post-Op Wt Loss: 108 7#/ 92% EBWL in 1 5 year(s) (since start of program)  96# wt loss since surgery x 11 months  Pt reports his current weight is stable and he is happy with his current weight      Review of History and Medications   Past Medical History:   Diagnosis Date    Bariatric surgery status     CPAP (continuous positive airway pressure) dependence     Hearing loss of aging     Hypercholesterolemia     Morbid obesity [facility] [address] )     NSTEMI (non-ST elevation myocardial infarction) [facility])     april 2019    Postsurgical malabsorption      Past Surgical History:   Procedure Laterality Date    CARDIAC CATHETERIZATION      no stents     COLONOSCOPY      EGD      HERNIA REPAIR      umbilical hernia    DC LAP GASTRIC BYPASS/FRITZ-EN-Y N/A 3/2/2020    Procedure: LAPAROSCOPIC FRITZ-EN-Y GASTRIC BYPASS AND INTRAOPERATIVE EGD;  Surgeon: Alison Cardona MD;  Location: [facility];  Service: Bariatrics    SKIN SURGERY      cyst removed from back and thumb    TONSILLECTOMY      TOOTH EXTRACTION       Social History     Socioeconomic History    Marital status: /Civil Union     Spouse name: Not on file    Number of children: Not on file    Years of education: Not on file    Highest education level: Not on file   Occupational History    Not on file   Social Needs    Financial resource strain: Not on file    Food insecurity     Worry: Not on file     Inability: Not on file    Transportation needs     Medical: Not on file     Non-medical: Not on file   Tobacco Use    Smoking status: Former Smoker     Types: Cigarettes     Quit date: 2011     Years since quitting: 10 1    Smokeless tobacco: Current User     Types: Chew     Last attempt to quit: 6/17/2014    Tobacco comment: quit cigarettes 2011   Substance and Sexual Activity    Alcohol use: Not Currently     Frequency: Never    Drug use: Never    Sexual activity: Not on file   Lifestyle    Physical activity     Days per week: Not on file     Minutes per session: Not on file    Stress: Not on file   Relationships    Social connections     Talks on phone: Not on file     Gets together: Not on file     Attends Yarsani service: Not on file     Active member of club or organization: Not on file     Attends meetings of clubs or organizations: Not on file     Relationship status: Not on file    Intimate partner violence     Fear of current or ex partner: Not on file     Emotionally abused: Not on file     Physically abused: Not on file     Forced sexual activity: Not on file   Other Topics Concern    Not on file   Social History Narrative    Former smoker - As per [employer]    Full-time employment        · Do you currently or have you served in [employer]:   No    As per Amanda Lopez        Current Outpatient Medications:     acetaminophen (TYLENOL) 325 mg tablet, Take 3 tablets (975 mg total) by mouth every 8 (eight) hours, Disp: 30 tablet, Rfl: 0    aspirin 81 mg chewable tablet, Chew 1 tablet (81 mg total) daily, Disp: 30 tablet, Rfl: 0    atorvastatin (LIPITOR) 40 mg tablet, Take 1 tablet (40 mg total) by mouth daily, Disp: 90 tablet, Rfl: 1    clonazePAM (KlonoPIN) 1 mg tablet, Take 1 tablet (1 mg total) by mouth 3 (three) times a day, Disp: 270 tablet, Rfl: 0    divalproex sodium (DEPAKOTE) 250 mg EC tablet, Take 1 tablet (250 mg total) by mouth every 12 (twelve) hours (Patient not taking: Reported on 2/2/2021), Disp: 60 tablet, Rfl: 3    fludrocortisone (FLORINEF) 0 1 mg tablet, Take 4 tablets (0 4 mg total) by mouth daily, Disp: 120 tablet, Rfl: 10    midodrine (PROAMATINE) 10 MG tablet, Take 1 tablet (10 mg total) by mouth 3 (three) times a day before meals, Disp: 90 tablet, Rfl: 0    OXcarbazepine (TRILEPTAL) 600 mg tablet, Take 1 tablet (600 mg total) by mouth 2 (two) times a day, Disp: 180 tablet, Rfl: 0    paliperidone palmitate ER (INVEGA) 234 mg/1 5 mL IM injection, Inject 1 5 mL (234 mg total) into a muscle every 28 days, Disp: 1 Syringe, Rfl: 3    potassium chloride (K-DUR,KLOR-CON) 20 mEq tablet, Take 1 tablet (20 mEq total) by mouth daily, Disp: 30 tablet, Rfl: 5    ranolazine (RANEXA) 500 mg 12 hr tablet, Take 2 tablets (1,000 mg total) by mouth 2 (two) times a day, Disp: 180 tablet, Rfl: 3    sodium chloride 1 g tablet, Take 1 tablet (1 g total) by mouth 3 (three) times a day, Disp: 90 tablet, Rfl: 3    traZODone (DESYREL) 300 MG tablet, Take 1 tablet (300 mg total) by mouth daily at bedtime, Disp: 90 tablet, Rfl: 1    venlafaxine (EFFEXOR) 75 mg tablet, Take 1 tablet (75 mg total) by mouth 3 (three) times a day, Disp: 270 tablet, Rfl: 3    Food Intake and Lifestyle Assessment   Food Intake Assessment completed via 24 hour recall  Wakes 6am  7:45: egg sandwich with barnett on croissant from [employer]: some triscuits and sourdough pretzels  Lunch: none   Sometimes rolled up lunchmeats and cheese  Snack: two clementines per day, strawberries/blueberries, dried prunes  Dinner: large pork chop: maybe 4-6 ounces  Snack: more triscuits and pretzels  Snack: MRE meal replacement protein drink at night  Beverage intake: water: one large 48 oz cup about three per day  Diet texture/stage: regular  Protein supplement: MRE whey protein powder mixed with almond milk 1-2 per day  Estimated protein intake per day: 70-90g  Estimated fluid intake per day:  oz/day  Meals eaten away from home: rare  Typical meal pattern: 2-3 meals per day and 0-3 snacks per day  Eating Behaviors: Appropriate portion sizes and Frequent snacking/ grazing  Pt does not like to cook  Pt's wife works 10-hr days and does not like to [employer] when she comes home  Pt instead picks grab-n-go things when he gets hungry  Pt reports he will eat almost anything, except dislikes sweet potatoes  Pt's son who lives with them is very health conscious, goes to the gym frequently, prepares pre-made lean protein meals for himself  Food allergies or intolerances: nkfa  Cultural or Mosque considerations: none    Physical Assessment  Nutrition Related Findings  Dizziness and lightheaded, lower blood pressures, reports he has fainted/passed out several times  Pt reports he passed out at work and got laid off because of this  Pt reports that his blacking out/dizziness with standing has not gotten any better and he is in the process of being evaluated by neurology  Physical Activity  Types of exercise: None  Current physical limitations: lightheadedness/dizziness    Psychosocial Assessment   Support systems: spouse and children  Socioeconomic factors: currently laid off/disability    Nutrition Diagnosis  Diagnosis: Altered GI function (NC-1 4) and inadequate calorie, carbohydrate, and protein intake  Related to: Food and nutrition-related knowledge deficit, Limited adherence to nutrition-related recommendations, Altered GI function and Inability or lack of desire to manage self-care  As Evidenced by: pt's self-reported food intake, 94lb wt loss x 10 months, pt reports frequent lightheadedness  Interventions and Teaching   Patient educated on post-op nutrition guidelines  Patient educated and handouts provided   Previously provided pt with sample 400-500 kcal lunch and dinner meal suggestions  Adequate hydration:  Reviewed hydration goals with pt  Pt is currently getting approximately 96 to 144 oz water per day  Nutrition considerations after surgery  Protein supplements:  Reviewed protein supplement use  Pt will email me specific brand he is using when he gets home  Likely 20-25 gram per serving  Meal planning and preparation  Appropriate carbohydrate, protein, and fat intake, and food/fluid choices to maximize safe weight loss, nutrient intake, and tolerance:  Reviewed post-operative protein goal   Recommended  gram protein per day  Reviewed how to estimate protein content of meat portions using food models  Dietary and lifestyle changes  Possible problems with poor eating habits:  Discussed having a source of protein with his snacks between breakfast and dinner, such as nuts  Pt is currently taking:  Bariatric Fusion chewable tablet multivitamin four per day  Salt tablets three times per day: 1 g each  Potassium at night  Miralax as needed    Education provided to: patient and son    Barriers to learning: No barriers identified    Readiness to change: action    Comprehension: needs reinforcement and verbalizes understanding     Expected Compliance: good    Evaluation/Monitoring   Eating pattern as discussed Tolerance of nutrition prescription Body weight Lab values Physical activity    Goals  Eat four meals per day  Three to four ounces lean protein at each meal   1/4 cup carbohydrate at each meal :  Pt is trying to eat six small meals per day, usually getting 2-3 meals plus several snacks plus 1-2 protein drinks  Add some sugar-free sports drinks  Pt is still drinking only plain water, but is getting RX salt tablets and potassium supplements daily  Time Spent:   30 Minutes  Scheduled televisit follow-up in one month

## 2021-02-11 NOTE — PROGRESS NOTES
Support visit  Patient doing well nutritionally  Reviewed with patient recommendations for no tobacco and alcohol  Patient reports he does not drink alcohol at all; however, he chews a can/can and a half a day of chewing tobacco  Patient had quit after surgery and then started again  Emphasized importance of no tobacco; however, patient does not seem inclined to stop chewing  Patient reports taking PPI which will help to counteract tobacco      Patient lost his job 3-4 months ago after several fainting spells  Patient reports receiving unemployment now  Patient is scheduled to see neurology and MRI scheduled  By patient report, doctor thinks he may have had frontal lobe stroke that may be impacting his symptoms  Patient reports good support from family at this time  Limited with what he can do due to blood pressure changes and movement, however tries to do what he can  Patient has upbeat, positive mood  Patient encouraged to follow through with tests and appointments  Patient scheduled for virtual visit next month

## 2021-02-16 NOTE — TELEPHONE ENCOUNTER
Call placed to [facility]   [phone]  Spoke to [place]  Out of network authorization has been approved for dates 2/9/2021-5/8/2021  Auth # [id number]  Call placed to patient  Notified of approval for out of network authorization  Reviewed dates covered  Patient verbalized understanding  Stated he already had Dr Kelly contact information and he will reach out to office to schedule appt  Dr Ezra BARRETO Out of Network authorization approved

## 2021-02-17 ENCOUNTER — TELEPHONE (OUTPATIENT)
Dept: NEUROLOGY | Facility: CLINIC | Age: 56
End: 2021-02-17

## 2021-02-17 NOTE — TELEPHONE ENCOUNTER
Called and spoke to patient  Patient confirmed upcoming apt with Dr Victoria Corbin for 2/26/21  Patient has no issues or concerns at this time

## 2021-02-17 NOTE — TELEPHONE ENCOUNTER
Called and spoke to patient and confirmed appt for 2/26/21  Patient stated he will call and make an appointment with Dr Oswaldo Daniels after his appointment with Dr Jaleel Carrasco on 2/26/21  Patient has no issues or concerns at this time

## 2021-02-19 ENCOUNTER — TELEMEDICINE (OUTPATIENT)
Dept: PSYCHIATRY | Facility: CLINIC | Age: 56
End: 2021-02-19
Payer: COMMERCIAL

## 2021-02-19 ENCOUNTER — HOSPITAL ENCOUNTER (OUTPATIENT)
Dept: MRI IMAGING | Facility: HOSPITAL | Age: 56
Discharge: HOME | End: 2021-02-19
Attending: PSYCHIATRY & NEUROLOGY
Payer: COMMERCIAL

## 2021-02-19 DIAGNOSIS — G25.3 MYOCLONIC JERKING: ICD-10-CM

## 2021-02-19 DIAGNOSIS — F31.81 BIPOLAR 2 DISORDER (HCC): ICD-10-CM

## 2021-02-19 DIAGNOSIS — R56.9 OBSERVED SEIZURE-LIKE ACTIVITY (HCC): ICD-10-CM

## 2021-02-19 DIAGNOSIS — R55 SYNCOPE AND COLLAPSE: ICD-10-CM

## 2021-02-19 DIAGNOSIS — F31.62 BIPOLAR DISORDER, CURRENT EPISODE MIXED, MODERATE (HCC): Primary | ICD-10-CM

## 2021-02-19 PROCEDURE — 70553 MRI BRAIN STEM W/O & W/DYE: CPT

## 2021-02-19 PROCEDURE — 99214 OFFICE O/P EST MOD 30 MIN: CPT | Performed by: NURSE PRACTITIONER

## 2021-02-19 PROCEDURE — G1004 CDSM NDSC: HCPCS

## 2021-02-19 PROCEDURE — A9585 GADOBUTROL INJECTION: HCPCS | Performed by: PSYCHIATRY & NEUROLOGY

## 2021-02-19 RX ADMIN — GADOBUTROL 9 ML: 604.72 INJECTION INTRAVENOUS at 09:59

## 2021-02-19 NOTE — BH TREATMENT PLAN
TREATMENT PLAN (Medication Management Only)        [facility]    Name and Date of Birth:  Solitario Serrano 55 y o  1965  Date of Treatment Plan: February 19, 2021  Diagnosis/Diagnoses:    1  Bipolar disorder, current episode mixed, moderate ([facility] [address] )    2  Bipolar 2 disorder [facility])      Strengths/Personal Resources for Self-Care: supportive family, supportive friends, taking medications as prescribed, ability to communicate well, ability to listen, average or above intelligence, motivation for treatment  Area/Areas of need (in own words): mood instability  1  Long Term Goal: continue improvement in mood stability  Target Date:3 months - 5/19/2021  Person/Persons responsible for completion of goal: Chandler  2  Short Term Objective (s) - How will we reach this goal?:   A  Provider new recommended medication/dosage changes and/or continue medication(s): continue current medications as prescribed  B  N/A   C  N/A  Target Date:3 months - 5/19/2021  Person/Persons Responsible for Completion of Goal: Chandler  Progress Towards Goals: continuing treatment  Treatment Modality: medication management every 3 months  Review due 180 days from date of this plan: 3 months - 5/19/2021  Expected length of service: ongoing treatment  My Physician/PA/NP and I have developed this plan together and I agree to work on the goals and objectives  I understand the treatment goals that were developed for my treatment  EOM intact.

## 2021-02-19 NOTE — PSYCH
Virtual Regular Visit    Problem List Items Addressed This Visit        Other    Bipolar disorder, current episode mixed, moderate ([facility] [address] ) - Primary      Other Visit Diagnoses     Bipolar 2 disorder ([facility] [address] )            Reason for visit is   Chief Complaint   Patient presents with    Follow-up    Medication Management     Encounter provider Campos Pritchett    Provider located at [address]  [place] [address]  [phone]    Recent Visits  No visits were found meeting these conditions  Showing recent visits within past 7 days and meeting all other requirements     Today's Visits  Date Type Provider Dept   02/19/21 Telemedicine Campos Pritchett [facility]   Showing today's visits and meeting all other requirements     Future Appointments  No visits were found meeting these conditions  Showing future appointments within next 150 days and meeting all other requirements        After connecting through [employer], the patient was identified by name and date of birth  Eleni Forbes was informed that this is a telemedicine visit and that the visit is being conducted through [email or web address] and patient was informed that this is not a secure, HIPAA-compliant platform  He agrees to proceed  which may not be secure and therefore, might not be HIPAA-compliant  My office door was closed  No one else was in the room  He acknowledged consent and understanding of privacy and security of the video platform  The patient has agreed to participate and understands they can discontinue the visit at any time  SUBJECTIVE:    Eleni Forbes is a 55 y o  male with a history of mood instability seen for follow up and medication management  He has been doing better since he has been on his Invega IM, and since his last injection he has not had an episode of instability of mood   As the weeks go by he can notice a difference, and usually by the third week the effects are decreased  We discussed the possibility that he may be a fast metabolizer for this agent and he may equire dosing [id number], not 4  Sleep and appetite are good  He just completed an MRI of the brain and is scheduled for an EEG   Follow up with neurology later in the month       HPI ROS Appetite Changes and Sleep: normal appetite, normal energy level and normal number of sleep hours    Review Of Systems:     Mood Normal, but hx of mood instability   Behavior Normal    Thought Content Normal   General Normal    Personality Normal   Other Psych Symptoms Normal   Constitutional Negative   ENT Negative   Cardiovascular CAD, orthostatic hypotension, syncope and collapse, hx of NSTEMI   Respiratory ROSI,   Gastrointestinal post-surgicla malabsorption   Genitourinary Negative   Musculoskeletal Negative   Integumentary Negative   Neurological r/o Seizure disorder   Endocrine Normal    Other Symptoms hypercholesteremia, primary insomnia       Substance Abuse History:    Social History     Substance and Sexual Activity   Drug Use Never       Family Psychiatric History:     Family History   Problem Relation Age of Onset    Lung cancer Mother     Brain cancer Mother     Diabetes Brother     Bipolar disorder Maternal Grandfather        Social History     Socioeconomic History    Marital status: /Civil Union     Spouse name: Not on file    Number of children: Not on file    Years of education: Not on file    Highest education level: Not on file   Occupational History    Not on file   Social Needs    Financial resource strain: Not on file    Food insecurity     Worry: Not on file     Inability: Not on file    Transportation needs     Medical: Not on file     Non-medical: Not on file   Tobacco Use    Smoking status: Former Smoker     Types: Cigarettes     Quit date: 2011     Years since quitting: 10 1    Smokeless tobacco: Current User     Types: Chew     Last attempt to quit: 6/17/2014    Tobacco comment: quit cigarettes 2011   Substance and Sexual Activity    Alcohol use: Not Currently     Frequency: Never    Drug use: Never    Sexual activity: Not on file   Lifestyle    Physical activity     Days per week: Not on file     Minutes per session: Not on file    Stress: Not on file   Relationships    Social connections     Talks on phone: Not on file     Gets together: Not on file     Attends Mandaen service: Not on file     Active member of club or organization: Not on file     Attends meetings of clubs or organizations: Not on file     Relationship status: Not on file    Intimate partner violence     Fear of current or ex partner: Not on file     Emotionally abused: Not on file     Physically abused: Not on file     Forced sexual activity: Not on file   Other Topics Concern    Not on file   Social History Narrative    [facility]    Full-time employment        · Do you currently or have you served in [employer]:   No    As per [place]        Past Medical History:   Diagnosis Date    Bariatric surgery status     CPAP (continuous positive airway pressure) dependence     Hearing loss of aging     Hypercholesterolemia     Morbid obesity ([facility] [address] )     NSTEMI (non-ST elevation myocardial infarction) [facility])     april 2019    Postsurgical malabsorption        Past Surgical History:   Procedure Laterality Date    CARDIAC CATHETERIZATION      no stents     COLONOSCOPY      EGD      HERNIA REPAIR      umbilical hernia    [facility] GASTRIC BYPASS/FRITZ-EN-Y N/A 3/2/2020    Procedure: LAPAROSCOPIC FRITZ-EN-Y GASTRIC BYPASS AND INTRAOPERATIVE EGD;  Surgeon: Brittney Vaz MD;  Location: [facility];  Service: Bariatrics    SKIN SURGERY      cyst removed from back and thumb    TONSILLECTOMY      TOOTH EXTRACTION         Current Outpatient Medications   Medication Sig Dispense Refill    acetaminophen (TYLENOL) 325 mg tablet Take 3 tablets (975 mg total) by mouth every 8 (eight) hours 30 tablet 0    aspirin 81 mg chewable tablet Chew 1 tablet (81 mg total) daily 30 tablet 0    atorvastatin (LIPITOR) 40 mg tablet Take 1 tablet (40 mg total) by mouth daily 90 tablet 1    clonazePAM (KlonoPIN) 1 mg tablet Take 1 tablet (1 mg total) by mouth 3 (three) times a day 270 tablet 0    divalproex sodium (DEPAKOTE) 250 mg EC tablet Take 1 tablet (250 mg total) by mouth every 12 (twelve) hours (Patient not taking: Reported on 2/2/2021) 60 tablet 3    fludrocortisone (FLORINEF) 0 1 mg tablet Take 4 tablets (0 4 mg total) by mouth daily 120 tablet 10    midodrine (PROAMATINE) 10 MG tablet Take 1 tablet (10 mg total) by mouth 3 (three) times a day before meals 90 tablet 0    OXcarbazepine (TRILEPTAL) 600 mg tablet Take 1 tablet (600 mg total) by mouth 2 (two) times a day 180 tablet 0    paliperidone palmitate ER (INVEGA) 234 mg/1 5 mL IM injection Inject 1 5 mL (234 mg total) into a muscle every 28 days 1 Syringe 3    potassium chloride (K-DUR,KLOR-CON) 20 mEq tablet Take 1 tablet (20 mEq total) by mouth daily 30 tablet 5    ranolazine (RANEXA) 500 mg 12 hr tablet Take 2 tablets (1,000 mg total) by mouth 2 (two) times a day 180 tablet 3    sodium chloride 1 g tablet Take 1 tablet (1 g total) by mouth 3 (three) times a day 90 tablet 3    traZODone (DESYREL) 300 MG tablet Take 1 tablet (300 mg total) by mouth daily at bedtime 90 tablet 1    venlafaxine (EFFEXOR) 75 mg tablet Take 1 tablet (75 mg total) by mouth 3 (three) times a day 270 tablet 3     No current facility-administered medications for this visit           No Known Allergies    The following portions of the patient's history were reviewed and updated as appropriate: allergies, current medications, past family history, past medical history, past social history, past surgical history and problem list     OBJECTIVE:     Mental Status Examination:    Appearance calm and cooperative and adequate hygiene and grooming   Mood euthymic   Affect affect appropriate    Speech a normal rate and fluent   Thought Processes coherent/organized and normal thought processes   Hallucinations no hallucinations present    Thought Content no delusions   Abnormal Thoughts no suicidal thoughts  and no homicidal thoughts    Orientation  oriented to person and place and time   Remote Memory short term memory intact and long term memory intact   Attention Span concentration intact   Intellect Appears to be of Average Intelligence   Insight Insight intact   Judgement judgment was intact   Muscle Strength [place]   Language no difficulty naming common objects, no difficulty repeating a phrase  and no difficulty writing a sentence    Fund of Knowledge displays adequate knowledge of current events, adequate fund of knowledge regarding past history and adequate fund of knowledge regarding vocabulary    Pain none   Pain Scale 0       Laboratory Results: No results found for this or any previous visit  Assessment/Plan:       Diagnoses and all orders for this visit:    Bipolar disorder, current episode mixed, moderate (HCC)    Bipolar 2 disorder ([facility] [address] )          Treatment Recommendations- Risks Benefits      Immediate Medical/Psychiatric/Psychotherapy Treatments and Any Precautions: Continue present medications as prescribed  Garrick Makua Sustena Im q 3 weeks    Risks, Benefits And Possible Side Effects Of Medications: discussed with patient  Discussed risk of BZD therapy    Goals discussed in session:Maintain mood stabilization      Treatment Plan:    Completed and signed during the session: Yes - Treatment Plan done but not signed at time of office visit due to:  Plan reviewed by video and verbal consent given due to [address] distancing    I spent 30 minutes with the patient during this visit      Campos Ruano 02/19/21

## 2021-02-22 ENCOUNTER — OFFICE VISIT (OUTPATIENT)
Dept: PSYCHIATRY | Facility: CLINIC | Age: 56
End: 2021-02-22
Payer: COMMERCIAL

## 2021-02-22 DIAGNOSIS — F31.62 BIPOLAR DISORDER, CURRENT EPISODE MIXED, MODERATE (HCC): Primary | ICD-10-CM

## 2021-02-22 PROCEDURE — 90792 PSYCH DIAG EVAL W/MED SRVCS: CPT | Performed by: STUDENT IN AN ORGANIZED HEALTH CARE EDUCATION/TRAINING PROGRAM

## 2021-02-22 NOTE — PSYCH
[facility]    Name and Date of Birth:  Young Weathers 55 y o  1965 MRN: [id number]    Date of Visit: February 22, 2021    Reason for visit: Full psychiatric intake assessment for medication management     HPI     Young Weathers is a 55 y o  male with a past psychiatric history significant for bipolar disorder type I and nicotine use disorder who presents to the [facility] outpatient clinic for intake assessment  Wilfredo Fung presents as calm, pleasant, and cooperative  He is noticeably blunted in affect during the initial phase of the assessment but becomes increasingly more animated and appropriate as session progresses  His thoughts are organized, linear, and goal-directed  With assistance from his wife, Adonay SANON, who participates via Alan Burch, he completes evaluation without difficulty  Emmanuel Russell") endorses a longstanding history of bipolar disorder that was diagnosed at the age of 23  He has been trialed on numerous psychotropic medications throughout his life yet he denies prior psychiatric inpatient admissions  Prior to linkage to this clinic, he was under the care of Dr Crow Isidro  Today, Yareli Bledsoe presents to the clinic of Trileptal 600mg BID, Invega IM 234mg Q4 weeks, Trazodone 300mg QHS, Effexor 75mg TID, and Klonopin 1mg TID PRN  He states that he decided to transfer to a different provider as he is unclear if his current psychotropic medication regimen is adequate  Yarlei Bledsoe and his wife agree that he predominately experiences the manic/hypomanic aspect of bipolar affective disorder  He describes "episodes" that last hours to days when he becomes increasingly more irritable, argumentative, and impulsive  Yareli Bledsoe states during these periods he is "unable to control his behavior" and is often combative and disruptive   He describes a recent incident in which he impulsively and recklessly smashed a table and broke chairs  At the conclusion of these episodes, Gregorio gardiner is often "exhausted"  He denies lengthy periods without sleep, increased goal-directed behavior, excessive spending or sexual promiscuity that is often seen during periods of leidy  However, he and his wife do endorse periods of euphoria and elevated/expansive mood to which they describe him as a "happy drunk"  Aside from these periods, Gregorio gardiner denies extensive periods of depression, characterized by social isolation, suicidal thoughts, and poor mood  He states that he will feel "sad" at times but this is most often secondary to psychosocial stressors and his current lack of connectivity and purpose  He describes a recent incident in which he was worried about his dad's health and chose to disconnect from the world but sitting peacefully and listening to music  Within 24 hours, he felt back to baseline  At the moment, Gregorio gardiner denies most neurovegetative symptoms of depression  His sleep is adequate and his appetite is fair  He endorses diminished energy and motivation at times, likely related to medical complications (orthostatic hypotension) related to recent gastric surgery  He denies new-onset anhedonia, daily crying spells, or pervasive hopelessness, helplessness, futility, or despair  He denies current thoughts of suicide or self-harm  He has no plan to harm others  Wife confirms that she has removed firearms and knives from the house for safety, given his episodic periods of "leidy" and "anger outbursts"  Gregorio gardiner is future-oriented and discusses plans to purchase a dog that he can train to be a certified K-9 dog  He demonstrates self-preservation as evidenced by his commitment to treatment  During today's evaluation, Carrie Briceño does not exhibit objective evidence of hypomania/leidy  Carrie Briceño is mostly organized in thought without flight of ideas or loosening of associations   Speech does not appear to be pressured or rapid and Carrie Briceño responds well to verbal redirecting  Simone Romero denies historical symptomatology suggestive of an underlying psychotic process  Simone Romero does not currently endorse acute perceptual disturbances such as A/V hallucinations, paranoia, referential ideation, or delusions  Simone Romero denies acute and chronic Schneiderian symptoms, including: thought-broadcasting, thought-insertion, thought-withdrawal or audible thoughts  During today's evaluation, Simone Romero does not exhibit objective evidence of fred psychosis as the patient does not appear internally preoccupied or easily distracted  hCandler's thoughts are organized, linear, and reality-based  Mega Andrade denies a history of substance abuse, PTSD symptomatology, OCD, or disordered eating  He is not overtly anxious or perturbed  Current Rating Scores:     Current PHQ-9   PHQ-9 Score (since 1/22/2021)     PHQ-9 Score  14        Current ANNELIESE-7 is   ANNELIESE-7 Flowsheet Screening      Most Recent Value   Over the last two weeks, how often have you been bothered by the following problems? Feeling nervous, anxious, or on edge  2   Not being able to stop or control worrying  1   Worrying too much about different things  1   Trouble relaxing   1   Being so restless that it's hard to sit still  0   Becoming easily annoyed or irritable   2   Feeling afraid as if something awful might happen  0   How difficult have these problems made it for you to do your work, take care of things at home, or get along with other people? Somewhat difficult   ANNELIESE Score   7            Psychiatric Review Of Systems:    Sleep changes: no change  Appetite changes: no  Weight changes: weight loss   Energy/anergy: yes, decreased  Interest/pleasure/anhedonia: no  Somatic symptoms: no  Anxiety/panic: no  Henrietta: history of periods of elevated mood, past mixed episodes, currently mixed symptoms, mood swings  Guilty/hopeless: no  Self injurious behavior/risky behavior: no  Suicidal ideation: no  Homicidal ideation: no  Auditory hallucinations: no  Visual hallucinations: no  Other hallucinations: no  Delusional thinking: no  Eating disorder history: no  Obsessive/compulsive symptoms: no    Review Of Systems:    Constitutional feeling poorly, feeling tired, low energy and fluctuating energy level   ENT blurred vision, decreased vision and dizziness   Cardiovascular negative   Respiratory shortness of breath and wheezing   Gastrointestinal abdominal discomfort   Genitourinary negative   Musculoskeletal negative   Integumentary negative   Neurological dizziness and lightheadedness   Endocrine negative   Other Symptoms none, all other systems are negative       Family Psychiatric History:     Family History   Problem Relation Age of Onset    Lung cancer Mother     Brain cancer Mother     Diabetes Brother     Bipolar disorder Maternal Grandfather     Bipolar disorder Son          Past Psychiatric History:     Inpatient psychiatric admissions: Denies  Prior outpatient psychiatric linkage: Previously linked with Dr Zina Flores via [facility] ([place], [place])  Past/current psychotherapy: Hx of psychotherapy, no current linkage  History of suicidal attempts/gestures: Denies  History of violence/aggressive behaviors: Denies  Psychotropic medication trials: Depakote, Trileptal, Arlis Codding Sustenna, Klonopin, Effexor  Substance abuse inpatient/outpatient rehabilitation: Denies    Substance Abuse History:    No history of ETOH or illict substance abuse  The patient does endorse ongoing tobacco abuse (chew and vaping)  No past legal actions or arrests secondary to substance intoxication  The patient denies prior DWIs/DUIs  No history of outpatient/inpatient rehabilitation programs  Shelton Brown does not exhibit objective evidence of substance withdrawal during today's examination nor does Chandler appear under the influence of any psychoactive substance  Social History:    Developmental: Denies a history of milestone/developmental delay   Denies a history of in-utero exposure to toxins/illicit substances  There is no documented history of IEP or need for special education  Education: high school diploma/GED  Marital history:   Living arrangement, social support: wife and son, mother-in-law, and wife's 2 children  Occupational History: on 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  Access to firearms: Denies direct access to weapons/firearms  Zina Gaytan has no history of arrests or violence with a deadly weapon  Wife confirms that firearms and knives have been removed from the home  Traumatic History:     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, denies PTSD    Past Medical History:    Past Medical History:   Diagnosis Date    Bariatric surgery status     CPAP (continuous positive airway pressure) dependence     Hearing loss of aging     Hypercholesterolemia     Morbid obesity ([facility] [address] )     NSTEMI [facility]non-ST elevation [facility])     april 2019    Postsurgical malabsorption      No past medical history pertinent negatives  Past Surgical History:   Procedure Laterality Date    CARDIAC CATHETERIZATION      no stents     COLONOSCOPY      EGD      HERNIA REPAIR      umbilical hernia    [place] LAP GASTRIC BYPASS/FRITZ-EN-Y N/A 3/2/2020    Procedure: LAPAROSCOPIC FRITZ-EN-Y GASTRIC BYPASS AND INTRAOPERATIVE EGD;  Surgeon: John Barragan MD;  Location: [facility];  Service: Bariatrics    SKIN SURGERY      cyst removed from back and thumb    TONSILLECTOMY      TOOTH EXTRACTION       No Known Allergies    History Review: The following portions of the patient's history were reviewed and updated as appropriate: allergies, current medications, past family history, past medical history, past social history, past surgical history and problem list     OBJECTIVE:    Vital signs in last 24 hours: There were no vitals filed for this visit      Mental Status Evaluation:    Appearance age appropriate, casually dressed, dressed appropriately, looks stated age, bearded, piercings   Behavior pleasant, cooperative, calm, good eye contact   Speech normal rate, normal volume, normal pitch, fluent   Mood dysphoric   Affect blunted, mood-congruent   Thought Processes organized, coherent, goal directed, normal rate of thoughts, normal abstract reasoning   Associations intact associations   Thought Content no overt delusions   Perceptual Disturbances: no auditory hallucinations, no visual hallucinations   Abnormal Thoughts  Risk Potential Suicidal ideation - None  Homicidal ideation - None  Potential for aggression - Yes, due to poor impulse control   Orientation oriented to person, place, time/date and situation   Memory recent and remote memory grossly intact   Consciousness alert and awake   Attention Span Concentration Span attention span and concentration are age appropriate   Intellect appears to be of average intelligence   Insight fair   Judgement fair   Muscle Strength and  Gait normal muscle strength and normal muscle tone, normal gait and normal balance   Motor Activity no abnormal movements   Language no difficulty naming common objects, no difficulty repeating a phrase   Fund of Knowledge adequate knowledge of current events  adequate fund of knowledge regarding past history  adequate fund of knowledge regarding vocabulary    Pain none   Pain Scale 0       Laboratory Results: I have personally reviewed all pertinent laboratory/tests results    Recent Labs (last 2 months):   Lab on 01/16/2021   Component Date Value    Copper 01/16/2021 86        Suicide/Homicide Risk Assessment:    Risk of Harm to Self:  The following ratings are based on assessment at the time of the interview and review of records  Demographic risk factors include: , male, age: over 48 or older  Historical Risk Factors include: history of anxiety, history of mood disorder, history of impulsive behaviors  Recent Specific Risk Factors include: diagnosis of mood disorder  Protective Factors: no current suicidal ideation, ability to adapt to change, access to mental health treatment, being a parent, being , compliant with medications, compliant with mental health treatment, connection to own children, contact with caregivers, effective coping skills, effective decision-making skills, effective problem solving skills, good health, good self-esteem, having a sense of purpose or meaning in life, having pets, medical compliance, no substance use problems, opportunities to participate in community, personal beliefs about the meaning and value of life, resiliency, restricted access to lethal means, stable living environment, sense of determination, sense of personal control, sobriety, supportive family, supportive friends  Weapons: none  The following steps have been taken to ensure weapons are properly secured: removed, by wife  Based on today's Mcgregor Major presents the following risk of harm to self: none    Risk of Harm to Others: The following ratings are based on assessment at the time of the interview and review of records  Demographic Risk Factors include: male  Historical Risk Factors include: none  Recent Specific Risk Factors include: none  Protective Factors: no current homicidal ideation  Weapons: none  The following steps have been taken to ensure weapons are properly secured: removed, by wife  Based on today's Mcgregor Major presents the following risk of harm to others: none    The following interventions are recommended: no intervention changes needed  Although patient's acute lethality risk is LOW, long-term/chronic lethality risk is mildly elevated given history of bipolar effective disorder, impulsivity, and fair insight   However, at the current moment, Agueda Banerjee is future-oriented, forward-thinking, and demonstrates ability to act in a self-preserving manner as evidenced by volitionally presenting to the clinic today, seeking treatment  Additionally, Alfredo Hendrix sits throughout the assessment wearing personal protective gear (ie mask) in the context of an ongoing viral pandemic, suggesting a will and desire to live  He lists his wife, pets, and children as protective factors against suicide  At this juncture, inpatient hospitalization is not currently warranted  To mitigate future risk, patient should adhere to treatment recommendations, avoid alcohol/illicit substance use, utilize community-based resources and familiar support, and prioritize mental health treatment  Assessment/Plan:     Corina Capps is a 55 y o  male with a past psychiatric history significant for bipolar disorder type I and nicotine use disorder who presents to the [address] [place] [address] [address] outpatient clinic for intake assessment  Chad Chung") endorses a longstanding history of bipolar disorder that was diagnosed at the age of 23  He has been trialed on numerous psychotropic medications throughout his life yet he denies prior psychiatric inpatient admissions  Prior to linkage to this clinic, he was under the care of Dr Yamini Ortiz  Today, Britany Gay presents to the clinic of Trileptal 600mg BID, Invega IM 234mg Q4 weeks, Trazodone 300mg QHS, Effexor 75mg TID, and Klonopin 1mg TID PRN  He states that he decided to transfer to a different provider as he is unclear if his current psychotropic medication regimen is adequate  Britany Gay and his wife agree that he predominately experiences the manic/hypomanic aspect of bipolar affective disorder  He describes "episodes" that last hours to days when he becomes increasingly more irritable, argumentative, and impulsive  Britany Gay states during these periods he is "unable to control his behavior" and is often combative and disruptive  He describes a recent incident in which he impulsively and recklessly smashed a table and broke chairs   At the conclusion of these episodes, Britany Gay is often "exhausted"  He denies lengthy periods without sleep, increased goal-directed behavior, excessive spending or sexual promiscuity that is often seen during periods of leidy  However, he and his wife do endorse periods of euphoria and elevated/expansive mood to which they describe him as a "happy drunk"  Aside from these periods, Britany Gay denies extensive periods of depression, characterized by social isolation, suicidal thoughts, and poor mood  He states that he will feel "sad" at times but this is most often secondary to psychosocial stressors and his current lack of connectivity and purpose  He describes a recent incident in which he was worried about his dad's health and chose to disconnect from the world but sitting peacefully and listening to music  Within 24 hours, he felt back to baseline  At the moment, Britany Gay denies most neurovegetative symptoms of depression  Wife confirms that she has removed firearms and knives from the house for safety, given his episodic periods of "leidy" and "anger outbursts"  Britany Gay is future-oriented and discusses plans to purchase a dog that he can train to be a certified K-9 dog  He demonstrates self-preservation as evidenced by his commitment to treatment  Psychopharmacologically, lengthy discussion was held regarding his current medication regimen and medications that have improved and amplified his bipolar symptomatology  Given profound benefit from IM Invega injections, will change dosing interval from [id number] to Q3 weeks  Next injection March 3, 2021  Will begin tapering process of Effexor, given no history of depression and increased irritability since starting this agent  Antidepressants like Effexor should be avoided in patient's with a true underlying bipolar chemistry   Risks/benefits/alternativies to treatment discussed, including a myriad of potential adverse medication side effects, to which Alfredo Hendrix voiced understanding and consented fully to treatment       DSM-V Diagnoses:     1 ) Bipolar Disorder Type I  2 ) Nicotine Use Disorder      Treatment Recommendations/Precautions:      1 ) Bipolar Disorder Type I  - Continue Trileptal 600mg BID   - Continue checking BMP  - Change dosing interval of IM Invega Sustenna 234mg [id number] to [place]   - Next injection: 3/3/2021  - Taper Effexor 75mg TID to BID for 2 weeks - after 2 weeks, taper to 75mg Daily  - Continue PRN use of Klonopin 1mg TID - uses approximately 1x per week  - Continue Trazodone 300mg QHS PRN for sleep  - Not interested in weekly psychotherapy at this point  - Psychoeducation provided regarding the importance of exercise and healthy dietary choices and their impact on mood, energy, and motivation  - Counseled to avoid ETOH, illict substances, and nicotine secondary to the detrimental effects of these substances on mental and physical health  - Encouraged to engage in non-verbal forms of therapy such as art therapy, music therapy, and mindfulness  - Discussed the bio-psycho-social model to treatment and therapeutic exercises/interventions were attempted to cognitively restructure thoughts      2 ) Nicotine Use Disorder  - Counseled to avoid ETOH, illict substances, and nicotine secondary to the detrimental effects of these substances on mental and physical health      Medication management every 4 weeks  Does not want to see a therapist despite recommendation  Aware of need to follow up with family physician for medical issues  Aware of 24 hour and weekend coverage for urgent situations accessed by calling [facility] main practice number    Medications Risks/Benefits:      Risks, Benefits And Possible Side Effects Of Medications:    Risks, benefits, and possible side effects of medications explained to Mora Massey including risk of hyponatremia related to treatment with Trileptal, risk of parkinsonian symptoms, Tardive Dyskinesia and metabolic syndrome related to treatment with antipsychotic medications, risk of cardiovascular events in elderly related to treatment with antipsychotic medications, risk of suicidality and serotonin syndrome related to treatment with antidepressants, risks of misuse, abuse or dependence, sedation and respiratory depression related to treatment with benzodiazepine medications and risk of impaired next-day mental alertness, complex sleep-related behavior and dependence related to treatment with hypnotic medications  He verbalizes understanding and agreement for treatment  Controlled Medication Discussion:     Marisol Baron has been filling controlled prescriptions on time as prescribed according to [address] [facility]  Discussed with Marisol Baron the risks of sedation, respiratory depression, impairment of ability to drive and potential for abuse and addiction related to treatment with benzodiazepine medications  He understands risk of treatment with benzodiazepine medications, agrees to not drive if feels impaired and agrees to take medications as prescribed    Note Share Disclaimer:  This note was not shared with the patient due to reasonable likelihood of causing patient harm    Treatment Plan:    Completed and signed during the session: Yes - Treatment Plan done but not signed at time of office visit due to:  Plan reviewed in person and verbal consent given due to [place] social distchristiano Paniagua MD 02/22/21

## 2021-02-22 NOTE — BH TREATMENT PLAN
TREATMENT PLAN (Medication Management Only)        [facility]    Name and Date of Birth:  Eleni Forbes 55 y o  1965  Date of Treatment Plan: February 22, 2021  Diagnosis/Diagnoses:    1  Bipolar disorder, current episode mixed, moderate ([address] )      Strengths/Personal Resources for Self-Care: supportive family, supportive friends, taking medications as prescribed, ability to adapt to life changes, ability to communicate needs, ability to communicate well, ability to listen, ability to reason, ability to understand psychiatric illness, average or above intelligence, family ties, general fund of knowledge, good understanding of illness, independence, motivation for treatment, ability to negotiate basic needs, self-reliance, sense of humor, special hobby/interest, willingness to work on problems  Area/Areas of need (in own words): mood instability, mood swings, family conflict  1  Long Term Goal: improve mood stability  Target Date:1 year - 2/22/2022  Person/Persons responsible for completion of goal: Chandler King  Short Term Objective (s) - How will we reach this goal?:   A  Provider new recommended medication/dosage changes and/or continue medication(s): continue current medications as prescribed  B  Attend medication management appointments regularly  C  Take psychiatric medications responsibly  D  Find purpose in life - 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  E  Expand social network - meet up with friends/family 1-2x per week over the next 6 months  F   Consider starting individual or group therapy by August 2021  Target Date:6 months - 8/22/2021  Person/Persons Responsible for Completion of Goal: Agnes Merritt  Progress Towards Goals: continuing treatment  Treatment Modality: medication management every 4 weeks  Review due 180 days from date of this plan: 6 months - 8/22/2021  Expected length of service: ongoing treatment  My Physician/PA/NP and I have developed this plan together and I agree to work on the goals and objectives  I understand the treatment goals that were developed for my treatment  Treatment Plan completed with assistance and input from patient and verbal consent provided  Treatment plan was not signed at time of office visit secondary to COVID-19 social distancing guidelines

## 2021-02-22 NOTE — PATIENT INSTRUCTIONS
- Taper Effexor 75mg TID to 75g BID for two weeks - After 2 weeks, taper dose to 75mg Daily  - Continue Trileptal 600mg twice per day  - Change interval of IM Invega 234mg injection from Q4 weeks to Q3 weeks  - Continue Trazodone 300mg QHS as needed for sleep  - Continue use of Klonopin as needed for anxiety

## 2021-02-23 ENCOUNTER — HOSPITAL ENCOUNTER (OUTPATIENT)
Dept: NEUROLOGY | Facility: CLINIC | Age: 56
Discharge: HOME | End: 2021-02-23
Payer: COMMERCIAL

## 2021-02-23 DIAGNOSIS — R55 SYNCOPE AND COLLAPSE: ICD-10-CM

## 2021-02-23 DIAGNOSIS — R56.9 OBSERVED SEIZURE-LIKE ACTIVITY (HCC): ICD-10-CM

## 2021-02-23 DIAGNOSIS — G25.3 MYOCLONIC JERKING: ICD-10-CM

## 2021-02-23 PROCEDURE — 95816 EEG AWAKE AND DROWSY: CPT

## 2021-02-23 PROCEDURE — 95816 EEG AWAKE AND DROWSY: CPT | Performed by: PSYCHIATRY & NEUROLOGY

## 2021-02-24 ENCOUNTER — TELEPHONE (OUTPATIENT)
Dept: NEUROLOGY | Facility: CLINIC | Age: 56
End: 2021-02-24

## 2021-02-24 DIAGNOSIS — R56.9 OBSERVED SEIZURE-LIKE ACTIVITY (HCC): ICD-10-CM

## 2021-02-24 DIAGNOSIS — R55 SYNCOPE AND COLLAPSE: ICD-10-CM

## 2021-02-24 DIAGNOSIS — I25.118 CORONARY ARTERY DISEASE OF NATIVE ARTERY OF NATIVE HEART WITH STABLE ANGINA PECTORIS (HCC): ICD-10-CM

## 2021-02-24 DIAGNOSIS — I95.1 ORTHOSTATIC HYPOTENSION: Primary | ICD-10-CM

## 2021-02-24 NOTE — TELEPHONE ENCOUNTER
The order can be placed for now to hold patients spot, new order will just need to be placed when EMU admission orders are entered and they can link the 2  Maybe just place a comment on the order that patient will be inpatient on EMU unit  Study already scheduled 3/16

## 2021-02-24 NOTE — TELEPHONE ENCOUNTER
----- Message from Meño Stallings MD sent at 2/23/2021  5:53 PM EST -----  There is no finding on the MRI brain study to explain episodes of loss of consciousness  Routine EEG study is normal (does not rule out seizures as a possibility)  I recommend in inpatient video EEG monitoring study (EMU) as discussed from the last telemedicine visit to better characterize these spells  Patient will need an admission on a Monday and try to coordinate a tilt table study with the cardiology lab during the admission  Prior to admission can we call the cardiology lab to best schedule a time when he is in the hospital for a tilt table and EEG monitoring?

## 2021-02-24 NOTE — TELEPHONE ENCOUNTER
Spoke to patient  Reviewed previous with him  Verbalized understanding with results and recommendations  He will speak to wife regarding EMU admission and give our office a call back  Informed patient we are looking at the week of 3/15/2021 9am admission time  Patient was placed on the calendar, to hold the spot due to attempting to coordinate testing  (nothing else done as far as EMU scheduling - will need ADT21 order entered and encounter sent to [facility])  Informed patient we will follow up with him on Friday if we don't hear from him before then  Spoke to cardiology (ext [phone]  They would be able to schedule patient for Tilt table study on 3/16/2021  They need an order for inpatient tilt table study to be placed so spot can be held for patient  Asking for a call back at [phone] once order has been placed  Dr Ezra BARRETO  We can let you know once we receive confirmation from patient, but order will need to be placed

## 2021-02-26 ENCOUNTER — OFFICE VISIT (OUTPATIENT)
Dept: NEUROLOGY | Facility: CLINIC | Age: 56
End: 2021-02-26
Payer: COMMERCIAL

## 2021-02-26 VITALS
HEIGHT: 71 IN | BODY MASS INDEX: 26.6 KG/M2 | HEART RATE: 100 BPM | WEIGHT: 190 LBS | SYSTOLIC BLOOD PRESSURE: 80 MMHG | DIASTOLIC BLOOD PRESSURE: 62 MMHG

## 2021-02-26 DIAGNOSIS — I95.1 ORTHOSTATIC HYPOTENSION: ICD-10-CM

## 2021-02-26 DIAGNOSIS — G25.3 MYOCLONIC JERKING: ICD-10-CM

## 2021-02-26 DIAGNOSIS — R56.9 OBSERVED SEIZURE-LIKE ACTIVITY (HCC): ICD-10-CM

## 2021-02-26 DIAGNOSIS — G90.8 DYSAUTONOMIA-LIKE DISORDER: ICD-10-CM

## 2021-02-26 DIAGNOSIS — R55 SYNCOPE AND COLLAPSE: Primary | ICD-10-CM

## 2021-02-26 PROCEDURE — 99215 OFFICE O/P EST HI 40 MIN: CPT | Performed by: PSYCHIATRY & NEUROLOGY

## 2021-02-26 NOTE — PROGRESS NOTES
[facility] [facility]  Patient's Name: Jadiel Campos   Patient's : 1965   Visit Type: follow-up  Referring MD / PCP:  Ni Simmons DO    Assessment:  Mr  Jadiel Campos is a 55 y o  man who has been experiencing multiple recurrent episodes of postural related lightheadedness, period of appearing "disconnected", and brief generalized body shaking that may progress to loss of consciousness  These symptoms are likely due to orthostatic hypotension or orthostatic intolerance  These symptoms started a couple of months after Marii-en-Y gastric bypass surgery  Clinical exam today did not reveal alternative causes for dysautonomia  There is no parkinsonian or cerebellar dysfunction or other symptoms of dysautonomia  His symptoms of brief generalized body shaking may still be myoclonus related to orthostatic hypotension and hypoperfusion of the brainstem  But due to periods of "disconnected" and shaking activity, I still believe it is important to rule out the possibility of seizures and an underlying cause  This can be accomplished with inpatient video EEG monitoring (epilepsy monitoring unit, EMU)  This would allow for immediate characterization of symptoms with continuous EEG, single channel ECG, and vital signs assessment  During the EMU study, we can arrange for tilt table study to be performed while on continuous EEG monitoring  Once we rule out seizures, I recommend an evaluation by a dysautonomia specialist for other secondary causes of dysautonomia  The closest provider that I am aware of is at [facility] in [place], [place] (I'll place a referral to Dr Fei Bhardwaj)  Once other causes are ruled out, then all that remains is orthostatic hypotension related to post-gastric bypass      Before trying another blood pressure supporting medication (Droxidopa), he wants to work with his psychiatrist in trying to remove any of his medications that can be contributing to orthostatic hypotension  Plan:   1 - referral to EMU for spell characterization of episodic dizziness  During this admission, please place a Tilt table study (Tilt table study has been arranged for 3/16/2021, please place order as an inpatient, along with hold midodrine and Florinef on day of admission)  2 - referral to dysautonomia specialist at [facility] (Dr Zabrina Pritchard)  3 - when admitted to the EMU, please send out for autoimmune dysautonomia evaluation, serum (DYS2 at [facility]), and order AChR antibiodies  4 - follow-up after EMU study is completed  5 - I would consider discontinuing midodrine and Florinef if these medications do not work  I would consider a trial of Droxidopa 100mg three times a day or as needed based on symptoms  This medication could be titrated up to 300mg three times a day based on response  Side effects include supine hypertension, headache, nausea, fatigue; avoid in the setting of congestive heart failure and chronic renal failure  Problem List Items Addressed This Visit        Cardiovascular and Mediastinum    Orthostatic hypotension    Syncope and collapse - Primary       Nervous and Auditory    Dysautonomia-like disorder       Other    Myoclonic jerking    Observed seizure-like activity ([facility] [address] )          Chief Complaint:    Chief Complaint   Patient presents with    Syncope and collapse      HPI:    Cindy Alberto is a 55 y o  right handed male here for follow-up evaluation of orthostatic hypotension with periods of confusion and altered awareness  Interval History 2/26/2021  He continues to have frequent episodes of feeling lightheaded on standing  Lightheadedness starts immediately  If he walks for 30-40 seconds then he is likely going to pass out  He also gets lightheaded if he was to bend over and it will be likely that he will pass out    His son reports that he has a staring out, body tremors, and sometimes there is a period of sluggishness  Episodes are not present when he is sitting or lying down  He does not take his fludrocortisone and midodrine on a regular basis  He feels that these medications have not help to increase his blood pressure  He has been maximized on these doses  He has tried compression stockings without relief, they are very uncomfortable, these go up to his hip  He sleeps with a thick pillow to keep his head up  His psychiatrist has been trying to wean him off of venlafaxine and identify any medication that may be contributing to orthostatic hypotension  AED/side effects/compliance:  None for management of seizures disorder  Oxcarbazepine 600-600 by psychiatry for bipolar disorder    Event/Seizure semiology:  1  Typically provoked upon standing, lightheaded, sense of movement, wife/son reports a period of disconnected appearance and generalized shaking, mostly hands, then head/upper body, may progress to loss of consciousness  Prior Epilepsy History:  Intake History 2/2/2021  Patient was seen in the hospital on 7/16/2020 by the neurohospitalist service on consultation regarding syncope  Prior to his presentation he was complaining of episodes of "near syncope" and lightheaded with myoclonic jerking for about 1 month  He reports having myoclonic jerking for 2-3 seconds that occurs 4-5 times a month, more so while he changes positions  He was found to have low blood pressures with SBP 80 while standing and SBP 90 while sitting  He was previously on metoprolol, which was put on hold  He was put on midodrine and eventually Florinef  He had gastric bypass surgery in March 2020  He had a ZIO patch to evaluate for arrhythmias, none was found  He has had more episodes of orthostasis and passed out at work  He is employed as an , using ladders, and often he has to squat and stand  Patient's history:  About 6-7 months ago he started to have episodes of passing out    He gets really lightheaded and just goes goes out  There are days when he may pass out 2-3 times a day and sometimes he just feels lightheaded  There are no symptoms if he is sitting or lying down  These are usually triggered when he stand up  These symptoms can happened when he stands up, which has been consistent  Symptoms include a sensation of lightheaded, then shaking like he is going to have a seizure, more like twitching all over his body  These symptoms last 30-60 seconds  If he sits down the twitching goes away, but he still feels lightheaded  He may lose consciousness if he does not sit down  He has not lost consciousness for the past 1-2 weeks  He has good days and bad days  His wife mentioned that he may appear unresponsive  His wife reports he has a glazed look over his eyes, there is a period of disconnect for a couple seconds, looks freezes up, there is shaking in his hands (his son feels that he shakes all over, sometimes the shaking progresses up to the shoulder), when he passes out there is loss of color in his face, he would be unconscious for 30-60 seconds, sometimes longer  These episodes have become more frequent  He had bariatric surgery in March 2020, about 1-2 months later he had these symptoms  He was previously on blood pressure medications, he does not remember the name of the medications, but his medications were discontinued  Despite using Florinef and midodrine and drink 4-5 48 oz of water a day  He has had issues with constipation since his surgery  There have been no episodes of diarrhea  He has normal amount of sweat, no problems with dry eyes or dry mouth, no problems with urination, he is able to have an erection  He has issues with his balance since his bariatric surgery  There have been no tingling or loss of sensation in his hands  There is no weakness in his hands  He has episodes of hypomanic state, when he looks like a "happy drunk", he appears to ramble alone   He feels that he rambles every day  He has been on the same dose of trazodone, oxcarbazepine, venlafaxine, and clonazepam for 8-9 years for bipolar disorder, divalproex was discontinued due to double vision  He has been on these medications without complaint of syncopal symptoms for many years  There are no family members with seizures, orthostatic hypotension, or Parkinson's disease  Epilepsy Risk Factors:  Abnormal pregnancy: No  Abnormal birth/: No  Abnormal Development: No  Febrile seizures, simple: No  Febrile seizures, complex: No  CNS infection: No  Mental retardation: No  Cerebral palsy: No  Head injury (moderate/severe): No  CNS neoplasm: No  CNS malformation: No  Neurosurgical procedure: No  Stroke: No  CNS autoimmune disorder: No  Alcohol abuse: No  Drug abuse: No  Family history Sz/epilepsy: No    Prior AEDs:  medication Max dose Time used Reason to stop                 Prior workup:  x  Imagin2021 - MRI brain  Small arachnoid cyst in the left cerebellopontine angle  Normal MR brain study  EEGs:  2021  Routine - normal awake and drowsy    Labs:  Component      Latest Ref Rng & Units 2020   Iron Saturation      % 44     TIBC      250 - 450 ug/dL 240 (L)     Iron      65 - 175 ug/dL 105     Hemoglobin A1C      Normal 3 8-5 6%; PreDiabetic 5 7-6 4%;  Diabetic >=6 5%; Glycemic control for adults with diabetes <7 0% %  4 9    eAG, EST AVG Glucose      mg/dl  94    Folate      3 1 - 17 5 ng/mL 15 2     VITAMIN A LEVEL      20 1 - 62 0 ug/dL 32 0     VITAMIN B1, WHOLE BLOOD      66 5 - 200 0 nmol/L 123 2     Vitamin B-12      100 - 900 pg/mL 533     Vit D, 25-Hydroxy      30 0 - 100 0 ng/mL 39 7     ZINC      56 - 134 ug/dL 89     COPPER      69 - 132 ug/dL   86     Review of prior BPs and HRs:  Vitals with Age-Percentiles Systolic Diastolic Pulse   9687 110 70 92   10/5/2020 114 64 95   2020 90 50 101   2020 88 58 107   2021 110 62 85   1/20/2021 115 67 74   1/20/2021 108 57 62   1/20/2021 111 67 59     General exam   BP (!) 80/62 (BP Location: Right arm, Patient Position: Standing, Cuff Size: Standard)   Pulse 100   Ht 5' 11" (1 803 m)   Wt 86 2 kg (190 lb)   BMI 26 50 kg/m²    Supine HR 82,  /60  Sitting HR 92, BP 90/60  Standing , BP 80/62  Each measurement is taken in 3 minute intervals  Appearance: normally developed, appears well  Carotids: no bruit  Cardiovascular: regular rate and rhythm and normal heart sounds  Pulmonary: clear to auscultation    HEENT: anicteric and neck is supple   Fundoscopy: normal    Mental status  Orientation: alert and oriented to name, place, time  Fund of Knowledge: intact   Attention and Concentration: intact  Current and Remote Memory:intact  Language: spontaneous speech is normal and comprehension is intact    Cranial Nerves  CN 1: not tested  CN 2: Visual fields intact to confrontation and pupils equal round reactive to direct and consenual light   CN 3, 4, 6: EOMI, no nystagmus  CN 5:sensation intact to all distribution V1, V2, V3  CN 7:not assessed  CN 8:symmetric to finger rubs bilaterally  CN 9, 10:no dysarthria present  CN 11:symmetric SCM with head turns  CN 12:not assessed    Motor:  Bulk, Tone: normal bulk, normal tone  Pronation: no pronator drift  Strength: Patient has full strength symmetrically of shoulder abduction, biceps, triceps, finger flexion, finger abduction, hip flexion, knee flexion, knee extension, dorsiflexion  Abnormal movements: no abnormal movements are present    Sensory:  Pinprick: intact in all fingers and toes  Vibration: intact in all fingers and toes  Temperature: intact in all fingers and toes  Romberg:normal    Coordination:  FNF:FNF bilaterally intact  LUCIA:intact  FFM:intact  Gait/Station:normal gait and normal tandem gait    Reflexes:  Unable to elicit with biceps, triceps, brachioradialis, and ankles  Knees are 1+/4    Past Medical/Surgical History:  Patient Active Problem List   Diagnosis    NSTEMI (non-ST elevated myocardial infarction) ([facility] [address] )    Bipolar disorder, current episode mixed, moderate ([facility] [address] )    ROSI (obstructive sleep apnea)    Coronary artery disease of native artery of native heart [facility]    Former smoker    Migraine with aura and without status migrainosus, not intractable    Hypercholesterolemia    Bariatric surgery status    Primary insomnia    Overweight    Encounter for surgical aftercare following surgery of digestive system    Postsurgical malabsorption    Tobacco use    Orthostatic hypotension    Myoclonic jerking    Syncope and collapse    Infiltrate of middle lobe of right lung present on imaging study    Hypokalemia    Elevated troponin    Observed seizure-like activity (HCC)    Dysautonomia-like disorder     Past Surgical History:   Procedure Laterality Date    CARDIAC CATHETERIZATION      no stents     COLONOSCOPY      EGD      HERNIA REPAIR      umbilical hernia    OK LAP GASTRIC BYPASS/FRITZ-EN-Y N/A 3/2/2020    Procedure: LAPAROSCOPIC FRITZ-EN-Y GASTRIC BYPASS AND INTRAOPERATIVE EGD;  Surgeon: Ashtyn Chang MD;  Location: [facility] OR;  Service: Bariatrics    SKIN SURGERY      cyst removed from back and thumb    TONSILLECTOMY      TOOTH EXTRACTION       Past Psychiatric History:  Depression: Bipolar disorder  Anxiety: No  Psychosis: No  No prior behavioral health hospital    Medications:    Current Outpatient Medications:     acetaminophen (TYLENOL) 325 mg tablet, Take 3 tablets (975 mg total) by mouth every 8 (eight) hours, Disp: 30 tablet, Rfl: 0    aspirin 81 mg chewable tablet, Chew 1 tablet (81 mg total) daily, Disp: 30 tablet, Rfl: 0    atorvastatin (LIPITOR) 40 mg tablet, Take 1 tablet (40 mg total) by mouth daily, Disp: 90 tablet, Rfl: 1    clonazePAM (KlonoPIN) 1 mg tablet, Take 1 tablet (1 mg total) by mouth 3 (three) times a day (Patient taking differently: Take 1 mg by mouth 3 (three) times a day as needed ), Disp: 270 tablet, Rfl: 0    fludrocortisone (FLORINEF) 0 1 mg tablet, Take 4 tablets (0 4 mg total) by mouth daily, Disp: 120 tablet, Rfl: 10    midodrine (PROAMATINE) 10 MG tablet, Take 1 tablet (10 mg total) by mouth 3 (three) times a day before meals, Disp: 90 tablet, Rfl: 0    OXcarbazepine (TRILEPTAL) 600 mg tablet, Take 1 tablet (600 mg total) by mouth 2 (two) times a day, Disp: 180 tablet, Rfl: 0    paliperidone palmitate ER (INVEGA) 234 mg/1 5 mL IM injection, Inject 1 5 mL (234 mg total) into a muscle every 28 days, Disp: 1 Syringe, Rfl: 3    sodium chloride 1 g tablet, Take 1 tablet (1 g total) by mouth 3 (three) times a day, Disp: 90 tablet, Rfl: 3    traZODone (DESYREL) 300 MG tablet, Take 1 tablet (300 mg total) by mouth daily at bedtime, Disp: 90 tablet, Rfl: 1    venlafaxine (EFFEXOR) 75 mg tablet, Take 1 tablet (75 mg total) by mouth 3 (three) times a day (Patient taking differently: Take 75 mg by mouth 2 (two) times a day ), Disp: 270 tablet, Rfl: 3    potassium chloride (K-DUR,KLOR-CON) 20 mEq tablet, Take 1 tablet (20 mEq total) by mouth daily, Disp: 30 tablet, Rfl: 5    ranolazine (RANEXA) 1000 MG SR tablet, Take 1 tablet (1,000 mg total) by mouth 2 (two) times a day, Disp: 180 tablet, Rfl: 3    Allergies:  No Known Allergies    Family history:  Family History   Problem Relation Age of Onset    Lung cancer Mother     Brain cancer Mother     Diabetes Brother     Bipolar disorder Maternal Grandfather     Bipolar disorder Son      There is no family history of seizure, epilepsy or developmental delay  Social History  Living situation:  Lives with wife and children  Work:  Completed some college, , commercial/industrial work  Driving:  Yes   reports that he quit smoking about 10 years ago  His smoking use included cigarettes  His smokeless tobacco use includes chew  He reports previous alcohol use   He reports that he does not use drugs  Review of Systems  A review of at least 12 organ/systems was obtained by the medical assistant and reviewed by me, including additional positives/negatives:  Neurological: Positive for dizziness and light-headedness  Decision making was of high-complexity due to the patient's high risk condition (seizures), psychiatric and neuropsychological comorbidities, behavioral problems, memory and cognitive problems and medication side effects  The total amount of time spent with the patient along with pre-chart and post-chart preparation was 63 minutes on the calendar day of the date of service  This included history taking, physical exam, review of ancillary testing, counseling provided to the patient regarding diagnosis, medications, treatment, and risk management, and other communication to the patient's providers and/or family    Start time: 8:15AM  End time: 9:18AM

## 2021-02-26 NOTE — TELEPHONE ENCOUNTER
Spoke to patient  Confirmed he is agreeable to admission 3/15/2021 9am  EMU admission paperwork mailed to patient  ADT21 order entered  Previously placed on EMU calendar  Juan Kennedy patient for EMU admission 3/15/2021 9am     Dr Munguia - patient scheduled for EMU  Message again left for cardiology to confirm TILT table study

## 2021-02-26 NOTE — PATIENT INSTRUCTIONS
Plan:   1 - referral to EMU for spell characterization of episodic dizziness  2 - referral to dysautonomia specialist at [facility] (Dr Mitul Torres)  3 - autoimmune dysautonomia evaluation, serum (DYS2 at [facility]), order AChR antibiodies  4 - follow-up after EMU study is completed  5 - I would consider discontinuing midodrine and Florinef if these medications do not work  I would consider a trial of Droxidopa 100mg three times a day or as needed based on symptoms  This medication could be titrated up to 300mg three times a day based on response  Side effects include supine hypertension, headache, nausea, fatigue; avoid in the setting of congestive heart failure and chronic renal failure  6 - try to stay active as much as possible with supine/sitting exercises  Video Electroencephalopathy (VEEG) and the Epilepsy Monitoring Unit (EMU)  Location of the EMU is in the [address] [place] [address] in [place], [place] on [address] ([address])    What is video EEG?  In video-EEG, you are video taped at the same time as your brain waves are recorded   This typically occurs in the hospital over a long period of time, often an average of 3-7 days   The doctor is able to review both the video and EEG at the same time to see exactly what your brain waves are doing while watching what your body is doing  Why do I need video-EEG? Typically the reasons to have a video-EEG study are to make a diagnosis, classify the type of seizures and epilepsy, and if medications do not prevent the seizures then offer an alternative therapy (pre-surgical evaluation)   Some people have events that look like an epileptic seizure (caused by electrical storm of the brain); but are in fact not due to abnormal electrical activity in the brain    Other causes include cardiac or vascular pathology, other neurological causes such as tics and movement disorders, or psychological / psychogenic nonepileptic events   There are also many different kinds of epileptic seizures  The video EEG will help classify the seizures and epilepsy syndrome to determine the best medications or treatments   Nearly a third of epilepsy patients are medically refractory (failed 2 or more appropriate anti-seizure medications) and epilepsy surgery may be an option for these patients  VEEG is used to evaluate where seizures start in the brain, determine if surgery may be possible and guide the specific surgical approach for patients who are found to be surgical candidates  What to expect in the Epilepsy Monitoring Unit (EMU)  The EMU is a specialized inpatient unit in the hospital specially designed for evaluation of seizure disorders  The unit is equipped with computer-based monitoring equipment, certified EEG technologists, trained nurses, and attending physicians trained in the management of epilepsy   In the EMU, seizures are recorded and specially reviewed so that a proper diagnosis can be made and treatment can be optimized   Each patient will have a private room and a private bathroom   Patients will be connected to video-EEG equipment continuously (24 hours a day)  o There is no video recording while in the restroom, but brain waves are recorded on the EEG at all times  o Although this can be bothersome, continuous monitoring at all times is necessary to make sure patients are safe and that the necessary information is collected   Because patients are connected to recording equipment, mobility is restricted to the patients room   Patients are not be able to take a shower or wash your hair while on EEG monitoring  o This would interfere with your EEG electrodes  o Washing with a basin or sink is permitted   Patients are asked to activate a push button when they experience an event and then state aloud what they are experiencing      Nurses will test the patient during an event to help the doctors see what is occurring   To increase the chance of capturing a seizure in a limited amount of time a variety of activation procedures are employed  o Sleep deprivation (no naps during the day and attempts to keep you awake all night as often as every other night)  o Photic stimulation and hyperventilation procedures  o Weaning or withdrawal of medications used to control seizures (the attending physician will discuss this with you)   You will need to have a peripheral IV placed in your hand or arm  This allows the doctors to give rescue medications in the event of a medical emergency while you are in the hospital  You also may be given a blood thinner in the form of daily subcutaneous injections to prevent deep venous thrombosis (DVT)   The hospital is a non-smoking facility, therefore smoking is not allowed   Chewing gum is not allowed, this creates artifact on the EEG   If you are a woman of childbearing age, you may be asked to take a urine pregnancy test    We strive to make the EMU as safe as possible  Throughout the world video EEG monitoring is the standard of care and thousands of patients have been admitted to epilepsy monitoring units and there are rare cases of complications due to severely difficult to control epilepsy  These have included seizure clusters, status epilepticus (seizures that do not stop with typical medications and advanced measures are required), injuries (fractures and head injury), and very rarely death  How long do patients stay in the EMU?  The length of time varies for each patient   Prepare to stay about 1 week (even though it may not last that long)  o Typically, patients stay between 3-7 days, but may stay more or less time in special circumstances  Things you can do to prepare for admission   Take a shower and wash your hair the night before or the morning of admission      Do not use any hair products such as gels, sprays, mousse, or hair weaves  o It is NOT necessary to cut your hair or shave your head for the test     Unless you received specific instructions from your physician, continue to take your medications as you normally do, including the morning of your admission   Bring all of your current medications in the original prescription bottles to the hospital (some specialty medications may not be available in the hospital)   Bring a complete list of your medical and surgical history   Bring your seizure calendar   Wear comfortable clothing or pajamas  o Button-down shirts and elastic-waist pants are preferred    o You can bring slippers or sneakers for when you are walking around the room   You can bring activities like computers, phones, cards, music, movies, etc  with you to keep you occupied  o Electronic devices cannot be plugged in while you are touching them (this interferes with the equipment), but can be charging on the other side of the room      If you need any medical devices (such as a CPAP for obstructive sleep apnea), please bring it to the hospital

## 2021-03-03 ENCOUNTER — OFFICE VISIT (OUTPATIENT)
Dept: PSYCHIATRY | Facility: CLINIC | Age: 56
End: 2021-03-03
Payer: COMMERCIAL

## 2021-03-03 DIAGNOSIS — F31.62 BIPOLAR DISORDER, CURRENT EPISODE MIXED, MODERATE (HCC): Primary | ICD-10-CM

## 2021-03-03 PROCEDURE — 99213 OFFICE O/P EST LOW 20 MIN: CPT | Performed by: NURSE PRACTITIONER

## 2021-03-03 NOTE — TELEPHONE ENCOUNTER
[facility]  He was unable to schedule as office needed our office notes and referral  Printed and faxed to [phone]  (Verified fax number with Dr Ardon's office)

## 2021-03-03 NOTE — PSYCH
Invega Sustena 234 mg IM administered in L shoulder  No site reactions present   Will administer his injection q3wks

## 2021-03-05 ENCOUNTER — TELEMEDICINE (OUTPATIENT)
Dept: BARIATRICS | Facility: CLINIC | Age: 56
End: 2021-03-05
Payer: COMMERCIAL

## 2021-03-05 VITALS — WEIGHT: 179 LBS | BODY MASS INDEX: 25.06 KG/M2 | HEIGHT: 71 IN

## 2021-03-05 DIAGNOSIS — R42 LIGHTHEADEDNESS: Primary | ICD-10-CM

## 2021-03-05 DIAGNOSIS — E87.6 HYPOKALEMIA: ICD-10-CM

## 2021-03-05 DIAGNOSIS — I95.1 ORTHOSTATIC HYPOTENSION: ICD-10-CM

## 2021-03-05 PROCEDURE — 99214 OFFICE O/P EST MOD 30 MIN: CPT | Performed by: SURGERY

## 2021-03-05 RX ORDER — POTASSIUM CHLORIDE 20 MEQ/1
20 TABLET, EXTENDED RELEASE ORAL DAILY
Qty: 30 TABLET | Refills: 5 | Status: SHIPPED | OUTPATIENT
Start: 2021-03-05 | End: 2021-09-07 | Stop reason: SDUPTHER

## 2021-03-05 NOTE — PROGRESS NOTES
Virtual Regular Visit      Assessment/Plan:    Problem List Items Addressed This Visit     None               Reason for visit is   Chief Complaint   Patient presents with    Follow-up    Virtual Regular Visit        Encounter provider Carol Bustos MD    Provider located at [address] [place] [id number]      Recent Visits  No visits were found meeting these conditions  Showing recent visits within past 7 days and meeting all other requirements     Today's Visits  Date Type Provider Dept   03/05/21 Telemedicine Carol Bustos MD [facility]   Showing today's visits and meeting all other requirements     Future Appointments  No visits were found meeting these conditions  Showing future appointments within next 150 days and meeting all other requirements        The patient was identified by name and date of birth  Ericka Ramesh was informed that this is a telemedicine visit and that the visit is being conducted through [employer] and patient was informed that this is a secure, HIPAA-compliant platform  He agrees to proceed     My office door was closed  No one else was in the room  He acknowledged consent and understanding of privacy and security of the video platform  The patient has agreed to participate and understands they can discontinue the visit at any time  Patient is aware this is a billable service  Alejo Sherman is a 55 y o  male s/p LRYGB with orthostatic hypotension R/O autonomic dysfunction, patient has been evaluated by cardiology and neurology as well and is supposed to undergo additional diagnostic studies and evaluation by a dysautonomia specialist at [facility] in addition to an EMU study   I had a long discussion with the patient and given that dysautonomia can be result of rapid weight loss following RYGB I did mention that it would not be unreasonable to consider Marii-en-Y gastric bypass reversal or even a gastric remnant feeding tube to start  I will wait until the studies are completely performed and reviewed prior to deciding on the next step        HPI     Past Medical History:   Diagnosis Date    Bariatric surgery status     CPAP (continuous positive airway pressure) dependence     Hearing loss of aging     Hypercholesterolemia     Morbid obesity ([address] )     NSTEMI (non-ST elevation myocardial infarction[facility])     april 2019    Postsurgical malabsorption        Past Surgical History:   Procedure Laterality Date    CARDIAC CATHETERIZATION      no stents     COLONOSCOPY      EGD      HERNIA REPAIR      umbilical hernia    [place] LAP GASTRIC BYPASS/FRITZ-EN-Y N/A 3/2/2020    Procedure: LAPAROSCOPIC FRITZ-EN-Y GASTRIC BYPASS AND INTRAOPERATIVE EGD;  Surgeon: Alison Cardona MD;  Location: [facility];  Service: Bariatrics    SKIN SURGERY      cyst removed from back and thumb    TONSILLECTOMY      TOOTH EXTRACTION         Current Outpatient Medications   Medication Sig Dispense Refill    acetaminophen (TYLENOL) 325 mg tablet Take 3 tablets (975 mg total) by mouth every 8 (eight) hours 30 tablet 0    aspirin 81 mg chewable tablet Chew 1 tablet (81 mg total) daily 30 tablet 0    atorvastatin (LIPITOR) 40 mg tablet Take 1 tablet (40 mg total) by mouth daily 90 tablet 1    clonazePAM (KlonoPIN) 1 mg tablet Take 1 tablet (1 mg total) by mouth 3 (three) times a day (Patient taking differently: Take 1 mg by mouth 3 (three) times a day as needed ) 270 tablet 0    fludrocortisone (FLORINEF) 0 1 mg tablet Take 4 tablets (0 4 mg total) by mouth daily 120 tablet 10    midodrine (PROAMATINE) 10 MG tablet Take 1 tablet (10 mg total) by mouth 3 (three) times a day before meals 90 tablet 0    OXcarbazepine (TRILEPTAL) 600 mg tablet Take 1 tablet (600 mg total) by mouth 2 (two) times a day 180 tablet 0    paliperidone palmitate ER (INVEGA) 234 mg/1 5 mL IM injection Inject 1 5 mL (234 mg total) into a muscle every 28 days 1 Syringe 3    potassium chloride (K-DUR,KLOR-CON) 20 mEq tablet Take 1 tablet (20 mEq total) by mouth daily 30 tablet 5    ranolazine (RANEXA) 500 mg 12 hr tablet Take 2 tablets (1,000 mg total) by mouth 2 (two) times a day 180 tablet 3    sodium chloride 1 g tablet Take 1 tablet (1 g total) by mouth 3 (three) times a day 90 tablet 3    traZODone (DESYREL) 300 MG tablet Take 1 tablet (300 mg total) by mouth daily at bedtime 90 tablet 1    venlafaxine (EFFEXOR) 75 mg tablet Take 1 tablet (75 mg total) by mouth 3 (three) times a day (Patient taking differently: Take 75 mg by mouth 2 (two) times a day ) 270 tablet 3     No current facility-administered medications for this visit  No Known Allergies    Review of Systems    Video Exam    Vitals:    03/05/21 0916   Weight: 81 2 kg (179 lb)   Height: 5' 11" (1 803 m)       Physical Exam     I spent 15 minutes directly with the patient during this visit      VIRTUAL VISIT 41 Jeancarlos Ruiz acknowledges that he has consented to an online visit or consultation  He understands that the online visit is based solely on information provided by him, and that, in the absence of a face-to-face physical evaluation by the physician, the diagnosis he receives is both limited and provisional in terms of accuracy and completeness  This is not intended to replace a full medical face-to-face evaluation by the physician  Charmayne Poplar understands and accepts these terms

## 2021-03-07 RX ORDER — RANOLAZINE 1000 MG/1
1000 TABLET, EXTENDED RELEASE ORAL 2 TIMES DAILY
Qty: 180 TABLET | Refills: 3 | Status: SHIPPED | OUTPATIENT
Start: 2021-03-07 | End: 2022-03-23 | Stop reason: SDUPTHER

## 2021-03-10 ENCOUNTER — OFFICE VISIT (OUTPATIENT)
Dept: BARIATRICS | Facility: CLINIC | Age: 56
End: 2021-03-10

## 2021-03-10 VITALS — WEIGHT: 178 LBS | BODY MASS INDEX: 24.83 KG/M2

## 2021-03-10 DIAGNOSIS — Z98.84 BARIATRIC SURGERY STATUS: Primary | ICD-10-CM

## 2021-03-10 DIAGNOSIS — E66.3 OVERWEIGHT: ICD-10-CM

## 2021-03-10 DIAGNOSIS — Z98.84 BARIATRIC SURGERY STATUS: ICD-10-CM

## 2021-03-10 DIAGNOSIS — K91.2 POSTSURGICAL MALABSORPTION: Primary | ICD-10-CM

## 2021-03-10 PROCEDURE — RECHECK

## 2021-03-10 PROCEDURE — RECHECK: Performed by: DIETITIAN, REGISTERED

## 2021-03-10 NOTE — PROGRESS NOTES
Bariatric Follow Up Nutrition Note Televisit  i  Name was verified by patient stating name  ii   verified by patient stating  iii  You verified the patient is alone  iv  I would like to verify that you were offered a live visit but are now consenting to this telephone visit  v  This visit is free    Type of surgery  Gastric bypass: laparoscopic  Surgery Date: 3/2/2020  1 year post-op  Surgeon: Dr Marcos Tarango  55 y o   male  Wt 80 7 kg (178 lb) Comment: per pt report due to televisit  BMI 24 83 kg/m²    Pt reports his weight continues to be stable between 178-182lbs  [facility] Equation:     Weight maintenance= 2047 kcal/day  Estimated calories for weight loss 1547 kcal/day (1# per wk wt loss - sedentary )  Estimated protein needs 81 2-97 g/day (1 0-1 2 gms/kg IBW )   Estimated fluid needs 0435-5882 mL/day (81-95 oz/day)(30-35 ml/kg IBW )      Initial office jtjqjl=686 1lbs 2019  Weight on Day of Weight Loss Surgery: 283 4lbs  Weight in (lb) to have BMI = 25: 178 6lbs  Pre-Op Excess Wt: 117 5lbs  Post-Op Wt Loss: 108 7#/ 92% EBWL in 1 5 year(s) (since start of program)  96# wt loss since surgery x 1 year  Pt reports his current weight is stable and he is happy with his current weight      Review of History and Medications   Past Medical History:   Diagnosis Date    Bariatric surgery status     CPAP (continuous positive airway pressure) dependence     Hearing loss of aging     Hypercholesterolemia     Morbid obesity ([address] )     [facility]STEMI [facility]non-ST [facility])     2019    Postsurgical malabsorption      Past Surgical History:   Procedure Laterality Date    CARDIAC CATHETERIZATION      no stents     COLONOSCOPY      EGD      HERNIA REPAIR      umbilical hernia    [place] LAP GASTRIC BYPASS/FRITZ-EN-Y N/A 3/2/2020    Procedure: LAPAROSCOPIC FRITZ-EN-Y GASTRIC BYPASS AND INTRAOPERATIVE EGD;  Surgeon: Lawrence Mcfarlane MD; Location: [facility];  Service: Bariatrics    SKIN SURGERY      cyst removed from back and thumb    TONSILLECTOMY      TOOTH EXTRACTION       Social History     Socioeconomic History    Marital status: /Civil Union     Spouse name: Not on file    Number of children: Not on file    Years of education: Not on file    Highest education level: Not on file   Occupational History    Not on file   Social Needs    Financial resource strain: Not on file    Food insecurity     Worry: Not on file     Inability: Not on file   [employer] needs     Medical: Not on file     Non-medical: Not on file   Tobacco Use    Smoking status: Former Smoker     Types: Cigarettes     Quit date: 2011     Years since quitting: 10 1    Smokeless tobacco: Current User     Types: Chew     Last attempt to quit: 6/17/2014    Tobacco comment: quit cigarettes 2011   Substance and Sexual Activity    Alcohol use: Not Currently     Frequency: Never    Drug use: Never    Sexual activity: Not on file   Lifestyle    Physical activity     Days per week: Not on file     Minutes per session: Not on file    Stress: Not on file   Relationships    Social connections     Talks on phone: Not on file     Gets together: Not on file     Attends [employer] service: Not on file     Active member of club or organization: Not on file     Attends meetings of clubs or organizations: Not on file     Relationship status: Not on file    Intimate partner violence     Fear of current or ex partner: Not on file     Emotionally abused: Not on file     Physically abused: Not on file     Forced sexual activity: Not on file   Other Topics Concern    Not on file   Social History Narrative    Former smoker - As per [facility]    Full-time employment        · Do you currently or have you served in [employer] [facility] [employer]:   No    As per [place]        Current Outpatient Medications:     acetaminophen (TYLENOL) 325 mg tablet, Take 3 tablets (975 mg total) by mouth every 8 (eight) hours, Disp: 30 tablet, Rfl: 0    aspirin 81 mg chewable tablet, Chew 1 tablet (81 mg total) daily, Disp: 30 tablet, Rfl: 0    atorvastatin (LIPITOR) 40 mg tablet, Take 1 tablet (40 mg total) by mouth daily, Disp: 90 tablet, Rfl: 1    clonazePAM (KlonoPIN) 1 mg tablet, Take 1 tablet (1 mg total) by mouth 3 (three) times a day (Patient taking differently: Take 1 mg by mouth 3 (three) times a day as needed ), Disp: 270 tablet, Rfl: 0    fludrocortisone (FLORINEF) 0 1 mg tablet, Take 4 tablets (0 4 mg total) by mouth daily, Disp: 120 tablet, Rfl: 10    midodrine (PROAMATINE) 10 MG tablet, Take 1 tablet (10 mg total) by mouth 3 (three) times a day before meals, Disp: 90 tablet, Rfl: 0    OXcarbazepine (TRILEPTAL) 600 mg tablet, Take 1 tablet (600 mg total) by mouth 2 (two) times a day, Disp: 180 tablet, Rfl: 0    paliperidone palmitate ER (INVEGA) 234 mg/1 5 mL IM injection, Inject 1 5 mL (234 mg total) into a muscle every 28 days, Disp: 1 Syringe, Rfl: 3    potassium chloride (K-DUR,KLOR-CON) 20 mEq tablet, Take 1 tablet (20 mEq total) by mouth daily, Disp: 30 tablet, Rfl: 5    ranolazine (RANEXA) 1000 MG SR tablet, Take 1 tablet (1,000 mg total) by mouth 2 (two) times a day, Disp: 180 tablet, Rfl: 3    sodium chloride 1 g tablet, Take 1 tablet (1 g total) by mouth 3 (three) times a day, Disp: 90 tablet, Rfl: 3    traZODone (DESYREL) 300 MG tablet, Take 1 tablet (300 mg total) by mouth daily at bedtime, Disp: 90 tablet, Rfl: 1    venlafaxine (EFFEXOR) 75 mg tablet, Take 1 tablet (75 mg total) by mouth 3 (three) times a day (Patient taking differently: Take 75 mg by mouth 2 (two) times a day ), Disp: 270 tablet, Rfl: 3    Food Intake and Lifestyle Assessment   Food Intake Assessment completed via usual diet recall:  Pt reports he has been following my advice and is eating healthier foods at more regular times of day, usually three meals and two snacks each 3-4 hours apart    Pt reports he does feel better but this has not changed his dizzinewss/lightheaded episodes  Breakfast: instant oatmeal packet made with water and two clementines  Lunch:  Leftover meatloaf patties  Snack: almonds or walnuts  Dinner:  Fish or chicken, salad  Snack: "something with protein"  Pt's son also bought him a whey protein isolate powder which he is having once daily  Beverage intake: water: one large 48 oz cup about three to six per day  Diet texture/stage: regular  Protein supplement: whey protein isolate  Estimated protein intake per day: 90g  Estimated fluid intake per day:  oz/day  Meals eaten away from home: rare  Typical meal pattern: 3 meals per day and 2 snacks per day  Eating Behaviors: Appropriate portion sizes and Frequent snacking/ grazing  Pt does not like to cook  Pt's wife works 10-hr days and does not like to [employer] when she comes home  Pt instead picks grab-n-go things when he gets hungry  Pt reports he will eat almost anything, except dislikes sweet potatoes  Pt's son who lives with them is very health conscious, goes to the gym frequently, prepares pre-made lean protein meals for himself  Food allergies or intolerances: nkfa  Cultural or Worship considerations: none    Physical Assessment  Nutrition Related Findings  Dizziness and lightheaded, lower blood pressures, reports he has fainted/passed out several times  Pt reports he passed out at work and got laid off because of this  Pt reports that his blacking out/dizziness with standing has not gotten any better and he is in the process of being evaluated by neurology  Physical Activity  Types of exercise: None  Current physical limitations: lightheadedness/dizziness    Psychosocial Assessment   Support systems: spouse and children  Socioeconomic factors: currently laid off/disability for the past five months  Pt reports that his doctor is weaning him off of some of his psych meds and will be changing some others    Pt reports his mood has been good recently  Nutrition Diagnosis-continued  Diagnosis: Altered GI function (NC-1 4) and inadequate calorie, carbohydrate, and protein intake  Related to: Food and nutrition-related knowledge deficit, Limited adherence to nutrition-related recommendations, Altered GI function and Inability or lack of desire to manage self-care  As Evidenced by: pt's self-reported food intake, 94lb wt loss x 10 months, pt reports frequent lightheadedness  Interventions and Teaching   Patient educated on post-op nutrition guidelines  Patient educated and handouts provided  Previously provided pt with sample 400-500 kcal lunch and dinner meal suggestions  Adequate hydration:  Reviewed hydration goals with pt  Pt is currently getting approximately 96 to 144 oz water per day  Nutrition considerations after surgery  Protein supplements:  Reviewed protein supplement use  Meal planning and preparation  Appropriate carbohydrate, protein, and fat intake, and food/fluid choices to maximize safe weight loss, nutrient intake, and tolerance:  Reviewed post-operative protein goal   Recommended  gram protein per day  Reviewed how to estimate protein content of meat portions using food models  Dietary and lifestyle changes  Possible problems with poor eating habits:  Discussed having a source of protein with his snacks between breakfast and dinner, such as nuts  Pt is currently taking:  Bariatric Fusion chewable tablet multivitamin four per day  Salt tablets three times per day: 1 g each  Potassium at night  Miralax as needed    Education provided to: patient    Barriers to learning: No barriers identified    Readiness to change: action    Comprehension: needs reinforcement and verbalizes understanding     Expected Compliance: good    Evaluation/Monitoring   Eating pattern as discussed Tolerance of nutrition prescription Body weight Lab values Physical activity    Goals  Eat four meals per day    Three to four ounces lean protein at each meal   1/4 cup carbohydrate at each meal :  Pt is trying to eat six small meals per day, usually getting 2-3 meals plus several snacks plus 1-2 protein drinks  Add some sugar-free sports drinks  Pt is still drinking only plain water, but is getting RX salt tablets and potassium supplements daily       Time Spent:   30 Minutes

## 2021-03-10 NOTE — PROGRESS NOTES
Called patient for scheduled appointment today  Patient did not answer  VM left for patient to please return call and reschedule appointment

## 2021-03-11 NOTE — TELEPHONE ENCOUNTER
Call received from patient  He is still unable to schedule appt with Dr Ardon due to "no referral"  Call placed to Dr Ardon's office  Spoke to [employer]  Verified they have not received referral that was previously faxed  Referral faxed again to Dr Ardon's office  Will follow up tomorrow if received  [phone] successfully sent

## 2021-03-12 ENCOUNTER — TELEPHONE (OUTPATIENT)
Dept: NEUROLOGY | Facility: CLINIC | Age: 56
End: 2021-03-12

## 2021-03-12 NOTE — TELEPHONE ENCOUNTER
Call placed to Dr Ardon's office  They have received patients referral from our office  Went in to "review" and once reviewed they will contact patient  Detailed message left for patient informing him referral was received  Requested he follow up with office early next week

## 2021-03-12 NOTE — TELEPHONE ENCOUNTER
Patient calling in regards to his EMU admission  Wanted to know if his wife could come  Reviewed [facility] visitor policy with patient and states understanding

## 2021-03-15 ENCOUNTER — APPOINTMENT (INPATIENT)
Dept: NEUROLOGY | Facility: CLINIC | Age: 56
DRG: 312 | End: 2021-03-15
Payer: COMMERCIAL

## 2021-03-15 ENCOUNTER — HOSPITAL ENCOUNTER (INPATIENT)
Facility: HOSPITAL | Age: 56
LOS: 1 days | Discharge: HOME | DRG: 312 | End: 2021-03-16
Attending: PSYCHIATRY & NEUROLOGY | Admitting: PSYCHIATRY & NEUROLOGY
Payer: COMMERCIAL

## 2021-03-15 DIAGNOSIS — I95.1 ORTHOSTATIC HYPOTENSION: ICD-10-CM

## 2021-03-15 DIAGNOSIS — R55 SYNCOPE AND COLLAPSE: ICD-10-CM

## 2021-03-15 DIAGNOSIS — R56.9 OBSERVED SEIZURE-LIKE ACTIVITY (HCC): ICD-10-CM

## 2021-03-15 LAB
ALBUMIN SERPL BCP-MCNC: 3.1 G/DL (ref 3.5–5)
ALP SERPL-CCNC: 69 U/L (ref 46–116)
ALT SERPL W P-5'-P-CCNC: 28 U/L (ref 12–78)
ANION GAP SERPL CALCULATED.3IONS-SCNC: 5 MMOL/L (ref 4–13)
AST SERPL W P-5'-P-CCNC: 19 U/L (ref 5–45)
BASOPHILS # BLD AUTO: 0.09 THOUSANDS/ΜL (ref 0–0.1)
BASOPHILS NFR BLD AUTO: 1 % (ref 0–1)
BILIRUB SERPL-MCNC: 0.44 MG/DL (ref 0.2–1)
BUN SERPL-MCNC: 10 MG/DL (ref 5–25)
CALCIUM ALBUM COR SERPL-MCNC: 9 MG/DL (ref 8.3–10.1)
CALCIUM SERPL-MCNC: 8.3 MG/DL (ref 8.3–10.1)
CHLORIDE SERPL-SCNC: 103 MMOL/L (ref 100–108)
CO2 SERPL-SCNC: 27 MMOL/L (ref 21–32)
CREAT SERPL-MCNC: 0.6 MG/DL (ref 0.6–1.3)
EOSINOPHIL # BLD AUTO: 0.15 THOUSAND/ΜL (ref 0–0.61)
EOSINOPHIL NFR BLD AUTO: 2 % (ref 0–6)
ERYTHROCYTE [DISTWIDTH] IN BLOOD BY AUTOMATED COUNT: 12 % (ref 11.6–15.1)
GFR SERPL CREATININE-BSD FRML MDRD: 113 ML/MIN/1.73SQ M
GLUCOSE SERPL-MCNC: 63 MG/DL (ref 65–140)
HCT VFR BLD AUTO: 38.9 % (ref 36.5–49.3)
HGB BLD-MCNC: 13.3 G/DL (ref 12–17)
IMM GRANULOCYTES # BLD AUTO: 0.03 THOUSAND/UL (ref 0–0.2)
IMM GRANULOCYTES NFR BLD AUTO: 0 % (ref 0–2)
LYMPHOCYTES # BLD AUTO: 2.18 THOUSANDS/ΜL (ref 0.6–4.47)
LYMPHOCYTES NFR BLD AUTO: 24 % (ref 14–44)
MCH RBC QN AUTO: 30.9 PG (ref 26.8–34.3)
MCHC RBC AUTO-ENTMCNC: 34.2 G/DL (ref 31.4–37.4)
MCV RBC AUTO: 91 FL (ref 82–98)
MONOCYTES # BLD AUTO: 0.85 THOUSAND/ΜL (ref 0.17–1.22)
MONOCYTES NFR BLD AUTO: 9 % (ref 4–12)
NEUTROPHILS # BLD AUTO: 5.73 THOUSANDS/ΜL (ref 1.85–7.62)
NEUTS SEG NFR BLD AUTO: 64 % (ref 43–75)
NRBC BLD AUTO-RTO: 0 /100 WBCS
PLATELET # BLD AUTO: 261 THOUSANDS/UL (ref 149–390)
PMV BLD AUTO: 9.2 FL (ref 8.9–12.7)
POTASSIUM SERPL-SCNC: 4 MMOL/L (ref 3.5–5.3)
PROT SERPL-MCNC: 5.7 G/DL (ref 6.4–8.2)
RBC # BLD AUTO: 4.3 MILLION/UL (ref 3.88–5.62)
SODIUM SERPL-SCNC: 135 MMOL/L (ref 136–145)
WBC # BLD AUTO: 9.03 THOUSAND/UL (ref 4.31–10.16)

## 2021-03-15 PROCEDURE — 83519 RIA NONANTIBODY: CPT | Performed by: NURSE PRACTITIONER

## 2021-03-15 PROCEDURE — 86341 ISLET CELL ANTIBODY: CPT

## 2021-03-15 PROCEDURE — 80183 DRUG SCRN QUANT OXCARBAZEPIN: CPT | Performed by: NURSE PRACTITIONER

## 2021-03-15 PROCEDURE — 85025 COMPLETE CBC W/AUTO DIFF WBC: CPT | Performed by: NURSE PRACTITIONER

## 2021-03-15 PROCEDURE — 95715 VEEG EA 12-26HR INTMT MNTR: CPT

## 2021-03-15 PROCEDURE — 90682 RIV4 VACC RECOMBINANT DNA IM: CPT | Performed by: NURSE PRACTITIONER

## 2021-03-15 PROCEDURE — 80053 COMPREHEN METABOLIC PANEL: CPT | Performed by: NURSE PRACTITIONER

## 2021-03-15 PROCEDURE — 86255 FLUORESCENT ANTIBODY SCREEN: CPT

## 2021-03-15 PROCEDURE — 83520 IMMUNOASSAY QUANT NOS NONAB: CPT

## 2021-03-15 PROCEDURE — 99223 1ST HOSP IP/OBS HIGH 75: CPT | Performed by: PSYCHIATRY & NEUROLOGY

## 2021-03-15 PROCEDURE — 95700 EEG CONT REC W/VID EEG TECH: CPT

## 2021-03-15 PROCEDURE — 90471 IMMUNIZATION ADMIN: CPT | Performed by: NURSE PRACTITIONER

## 2021-03-15 PROCEDURE — 84238 ASSAY NONENDOCRINE RECEPTOR: CPT | Performed by: NURSE PRACTITIONER

## 2021-03-15 RX ORDER — RANOLAZINE 500 MG/1
1000 TABLET, EXTENDED RELEASE ORAL 2 TIMES DAILY
Status: DISCONTINUED | OUTPATIENT
Start: 2021-03-15 | End: 2021-03-16 | Stop reason: HOSPADM

## 2021-03-15 RX ORDER — VENLAFAXINE 37.5 MG/1
75 TABLET ORAL DAILY
Status: DISCONTINUED | OUTPATIENT
Start: 2021-03-16 | End: 2021-03-16 | Stop reason: HOSPADM

## 2021-03-15 RX ORDER — DIPHENHYDRAMINE HCL 25 MG
25 TABLET ORAL EVERY 8 HOURS PRN
Status: DISCONTINUED | OUTPATIENT
Start: 2021-03-15 | End: 2021-03-16 | Stop reason: HOSPADM

## 2021-03-15 RX ORDER — ACETAMINOPHEN 325 MG/1
650 TABLET ORAL EVERY 6 HOURS PRN
Status: DISCONTINUED | OUTPATIENT
Start: 2021-03-15 | End: 2021-03-16 | Stop reason: HOSPADM

## 2021-03-15 RX ORDER — NICOTINE 21 MG/24HR
21 PATCH, TRANSDERMAL 24 HOURS TRANSDERMAL DAILY
Status: DISCONTINUED | OUTPATIENT
Start: 2021-03-15 | End: 2021-03-16 | Stop reason: HOSPADM

## 2021-03-15 RX ORDER — ATORVASTATIN CALCIUM 40 MG/1
40 TABLET, FILM COATED ORAL DAILY
Status: DISCONTINUED | OUTPATIENT
Start: 2021-03-16 | End: 2021-03-16 | Stop reason: HOSPADM

## 2021-03-15 RX ORDER — ASPIRIN 81 MG/1
81 TABLET, CHEWABLE ORAL DAILY
Status: DISCONTINUED | OUTPATIENT
Start: 2021-03-16 | End: 2021-03-16 | Stop reason: HOSPADM

## 2021-03-15 RX ORDER — POLYETHYLENE GLYCOL 3350 17 G/17G
17 POWDER, FOR SOLUTION ORAL DAILY PRN
Status: DISCONTINUED | OUTPATIENT
Start: 2021-03-15 | End: 2021-03-16 | Stop reason: HOSPADM

## 2021-03-15 RX ORDER — OXCARBAZEPINE 300 MG/1
600 TABLET, FILM COATED ORAL 2 TIMES DAILY
Status: DISCONTINUED | OUTPATIENT
Start: 2021-03-15 | End: 2021-03-16 | Stop reason: HOSPADM

## 2021-03-15 RX ORDER — CLONAZEPAM 1 MG/1
1 TABLET ORAL 3 TIMES DAILY PRN
Status: DISCONTINUED | OUTPATIENT
Start: 2021-03-15 | End: 2021-03-16 | Stop reason: HOSPADM

## 2021-03-15 RX ORDER — SODIUM CHLORIDE 1000 MG
1 TABLET, SOLUBLE MISCELLANEOUS
Status: DISCONTINUED | OUTPATIENT
Start: 2021-03-15 | End: 2021-03-15

## 2021-03-15 RX ORDER — POTASSIUM CHLORIDE 20 MEQ/1
20 TABLET, EXTENDED RELEASE ORAL
Status: DISCONTINUED | OUTPATIENT
Start: 2021-03-15 | End: 2021-03-16 | Stop reason: HOSPADM

## 2021-03-15 RX ORDER — ONDANSETRON 2 MG/ML
4 INJECTION INTRAMUSCULAR; INTRAVENOUS EVERY 6 HOURS PRN
Status: DISCONTINUED | OUTPATIENT
Start: 2021-03-15 | End: 2021-03-16 | Stop reason: HOSPADM

## 2021-03-15 RX ORDER — DOCUSATE SODIUM 100 MG/1
100 CAPSULE, LIQUID FILLED ORAL 2 TIMES DAILY PRN
Status: DISCONTINUED | OUTPATIENT
Start: 2021-03-15 | End: 2021-03-16 | Stop reason: HOSPADM

## 2021-03-15 RX ORDER — TRAZODONE HYDROCHLORIDE 100 MG/1
300 TABLET ORAL
Status: DISCONTINUED | OUTPATIENT
Start: 2021-03-15 | End: 2021-03-16 | Stop reason: HOSPADM

## 2021-03-15 RX ORDER — LORAZEPAM 2 MG/ML
2 INJECTION INTRAMUSCULAR EVERY 8 HOURS PRN
Status: DISCONTINUED | OUTPATIENT
Start: 2021-03-15 | End: 2021-03-16 | Stop reason: HOSPADM

## 2021-03-15 RX ADMIN — TRAZODONE HYDROCHLORIDE 300 MG: 100 TABLET ORAL at 22:01

## 2021-03-15 RX ADMIN — OXCARBAZEPINE 600 MG: 300 TABLET, FILM COATED ORAL at 17:10

## 2021-03-15 RX ADMIN — RANOLAZINE 1000 MG: 500 TABLET, FILM COATED, EXTENDED RELEASE ORAL at 17:10

## 2021-03-15 RX ADMIN — ENOXAPARIN SODIUM 40 MG: 40 INJECTION SUBCUTANEOUS at 11:00

## 2021-03-15 RX ADMIN — CLONAZEPAM 2 MG: 1 TABLET ORAL at 18:24

## 2021-03-15 RX ADMIN — INFLUENZA A VIRUS A/HAWAII/70/2019 (H1N1) RECOMBINANT HEMAGGLUTININ ANTIGEN, INFLUENZA A VIRUS A/MINNESOTA/41/2019 (H3N2) RECOMBINANT HEMAGGLUTININ ANTIGEN, INFLUENZA B VIRUS B/WASHINGTON/02/2019 RECOMBINANT HEMAGGLUTININ ANTIGEN, AND INFLUENZA B VIRUS B/PHUKET/3073/2013 RECOMBINANT HEMAGGLUTININ ANTIGEN 0.5 ML: 45; 45; 45; 45 INJECTION INTRAMUSCULAR at 11:00

## 2021-03-15 RX ADMIN — NICOTINE 21 MG: 21 PATCH, EXTENDED RELEASE TRANSDERMAL at 17:10

## 2021-03-15 RX ADMIN — POTASSIUM CHLORIDE 20 MEQ: 1500 TABLET, EXTENDED RELEASE ORAL at 22:01

## 2021-03-15 NOTE — CASE MANAGEMENT
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 all ADL's, drive and unemployed  Pt has no DME  PCP is Dr Karyn Thomas  Pharmacy is [facility]  Pt denies hx with HHC, STR, alc, drug and IP psych tx   Pt's son will transport pt home when dc  CM reviewed d/c planning process including the following: identifying help at home, patient preference for d/c planning needs, Discharge Lounge, [facility] Meds to Bed program, availability of treatment team to discuss questions or concerns patient and/or family may have regarding understanding medications and recognizing signs and symptoms once discharged  CM also encouraged patient to follow up with all recommended appointments after discharge  Patient advised of importance for patient and family to participate in managing patients medical well being

## 2021-03-15 NOTE — PROGRESS NOTES
Pt had chewing tobacco in room, Pt was instructed by Nursing and Pt care manager that no form of tobacco can be used while in the hospital  Pt gave chewing tobacco to RN and RN placed tobacco in a pt bag on the charge desk with Pt identification  Pt family will be coming to visit and take tobacco home along with them

## 2021-03-15 NOTE — PLAN OF CARE
Problem: Potential for Falls  Goal: Patient will remain free of falls  Description: INTERVENTIONS:  - Assess patient frequently for physical needs  -  Identify cognitive and physical deficits and behaviors that affect risk of falls  -  [place] fall precautions as indicated by assessment   - Educate patient/family on patient safety including physical limitations  - Instruct patient to call for assistance with activity based on assessment  - Modify environment to reduce risk of injury  - Consider OT/PT consult to assist with strengthening/mobility  Outcome: Progressing     Problem: PAIN - ADULT  Goal: Verbalizes/displays adequate comfort level or baseline comfort level  Description: Interventions:  - Encourage patient to monitor pain and request assistance  - Assess pain using appropriate pain scale  - Administer analgesics based on type and severity of pain and evaluate response  - Implement non-pharmacological measures as appropriate and evaluate response  - Consider cultural and social influences on pain and pain management  - Notify physician/advanced practitioner if interventions unsuccessful or patient reports new pain  Outcome: Progressing     Problem: SAFETY ADULT  Goal: Patient will remain free of falls  Description: INTERVENTIONS:  - Assess patient frequently for physical needs  -  Identify cognitive and physical deficits and behaviors that affect risk of falls    -  [place] fall precautions as indicated by assessment   - Educate patient/family on patient safety including physical limitations  - Instruct patient to call for assistance with activity based on assessment  - Modify environment to reduce risk of injury  - Consider OT/PT consult to assist with strengthening/mobility  Outcome: Progressing  Goal: Maintain or return to baseline ADL function  Description: INTERVENTIONS:  -  Assess patient's ability to carry out ADLs; assess patient's baseline for ADL function and identify physical deficits which impact ability to perform ADLs (bathing, care of mouth/teeth, toileting, grooming, dressing, etc )  - Assess/evaluate cause of self-care deficits   - Assess range of motion  - Assess patient's mobility; develop plan if impaired  - Assess patient's need for assistive devices and provide as appropriate  - Encourage maximum independence but intervene and supervise when necessary  - Involve family in performance of ADLs  - Assess for home care needs following discharge   - Consider OT consult to assist with ADL evaluation and planning for discharge  - Provide patient education as appropriate  Outcome: Progressing  Goal: Maintain or return mobility status to optimal level  Description: INTERVENTIONS:  - Assess patient's baseline mobility status (ambulation, transfers, stairs, etc )    - Identify cognitive and physical deficits and behaviors that affect mobility  - Identify mobility aids required to assist with transfers and/or ambulation (gait belt, sit-to-stand, lift, walker, cane, etc )  - [place] fall precautions as indicated by assessment  - Record patient progress and toleration of activity level on Mobility SBAR; progress patient to next Phase/Stage  - Instruct patient to call for assistance with activity based on assessment  - Consider rehabilitation consult to assist with strengthening/weightbearing, etc   Outcome: Progressing     Problem: NEUROSENSORY - ADULT  Goal: Achieves stable or improved neurological status  Description: INTERVENTIONS  - Monitor and report changes in neurological status  - Monitor vital signs such as temperature, blood pressure, glucose, and any other labs ordered   - Initiate measures to prevent increased intracranial pressure  - Monitor for seizure activity and implement precautions if appropriate      Outcome: Progressing  Goal: Remains free of injury related to seizures activity  Description: INTERVENTIONS  - Maintain airway, patient safety  and administer oxygen as ordered  - Monitor patient for seizure activity, document and report duration and description of seizure to physician/advanced practitioner  - If seizure occurs,  ensure patient safety during seizure  - Reorient patient post seizure  - Seizure pads on all 4 side rails  - Instruct patient/family to notify RN of any seizure activity including if an aura is experienced  - Instruct patient/family to call for assistance with activity based on nursing assessment  - Administer anti-seizure medications if ordered    Outcome: Progressing  Goal: Achieves maximal functionality and self care  Description: INTERVENTIONS  - Monitor swallowing and airway patency with patient fatigue and changes in neurological status  - Encourage and assist patient to increase activity and self care     - Encourage visually impaired, hearing impaired and aphasic patients to use assistive/communication devices  Outcome: Progressing

## 2021-03-15 NOTE — H&P
Epilepsy Attending Admission H&P  Nicholas Santo 55 y o  male   : 1965  MRN: [id number]     Unit/Bed#: [facility] 714-01    Encounter: [id number]     -------------------------------------------------------------------------------------------------------------------                Outpatient Neurologist:  Dr Lavinia Otto                  Primary Care Physician:  Murali Hendrickson DO      CC:  Episodes concerning for seizures/ orthostatic hypotension   -------------------------------------------------------------------------------------------------------------------  HPI:    Nicholas Santo is a 55 y o   male with episodes concerning for seizures who is admitted to the Epilepsy Monitoring Unit for evaluation of events  The patient also has a past medical history of coronary artery disease, bipolar disorder, migraine, bariatric surgery, insomnia, postsurgical malabsorption, orthostatic hypotension, myoclonic jerks, and syncope  He is followed by Dr Lavinia Otto as an outpatient  At his last visit with Dr Lavinia Otto on 2021 it was noted that the patient had been experiencing multiple recurrent episodes of postural related lightheadedness, periods of appearing "disconnected", and brief generalized body shaking that may progress to loss of conscious  The symptoms started several months after bariatric surgery and are likely due to orthostatic hypotension/intolerance  There were no other exam findings at that time to reveal alternative causes for dysautonomia  It was noted that his symptoms of brief generalized body shaking may be due to myoclonus related to orthostatic hypotension and hypoperfusion of the brainstem  Dr Lavinia Otto related that due to periods of being "disconnected" and shaking activity, it was important to rule out the possibility of seizures as un underlying cause  It was determined that EMU admission with continuous video EEG would be warranted to characterize events and perform tilt table testing   Recommended that during admission, patient have send out lab studies for autoimmune dysautonomia evaluation through [facility] labs and A[facility] antibodies  Noted that if events were determined to not be seizures, referral was recommended to dysautonomia specialist at [facility] (Dr Anahi Mahmood)  The patient presents to the EMU today with his wife and son  The patient has continued to have the events as above but he also has "hypermanic episodes "   The patient reports his events concerning for seizure versus orthostatic hypotension as blood pressure getting low, gets dizzy, and then passes out  Family reports a blank stare and then shaking which the patient may or may not realize is occurring  He may lose consciousness with these events  Episodes are only present from standing position, not when sitting or lying down  His hypermanic episodes involve feelings that he is unable to control himself  he may stare do things without intention  Patient may not recall these events  His son is able to tell when he is going to have one  His family notes that he may behave in a drunken manner, depending on mood  Family thinks these events are more psychological than physical   Currently receives monthly Invega shots for his leidy which family notes has improved the frequency of hypermanic episodes  Follows closely with Psychiatry  On review of systems, he reports constant dizziness, chest pain (on Ranexa), constipation status post bariatric surgery, balance issues, and worsening dizziness with position changes  He reports that he cannot bend down and stand up quickly or he will get dizzy  At prior visits with Dr Iraj Noonan he reported that if he walks for 30-40 seconds he will likely pass out  He may office so passed out if he bends over  The patient does report using chewing tobacco and nicotine vaping device  Offered a nicotine patch with the patient has refused      The patient is no longer taking any of the medications he was taking in the past for orthostatic hypotension including salt tabs, midodrine, and florinef as they were not helpful  His psychiatrist has been working to wean him off of venlafaxine due to possibility of contributing to orthostatic hypotension  Currently on oxcarbazepine 600 mg twice per day by Psychiatry for bipolar disorder  he spends most of his time with his son who is his caretaker during the day when his wife is at work  History was also obtained from  wife and son, who were present at the time of admission  I reviewed her prior records including clinic notes from Neurology, which are summarized and incorporated above      -------------------------------------------------------------------------------------------------------------------   Seizure/Spell Characteristics:     1  Episodes are typically provoked upon standing  The becomes lightheaded, dizzy/ sense of movement, wife and son reporte a disconnected appearance and generalized shaking, mostly hands, then upper body/ head, may progress to loss of consciousness  2    Hyper leidy episodes -  Feeling that he cannot control himself  Atypical behavior        -------------------------------------------------------------------------------------------------------------------  Special Features  Status epilepticus: No  Self Injury Seizures:   No  Precipitating Factors:  No    Epilepsy Risk Factors:  Abnormal pregnancy:    no  Abnormal birth/:   no  Abnormal Development:   no  Febrile seizures, simple:   no  Febrile seizures, complex:   no  CNS infection:    no  Intellectual disability:    no  Cerebral palsy:    no  Head injury (moderate/severe):  no  CNS neoplasm:    no  CNS malformation:    no  Neurosurgical procedure:   no  Stroke:     no  Alcohol abuse:    no  Drug abuse:     no  Family history Sz/epilepsy:   no      Prior AEDs:   none    Prior Evaluation:  Imagin2021 - MRI brain  Small arachnoid cyst in the left cerebellopontine angle  Normal MR brain study      EEGs:  2/23/2021  Routine - normal awake and drowsy    - Ambulatory EEG: none  - Video EEG: none  - PET scan brain : none  - Neuropsychologic testing: none  - Labs:  Results for Obie Gosselin (MRN [id number])    Ref   Range 12/9/2020 08:37   Sodium Latest Ref Range: 136 - 145 mmol/L 141   Potassium Latest Ref Range: 3 5 - 5 3 mmol/L 4 0   Chloride Latest Ref Range: 100 - 108 mmol/L 106   CO2 Latest Ref Range: 21 - 32 mmol/L 32   Anion Gap Latest Ref Range: 4 - 13 mmol/L 3 (L)   BUN Latest Ref Range: 5 - 25 mg/dL 6   Creatinine Latest Ref Range: 0 60 - 1 30 mg/dL 0 86   GLUCOSE FASTING Latest Ref Range: 65 - 99 mg/dL 82   Calcium Latest Ref Range: 8 3 - 10 1 mg/dL 9 3   AST Latest Ref Range: 5 - 45 U/L 15   ALT Latest Ref Range: 12 - 78 U/L 30   Alkaline Phosphatase Latest Ref Range: 46 - 116 U/L 80   Total Protein Latest Ref Range: 6 4 - 8 2 g/dL 6 3 (L)   Albumin Latest Ref Range: 3 5 - 5 0 g/dL 3 5   TOTAL BILIRUBIN Latest Ref Range: 0 20 - 1 00 mg/dL 0 42   eGFR Latest Units: ml/min/1 73sq m 98   BILIRUBIN DIRECT Latest Ref Range: 0 00 - 0 20 mg/dL 0 14   WBC Latest Ref Range: 4 31 - 10 16 Thousand/uL 8 37   Red Blood Cell Count Latest Ref Range: 3 88 - 5 62 Million/uL 4 55   Hemoglobin Latest Ref Range: 12 0 - 17 0 g/dL 13 7   HCT Latest Ref Range: 36 5 - 49 3 % 43 0   MCV Latest Ref Range: 82 - 98 fL 95   MCH Latest Ref Range: 26 8 - 34 3 pg 30 1   MCHC Latest Ref Range: 31 4 - 37 4 g/dL 31 9   RDW Latest Ref Range: 11 6 - 15 1 % 12 7   Platelet Count Latest Ref Range: 149 - 390 Thousands/uL 271   MPV Latest Ref Range: 8 9 - 12 7 fL 10 0   nRBC Latest Units: /100 WBCs 0   Neutrophils % Latest Ref Range: 43 - 75 % 65   Immat GRANS % Latest Ref Range: 0 - 2 % 0   Lymphocytes Relative Latest Ref Range: 14 - 44 % 24   Monocytes Relative Latest Ref Range: 4 - 12 % 7   Eosinophils Latest Ref Range: 0 - 6 % 3   Basophils Relative Latest Ref Range: 0 - 1 % 1 Immature Grans Absolute Latest Ref Range: 0 00 - 0 20 Thousand/uL 0 03   Absolute Neutrophils Latest Ref Range: 1 85 - 7 62 Thousands/µL 5 41   Lymphocytes Absolute Latest Ref Range: 0 60 - 4 47 Thousands/µL 2 03   Absolute Monocytes Latest Ref Range: 0 17 - 1 22 Thousand/µL 0 60   Absolute Eosinophils Latest Ref Range: 0 00 - 0 61 Thousand/µL 0 24   Basophils Absolute Latest Ref Range: 0 00 - 0 10 Thousands/µL 0 06   OXCARBAZEPINE Latest Ref Range: 10 - 35 ug/mL 13   Hemoglobin A1C Latest Ref Range: Normal 3 8-5 6%; PreDiabetic 5 7-6 4%; Diabetic >=6 5%; Glycemic control for adults with diabetes <7 0% % 4 9   eAG, EST AVG Glucose Latest Units: mg/dl 94       Psychiatric History:  Depression: Bipolar Disorder  Anxiety: yes  Psychosis: no  Psychiatric Admissions: no      -------------------------------------------------------------------------------------------------------------------  Current Medications:   No current facility-administered medications on file prior to encounter        Current Outpatient Medications on File Prior to Encounter   Medication Sig Dispense Refill    acetaminophen (TYLENOL) 325 mg tablet Take 3 tablets (975 mg total) by mouth every 8 (eight) hours (Patient not taking: Reported on 3/15/2021) 30 tablet 0    aspirin 81 mg chewable tablet Chew 1 tablet (81 mg total) daily 30 tablet 0    atorvastatin (LIPITOR) 40 mg tablet Take 1 tablet (40 mg total) by mouth daily 90 tablet 1    clonazePAM (KlonoPIN) 1 mg tablet Take 1 tablet (1 mg total) by mouth 3 (three) times a day (Patient taking differently: Take 1 mg by mouth 3 (three) times a day as needed ) 270 tablet 0    OXcarbazepine (TRILEPTAL) 600 mg tablet Take 1 tablet (600 mg total) by mouth 2 (two) times a day 180 tablet 0    paliperidone palmitate ER (INVEGA) 234 mg/1 5 mL IM injection Inject 1 5 mL (234 mg total) into a muscle every 28 days 1 Syringe 3    ranolazine (RANEXA) 1000 MG SR tablet Take 1 tablet (1,000 mg total) by mouth 2 (two) times a day 180 tablet 3    sodium chloride 1 g tablet Take 1 tablet (1 g total) by mouth 3 (three) times a day 90 tablet 3    traZODone (DESYREL) 300 MG tablet Take 1 tablet (300 mg total) by mouth daily at bedtime 90 tablet 1    venlafaxine (EFFEXOR) 75 mg tablet Take 1 tablet (75 mg total) by mouth 3 (three) times a day (Patient taking differently: Take 75 mg by mouth daily In AM) 270 tablet 3    [DISCONTINUED] fludrocortisone (FLORINEF) 0 1 mg tablet Take 4 tablets (0 4 mg total) by mouth daily (Patient not taking: Reported on 3/15/2021) 120 tablet 10    [DISCONTINUED] midodrine (PROAMATINE) 10 MG tablet Take 1 tablet (10 mg total) by mouth 3 (three) times a day before meals (Patient not taking: Reported on 3/15/2021) 90 tablet 0       ------------------------------------------------------------------------------------------------------------------  Past Medical / Surgical History/Social History/Family History:    Past Medical History:   Diagnosis Date    Bariatric surgery status     CPAP (continuous positive airway pressure) dependence     Hearing loss of aging     Hypercholesterolemia     Morbid obesity ([facility] [address] )     [facility]STEM[facility]non-ST [facility])     april 2019    Postsurgical malabsorption      Past Surgical History:   Procedure Laterality Date    CARDIAC CATHETERIZATION      no stents     COLONOSCOPY      EGD      HERNIA REPAIR      umbilical hernia    ME LAP GASTRIC BYPASS/FRITZ-EN-Y N/A 3/2/2020    Procedure: LAPAROSCOPIC FRITZ-EN-Y GASTRIC BYPASS AND INTRAOPERATIVE EGD;  Surgeon: Beth Regan MD;  Location: [facility] OR;  Service: Bariatrics    SKIN SURGERY      cyst removed from back and thumb    TONSILLECTOMY      TOOTH EXTRACTION       Social History     Socioeconomic History    Marital status: /Civil Union     Spouse name: Not on file    Number of children: Not on file    Years of education: Not on file    Highest education level: Not on file Occupational History    Not on file   Social Needs    Financial resource strain: Not on file    Food insecurity     Worry: Not on file     Inability: Not on file    Transportation needs     Medical: Not on file     Non-medical: Not on file   Tobacco Use    Smoking status: Former Smoker     Types: Cigarettes     Quit date: 2011     Years since quitting: 10 2    Smokeless tobacco: Current User     Types: Chew     Last attempt to quit: 6/17/2014    Tobacco comment: quit cigarettes 2011   Substance and Sexual Activity    Alcohol use: Not Currently     Frequency: Never    Drug use: Never    Sexual activity: Not on file   Lifestyle    Physical activity     Days per week: Not on file     Minutes per session: Not on file    Stress: Not on file   Relationships    Social connections     Talks on phone: Not on file     Gets together: Not on file     Attends [employer] service: Not on file     Active member of club or organization: Not on file     Attends meetings of clubs or organizations: Not on file     Relationship status: Not on file    Intimate partner violence     Fear of current or ex partner: Not on file     Emotionally abused: Not on file     Physically abused: Not on file     Forced sexual activity: Not on file   Other Topics Concern    Not on file   Social History Narrative    Former smoker - As per [facility]    Full-time employment        · Do you currently or have you served in [employer]:   No    As per [address] History   Problem Relation Age of Onset    Lung cancer Mother     Brain cancer Mother     Diabetes Brother     Bipolar disorder Maternal Grandfather     Bipolar disorder Son        Allergies:  No Known Allergies       ------------------------------------------------------------------------------------------------------------------  ROS:  A 12 system review of system was obtained and otherwise negative except as per HPI ------------------------------------------------------------------------------------------------------------------  VITALS:  Blood pressure 127/71, pulse 88, temperature 97 5 °F (36 4 °C), resp  rate 18, height 5' 11" (1 803 m), weight 86 6 kg (190 lb 14 7 oz), SpO2 98 %  GENERAL EXAMINATION:   In general patient is well appearing and in no distress  Head normocephalic  Heart RRR w/o MRG  Lungs CTA w/o WRR  Abdomen soft, non-tender, normoactive bowel sounds  There is no peripheral edema  NEUROLOGIC EXAMINATION:     Alert and oriented to person, date, location  Fund of knowledge is full with good understanding of medical situation  Recent and remote memory were intact    Mood and affect are appropriate  Attention is intact  Language function including fluency, naming, and comprehension intact  Cranial nerves: Pupils are equal round reactive to light and accommodation  Visual Fields are full to confrontation bilaterally  Extraocular movements are intact without nystagmus  Facial sensation is intact to light touch  No facial droop, face activates symmetrically  There is no dysarthria  Hearing was intact to finger rub  Tongue and uvula are midline and palate elevates symmetrically  Shoulder shrug  5/5  Motor Exam:  No pronator drift  Bulk and tone are normal  Strength is 5/5 throughout  Deep tendon reflexes: Biceps 1+, brachioradialis 1+, patellar 1+, Achilles 1+ bilaterally  Sensation: Intact light touch    Coordination: Finger nose finger and heel to shin testing are without dysmetria  Gait: Not assessed this morning                      -------------------------------------------------------------------------------------------------------------------  Impression and Plan:  Jairo Sullivan is a 55 y o   male with episodes concerning for seizures who is admitted to the Epilepsy Monitoring Unit for evaluation of events   Neurologic examination was non-focal  Prior routine EEG was normal  MRI brain with small arachnoid cyst in the left cerebellopontine angle, unlikely source of events  Given continued frequent events concerning for seizure, patient requires inpatient video EEG monitoring to capture and characterize events for further treatment  While inpatient, specialty lab will be sent out and tilt table testing will be performed  Given frequency of events, patient will likely only require short admission but may be up to 7 days if events are not captured in a timely manner  1)  Spells/Seizures:   1) Will start continuous video EEG monitoring to capture and characterize events  2) Will start  Telemetry and single lead EKG monitoring   2) Medications:  Continue all home medications  3) Additional diagnostic tests:  None at this time   4) Seizure/fall/status epilepticus precautions  5) Will order Ativan 2 mg as needed for more than two complex partial seizures or 1 Generalized tonic clonic seizure in a 24 hour period  6) Check labs with AED drug levels, CBC and CMP   - send out labs ordered as requested by Dr Leighton Martinez    2)  Co morbidities:   1)Bipolar disorder - continue home dose of venlafaxine 75 mg daily, oxcarbazepine 600 mg twice per day, and trazodone 300 mg at night time  Clonazepam ordered PRN  Notes that he can take up to three 1mg doses per day  At times, he will take two doses and will not have to take another dose throughout the day  We discussed that this can be accommodated during admission as well to keep mood stable  2) CAD- continue home doses of aspirin 81 mg daily, atorvastatin 40 mg daily, and ranexa 1000 mg twice per day  3) Tobacco use- patient admits to nicotine vape and tobacco chew  These substances are unable to be used during admission to the hospital  Nicotine patch was offered but patient did refuse upon admission  4) Orthostatic hypotension - not currently on medications for treatment prior to admission  Tilt table testing ordered   Patient is high fall risk, recommend that bed alarm be activated at all times  5) Hypokalemia- continue with home dose of potassium chloride 20 mEq daily      3) DVT prophylaxis: Lovenox 40 mg daily         Code status: full code  -------------------------------------------------------------------------------------------------------------------    EMRE Sue             Date: 3/15/2021     Time: 10:30 AM  [facility] [place]

## 2021-03-16 ENCOUNTER — APPOINTMENT (INPATIENT)
Dept: NEUROLOGY | Facility: CLINIC | Age: 56
DRG: 312 | End: 2021-03-16
Payer: COMMERCIAL

## 2021-03-16 ENCOUNTER — APPOINTMENT (OUTPATIENT)
Dept: NON INVASIVE DIAGNOSTICS | Facility: HOSPITAL | Age: 56
End: 2021-03-16
Attending: PSYCHIATRY & NEUROLOGY
Payer: COMMERCIAL

## 2021-03-16 VITALS
OXYGEN SATURATION: 99 % | WEIGHT: 190.92 LBS | TEMPERATURE: 97.9 F | HEIGHT: 71 IN | RESPIRATION RATE: 10 BRPM | BODY MASS INDEX: 26.73 KG/M2 | DIASTOLIC BLOOD PRESSURE: 65 MMHG | SYSTOLIC BLOOD PRESSURE: 107 MMHG | HEART RATE: 77 BPM

## 2021-03-16 PROCEDURE — NC001 PR NO CHARGE: Performed by: PSYCHIATRY & NEUROLOGY

## 2021-03-16 PROCEDURE — 95720 EEG PHY/QHP EA INCR W/VEEG: CPT | Performed by: PSYCHIATRY & NEUROLOGY

## 2021-03-16 PROCEDURE — 99239 HOSP IP/OBS DSCHRG MGMT >30: CPT | Performed by: PSYCHIATRY & NEUROLOGY

## 2021-03-16 PROCEDURE — 93660 TILT TABLE EVALUATION: CPT

## 2021-03-16 PROCEDURE — 95712 VEEG 2-12 HR INTMT MNTR: CPT

## 2021-03-16 RX ADMIN — OXCARBAZEPINE 600 MG: 300 TABLET, FILM COATED ORAL at 08:13

## 2021-03-16 RX ADMIN — ATORVASTATIN CALCIUM 40 MG: 40 TABLET, FILM COATED ORAL at 08:13

## 2021-03-16 RX ADMIN — ASPIRIN 81 MG: 81 TABLET, CHEWABLE ORAL at 08:13

## 2021-03-16 RX ADMIN — RANOLAZINE 1000 MG: 500 TABLET, FILM COATED, EXTENDED RELEASE ORAL at 08:13

## 2021-03-16 RX ADMIN — ENOXAPARIN SODIUM 40 MG: 40 INJECTION SUBCUTANEOUS at 08:14

## 2021-03-16 RX ADMIN — VENLAFAXINE 75 MG: 37.5 TABLET ORAL at 08:13

## 2021-03-16 RX ADMIN — NICOTINE 21 MG: 21 PATCH, EXTENDED RELEASE TRANSDERMAL at 08:15

## 2021-03-16 NOTE — PROGRESS NOTES
Attempted to visit patient  Introduced self and built relationship of care and support  Patient expressed his appreciate and thanked for stopping by his room       03/16/21 1400   Clinical Encounter Type   Visited With Patient   Routine Visit Introduction

## 2021-03-16 NOTE — UTILIZATION REVIEW
Initial Clinical Review    Admission: Date/Time/Statement:   Admission Orders (From admission, onward)     Ordered        03/15/21 0917  Inpatient Admission  Once                   Orders Placed This Encounter   Procedures    Inpatient Admission     Standing Status:   Standing     Number of Occurrences:   1     Order Specific Question:   Level of Care     Answer:   Level 2 Stepdown / HOT [14]     Order Specific Question:   Bed Type     Answer:   EMU [8]     Order Specific Question:   Estimated length of stay     Answer:   More than 2 Midnights     Order Specific Question:   Certification     Answer:   I certify that inpatient services are medically necessary for this patient for a duration of greater than two midnights  See H&P and MD Progress Notes for additional information about the patient's course of treatment  Assessment/Plan: 56 yo m presents as an elective admission to the  Epilepsy Monitoring Unit for evaluation of events  He has a pmh of CAD, bipolar disorder, migraine, bariatric surgery, insomnia, post surgical malabsorption,  orthostatic hypotension, myoclonic jerks and syncope  On his last visit with his neurologist he  Was noted to have experienced multiple recurrent episodes of postural relayed lightheadedness, periods of appearing disconnected, and brief generalized body shaking that may progress to loss of consciousness  He is admitted inpatient for EMU monitoring to rule out the possibility of seizures as an underlying cause  3/16 - He admited to having  A hypermanic episode prior to getting hooked up yesterday  Overnight he denies any events  Neurology 3/16 -  Pt admits to have a hypermanic episode prior to getting hooked up to VEEG monitor  Overnight he denies any events  There are no concerning EEG changes, Additional monitoring likely low yield  Clinical concern as the cause is low  Dysautonomia panel sent to [facility]   He is in process of setting up a visit with a specialist at [facility]           Tilt table  Test - 3/16 -  No event provoked by th testing, He felt fuzzy but did not have symptoms characteristic of his  typical events   Formal report pending       Vitals   Temperature Pulse Respirations Blood Pressure SpO2   03/15/21 0900 03/15/21 0900 03/15/21 0900 03/15/21 0900 03/15/21 0900   97 5 °F (36 4 °C) 88 18 127/71 98 %      Temp Source Heart Rate Source Patient Position - Orthostatic VS BP Location FiO2 (%)   03/15/21 1523 03/15/21 1045 03/15/21 1045 03/15/21 1045 --   Oral Monitor Sitting Right arm       Pain Score       03/15/21 0939       No Pain          Wt Readings from Last 1 Encounters:   03/15/21 86 6 kg (190 lb 14 7 oz)     Additional Vital Signs:   Date/Time  Temp  Pulse  Resp  BP  MAP (mmHg)  SpO2  O2 Device  Patient Position - Orthostatic VS   03/16/21 08:19:31  --  --  --  107/65  79  --  --  --   03/16/21 07:06:33  97 9 °F (36 6 °C)  77  10Abnormal   99/66  77  99 %  None (Room air)  Lying   03/15/21 22:23:44  97 9 °F (36 6 °C)  65  17  129/83  --  --  --  --   03/15/21 2000  --  --  --  --  --  --  None (Room air)  --   03/15/21 18:46:44  97 6 °F (36 4 °C)  72  17  134/79  --  98 %  --  --   03/15/21 15:23:43  98 7 °F (37 1 °C)  76  17  131/59  --  98 %  --  Sitting   03/15/21 1045  --  81  20  116/67  --  --  --  Sitting           Pertinent Labs/Diagnostic Test Results:       Results from last 7 days   Lab Units 03/15/21  1108   WBC Thousand/uL 9 03   HEMOGLOBIN g/dL 13 3   HEMATOCRIT % 38 9   PLATELETS Thousands/uL 261   NEUTROS ABS Thousands/µL 5 73         Results from last 7 days   Lab Units 03/15/21  1108   SODIUM mmol/L 135*   POTASSIUM mmol/L 4 0   CHLORIDE mmol/L 103   CO2 mmol/L 27   ANION GAP mmol/L 5   BUN mg/dL 10   CREATININE mg/dL 0 60   EGFR ml/min/1 73sq m 113   CALCIUM mg/dL 8 3     Results from last 7 days   Lab Units 03/15/21  1108   AST U/L 19   ALT U/L 28   ALK PHOS U/L 69   TOTAL PROTEIN g/dL 5 7*   ALBUMIN g/dL 3 1*   TOTAL BILIRUBIN mg/dL 0 44 Results from last 7 days   Lab Units 03/15/21  1108   GLUCOSE RANDOM mg/dL 63*     No Radiology studies  No ECG    Tilt table test on 3/16 - pending      Past Medical History:   Diagnosis Date    Bariatric surgery status     CPAP (continuous positive airway pressure) dependence     Hearing loss of aging     Hypercholesterolemia     Morbid obesity (HCC)     NSTEMI (non-ST elevation myocardial infarction) [facility])     april 2019    Postsurgical malabsorption      Present on Admission:   Coronary artery disease of native artery of native heart with stable angina pectoris (HCC)   Bipolar disorder, current episode mixed, moderate (HCC)   Tobacco use   Observed seizure-[facility] ([facility])   Orthostatic hypotension      Admitting Diagnosis: Orthostatic hypotension [I95 1]  Age/Sex: 55 y o  male       Admission Orders:  Scheduled Medications:  aspirin, 81 mg, Oral, Daily  atorvastatin, 40 mg, Oral, Daily  enoxaparin, 40 mg, Subcutaneous, Daily  nicotine, 21 mg, Transdermal, Daily  OXcarbazepine, 600 mg, Oral, BID  potassium chloride, 20 mEq, Oral, HS  ranolazine, 1,000 mg, Oral, BID  traZODone, 300 mg, Oral, HS  venlafaxine, 75 mg, Oral, Daily      Continuous IV Infusions:     PRN Meds:  acetaminophen, 650 mg, Oral, Q6H PRN  clonazePAM, 1 mg, Oral, TID PRN - 3/15 x 1   diphenhydrAMINE, 25 mg, Oral, Q8H PRN  docusate sodium, 100 mg, Oral, BID PRN  LORazepam, 2 mg, Intravenous, Q8H PRN   ondansetron, 4 mg, Intravenous, Q6H PRN  polyethylene glycol, 17 g, Oral, Daily PRN      Nursing Orders -  Telem - EMU monitoring - neuro checks q 4 and prn  - Bed alarm, seizure precautions - OOB to chair -       Network Utilization Review Department  ATTENTION: Please call with any questions or concerns to [phone] and carefully listen to the prompts so that you are directed to the right person   All voicemails are confidential   Torito Agustin all requests for admission clinical reviews, approved or denied determinations and any other requests to dedicated fax number below belonging to the campus where the patient is receiving treatment   List of dedicated fax numbers for the Facilities:  [address] [place] [address] DENIALS (Administrative/Medical Necessity) [phone]   [address] (Maternity/NICU/Pediatrics) [phone]   [address] Pepper Camacho 9022 (  Siobhan Montoya "Crissy" 103) [address] [address] [place] [address] [place] [address] [phone]

## 2021-03-16 NOTE — UTILIZATION REVIEW
Notification of Inpatient Admission/Inpatient Authorization Request   This is a Notification of Inpatient Admission for [address]  Be advised that this patient was admitted to our facility under Inpatient Status  Contact Sotero Luciano at [phone] for additional admission information  [facility] DE[facility]  DEDICATED -[phone]  Patient Name:   Lindsey Correa   YOB: 1965       [address]   [address]:   [facility]  Tax ID: [id number]  NPI: [id number] Attending Provider/NPI:  Phone:  Address: Glenny Napier [[id number]]  [phone]  Same as Facility   Place of Service Code: 24 Place of Service Name:  [address] [place] [address]   Start Date: 3/15/21 4718 Discharge Date & Time: No discharge date for patient encounter  Type of Admission: Inpatient Status Discharge Disposition (if discharged): Home/Self Care   Patient Diagnoses: Orthostatic hypotension [I95 1]     Orders: Admission Orders (From admission, onward)     Ordered        03/15/21 0917  Inpatient Admission  Once                    Assigned Utilization Review Contact: Sotero Luciano  Utilization   Network Utilization Review Department  Phone: [phone]; Fax [phone]  Email: [email or web address]   ATTENTION PAYERS: Please call the assigned Utilization  directly with any questions or concerns ALL voicemails in the department are confidential  Send all requests for admission clinical reviews, approved or denied determinations and any other requests to dedicated fax number belonging to the campus where the patient is receiving treatment

## 2021-03-16 NOTE — PROGRESS NOTES
EMU Daily Progress Note   Scott Davey 55 y o  male   : 1965  MRN: [id number]     Unit/Bed#: [facility] 714-01    Encounter: [id number]  -------------------------------------------------------------------------------------------------------------------  Interval History:    Since admission, the patient is doing well  He did admit to have a hypermanic episode prior to getting hooked up yesterday  He felt anxious, like he was saying too much, and his speech was mumbled  He notes that his son told him he was going to have one prior to leaving his bedside  Patient states that his hypermanic episodes typically occur between 9am and 1pm      Overnight, he denies any events  He is feeling well this morning and denies any complaints on ROS  He is currently NPO and is scheduled for tilt table tasting at 9am  He is hopeful that after his testing he may be able to be discharged  We reviewed that we will discuss the results and if further video EEG monitoring is needed  No medication changes have been made since admission  Neurologic exam at baseline  Telemetry reviewed- no events  Vital signs stable since admission  Labs on admission (dysautonomia panel, Acetylcholine receptors, and oxcarbazepine levels pending):  Results for Sage Charles (MRN [id number])    Ref   Range 3/15/2021 11:08   Sodium Latest Ref Range: 136 - 145 mmol/L 135 (L)   Potassium Latest Ref Range: 3 5 - 5 3 mmol/L 4 0   Chloride Latest Ref Range: 100 - 108 mmol/L 103   CO2 Latest Ref Range: 21 - 32 mmol/L 27   Anion Gap Latest Ref Range: 4 - 13 mmol/L 5   BUN Latest Ref Range: 5 - 25 mg/dL 10   Creatinine Latest Ref Range: 0 60 - 1 30 mg/dL 0 60   Glucose, Random Latest Ref Range: 65 - 140 mg/dL 63 (L)   Calcium Latest Ref Range: 8 3 - 10 1 mg/dL 8 3   CORRECTED CALCIUM Latest Ref Range: 8 3 - 10 1 mg/dL 9 0   AST Latest Ref Range: 5 - 45 U/L 19   ALT Latest Ref Range: 12 - 78 U/L 28   Alkaline Phosphatase Latest Ref Range: 46 - 116 U/L 69 Total Protein Latest Ref Range: 6 4 - 8 2 g/dL 5 7 (L)   Albumin Latest Ref Range: 3 5 - 5 0 g/dL 3 1 (L)   TOTAL BILIRUBIN Latest Ref Range: 0 20 - 1 00 mg/dL 0 44   eGFR Latest Units: ml/min/1 73sq m 113   WBC Latest Ref Range: 4 31 - 10 16 Thousand/uL 9 03   Red Blood Cell Count Latest Ref Range: 3 88 - 5 62 Million/uL 4 30   Hemoglobin Latest Ref Range: 12 0 - 17 0 g/dL 13 3   HCT Latest Ref Range: 36 5 - 49 3 % 38 9   MCV Latest Ref Range: 82 - 98 fL 91   MCH Latest Ref Range: 26 8 - 34 3 pg 30 9   MCHC Latest Ref Range: 31 4 - 37 4 g/dL 34 2   RDW Latest Ref Range: 11 6 - 15 1 % 12 0   Platelet Count Latest Ref Range: 149 - 390 Thousands/uL 261   MPV Latest Ref Range: 8 9 - 12 7 fL 9 2   nRBC Latest Units: /100 WBCs 0   Neutrophils % Latest Ref Range: 43 - 75 % 64   Immat GRANS % Latest Ref Range: 0 - 2 % 0   Lymphocytes Relative Latest Ref Range: 14 - 44 % 24   Monocytes Relative Latest Ref Range: 4 - 12 % 9   Eosinophils Latest Ref Range: 0 - 6 % 2   Basophils Relative Latest Ref Range: 0 - 1 % 1   Immature Grans Absolute Latest Ref Range: 0 00 - 0 20 Thousand/uL 0 03   Absolute Neutrophils Latest Ref Range: 1 85 - 7 62 Thousands/µL 5 73   Lymphocytes Absolute Latest Ref Range: 0 60 - 4 47 Thousands/µL 2 18   Absolute Monocytes Latest Ref Range: 0 17 - 1 22 Thousand/µL 0 85   Absolute Eosinophils Latest Ref Range: 0 00 - 0 61 Thousand/µL 0 15   Basophils Absolute Latest Ref Range: 0 00 - 0 10 Thousands/µL 0 09         -------------------------------------------------------------------------------------------------------------------  Past Medical history, surgical history, family history, and social history are unchanged from admission H&P     ROS:  A 12 system review of system was obtained and was unrevealing       All other review of systems negative   -------------------------------------------------------------------------------------------------------------------  Allergies: No Known Allergies Scheduled Meds:   Current Facility-Administered Medications   Medication Dose Route Frequency Provider Last Rate    acetaminophen  650 mg Oral Q6H PRN Genie Susan, CRNP      aspirin  81 mg Oral Daily Genie Susan, CRNP      atorvastatin  40 mg Oral Daily Genie Susan, CRNP      clonazePAM  1 mg Oral TID PRN Genie Susan, CRNP      diphenhydrAMINE  25 mg Oral Q8H PRN Genie Susan, CRNP      docusate sodium  100 mg Oral BID PRN Genie Susan, CRNP      enoxaparin  40 mg Subcutaneous Daily Genie Susan, CRNP      LORazepam  2 mg Intravenous Q8H PRN Genie Susan, CRNP      nicotine  21 mg Transdermal Daily Nicolle Fabian MD      ondansetron  4 mg Intravenous Q6H PRN Genie Susan, CRNP      OXcarbazepine  600 mg Oral BID Genie Susan, CRNP      polyethylene glycol  17 g Oral Daily PRN Genie Susan, CRNP      potassium chloride  20 mEq Oral HS Genie Susan, CRNP      ranolazine  1,000 mg Oral BID Genie Susan, CRNP      traZODone  300 mg Oral HS Genie Susan, CRNP      venlafaxine  75 mg Oral Daily Genie Susan, CRNP       PRN Meds:   acetaminophen    clonazePAM    diphenhydrAMINE    docusate sodium    LORazepam    ondansetron    polyethylene glycol  -------------------------------------------------------------------------------------------------------------------  Physical Exam:  Vitals: Blood pressure 107/65, pulse 77, temperature 97 9 °F (36 6 °C), temperature source Oral, resp  rate (!) 10, height 5' 11" (1 803 m), weight 86 6 kg (190 lb 14 7 oz), SpO2 99 %  No apparent distress  Appears well nourished  Head normocephalic  Heart RRR w/o MRG  Lungs CTA w/o WRR  Abdomen soft, NT, normoactive bowel sounds  Mood appropriate for situation     Neurologic Exam  Mental status- alert and oriented to person, place, and time  Speech appropriate for conversation  Good attention and knowledge       Cranial Nerves- PERRL, VFFTC, EOMS normal, facial sensation and muscles symmetric, hearing intact bilaterally to finger rubs, tongue midline, palate rise symmetrical, shoulder shrug symmetrical     Motor- No pronator drift  Appropriate strength  Moves all extremities freely  No tremor  Sensory-  Intact distally in all extremities to light touch  DTRs- 1+ and symmetric in bilateral biceps and patellar  Gait- not assessed this morning  Coordination- FNF intact            -------------------------------------------------------------------------------------------------------------------  LAB & TEST RESULTS:  Recent Labs     03/15/21  1108   WBC 9 03   HGB 13 3   HCT 38 9        Recent Labs     03/15/21  1108   K 4 0      CO2 27   BUN 10   CREATININE 0 60   CALCIUM 8 3     Invalid input(s): CK  Recent Labs     03/15/21  1108   ALB 3 1*   ALKPHOS 69   ALT 28   AST 19     No results for input(s): INR, PT, PTT in the last 72 hours  No results for input(s): PHART, KSQ5DUT, PO2ART, HCO3 in the last 72 hours  Invalid input(s): BE  No results found for: PHENYTOIN, PHENOBARB, VALPROATE    -------------------------------------------------------------------------------------------------------------------  Assessment/Plan:    1)  Spells/Seizures:   1) Continue video EEG monitoring to capture and characterize events  2) Continue Telemetry and single lead EKG monitoring   2) Medications: Continue all home medications  3) Additional diagnostic tests:  tilt table testing today, consider photic stim   4) Continue seizure/fall/status epilepticus precautions  5) Ativan 2 mg as needed for more than two complex partial seizures or 1 Generalized tonic clonic seizure in a 24 hour period  6) Send out dysautonomia panel pending  Will likely result after discharge and can review results on follow up       2)  Co morbidities:   1)Bipolar disorder - continue home dose of venlafaxine 75 mg daily, oxcarbazepine 600 mg twice per day, and trazodone 300 mg at night time   Clonazepam ordered PRN  2) CAD- continue home doses of aspirin 81 mg daily, atorvastatin 40 mg daily, and ranexa 1000 mg twice per day  3) Tobacco use- admits to chewing tobacco and nicotine vape use  Nicotine patch ordered  4) Orthostatic hypotension - not currently on medications for treatment prior to admission  Tilt table testing ordered  Patient is high fall risk, recommend that bed alarm be activated at all times     5) Hypokalemia- continue with home dose of potassium chloride 20 mEq daily        3) DVT prophylaxis: Lovenox 40 mg daily        Code status: full code    I spent 25 minutes with the patient and coordinating his care     -------------------------------------------------------------------------------------------------------------------  EMRE Gaines        Date: 3/16/2021     Time: 8:21 AM   [address] [facility]

## 2021-03-17 ENCOUNTER — TRANSITIONAL CARE MANAGEMENT (OUTPATIENT)
Dept: FAMILY MEDICINE CLINIC | Facility: CLINIC | Age: 56
End: 2021-03-17

## 2021-03-17 PROCEDURE — 95718 EEG PHYS/QHP 2-12 HR W/VEEG: CPT | Performed by: PSYCHIATRY & NEUROLOGY

## 2021-03-17 NOTE — DISCHARGE SUMMARY
Epilepsy Monitoring Unit Discharge Summary    Patient Name: Chaparro Mandel   YOB: 1965   MRN: [id number]   Date of Admission: 3/15/2021   Date of Discharge: 3/16/2021    Referring Physician: Marta Aaron MD  Attending on Admission: Ralph Wilson MD  Attending on Discharge: Ralph Wilson MD    Discharge Diagnoses:  1  Orthostatic hypotension    2  Observed seizure-like activity ([address] )    3  Syncope and collapse        Consultations:  none    Procedures:  1  Continuous Video EEG  2  Telemetry    Disposition:  Discharge to Home    Admission Medications:  Prior to Admission Medications   Prescriptions Last Dose Informant Patient Reported? Taking?    OXcarbazepine (TRILEPTAL) 600 mg tablet   No No   Sig: Take 1 tablet (600 mg total) by mouth 2 (two) times a day   acetaminophen (TYLENOL) 325 mg tablet Not Taking at Unknown time Self No No   Sig: Take 3 tablets (975 mg total) by mouth every 8 (eight) hours   Patient not taking: Reported on 3/15/2021   aspirin 81 mg chewable tablet  Self No No   Sig: Chew 1 tablet (81 mg total) daily   atorvastatin (LIPITOR) 40 mg tablet   No No   Sig: Take 1 tablet (40 mg total) by mouth daily   clonazePAM (KlonoPIN) 1 mg tablet   No No   Sig: Take 1 tablet (1 mg total) by mouth 3 (three) times a day   Patient taking differently: Take 1 mg by mouth 3 (three) times a day as needed    paliperidone palmitate ER (INVEGA) 234 mg/1 5 mL IM injection   No No   Sig: Inject 1 5 mL (234 mg total) into a muscle every 28 days   potassium chloride (K-DUR,KLOR-CON) 20 mEq tablet   No No   Sig: Take 1 tablet (20 mEq total) by mouth daily   Patient taking differently: Take 20 mEq by mouth daily at bedtime EVENING   ranolazine (RANEXA) 1000 MG SR tablet   No No   Sig: Take 1 tablet (1,000 mg total) by mouth 2 (two) times a day   sodium chloride 1 g tablet  Self No No   Sig: Take 1 tablet (1 g total) by mouth 3 (three) times a day   traZODone (DESYREL) 300 MG tablet  Self No No   Sig: Take 1 tablet (300 mg total) by mouth daily at bedtime   venlafaxine (EFFEXOR) 75 mg tablet  Self No No   Sig: Take 1 tablet (75 mg total) by mouth 3 (three) times a day   Patient taking differently: Take 75 mg by mouth daily In AM      Facility-Administered Medications: None       Discharge Medications:  See after visit summary for reconciled discharge medications provided to patient and family  Follow-up Appointments/Referrals:  1  Follow-up with [facility] in 2-4 weeks after discharge with Dr Rut Mendoza  2  Follow up as scheduled with Cardiology (April 26th)  3  Follow up with Psychiatry (March 22nd)  4  Referred to dysautonomia specialist at [facility] - Dr Maxim Lopez    Recommended follow-up testing:  None  Autoimmune dysautonomia evaluation, serum (DYS2 at [facility]), and [facility] antibiodies pending  Results to be reviewed at follow up  Diet: Regular  Activity: As tolerated  Restrictions: no driving  Slow position changes  Indication for Admission: To rule out epilepsy as source of recurrent events of postural related lightheadedness, period of appearing "disconnected", and brief generalized body shaking that may progress to loss of consciousness  During admission, workup for autoimmune dysautonomia was sent out  Patient also required tilt table testing  Hospital Course: The patient was admitted to the EMU and monitored with continuous video EEG  During admission, no medication changes were made  He had tilt table testing on 3/16 which did not capture any events of clinical interest though did admit to feeling "fuzzy" but not lightheadedness  No symptoms occurred that he has with his typical events  Lowest BP during testing was 92/70, formal reading of tilt table test is pending at this time  EEG was normal throughout admission and during tilt table testing  Activation procedures:  1  Tilt table testing  2   Photic stimulation        EMU Conclusion  Summary of interictal findings: Normal EEG  Summary of event findings: N/A - no events of clinical interest were captured during admission    Seizure Classification: N/A    Epilepsy Classification: N/A    EMU findings and discussion:  Patient's events are unlikely related to seizure  Recommend that he establish with dysautonomia specialist for further evaluation           Katina Vega, [address] [place] [address]

## 2021-03-18 LAB
ACHR BIND AB SER-SCNC: <0.03 NMOL/L (ref 0–0.24)
ACHR BLOCK AB/ACHR TOTAL SFR SER: 16 % (ref 0–25)

## 2021-03-19 LAB — OXCARBAZEPINE SERPL-MCNC: 15 UG/ML (ref 10–35)

## 2021-03-22 ENCOUNTER — TELEPHONE (OUTPATIENT)
Dept: FAMILY MEDICINE CLINIC | Facility: CLINIC | Age: 56
End: 2021-03-22

## 2021-03-22 ENCOUNTER — OFFICE VISIT (OUTPATIENT)
Dept: PSYCHIATRY | Facility: CLINIC | Age: 56
End: 2021-03-22
Payer: COMMERCIAL

## 2021-03-22 DIAGNOSIS — Z72.0 TOBACCO USE: ICD-10-CM

## 2021-03-22 DIAGNOSIS — Z72.0 TOBACCO ABUSE: Primary | ICD-10-CM

## 2021-03-22 DIAGNOSIS — F31.62 BIPOLAR DISORDER, CURRENT EPISODE MIXED, MODERATE (HCC): Primary | ICD-10-CM

## 2021-03-22 PROCEDURE — 90833 PSYTX W PT W E/M 30 MIN: CPT | Performed by: STUDENT IN AN ORGANIZED HEALTH CARE EDUCATION/TRAINING PROGRAM

## 2021-03-22 PROCEDURE — 99213 OFFICE O/P EST LOW 20 MIN: CPT | Performed by: STUDENT IN AN ORGANIZED HEALTH CARE EDUCATION/TRAINING PROGRAM

## 2021-03-22 RX ORDER — VENLAFAXINE HYDROCHLORIDE 37.5 MG/1
37.5 CAPSULE, EXTENDED RELEASE ORAL DAILY
Qty: 14 CAPSULE | Refills: 0 | Status: SHIPPED | OUTPATIENT
Start: 2021-03-22 | End: 2021-05-26

## 2021-03-22 NOTE — TELEPHONE ENCOUNTER
Pt states he was given a nicotine patch while in hospital to curb the chew craving and it worked really well    He would like a prescription for what he was given in hospital if possible, he has appt scheduled for 3/30/21
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
## 2021-03-23 ENCOUNTER — TELEPHONE (OUTPATIENT)
Dept: PSYCHIATRY | Facility: CLINIC | Age: 56
End: 2021-03-23

## 2021-03-23 RX ORDER — NICOTINE 21 MG/24HR
1 PATCH, TRANSDERMAL 24 HOURS TRANSDERMAL EVERY 24 HOURS
Qty: 28 PATCH | Refills: 3 | Status: SHIPPED | OUTPATIENT
Start: 2021-03-23 | End: 2021-07-27 | Stop reason: SDUPTHER

## 2021-03-23 NOTE — TELEPHONE ENCOUNTER
[facility] called and left a message at [facility]  They need a prior auth for his medication   Please give them a call back at [phone]

## 2021-03-24 ENCOUNTER — HOSPITAL ENCOUNTER (OUTPATIENT)
Dept: INFUSION CENTER | Facility: HOSPITAL | Age: 56
Discharge: HOME | End: 2021-03-24

## 2021-03-24 LAB
ACHR MOD AB/ACHR TOTAL SFR SER: <12 % (ref 0–20)
MISCELLANEOUS LAB TEST RESULT: NORMAL

## 2021-03-26 ENCOUNTER — HOSPITAL ENCOUNTER (OUTPATIENT)
Dept: INFUSION CENTER | Facility: HOSPITAL | Age: 56
Discharge: HOME | End: 2021-03-26

## 2021-03-29 DIAGNOSIS — F32.A ANXIETY AND DEPRESSION: ICD-10-CM

## 2021-03-29 DIAGNOSIS — F41.9 ANXIETY AND DEPRESSION: ICD-10-CM

## 2021-03-30 ENCOUNTER — OFFICE VISIT (OUTPATIENT)
Dept: FAMILY MEDICINE CLINIC | Facility: CLINIC | Age: 56
End: 2021-03-30
Payer: COMMERCIAL

## 2021-03-30 ENCOUNTER — TELEPHONE (OUTPATIENT)
Dept: INFUSION CENTER | Facility: HOSPITAL | Age: 56
End: 2021-03-30

## 2021-03-30 VITALS
SYSTOLIC BLOOD PRESSURE: 124 MMHG | RESPIRATION RATE: 16 BRPM | BODY MASS INDEX: 26.88 KG/M2 | DIASTOLIC BLOOD PRESSURE: 72 MMHG | HEIGHT: 71 IN | TEMPERATURE: 97 F | HEART RATE: 68 BPM | WEIGHT: 192 LBS

## 2021-03-30 DIAGNOSIS — I95.1 ORTHOSTATIC HYPOTENSION: ICD-10-CM

## 2021-03-30 DIAGNOSIS — R55 SYNCOPE AND COLLAPSE: Primary | ICD-10-CM

## 2021-03-30 DIAGNOSIS — G90.8 DYSAUTONOMIA-LIKE DISORDER: ICD-10-CM

## 2021-03-30 DIAGNOSIS — Z98.84 BARIATRIC SURGERY STATUS: ICD-10-CM

## 2021-03-30 DIAGNOSIS — Z23 ENCOUNTER FOR IMMUNIZATION: ICD-10-CM

## 2021-03-30 PROCEDURE — 99495 TRANSJ CARE MGMT MOD F2F 14D: CPT | Performed by: FAMILY MEDICINE

## 2021-03-30 RX ORDER — CLONAZEPAM 1 MG/1
1 TABLET ORAL 3 TIMES DAILY PRN
Qty: 90 TABLET | Refills: 0 | Status: SHIPPED | OUTPATIENT
Start: 2021-03-30 | End: 2021-05-05 | Stop reason: SDUPTHER

## 2021-03-30 NOTE — PROGRESS NOTES
Assessment/Plan:    1  Syncope and collapse    2  Dysautonomia-like disorder    3  Bariatric surgery status    4  Orthostatic hypotension            There are no Patient Instructions on file for this visit  Return for Next scheduled follow up  Subjective:      Patient ID: Nii Becerril is a 55 y o  male  Chief Complaint   Patient presents with    Transition of Care Management     [facility] lpnilda       Pt is here for a TCM for hospital admission  Pt went in wuith another episode of near syncopy  Pt went down to his knees  Nurses TCM note appreciated  Pt states he was advised irt could be epilepisy - he was in seizure unit and it was negative  Pt had Tilt table test that was negative    Pt has a specialist he will be seeing in [place] trying to get to the bottom of this    Pt's BP is fine tday and he is feeling well    Since discharge he has had symptoms - yesterday was a bad day - was close top having another event but did not    Hyperlipidemia-  he  has been taking their cholesterol medication regularly  Associated symptoms:  Myalgias: no    Pt has stopped chewing tobacco - uses nicotine patch, has not not noticed a diff in symptoms  The following portions of the patient's history were reviewed and updated as appropriate: allergies, current medications, past family history, past medical history, past social history, past surgical history and problem list     Review of Systems   Constitutional: Negative for activity change, appetite change, chills, diaphoresis, fatigue, fever and unexpected weight change  HENT: Negative for congestion, dental problem, ear pain, mouth sores, sinus pressure, sinus pain, sore throat and trouble swallowing  Eyes: Negative for photophobia, discharge and itching  Respiratory: Negative for apnea, chest tightness and shortness of breath  Cardiovascular: Negative for chest pain, palpitations and leg swelling     Gastrointestinal: Negative for abdominal distention, abdominal pain, blood in stool, nausea and vomiting  Endocrine: Negative for cold intolerance, heat intolerance, polydipsia, polyphagia and polyuria  Genitourinary: Negative for difficulty urinating  Musculoskeletal: Negative for arthralgias  Skin: Negative for color change and wound  Neurological: Negative for dizziness, syncope, speech difficulty and headaches  Hematological: Negative for adenopathy  Psychiatric/Behavioral: Negative for agitation and behavioral problems  Current Outpatient Medications   Medication Sig Dispense Refill    acetaminophen (TYLENOL) 325 mg tablet Take 3 tablets (975 mg total) by mouth every 8 (eight) hours 30 tablet 0    aspirin 81 mg chewable tablet Chew 1 tablet (81 mg total) daily 30 tablet 0    atorvastatin (LIPITOR) 40 mg tablet Take 1 tablet (40 mg total) by mouth daily 90 tablet 1    clonazePAM (KlonoPIN) 1 mg tablet Take 1 tablet (1 mg total) by mouth 3 (three) times a day as needed for anxiety 90 tablet 0    nicotine (NICODERM CQ) 21 mg/24 hr TD 24 hr patch Place 1 patch on the skin every 24 hours 28 patch 3    OXcarbazepine (TRILEPTAL) 600 mg tablet Take 1 tablet (600 mg total) by mouth 2 (two) times a day 180 tablet 0    paliperidone palmitate ER (INVEGA) 234 mg/1 5 mL IM injection Inject 1 5 mL (234 mg total) into a muscle every 28 days 1 Syringe 3    potassium chloride (K-DUR,KLOR-CON) 20 mEq tablet Take 1 tablet (20 mEq total) by mouth daily (Patient taking differently: Take 20 mEq by mouth daily at bedtime EVENING) 30 tablet 5    ranolazine (RANEXA) 1000 MG SR tablet Take 1 tablet (1,000 mg total) by mouth 2 (two) times a day 180 tablet 3    traZODone (DESYREL) 300 MG tablet Take 1 tablet (300 mg total) by mouth daily at bedtime 90 tablet 1    venlafaxine (EFFEXOR-XR) 37 5 mg 24 hr capsule Take 1 capsule (37 5 mg total) by mouth daily 14 capsule 0     No current facility-administered medications for this visit          Objective:    BP 124/72   Pulse 68   Temp (!) 97 °F (36 1 °C)   Resp 16   Ht 5' 11" (1 803 m)   Wt 87 1 kg (192 lb)   BMI 26 78 kg/m²        Physical Exam  Vitals signs and nursing note reviewed  Constitutional:       General: He is not in acute distress  Appearance: He is well-developed  He is not diaphoretic  HENT:      Head: Normocephalic and atraumatic  Right Ear: External ear normal       Left Ear: External ear normal       Nose: Nose normal       Mouth/Throat:      Pharynx: No oropharyngeal exudate  Eyes:      General: No scleral icterus  Right eye: No discharge  Left eye: No discharge  Pupils: Pupils are equal, round, and reactive to light  Neck:      Thyroid: No thyromegaly  Cardiovascular:      Rate and Rhythm: Normal rate  Heart sounds: Normal heart sounds  No murmur  Pulmonary:      Effort: Pulmonary effort is normal  No respiratory distress  Breath sounds: Normal breath sounds  No wheezing  Abdominal:      General: Bowel sounds are normal  There is no distension  Palpations: Abdomen is soft  There is no mass  Tenderness: There is no abdominal tenderness  There is no guarding or rebound  Musculoskeletal: Normal range of motion  Skin:     General: Skin is warm and dry  Findings: No erythema or rash  Neurological:      Mental Status: He is alert        Coordination: Coordination normal       Deep Tendon Reflexes: Reflexes normal    Psychiatric:         Behavior: Behavior normal               TCM Call (since 2/27/2021)     Date and time call was made  3/17/2021  2:32 PM    Hospital care reviewed  Records reviewed    Patient was hospitialized at  [facility]        Date of Admission  03/15/21    Date of discharge  03/16/21    Diagnosis  Observed seizure like activity     Disposition  Home    Were the patients medications reviewed and updated  Yes    Current Symptoms  Dizziness    Dizziness severity  Mild    Quality Character Lightheadedness    Episode pattern  Intermittent    Cause  No known event    Clinical progress  Unchanged      TCM Call (since 2/27/2021)     Post hospital issues  None    Should patient be enrolled in anticoag monitoring? No    Scheduled for follow up? Yes    Patients specialists  Psychiatrist; Neurologist; Cardiologist; Other (comment)    Other specialists names  Dr Nessa Murillo [facility] - dysautonomia specialist     Did you obtain your prescribed medications  Yes    Do you need help managing your prescriptions or medications  No    Is transportation to your appointment needed  No    I have advised the patient to call PCP with any new or worsening symptoms  Jason Ricketts LPN     Living Arrangements  Spouse or Significiant other    Support System  Family    The type of support provided  Emotional    Do you have social support  Yes, as much as I need    Are you recieving any outpatient services  No    Are you recieving home care services  No    Are you using any community resources  No    Current waiver services  No    Have you fallen in the last 12 months  Yes    How many times  6 with injuries  Interperter language line needed  No    Counseling  Patient    Counseling topics  Activities of daily living; Importance of RX compliance; patient and family education; instructions for management;  Risk factor reduction            Aurelia Duval, DO

## 2021-03-31 ENCOUNTER — TELEPHONE (OUTPATIENT)
Dept: FAMILY MEDICINE CLINIC | Facility: CLINIC | Age: 56
End: 2021-03-31

## 2021-03-31 ENCOUNTER — TELEPHONE (OUTPATIENT)
Dept: PSYCHIATRY | Facility: CLINIC | Age: 56
End: 2021-03-31

## 2021-03-31 NOTE — TELEPHONE ENCOUNTER
I spoke with David Hilton at the infusion center she stated she just wanted to make you aware that Huseyin Torresu self administered his Invega injection last Friday 3/26/21  He called to reschedule his appt for this Friday  She rescheduled his appt for 4/16/21  She also stated when Huseyin Schaefer called he was very polite he told her his wife doesn't like him when his shot starts to wear off, that is why he picked it up from the pharmacy

## 2021-03-31 NOTE — TELEPHONE ENCOUNTER
Spoke to patient, his blood pressure was 74/52 with some dizziness  Patient was recently in the hospital for this and was seen yesterday with Dr Josesito Canchola as well  As per Dr Keila Chun, patient is to avoid standing and walking around due to the dizziness and increase fluid intake  Patient agreed  Sending to Dr Keila Chun and Dr Marlo Hunter MA THE PATIENT WAS SEEN AND EXAMINED BY ME WITH THE HOUSESTAFF AND STROKE TEAM DURING MORNING ROUNDS.   HPI:  87F w/ PMH of HTN, HLD, transferred from [facility] for NSG eval after found to have large L. BG IPH. Patient found to have severe headache with subsequent syncopal episode. Found to be minimally responsive and so initially intubated for airway protection. Was hypertensive,  started on Caredene and prop gtt. Cardene discontinue prior to transfer. Upon arrival, with sedation held she was unable to open her eyes, not following commands, moving spontaneously on the L, RUE loc, RLE WDs. After extensive discussion with the family regarding goals of care, she was brought to the operating room for emergent clot evacuation now extubated.     SUBJECTIVE: No events overnight.  No new neurologic complaints.      acetaminophen   Tablet .. 650 milliGRAM(s) Oral every 6 hours PRN  digoxin    Elixir 125 MICROGram(s) Oral daily  diltiazem Infusion 10 mG/Hr IV Continuous <Continuous>  doxazosin 2 milliGRAM(s) Oral at bedtime  enoxaparin Injectable 40 milliGRAM(s) SubCutaneous <User Schedule>  levETIRAcetam  Solution 500 milliGRAM(s) Oral two times a day  nystatin    Suspension 4 milliLiter(s) Oral four times a day  ondansetron Injectable 4 milliGRAM(s) IV Push every 6 hours PRN  polyethylene glycol 3350 17 Gram(s) Oral every 12 hours  propranolol 10 milliGRAM(s) Oral three times a day  senna 2 Tablet(s) Oral at bedtime  sodium chloride 3%  Inhalation 3 milliLiter(s) Inhalation every 6 hours PRN    PHYSICAL EXAM:   Vital Signs Last 24 Hrs  T(C): 37.4 (21 May 2020 07:01), Max: 37.4 (21 May 2020 07:01)  T(F): 99.3 (21 May 2020 07:01), Max: 99.3 (21 May 2020 07:01)  HR: 83 (21 May 2020 06:00) (71 - 132)  BP: 143/109 (21 May 2020 06:00) (97/64 - 143/109)  BP(mean): 120 (21 May 2020 06:00) (68 - 120)  RR: 17 (21 May 2020 06:00) (14 - 23)  SpO2: 96% (21 May 2020 06:00) (96% - 99%)    General: No acute distress  HEENT: eyes open, right gaze palsy , R HHA  Abdomen: Soft, nontender, nondistended   Extremities: No edema    NEUROLOGICAL EXAM:  Mental status: Awake, eyes open and somewhat minimally attentive then closes again, not following commands, no verbal output   Cranial Nerves: right facial droop, right gaze palsy, right homonymous   Motor exam: left arm moves spontaneously against gravity within bed, LLE with some flexion in bed, right hemiplegia with only trace flexion  Sensation: decreased on right   Coordination/ Gait: gait not assessed    LABS:                        12.6   10.05 )-----------( 105      ( 21 May 2020 06:20 )             38.5    05-21    139  |  105  |  23  ----------------------------<  108<H>  4.0   |  24  |  0.72    Ca    8.9      21 May 2020 06:20  Phos  4.5     05-20    TPro  5.2<L>  /  Alb  2.8<L>  /  TBili  0.6  /  DBili  x   /  AST  35  /  ALT  66<H>  /  AlkPhos  56  05-20    IMAGING: Reviewed by me.     CT Head No Cont (05.11.20)  No change in moderate residual parenchymal hemorrhage in the left frontal parenchyma and basal ganglia compared with 5/10/2020. No change in mass effect.    (05.09.20)  NONCONTRAST CT BRAIN: Large intraparenchymal hemorrhage involving the left basal ganglia, frontal lobe and medial temporal lobe as described above. Near complete effacement of the left lateral ventricle. There is 0.4 cm of rightward midline shift.    CTA BRAIN AND NECK: Patent cervical circulation.  No identified aneurysm or AVM. Decreased size of the left middle cerebral artery may be due to distal branch occlusion or compression by the intraparenchymal hemorrhage. THE PATIENT WAS SEEN AND EXAMINED BY ME WITH THE HOUSESTAFF AND STROKE TEAM DURING MORNING ROUNDS.   HPI:  87F w/ PMH of HTN, HLD, transferred from [facility] for NSG eval after found to have large L. BG IPH. Patient found to have severe headache with subsequent syncopal episode. Found to be minimally responsive and so initially intubated for airway protection. Was hypertensive,  started on Caredene and prop gtt. Cardene discontinue prior to transfer. Upon arrival, with sedation held she was unable to open her eyes, not following commands, moving spontaneously on the L, RUE loc, RLE WDs. After extensive discussion with the family regarding goals of care, she was brought to the operating room for emergent clot evacuation now extubated.     SUBJECTIVE: No events overnight.  No new neurologic complaints.      acetaminophen   Tablet .. 650 milliGRAM(s) Oral every 6 hours PRN  digoxin    Elixir 125 MICROGram(s) Oral daily  diltiazem Infusion 10 mG/Hr IV Continuous <Continuous>  doxazosin 2 milliGRAM(s) Oral at bedtime  enoxaparin Injectable 40 milliGRAM(s) SubCutaneous <User Schedule>  levETIRAcetam  Solution 500 milliGRAM(s) Oral two times a day  nystatin    Suspension 4 milliLiter(s) Oral four times a day  ondansetron Injectable 4 milliGRAM(s) IV Push every 6 hours PRN  polyethylene glycol 3350 17 Gram(s) Oral every 12 hours  propranolol 10 milliGRAM(s) Oral three times a day  senna 2 Tablet(s) Oral at bedtime  sodium chloride 3%  Inhalation 3 milliLiter(s) Inhalation every 6 hours PRN    PHYSICAL EXAM:   Vital Signs Last 24 Hrs  T(C): 37.4 (21 May 2020 07:01), Max: 37.4 (21 May 2020 07:01)  T(F): 99.3 (21 May 2020 07:01), Max: 99.3 (21 May 2020 07:01)  HR: 83 (21 May 2020 06:00) (71 - 132)  BP: 143/109 (21 May 2020 06:00) (97/64 - 143/109)  BP(mean): 120 (21 May 2020 06:00) (68 - 120)  RR: 17 (21 May 2020 06:00) (14 - 23)  SpO2: 96% (21 May 2020 06:00) (96% - 99%)    General: No acute distress  HEENT: eyes open, right gaze palsy , R HHA  Abdomen: Soft, nontender, nondistended   Extremities: No edema    NEUROLOGICAL EXAM:  Mental status: Awake, eyes open and somewhat minimally attentive, not following commands, some unintelligible speech   Cranial Nerves: right facial droop, right gaze palsy, right homonymous   Motor exam: left arm moves spontaneously against gravity within bed, LLE with some flexion in bed, right hemiplegia RUE/RLE 0/5.  Sensation: decreased on right   Coordination/ Gait: gait not assessed    LABS:                        12.6   10.05 )-----------( 105      ( 21 May 2020 06:20 )             38.5    05-21    139  |  105  |  23  ----------------------------<  108<H>  4.0   |  24  |  0.72    Ca    8.9      21 May 2020 06:20  Phos  4.5     05-20    TPro  5.2<L>  /  Alb  2.8<L>  /  TBili  0.6  /  DBili  x   /  AST  35  /  ALT  66<H>  /  AlkPhos  56  05-20    IMAGING: Reviewed by me.     CT Head No Cont (05.11.20)  No change in moderate residual parenchymal hemorrhage in the left frontal parenchyma and basal ganglia compared with 5/10/2020. No change in mass effect.    (05.09.20)  NONCONTRAST CT BRAIN: Large intraparenchymal hemorrhage involving the left basal ganglia, frontal lobe and medial temporal lobe as described above. Near complete effacement of the left lateral ventricle. There is 0.4 cm of rightward midline shift.    CTA BRAIN AND NECK: Patent cervical circulation.  No identified aneurysm or AVM. Decreased size of the left middle cerebral artery may be due to distal branch occlusion or compression by the intraparenchymal hemorrhage.

## 2021-03-31 NOTE — TELEPHONE ENCOUNTER
Zach Martin an RN at the [facility] infusion Center called and LM on nursing line  She said Edson Screen picked up Invega injectable and self administered the IM   Please call Zach Martin to discuss [phone]

## 2021-03-31 NOTE — TELEPHONE ENCOUNTER
I informed Ramila Pat that if he would like to continue receiving the injection he has to get it at the infusion center  He verbalized understanding  Ramila Pat also wanted me to let you know that when he takes his nighttime dose of trileptal he gets double vision that lasts about an hour to an hour in a half

## 2021-03-31 NOTE — TELEPHONE ENCOUNTER
Thanks for bringing this to my awareness  It is inappropriate for Chandler to administer the injection himself  If he does so in the future, we will discontinue the SUNG  We'll continue to monitor his status after the 4/16 IM injection

## 2021-04-01 ENCOUNTER — IMMUNIZATIONS (OUTPATIENT)
Dept: FAMILY MEDICINE CLINIC | Facility: HOSPITAL | Age: 56
End: 2021-04-01

## 2021-04-01 DIAGNOSIS — Z23 ENCOUNTER FOR IMMUNIZATION: Primary | ICD-10-CM

## 2021-04-01 PROCEDURE — 91301 SARS-COV-2 / COVID-19 MRNA VACCINE (MODERNA) 100 MCG: CPT

## 2021-04-01 PROCEDURE — 0011A SARS-COV-2 / COVID-19 MRNA VACCINE (MODERNA) 100 MCG: CPT

## 2021-04-02 ENCOUNTER — HOSPITAL ENCOUNTER (OUTPATIENT)
Dept: INFUSION CENTER | Facility: HOSPITAL | Age: 56
Discharge: HOME | End: 2021-04-02

## 2021-04-05 NOTE — TELEPHONE ENCOUNTER
Called and spoke to patient -  patient confirmed upcoming apt with Dr Munguia on 4/16/21 @ 9:30 am  Patient has no issues or concerns at this time

## 2021-04-06 PROCEDURE — 93660 TILT TABLE EVALUATION: CPT | Performed by: INTERNAL MEDICINE

## 2021-04-15 ENCOUNTER — TELEPHONE (OUTPATIENT)
Dept: NEUROLOGY | Facility: CLINIC | Age: 56
End: 2021-04-15

## 2021-04-15 NOTE — TELEPHONE ENCOUNTER
Patient sent message via my chart to cancel his appointment for 4/16 with Dr Poonam Michelle  Patient contacted back and new appointment made for 6/7 at 2 pm with Dr Poonam Michelle in [facility]

## 2021-04-16 ENCOUNTER — HOSPITAL ENCOUNTER (OUTPATIENT)
Dept: INFUSION CENTER | Facility: HOSPITAL | Age: 56
Discharge: HOME | End: 2021-04-16
Payer: COMMERCIAL

## 2021-04-16 PROCEDURE — 96372 THER/PROPH/DIAG INJ SC/IM: CPT

## 2021-04-16 RX ADMIN — PALIPERIDONE PALMITATE 234 MG: 234 INJECTION INTRAMUSCULAR at 09:25

## 2021-04-16 NOTE — PROGRESS NOTES
Pt tolerated Nancyann Rj Sustenna injection to R deltoid without difficulty  Next appt scheduled    Left ambulatory declining AVS

## 2021-04-26 ENCOUNTER — OFFICE VISIT (OUTPATIENT)
Dept: CARDIOLOGY CLINIC | Facility: CLINIC | Age: 56
End: 2021-04-26
Payer: COMMERCIAL

## 2021-04-26 VITALS
HEIGHT: 71 IN | WEIGHT: 191.7 LBS | DIASTOLIC BLOOD PRESSURE: 74 MMHG | OXYGEN SATURATION: 99 % | BODY MASS INDEX: 26.84 KG/M2 | SYSTOLIC BLOOD PRESSURE: 120 MMHG | HEART RATE: 102 BPM

## 2021-04-26 DIAGNOSIS — R55 SYNCOPE AND COLLAPSE: ICD-10-CM

## 2021-04-26 DIAGNOSIS — F51.01 PRIMARY INSOMNIA: ICD-10-CM

## 2021-04-26 DIAGNOSIS — I25.118 CORONARY ARTERY DISEASE OF NATIVE ARTERY OF NATIVE HEART WITH STABLE ANGINA PECTORIS (HCC): ICD-10-CM

## 2021-04-26 DIAGNOSIS — I95.1 ORTHOSTATIC HYPOTENSION: Primary | ICD-10-CM

## 2021-04-26 PROCEDURE — 99214 OFFICE O/P EST MOD 30 MIN: CPT | Performed by: INTERNAL MEDICINE

## 2021-04-26 RX ORDER — TRAZODONE HYDROCHLORIDE 300 MG/1
300 TABLET ORAL
Qty: 90 TABLET | Refills: 1 | Status: SHIPPED | OUTPATIENT
Start: 2021-04-26 | End: 2021-08-03 | Stop reason: SDUPTHER

## 2021-04-26 NOTE — PROGRESS NOTES
Cardiology Consultation     Leodan Gregory  [id number]  1965  [address] [place] [address]      1  Orthostatic hypotension     2  Syncope and collapse     3  Coronary artery disease of native artery of native heart with stable angina pectoris [facility])         Discussion/Summary:      Narayan continues to have symptoms which seem most consistent with orthostatic hypotension and autonomic insufficiency  Timeframe of this seems to be after his gastric bypass  He is doing his best to stay adequately hydrated eat regular meals throughout the day  He previously was on high doses of midodrine and Florinef both as well as salt tablets  Because he felt no improvement and had no different numbers with this as opposed to not, he has now weaned himself off of this since the last visit with me on his own  Despite being off of this now, he tells me he feels no different  He will take the salt tablets p r n  He did have a tilt-table test done in March when he was in the hospital for video EEG  This did not induce symptoms and he was not actually orthostatic at that time but blood pressure was on the lower side as it is typically  He has discussed reversal of gastric bypass with his bariatric surgeon  He is reluctant to do this without more shortness that this will improve his symptoms, as he said it took a while for him to recover from his surgery  In my opinion, he has been resistant to medical therapy, therefore I think this may provide him the best chance of improvement, but encouraged him to discuss further with the autonomic dysfunction specialist he is seeing tomorrow at [facility]  Finally, with his chest pain which he describes, it is very atypical   I do not think this is truly angina, but he is on Ranexa    I am not using Imdur, beta blockers, calcium channel blocker as all of these would worsen the hypotension  Previous History:  55year old man  In May of 2019, he had an admission at [facility] for chest discomfort  He had a minimally elevated troponin  He had a cardiac catheterization which showed nonobstructive CAD in some myocardial bridging of the LAD  He was gradually started on cardiac medications including metoprolol, Imdur, lipid-lowering therapy with statins  He had had chest pain off and on for several months, continues to have this, but it is atypical   In March of 2020, he had gastric bypass surgery  A few months after his gastric bypass, he started getting orthostatic  His metoprolol and Imdur were stopped  He was advised to increase his fluid intake and increase solute intake  During some hospitalization, he was also seen by another cardiologist and was prescribed thigh-high compression stockings  Gradually, midodrine and Florinef were added and titrated up (now off of these, as of 4/26/2021, see below)  He had ZIO patch ordered to exclude any arrhythmogenic causes of his symptoms  None were found  I first met him in October, 2020 when he saw me for another opinion about his symptoms  We discussed the etiology, but ultimately continued the same treatment  I did talk to him about increase fluid intake more, and decreasing energy drinks which he was drinking a lot of  He has now quit chewing tobacco   We have discussed reversal of GB surgery, and he has discussed this with the bariatric surgeon as well  Interval History:  Returns for regular follow-up  He had hospital stay for EEG which was unremarkable  During the hospitalization, he also had a tilt-table test which was borderline for hypotension, but did not induce any symptoms passing out  Since last visit with me, he tells me he is also weaned himself entirely off of midodrine and Florinef    He tells me he felt no different when he was taking it or not, and his blood pressure numbers were no different so he has discontinued this because he does not want to take a lot of medications  When he feels the day of "severe symptoms "he will take the salt tablets and he does feel that this helps somewhat  He did have a visit with the bariatric surgeon who discussed options of reversal of the surgery  Because there are no assurances that were provided that this would actually improve his orthostatic hypotension, he is currently is reluctant to proceed in that direction  He will be seeing the specialist center at [facility] for autonomic dysfunction tomorrow morning  Describes chest pain, mostly constant, not worse with exertion/activity  Does not feel similar to his prior chest pain which he has described      Patient Active Problem List   Diagnosis    NSTEMI (non-ST elevated myocardial infarction) ([facility] [address] )    Bipolar disorder, current episode mixed, moderate (HCC)    ROSI (obstructive sleep apnea)    Coronary artery disease of native artery of native heart with stable angina pectoris ([facility] [address] )    Former smoker    Migraine with aura and without status migrainosus, not intractable    Hypercholesterolemia    Bariatric surgery status    Primary insomnia    Overweight    Encounter for surgical aftercare following surgery of digestive system    Postsurgical malabsorption    Tobacco use    Orthostatic hypotension    Myoclonic jerking    Syncope and collapse    Infiltrate of middle lobe of right lung present on imaging study    Hypokalemia    Elevated troponin    Observed seizure-like activity (HCC)    Dysautonomia-like disorder     Past Medical History:   Diagnosis Date    Bariatric surgery status     CPAP (continuous positive airway pressure) dependence     Hearing loss of aging     Hypercholesterolemia     Morbid obesity ([facility] [address] )     NSTEMI (non-ST elevation myocardial infarction) [facility])     april 2019    Postsurgical malabsorption      Social History Tobacco Use    Smoking status: Former Smoker     Types: Cigarettes     Quit date: 2011     Years since quitting: 10 3    Smokeless tobacco: Current User     Types: Chew     Last attempt to quit: 6/17/2014    Tobacco comment: quit cigarettes 2011   Substance Use Topics    Alcohol use: Not Currently     Frequency: Never    Drug use: Never      Family History   Problem Relation Age of Onset    Lung cancer Mother     Brain cancer Mother     Diabetes Brother     Bipolar disorder Maternal Grandfather     Bipolar disorder Son      Past Surgical History:   Procedure Laterality Date    CARDIAC CATHETERIZATION      no stents     COLONOSCOPY      EGD      HERNIA REPAIR      umbilical hernia    IA LAP GASTRIC BYPASS/FRITZ-EN-Y N/A 3/2/2020    Procedure: LAPAROSCOPIC FRITZ-EN-Y GASTRIC BYPASS AND INTRAOPERATIVE EGD;  Surgeon: Julio Mcmanus MD;  Location: [facility];  Service: Bariatrics    SKIN SURGERY      cyst removed from back and thumb    TONSILLECTOMY      TOOTH EXTRACTION         Current Outpatient Medications:     aspirin 81 mg chewable tablet, Chew 1 tablet (81 mg total) daily, Disp: 30 tablet, Rfl: 0    atorvastatin (LIPITOR) 40 mg tablet, Take 1 tablet (40 mg total) by mouth daily, Disp: 90 tablet, Rfl: 1    clonazePAM (KlonoPIN) 1 mg tablet, Take 1 tablet (1 mg total) by mouth 3 (three) times a day as needed for anxiety, Disp: 90 tablet, Rfl: 0    nicotine (NICODERM CQ) 21 mg/24 hr TD 24 hr patch, Place 1 patch on the skin every 24 hours, Disp: 28 patch, Rfl: 3    OXcarbazepine (TRILEPTAL) 600 mg tablet, Take 1 tablet (600 mg total) by mouth 2 (two) times a day, Disp: 180 tablet, Rfl: 0    paliperidone palmitate ER (INVEGA) 234 mg/1 5 mL IM injection, Inject 1 5 mL (234 mg total) into a muscle every 28 days, Disp: 1 Syringe, Rfl: 3    ranolazine (RANEXA) 1000 MG SR tablet, Take 1 tablet (1,000 mg total) by mouth 2 (two) times a day, Disp: 180 tablet, Rfl: 3    traZODone (DESYREL) 300 MG tablet, Take 1 tablet (300 mg total) by mouth daily at bedtime, Disp: 90 tablet, Rfl: 1    acetaminophen (TYLENOL) 325 mg tablet, Take 3 tablets (975 mg total) by mouth every 8 (eight) hours (Patient not taking: Reported on 4/26/2021), Disp: 30 tablet, Rfl: 0    potassium chloride (K-DUR,KLOR-CON) 20 mEq tablet, Take 1 tablet (20 mEq total) by mouth daily (Patient not taking: Reported on 4/26/2021), Disp: 30 tablet, Rfl: 5    venlafaxine (EFFEXOR-XR) 37 5 mg 24 hr capsule, Take 1 capsule (37 5 mg total) by mouth daily (Patient not taking: Reported on 4/26/2021), Disp: 14 capsule, Rfl: 0  No Known Allergies    Vitals:    04/26/21 0855   BP: 120/74   BP Location: Right arm   Patient Position: Sitting   Cuff Size: Standard   Pulse: 102   SpO2: 99%   Weight: 87 kg (191 lb 11 2 oz)   Height: 5' 11" (1 803 m)     Vitals:    04/26/21 0855   Weight: 87 kg (191 lb 11 2 oz)      Height: 5' 11" (180 3 cm)   Body mass index is 26 74 kg/m²  Physical Exam:  GEN: Uri Nugent appears well, alert and oriented x 3, pleasant and cooperative   HEENT: pupils equal, round, and reactive to light; extraocular muscles intact  NECK: supple, no carotid bruits   HEART: regular rhythm, normal S1 and S2, no murmurs, clicks, gallops or rubs   LUNGS: clear to auscultation bilaterally; no wheezes, rales, or rhonchi   ABDOMEN: normal bowel sounds, soft, no tenderness, no distention  EXTREMITIES: peripheral pulses normal; no clubbing, cyanosis, or edema  NEURO: no focal findings   SKIN: normal without suspicious lesions on exposed skin      ROS:  Positive for bipolar, dizzy, lightheaded  Loss of consciousness with changing positions  Except as noted in HPI, is otherwise reviewed in detail and a 12 point review of systems is negative    ROS reviewed and is unchanged    Labs:  Lab Results   Component Value Date    K 4 0 03/15/2021     03/15/2021    CREATININE 0 60 03/15/2021    BUN 10 03/15/2021    CO2 27 03/15/2021    ALT 28 03/15/2021    AST 19 03/15/2021    INR 1 20 (H) 08/18/2020    GLUF 82 12/09/2020    HGBA1C 4 9 12/09/2020    WBC 9 03 03/15/2021    HGB 13 3 03/15/2021    HCT 38 9 03/15/2021     03/15/2021       No results found for: CHOL  Lab Results   Component Value Date    HDL 34 (L) 06/22/2020    HDL 41 06/18/2019    HDL 37 (L) 05/15/2019     Lab Results   Component Value Date    LDLCALC 35 06/22/2020    LDLCALC 63 06/18/2019    LDLCALC 118 (H) 05/15/2019     Lab Results   Component Value Date    TRIG 52 06/22/2020    TRIG 62 06/18/2019    TRIG 116 05/15/2019     Testing:  Tilt Table:  SUMMARY: Tilt table noted for bordeline hypotension but and tachycardia induced by upright tilt without reproduction of symptoms, she started from low baseline so not enough change to consider this orthostatic hypotension  He had 4 beat run of PAT and occassional PVC's not correlated to symptoms  Echo 8/19/20:  LEFT VENTRICLE:  Systolic function was normal  Ejection fraction was estimated to be 55 %  There were no regional wall motion abnormalities    There was mild concentric hypertrophy      RIGHT VENTRICLE:  The size was normal   Systolic function was normal

## 2021-05-03 DIAGNOSIS — F31.62 BIPOLAR DISORDER, CURRENT EPISODE MIXED, MODERATE (HCC): ICD-10-CM

## 2021-05-03 RX ORDER — OXCARBAZEPINE 600 MG/1
600 TABLET, FILM COATED ORAL 2 TIMES DAILY
Qty: 180 TABLET | Refills: 0 | Status: SHIPPED | OUTPATIENT
Start: 2021-05-03 | End: 2021-05-26

## 2021-05-05 DIAGNOSIS — F32.A ANXIETY AND DEPRESSION: ICD-10-CM

## 2021-05-05 DIAGNOSIS — F41.9 ANXIETY AND DEPRESSION: ICD-10-CM

## 2021-05-05 RX ORDER — CLONAZEPAM 1 MG/1
1 TABLET ORAL 3 TIMES DAILY PRN
Qty: 90 TABLET | Refills: 0 | Status: CANCELLED | OUTPATIENT
Start: 2021-05-05

## 2021-05-05 RX ORDER — CLONAZEPAM 1 MG/1
1 TABLET ORAL 3 TIMES DAILY PRN
Qty: 90 TABLET | Refills: 0 | Status: SHIPPED | OUTPATIENT
Start: 2021-05-05 | End: 2021-05-26 | Stop reason: SDUPTHER

## 2021-05-05 NOTE — TELEPHONE ENCOUNTER
PDMP website reviewed  Prasanna Shrestha has been appropriately adherent to controlled psychotropic medications without evidence of abuse or misuse  As such, will send 30-day refill to pharmacy of choice and follow up as necessary

## 2021-05-05 NOTE — TELEPHONE ENCOUNTER
Austin  called saying that he got a [employer] generated message saying that his refill was declined  When I looked in the chart it said that you approved it but it didn't seem to go through

## 2021-05-07 ENCOUNTER — IMMUNIZATIONS (OUTPATIENT)
Dept: FAMILY MEDICINE CLINIC | Facility: HOSPITAL | Age: 56
End: 2021-05-07
Payer: COMMERCIAL

## 2021-05-07 ENCOUNTER — HOSPITAL ENCOUNTER (OUTPATIENT)
Dept: INFUSION CENTER | Facility: HOSPITAL | Age: 56
Discharge: HOME | End: 2021-05-07
Payer: COMMERCIAL

## 2021-05-07 DIAGNOSIS — Z23 ENCOUNTER FOR IMMUNIZATION: Primary | ICD-10-CM

## 2021-05-07 PROCEDURE — 96372 THER/PROPH/DIAG INJ SC/IM: CPT

## 2021-05-07 PROCEDURE — 0012A SARS-COV-2 / COVID-19 MRNA VACCINE (MODERNA) 100 MCG: CPT

## 2021-05-07 PROCEDURE — 91301 SARS-COV-2 / COVID-19 MRNA VACCINE (MODERNA) 100 MCG: CPT

## 2021-05-07 RX ADMIN — PALIPERIDONE PALMITATE 234 MG: 234 INJECTION INTRAMUSCULAR at 08:53

## 2021-05-07 NOTE — PROGRESS NOTES
Pt tolerated Invega Sustenna injection IM in the left deltoid  Next apt scheduled  AVS Declined  Left unit ambualtory with a steady gait

## 2021-05-18 ENCOUNTER — TELEPHONE (OUTPATIENT)
Dept: NEUROLOGY | Facility: CLINIC | Age: 56
End: 2021-05-18

## 2021-05-18 NOTE — TELEPHONE ENCOUNTER
Telephone call to patient to review his chart for his appointment with Dr Дмитрий Colindres on 6/7/21 @ 2 pm that the patient confirmed  While on the phone with the patient he notified me that he will need a out of network referral for his testing that he will be having at [facility] in [place] on 7/23/21   I spoke with Sandhya and she informed me to send this to nursing

## 2021-05-19 NOTE — TELEPHONE ENCOUNTER
[facility] calling to say that the Out of Network request has been approved for 1 year (5/18/22)  Authorization # [id number]  Patient made aware

## 2021-05-26 ENCOUNTER — OFFICE VISIT (OUTPATIENT)
Dept: PSYCHIATRY | Facility: CLINIC | Age: 56
End: 2021-05-26
Payer: COMMERCIAL

## 2021-05-26 DIAGNOSIS — F32.A ANXIETY AND DEPRESSION: ICD-10-CM

## 2021-05-26 DIAGNOSIS — F41.9 ANXIETY DISORDER, UNSPECIFIED TYPE: ICD-10-CM

## 2021-05-26 DIAGNOSIS — F31.62 BIPOLAR DISORDER, CURRENT EPISODE MIXED, MODERATE (HCC): Primary | ICD-10-CM

## 2021-05-26 DIAGNOSIS — F41.9 ANXIETY AND DEPRESSION: ICD-10-CM

## 2021-05-26 DIAGNOSIS — Z72.0 TOBACCO USE: ICD-10-CM

## 2021-05-26 DIAGNOSIS — F31.62 BIPOLAR DISORDER, CURRENT EPISODE MIXED, MODERATE (HCC): ICD-10-CM

## 2021-05-26 DIAGNOSIS — F51.01 PRIMARY INSOMNIA: ICD-10-CM

## 2021-05-26 PROCEDURE — 90833 PSYTX W PT W E/M 30 MIN: CPT | Performed by: STUDENT IN AN ORGANIZED HEALTH CARE EDUCATION/TRAINING PROGRAM

## 2021-05-26 PROCEDURE — 99214 OFFICE O/P EST MOD 30 MIN: CPT | Performed by: STUDENT IN AN ORGANIZED HEALTH CARE EDUCATION/TRAINING PROGRAM

## 2021-05-26 RX ORDER — RISPERIDONE 1 MG/1
1 TABLET, FILM COATED ORAL 2 TIMES DAILY
Qty: 30 TABLET | Refills: 1 | Status: SHIPPED | OUTPATIENT
Start: 2021-05-26 | End: 2021-06-23

## 2021-05-26 RX ORDER — CLONAZEPAM 1 MG/1
1 TABLET ORAL 3 TIMES DAILY PRN
Qty: 90 TABLET | Refills: 0 | Status: SHIPPED | OUTPATIENT
Start: 2021-05-26 | End: 2021-06-23 | Stop reason: SDUPTHER

## 2021-05-26 RX ORDER — OXCARBAZEPINE 300 MG/1
300 TABLET, FILM COATED ORAL 2 TIMES DAILY
Qty: 60 TABLET | Refills: 0 | Status: SHIPPED | OUTPATIENT
Start: 2021-05-26 | End: 2021-07-08 | Stop reason: SDUPTHER

## 2021-05-26 RX ORDER — CARBAMAZEPINE 200 MG/1
200 TABLET, EXTENDED RELEASE ORAL 2 TIMES DAILY
Qty: 60 TABLET | Refills: 0 | Status: SHIPPED | OUTPATIENT
Start: 2021-05-26 | End: 2021-05-26

## 2021-05-26 NOTE — PSYCH
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 not drive if feels impaired and agrees to take medications as prescribed    Psychotherapy Provided:     Individual psychotherapy provided: Yes  Counseling was provided during the session today for 18 minutes  Medication changes discussed with Chandler  Medication education provided to Austin Grajeda  Goals discussed during in session: alleviate anxiety, maintain control of depression, improve mood stability and maintain control of sleep  Recent stressors discussed with Austin Grajeda including family conflict, family issues, health issues, medical problems, recent medication change, limited support, social difficulties, everyday stressors and occasional anxiety  Coping strategies including abstaining from substance use, compliance with medications, exercising, getting into a good routine, increasing social contact, increasing social interaction, keeping busy at home, maintain healthy diet and maintain heathy sleeping hygiene reviewed with Austin Nicci  Educated on importance of medication and treatment compliance  Importance of follow up with family physician for medical issues reviewed with Austin Nicci  Supportive therapy provided  Treatment Plan:    Completed and signed during the session: Not applicable - Treatment Plan not due at this session    Note Share Disclaimer:      This note was not shared with the patient due to reasonable likelihood of causing patient harm    Nikia Hyatt MD 05/26/21

## 2021-05-26 NOTE — PROGRESS NOTES
Spoke with Merlin Arguelles at length  Plan was agreed upon today to cross-taper off Trileptal and to start Tegretol for greater mood stability and to limit adverse side effect of "blurry vision" associated with Trileptal  Unfortunately, after discussion with pharmacist, there appears to be a significant interaction between Tegretol (new medication) and Ranexa  As such, will discontinue Tegretol  I spoke with Merlin Arguelles about mood stabilizing agents, such as Lithium, to which he voiced concern  As such, he was agreeable to start Risperdal 1mg QHS (as discussed at appointment earlier today) and taper Trileptal 600mg BID to 600mg AM, 300mg Pm (900mg total) to mitigate risk of vision changes but also keep mood stabilizing properties on board  Merlin Arguelles was encouraged to call the clinic in 3 weeks to update this writer on his status  At that time, we'll likely increase Risperdal from 1mg QHS to 1mg BID  Script provided today of Risperdal was higher dose than currently prescribed as the plan will likely be to use this dose in future  Merlin Arguelles voiced understanding  All questions were answered   Moving forward, his current psychiatric medications will be as follows:    - Risperdal 1mg QHS (call clinic in 3 weeks to discuss titration to 1mg BID  - D/C Tegretol  - Taper Trileptal 600mg BID to 60mg AM, 300mg PM  - Continue Klonopin 1mg TID  - Continue Invega Sustena 234mg IM [id number]  - Continue Trazodone 300mg QHS

## 2021-05-26 NOTE — PATIENT INSTRUCTIONS
- Decrease Trileptal 600mg twice per day to 300mg twice per day  - Start Tegretol 200mg twice per day  - Start Risperdal 1mg each evening  - Continue Trazodone 300mg each evening  - Continue Klonopin 1mg three times daily  - Call clinic in 3 weeks to discuss further medication changes

## 2021-05-28 ENCOUNTER — TELEPHONE (OUTPATIENT)
Dept: PSYCHIATRY | Facility: CLINIC | Age: 56
End: 2021-05-28

## 2021-05-28 NOTE — TELEPHONE ENCOUNTER
For Dr Martina Lopez review
Pt called asking if he would be able to take risperidone 2x a days  Pt stated that he took it before a hyper manic episode and it stopped the hyper manic episode 
Spoke with [employer]  Advised Dr Clive Dove did authorize the Risperidone 1 mg BID   He verbalized understanding
Sure, please make Britany Driscoll aware that he can increase Risperdal from 1mg QHS to 1mg BID  Plan was to ultimately optimize to 1mg BID but we wanted to start slow  Glad to see he is tolerating it well and benefiting from it 
71

## 2021-06-03 ENCOUNTER — HOSPITAL ENCOUNTER (OUTPATIENT)
Dept: INFUSION CENTER | Facility: HOSPITAL | Age: 56
Discharge: HOME | End: 2021-06-03
Payer: COMMERCIAL

## 2021-06-03 PROCEDURE — 96372 THER/PROPH/DIAG INJ SC/IM: CPT

## 2021-06-03 RX ADMIN — PALIPERIDONE PALMITATE 234 MG: 234 INJECTION INTRAMUSCULAR at 09:34

## 2021-06-03 NOTE — PROGRESS NOTES
Pt tolerated Coco Matute Juancarlos injection to R deltoid without difficulty  Next appt scheduled for 3 weeks out per order  This injection given at 4 wks due to drug not arriving from specialty pharmacy on time    Left ambulatory declining AVS

## 2021-06-07 ENCOUNTER — OFFICE VISIT (OUTPATIENT)
Dept: NEUROLOGY | Facility: CLINIC | Age: 56
End: 2021-06-07
Payer: COMMERCIAL

## 2021-06-07 VITALS
DIASTOLIC BLOOD PRESSURE: 60 MMHG | SYSTOLIC BLOOD PRESSURE: 120 MMHG | WEIGHT: 195.4 LBS | BODY MASS INDEX: 27.35 KG/M2 | HEIGHT: 71 IN | HEART RATE: 59 BPM

## 2021-06-07 DIAGNOSIS — G90.8 DYSAUTONOMIA-LIKE DISORDER: ICD-10-CM

## 2021-06-07 DIAGNOSIS — I95.1 ORTHOSTATIC HYPOTENSION: Primary | ICD-10-CM

## 2021-06-07 DIAGNOSIS — Z98.84 STATUS POST GASTRIC BYPASS FOR OBESITY: ICD-10-CM

## 2021-06-07 PROBLEM — R56.9 OBSERVED SEIZURE-LIKE ACTIVITY (HCC): Status: RESOLVED | Noted: 2021-02-02 | Resolved: 2021-06-07

## 2021-06-07 PROCEDURE — 99215 OFFICE O/P EST HI 40 MIN: CPT | Performed by: PSYCHIATRY & NEUROLOGY

## 2021-06-07 RX ORDER — PANTOPRAZOLE SODIUM 20 MG/1
1 TABLET, DELAYED RELEASE ORAL
COMMUNITY
Start: 2021-06-01 | End: 2021-09-07 | Stop reason: SDUPTHER

## 2021-06-07 RX ORDER — SODIUM CHLORIDE 1000 MG
1 TABLET, SOLUBLE MISCELLANEOUS AS NEEDED
COMMUNITY
End: 2021-09-22

## 2021-06-07 NOTE — PROGRESS NOTES
Review of Systems   Constitutional: Positive for fatigue  Negative for chills and fever  HENT: Negative for ear pain and sore throat  Eyes: Negative for pain and visual disturbance  Respiratory: Negative for cough and shortness of breath  Cardiovascular: Negative for chest pain and palpitations  Gastrointestinal: Negative for abdominal pain and vomiting  Genitourinary: Negative for dysuria and hematuria  Musculoskeletal: Negative for arthralgias and back pain  Skin: Negative for color change and rash  Neurological: Positive for seizures  Negative for syncope  All other systems reviewed and are negative

## 2021-06-07 NOTE — PROGRESS NOTES
[facility]  Patient's Name: Lance Gastelum   Patient's : 1965   Visit Type: follow-up  Referring MD / PCP:  Waldemar Padron DO    Assessment:  Mr Lance Gastelum is a 55 y o  man with recurrent orthostatic hypotension, which causes him to feel lightheaded and "disconnected", sometimes to the point of appearing to have generalized body shaking (liky myoclonic syncope activity)  He has had multiple EEG studies without finding epileptiform discharges  There is no underlying small fiber neuropathy, movement disorder with other dysautonomic features  His tilt table study did not trigger an episode  It seems that some of his orthostatic symptoms have slightly improved, but continues to have milder bouts  He may have orthostatic hypotension due to gastric bypass surgery  I am less convinced about an underlying epilepsy disorder causing his episodes of generalized shaking and loss of consciousness  He is still awaiting further autonomic testing at [facility]  I was hoping that the providers there could provide more insight to the etiology and offer some recommendations as to how to manage the patient symptoms  He has done as much as he can with increased salt intake and water consumption; but compression stockings and abdominal binders and midodrine and Florinef were not helpful  He is still working with his psychiatrist in minimizing his medications that can worsen orthostatic hypotension      Plan:   1 - continue to follow-up with dysautonomia testing at [facility]   2 - continue to drink more than 2 5 liters of water a day  3 - when symptomatic consider drinking additional 0 5 L of water  4 - continue to take salt tabs 1 g take one tab three times a day  5 - take small meals that is low in carbohydrate  6 - sleep in a recliner with head up 30 to 45 degrees or with a sleep wedge to prop your head up while you are sleeping  7 - recommend as tolerated exercises to increase endurance, use the recumbent bicycle as much as you can tolerate, take it easy when coming off of the bicycle, may also try rowing machines, or exercises that are more floor based exercises  8 -I would consider a trial of Droxidopa 100mg three times a day or as needed based on symptoms  This medication could be titrated up to 300mg three times a day based on response  Side effects include supine hypertension, headache, nausea, fatigue; avoid in the setting of congestive heart failure and chronic renal failure  9 - follow-up in 3 months  10 - recommend checking B1, B6, B12, homocysteine, methylmalonic acid, vitamin E, protein electrophorosis at next blood work  Problem List Items Addressed This Visit        Cardiovascular and Mediastinum    Orthostatic hypotension - Primary       Nervous and Auditory    Dysautonomia-like disorder    Relevant Orders    Protein electrophoresis, serum    TSH, 3rd generation with Free T4 reflex    Vitamin B12    Hemoglobin A1C    Methylmalonic acid, serum    Vitamin E    Vitamin B1, whole blood    Vitamin B6       Other    Status post gastric bypass for obesity    Relevant Orders    Vitamin B12    Hemoglobin A1C    Methylmalonic acid, serum    Vitamin E    Vitamin B1, whole blood    Vitamin B6          Chief Complaint:    Chief Complaint   Patient presents with    Loss of Consciousness    Neurologic Problem     passing out      HPI:    Nancy Saab is a 55 y o  right handed male here for follow-up evaluation of orthostatic hypotension with periods of confusion and altered awareness  Interval History 6/7/2021  He completed EMU admission on 3/15-3/16/2021  He had a tilt table study with EEG monitoring, but no event was provoked during testing  He felt "fuzzy" but not typical      Since the last visit, he has not passed out fully, but he gets close with low blood pressure, he feels tremulous, lightheaded, and disconnected    He will try to hold on until his blood pressure is able to stabilize  His psychiatrist is trying to get him off of oxcarbazepine  He did see the dysautonomia clinic at [facility]  He is scheduled for 2-3 hour testing at [facility] for tilt table and a sweat test     He was not prescribed midodrine or Florinef  He sleeps with a single pillow  He drinks like a fish, he drinks 4-5 portions of a beer jelena that is about 48 ounces  He is unable to exercise because he gets lightheaded when he tries to  AED/side effects/compliance:  None for management of seizures disorder  Oxcarbazepine 300-150 by psychiatry for bipolar disorder    Event/Seizure semiology:  1  Typically provoked upon standing, lightheaded, sense of movement, wife/son reports a period of disconnected appearance and generalized shaking, mostly hands, then head/upper body, may progress to loss of consciousness  Prior Epilepsy History:  Intake History 2/2/2021  Patient was seen in the hospital on 7/16/2020 by the neurohospitalist service on consultation regarding syncope  Prior to his presentation he was complaining of episodes of "near syncope" and lightheaded with myoclonic jerking for about 1 month  He reports having myoclonic jerking for 2-3 seconds that occurs 4-5 times a month, more so while he changes positions  He was found to have low blood pressures with SBP 80 while standing and SBP 90 while sitting  He was previously on metoprolol, which was put on hold  He was put on midodrine and eventually Florinef  He had gastric bypass surgery in March 2020  He had a ZIO patch to evaluate for arrhythmias, none was found  He has had more episodes of orthostasis and passed out at work  He is employed as an , using ladders, and often he has to squat and stand  Patient's history:  About 6-7 months ago he started to have episodes of passing out  He gets really lightheaded and just goes goes out    There are days when he may pass out 2-3 times a day and sometimes he just feels lightheaded  There are no symptoms if he is sitting or lying down  These are usually triggered when he stand up  These symptoms can happened when he stands up, which has been consistent  Symptoms include a sensation of lightheaded, then shaking like he is going to have a seizure, more like twitching all over his body  These symptoms last 30-60 seconds  If he sits down the twitching goes away, but he still feels lightheaded  He may lose consciousness if he does not sit down  He has not lost consciousness for the past 1-2 weeks  He has good days and bad days  His wife mentioned that he may appear unresponsive  His wife reports he has a glazed look over his eyes, there is a period of disconnect for a couple seconds, looks freezes up, there is shaking in his hands (his son feels that he shakes all over, sometimes the shaking progresses up to the shoulder), when he passes out there is loss of color in his face, he would be unconscious for 30-60 seconds, sometimes longer  These episodes have become more frequent  He had bariatric surgery in March 2020, about 1-2 months later he had these symptoms  He was previously on blood pressure medications, he does not remember the name of the medications, but his medications were discontinued  Despite using Florinef and midodrine and drink 4-5 48 oz of water a day  He has had issues with constipation since his surgery  There have been no episodes of diarrhea  He has normal amount of sweat, no problems with dry eyes or dry mouth, no problems with urination, he is able to have an erection  He has issues with his balance since his bariatric surgery  There have been no tingling or loss of sensation in his hands  There is no weakness in his hands  He has episodes of hypomanic state, when he looks like a "happy drunk", he appears to ramble alone  He feels that he rambles every day    He has been on the same dose of trazodone, oxcarbazepine, venlafaxine, and clonazepam for 8-9 years for bipolar disorder, divalproex was discontinued due to double vision  He has been on these medications without complaint of syncopal symptoms for many years  There are no family members with seizures, orthostatic hypotension, or Parkinson's disease  Interval History 2021  He continues to have frequent episodes of feeling lightheaded on standing  Lightheadedness starts immediately  If he walks for 30-40 seconds then he is likely going to pass out  He also gets lightheaded if he was to bend over and it will be likely that he will pass out  His son reports that he has a staring out, body tremors, and sometimes there is a period of sluggishness  Episodes are not present when he is sitting or lying down  He does not take his fludrocortisone and midodrine on a regular basis  He feels that these medications have not help to increase his blood pressure  He has been maximized on these doses  He has tried compression stockings without relief, they are very uncomfortable, these go up to his hip  He sleeps with a thick pillow to keep his head up  His psychiatrist has been trying to wean him off of venlafaxine and identify any medication that may be contributing to orthostatic hypotension      Epilepsy Risk Factors:  Abnormal pregnancy: No  Abnormal birth/: No  Abnormal Development: No  Febrile seizures, simple: No  Febrile seizures, complex: No  CNS infection: No  Mental retardation: No  Cerebral palsy: No  Head injury (moderate/severe): No  CNS neoplasm: No  CNS malformation: No  Neurosurgical procedure: No  Stroke: No  CNS autoimmune disorder: No  Alcohol abuse: No  Drug abuse: No  Family history Sz/epilepsy: No    Prior AEDs:  medication Max dose Time used Reason to stop                 Prior workup:  x  [facility]agin2021 - MRI brain  Small arachnoid cyst in the left cerebellopontine angle  Normal MR brain study     EEGs:  2/23/2021  Routine - normal awake and drowsy    3/15-3/16/2021 - >24 hours video EEG, normal EEG    3/16/2021 Tilt table  Borderline hypotension with tachycardia induced by upright tilt without reproduction of symptoms  He had a 45 minutes upright tilt with HR  and BP /70-80;     Labs:  3/15/2021 -   [place] Autoimmune Dysautonomia serum - test did not find autoimmune antibodies in panel including AChR ganglionic neuronal Abs      Component      Latest Ref Rng & Units 3/15/2021   WBC      4 31 - 10 16 Thousand/uL 9 03   Red Blood Cell Count      3 88 - 5 62 Million/uL 4 30   Hemoglobin      12 0 - 17 0 g/dL 13 3   HCT      36 5 - 49 3 % 38 9   Platelet Count      335 - 390 Thousands/uL 261   Sodium      136 - 145 mmol/L 135 (L)   Potassium      3 5 - 5 3 mmol/L 4 0   Chloride      100 - 108 mmol/L 103   CO2      21 - 32 mmol/L 27   Anion Gap      4 - 13 mmol/L 5   BUN      5 - 25 mg/dL 10   Creatinine      0 60 - 1 30 mg/dL 0 60   Glucose, Random      65 - 140 mg/dL 63 (L)   Calcium      8 3 - 10 1 mg/dL 8 3   CORRECTED CALCIUM      8 3 - 10 1 mg/dL 9 0   AST      5 - 45 U/L 19   ALT      12 - 78 U/L 28   Alkaline Phosphatase      46 - 116 U/L 69   Total Protein      6 4 - 8 2 g/dL 5 7 (L)   Albumin      3 5 - 5 0 g/dL 3 1 (L)   TOTAL BILIRUBIN      0 20 - 1 00 mg/dL 0 44   eGFR      ml/min/1 73sq m 113   OXCARBAZEPINE      10 - 35 ug/mL 15   ACHR BINDING AB, SERUM      0 00 - 0 24 nmol/L <0 03   AChR Blocking Abs, Serum      0 - 25 % 16   AChR Modulating Abs      0 - 20 % <12         General exam   /60 (BP Location: Left arm, Patient Position: Sitting, Cuff Size: Large)   Pulse 59   Ht 5' 11" (1 803 m)   Wt 88 6 kg (195 lb 6 4 oz)   BMI 27 25 kg/m²    Appearance: normally developed, appears well  Carotids: not assessed  Cardiovascular: regular rate and rhythm and normal heart sounds  Pulmonary: clear to auscultation    HEENT: anicteric and neck is supple   Fundoscopy: not assessed    Mental status  Orientation: alert and oriented to name, place, time  Fund of Knowledge: intact   Attention and Concentration: intact  Current and Remote Memory:intact  Language: spontaneous speech is normal and comprehension is intact    Cranial Nerves  CN 1: not tested  CN 2: pupils equal round reactive to direct and consenual light   CN 3, 4, 6: EOMI, no nystagmus  CN 5:sensation intact to all distribution V1, V2, V3  CN 7:not assessed  CN 8:not assessed  CN 9, 10:no dysarthria present  CN 11:symmetric SCM with head turns  CN 12:not assessed    Motor:  Bulk, Tone: normal bulk, normal tone  Pronation: no pronator drift  Strength: Symmetric strength of the arms and legs, no lateralizing weakness   Abnormal movements: no abnormal movements are present    Sensory:  Lighttouch: intact in all fingers and toes  Romberg:normal    Coordination:  FNF:FNF bilaterally intact  LUCIA:intact  FFM:intact  Gait/Station:normal gait and normal tandem gait    Reflexes:  Not assessed    Past Medical/Surgical History:  Patient Active Problem List   Diagnosis    NSTEMI (non-ST elevated myocardial infarction) ([address]    Bipolar disorder, current episode mixed, moderate ([address] )    ROSI (obstructive sleep apnea)    Coronary artery disease of native artery of native heart with stable angina pectoris ([address] )    Former smoker    Migraine with aura and without status migrainosus, not intractable    Hypercholesterolemia    Bariatric surgery status    Primary insomnia    Overweight    Encounter for surgical aftercare following surgery of digestive system    Postsurgical malabsorption    Tobacco use    Orthostatic hypotension    Myoclonic jerking    Syncope and collapse    Infiltrate of middle lobe of right lung present on imaging study    Hypokalemia    Elevated troponin    Dysautonomia-like disorder    Anxiety disorder    Status post gastric bypass for obesity     Past Surgical History:   Procedure Laterality Date    CARDIAC CATHETERIZATION      no stents     COLONOSCOPY      EGD      HERNIA REPAIR      umbilical hernia    CO LAP GASTRIC BYPASS/FRITZ-EN-Y N/A 3/2/2020    Procedure: LAPAROSCOPIC FRITZ-EN-Y GASTRIC BYPASS AND INTRAOPERATIVE EGD;  Surgeon: Dylon Gomez MD;  [facility];  Service: Bariatrics    SKIN SURGERY      cyst removed from back and thumb    TONSILLECTOMY      TOOTH EXTRACTION       Past Psychiatric History:  Depression: Bipolar disorder  Anxiety: No  Psychosis: No  No prior behavioral health hospital    Medications:    Current Outpatient Medications:     aspirin 81 mg chewable tablet, Chew 1 tablet (81 mg total) daily, Disp: 30 tablet, Rfl: 0    atorvastatin (LIPITOR) 40 mg tablet, Take 1 tablet (40 mg total) by mouth daily, Disp: 90 tablet, Rfl: 1    clonazePAM (KlonoPIN) 1 mg tablet, Take 1 tablet (1 mg total) by mouth 3 (three) times a day as needed for anxiety, Disp: 90 tablet, Rfl: 0    nicotine (NICODERM CQ) 21 mg/24 hr TD 24 hr patch, Place 1 patch on the skin every 24 hours, Disp: 28 patch, Rfl: 3    OXcarbazepine (TRILEPTAL) 300 mg tablet, Take 1 tablet (300 mg total) by mouth 2 (two) times a day (Patient taking differently: Take 300 mg by mouth 2 (two) times a day Patient sated 300 mg in the am and 150 mg in the evening ), Disp: 60 tablet, Rfl: 0    paliperidone palmitate ER (INVEGA) 234 mg/1 5 mL IM injection, Inject 1 5 mL (234 mg total) into a muscle every 28 days, Disp: 1 Syringe, Rfl: 3    pantoprazole (PROTONIX) 20 mg tablet, Take 1 tablet by mouth daily after dinner, Disp: , Rfl:     risperiDONE (RisperDAL) 1 mg tablet, Take 1 tablet (1 mg total) by mouth 2 (two) times a day, Disp: 30 tablet, Rfl: 1    sodium chloride 1 g tablet, Take 1 g by mouth as needed, Disp: , Rfl:     traZODone (DESYREL) 300 MG tablet, Take 1 tablet (300 mg total) by mouth daily at bedtime, Disp: 90 tablet, Rfl: 1    acetaminophen (TYLENOL) 325 mg tablet, Take 3 tablets (975 mg total) by mouth every 8 (eight) hours (Patient not taking: Reported on 4/26/2021), Disp: 30 tablet, Rfl: 0    potassium chloride (K-DUR,KLOR-CON) 20 mEq tablet, Take 1 tablet (20 mEq total) by mouth daily (Patient not taking: Reported on 4/26/2021), Disp: 30 tablet, Rfl: 5    ranolazine (RANEXA) 1000 MG SR tablet, Take 1 tablet (1,000 mg total) by mouth 2 (two) times a day (Patient not taking: Reported on 6/7/2021), Disp: 180 tablet, Rfl: 3    Allergies:  No Known Allergies    Family history:  Family History   Problem Relation Age of Onset    Lung cancer Mother     Brain cancer Mother     Diabetes Brother     Bipolar disorder Maternal Grandfather     Bipolar disorder Son      There is no family history of seizure, epilepsy or developmental delay  Social History  Living situation:  Lives with wife and children  Work:  Completed some college, , commercial/industrial work  Driving:  Yes   reports that he quit smoking about 10 years ago  His smoking use included cigarettes  His smokeless tobacco use includes chew  He reports previous alcohol use  He reports that he does not use drugs  Review of Systems  A review of at least 12 organ/systems was obtained by the medical assistant and reviewed by me, including additional positives/negatives:  Neurological: Positive for close to passing out  Constitutional: Positive for fatigue  The total amount of time spent with the patient along with pre-chart and post-chart preparation was 42 minutes on the calendar day of the date of service  This included history taking, physical exam, review of ancillary testing, counseling provided to the patient regarding diagnosis, medications, treatment, and risk management, and other communication to the patient's providers and/or family    Start time: 2:45PM  End time: 3:27PM

## 2021-06-07 NOTE — PATIENT INSTRUCTIONS
Plan:   1 - continue to follow-up with dysautonomia testing at [facility]   2 - continue to drink more than 2 5 liters of water a day  3 - when symptomatic consider drinking additional 0 5 L of water  4 - continue to take salt tabs 1 g take one tab three times a day  5 - take small meals that is low in carbohydrate  6 - sleep in a recliner with head up 30 to 45 degrees or with a sleep wedge to prop your head up while you are sleeping  7 - recommend as tolerated exercises to increase endurance, use the recumbent bicycle as much as you can tolerate, take it easy when coming off of the bicycle, may also try rowing machines, or exercises that are more floor based exercises  8 -I would consider a trial of Droxidopa 100mg three times a day or as needed based on symptoms  This medication could be titrated up to 300mg three times a day based on response  Side effects include supine hypertension, headache, nausea, fatigue; avoid in the setting of congestive heart failure and chronic renal failure  9 - follow-up in 3 months  10 - recommend checking B1, B6, B12, homocysteine, methylmalonic acid, vitamin E, protein electrophorosis at next blood work

## 2021-06-10 PROBLEM — G25.3 MYOCLONIC JERKING: Status: RESOLVED | Noted: 2020-07-15 | Resolved: 2021-06-10

## 2021-06-19 ENCOUNTER — APPOINTMENT (OUTPATIENT)
Dept: LAB | Age: 56
End: 2021-06-19
Payer: COMMERCIAL

## 2021-06-19 DIAGNOSIS — G90.8 DYSAUTONOMIA-LIKE DISORDER: ICD-10-CM

## 2021-06-19 DIAGNOSIS — Z98.84 STATUS POST GASTRIC BYPASS FOR OBESITY: ICD-10-CM

## 2021-06-19 LAB
EST. AVERAGE GLUCOSE BLD GHB EST-MCNC: 94 MG/DL
HBA1C MFR BLD: 4.9 %
TSH SERPL DL<=0.05 MIU/L-ACNC: 1.77 UIU/ML (ref 0.36–3.74)
VIT B12 SERPL-MCNC: 544 PG/ML (ref 100–900)

## 2021-06-19 PROCEDURE — 83036 HEMOGLOBIN GLYCOSYLATED A1C: CPT

## 2021-06-19 PROCEDURE — 36415 COLL VENOUS BLD VENIPUNCTURE: CPT

## 2021-06-19 PROCEDURE — 84165 PROTEIN E-PHORESIS SERUM: CPT | Performed by: PATHOLOGY

## 2021-06-19 PROCEDURE — 83918 ORGANIC ACIDS TOTAL QUANT: CPT

## 2021-06-19 PROCEDURE — 82607 VITAMIN B-12: CPT

## 2021-06-19 PROCEDURE — 84443 ASSAY THYROID STIM HORMONE: CPT

## 2021-06-19 PROCEDURE — 84425 ASSAY OF VITAMIN B-1: CPT

## 2021-06-19 PROCEDURE — 84207 ASSAY OF VITAMIN B-6: CPT

## 2021-06-19 PROCEDURE — 84165 PROTEIN E-PHORESIS SERUM: CPT

## 2021-06-19 PROCEDURE — 84446 ASSAY OF VITAMIN E: CPT

## 2021-06-22 ENCOUNTER — TELEPHONE (OUTPATIENT)
Dept: PSYCHIATRY | Facility: CLINIC | Age: 56
End: 2021-06-22

## 2021-06-22 DIAGNOSIS — F31.81 BIPOLAR 2 DISORDER (HCC): ICD-10-CM

## 2021-06-22 LAB
ALBUMIN SERPL ELPH-MCNC: 4.11 G/DL (ref 3.5–5)
ALBUMIN SERPL ELPH-MCNC: 63.3 % (ref 52–65)
ALPHA1 GLOB SERPL ELPH-MCNC: 0.33 G/DL (ref 0.1–0.4)
ALPHA1 GLOB SERPL ELPH-MCNC: 5 % (ref 2.5–5)
ALPHA2 GLOB SERPL ELPH-MCNC: 0.7 G/DL (ref 0.4–1.2)
ALPHA2 GLOB SERPL ELPH-MCNC: 10.7 % (ref 7–13)
BETA GLOB ABNORMAL SERPL ELPH-MCNC: 0.4 G/DL (ref 0.4–0.8)
BETA1 GLOB SERPL ELPH-MCNC: 6.2 % (ref 5–13)
BETA2 GLOB SERPL ELPH-MCNC: 4.4 % (ref 2–8)
BETA2+GAMMA GLOB SERPL ELPH-MCNC: 0.29 G/DL (ref 0.2–0.5)
GAMMA GLOB ABNORMAL SERPL ELPH-MCNC: 0.68 G/DL (ref 0.5–1.6)
GAMMA GLOB SERPL ELPH-MCNC: 10.4 % (ref 12–22)
IGG/ALB SER: 1.72 {RATIO} (ref 1.1–1.8)
PROT PATTERN SERPL ELPH-IMP: ABNORMAL
PROT SERPL-MCNC: 6.5 G/DL (ref 6.4–8.2)

## 2021-06-22 NOTE — TELEPHONE ENCOUNTER
Received a message from Chandler's wife, requested refills of risperidone 1 mg BID and clonazepam 1 mg TID  Risperidone has a refill remaining  [facility] [place] (but not mail order)     Thank nickie

## 2021-06-22 NOTE — TELEPHONE ENCOUNTER
Received a call from the infusion center  A refill is needed for the Invega injection  Script can be electronically sent to perform speciality

## 2021-06-23 DIAGNOSIS — F41.9 ANXIETY AND DEPRESSION: ICD-10-CM

## 2021-06-23 DIAGNOSIS — F32.A ANXIETY AND DEPRESSION: ICD-10-CM

## 2021-06-23 DIAGNOSIS — F31.62 BIPOLAR DISORDER, CURRENT EPISODE MIXED, MODERATE (HCC): ICD-10-CM

## 2021-06-23 LAB
METHYLMALONATE SERPL-SCNC: 144 NMOL/L (ref 0–378)
SL AMB DISCLAIMER: NORMAL

## 2021-06-23 RX ORDER — CLONAZEPAM 1 MG/1
1 TABLET ORAL 3 TIMES DAILY PRN
Qty: 90 TABLET | Refills: 0 | Status: SHIPPED | OUTPATIENT
Start: 2021-06-23 | End: 2021-07-29 | Stop reason: SDUPTHER

## 2021-06-23 RX ORDER — RISPERIDONE 1 MG/1
1 TABLET, FILM COATED ORAL 2 TIMES DAILY
Qty: 60 TABLET | Refills: 2 | Status: SHIPPED | OUTPATIENT
Start: 2021-06-23 | End: 2021-08-03

## 2021-06-23 NOTE — TELEPHONE ENCOUNTER
Received an additional message from Enrico  Alba Tenorio said his bottle has no refills of risperidone  I spoke with [address]  Clonazepam is ready for  and 60 days of risperidone was picked up at the end of May - they give as close to 90 day refill so the patient doesn't have to pay the higher co-pay  Sent a message to Enrico with the above information; and Alba Tenorio would need one more refill before his next appointment; can ask Dr Kelly Diehl to sarahw or call closer to refill date

## 2021-06-23 NOTE — PROGRESS NOTES
PDMP website reviewed  Arpan Jerez has been appropriately adherent to controlled psychotropic medications without evidence of abuse or misuse  As such, will send 30-day refill to pharmacy of choice and follow up as necessary

## 2021-06-23 NOTE — TELEPHONE ENCOUNTER
Sent a staff message to Enrico:  risperidone has a refill; lorazepam sent today; let nursing know if difficulty filling

## 2021-06-24 LAB
A-TOCOPHEROL VIT E SERPL-MCNC: 4.4 MG/L (ref 7–25.1)
GAMMA TOCOPHEROL SERPL-MCNC: 0.6 MG/L (ref 0.5–5.5)
VIT B1 BLD-SCNC: 107.7 NMOL/L (ref 66.5–200)

## 2021-06-25 ENCOUNTER — TELEPHONE (OUTPATIENT)
Dept: NEUROLOGY | Facility: CLINIC | Age: 56
End: 2021-06-25

## 2021-06-25 LAB — VIT B6 SERPL-MCNC: 56 UG/L (ref 5.3–46.7)

## 2021-06-25 NOTE — TELEPHONE ENCOUNTER
----- Message from Adriane Kussmaul, MD sent at 6/25/2021  2:24 PM EDT -----  Vitamin B6 is slightly higher than usual   Excessive B6 can lead to sensory neuropathies; is he taking any vitamin supplement that has extra B6? Consider taking a vitamin supplement without B6 or lower doses of B6  His vitamin E levels are on the low to deficient end  Vitamin E deficiency can be associated with large fiber neuropathies and ataxia  Is he also on vitamin supplement with vitamin E? If not on vitamin E supplement recommend taking vitamin E 200-400 units daily

## 2021-06-25 NOTE — TELEPHONE ENCOUNTER
Called and reviewed results with patient  He reports that he used to take vitamins following his gastric bypass surgery but has not taken any supplements in months  He will add a vitamin E supplement

## 2021-06-28 ENCOUNTER — TELEPHONE (OUTPATIENT)
Dept: PSYCHIATRY | Facility: CLINIC | Age: 56
End: 2021-06-28

## 2021-06-28 NOTE — TELEPHONE ENCOUNTER
I faxed the form to [phone] and received the confirmation   I called and notified Jamison Schulte at the infusion center

## 2021-06-28 NOTE — TELEPHONE ENCOUNTER
Infusion center called and LM on nursing line  Pharmacy states they did not receive script for Injection   Please  Call [phone]

## 2021-06-30 NOTE — TELEPHONE ENCOUNTER
I spoke with Candy Betancourt he was very nice, he stated he is going to have the infusion center schedule all of his appts and when he gets his fourth shot he will call us to send the refill

## 2021-07-02 ENCOUNTER — HOSPITAL ENCOUNTER (OUTPATIENT)
Dept: INFUSION CENTER | Facility: HOSPITAL | Age: 56
Discharge: HOME | End: 2021-07-02
Payer: COMMERCIAL

## 2021-07-02 PROCEDURE — 96372 THER/PROPH/DIAG INJ SC/IM: CPT

## 2021-07-02 RX ADMIN — PALIPERIDONE PALMITATE 234 MG: 234 INJECTION INTRAMUSCULAR at 09:53

## 2021-07-02 NOTE — PROGRESS NOTES
Pt arrives on unit today for [place] injection  Tolerated injection IM in the left deltoid  Next apt scheduled  Declined AVS  Left unit ambulatory with a steady gait

## 2021-07-06 DIAGNOSIS — E78.2 MIXED HYPERLIPIDEMIA: ICD-10-CM

## 2021-07-06 DIAGNOSIS — I25.110 CORONARY ARTERY DISEASE INVOLVING NATIVE CORONARY ARTERY OF NATIVE HEART WITH UNSTABLE ANGINA PECTORIS (HCC): ICD-10-CM

## 2021-07-06 RX ORDER — ATORVASTATIN CALCIUM 40 MG/1
40 TABLET, FILM COATED ORAL DAILY
Qty: 90 TABLET | Refills: 1 | Status: SHIPPED | OUTPATIENT
Start: 2021-07-06 | End: 2022-01-25 | Stop reason: SDUPTHER

## 2021-07-08 DIAGNOSIS — F31.62 BIPOLAR DISORDER, CURRENT EPISODE MIXED, MODERATE (HCC): ICD-10-CM

## 2021-07-08 RX ORDER — OXCARBAZEPINE 300 MG/1
300 TABLET, FILM COATED ORAL 2 TIMES DAILY
Qty: 60 TABLET | Refills: 0 | Status: SHIPPED | OUTPATIENT
Start: 2021-07-08 | End: 2021-07-21 | Stop reason: SDUPTHER

## 2021-07-21 DIAGNOSIS — F31.62 BIPOLAR DISORDER, CURRENT EPISODE MIXED, MODERATE (HCC): ICD-10-CM

## 2021-07-21 RX ORDER — OXCARBAZEPINE 300 MG/1
300 TABLET, FILM COATED ORAL 2 TIMES DAILY
Qty: 60 TABLET | Refills: 0 | Status: SHIPPED | OUTPATIENT
Start: 2021-07-21 | End: 2021-11-05 | Stop reason: SDUPTHER

## 2021-07-22 ENCOUNTER — HOSPITAL ENCOUNTER (OUTPATIENT)
Dept: INFUSION CENTER | Facility: HOSPITAL | Age: 56
Discharge: HOME | End: 2021-07-22
Payer: COMMERCIAL

## 2021-07-22 PROCEDURE — 96372 THER/PROPH/DIAG INJ SC/IM: CPT

## 2021-07-22 RX ADMIN — PALIPERIDONE PALMITATE 234 MG: 234 INJECTION INTRAMUSCULAR at 10:26

## 2021-07-22 NOTE — PROGRESS NOTES
Pt tolerated Danay Rausch injection to R deltoid without difficulty  Next appt scheduled    Left ambulatory declining AVS

## 2021-07-23 ENCOUNTER — TELEPHONE (OUTPATIENT)
Dept: PSYCHIATRY | Facility: CLINIC | Age: 56
End: 2021-07-23

## 2021-07-23 NOTE — TELEPHONE ENCOUNTER
Patient called office on 7/23/21 in regards to medication Trileptal  He is looking for a script for 600mg that he would take once a day  Patient stated he takes the 300mg at different times but then also takes the 600mg  I do not see this in his chart  Can you please review and send script over to the pharmacy if possible  Patient requested to be notified when any update is given

## 2021-07-26 ENCOUNTER — TELEPHONE (OUTPATIENT)
Dept: PSYCHIATRY | Facility: CLINIC | Age: 56
End: 2021-07-26

## 2021-07-26 NOTE — TELEPHONE ENCOUNTER
Emmy Singh is correct  He is supposed to take Trileptal 600mg AM and 300mg PM  He was sent a script for the 300mg PM on 7/21/21  A new script for 600mg AM will be sent now

## 2021-07-26 NOTE — TELEPHONE ENCOUNTER
Prior auth renewal for invega sustenna injection submitted via fax [facility]  I left Chandler a message

## 2021-07-27 DIAGNOSIS — Z72.0 TOBACCO ABUSE: ICD-10-CM

## 2021-07-27 RX ORDER — NICOTINE 21 MG/24HR
1 PATCH, TRANSDERMAL 24 HOURS TRANSDERMAL EVERY 24 HOURS
Qty: 28 PATCH | Refills: 3 | Status: SHIPPED | OUTPATIENT
Start: 2021-07-27 | End: 2022-03-31

## 2021-07-29 DIAGNOSIS — F41.9 ANXIETY AND DEPRESSION: ICD-10-CM

## 2021-07-29 DIAGNOSIS — F32.A ANXIETY AND DEPRESSION: ICD-10-CM

## 2021-07-29 RX ORDER — CLONAZEPAM 1 MG/1
1 TABLET ORAL 3 TIMES DAILY PRN
Qty: 90 TABLET | Refills: 0 | Status: SHIPPED | OUTPATIENT
Start: 2021-07-29 | End: 2021-08-23 | Stop reason: SDUPTHER

## 2021-07-29 NOTE — TELEPHONE ENCOUNTER
PDMP website reviewed  Denver Marina has been appropriately adherent to controlled psychotropic medications without evidence of abuse or misuse  As such, will send 30-day refill to pharmacy of choice and follow up as necessary

## 2021-07-30 DIAGNOSIS — F41.9 ANXIETY AND DEPRESSION: ICD-10-CM

## 2021-07-30 DIAGNOSIS — F32.A ANXIETY AND DEPRESSION: ICD-10-CM

## 2021-07-30 RX ORDER — CLONAZEPAM 1 MG/1
1 TABLET ORAL 3 TIMES DAILY PRN
Qty: 90 TABLET | Refills: 0 | OUTPATIENT
Start: 2021-07-30

## 2021-08-03 ENCOUNTER — OFFICE VISIT (OUTPATIENT)
Dept: PSYCHIATRY | Facility: CLINIC | Age: 56
End: 2021-08-03
Payer: COMMERCIAL

## 2021-08-03 DIAGNOSIS — Z72.0 TOBACCO USE: ICD-10-CM

## 2021-08-03 DIAGNOSIS — F51.01 PRIMARY INSOMNIA: ICD-10-CM

## 2021-08-03 DIAGNOSIS — F41.1 GENERALIZED ANXIETY DISORDER: ICD-10-CM

## 2021-08-03 DIAGNOSIS — F31.62 BIPOLAR DISORDER, CURRENT EPISODE MIXED, MODERATE (HCC): Primary | ICD-10-CM

## 2021-08-03 PROCEDURE — 90833 PSYTX W PT W E/M 30 MIN: CPT | Performed by: STUDENT IN AN ORGANIZED HEALTH CARE EDUCATION/TRAINING PROGRAM

## 2021-08-03 PROCEDURE — 99214 OFFICE O/P EST MOD 30 MIN: CPT | Performed by: STUDENT IN AN ORGANIZED HEALTH CARE EDUCATION/TRAINING PROGRAM

## 2021-08-03 RX ORDER — RISPERIDONE 2 MG/1
2 TABLET, FILM COATED ORAL 2 TIMES DAILY
Qty: 60 TABLET | Refills: 2 | Status: SHIPPED | OUTPATIENT
Start: 2021-08-03 | End: 2021-11-05

## 2021-08-03 RX ORDER — TRAZODONE HYDROCHLORIDE 300 MG/1
300 TABLET ORAL
Qty: 90 TABLET | Refills: 1 | Status: SHIPPED | OUTPATIENT
Start: 2021-08-03 | End: 2021-12-10 | Stop reason: SDUPTHER

## 2021-08-03 NOTE — PSYCH
MEDICATION MANAGEMENT NOTE        [address]      Name and Date of Birth:  Tushar Ford 55 y o  1965 MRN: [id number]    Date of Visit: August 3, 2021    Reason for Visit: Follow-up visit for medication management     SUBJECTIVE:    Tushar Ford is a 55 y o  male with past psychiatric history significant for bipolar disorder type I, anxiety disorder, insomnia and nicotine use disorder who was personally seen and evaluated today at the [address] [place] [address] outpatient clinic for follow-up and medication management  Romayne Orem presents as pleasant, cooperative, and engaging  He does exhibit mild thought delay  Nonetheless, he is logical, organized, and reality-based in thought and completes assessment without difficulty  Last session, Risperdal 1mg BID was started for greater mood stability as adverse side effects to Trileptal limits further optimization  Tegretol was started but not approved given risk of adverse medication interactions  Chandler tolerated initiation of Risperdal well without adverse effects  He reports initial improvement in "hypermanic episodes" but states that the effect has worn off over time  Romayne Orem currently states that he is experiencing "manic" episodes every 1-2 days  These are characterized by periods of dissociation, irritability, mood lability, outbursts, and racing thoughts  I discussed other mood stabilizing agents such as Lithium or VPA  Romayne Orem states that he was prescribed VPA for "years" in the past and tolerated it well  He is unsure as to why this was discontinued previously  He is resistant to Lithium given "what he has heard from other people"  As such, Romayne Orem was agreeable to further optimization of Risperdal to 2mg BID  He was educated on risks of NMS given that he also received SUNG Q3 weeks  He has been adherent with his SUNG via [facility] infusion center  Last known injection was 7/22/21   Next injection schedule for 8/12/21  Wesley Estrada is pleased to report that he completed diagnostic work-up for dysautonomia at [facility] "last week"  He is awaiting results  His willingness to complete a battery of tests suggests self-preservation and a will to live  Acutely, Wesley Estrada denies SI/HI  He is without lethality concern  His sleep is adequate  His appetite is robust  His energy is erratic and fluctuates depending on the day  He is without hopelessness or guilt  He does report frustration related to his marriage and his perceived idea of his wife's rigidness  He is steadfast on purchasing a new dog, an idea his wife is resistant to  Pros/cons regarding this decision were discussed, including the potential impact it may have on his marriage  He was receptive  Wesley Estrada currently denies overt psychotic symptoms  His anxiety is well controlled with PRN Klonopin  PDMP website reveals no concern for abuse or misuse  Narayan offers no further concerns  Current Rating Scores:     None completed today      Review Of Systems:      Constitutional feeling poorly, feeling tired, low energy and as noted in HPI   ENT vertigo   Cardiovascular negative   Respiratory negative   Gastrointestinal negative   Genitourinary negative   Musculoskeletal negative   Integumentary negative   Neurological confusion and decreased memory   Endocrine negative   Other Symptoms none, all other systems are negative       Past Psychiatric History: (unchanged information from previous note copied and italicized) - Information that is bolded has been updated       Inpatient psychiatric admissions: Denies  Prior outpatient psychiatric linkage: Previously linked with Dr Paulina Rashid via [facility] ([place], [place])  Past/current psychotherapy: Hx of psychotherapy, no current linkage  History of suicidal attempts/gestures: Denies  History of violence/aggressive behaviors: Denies  Psychotropic medication trials: Depakote, Trileptal, Roseanne Lockhart, Klonopin, Effexor, Risperdal (now)  Substance abuse inpatient/outpatient rehabilitation: Denies     Substance Abuse History: (unchanged information from previous note copied and italicized) - Information that is bolded has been updated       No history of ETOH or illict substance abuse  The patient does endorse ongoing tobacco abuse (chew and vaping)  No past legal actions or arrests secondary to substance intoxication  The patient denies prior DWIs/DUIs  No history of outpatient/inpatient rehabilitation programs  Chandler does not exhibit objective evidence of substance withdrawal during today's examination nor does Chandler appear under the influence of any psychoactive substance           Social History: (unchanged information from previous note copied and italicized) - Information that is bolded has been updated       Developmental: Denies a history of milestone/developmental delay  Denies a history of in-utero exposure to toxins/illicit substances  There is no documented history of IEP or need for special education  Education: high school diploma/GED  Marital history:   Living arrangement, social support: wife and son, mother-in-law, and wife's 2 children - please to report that he and his wife just purchased a new dog ("Yoandy")  [address] [place] [address],[address] - previously worked as  and [employer] officer  Access to firearms: Denies direct access to weapons/firearms  [employer] no history of arrests or violence with a deadly weapon   Wife confirms that firearms and knives have been removed from the home       Traumatic History: (unchanged information from previous note copied and italicized) - Information that is bolded has been updated       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, denies PTSD        Past Medical History:    Past Medical History:   Diagnosis Date    Bariatric surgery status     CPAP (continuous positive airway pressure) dependence     Hearing loss of aging     Hypercholesterolemia     Morbid obesity ([address] )     NSTEMI (non-ST elevation myocardial infarction) [facility])     april 2019    Postsurgical malabsorption      No past medical history pertinent negatives    Past Surgical History:   Procedure Laterality Date    CARDIAC CATHETERIZATION      no stents     COLONOSCOPY      EGD      HERNIA REPAIR      umbilical hernia    RI LAP GASTRIC BYPASS/FRITZ-EN-Y N/A 3/2/2020    Procedure: LAPAROSCOPIC FRITZ-EN-Y GASTRIC BYPASS AND INTRAOPERATIVE EGD;  Surgeon: Guerita Tinoco MD;  Location: [facility];  Service: Bariatrics    SKIN SURGERY      cyst removed from back and thumb    TONSILLECTOMY      TOOTH EXTRACTION       No Known Allergies    Substance Abuse History:    Social History     Substance and Sexual Activity   Alcohol Use Not Currently     Social History     Substance and Sexual Activity   Drug Use Never       Social History:    Social History     Socioeconomic History    Marital status: /Civil Union     Spouse name: Not on file    Number of children: Not on file    Years of education: Not on file    Highest education level: Not on file   Occupational History    Not on file   Tobacco Use    Smoking status: Former Smoker     Types: Cigarettes     Quit date: 2011     Years since quitting: 10 5    Smokeless tobacco: Current User     Types: Chew     Last attempt to quit: 6/17/2014    Tobacco comment: quit cigarettes 2011   Vaping Use    Vaping Use: Every day    Substances: Nicotine, Flavoring   Substance and Sexual Activity    Alcohol use: Not Currently    Drug use: Never    Sexual activity: Not on file   Other Topics Concern    Not on file   Social History Narrative    Former smoker - As per [employer]    Full-time employment        · Do you currently or have you served in the [facility] [employer]:   No    As per [employer]     Financial Resource Strain:    Adarsh Pena Difficulty of Paying Living Expenses:    Food Insecurity:     Worried About Running Out of Food in the Last Year:     [address] in the Last Year:    Transportation Needs:     Lack of Transportation (Medical):  Lack of Transportation (Non-Medical):    Physical Activity:     Days of Exercise per Week:     Minutes of Exercise per Session:    Stress:     Feeling of Stress :    Social Connections:     Frequency of Communication with Friends and Family:     Frequency of Social Gatherings with Friends and Family:     Attends Christian Services:     Active Member of Clubs or Organizations:     Attends Club or Organization Meetings:     Marital Status:    Intimate Partner Violence:     Fear of Current or Ex-Partner:     Emotionally Abused:     Physically Abused:     Sexually Abused:        Family Psychiatric History:     Family History   Problem Relation Age of Onset    Lung cancer Mother     Brain cancer Mother     Diabetes Brother     Bipolar disorder Maternal Grandfather     Bipolar disorder Son        History Review: The following portions of the patient's history were reviewed and updated as appropriate: allergies, current medications, past family history, past medical history, past social history, past surgical history and problem list          OBJECTIVE:     Vital signs in last 24 hours: There were no vitals filed for this visit      Mental Status Evaluation:    Appearance age appropriate, casually dressed, dressed appropriately, looks stated age, bearded, piercings, tattooed   Behavior pleasant, cooperative, appears anxious, fair eye contact, restless   Speech normal rate, normal volume, normal pitch, fluent   Mood dysphoric, anxious   Affect constricted   Thought Processes organized, logical, goal directed, normal rate of thoughts, normal abstract reasoning   Associations intact associations   Thought Content no overt delusions   Perceptual Disturbances: no auditory hallucinations, no visual hallucinations   Abnormal Thoughts  Risk Potential Suicidal ideation - None at present  Homicidal ideation - None at present  Potential for aggression - No   Orientation oriented to person, place, time/date and situation   Memory recent and remote memory grossly intact   Consciousness alert and awake   Attention Span Concentration Span attention span and concentration are age appropriate   Intellect appears to be of average intelligence   Insight fair   Judgement fair   Muscle Strength and  Gait normal gait and normal balance   Motor activity no abnormal movements   Language no difficulty naming common objects, no difficulty repeating a phrase   Fund of Knowledge adequate knowledge of current events  adequate fund of knowledge regarding past history  adequate fund of knowledge regarding vocabulary    Pain none   Pain Scale 0       Laboratory Results: I have personally reviewed all pertinent laboratory/tests results    Recent Labs (last 4 months):   Appointment on 06/19/2021   Component Date Value    A/G Ratio 06/19/2021 1 72     Albumin Electrophoresis 06/19/2021 63 3     Albumin CONC 06/19/2021 4 11     Alpha 1 06/19/2021 5 0     ALPHA 1 CONC 06/19/2021 0 33     Alpha 2 06/19/2021 10 7     ALPHA 2 CONC 06/19/2021 0 70     Beta-1 06/19/2021 6 2     BETA 1 CONC 06/19/2021 0 40     Beta-2 06/19/2021 4 4     BETA 2 CONC 06/19/2021 0 29     Gamma Globulin 06/19/2021 10 4*    GAMMA CONC 06/19/2021 0 68     Total Protein 06/19/2021 6 5     SPEP Interpretation 06/19/2021 See Comment     TSH 3RD GENERATON 06/19/2021 1 770     Vitamin B-12 06/19/2021 544     Hemoglobin A1C 06/19/2021 4 9     EAG 06/19/2021 94     Methylmalonic Acid, S 06/19/2021 144     Disclaimer: 06/19/2021 Comment     Vitamin E(Alpha Tocopher* 06/19/2021 4 4*    Vitamin E(Gamma Tocopher* 06/19/2021 0 6     Vitamin B1, Whole Blood 06/19/2021 107 7     Vitamin B6 06/19/2021 56 0*       Suicide/Homicide Risk denies lengthy periods without sleep, increased goal-directed behavior, excessive spending or sexual promiscuity that is often seen during periods of leidy  However, he and his wife do endorse periods of euphoria and elevated/expansive mood to which they describe him as a "happy drunk"  Aside from these periods, Frederick Manzanares denies extensive periods of depression, characterized by social isolation, suicidal thoughts, and poor mood  He states that he will feel "sad" at times but this is most often secondary to psychosocial stressors and his current lack of connectivity and purpose  He describes a recent incident in which he was worried about his dad's health and chose to disconnect from the world but sitting peacefully and listening to music  Within 24 hours, he felt back to baseline  At the moment, Frederick Manzanares denies most neurovegetative symptoms of depression  Wife confirms that she has removed firearms and knives from the house for safety, given his episodic periods of "leidy" and "anger outbursts"  Frederick Manzanares is future-oriented and discusses plans to purchase a dog that he can train to be a certified K-9 dog  He demonstrates self-preservation as evidenced by his commitment to treatment       Psychopharmacologically, Chandler tolerated initiation of Risperdal well without adverse effects  He reports initial improvement in "hypermanic episodes" but states that the effect has worn off over time  Ramez Zamarripa currently states that he is experiencing "manic" episodes every 1-2 days  I discussed other mood stabilizing agents such as Lithium or VPA  Ramez Zamarripa states that he was prescribed VPA for "years" in the past and tolerated it well  He is unsure as to why this was discontinued previously  He is resistant to Lithium given "what he has heard from other people"  As such, Ramez Zamarripa was agreeable to further optimization of Risperdal to 2mg BID  He was educated on risks of NMS given that he also received SUNG Q3 weeks   He has been adherent with his SUNG via [facility] infusion center  Last known injection was 7/22/21  Next injection schedule for 8/12/21  Risks/benefits/alternativies to treatment discussed, including a myriad of potential adverse medication side effects, to which Jadon Cat voiced understanding and consented fully to treatment       DSM-V Diagnoses:      1  ) Bipolar Disorder Type I  2 ) Anxiety Disorder, NOS  3 ) Insomnia  4 ) Nicotine Use Disorder        Treatment Recommendations/Precautions:        1  ) Bipolar Disorder Type I  - Continue Trileptal 600mg AM, 300mg PM  - Consider cross-tapering with VPA or Lithium in future - he benefited from VPA use in the past              - Continue checking BMP - Na+ WNL on 3/15/2021              - Oxcarbazepine Level: 15 (3/15/21)   - Continue IM [place] Sustenna 234mg [id number]              - Next injection: 8/12/2021 at  Infusion center  - Titrate Risperdal 1mg BID to 2mg BID for greater mood stability   - Not interested in weekly psychotherapy at this point  - Psychoeducation provided regarding the importance of exercise and healthy dietary choices and their impact on mood, energy, and motivation  - Counseled to avoid ETOH, illict substances, and nicotine secondary to the detrimental effects of these substances on mental and physical health  - Encouraged to engage in non-verbal forms of therapy such as art therapy, music therapy, and mindfulness     2 ) Anxiety Disorder, unspecified  - Continue PRN use of Klonopin 1mg TID - PDMP website reviewed, no concerns for misuse      3  Insomnia  - Continue Trazodone 300mg QHS PRN for sleep        4  ) Nicotine Use Disorder  - Counseled to avoid ETOH, illict substances, and nicotine secondary to the detrimental effects of these substances on mental and physical health        Medication management every 2 months  Aware of need to follow up with family physician for medical issues  Aware of 24 hour and weekend coverage for urgent situations accessed by calling [employer]  [facility] main practice number    Medications Risks/Benefits      Risks, Benefits And Possible Side Effects Of Medications:    Risks, benefits, and possible side effects of medications explained to Chandler including risk of hyponatremia related to treatment with Trileptal, risk of parkinsonian symptoms, Tardive Dyskinesia and metabolic syndrome related to treatment with antipsychotic medications, risk of cardiovascular events in elderly related to treatment with antipsychotic medications, risk of suicidality and serotonin syndrome related to treatment with antidepressants and risks of misuse, abuse or dependence, sedation and respiratory depression related to treatment with benzodiazepine medications  He verbalizes understanding and agreement for treatment  Controlled Medication Discussion:     Jesse Davis has been filling controlled prescriptions on time as [facility]  Discussed with Jesse Davis the risks of sedation, respiratory depression, impairment of ability to drive and potential for abuse and addiction related to treatment with benzodiazepine medications  He understands risk of treatment with benzodiazepine medications, agrees to not drive if feels impaired and agrees to take medications as prescribed    Psychotherapy Provided:     Individual psychotherapy provided: Yes  Counseling was provided during the session today for 17 minutes  Medication changes discussed with Chandler  Medication education provided to Jesse Davis  Recent stressor including family conflict, family issues, marital problems, medical problems, recent medication change, everyday stressors and occasional anxiety discussed with Chandler     Coping strategies including abstaining from substance use, compliance with medications, getting into a good routine, keeping busy at home, maintain healthy diet, maintain heathy sleeping hygiene, stress reduction and spending time with family reviewed with Kirsty Barron  Educated on importance of medication and treatment compliance  Importance of follow up with family physician for medical issues reviewed with Kirsty Barron  Supportive therapy provided  Treatment Plan:    Completed and signed during the session: Not applicable - Treatment Plan not due at this session    Note Share Disclaimer:      This note was not shared with the patient due to reasonable likelihood of causing patient harm    Leonides Arita MD 08/03/21

## 2021-08-05 NOTE — TELEPHONE ENCOUNTER
Called [facility] @ [phone]  Spoke with Francisco  He reports P  A  needs Selina Oliveira ()  [facility] never received fax with Selina Singh requested to re-fax request to [phone]   Faxed as requested    Reference # [id number]

## 2021-08-12 ENCOUNTER — HOSPITAL ENCOUNTER (OUTPATIENT)
Dept: INFUSION CENTER | Facility: HOSPITAL | Age: 56
Discharge: HOME | End: 2021-08-12
Payer: COMMERCIAL

## 2021-08-12 PROCEDURE — 96374 THER/PROPH/DIAG INJ IV PUSH: CPT

## 2021-08-12 RX ADMIN — PALIPERIDONE PALMITATE 234 MG: 234 INJECTION INTRAMUSCULAR at 12:52

## 2021-08-12 NOTE — PROGRESS NOTES
Pt  Tolerated Invega IM injection in left deltoid w/out adverse reaction  Confirmed pts  Next appt   Pt  Declined AVS

## 2021-08-16 NOTE — TELEPHONE ENCOUNTER
Follow up call made for status of [place]  Reference number for call [id number]  Medication approved 8/1/21 - 2/1/22, authorization number [id number]    Reviewed approval and dates with Federico Guerin  Encouraged him to call nursing if he has any problems with the next injection and he agreed to same

## 2021-08-17 NOTE — TELEPHONE ENCOUNTER
Called and spoke with Birce at [facility] [phone]  Advised previous P  A  was sent with error and approved  It was sent with 1 injection every 1 month  This is incorrect  Directions are to be 1 injection every 3 weeks  Per Brice must re-fax P A  request  Use Ref# [id number] and fax to [phone]  Refaxed request with corrected directions to number stated       Will await outcome

## 2021-08-23 DIAGNOSIS — F41.9 ANXIETY AND DEPRESSION: ICD-10-CM

## 2021-08-23 DIAGNOSIS — F32.A ANXIETY AND DEPRESSION: ICD-10-CM

## 2021-08-24 RX ORDER — CLONAZEPAM 1 MG/1
1 TABLET ORAL 3 TIMES DAILY PRN
Qty: 90 TABLET | Refills: 0 | Status: SHIPPED | OUTPATIENT
Start: 2021-08-27 | End: 2021-09-16 | Stop reason: SDUPTHER

## 2021-08-24 NOTE — TELEPHONE ENCOUNTER
Chart and PMDP reviewed  Refill was sent to the patient's preferred pharmacy, covering patient's primary psychiatrist, Dr Grant Truong

## 2021-08-28 NOTE — TELEPHONE ENCOUNTER
SUBJECTIVE:  Georgiana Silvestre is a 11 year old female who presented requesting evaluation for sore throat and mild cough x2 days.  Mother would like patient tested for strep and COVID.  Patient mother denies fever, chills, loss of taste and smell, nausea, vomiting, and diarrhea.      Review of Systems   Constitutional: Negative for chills and fever.   HENT: Positive for sore throat. Negative for congestion, ear pain and rhinorrhea.    Eyes: Negative for discharge and itching.   Respiratory: Positive for cough. Negative for shortness of breath and wheezing.    Cardiovascular: Negative for chest pain and palpitations.   Gastrointestinal: Negative for abdominal pain, diarrhea, nausea and vomiting.   Skin: Negative for rash.         PAST HISTORIES:    ALLERGIES:   Allergen Reactions   • Seasonal HIVES, RASH and Other (See Comments)     wheezing   • Banana   (Food And Med) RASH   • Watermelon   (Food Or Med) RASH   • Bee Other (See Comments)     unknown   • Nuts   (Food) Other (See Comments)     unknown       MEDICATIONS:  Outpatient Medications Marked as Taking for the 8/28/21 encounter (Walk In) with Jhonatan Odom CNP   Medication Sig Dispense Refill   • acetaminophen (TYLENOL) 160 MG/5ML solution Take 460 mg by mouth.     • cetirizine (ZyrTEC) 5 MG/5ML solution Take 10 mg by mouth.     • diphenhydrAMINE (BENADRYL) 12.5 MG/5ML liquid Take 25 mg by mouth.     • EPINEPHrine 0.3 MG/0.3ML auto-injector Inject 0.3 mg into the muscle.     • fluticasone propionate (FLOVENT HFA) 110 MCG/ACT inhaler Inhale 1 puff into the lungs 2 times daily.     • fluticasone (FLONASE) 50 MCG/ACT nasal spray      • albuterol 108 (90 Base) MCG/ACT inhaler Inhale 2 puffs into the lungs every 4 hours as needed for Shortness of Breath or Wheezing.     • loratadine (LORATADINE CHILDRENS) 5 MG/5ML syrup Take 10 mg by mouth daily.         No past medical history on file.  No past surgical history on file.    OBJECTIVE:  PHYSICAL EXAM:    Visit  Forward to Dr Rony Boyle MA Vitals  /69   Pulse 93   Temp 97.8 °F (36.6 °C) (Temporal)   Resp 18   Ht 5' 3\" (1.6 m)   Wt 50.2 kg   SpO2 97%   BMI 19.60 kg/m²      Vital signs reviewed.  Nursing note reviewed.    CONSTITUTIONAL: Well-hydrated, well-nourished female who appears to be in no acute distress. Awake, alert and cooperative.  HEENT: Normocephalic, atraumatic. External ears without deformity, auditory canals intact.TMs are clear bilaterally, landmarks intact.  Hearing is grossly intact. Nasal mucosa is moist, there is no deformity. Oral mucosa moist and intact without lesions. Minimal posterior oropharyngeal erythema, uvula midline, no exudate presented.   NECK: Supple with full range of motion. No lymphadenopathy noted.  LUNGS: Non labored, symmetric lung expansion. Lung fields clear to auscultation. No wheezes, rales or rhonchi noted.   CARDIOVASCULAR: Regular rate and rhythm with normal S1 and S2. There are no murmurs, rub or gallop auscultated.   ABDOMEN: Soft and non-tender without any masses.There is no guarding. Bowel sounds normal.           ASSESSMENT/PLAN:  1. Sore throat        Georgiana Silvestre is a 11 year old female who presented to urgent care with complaint of sore throat and mild cough x2 days. Patient afebrile. VSS. Patient is not toxic appearing or in distress.  COVID-19 rapid test negative.  Strep PCR pending.  Discussed with mother that this is most likely related to viral URI but if strep PCR positive, will start on antibiotics otherwise continue with adequate fluid, rest, Tylenol ibuprofen for pain, Robitussin DM for cough, and     warm salt water gargles, throat lozenges, warm tea with honey, and humidification (breathing in steam from a hot shower) for sore throat.  Follow-up with PCP or urgent care if no improvement. Red flags s/s reviewed with mother and when to go to ER. Mother verbalizes understanding.    Patient instruction printed, reviewed, and given to mother.           Orders Placed This Encounter    • COVID DIAGNOSTIC TESTING   • Streptococcus group A PCR   • acetaminophen (TYLENOL) 160 MG/5ML solution   • cetirizine (ZyrTEC) 5 MG/5ML solution   • diphenhydrAMINE (BENADRYL) 12.5 MG/5ML liquid   • EPINEPHrine 0.3 MG/0.3ML auto-injector

## 2021-09-02 ENCOUNTER — HOSPITAL ENCOUNTER (OUTPATIENT)
Dept: INFUSION CENTER | Facility: HOSPITAL | Age: 56
Discharge: HOME | End: 2021-09-02
Payer: COMMERCIAL

## 2021-09-02 ENCOUNTER — TELEPHONE (OUTPATIENT)
Dept: PSYCHIATRY | Facility: CLINIC | Age: 56
End: 2021-09-02

## 2021-09-02 VITALS — TEMPERATURE: 95.3 F

## 2021-09-02 PROCEDURE — 96372 THER/PROPH/DIAG INJ SC/IM: CPT

## 2021-09-02 RX ADMIN — PALIPERIDONE PALMITATE 234 MG: 234 INJECTION INTRAMUSCULAR at 10:02

## 2021-09-02 NOTE — TELEPHONE ENCOUNTER
Leydi Colby from [facility] specialty pharmacy called in regard to [facility]   Requesting a call back from Dr Ratna Davis [phone]

## 2021-09-02 NOTE — PROGRESS NOTES
Pt  Tolerated Invega IM injection in Right deltoid w/out adverse reaction  Confirmed pts  Next appt   Pt  Declined AVS

## 2021-09-07 DIAGNOSIS — F31.62 BIPOLAR DISORDER, CURRENT EPISODE MIXED, MODERATE (HCC): ICD-10-CM

## 2021-09-07 DIAGNOSIS — E87.6 HYPOKALEMIA: ICD-10-CM

## 2021-09-07 DIAGNOSIS — Z98.84 BARIATRIC SURGERY STATUS: Primary | ICD-10-CM

## 2021-09-07 RX ORDER — OXCARBAZEPINE 600 MG/1
600 TABLET, FILM COATED ORAL EVERY 12 HOURS SCHEDULED
Qty: 30 TABLET | Refills: 1 | Status: SHIPPED | OUTPATIENT
Start: 2021-09-07 | End: 2021-11-05 | Stop reason: SDUPTHER

## 2021-09-07 RX ORDER — POTASSIUM CHLORIDE 20 MEQ/1
20 TABLET, EXTENDED RELEASE ORAL DAILY
Qty: 30 TABLET | Refills: 5 | Status: SHIPPED | OUTPATIENT
Start: 2021-09-07 | End: 2022-03-23 | Stop reason: SDUPTHER

## 2021-09-08 RX ORDER — PANTOPRAZOLE SODIUM 20 MG/1
20 TABLET, DELAYED RELEASE ORAL
Qty: 30 TABLET | Refills: 1 | Status: SHIPPED | OUTPATIENT
Start: 2021-09-08 | End: 2021-11-12 | Stop reason: SDUPTHER

## 2021-09-16 DIAGNOSIS — F32.A ANXIETY AND DEPRESSION: ICD-10-CM

## 2021-09-16 DIAGNOSIS — F41.9 ANXIETY AND DEPRESSION: ICD-10-CM

## 2021-09-16 RX ORDER — CLONAZEPAM 1 MG/1
1 TABLET ORAL 3 TIMES DAILY PRN
Qty: 90 TABLET | Refills: 0 | Status: SHIPPED | OUTPATIENT
Start: 2021-09-16 | End: 2021-10-07 | Stop reason: SDUPTHER

## 2021-09-16 NOTE — TELEPHONE ENCOUNTER
PDMP website reviewed  Saúl Roblero has been appropriately adherent to controlled psychotropic medications without evidence of abuse or misuse  As such, will send 30-day refill to pharmacy of choice and follow up as necessary

## 2021-09-20 ENCOUNTER — TELEPHONE (OUTPATIENT)
Dept: PSYCHIATRY | Facility: CLINIC | Age: 56
End: 2021-09-20

## 2021-09-20 NOTE — TELEPHONE ENCOUNTER
Scott Tellez from [facility] on nurse line informing us that they require new orders for Chandler's Invega injection scheduled to be given on Thursday,   Spoke to Scott Tellez ([phone]) and she states that  pharmacy has medications, but center requires new "paper orders" as it has  on 21  Infusion Center fax#  [phone]    For your review

## 2021-09-21 NOTE — TELEPHONE ENCOUNTER
[address] [facility] [address] [address] called asking for order for Chandler's Invega injection  Spoke to Ayad Berger to inform her that Joshua Medina tomorrow and has the information previously requested by them

## 2021-09-22 ENCOUNTER — OFFICE VISIT (OUTPATIENT)
Dept: CARDIOLOGY CLINIC | Facility: CLINIC | Age: 56
End: 2021-09-22
Payer: COMMERCIAL

## 2021-09-22 VITALS
SYSTOLIC BLOOD PRESSURE: 108 MMHG | OXYGEN SATURATION: 97 % | WEIGHT: 199.8 LBS | BODY MASS INDEX: 27.97 KG/M2 | HEIGHT: 71 IN | HEART RATE: 73 BPM | DIASTOLIC BLOOD PRESSURE: 64 MMHG

## 2021-09-22 DIAGNOSIS — I95.1 ORTHOSTATIC HYPOTENSION: Primary | ICD-10-CM

## 2021-09-22 DIAGNOSIS — I25.118 CORONARY ARTERY DISEASE OF NATIVE ARTERY OF NATIVE HEART WITH STABLE ANGINA PECTORIS (HCC): ICD-10-CM

## 2021-09-22 PROCEDURE — 99214 OFFICE O/P EST MOD 30 MIN: CPT | Performed by: INTERNAL MEDICINE

## 2021-09-22 NOTE — PROGRESS NOTES
Cardiology Consultation     Lance Pradhan  [id number]  1965  [address] [place] [address]      1  Orthostatic hypotension     2  Coronary artery disease of native artery of native heart with stable angina pectoris (HCC)         Discussion/Summary:    Orthostatic hypotension, continues to be generally stable  He was previously on midodrine, florinef, but has tapered off of these  He sees neurology for this, pretty extensive eval has been negative  Tilt table here was negative, and then subsequently had at [facility] state as well, I don't have the records of this evaluation  We had discussed reversal of gastric bypass in the past, as this seemed to be the time at which these symptoms developed  However, he was reluctant to proceed with this given concerns that symptoms and episodes may persist even after reversal     Had some nonobstructive CAD identified in the past, continue ranexa, aspirin, atorvastatin  Unclear what the insurance issue was with the Zio patch done in 9/2020  He said it was never submitted to insurance, and now the company has him in collections for that  I will message the individual in the office that does pre-cert to try and look into this on our end  Previous History:  55year old man  In May of 2019, he had an admission at [facility] for chest discomfort  He had a minimally elevated troponin  He had a cardiac catheterization which showed nonobstructive CAD in some myocardial bridging of the LAD  He was gradually started on cardiac medications including metoprolol, Imdur, lipid-lowering therapy with statins  He had had chest pain off and on for several months, continues to have this, but it is atypical   In March of 2020, he had gastric bypass surgery  A few months after his gastric bypass, he started getting orthostatic    His metoprolol and Imdur were stopped  He was advised to increase his fluid intake and increase solute intake  During some hospitalization, he was also seen by another cardiologist and was prescribed thigh-high compression stockings  Gradually, midodrine and Florinef were added and titrated up (now off of these, as of 4/26/2021)  He had ZIO patch ordered to exclude any arrhythmogenic causes of his symptoms  None were found  Since I met him in 10/2020, we have discussed reversal of GB surgery, and he has discussed this with the bariatric surgeon as well  He has also seen neurology and subsequently been referred to the autonomic insufficiency center at [facility]  Interval History:  Returns for follow up  He says he saw the center at [facility], also returned there for testing  Tilt table, sweat testing  He has not heard results  No changes to medications were made  Last syncopal episode was > 1 month ago  Overall, remains off medication other than aspirin, atorvastatin, ranexa from cardiac standpoint  He is having an issue with the Zio patch with payment      Patient Active Problem List   Diagnosis    NSTEMI (non-ST elevated myocardial infarction) ([address] )    Bipolar disorder, current episode mixed, moderate ([address] )    ROSI (obstructive sleep apnea)    Coronary artery disease of native artery of native heart with stable angina pectoris ([address] )    Former smoker    Migraine with aura and without status migrainosus, not intractable    Hypercholesterolemia    Bariatric surgery status    Primary insomnia    Overweight    Encounter for surgical aftercare following surgery of digestive system    Postsurgical malabsorption    Tobacco use    Orthostatic hypotension    Syncope and collapse    Infiltrate of middle lobe of right lung present on imaging study    Hypokalemia    Elevated troponin    Dysautonomia-like disorder    Anxiety disorder    Status post gastric bypass for obesity     Past Medical History: Diagnosis Date    Bariatric surgery status     CPAP (continuous positive airway pressure) dependence     Hearing loss of aging     Hypercholesterolemia     Morbid obesity ([facility] )     NSTEMI (non-ST elevation myocardial infarction) [facility])     april 2019    Postsurgical malabsorption      Social History     Tobacco Use    Smoking status: Former Smoker     Types: Cigarettes     Quit date: 2011     Years since quitting: 10 7    Smokeless tobacco: Current User     Types: Chew     Last attempt to quit: 6/17/2014    Tobacco comment: quit cigarettes 2011   Vaping Use    Vaping Use: Every day    Substances: Nicotine, Flavoring   Substance Use Topics    Alcohol use: Not Currently    Drug use: Never      Family History   Problem Relation Age of Onset    Lung cancer Mother     Brain cancer Mother     Diabetes Brother     Bipolar disorder Maternal Grandfather     Bipolar disorder Son      Past Surgical History:   Procedure Laterality Date    CARDIAC CATHETERIZATION      no stents     COLONOSCOPY      EGD      HERNIA REPAIR      umbilical hernia    CA LAP GASTRIC BYPASS/FRITZ-EN-Y N/A 3/2/2020    Procedure: LAPAROSCOPIC FRITZ-EN-Y GASTRIC BYPASS AND INTRAOPERATIVE EGD;  Surgeon: Yung Nunn MD;  Location: [facility];  Service: Bariatrics    SKIN SURGERY      cyst removed from back and thumb    TONSILLECTOMY      TOOTH EXTRACTION         Current Outpatient Medications:     acetaminophen (TYLENOL) 325 mg tablet, Take 3 tablets (975 mg total) by mouth every 8 (eight) hours (Patient taking differently: Take 975 mg by mouth every 8 (eight) hours prn), Disp: 30 tablet, Rfl: 0    aspirin 81 mg chewable tablet, Chew 1 tablet (81 mg total) daily, Disp: 30 tablet, Rfl: 0    atorvastatin (LIPITOR) 40 mg tablet, Take 1 tablet (40 mg total) by mouth daily, Disp: 90 tablet, Rfl: 1    clonazePAM (KlonoPIN) 1 mg tablet, Take 1 tablet (1 mg total) by mouth 3 (three) times a day as needed for anxiety, Disp: 90 tablet, Rfl: 0    OXcarbazepine (TRILEPTAL) 300 mg tablet, Take 1 tablet (300 mg total) by mouth 2 (two) times a day, Disp: 60 tablet, Rfl: 0    OXcarbazepine (TRILEPTAL) 600 mg tablet, Take 1 tablet (600 mg total) by mouth every 12 (twelve) hours, Disp: 30 tablet, Rfl: 1    paliperidone palmitate ER (INVEGA) 234 mg/1 5 mL IM injection, Inject 1 5 mL (234 mg total) into a muscle every 21 days, Disp: 1 mL, Rfl: 5    pantoprazole (PROTONIX) 20 mg tablet, Take 1 tablet (20 mg total) by mouth daily after dinner, Disp: 30 tablet, Rfl: 1    potassium chloride (K-DUR,KLOR-CON) 20 mEq tablet, Take 1 tablet (20 mEq total) by mouth daily, Disp: 30 tablet, Rfl: 5    traZODone (DESYREL) 300 MG tablet, Take 1 tablet (300 mg total) by mouth daily at bedtime, Disp: 90 tablet, Rfl: 1    nicotine (NICODERM CQ) 21 mg/24 hr TD 24 hr patch, Place 1 patch on the skin every 24 hours (Patient not taking: Reported on 9/22/2021), Disp: 28 patch, Rfl: 3    ranolazine (RANEXA) 1000 MG SR tablet, Take 1 tablet (1,000 mg total) by mouth 2 (two) times a day (Patient not taking: Reported on 6/7/2021), Disp: 180 tablet, Rfl: 3    risperiDONE (RisperDAL) 2 mg tablet, Take 1 tablet (2 mg total) by mouth 2 (two) times a day (Patient not taking: Reported on 9/22/2021), Disp: 60 tablet, Rfl: 2  No current facility-administered medications for this visit  Facility-Administered Medications Ordered in Other Visits:     [START ON 9/23/2021] paliperidone palmitate ER (INVEGA) IM injection 234 mg, 234 mg, Intramuscular, Once, Danny Ratliff MD  No Known Allergies    Vitals:    09/22/21 1240   BP: 108/64   BP Location: Left arm   Patient Position: Sitting   Cuff Size: Large   Pulse: 73   SpO2: 97%   Weight: 90 6 kg (199 lb 12 8 oz)   Height: 5' 11" (1 803 m)     Vitals:    09/22/21 1240   Weight: 90 6 kg (199 lb 12 8 oz)      Height: 5' 11" (180 3 cm)   Body mass index is 27 87 kg/m²      Physical Exam:  GEN: Ramesh Aliment appears well, alert and oriented x 3, pleasant and cooperative   HEENT: pupils equal, round, and reactive to light; extraocular muscles intact  NECK: supple, no carotid bruits   HEART: regular rhythm, normal S1 and S2, no murmurs, clicks, gallops or rubs   LUNGS: clear to auscultation bilaterally; no wheezes, rales, or rhonchi   ABDOMEN: normal bowel sounds, soft, no tenderness, no distention  EXTREMITIES: peripheral pulses normal; no clubbing, cyanosis, or edema  NEURO: no focal findings   SKIN: normal without suspicious lesions on exposed skin    ROS:  Positive for bipolar, dizzy, lightheaded  Loss of consciousness with changing positions  Except as noted in HPI, is otherwise reviewed in detail and a 12 point review of systems is negative  ROS reviewed and is unchanged    Labs:  Lab Results   Component Value Date    K 4 0 03/15/2021     03/15/2021    CREATININE 0 60 03/15/2021    BUN 10 03/15/2021    CO2 27 03/15/2021    ALT 28 03/15/2021    AST 19 03/15/2021    INR 1 20 (H) 08/18/2020    GLUF 82 12/09/2020    HGBA1C 4 9 06/19/2021    WBC 9 03 03/15/2021    HGB 13 3 03/15/2021    HCT 38 9 03/15/2021     03/15/2021       No results found for: CHOL  Lab Results   Component Value Date    HDL 34 (L) 06/22/2020    HDL 41 06/18/2019    HDL 37 (L) 05/15/2019     Lab Results   Component Value Date    LDLCALC 35 06/22/2020    LDLCALC 63 06/18/2019    LDLCALC 118 (H) 05/15/2019     Lab Results   Component Value Date    TRIG 52 06/22/2020    TRIG 62 06/18/2019    TRIG 116 05/15/2019     Testing:  Tilt Table:  SUMMARY: Tilt table noted for bordeline hypotension but and tachycardia induced by upright tilt without reproduction of symptoms, she started from low baseline so not enough change to consider this orthostatic hypotension  He had 4 beat run of PAT and occassional PVC's not correlated to symptoms  Echo 8/19/20:  LEFT VENTRICLE:  Systolic function was normal  Ejection fraction was estimated to be 55 %    There were no regional wall motion abnormalities    There was mild concentric hypertrophy      RIGHT VENTRICLE:  The size was normal   Systolic function was normal

## 2021-09-22 NOTE — TELEPHONE ENCOUNTER
Received paper order form for Nayeli  Faxed to  [facility] @ [phone]  Called  infusion center as requested  LM for Sergo Bush @ [phone] that order from was faxed  Provided nursing number for any issues         Will scan to [employer]        I

## 2021-09-22 NOTE — TELEPHONE ENCOUNTER
infusion center called and LM on nursing line  A "friendly reminder" regarding paper prescription needed for Injection for Chandler       Nursing- please call Miguelito Catherine when faxed @ [phone]

## 2021-09-22 NOTE — TELEPHONE ENCOUNTER
Marina GRIFFIN on nursing line  Stated it has been a half an hour and no fax was received  Confirmed fax # [phone]      Wrong fax number documented  Re-faxed to [phone] attn: Poli Vera  Called and spoke with marina  She did receive the fax

## 2021-09-23 ENCOUNTER — HOSPITAL ENCOUNTER (OUTPATIENT)
Dept: INFUSION CENTER | Facility: HOSPITAL | Age: 56
Discharge: HOME | End: 2021-09-23
Payer: COMMERCIAL

## 2021-09-23 PROCEDURE — 96372 THER/PROPH/DIAG INJ SC/IM: CPT

## 2021-09-23 RX ADMIN — PALIPERIDONE PALMITATE 234 MG: 234 INJECTION INTRAMUSCULAR at 09:22

## 2021-09-23 NOTE — PROGRESS NOTES
Pt tolerated Elwyn Bilis injection to L deltoid without difficulty  Next appt scheduled    Left ambulatory declining AVS

## 2021-10-07 DIAGNOSIS — F41.9 ANXIETY AND DEPRESSION: ICD-10-CM

## 2021-10-07 DIAGNOSIS — F32.A ANXIETY AND DEPRESSION: ICD-10-CM

## 2021-10-07 RX ORDER — CLONAZEPAM 1 MG/1
1 TABLET ORAL 3 TIMES DAILY PRN
Qty: 90 TABLET | Refills: 0 | Status: SHIPPED | OUTPATIENT
Start: 2021-10-15 | End: 2021-11-05 | Stop reason: SDUPTHER

## 2021-10-11 ENCOUNTER — OFFICE VISIT (OUTPATIENT)
Dept: NEUROLOGY | Facility: CLINIC | Age: 56
End: 2021-10-11
Payer: COMMERCIAL

## 2021-10-11 VITALS
HEIGHT: 71 IN | WEIGHT: 197.6 LBS | DIASTOLIC BLOOD PRESSURE: 79 MMHG | SYSTOLIC BLOOD PRESSURE: 117 MMHG | HEART RATE: 107 BPM | TEMPERATURE: 97.5 F | BODY MASS INDEX: 27.66 KG/M2

## 2021-10-11 DIAGNOSIS — R29.818 EXTRAPYRAMIDAL SYMPTOM: ICD-10-CM

## 2021-10-11 DIAGNOSIS — G90.8 DYSAUTONOMIA-LIKE DISORDER: ICD-10-CM

## 2021-10-11 DIAGNOSIS — I95.1 ORTHOSTATIC HYPOTENSION: Primary | ICD-10-CM

## 2021-10-11 PROCEDURE — 99215 OFFICE O/P EST HI 40 MIN: CPT | Performed by: PSYCHIATRY & NEUROLOGY

## 2021-10-11 RX ORDER — DROXIDOPA 100 MG/1
1 CAPSULE ORAL 3 TIMES DAILY
Qty: 90 CAPSULE | Refills: 5 | Status: SHIPPED | OUTPATIENT
Start: 2021-10-11 | End: 2022-03-31

## 2021-10-14 ENCOUNTER — TELEPHONE (OUTPATIENT)
Dept: NEUROLOGY | Facility: CLINIC | Age: 56
End: 2021-10-14

## 2021-10-14 ENCOUNTER — HOSPITAL ENCOUNTER (OUTPATIENT)
Dept: INFUSION CENTER | Facility: HOSPITAL | Age: 56
Discharge: HOME | End: 2021-10-14
Payer: COMMERCIAL

## 2021-10-14 PROCEDURE — 96372 THER/PROPH/DIAG INJ SC/IM: CPT

## 2021-10-14 RX ADMIN — PALIPERIDONE PALMITATE 234 MG: 234 INJECTION INTRAMUSCULAR at 09:31

## 2021-10-18 PROBLEM — R29.818 EXTRAPYRAMIDAL SYMPTOM: Status: ACTIVE | Noted: 2021-10-18

## 2021-10-20 DIAGNOSIS — F31.81 BIPOLAR 2 DISORDER (HCC): ICD-10-CM

## 2021-11-04 ENCOUNTER — HOSPITAL ENCOUNTER (OUTPATIENT)
Dept: INFUSION CENTER | Facility: HOSPITAL | Age: 56
Discharge: HOME | End: 2021-11-04
Payer: COMMERCIAL

## 2021-11-04 PROCEDURE — 96372 THER/PROPH/DIAG INJ SC/IM: CPT

## 2021-11-04 RX ADMIN — PALIPERIDONE PALMITATE 234 MG: 234 INJECTION INTRAMUSCULAR at 09:25

## 2021-11-05 ENCOUNTER — OFFICE VISIT (OUTPATIENT)
Dept: PSYCHIATRY | Facility: CLINIC | Age: 56
End: 2021-11-05
Payer: COMMERCIAL

## 2021-11-05 DIAGNOSIS — F32.A ANXIETY AND DEPRESSION: ICD-10-CM

## 2021-11-05 DIAGNOSIS — Z72.0 TOBACCO USE: ICD-10-CM

## 2021-11-05 DIAGNOSIS — F41.9 ANXIETY DISORDER, UNSPECIFIED TYPE: ICD-10-CM

## 2021-11-05 DIAGNOSIS — R29.818 EXTRAPYRAMIDAL SYMPTOM: ICD-10-CM

## 2021-11-05 DIAGNOSIS — F31.62 BIPOLAR DISORDER, CURRENT EPISODE MIXED, MODERATE (HCC): Primary | ICD-10-CM

## 2021-11-05 DIAGNOSIS — F41.9 ANXIETY AND DEPRESSION: ICD-10-CM

## 2021-11-05 DIAGNOSIS — F51.01 PRIMARY INSOMNIA: ICD-10-CM

## 2021-11-05 PROCEDURE — 99214 OFFICE O/P EST MOD 30 MIN: CPT | Performed by: STUDENT IN AN ORGANIZED HEALTH CARE EDUCATION/TRAINING PROGRAM

## 2021-11-05 PROCEDURE — 90833 PSYTX W PT W E/M 30 MIN: CPT | Performed by: STUDENT IN AN ORGANIZED HEALTH CARE EDUCATION/TRAINING PROGRAM

## 2021-11-05 RX ORDER — OXCARBAZEPINE 600 MG/1
600 TABLET, FILM COATED ORAL DAILY
Qty: 30 TABLET | Refills: 2 | Status: SHIPPED | OUTPATIENT
Start: 2021-11-05 | End: 2021-12-10 | Stop reason: SDUPTHER

## 2021-11-05 RX ORDER — RISPERIDONE 1 MG/1
1 TABLET, FILM COATED ORAL 2 TIMES DAILY
Qty: 60 TABLET | Refills: 1 | Status: SHIPPED | OUTPATIENT
Start: 2021-11-05 | End: 2021-12-10

## 2021-11-05 RX ORDER — CLONAZEPAM 1 MG/1
1 TABLET ORAL 3 TIMES DAILY PRN
Qty: 90 TABLET | Refills: 0 | Status: SHIPPED | OUTPATIENT
Start: 2021-11-05 | End: 2021-11-16 | Stop reason: SDUPTHER

## 2021-11-05 RX ORDER — OXCARBAZEPINE 300 MG/1
300 TABLET, FILM COATED ORAL EVERY EVENING
Qty: 30 TABLET | Refills: 2 | Status: SHIPPED | OUTPATIENT
Start: 2021-11-05 | End: 2021-12-10 | Stop reason: SDUPTHER

## 2021-11-08 ENCOUNTER — TELEPHONE (OUTPATIENT)
Dept: PSYCHIATRY | Facility: CLINIC | Age: 56
End: 2021-11-08

## 2021-11-10 ENCOUNTER — TELEPHONE (OUTPATIENT)
Dept: NEUROLOGY | Facility: CLINIC | Age: 56
End: 2021-11-10

## 2021-11-12 DIAGNOSIS — Z98.84 BARIATRIC SURGERY STATUS: ICD-10-CM

## 2021-11-12 RX ORDER — PANTOPRAZOLE SODIUM 20 MG/1
20 TABLET, DELAYED RELEASE ORAL
Qty: 30 TABLET | Refills: 1 | Status: SHIPPED | OUTPATIENT
Start: 2021-11-12 | End: 2022-01-27 | Stop reason: SDUPTHER

## 2021-11-15 DIAGNOSIS — F41.9 ANXIETY AND DEPRESSION: ICD-10-CM

## 2021-11-15 DIAGNOSIS — F32.A ANXIETY AND DEPRESSION: ICD-10-CM

## 2021-11-15 RX ORDER — CLONAZEPAM 1 MG/1
1 TABLET ORAL 3 TIMES DAILY PRN
Qty: 90 TABLET | Refills: 0 | OUTPATIENT
Start: 2021-11-15 | End: 2021-12-15

## 2021-11-16 ENCOUNTER — TELEPHONE (OUTPATIENT)
Dept: PSYCHIATRY | Facility: CLINIC | Age: 56
End: 2021-11-16

## 2021-11-16 ENCOUNTER — IMMUNIZATIONS (OUTPATIENT)
Dept: FAMILY MEDICINE CLINIC | Facility: HOSPITAL | Age: 56
End: 2021-11-16

## 2021-11-16 DIAGNOSIS — Z23 ENCOUNTER FOR IMMUNIZATION: Primary | ICD-10-CM

## 2021-11-16 DIAGNOSIS — F32.A ANXIETY AND DEPRESSION: ICD-10-CM

## 2021-11-16 DIAGNOSIS — F41.9 ANXIETY AND DEPRESSION: ICD-10-CM

## 2021-11-16 PROCEDURE — 91306 COVID-19 MODERNA VACC 0.25 ML BOOSTER: CPT

## 2021-11-16 PROCEDURE — 0064A COVID-19 MODERNA VACC 0.25 ML BOOSTER: CPT

## 2021-11-16 RX ORDER — CLONAZEPAM 1 MG/1
1 TABLET ORAL 3 TIMES DAILY PRN
Qty: 90 TABLET | Refills: 0 | Status: SHIPPED | OUTPATIENT
Start: 2021-11-16 | End: 2021-12-10 | Stop reason: SDUPTHER

## 2021-11-24 ENCOUNTER — HOSPITAL ENCOUNTER (OUTPATIENT)
Dept: INFUSION CENTER | Facility: HOSPITAL | Age: 56
Discharge: HOME | End: 2021-11-24
Payer: COMMERCIAL

## 2021-11-24 PROCEDURE — 96372 THER/PROPH/DIAG INJ SC/IM: CPT

## 2021-11-24 RX ADMIN — PALIPERIDONE PALMITATE 234 MG: 234 INJECTION INTRAMUSCULAR at 09:37

## 2021-12-10 ENCOUNTER — OFFICE VISIT (OUTPATIENT)
Dept: PSYCHIATRY | Facility: CLINIC | Age: 56
End: 2021-12-10
Payer: COMMERCIAL

## 2021-12-10 DIAGNOSIS — F31.62 BIPOLAR DISORDER, CURRENT EPISODE MIXED, MODERATE (HCC): Primary | ICD-10-CM

## 2021-12-10 DIAGNOSIS — F32.A ANXIETY AND DEPRESSION: ICD-10-CM

## 2021-12-10 DIAGNOSIS — F41.9 ANXIETY AND DEPRESSION: ICD-10-CM

## 2021-12-10 DIAGNOSIS — Z72.0 TOBACCO USE: ICD-10-CM

## 2021-12-10 DIAGNOSIS — F41.9 ANXIETY DISORDER, UNSPECIFIED TYPE: ICD-10-CM

## 2021-12-10 DIAGNOSIS — R29.818 EXTRAPYRAMIDAL SYMPTOM: ICD-10-CM

## 2021-12-10 DIAGNOSIS — F51.01 PRIMARY INSOMNIA: ICD-10-CM

## 2021-12-10 PROCEDURE — 99214 OFFICE O/P EST MOD 30 MIN: CPT | Performed by: STUDENT IN AN ORGANIZED HEALTH CARE EDUCATION/TRAINING PROGRAM

## 2021-12-10 PROCEDURE — 90833 PSYTX W PT W E/M 30 MIN: CPT | Performed by: STUDENT IN AN ORGANIZED HEALTH CARE EDUCATION/TRAINING PROGRAM

## 2021-12-10 RX ORDER — OXCARBAZEPINE 300 MG/1
300 TABLET, FILM COATED ORAL EVERY EVENING
Qty: 30 TABLET | Refills: 2 | Status: SHIPPED | OUTPATIENT
Start: 2021-12-10 | End: 2022-02-14 | Stop reason: SDUPTHER

## 2021-12-10 RX ORDER — TRAZODONE HYDROCHLORIDE 300 MG/1
300 TABLET ORAL
Qty: 90 TABLET | Refills: 1 | Status: SHIPPED | OUTPATIENT
Start: 2021-12-10 | End: 2022-03-29 | Stop reason: SDUPTHER

## 2021-12-10 RX ORDER — CLONAZEPAM 1 MG/1
1 TABLET ORAL 3 TIMES DAILY PRN
Qty: 90 TABLET | Refills: 0 | Status: SHIPPED | OUTPATIENT
Start: 2021-12-10 | End: 2022-01-14 | Stop reason: SDUPTHER

## 2021-12-10 RX ORDER — OXCARBAZEPINE 600 MG/1
600 TABLET, FILM COATED ORAL DAILY
Qty: 30 TABLET | Refills: 2 | Status: SHIPPED | OUTPATIENT
Start: 2021-12-10 | End: 2022-02-14 | Stop reason: SDUPTHER

## 2021-12-16 ENCOUNTER — HOSPITAL ENCOUNTER (OUTPATIENT)
Dept: INFUSION CENTER | Facility: HOSPITAL | Age: 56
Discharge: HOME | End: 2021-12-16
Payer: COMMERCIAL

## 2021-12-16 PROCEDURE — 96372 THER/PROPH/DIAG INJ SC/IM: CPT

## 2021-12-16 RX ADMIN — PALIPERIDONE PALMITATE 234 MG: 234 INJECTION INTRAMUSCULAR at 09:48

## 2021-12-20 ENCOUNTER — IMMUNIZATIONS (OUTPATIENT)
Dept: FAMILY MEDICINE CLINIC | Facility: CLINIC | Age: 56
End: 2021-12-20
Payer: COMMERCIAL

## 2021-12-20 DIAGNOSIS — Z23 NEED FOR INFLUENZA VACCINATION: Primary | ICD-10-CM

## 2021-12-20 PROCEDURE — 90471 IMMUNIZATION ADMIN: CPT

## 2021-12-20 PROCEDURE — 90682 RIV4 VACC RECOMBINANT DNA IM: CPT

## 2022-01-01 NOTE — ASSESSMENT & PLAN NOTE
Upper extremities drinking 4 seconds noted by his wife  Patient denied losing consciousness   Likely secondary to orthostatic hypotension versus vasovagal response  Neurology No further work up needed from Neuro standpoint  none

## 2022-01-06 ENCOUNTER — HOSPITAL ENCOUNTER (OUTPATIENT)
Dept: INFUSION CENTER | Facility: HOSPITAL | Age: 57
Discharge: HOME | End: 2022-01-06

## 2022-01-11 ENCOUNTER — TELEPHONE (OUTPATIENT)
Dept: PSYCHIATRY | Facility: CLINIC | Age: 57
End: 2022-01-11

## 2022-01-11 NOTE — TELEPHONE ENCOUNTER
Received fax from 's stating no prior authorization needed  there is a PAID CLAIM for the Dulce Yesy as of 1/10/22 at [address] [employer] [address] phone [phone]  No further action is needed  Contacted Silviano Domínguez and relayed information  Silviano Domínguez requested this be faxed to her for billing purposes   Faxed as requested [phone]    Receipt received

## 2022-01-11 NOTE — TELEPHONE ENCOUNTER
Obed Barragan form [facility] called  She stated Narayan has an appointment tomorrow for the [place] injection and has new insurance  [phone]    Called and spoke to Obed Barragan  New insurance is [employer] [facility]  ID [id number]     Attempted multiple PA requests and all are invalid  Called Customer service At [address] [facility] spoke with Annalise Gerard  She reports we need to download prior auth form on [email or web address]  com  or utilize Cover my meds  Initiated and sent PA request to [employer] via [employer] and marked as urgent

## 2022-01-12 ENCOUNTER — HOSPITAL ENCOUNTER (OUTPATIENT)
Dept: INFUSION CENTER | Facility: HOSPITAL | Age: 57
Discharge: HOME | End: 2022-01-12
Payer: COMMERCIAL

## 2022-01-12 PROCEDURE — 96372 THER/PROPH/DIAG INJ SC/IM: CPT

## 2022-01-12 RX ADMIN — PALIPERIDONE PALMITATE 234 MG: 234 INJECTION INTRAMUSCULAR at 11:34

## 2022-01-14 DIAGNOSIS — F41.9 ANXIETY AND DEPRESSION: ICD-10-CM

## 2022-01-14 DIAGNOSIS — F32.A ANXIETY AND DEPRESSION: ICD-10-CM

## 2022-01-17 RX ORDER — CLONAZEPAM 1 MG/1
1 TABLET ORAL 3 TIMES DAILY PRN
Qty: 90 TABLET | Refills: 0 | Status: SHIPPED | OUTPATIENT
Start: 2022-01-17 | End: 2022-02-14 | Stop reason: SDUPTHER

## 2022-01-17 NOTE — TELEPHONE ENCOUNTER
PDMP website reviewed  Michael Negrete has been appropriately adherent to controlled psychotropic medications without evidence of abuse or misuse  As such, will send 30-day refill to pharmacy of choice and follow up as necessary

## 2022-01-25 DIAGNOSIS — I25.110 CORONARY ARTERY DISEASE INVOLVING NATIVE CORONARY ARTERY OF NATIVE HEART WITH UNSTABLE ANGINA PECTORIS (HCC): ICD-10-CM

## 2022-01-25 DIAGNOSIS — E78.2 MIXED HYPERLIPIDEMIA: ICD-10-CM

## 2022-01-25 RX ORDER — ATORVASTATIN CALCIUM 40 MG/1
40 TABLET, FILM COATED ORAL DAILY
Qty: 90 TABLET | Refills: 1 | Status: SHIPPED | OUTPATIENT
Start: 2022-01-25 | End: 2022-07-25 | Stop reason: SDUPTHER

## 2022-01-27 DIAGNOSIS — Z98.84 BARIATRIC SURGERY STATUS: ICD-10-CM

## 2022-01-27 RX ORDER — PANTOPRAZOLE SODIUM 20 MG/1
20 TABLET, DELAYED RELEASE ORAL
Qty: 30 TABLET | Refills: 0
Start: 2022-01-27 | End: 2022-01-31 | Stop reason: SDUPTHER

## 2022-01-28 DIAGNOSIS — Z98.84 BARIATRIC SURGERY STATUS: ICD-10-CM

## 2022-01-28 RX ORDER — PANTOPRAZOLE SODIUM 20 MG/1
20 TABLET, DELAYED RELEASE ORAL
Qty: 30 TABLET | Refills: 0
Start: 2022-01-28

## 2022-01-28 NOTE — TELEPHONE ENCOUNTER
Patient requesting a refill on his Pantoprazole 20 mg daily  Patient did call and schedule an upcoming appointment with you for 1/31/2022

## 2022-01-31 ENCOUNTER — TELEMEDICINE (OUTPATIENT)
Dept: BARIATRICS | Facility: CLINIC | Age: 57
End: 2022-01-31
Payer: COMMERCIAL

## 2022-01-31 VITALS — WEIGHT: 187 LBS | BODY MASS INDEX: 26.18 KG/M2 | HEIGHT: 71 IN

## 2022-01-31 DIAGNOSIS — Z98.84 BARIATRIC SURGERY STATUS: ICD-10-CM

## 2022-01-31 DIAGNOSIS — K91.2 POSTSURGICAL MALABSORPTION: ICD-10-CM

## 2022-01-31 DIAGNOSIS — I25.118 CORONARY ARTERY DISEASE OF NATIVE ARTERY OF NATIVE HEART WITH STABLE ANGINA PECTORIS (HCC): ICD-10-CM

## 2022-01-31 DIAGNOSIS — F17.200 SMOKING: ICD-10-CM

## 2022-01-31 DIAGNOSIS — G47.33 OSA (OBSTRUCTIVE SLEEP APNEA): ICD-10-CM

## 2022-01-31 DIAGNOSIS — Z48.815 ENCOUNTER FOR SURGICAL AFTERCARE FOLLOWING SURGERY OF DIGESTIVE SYSTEM: Primary | ICD-10-CM

## 2022-01-31 PROCEDURE — 99213 OFFICE O/P EST LOW 20 MIN: CPT | Performed by: NURSE PRACTITIONER

## 2022-01-31 RX ORDER — PANTOPRAZOLE SODIUM 20 MG/1
20 TABLET, DELAYED RELEASE ORAL
Qty: 30 TABLET | Refills: 3
Start: 2022-01-31 | End: 2022-04-25

## 2022-01-31 NOTE — PROGRESS NOTES
Virtual Regular Visit    Verification of patient location:    Patient is located in the following state in which I hold an active license [place]       Assessment/Plan:    PLAN:     - Routine follow up in 1 year for next annual visit  - Continue with healthy lifestyle, adequate protein intake of 60 gm, fluid intake of at least 64 oz    - CONTINUE PPI daily due to use of NSAIDs and smoking hx    - Start with MVI daily; check labs in one month  - Activity as tolerated  - Labs ordered and will adjust accordingly if any deficiency  - Tobacco cessation discussed with patient  Risks associated with smoking was discussed  Patient states he will try to quit  - Follow up with RD and SW as needed           Problem List Items Addressed This Visit        Digestive    Postsurgical malabsorption    Relevant Orders    CBC and Platelet    Comprehensive metabolic panel    Ferritin    Folate    Iron Saturation %    Zinc    Vitamin D 25 hydroxy    Vitamin B12    Vitamin B1, whole blood    Vitamin A    PTH, intact       Respiratory    ROSI (obstructive sleep apnea)       Cardiovascular and Mediastinum    Coronary artery disease of native artery of native heart with stable angina pectoris (HCC)       Other    Bariatric surgery status    Relevant Medications    pantoprazole (PROTONIX) 20 mg tablet    Other Relevant Orders    CBC and Platelet    Comprehensive metabolic panel    Ferritin    Folate    Iron Saturation %    Zinc    Vitamin D 25 hydroxy    Vitamin B12    Vitamin B1, whole blood    Vitamin A    PTH, intact    Encounter for surgical aftercare following surgery of digestive system - Primary    Relevant Medications    pantoprazole (PROTONIX) 20 mg tablet    Other Relevant Orders    CBC and Platelet    Comprehensive metabolic panel    Ferritin    Folate    Iron Saturation %    Zinc    Vitamin D 25 hydroxy    Vitamin B12    Vitamin B1, whole blood    Vitamin A    PTH, intact      Other Visit Diagnoses     Smoking Relevant Medications    pantoprazole (PROTONIX) 20 mg tablet    BMI 26 0-26 9,adult        Relevant Medications    pantoprazole (PROTONIX) 20 mg tablet    Other Relevant Orders    CBC and Platelet    Comprehensive metabolic panel    Ferritin    Folate    Iron Saturation %    Zinc    Vitamin D 25 hydroxy    Vitamin B12    Vitamin B1, whole blood    Vitamin A    PTH, intact               Reason for visit is   Chief Complaint   Patient presents with    Follow-up     Annual    Virtual Awv    Virtual Regular Visit        Encounter provider Juanita Melendez    Provider located at [address] [place] [id number]      Recent Visits  No visits were found meeting these conditions  Showing recent visits within past 7 days and meeting all other requirements  Today's Visits  Date Type Provider Dept   01/31/22 Telemedicine Karyle Gibes, CRNP [facility]   Showing today's visits and meeting all other requirements  Future Appointments  No visits were found meeting these conditions  Showing future appointments within next 150 days and meeting all other requirements       The patient was identified by name and date of birth  Eliazar Blanca was informed that this is a telemedicine visit and that the visit is being conducted through Telephone  My office door was closed  The patient was notified the following individuals were present in the room [facility]  He acknowledged consent and understanding of privacy and security of the video platform  The patient has agreed to participate and understands they can discontinue the visit at any time  Patient is aware this is a billable service  Subjective  Eliazar Blanca is a 65 y o  male for an annual visit  S/P RNYGB with Dr Liseth Chavez on 03/02/2020  Overall doing well, tolerating a regular diet  Is not taking his MVI and is smoking cigars daily   States he is trying to quit and has nicotine patches at home  Denies having any abdominal pain, N/V/D/C, regurgitation, reflux or dysphagia  Initial - 320 lbs   Current - 187 lbs   NICOLE - 177 lbs  EWL - 93%  Tolerating a regular diet - yes  60 gm Protein -  Yes   64 oz of fluid  - yes   30/60 - limited - doing 30/30 because he was not able to get in enough fluids  Exercise - yes - bike  Carbonation - no   ETOH - no   Smoking - yes - smokes small cigar - 4-5/day; advised to stop smoking  Risks of smoking was discussed with patient such as but not limited to - gastric irritation, ulcerations with potential perforation, sepsis and possible death  NSAIDs- yes - aspirin daily  States he does at times forget  Advised to take his PPI daily as he is high risk secondary to smoking and NSAIDs intake  Advised patient to remember to take his asa for his CAD prevention  Multivitamin - No - advised to start MVI x 1 month and get labs checked  LABS ordered - Last checked in 2020  Will recheck to assess for any vitamin deficiencies  ROSI - saw sleep medicine  States he is off CPAP machine  Denies excessive sleeping or fatigue during the day  CAD - stable  On daily ASA 81 MG PO QD  Follow up with cardiologist as scheduled/needed  Advised to continue with PPI daily  Smoking - Patient with current smoking status  Risks and benefits were discussed associated with smoking and cessation  Patient states he has nicotine patches and will try to start quitting  Advise to follow up with PCP for further assistance with his attempt to stop       Colonoscopy - UTD    HPI     Past Medical History:   Diagnosis Date    Bariatric surgery status     CPAP (continuous positive airway pressure) dependence     Hearing loss of aging     Hypercholesterolemia     Morbid obesity ([facility] [address] )     NSTEMI (non-ST elevation myocardial infarction) [facility])     april 2019    Postsurgical malabsorption        Past Surgical History:   Procedure Laterality Date    CARDIAC CATHETERIZATION      no stents     COLONOSCOPY      EGD      HERNIA REPAIR      umbilical hernia    TX LAP GASTRIC BYPASS/FRITZ-EN-Y N/A 3/2/2020    Procedure: LAPAROSCOPIC FRITZ-EN-Y GASTRIC BYPASS AND INTRAOPERATIVE EGD;  Surgeon: Jasmin Joya MD;  Location: [facility];  Service: Bariatrics    SKIN SURGERY      cyst removed from back and thumb    TONSILLECTOMY      TOOTH EXTRACTION         Current Outpatient Medications   Medication Sig Dispense Refill    acetaminophen (TYLENOL) 325 mg tablet Take 3 tablets (975 mg total) by mouth every 8 (eight) hours (Patient taking differently: Take 975 mg by mouth every 8 (eight) hours prn) 30 tablet 0    aspirin 81 mg chewable tablet Chew 1 tablet (81 mg total) daily 30 tablet 0    atorvastatin (LIPITOR) 40 mg tablet Take 1 tablet (40 mg total) by mouth daily 90 tablet 1    clonazePAM (KlonoPIN) 1 mg tablet Take 1 tablet (1 mg total) by mouth 3 (three) times a day as needed for anxiety 90 tablet 0    OXcarbazepine (TRILEPTAL) 300 mg tablet Take 1 tablet (300 mg total) by mouth every evening 30 tablet 2    OXcarbazepine (TRILEPTAL) 600 mg tablet Take 1 tablet (600 mg total) by mouth daily 30 tablet 2    pantoprazole (PROTONIX) 20 mg tablet Take 1 tablet (20 mg total) by mouth daily after dinner 30 tablet 3    potassium chloride (K-DUR,KLOR-CON) 20 mEq tablet Take 1 tablet (20 mEq total) by mouth daily 30 tablet 5    traZODone (DESYREL) 300 MG tablet Take 1 tablet (300 mg total) by mouth daily at bedtime 90 tablet 1    Droxidopa 100 MG CAPS Take 1 capsule (100 mg total) by mouth 3 (three) times a day (Patient not taking: Reported on 1/31/2022 ) 90 capsule 5    nicotine (NICODERM CQ) 21 mg/24 hr TD 24 hr patch Place 1 patch on the skin every 24 hours (Patient not taking: Reported on 9/22/2021) 28 patch 3    paliperidone palmitate ER (INVEGA) 234 mg/1 5 mL IM injection Inject 1 5 mL (234 mg total) into a muscle every 21 days for 1 dose 1 mL 0    ranolazine (RANEXA) 1000 MG SR tablet Take 1 tablet (1,000 mg total) by mouth 2 (two) times a day (Patient not taking: Reported on 1/31/2022 ) 180 tablet 3     No current facility-administered medications for this visit  No Known Allergies    Review of Systems   Constitutional: Negative  Respiratory: Negative  Cardiovascular: Negative  Gastrointestinal: Negative  Musculoskeletal: Negative  Skin: Negative  Neurological: Negative  Psychiatric/Behavioral: Negative for suicidal ideas  The patient is not nervous/anxious  Follows with psychiatrist d/t hx of bipolar  Controlled  Video Exam    Vitals:    01/31/22 0753   Weight: 84 8 kg (187 lb)   Height: 5' 11" (1 803 m)       Physical Exam  Vitals and nursing note reviewed  Pulmonary:      Effort: Pulmonary effort is normal       Breath sounds: Normal breath sounds  Psychiatric:         Mood and Affect: Mood normal          Behavior: Behavior normal          Thought Content: Thought content normal          Judgment: Judgment normal           I spent 20 minutes directly with the patient during this visit    [address] [facility] verbally agrees to participate in [employer]  Pt is aware that [employer] could be limited without vital signs or the ability to perform a full hands-on physical exam  Chandler Duarte understands he or the provider may request at any time to terminate the video visit and request the patient to seek care or treatment in person

## 2022-01-31 NOTE — PATIENT INSTRUCTIONS
PLAN:     - Routine follow up in 1 year  - Continue with healthy lifestyle, adequate protein intake of 60 gm, fluid intake of at least 64 oz  - Start with Multivitamin daily and check labs in one Month  - Activity as tolerated  - Labs ordered and will adjust accordingly if any deficiency  - Follow up with RD and SW as needed  - Continue with pantoprazole daily    - Please try to stop smoking

## 2022-02-03 ENCOUNTER — TELEPHONE (OUTPATIENT)
Dept: NEUROLOGY | Facility: CLINIC | Age: 57
End: 2022-02-03

## 2022-02-03 ENCOUNTER — TELEPHONE (OUTPATIENT)
Dept: PSYCHIATRY | Facility: CLINIC | Age: 57
End: 2022-02-03

## 2022-02-03 ENCOUNTER — HOSPITAL ENCOUNTER (OUTPATIENT)
Dept: INFUSION CENTER | Facility: HOSPITAL | Age: 57
Discharge: HOME | End: 2022-02-03
Payer: COMMERCIAL

## 2022-02-03 DIAGNOSIS — F31.62 BIPOLAR DISORDER, CURRENT EPISODE MIXED, MODERATE (HCC): Primary | ICD-10-CM

## 2022-02-03 PROCEDURE — 96372 THER/PROPH/DIAG INJ SC/IM: CPT

## 2022-02-03 RX ORDER — DESVENLAFAXINE 50 MG/1
50 TABLET, EXTENDED RELEASE ORAL DAILY
Qty: 30 TABLET | Refills: 0 | Status: SHIPPED | OUTPATIENT
Start: 2022-02-03 | End: 2022-02-14 | Stop reason: SDUPTHER

## 2022-02-03 RX ADMIN — PALIPERIDONE PALMITATE 234 MG: 234 INJECTION INTRAMUSCULAR at 09:39

## 2022-02-03 NOTE — TELEPHONE ENCOUNTER
Nieves Lema stopped into [employer] after picking up his medication at the pharmacy  He is looking to get an antidepressant called in the [address] [place] [address] per discussion at his last visit

## 2022-02-03 NOTE — TELEPHONE ENCOUNTER
Follow up call made and spoke with Neelima Brewster  When asked about his last office visit, he said Dr Octavio Hassan didn't say the name of the antidepressant  Neelima Brewster said he was on one before, but didn't remember the name of the medication  Neelima Brewster was asked to elaborate on how he's feeling now  He said he feels depressed  He gets into "crying fits;" doesn't want to do anything; just sitting around; having trouble getting the energy to shower  HE says he's not eating as much, but hasn't noticed weight loss (he hasn't weighed himself)  He is sleeping a bit less:  he falls asleep around 7-8 PM and is awake at 5 AM  He said he used to sleep later  Neelima Brewster denies SI/HI  Next appointment is 2/14/22  Last Office Visit   2021  Upcomin2022    Last Refill  levothyroxine 88 MCG tablet 90 tablet 0 2021     Sig - Route: Take 1 tablet by mouth daily. - Oral    Sent to pharmacy as: Levothyroxine Sodium 88 MCG Oral Tablet    Class: Eprescribe    Notes to Pharmacy: Dose increase    E-Prescribing Status: Receipt confirmed by pharmacy (2021 12:17 PM CDT)        FAILED PROTOCOL  Abnormal Labs:   Thyroid:   TSH (m[iU]/L)   Date Value   2021 5.68 (H)     TSH (WITH REFLEX TO FREE T4) (m[iU]/L)   Date Value   2020 17.20 (H)     Medication has been pended for provider review.

## 2022-02-03 NOTE — TELEPHONE ENCOUNTER
Spoke with patient regarding an schedule opening in [facility] 2/4/22 at 8:30a, patient expressed his concerns about the weather tomorrow, he feels safer doing a virtual visit from home  Patient accepted virtual appointment

## 2022-02-03 NOTE — PROGRESS NOTES
Invega injection administered in R deltoid  Patient tolerated well with no adverse effects  Offers no complaints  Next appointment verified, AVS declined

## 2022-02-03 NOTE — TELEPHONE ENCOUNTER
Tomas Polk,    Can you contact Jordan and inquire? I do not know what he is referring to regarding a "new antidepressant"  Thanks

## 2022-02-03 NOTE — TELEPHONE ENCOUNTER
Raymundo Neri,    Can you make Farnaz Grider aware about his new script? Last visit, we spoke about potential use of Pristiq, given benefit and tolerability with Effexor in past  HE was receptive but did not want to initiate treatment at that time  Given recent uptick in depression, will start Pristiq 50mg Daily  Risks/benefits/alternativies to treatment discussed extensively last visit, including a myriad of potential adverse medication side effects, to which [facility] voiced understanding  30 day script sent to pharmacy, will follow-up with Farnaz Grider on 2/14/22

## 2022-02-03 NOTE — TELEPHONE ENCOUNTER
Spoke with Arpan Jerez and reviewed Pristiq/desvenlafaxine sent to pharmacy; reminded him of previous trial of venlafaxine/Effexor; starting dose of 50 mg; call with questions concerns; follow up with Dr Ricardo Craw 2/14/22  He verbalized understanding

## 2022-02-03 NOTE — PROGRESS NOTES
Emeka Gaytan voiced concerns regarding ongoing depressive symptomatology  Last visit, we spoke about potential use of Pristiq, given benefit and tolerability with Effexor in past  HE was receptive but did not want to initiate treatment at that time  Given recent uptick in depression, Will start Pristiq 50mg Daily  Risks/benefits/alternativies to treatment discussed extensively last visit, including a myriad of potential adverse medication side effects, to which [facility] [employer] voiced understanding

## 2022-02-04 ENCOUNTER — TELEPHONE (OUTPATIENT)
Dept: NEUROLOGY | Facility: CLINIC | Age: 57
End: 2022-02-04

## 2022-02-04 ENCOUNTER — TELEMEDICINE (OUTPATIENT)
Dept: NEUROLOGY | Facility: CLINIC | Age: 57
End: 2022-02-04
Payer: COMMERCIAL

## 2022-02-04 VITALS — WEIGHT: 178 LBS | BODY MASS INDEX: 24.11 KG/M2 | HEIGHT: 72 IN

## 2022-02-04 DIAGNOSIS — G90.8 DYSAUTONOMIA-LIKE DISORDER: Primary | ICD-10-CM

## 2022-02-04 DIAGNOSIS — R29.818 EXTRAPYRAMIDAL SYMPTOM: ICD-10-CM

## 2022-02-04 DIAGNOSIS — I95.1 ORTHOSTATIC HYPOTENSION: ICD-10-CM

## 2022-02-04 PROCEDURE — 99214 OFFICE O/P EST MOD 30 MIN: CPT | Performed by: PSYCHIATRY & NEUROLOGY

## 2022-02-04 NOTE — PROGRESS NOTES
Virtual Regular Visit         Reason for visit is orthostatic hypotension, dysarthria, balance disturbance, disautonomia    Provider located at [address]  [address] [address]    Recent Visits  Date Type Provider Dept   02/04/22 [address], MD [facility] Assanju Begum   Showing recent visits within past 7 days and meeting all other requirements  Future Appointments  No visits were found meeting these conditions  Showing future appointments within next 150 days and meeting all other requirements        Verification of patient location:     Patient is located in the following state in which I hold an active license [place]  The patient was identified by name and date of birth  The patient is located at his home in [place]  Nura Brady was informed that this is a telemedicine visit and that the visit is being conducted through [facility] and patient was informed this is a secure, HIPAA-complaint platform  He agrees to proceed     My office door was closed  The patient was notified the following individuals were present in the room resident Kenyetta Colindres and Dr Susan Ulloa  He acknowledged consent and understanding of privacy and security of the video platform  The patient has agreed to participate and understands he can discontinue the visit at any time  Patient is aware this is a billable service  VIRTUAL VISIT DISCLAIMER    Nura Brady acknowledges that he has consented to an online visit or consultation  He understands that the online visit is based solely on information provided by him, and that, in the absence of a face-to-face physical evaluation by the physician, the diagnosis he receives is both limited and provisional in terms of accuracy and completeness  This is not intended to replace a full medical face-to-face evaluation by the physician  Nura Brady understands and accepts these terms       [facility]  Patient's Name: Ena Delgadillo   Patient's : 1965   Visit Type: follow-up  Referring MD / PCP:  Luciana Sullivan DO    Assessment:  Mr Ena Delgadillo is a 65 y o  man with recurrent orthostatic intolerance (previously he did have orthostatic hypotension during initial office visit, but subsequent tilt table testing did not confirm pathological orthostatic hypotension)  There is no evidence of seizure disorder  Subsequent testing for a peripheral dysautonomia has been unrevealing  There was initial suspicion that he had bariatric surgery that resulted in orthostatic hypotension, possibly complicated by some of his psychiatric medications  However, his psychiatrist has tried removing offending medications  He continues to report orthostatic intolerance (not improved by midodrine or Florinef), constipation, and later he reports excessive drooling and difficulty walking  His last exam in the office picked up on mild degree of rigidity and postural imbalance  He is not able to afford droxidopa  I am not sure if he has an underlying CNS cause of orthostatic hypotension  With his new symptoms of drooling, gait imbalance, and slow movements, we may have to consider an underlying parkinsonian syndrome  His prior MRI brain study did not demonstrate significant atrophy of the midbrain region  I will need the assistance of our movement disorder specialist to assess if he has parkinsonism or if clinical features can be seen in multisystems atrophy  For now, even though we do not have a clear underlying cause of his symptoms, management is to minimize orthostatic intolerance  I recommended referral to physical therapy for the [facility] protocol which is usually used in POTS but can help with increasing his endurance and tolerance  He is likely deconditioned by the lack of exercise as orthostatic intolerance often promote sedentary life style    To improve tolerating up right posture we can try Droxidopa which is used for neurogenic orthostatic hypotension to provide increased norepinephrine and adrenaline to increase vascular resistance  Plan:   1 - continue to drink more than 2 5 liters of water a day  2 - when symptomatic consider drinking additional 0 5 L of water  3 - continue to take salt tabs 1 g take one tab three times a day  4 - take small meals that is low in carbohydrate  5 - sleep in a recliner with head up 30 to 45 degrees or with a sleep wedge to prop your head up while you are sleeping  6 - recommend as tolerated exercises to increase endurance, use the recumbent bicycle as much as you can tolerate, take it easy when coming off of the bicycle, may also try rowing machines, or exercises that are more floor based exercises  7 - would like a trial of Droxidopa 100mg three times a day or as needed based on symptoms  But will need assistance of our  to see if there is a way to make this medication more affordable  Would start with one capsule when you wake up for 3 days, then one capsule when you wake up and at lunch for 3 days, then one capsule three times a day (last dose before dinner)   8 - referral to Dr Nano Fischer (movement disorder specialist) for evaluation of multiple systems atrophy  Will schedule follow-up after evaluation by movement disorder specialist     Problem List Items Addressed This Visit        Cardiovascular and Mediastinum    Orthostatic hypotension       Nervous and Auditory    Dysautonomia-like disorder - Primary       Other    Extrapyramidal symptom          Chief Complaint:    Chief Complaint   Patient presents with    Virtual Regular Visit    Neurologic Problem     orthostatic hypotension      HPI:    Martina Patel is a 65 y o  right handed male here for follow-up evaluation of orthostatic hypotension   Patient reports no significant changes since his last visit in his balance or medications except that he was recently prescribed a new AD which has yet to start taking (Pristiq)  Denies new or worsening sx but notes that he has not improved  Has had at least one significant fall within the last couple of weeks but is not interested in trying an additional ambulatory aids  Patient Denies N/V/F/C, abdominal pain, HA, visual changes, new weakness or sensory changes and notes that he does not have any trouble with coordination  Notes no significant sleep disturbance or acting out of dreams  Denies tremors or gait dysfunction outside of the intermittent lightheadedness  Interval History 2/4/2022  He feels that the frequency and severity of his orthostatic intolerence is about the same  He was not able to afford the droxidopa, it was going to cost him $800/month for the prescription  His medication was approved for one month  He continues to have slurred speech, drooling, and difficulty with his balance  There are no tremors  He does endorsing having dry mouth but excessive teary eyes  He is frequently constipated  He denies difficulty with micturition  He denies abnormal sleep other than sleeping in a recline  He sleeps alone and his wife is upstairs  There have been no report of abnormal nocturnal behavior or finding himself fallen out of his recliner  Oxcarbazepine is prescribed by psychiatry for bipolar disorder    Event semiology:  1  Typically provoked upon standing, lightheaded, sense of movement, wife/son reports a period of disconnected appearance and generalized shaking, mostly hands, then head/upper body, may progress to loss of consciousness  Prior History:  Intake History 2/2/2021  Patient was seen in the hospital on 7/16/2020 by the neurohospitalist service on consultation regarding syncope  Prior to his presentation he was complaining of episodes of "near syncope" and lightheaded with myoclonic jerking for about 1 month    He reports having myoclonic jerking for 2-3 seconds that occurs 4-5 times a month, more so while he changes positions  He was found to have low blood pressures with SBP 80 while standing and SBP 90 while sitting  He was previously on metoprolol, which was put on hold  He was put on midodrine and eventually Florinef  He had gastric bypass surgery in March 2020  He had a ZIO patch to evaluate for arrhythmias, none was found  He has had more episodes of orthostasis and passed out at work  He is employed as an , using ladders, and often he has to squat and stand  Patient's history:  About 6-7 months ago he started to have episodes of passing out  He gets really lightheaded and just goes goes out  There are days when he may pass out 2-3 times a day and sometimes he just feels lightheaded  There are no symptoms if he is sitting or lying down  These are usually triggered when he stand up  These symptoms can happened when he stands up, which has been consistent  Symptoms include a sensation of lightheaded, then shaking like he is going to have a seizure, more like twitching all over his body  These symptoms last 30-60 seconds  If he sits down the twitching goes away, but he still feels lightheaded  He may lose consciousness if he does not sit down  He has not lost consciousness for the past 1-2 weeks  He has good days and bad days  His wife mentioned that he may appear unresponsive  His wife reports he has a glazed look over his eyes, there is a period of disconnect for a couple seconds, looks freezes up, there is shaking in his hands (his son feels that he shakes all over, sometimes the shaking progresses up to the shoulder), when he passes out there is loss of color in his face, he would be unconscious for 30-60 seconds, sometimes longer  These episodes have become more frequent  He had bariatric surgery in March 2020, about 1-2 months later he had these symptoms    He was previously on blood pressure medications, he does not remember the name of the medications, but his medications were discontinued  Despite using Florinef and midodrine and drink 4-5 48 oz of water a day  He has had issues with constipation since his surgery  There have been no episodes of diarrhea  He has normal amount of sweat, no problems with dry eyes or dry mouth, no problems with urination, he is able to have an erection  He has issues with his balance since his bariatric surgery  There have been no tingling or loss of sensation in his hands  There is no weakness in his hands  He has episodes of hypomanic state, when he looks like a "happy drunk", he appears to ramble alone  He feels that he rambles every day  He has been on the same dose of trazodone, oxcarbazepine, venlafaxine, and clonazepam for 8-9 years for bipolar disorder, divalproex was discontinued due to double vision  He has been on these medications without complaint of syncopal symptoms for many years  There are no family members with seizures, orthostatic hypotension, or Parkinson's disease  Orthostatic BP/HR for this visit, taken at 3 minute intervals:  Supine HR 82,  /60  Sitting HR 92, BP 90/60  Standing , BP 80/62    Summary 2021  Throughout the year, he continued to have frequent episodes of feeling lightheaded on standing (orthostatic intolerance with orthostatic hypotension)  Lightheadedness starts immediately  If he walks for 30-40 seconds then he is likely going to pass out  Episodes are not present when he is sitting or lying down  He was in the EMU 3/15-3/16/2022, but no clinical event was captured, 24 hours study was normal, no epileptiform discharges, clinical semiology was not consistent with seizure  He had a tilt table study with EEG monitoring, but no event was provoked during testing  He felt "fuzzy" but not typical     His son reports that he has a staring out, body tremors, and sometimes there is a period of sluggishness        Trial of fludrocortisone and midodrine have not help orthostatic symptoms  He has tried compression stockings without relief, they are very uncomfortable, these go up to his hip  He sleeps with a thick pillow to keep his head up  He primarily sleep on a recliner with his head up  He drinks like a fish, he drinks 4-5 portions of a beer jelena (but no beer) that is about 48 ounces  He is unable to exercise because he gets lightheaded when he tries to stand up  He has not tried recumbent bicycle exercises  His psychiatrist has been trying to wean him off of venlafaxine and identify any medication that may be contributing to orthostatic hypotension  His psychiatrist is trying to get him off of oxcarbazepine  He did see the dysautonomia clinic at [facility], with a variety of autonomic testing  However, results did not indicate the presence of a peripheral autonomic dysfunction  His tilt table test was without abnormality  At the last visit in 2021, his wife was concerned about him drooling more, slow reaction time, and shuffling type of gait  He is always constipated  Mild positive pull test     Prior workup:  x  Imagin2021 - MRI brain  Small arachnoid cyst in the left cerebellopontine angle  Normal MR brain study  EEGs:  2021  Routine - normal awake and drowsy    3/15-3/16/2021 - >24 hours video EEG, normal EEG    3/16/2021 Tilt table  Borderline hypotension with tachycardia induced by upright tilt without reproduction of symptoms  He had a 45 minutes upright tilt with HR  and BP /70-80; [facility] autonomic lab testing 2021  Head-up tilt testing  Baseline HR 64, /67  1 minute tilt HR 77, BP 99/64 (little dizziness)  3 minutes dizziness gone, no further symptoms HR 80-88, -104/68-69 during 10 minutes of tilt  Testing included QSART and HR variability testing  Summary -   HRDB borderline abnormal, but HRDB could be technically affected    QSART is normal  Tilt table is normal SBP and HR variation  Valsalva ratio is normal    Labs:  3/15/2021 -   [facility] Dysautonomia serum - test did not find autoimmune antibodies in panel including AChR ganglionic neuronal Abs  Component      Latest Ref Rng & Units 6/19/2021   Albumin/Globulin Ratio      1 10 - 1 80 1 72   ALBUMIN ELP      52 0 - 65 0 % 63 3   ALBUMIN CONC ELP      3 50 - 5 00 g/dl 4 11   ALPHA 1      2 5 - 5 0 % 5 0   ALPHA 1 CONC ELP      0 10 - 0 40 g/dL 0 33   ALPHA 2      7 0 - 13 0 % 10 7   ALPHA 2 CONC ELP      0 40 - 1 20 g/dL 0 70   BETA-1      5 0 - 13 0 % 6 2   BETA 1 CONC ELP      0 40 - 0 80 g/dL 0 40   BETA-2      2 0 - 8 0 % 4 4   BETA 2 CONC ELP      0 20 - 0 50 g/dL 0 29   GAMMA GLOBULIN      12 0 - 22 0 % 10 4 (L)   GAMMA CONC ELP      0 50 - 1 60 g/dL 0 68   Total Protein      6 4 - 8 2 g/dL 6 5   SPEP INTERPRETATION       No monoclonal bands   Hemoglobin A1C      Normal 3 8-5 6%; PreDiabetic 5 7-6 4%;  Diabetic >=6 5%; Glycemic control for adults with diabetes <7 0% % 4 9   eAG, EST AVG Glucose      mg/dl 94   METHYLMALONIC ACID, S      0 - 378 nmol/L 144   DISCLAIMER:       Comment   Vitamin E(Alpha Tocopherol)      7 0 - 25 1 mg/L 4 4 (L)   Vitamin E(Gamma Tocopherol)      0 5 - 5 5 mg/L 0 6   TSH 3RD GENERATON      0 358 - 3 740 uIU/mL 1 770   Vitamin B-12      100 - 900 pg/mL 544   VITAMIN B1, WHOLE BLOOD      66 5 - 200 0 nmol/L 107 7   VITAMIN B6      5 3 - 46 7 ug/L 56 0 (H)     Video Exam  General exam   Ht 6' (1 829 m)   Wt 80 7 kg (178 lb)   BMI 24 14 kg/m²    Appearance: he appears to be of normal development, of stated age, no acute distress  Carotids: not assessed  Cardiovascular: unable to assess due to virtual visit  Pulmonary: not assessed due to virtual visit    HEENT: anicteric and neck is supple   Fundoscopy: not assessed    Mental status  Orientation: alert and oriented to name, place, time  Fund of Knowledge: intact   Attention and Concentration: intact  Current and Remote Memory:intact  Language: no aphasia and spontaneous speech is normal    Cranial Nerves  CN 1: not tested  CN 2: unable to assess due to virtual visit   CN 3, 4, 6: EOMI, no nystagmus  CN 5:not assessed  CN 7:muscles of facial expression are symmetric  CN 8:not assessed  CN 9, 10:no dysarthria present  CN 11:not assessed  CN 12:tongue is midline    Motor:  Bulk, Tone: unable to assess on virtual visit, but appears to have normal bulk (prior visit: he has no rigidity with arms at rest, but there is increased rigidity with distraction maneuvers)  Pronation: normal barrel roll  Strength: Symmetric strength of the arms and legs, no lateralizing weakness   Abnormal movements: no abnormal movements are present; there are no tremors, no resting or action tremor  He does not appear to have bradyphrenia on video visit  Prior visit Pull test: with very slight force pulling at the shoulders, he started to stumble backwards for 3 steps      Sensory:  Lighttouch: not assessed on virtual visit  Romberg:not assessed    Coordination:  FNF:FNF bilaterally intact  LUCIA:intact  FFM:intact  Gait/Station:slightly wide based gait, but good tempo of steps, no shuffling quality    Reflexes:  Not assessed    Past Medical/Surgical History:  Patient Active Problem List   Diagnosis    NSTEMI [facility]non-ST elevated myocardial infarction) ([facility] [address] )    Bipolar disorder, current episode mixed, moderate (HCC)    ROSI (obstructive sleep apnea)    Coronary artery disease of native artery of native heart with stable angina pectoris ([facility] [address] )    Former smoker    Migraine with aura and without status migrainosus, not intractable    Hypercholesterolemia    Bariatric surgery status    Primary insomnia    Overweight    Encounter for surgical aftercare following surgery of digestive system    Postsurgical malabsorption    Tobacco use    Orthostatic hypotension    Syncope and collapse    Infiltrate of middle lobe of right lung present on imaging study    Hypokalemia    Elevated troponin    Dysautonomia-like disorder    Anxiety disorder    Status post gastric bypass for obesity    Extrapyramidal symptom     Past Surgical History:   Procedure Laterality Date    CARDIAC CATHETERIZATION      no stents     COLONOSCOPY      EGD      HERNIA REPAIR      umbilical hernia    ID LAP GASTRIC BYPASS/FRITZ-EN-Y N/A 3/2/2020    Procedure: LAPAROSCOPIC FRITZ-EN-Y GASTRIC BYPASS AND INTRAOPERATIVE EGD;  Surgeon: Chris Gilbert MD;  Location: [facility];  Service: Bariatrics    SKIN SURGERY      cyst removed from back and thumb    TONSILLECTOMY      TOOTH EXTRACTION       Past Psychiatric History:  Depression: Bipolar disorder  Anxiety: No  Psychosis: No  No prior behavioral health hospital    Medications:    Current Outpatient Medications:     acetaminophen (TYLENOL) 325 mg tablet, Take 3 tablets (975 mg total) by mouth every 8 (eight) hours (Patient taking differently: Take 975 mg by mouth every 8 (eight) hours prn), Disp: 30 tablet, Rfl: 0    aspirin 81 mg chewable tablet, Chew 1 tablet (81 mg total) daily, Disp: 30 tablet, Rfl: 0    atorvastatin (LIPITOR) 40 mg tablet, Take 1 tablet (40 mg total) by mouth daily, Disp: 90 tablet, Rfl: 1    clonazePAM (KlonoPIN) 1 mg tablet, Take 1 tablet (1 mg total) by mouth 3 (three) times a day as needed for anxiety, Disp: 90 tablet, Rfl: 0    desvenlafaxine succinate (PRISTIQ) 50 mg 24 hr tablet, Take 1 tablet (50 mg total) by mouth daily, Disp: 30 tablet, Rfl: 0    nicotine (NICODERM CQ) 21 mg/24 hr TD 24 hr patch, Place 1 patch on the skin every 24 hours, Disp: 28 patch, Rfl: 3    OXcarbazepine (TRILEPTAL) 300 mg tablet, Take 1 tablet (300 mg total) by mouth every evening, Disp: 30 tablet, Rfl: 2    OXcarbazepine (TRILEPTAL) 600 mg tablet, Take 1 tablet (600 mg total) by mouth daily, Disp: 30 tablet, Rfl: 2    paliperidone palmitate ER (INVEGA) 234 mg/1 5 mL IM injection, Inject 1 5 mL (234 mg total) into a muscle every 21 days for 1 dose, Disp: 1 mL, Rfl: 0    pantoprazole (PROTONIX) 20 mg tablet, Take 1 tablet (20 mg total) by mouth daily after dinner, Disp: 30 tablet, Rfl: 3    potassium chloride (K-DUR,KLOR-CON) 20 mEq tablet, Take 1 tablet (20 mEq total) by mouth daily, Disp: 30 tablet, Rfl: 5    traZODone (DESYREL) 300 MG tablet, Take 1 tablet (300 mg total) by mouth daily at bedtime, Disp: 90 tablet, Rfl: 1    Droxidopa 100 MG CAPS, Take 1 capsule (100 mg total) by mouth 3 (three) times a day (Patient not taking: Reported on 1/31/2022 ), Disp: 90 capsule, Rfl: 5    ranolazine (RANEXA) 1000 MG SR tablet, Take 1 tablet (1,000 mg total) by mouth 2 (two) times a day (Patient not taking: Reported on 1/31/2022 ), Disp: 180 tablet, Rfl: 3    Allergies:  No Known Allergies    Family history:  Family History   Problem Relation Age of Onset    Lung cancer Mother     Brain cancer Mother     Diabetes Brother     Bipolar disorder Maternal Grandfather     Bipolar disorder Son      There is no family history of seizure, epilepsy or developmental delay  Social History  Living situation:  Lives with wife and children  Work:  Completed some college, , commercial/industrial work  Driving:  Yes   reports that he has been smoking cigars  He started smoking about 2 months ago  His smokeless tobacco use includes chew  He reports previous alcohol use  He reports that he does not use drugs  Review of Systems  A review of at least 12 organ/systems was obtained by the medical assistant and reviewed by me, including additional positives/negatives:  Gastrointestinal: Positive for constipation  Negative for nausea and vomiting  Musculoskeletal: Positive for gait problem (balance issues) and joint swelling  Negative for myalgias and neck pain  Left shoulder pain   Neurological: Positive for dizziness and syncope (6 weeks ago-when he stands up)   Negative for tremors, seizures, facial asymmetry, speech difficulty, weakness, light-headedness, numbness and headaches  Psychiatric/Behavioral: Negative for confusion and hallucinations  The patient is nervous/anxious  Depression,memory issues     The total amount of time spent with the patient along with pre-chart and post-chart preparation was 30 minutes on the calendar day of the date of service  This included history taking, physical exam, review of ancillary testing, counseling provided to the patient regarding diagnosis, medications, treatment, and risk management, and other communication to the patient's providers and/or family    Start time: 8:50AM  End time: 9:20AM

## 2022-02-04 NOTE — TELEPHONE ENCOUNTER
No pharmacy benefits on file  MSW phoned patient to get his prescription drug card coverage info at [phone]  Patient stated that he does not have the card, but his pharmacy ([facility] [address]) should  MSW phoned [address] at [phone] and spoke with Librado Pugh who provided patient's prescription card info as follows:    [employer]  ID# [id number]  Group # [id number]  BIN# [id number]  PCN [id number]  Help Desk # [phone]    MSW phoned the Help Desk number at [phone] and spoke with Trinna Canavan Trinna Canavan stated the Droxidopa 100mg is covered as a generic under the formulary and that a prior [place] is needed  Trinna Canavan stated that this medication is through [facility], but that patient needs to use [facility]  Trinna Canavan stated that a prior [place] was on file in 2021, but only for one month from 10/14/21-11/13/21  Trinna Canavan stated that she sees a paid claim of $483 70 but then it was reversed  Trinna Canavan stated that a prior auth can be initiated one of several ways:    -www  cap-[employer]  com (form can be located under the Provider Section)  -on Cover My Meds  - by calling [phone]    MSW also phoned the [facility] at [phone] and spoke with Ronald Up to inquire about financial assistance for commercially insured patients  Ronald Up stated that the best option would be for a commercially insured patient to use their copay  Same can be obtained on www [employer]  com or by calling [phone], ext [phone]  Ronald Up stated that patient can pay as little as $10 per month   Ronald Up stated that they no longer have a Patient [facility] stated that there is a benefit of using the [employer] form as they can determine:  - what specialty pharmacy to use  -if a prior auth is needed  -activate a copay card  -provide a free 30 day supply

## 2022-02-04 NOTE — TELEPHONE ENCOUNTER
----- Message from Silvia Stevens MD sent at 2/4/2022  9:32 AM EST -----  Regarding: Droxidopa is too expensive copay  I prescribed Droxidopa (Mehreen Austin) to help with this patient's orthostatic hypotension  He was only authorized for 1 month prescription  However, his copay or out of pocket pay was going to be $800, which he cannot afford  He says he is under [facility]'s drug plan  Is there away to get the out of pocket cost reduced? Is this a high deductable or it is his copay or does he have secondary insurance? Would he qualify for patient assistance plan?     Elisa Deng

## 2022-02-04 NOTE — PATIENT INSTRUCTIONS
1 - will see if  is able to assist in finding out a path to get droxidopa at a reduced cost  2 - continue with referral to Movement disorder specialist regarding possible underlying parkinsonian related dysautonomia or multiple systems atrophy, new symptoms of slurred speech and difficulty with gait in the last 6 months  3 - schedule a follow-up visit after you are seen by the movement disorder specialist

## 2022-02-04 NOTE — PROGRESS NOTES
Virtual Regular Visit    Verification of patient location:    Patient is located in the following state in which I hold an active license { amb virtual patient location:[address]}      Assessment/Plan:    Problem List Items Addressed This Visit     None               Reason for visit is   Chief Complaint   Patient presents with    Virtual Regular Visit        Encounter provider Jessica Richter MD    Provider located at [address] [address]      Recent Visits  No visits were found meeting these conditions  Showing recent visits within past 7 days and meeting all other requirements  Today's Visits  Date Type Provider Dept   02/04/22 Telemedicine Jessica Richter MD [facility]   Showing today's visits and meeting all other requirements  Future Appointments  No visits were found meeting these conditions  Showing future appointments within next 150 days and meeting all other requirements       The patient was identified by name and date of birth  Carlos Acosta was informed that this is a telemedicine visit and that the visit is being conducted through {AMB VIRTUAL VISIT YGLCEW:34564}  {Telemedicine confidentiality :19344} {Telemedicine participants:01169}  He acknowledged consent and understanding of privacy and security of the video platform  The patient has agreed to participate and understands they can discontinue the visit at any time  Patient is aware this is a billable service  Subjective  Carlos Acosta is a 65 y o  male ***         HPI     Past Medical History:   Diagnosis Date    Bariatric surgery status     CPAP (continuous positive airway pressure) dependence     Hearing loss of aging     Hypercholesterolemia     Morbid obesity ([address] )     NSTEMI (non-ST elevation myocardial infarction) [facility])     april 2019    Postsurgical malabsorption        Past Surgical History:   Procedure Laterality Date    CARDIAC CATHETERIZATION      no stents     COLONOSCOPY      EGD      HERNIA REPAIR      umbilical hernia    [facility] LAP GASTRIC BYPASS/FRITZ-EN-Y N/A 3/2/2020    Procedure: LAPAROSCOPIC FRITZ-EN-Y GASTRIC BYPASS AND INTRAOPERATIVE EGD;  Surgeon: Saida Diaz MD;  Location: [facility];  Service: Bariatrics    SKIN SURGERY      cyst removed from back and thumb    TONSILLECTOMY      TOOTH EXTRACTION         Current Outpatient Medications   Medication Sig Dispense Refill    acetaminophen (TYLENOL) 325 mg tablet Take 3 tablets (975 mg total) by mouth every 8 (eight) hours (Patient taking differently: Take 975 mg by mouth every 8 (eight) hours prn) 30 tablet 0    aspirin 81 mg chewable tablet Chew 1 tablet (81 mg total) daily 30 tablet 0    atorvastatin (LIPITOR) 40 mg tablet Take 1 tablet (40 mg total) by mouth daily 90 tablet 1    clonazePAM (KlonoPIN) 1 mg tablet Take 1 tablet (1 mg total) by mouth 3 (three) times a day as needed for anxiety 90 tablet 0    desvenlafaxine succinate (PRISTIQ) 50 mg 24 hr tablet Take 1 tablet (50 mg total) by mouth daily 30 tablet 0    nicotine (NICODERM CQ) 21 mg/24 hr TD 24 hr patch Place 1 patch on the skin every 24 hours 28 patch 3    OXcarbazepine (TRILEPTAL) 300 mg tablet Take 1 tablet (300 mg total) by mouth every evening 30 tablet 2    OXcarbazepine (TRILEPTAL) 600 mg tablet Take 1 tablet (600 mg total) by mouth daily 30 tablet 2    paliperidone palmitate ER (INVEGA) 234 mg/1 5 mL IM injection Inject 1 5 mL (234 mg total) into a muscle every 21 days for 1 dose 1 mL 0    pantoprazole (PROTONIX) 20 mg tablet Take 1 tablet (20 mg total) by mouth daily after dinner 30 tablet 3    potassium chloride (K-DUR,KLOR-CON) 20 mEq tablet Take 1 tablet (20 mEq total) by mouth daily 30 tablet 5    traZODone (DESYREL) 300 MG tablet Take 1 tablet (300 mg total) by mouth daily at bedtime 90 tablet 1    Droxidopa 100 MG CAPS Take 1 capsule (100 mg total) by mouth 3 (three) times a day (Patient not taking: Reported on 1/31/2022 ) 90 capsule 5    ranolazine (RANEXA) 1000 MG SR tablet Take 1 tablet (1,000 mg total) by mouth 2 (two) times a day (Patient not taking: Reported on 1/31/2022 ) 180 tablet 3     No current facility-administered medications for this visit  No Known Allergies    Review of Systems    Video Exam    Vitals:    02/04/22 0824   Weight: 80 7 kg (178 lb)   Height: 6' (1 829 m)       Physical Exam     {Time Spent:09942}    VIRTUAL VISIT DISCLAIMER      Evert Bailey verbally agrees to participate in [employer]  Pt is aware that [employer] could be limited without vital signs or the ability to perform a full hands-on physical exam  Chandler Mccauley Oms understands he or the provider may request at any time to terminate the video visit and request the patient to seek care or treatment in person

## 2022-02-04 NOTE — PROGRESS NOTES
Review of Systems   Constitutional: Negative  Negative for appetite change and fever  HENT: Negative  Negative for hearing loss, tinnitus, trouble swallowing and voice change  Eyes: Negative  Negative for photophobia and pain  Respiratory: Negative  Negative for shortness of breath  Cardiovascular: Negative  Negative for palpitations  Gastrointestinal: Positive for constipation  Negative for nausea and vomiting  Endocrine: Negative  Negative for cold intolerance  Genitourinary: Negative  Negative for dysuria, frequency and urgency  Musculoskeletal: Positive for gait problem (balance issues) and joint swelling  Negative for myalgias and neck pain  Left shoulder pain   Skin: Negative  Negative for rash  Allergic/Immunologic: Negative  Neurological: Positive for dizziness and syncope (6 weeks ago-when he stands up)  Negative for tremors, seizures, facial asymmetry, speech difficulty, weakness, light-headedness, numbness and headaches  Hematological: Negative  Does not bruise/bleed easily  Psychiatric/Behavioral: Negative for confusion and hallucinations  The patient is nervous/anxious           Depression,memory issues

## 2022-02-07 NOTE — TELEPHONE ENCOUNTER
Addended by: DEMETRIO ALLAN on: 10/21/2019 02:02 PM     Modules accepted: Orders     Will initiate start form for Northera/Droxidopa  Start form requires patient signature  Contacted patient, he asked that start form be mailed for him to review and sign  Form printed and mailed to patient for signature with instructions to return to office  Dr Munguia-please complete prescription portion of form while awaiting patient portion   (Form sent to your email)

## 2022-02-07 NOTE — TELEPHONE ENCOUNTER
Prescription page of start form received from Dr Munguia via email  Awaiting patient to return his portion  This has been scanned in to chart awhile

## 2022-02-14 ENCOUNTER — OFFICE VISIT (OUTPATIENT)
Dept: PSYCHIATRY | Facility: CLINIC | Age: 57
End: 2022-02-14
Payer: COMMERCIAL

## 2022-02-14 DIAGNOSIS — F41.9 ANXIETY AND DEPRESSION: ICD-10-CM

## 2022-02-14 DIAGNOSIS — R29.818 EXTRAPYRAMIDAL SYMPTOM: ICD-10-CM

## 2022-02-14 DIAGNOSIS — F32.A ANXIETY AND DEPRESSION: ICD-10-CM

## 2022-02-14 DIAGNOSIS — F31.32 BIPOLAR AFFECTIVE DISORDER, CURRENTLY DEPRESSED, MODERATE (HCC): Primary | ICD-10-CM

## 2022-02-14 DIAGNOSIS — F41.9 ANXIETY DISORDER, UNSPECIFIED TYPE: ICD-10-CM

## 2022-02-14 DIAGNOSIS — F31.62 BIPOLAR DISORDER, CURRENT EPISODE MIXED, MODERATE (HCC): ICD-10-CM

## 2022-02-14 DIAGNOSIS — F51.01 PRIMARY INSOMNIA: ICD-10-CM

## 2022-02-14 PROCEDURE — 90833 PSYTX W PT W E/M 30 MIN: CPT | Performed by: STUDENT IN AN ORGANIZED HEALTH CARE EDUCATION/TRAINING PROGRAM

## 2022-02-14 PROCEDURE — 99214 OFFICE O/P EST MOD 30 MIN: CPT | Performed by: STUDENT IN AN ORGANIZED HEALTH CARE EDUCATION/TRAINING PROGRAM

## 2022-02-14 RX ORDER — OXCARBAZEPINE 300 MG/1
300 TABLET, FILM COATED ORAL EVERY EVENING
Qty: 30 TABLET | Refills: 2 | Status: SHIPPED | OUTPATIENT
Start: 2022-02-14 | End: 2022-03-29 | Stop reason: SDUPTHER

## 2022-02-14 RX ORDER — DESVENLAFAXINE 50 MG/1
50 TABLET, EXTENDED RELEASE ORAL DAILY
Qty: 30 TABLET | Refills: 2 | Status: SHIPPED | OUTPATIENT
Start: 2022-02-14 | End: 2022-03-29 | Stop reason: SDUPTHER

## 2022-02-14 RX ORDER — OXCARBAZEPINE 600 MG/1
600 TABLET, FILM COATED ORAL DAILY
Qty: 30 TABLET | Refills: 2 | Status: SHIPPED | OUTPATIENT
Start: 2022-02-14 | End: 2022-03-29 | Stop reason: SDUPTHER

## 2022-02-14 RX ORDER — CLONAZEPAM 1 MG/1
1 TABLET ORAL 4 TIMES DAILY
Qty: 120 TABLET | Refills: 0 | Status: SHIPPED | OUTPATIENT
Start: 2022-02-14 | End: 2022-02-22
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
## 2022-02-15 NOTE — TELEPHONE ENCOUNTER
Awaiting receipt of the patient section of the [facility] TREATMENT NETWORK Form (same was mailed to patient on 2/7)

## 2022-02-16 ENCOUNTER — TELEPHONE (OUTPATIENT)
Dept: PSYCHIATRY | Facility: CLINIC | Age: 57
End: 2022-02-16

## 2022-02-16 NOTE — TELEPHONE ENCOUNTER
Received a fax from the [address] with an initiated PA request for the clonazepam 1 mg tabs (QID)  Completed the already initiated clonazepam 1 mg tab (QID) PA via [employer], using key# [id number], for [employer] [facility]  Spoke to 179-00 Cy Gross him of the clonazepam 1 mg (QID) PA request   Darío Nino is familiar with the PA process  Will call him back when a decision is reached  For your review

## 2022-02-17 NOTE — TELEPHONE ENCOUNTER
MSW phoned patient who reported that he has not received signature page for Theoplis Kenefic yet in the mail  MSW had discussion with Emmy Hawley  About case and form that needs to be signed by patient  MSW attached signature page and return envelope attn: Paige Geiger RN  Patient to add stamp  Once received Amaryllis Doom with attach it to provider portion already completed located under media  MSW sent signature page to patient via mail today

## 2022-02-18 ENCOUNTER — TELEPHONE (OUTPATIENT)
Dept: PSYCHIATRY | Facility: CLINIC | Age: 57
End: 2022-02-18

## 2022-02-18 NOTE — TELEPHONE ENCOUNTER
Thuy Rain called lindsay herrera on nursing line  He is following up on the status of the PA for Clonazepam  He only has enough for today   [phone]

## 2022-02-18 NOTE — TELEPHONE ENCOUNTER
Spoke to Jordan explaining to him, again, the PA process, and that we will call him back as soon as a decision is reached  Maikol Boucher to call the insurance company to inquire about the PA if he felt the need to do so  Jordan appeared appreciative of the call back      FYI

## 2022-02-21 PROBLEM — H61.23 BILATERAL IMPACTED CERUMEN: Status: ACTIVE | Noted: 2022-02-21

## 2022-02-21 NOTE — TELEPHONE ENCOUNTER
Spoke to Olivia Chapa at 's to inquire as to the status of the clonazepam 1 mg tab PA  Olivia Chapa states that the PA is still open  Will await a decision  For your review

## 2022-02-22 ENCOUNTER — TELEPHONE (OUTPATIENT)
Dept: PSYCHIATRY | Facility: CLINIC | Age: 57
End: 2022-02-22

## 2022-02-22 DIAGNOSIS — F41.9 ANXIETY DISORDER, UNSPECIFIED TYPE: Primary | ICD-10-CM

## 2022-02-22 RX ORDER — CLONAZEPAM 1 MG/1
1 TABLET ORAL 3 TIMES DAILY
Qty: 90 TABLET | Refills: 0 | Status: SHIPPED | OUTPATIENT
Start: 2022-02-22 | End: 2022-03-16 | Stop reason: SDUPTHER

## 2022-02-22 NOTE — TELEPHONE ENCOUNTER
Spoke to 179-00 Cy Gross him that we checked yesterday on the status of the clonazepam 1 mg tab (QID) PA status and that it was still pending  Chandler Herr to send a prescription for TID versus the QID, so that he can have his medication  Please advise

## 2022-02-22 NOTE — TELEPHONE ENCOUNTER
Maico Arteaga on nursing line  He stated he is "waiting on a refill of Klonopin" but insurance is requiring a PA that is in process  He said however it has been over a week  He would like a call back       [phone]

## 2022-02-22 NOTE — TELEPHONE ENCOUNTER
MSW phoned patient at [phone] he reported that he has not yet received the [employer] start form in the mail  MSW informed patient that a second copy was sent to him in the mail on 2/17 and he should be receiving it soon

## 2022-02-22 NOTE — TELEPHONE ENCOUNTER
Spoke to Jordan to notify him that Dr Rafal Scott sent a new prescription to the pharmacy for clonazepam 1 mg tab (TID)  Jordan appeared very appreciative      FYI

## 2022-02-22 NOTE — TELEPHONE ENCOUNTER
That is fine, new script for Klonopin 1mg TID send to pharmacy  Please make Sean Aquino aware  Thank you

## 2022-02-23 NOTE — TELEPHONE ENCOUNTER
MSW attempted to reach patient to see if he received the [facility] Form yet  No answer at [phone]  MSW left message requesting callback  Awaiting same

## 2022-02-24 ENCOUNTER — HOSPITAL ENCOUNTER (OUTPATIENT)
Dept: INFUSION CENTER | Facility: HOSPITAL | Age: 57
Discharge: HOME | End: 2022-02-24
Payer: COMMERCIAL

## 2022-02-24 ENCOUNTER — TELEPHONE (OUTPATIENT)
Dept: PSYCHIATRY | Facility: CLINIC | Age: 57
End: 2022-02-24

## 2022-02-24 PROCEDURE — 96372 THER/PROPH/DIAG INJ SC/IM: CPT

## 2022-02-24 RX ADMIN — PALIPERIDONE PALMITATE 234 MG: 234 INJECTION INTRAMUSCULAR at 09:23

## 2022-02-24 NOTE — TELEPHONE ENCOUNTER
MSW attempted to reach patient to see if he received the [facility] Form yet  No answer at [phone]  MSW left message requesting callback  Awaiting same

## 2022-02-24 NOTE — TELEPHONE ENCOUNTER
Spoke to Jordan who states that he gets the [place] injections every 3 weeks and the new insurance will not cover the injections, after the next injection  Will set up to submit a PA for these injections      ESTEVAN

## 2022-02-24 NOTE — TELEPHONE ENCOUNTER
Spoke to Surya at [facility] to inquire as to the status of the clonazepam 1 mg tab PA submitted on 2/16/22  Surya states that the PA is still pencing, however, there was a paid claim at the pharmacy for the new prescription (90 for 30 days) on 2/22/22  Surya states that there is no PA required if prescription written as 90 for 30 days  However, if it needs to be 120 for 30 days, then a PA is required and we need to submit the office visit notes for documentation for the dose  Therefore, is moiz taking 4 tabs per day or just 3? Please advise

## 2022-02-24 NOTE — TELEPHONE ENCOUNTER
He is currently taking 1mg TID  Plan was discussed last visit to optimize to 1mg 4x daily, but the delay in approval prevented this from happening  As such, we'll just continue 1mg TID dosing and adjust next visit, if warranted

## 2022-02-25 NOTE — TELEPHONE ENCOUNTER
MSW phoned patient this date at [phone]  Patient confirmed that he had received the [facility] Form yesterday by mail  MSW encouraged patient to sign same and mail back to our office ASAP  Patient stated that he will ask his wife where she put it and will sign/place in mail ASAP  MSW will follow-up with Michelle James next week to see if she received same as it will be sent to her attention

## 2022-02-28 NOTE — TELEPHONE ENCOUNTER
Chandler called and LM on nursing line   He found the [facility] [id number]    Pleas call Nieves Lema @ [phone]

## 2022-03-01 NOTE — TELEPHONE ENCOUNTER
Attempt made to use the given BIN and ID number to submit the Invega Sustena 234 mg/1 5 ml injection PA via navinet  and message received stating "incorrect information entered on one or more lines "    Spoke to Adonay SANON and received insurance information:    ID [id number]  BIN    [id number]  N (?)   [id number]    Same message received as before  Spoke to Jordan and was given the following insurance information:    ID#  [id number]  BIN  [id number]  N   [id number]  Maykel Mcgrath  [id number]    Will attempt to submit PA using this information  For your review

## 2022-03-01 NOTE — TELEPHONE ENCOUNTER
Not sure which doctor is going to be seeing Tegan Judda, but attempt was made, again, to submit the PA for the Invega injection using new insurance information and received a message from Gadiel Herrera  To "contact them at [phone]  Spoke to Eduard kwan at [address] she states that Tegan Maciel is not a member  Called the [facility] and spoke to [place]  [place] states that she does not see in her notes any PA's ever being done  I explained to Hilda arnold that Tegan Maciel received a letter form his new insurance stating that they would not cover the Invega injections  [place] states that the next fill is due on 3/23/22, and it is too soon to fill now, and they will not receive any information on whether this will go through as a paid claim until 3/8/22  At that time, [place] will notify us and Tegan Maciel if anything further needs to be done to approve the medication  I notified both Vaibhav Wilburn, spouse, and Tegan Maciel of the current situation for the Kurtis salem injection      For your review

## 2022-03-03 NOTE — TELEPHONE ENCOUNTER
MSW spoke Thelma Galindo, this date  She reported that she has yet to receive the [employer] Form back from patient  MSW attempted to reach patient at [phone] to follow-up to ensure that he mailed same back  No answer  MSW left a message requesting callback  Awaiting same

## 2022-03-04 NOTE — TELEPHONE ENCOUNTER
MSW phoned patient at [phone] to follow-up regarding the [facility] Form  Patient stated that he and his wife would like to hold off on completing the [facility] Form and would he would like to see the new provider he was referred to first (patient is scheduled to see Dr Katina Funk on 4/14/22)

## 2022-03-16 DIAGNOSIS — F41.9 ANXIETY DISORDER, UNSPECIFIED TYPE: ICD-10-CM

## 2022-03-16 RX ORDER — CLONAZEPAM 1 MG/1
1 TABLET ORAL 3 TIMES DAILY
Qty: 90 TABLET | Refills: 0 | Status: SHIPPED | OUTPATIENT
Start: 2022-03-16 | End: 2022-03-29

## 2022-03-16 NOTE — TELEPHONE ENCOUNTER
PDMP website reviewed  Darío Nino has been appropriately adherent to controlled psychotropic medications without evidence of abuse or misuse  As such, will send 30-day refill to pharmacy of choice and follow up as necessary

## 2022-03-17 ENCOUNTER — HOSPITAL ENCOUNTER (OUTPATIENT)
Dept: INFUSION CENTER | Facility: HOSPITAL | Age: 57
Discharge: HOME | End: 2022-03-17
Payer: COMMERCIAL

## 2022-03-17 PROCEDURE — 96372 THER/PROPH/DIAG INJ SC/IM: CPT

## 2022-03-17 RX ADMIN — PALIPERIDONE PALMITATE 234 MG: 234 INJECTION INTRAMUSCULAR at 09:30

## 2022-03-17 NOTE — PROGRESS NOTES
Pt tolerated Madelon Erick injection to L deltoid without difficulty  Next appt scheduled    Left ambulatory declining AVS

## 2022-03-23 DIAGNOSIS — I25.118 CORONARY ARTERY DISEASE OF NATIVE ARTERY OF NATIVE HEART WITH STABLE ANGINA PECTORIS (HCC): ICD-10-CM

## 2022-03-23 DIAGNOSIS — E87.6 HYPOKALEMIA: ICD-10-CM

## 2022-03-23 RX ORDER — RANOLAZINE 1000 MG/1
1000 TABLET, EXTENDED RELEASE ORAL 2 TIMES DAILY
Qty: 180 TABLET | Refills: 3 | Status: SHIPPED | OUTPATIENT
Start: 2022-03-23

## 2022-03-23 RX ORDER — POTASSIUM CHLORIDE 20 MEQ/1
20 TABLET, EXTENDED RELEASE ORAL DAILY
Qty: 30 TABLET | Refills: 5 | Status: SHIPPED | OUTPATIENT
Start: 2022-03-23 | End: 2022-06-27 | Stop reason: SDUPTHER

## 2022-03-24 ENCOUNTER — OFFICE VISIT (OUTPATIENT)
Dept: NEUROLOGY | Facility: CLINIC | Age: 57
End: 2022-03-24
Payer: COMMERCIAL

## 2022-03-24 VITALS
BODY MASS INDEX: 24.14 KG/M2 | HEART RATE: 84 BPM | DIASTOLIC BLOOD PRESSURE: 62 MMHG | SYSTOLIC BLOOD PRESSURE: 104 MMHG | WEIGHT: 178 LBS

## 2022-03-24 DIAGNOSIS — I95.1 ORTHOSTATIC HYPOTENSION: ICD-10-CM

## 2022-03-24 DIAGNOSIS — R29.818 EXTRAPYRAMIDAL SYMPTOM: ICD-10-CM

## 2022-03-24 DIAGNOSIS — G90.8 DYSAUTONOMIA-LIKE DISORDER: Primary | ICD-10-CM

## 2022-03-24 PROCEDURE — 99215 OFFICE O/P EST HI 40 MIN: CPT | Performed by: PSYCHIATRY & NEUROLOGY

## 2022-03-24 NOTE — PROGRESS NOTES
Review of Systems   Constitutional: Negative  Negative for appetite change and fever  HENT: Positive for drooling  Negative for hearing loss, tinnitus, trouble swallowing and voice change  Eyes: Negative  Negative for photophobia and pain  Respiratory: Negative  Negative for shortness of breath  Cardiovascular: Negative  Negative for palpitations  Gastrointestinal: Negative  Negative for nausea and vomiting  Endocrine: Negative  Negative for cold intolerance  Genitourinary: Negative  Negative for dysuria, frequency and urgency  Musculoskeletal: Positive for gait problem (is off at times)  Negative for myalgias and neck pain  Balance Issues     Skin: Negative  Negative for rash  Allergic/Immunologic: Negative  Neurological: Positive for dizziness, speech difficulty (At times slurred speech) and numbness (Usually in his hands)  Negative for tremors, seizures, syncope, facial asymmetry, weakness, light-headedness and headaches  Hematological: Negative  Does not bruise/bleed easily  Psychiatric/Behavioral: Negative  Negative for confusion, hallucinations and sleep disturbance  All other systems reviewed and are negative

## 2022-03-24 NOTE — ASSESSMENT & PLAN NOTE
Recurrent episodes of lightheadedness associated with imbalance with prior syncopal events which appeared to have decreased in frequency  Also with imbalance and mild postural instability on exam  He has undergone extensive lab and clinical testing including tilt table and autonomic workup which has been unremarkable and not consistent with orthostatic hypotension or neuropathy  MRI of the brain with no indications of an underlying neuro degenerative disorder such as MSA  On exam there is a subtle decrement on left rapid altering movements and toe taps, along with mild postural instability on pull test but otherwise exam not very suggestive of parkinsonism  Time spent discussing the clinical diagnosis of neuro degenerative conditions such as MSA  We spoke about the potential additional information that could be obtained with a DaTSCAN  This could help identify whether there is an underlying dopamine deficiency  I do not think this would change clinical management at this time  I would not recommend treating with levodopa given minimal symptoms and risks for worsening lightheadedness  He has tried and failed all old multiple medications for orthostatic hypotension  Workup for orthostatic hypotension has been unremarkable  He was encouraged to continue to remain hydrated and utilize strategies to prevent lightheadedness  He would benefit from balance therapy to improve gait and balance and reduce fall risk  Role of therapy explained and patient was agreeable  He continues to have episodes described as child-like behavior with either an extreme happiness or nastiness with associated atypical behaviors occurring between 11:00 a m  and 6:61 p m  on certain days  These episodes were never captured on video EEG  The question if there is any role in doing ambulatory monitoring with them setting of a video  Will review with Dr Germania Vanessa       Will continue to monitor for any signs of progression suggestive of a neurodegenerative cause  [place] to be performed on follow-up

## 2022-03-24 NOTE — PATIENT INSTRUCTIONS
Will have you follow up to continue to monitor for signs of progression to suggest an underlying neurodegenerative cause  WE spoke about KOBY scan to evaluate for signs of an underlying dopamine deficiency but opted not to do this  Will refer to PT for balance  Continue to stay hydrated

## 2022-03-24 NOTE — PROGRESS NOTES
Patient ID: Leodan Gregory is a 65 y o  male    Assessment/Plan:    Dysautonomia-like disorder  Recurrent episodes of lightheadedness associated with imbalance with prior syncopal events which appeared to have decreased in frequency  Also with imbalance and mild postural instability on exam  He has undergone extensive lab and clinical testing including tilt table and autonomic workup which has been unremarkable and not consistent with orthostatic hypotension or neuropathy  MRI of the brain with no indications of an underlying neuro degenerative disorder such as MSA  On exam there is a subtle decrement on left rapid altering movements and toe taps, along with mild postural instability on pull test but otherwise exam not very suggestive of parkinsonism  Time spent discussing the clinical diagnosis of neuro degenerative conditions such as MSA  We spoke about the potential additional information that could be obtained with a DaTSCAN  This could help identify whether there is an underlying dopamine deficiency  I do not think this would change clinical management at this time  I would not recommend treating with levodopa given minimal symptoms and risks for worsening lightheadedness  He has tried and failed all old multiple medications for orthostatic hypotension  Workup for orthostatic hypotension has been unremarkable  He was encouraged to continue to remain hydrated and utilize strategies to prevent lightheadedness  He would benefit from balance therapy to improve gait and balance and reduce fall risk  Role of therapy explained and patient was agreeable  He continues to have episodes described as child-like behavior with either an extreme happiness or nastiness with associated atypical behaviors occurring between 11:00 a m  and 5:19 p m  on certain days  These episodes were never captured on video EEG    The question if there is any role in doing ambulatory monitoring with them setting of a video   Will review with Dr Stan Medina  Will continue to monitor for any signs of progression suggestive of a neurodegenerative cause  [place] to be performed on follow-up  Diagnoses and all orders for this visit:    Dysautonomia-like disorder  -     Ambulatory Referral to Physical Therapy; Future    Orthostatic hypotension    Extrapyramidal symptom  -     Ambulatory Referral to Physical Therapy; Future       Spent total 70 minutes on patient visit, counseling, review of prior workup and notes to understand history, and documentation  51 minutes of this time spent face to face  Subjective:         Mr Summer Gr is a 65 y o  man with bipolar disorder and recurrent orthostatic intolerance who presents for movement disorder evaluation  He is referred by Dr Stan Medina  Review of notes reveal a history of  multiple recurrent episodes of postural related lightheadedness, period of appearing "disconnected", and myoclonic jerks throughout to be related to hypotension  It was initially suspected  that he had post-gastric bypass orthostatic intolerance, possibly complicated by some of his psychiatric medications  Potentially offending medications have since been removed  Tilt table testing did not confirm pathological orthostatic hypotension   Routine EEG  normal  Inpatient video EEG did not capture any of the events  MRI brain 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 is retrospectively stable  No significant mass effect or associated edema  More recently noted to have drooling, gait imbalance, and slow movements so the question of MSA arose  He continues to have lightheadedness when standing or changing position  He will feel like he is going to "pass out" but he has not had a syncopal event in awhile  Speech is slurred at times  He has been drooling over the past 5-6 months  No difficulty chewing  It can be hard to swallow at times where food feel caught      Mild slowness  noted with dressing and hygiene acts  Handwriting is micrographic  He notice an intermittent tremor for example earlier today when biting his finger nails  There is no difficulty arising out of chair  Gait is off at time  He was shuffling for a period but this is no longer occurring  Sleeps well  There is mild daytime sedation  There have been no symptoms of REM sleep behavior disorder or RLS  He can be forgetful with recent and long term memories  He can perform ADL's with mild issues with socks and pants due to imbalance  He spend his days watching television  There is no difficulty with household tasks  He drives with no issues  He is noted to have slow reaction time  No hallucinations  There are no changes in olfaction  Between 11am and 1-2pm he has a change in demeanor described as : "reverting to a kid" this can be a happy or nasty mood  HE will be very "chatty" and can perform atypical behaviors  He reports feeling these episodes come on as he feels a pressure  This does not occur every day  This was also discussed with his psychiatrist          Objective:    /62 (BP Location: Right arm, Patient Position: Standing, Cuff Size: Standard)   Pulse 84   Wt 80 7 kg (178 lb)   BMI 24 14 kg/m²       Physical Exam  Vitals reviewed  Eyes:      Extraocular Movements: Extraocular movements intact  Pupils: Pupils are equal, round, and reactive to light  Neurological:      Mental Status: He is alert  Deep Tendon Reflexes: Strength normal          Neurological Exam  Mental Status  Alert  Oriented to person, place, time and situation  Speech: hypophonia  Language is fluent with no aphasia  Attention and concentration are normal   Mild hypomimia  Cranial Nerves  CN III, IV, VI: Extraocular movements intact bilaterally  Pupils equal round and reactive to light bilaterally  CN V: Facial sensation is normal   CN VII: Full and symmetric facial movement    CN VIII: Hearing is normal   CN IX, X: Palate elevates symmetrically  CN XI: Shoulder shrug strength is normal   CN XII: Tongue midline without atrophy or fasciculations  Motor   Normal muscle tone  Strength is 5/5 throughout all four extremities  Sensory  Light touch is normal in upper and lower extremities  Coordination  Right: Finger-to-nose normal  Rapid alternating movement abnormality:  Left: Finger-to-nose normal  Rapid alternating movement abnormality:  See MDS UPDRS III    Very slight decrement on left LUCIA and TT       Gait  Casual gait: Abnormal pull test  Recovered in 4 steps    Able to rise from chair without using arms  Reduced arm swing bilaterally (wife states he has not swung arm since he was working as a )   Normal stride length        MDS UPDRS III                                 Time since last dose:    Speech  0   Facial Expression  1   Rigidity - Neck  0   Rigidity - Upper Extremity (Right)  0   Rigidity - Upper Extremity (Left)   0   Rigidity - Lower Extremity (Right)  0   Rigidity - Lower Extremity (Left)   0   Finger Taps (Right)   0   Finger Taps (Left)   0   Hand Movement (Right)  0   Hand Movement (Left)   0   Pronation/Supination (Right)  0   Pronation/Supination (Left)   1   Toe Tapping (Right) 0   Toe Tapping (Left) 1   Leg Agility (Right)  0   Leg Agility (Left)   0   Arising from Chair   0   Gait   1 only reduced arm swing   Freezing of Gait 0   Postural Stability   1   Posture 0   Global spontaneity of movement 1   Postural Tremor (Right) 0   Postural Tremor (Left) 0   Kinetic Tremor (Right)  1   Kinetic Tremor (Left)  1   Rest tremor amplitude RUE 0   Rest tremor amplitude LUE 0   Rest tremor amplitude RLE 0   Reset tremor amplitude LLE 0   Lip/Jaw Tremor  0   Consistency of tremor 0   Motor Exam Total:             Current Outpatient Medications on File Prior to Visit   Medication Sig Dispense Refill    acetaminophen (TYLENOL) 325 mg tablet Take 3 tablets (975 mg total) by mouth every 8 (eight) hours (Patient taking differently: Take 975 mg by mouth every 8 (eight) hours prn) 30 tablet 0    atorvastatin (LIPITOR) 40 mg tablet Take 1 tablet (40 mg total) by mouth daily 90 tablet 1    clonazePAM (KlonoPIN) 1 mg tablet Take 1 tablet (1 mg total) by mouth 3 (three) times a day 90 tablet 0    desvenlafaxine succinate (PRISTIQ) 50 mg 24 hr tablet Take 1 tablet (50 mg total) by mouth daily 30 tablet 2    OXcarbazepine (TRILEPTAL) 300 mg tablet Take 1 tablet (300 mg total) by mouth every evening 30 tablet 2    OXcarbazepine (TRILEPTAL) 600 mg tablet Take 1 tablet (600 mg total) by mouth daily 30 tablet 2    paliperidone palmitate ER (INVEGA) 234 mg/1 5 mL IM injection Inject 1 5 mL (234 mg total) into a muscle every 21 days for 1 dose 1 mL 0    pantoprazole (PROTONIX) 20 mg tablet Take 1 tablet (20 mg total) by mouth daily after dinner 30 tablet 3    potassium chloride (K-DUR,KLOR-CON) 20 mEq tablet Take 1 tablet (20 mEq total) by mouth daily 30 tablet 5    ranolazine (RANEXA) 1000 MG SR tablet Take 1 tablet (1,000 mg total) by mouth 2 (two) times a day 180 tablet 3    traZODone (DESYREL) 300 MG tablet Take 1 tablet (300 mg total) by mouth daily at bedtime 90 tablet 1    aspirin 81 mg chewable tablet Chew 1 tablet (81 mg total) daily (Patient not taking: Reported on 3/24/2022 ) 30 tablet 0    Droxidopa 100 MG CAPS Take 1 capsule (100 mg total) by mouth 3 (three) times a day (Patient not taking: Reported on 1/31/2022 ) 90 capsule 5    nicotine (NICODERM CQ) 21 mg/24 hr TD 24 hr patch Place 1 patch on the skin every 24 hours (Patient not taking: Reported on 3/24/2022 ) 28 patch 3     No current facility-administered medications on file prior to visit  Review of Systems   Constitutional: Negative  Negative for appetite change and fever  HENT: Positive for drooling  Negative for hearing loss, tinnitus, trouble swallowing and voice change  Eyes: Negative  Negative for photophobia and pain     Respiratory: Negative  Negative for shortness of breath  Cardiovascular: Negative  Negative for palpitations  Gastrointestinal: Negative  Negative for nausea and vomiting  Endocrine: Negative  Negative for cold intolerance  Genitourinary: Negative  Negative for dysuria, frequency and urgency  Musculoskeletal: Positive for gait problem (is off at times)  Negative for myalgias and neck pain  Balance Issues     Skin: Negative  Negative for rash  Allergic/Immunologic: Negative  Neurological: Positive for dizziness, speech difficulty (At times slurred speech) and numbness (Usually in his hands)  Negative for tremors, seizures, syncope, facial asymmetry, weakness, light-headedness and headaches  Hematological: Negative  Does not bruise/bleed easily  Psychiatric/Behavioral: Negative  Negative for confusion, hallucinations and sleep disturbance  All other systems reviewed and are negative      Review of system documented by the MA, was personally reviewed      Live Talbot MD  Movement disorder physician  [address] [place] [address]

## 2022-03-28 ENCOUNTER — TELEPHONE (OUTPATIENT)
Dept: PSYCHIATRY | Facility: CLINIC | Age: 57
End: 2022-03-28

## 2022-03-29 ENCOUNTER — TELEPHONE (OUTPATIENT)
Dept: PSYCHIATRY | Facility: CLINIC | Age: 57
End: 2022-03-29

## 2022-03-29 ENCOUNTER — OFFICE VISIT (OUTPATIENT)
Dept: PSYCHIATRY | Facility: CLINIC | Age: 57
End: 2022-03-29
Payer: COMMERCIAL

## 2022-03-29 DIAGNOSIS — F31.62 BIPOLAR DISORDER, CURRENT EPISODE MIXED, MODERATE (HCC): Primary | ICD-10-CM

## 2022-03-29 DIAGNOSIS — Z72.0 TOBACCO USE: ICD-10-CM

## 2022-03-29 DIAGNOSIS — F41.9 ANXIETY DISORDER, UNSPECIFIED TYPE: ICD-10-CM

## 2022-03-29 DIAGNOSIS — F51.01 PRIMARY INSOMNIA: ICD-10-CM

## 2022-03-29 PROCEDURE — 99214 OFFICE O/P EST MOD 30 MIN: CPT | Performed by: STUDENT IN AN ORGANIZED HEALTH CARE EDUCATION/TRAINING PROGRAM

## 2022-03-29 PROCEDURE — 90833 PSYTX W PT W E/M 30 MIN: CPT | Performed by: STUDENT IN AN ORGANIZED HEALTH CARE EDUCATION/TRAINING PROGRAM

## 2022-03-29 RX ORDER — OXCARBAZEPINE 300 MG/1
300 TABLET, FILM COATED ORAL EVERY EVENING
Qty: 30 TABLET | Refills: 2 | Status: SHIPPED | OUTPATIENT
Start: 2022-03-29 | End: 2022-06-07 | Stop reason: SDUPTHER

## 2022-03-29 RX ORDER — DESVENLAFAXINE 50 MG/1
50 TABLET, EXTENDED RELEASE ORAL DAILY
Qty: 30 TABLET | Refills: 2 | Status: SHIPPED | OUTPATIENT
Start: 2022-03-29 | End: 2022-06-07 | Stop reason: SDUPTHER

## 2022-03-29 RX ORDER — CLONAZEPAM 2 MG/1
2 TABLET ORAL 2 TIMES DAILY PRN
Qty: 60 TABLET | Refills: 0 | Status: SHIPPED | OUTPATIENT
Start: 2022-03-29 | End: 2022-04-20 | Stop reason: SDUPTHER

## 2022-03-29 RX ORDER — TRAZODONE HYDROCHLORIDE 300 MG/1
300 TABLET ORAL
Qty: 90 TABLET | Refills: 1 | Status: SHIPPED | OUTPATIENT
Start: 2022-03-29 | End: 2022-06-07 | Stop reason: SDUPTHER

## 2022-03-29 RX ORDER — OXCARBAZEPINE 600 MG/1
600 TABLET, FILM COATED ORAL DAILY
Qty: 30 TABLET | Refills: 2 | Status: SHIPPED | OUTPATIENT
Start: 2022-03-29 | End: 2022-06-07 | Stop reason: SDUPTHER

## 2022-03-29 NOTE — BH TREATMENT PLAN
TREATMENT PLAN (Medication Management Only)        [employer]    Name and Date of Birth:  Nura Brady 65 y o  1965  Date of Treatment Plan: March 29, 2022  Diagnosis/Diagnoses:    1  Anxiety disorder, unspecified type    2  Bipolar disorder, current episode mixed, moderate ([address] )    3  Primary insomnia      Strengths/Personal Resources for Self-Care: supportive family, taking medications as prescribed, ability to adapt to life changes, ability to communicate needs, ability to communicate well, ability to listen, ability to reason, good understanding of illness, independence, motivation for treatment, ability to negotiate basic needs, being resoureceful, self-reliance, sense of humor, special hobby/interest, willingness to work on problems  Area/Areas of need (in own words): anxiety symptoms, depressive symptoms, mood instability, sleep problems  1  Long Term Goal: improve control of anxiety  Target Date:6 months - 9/29/2022  Person/Persons responsible for completion of goal: Chandler  2  Short Term Objective (s) - How will we reach this goal?:   A  Provider new recommended medication/dosage changes and/or continue medication(s): continue current medications as prescribed  B  Attend medication management appointments regularly  C  Take psychiatric medications responsibly  D  "get outside more, walk the dogs"  E  Comminute better with wife   Target Date:6 months - 9/29/2022  Person/Persons Responsible for Completion of Goal: Chandler  Progress Towards Goals: continuing treatment  Treatment Modality: medication management every 3 months  Review due 180 days from date of this plan: 6 months - 9/29/2022  Expected length of service: ongoing treatment  My Physician/PA/NP and I have developed this plan together and I agree to work on the goals and objectives  I understand the treatment goals that were developed for my treatment  Treatment Plan completed with assistance and input from patient and verbal consent provided  Treatment plan was not signed at time of office visit secondary to COVID-19 social distancing guidelines

## 2022-03-29 NOTE — TELEPHONE ENCOUNTER
Spoke to oJrdan who states the the [facility] left him a message stating that they cannot fill his Invega 234 mg/1 5 ML injection prescription, but he did not know why  Jordan also states that he has an appointment for the injection at the [facility] next Thursday, 3/7/22  Spoke to Alpa Bolden at the [facility] who states that the Invega 234 mg/1 5 ML injection prescription has , and they need a new one to be written for Jordan  Please advise

## 2022-03-29 NOTE — TELEPHONE ENCOUNTER
Spoke to Jordan to inform him that the Kurtis sal[place]  mg/1 5 ML injection prescription  and that Dr Octavio Hassan is working on submitting a new prescription  Will call back Jordan when this is completed  Spoke to Alvarez at the [facility] to make her aware that Dr Octavio Hassan is working on resubmitting the prescription for the  Kurtis [place]  mg/1 5 ML injection  For your review

## 2022-03-29 NOTE — TELEPHONE ENCOUNTER
Spoke with Epic team who stated that they do not currently have a template for ordering Invega Sustenna injections via the [facility]  They will work to complete this over the next 24-48 hours and inform this provider that the order set is complete so I can place the order officially
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
## 2022-03-30 NOTE — TELEPHONE ENCOUNTER
Received a message from [address] at [facility]  In lieu of being able to enter [employer] orders in [place], a paper order needs to be completed and faxed to Suzette

## 2022-03-31 ENCOUNTER — OFFICE VISIT (OUTPATIENT)
Dept: FAMILY MEDICINE CLINIC | Facility: CLINIC | Age: 57
End: 2022-03-31
Payer: COMMERCIAL

## 2022-03-31 VITALS
BODY MASS INDEX: 28.35 KG/M2 | SYSTOLIC BLOOD PRESSURE: 114 MMHG | TEMPERATURE: 99.8 F | DIASTOLIC BLOOD PRESSURE: 72 MMHG | WEIGHT: 198 LBS | HEIGHT: 70 IN | OXYGEN SATURATION: 96 % | HEART RATE: 120 BPM | RESPIRATION RATE: 20 BRPM

## 2022-03-31 DIAGNOSIS — Z00.00 WELL ADULT EXAM: Primary | ICD-10-CM

## 2022-03-31 DIAGNOSIS — Z98.84 BARIATRIC SURGERY STATUS: ICD-10-CM

## 2022-03-31 DIAGNOSIS — Z11.59 NEED FOR HEPATITIS C SCREENING TEST: ICD-10-CM

## 2022-03-31 DIAGNOSIS — I95.1 ORTHOSTATIC HYPOTENSION: ICD-10-CM

## 2022-03-31 DIAGNOSIS — Z13.6 SCREENING FOR CARDIOVASCULAR CONDITION: ICD-10-CM

## 2022-03-31 DIAGNOSIS — Z12.5 SCREENING FOR PROSTATE CANCER: ICD-10-CM

## 2022-03-31 DIAGNOSIS — E78.2 MIXED HYPERLIPIDEMIA: ICD-10-CM

## 2022-03-31 DIAGNOSIS — I25.118 CORONARY ARTERY DISEASE OF NATIVE ARTERY OF NATIVE HEART WITH STABLE ANGINA PECTORIS (HCC): ICD-10-CM

## 2022-03-31 PROCEDURE — 99396 PREV VISIT EST AGE 40-64: CPT | Performed by: FAMILY MEDICINE

## 2022-03-31 NOTE — TELEPHONE ENCOUNTER
Spoke with Yasmin Segura at [employer]  [facility] is the [facility]  He is not familiar with Debria Achilles injections going through him  Prior auth for Debria Achilles was completed via PBM ([employer]) on 1/1122 and it does not need P A  Called [employer] of [address] [place] [address] ([phone]) and spoke with Zina  The service of administration for injection at [address] does require review of clinicals by the nurse  She did initiate the request as urgent  Request form and office notes faxed ([phone]), also marked as urgent  If deemed Urgent, will receive a decision in 72 hours, otherwise it could be up to 15 days

## 2022-03-31 NOTE — TELEPHONE ENCOUNTER
[facility]  A new prescription needs to be sent, but there are insurance issues with [facility]  Needs a new prescription  for Invega to be released  Called [facility] ([phone]) and spoke with Toby Gates  PBM has been terminated  Spoke with Sandro Joseph to verify insurance coverage  He only has [place] with PBM of [employer]

## 2022-03-31 NOTE — PROGRESS NOTES
FAMILY PRACTICE HEALTH MAINTENANCE OFFICE VISIT  [facility]    NAME: Summer Gr  AGE: 65 y o  SEX: male  : 1965     DATE: 3/31/2022    Assessment and Plan     1  Well adult exam  -     TSH, 3rd generation; Future    2  Mixed hyperlipidemia  -     TSH,  generation; Future    3  Bariatric surgery status  -     TSH,  generation; Future    4  Coronary artery disease of native artery of native heart with stable angina pectoris (HCC)  -     TSH,  generation; Future    5  Orthostatic hypotension  -     TSH, 3rd generation; Future    6  Need for hepatitis C screening test  -     Hepatitis C antibody; Future  -     TSH, 3rd generation; Future    7  Screening for cardiovascular condition  -     Comprehensive metabolic panel; Future  -     Lipid Panel with Direct LDL reflex; Future  -     TSH,  generation; Future    8  Screening for prostate cancer  -     PSA, Total Screen; Future  -     TSH,  generation; Future    Offered PFT - pt does not really want to do that at this time      · Patient Counseling:   · Nutrition: Stressed importance of a well balanced diet, moderation of sodium/saturated fat, caloric balance and sufficient intake of fiber  · Exercise: Stressed the importance of regular exercise with a goal of 150 minutes per week  · Dental Health: Discussed daily flossing and brushing and regular dental visits     · Immunizations reviewed: Up To Date  · Discussed benefits of:  Colon Cancer Screening, Prostate Cancer Screening  and Screening labs   BMI Counseling: Body mass index is 28 41 kg/m²  Discussed with patient's BMI with him  The BMI is above normal  Nutrition recommendations include reducing portion sizes  No follow-ups on file          Chief Complaint     Chief Complaint   Patient presents with    Physical Exam     sas/cma       History of Present Illness     Pt is here for a full physical  Pt states he had a bout of low BP yesterday , states sometimes that happens    Pt is a little SOB - states he just giot done avping and states that happens every time he vapes    Pt states he has not passed out in a long time - on the oprder of two or three months  No chest pain  Pt states the meds that we have given him in the past, all of them did not work so he stopped taking them      Well Adult Physical   Patient here for a comprehensive physical exam       Diet and Physical Activity  Diet: well balanced diet  Exercise: frequently      Depression Screen  PHQ-2/9 Depression Screening            General Health  Hearing: Normal:  bilateral  Vision: wears glasses  Dental: regular dental visits    Reproductive Health  No issues       The following portions of the patient's history were reviewed and updated as appropriate: allergies, current medications, past family history, past medical history, past social history, past surgical history and problem list     Review of Systems     Review of Systems   Constitutional: Negative for activity change, appetite change, chills, diaphoresis, fatigue, fever and unexpected weight change  HENT: Negative for congestion, dental problem, ear pain, mouth sores, sinus pressure, sinus pain, sore throat and trouble swallowing  Eyes: Negative for photophobia, discharge and itching  Respiratory: Negative for apnea, chest tightness and shortness of breath  Cardiovascular: Negative for chest pain, palpitations and leg swelling  Gastrointestinal: Negative for abdominal distention, abdominal pain, blood in stool, nausea and vomiting  Endocrine: Negative for cold intolerance, heat intolerance, polydipsia, polyphagia and polyuria  Genitourinary: Negative for difficulty urinating  Musculoskeletal: Negative for arthralgias  Skin: Negative for color change and wound  Neurological: Negative for dizziness (occasionally), syncope, speech difficulty and headaches  Hematological: Negative for adenopathy  Psychiatric/Behavioral: Negative for agitation and behavioral problems         Past Medical History     Past Medical History:   Diagnosis Date    Bariatric surgery status     CPAP (continuous positive airway pressure) dependence     Hearing loss of aging     Hypercholesterolemia     Morbid obesity (HCC)     NSTEMI (non-ST elevation myocardial infarction) [facility])     2019    Postsurgical malabsorption        Past Surgical History     Past Surgical History:   Procedure Laterality Date    CARDIAC CATHETERIZATION      no stents     COLONOSCOPY      EGD      HERNIA REPAIR      umbilical hernia    CO LAP GASTRIC BYPASS/FRITZ-EN-Y N/A 3/2/2020    Procedure: LAPAROSCOPIC FRITZ-EN-Y GASTRIC BYPASS AND INTRAOPERATIVE EGD;  Surgeon: La Quintana MD;  Location: [facility];  Service: [facility]      cyst removed from back and thumb    TONSILLECTOMY      TOOTH EXTRACTION         Social History     Social History     Socioeconomic History    Marital status: /Civil Union     Spouse name: None    Number of children: None    Years of education: None    Highest education level: None   Occupational History    None   Tobacco Use    Smoking status: Former Smoker     Types: Cigars     Start date: 2021     Quit date:      Years since quittin 2    Smokeless tobacco: Current User     Types: Chew     Last attempt to quit: 2014    Tobacco comment: quit cigarettes    Vaping Use    Vaping Use: Some days    Substances: Nicotine, Flavoring   Substance and Sexual Activity    Alcohol use: Not Currently    Drug use: Never    Sexual activity: None   Other Topics Concern    None   Social History Narrative    Former smoker - As per [facility]    Full-time employment        · Do you currently or have you served in the [employer]:   No    As per [employer]     Financial Resource Strain: Not on [employer] Insecurity: Not on file Transportation Needs: Not on file   Physical Activity: Not on file   Stress: Not on file   Social Connections: Not on file   Intimate Partner Violence: Not on file   Housing Stability: Not on file       Family History     Family History   Problem Relation Age of Onset    Lung cancer Mother     Brain cancer Mother     Diabetes Brother     Bipolar disorder Maternal Grandfather     Bipolar disorder Son        Current Medications       Current Outpatient Medications:     acetaminophen (TYLENOL) 325 mg tablet, Take 3 tablets (975 mg total) by mouth every 8 (eight) hours (Patient taking differently: Take 975 mg by mouth every 8 (eight) hours prn), Disp: 30 tablet, Rfl: 0    aspirin 81 mg chewable tablet, Chew 1 tablet (81 mg total) daily, Disp: 30 tablet, Rfl: 0    atorvastatin (LIPITOR) 40 mg tablet, Take 1 tablet (40 mg total) by mouth daily, Disp: 90 tablet, Rfl: 1    clonazePAM (KlonoPIN) 2 mg tablet, Take 1 tablet (2 mg total) by mouth 2 (two) times a day as needed for anxiety, Disp: 60 tablet, Rfl: 0    desvenlafaxine succinate (PRISTIQ) 50 mg 24 hr tablet, Take 1 tablet (50 mg total) by mouth daily, Disp: 30 tablet, Rfl: 2    OXcarbazepine (TRILEPTAL) 300 mg tablet, Take 1 tablet (300 mg total) by mouth every evening, Disp: 30 tablet, Rfl: 2    OXcarbazepine (TRILEPTAL) 600 mg tablet, Take 1 tablet (600 mg total) by mouth daily, Disp: 30 tablet, Rfl: 2    paliperidone palmitate ER (INVEGA) 234 mg/1 5 mL IM injection, Inject 1 5 mL (234 mg total) into a muscle every 21 days for 1 dose, Disp: 1 mL, Rfl: 0    pantoprazole (PROTONIX) 20 mg tablet, Take 1 tablet (20 mg total) by mouth daily after dinner, Disp: 30 tablet, Rfl: 3    potassium chloride (K-DUR,KLOR-CON) 20 mEq tablet, Take 1 tablet (20 mEq total) by mouth daily, Disp: 30 tablet, Rfl: 5    ranolazine (RANEXA) 1000 MG SR tablet, Take 1 tablet (1,000 mg total) by mouth 2 (two) times a day, Disp: 180 tablet, Rfl: 3    traZODone (DESYREL) 300 MG tablet, Take 1 tablet (300 mg total) by mouth daily at bedtime, Disp: 90 tablet, Rfl: 1     Allergies     No Known Allergies    Objective     /72   Pulse (!) 120   Temp 99 8 °F (37 7 °C)   Resp 20   Ht 5' 10" (1 778 m)   Wt 89 8 kg (198 lb)   SpO2 96%   BMI 28 41 kg/m²      Physical Exam  Vitals and nursing note reviewed  Constitutional:       General: He is not in acute distress  Appearance: He is well-developed  He is not diaphoretic  HENT:      Head: Normocephalic and atraumatic  Right Ear: External ear normal       Left Ear: External ear normal       Nose: Nose normal       Mouth/Throat:      Pharynx: No oropharyngeal exudate  Eyes:      General: No scleral icterus  Right eye: No discharge  Left eye: No discharge  Pupils: Pupils are equal, round, and reactive to light  Neck:      Thyroid: No thyromegaly  Cardiovascular:      Rate and Rhythm: Normal rate  Heart sounds: Normal heart sounds  No murmur heard  Pulmonary:      Effort: Pulmonary effort is normal  No respiratory distress  Breath sounds: Normal breath sounds  No stridor  No wheezing, rhonchi or rales  Comments: Slight SOB - pt states always happens when he vapes  Chest:      Chest wall: No tenderness  Abdominal:      General: Bowel sounds are normal  There is no distension  Palpations: Abdomen is soft  There is no mass  Tenderness: There is no abdominal tenderness  There is no guarding or rebound  Musculoskeletal:         General: Normal range of motion  Skin:     General: Skin is warm and dry  Findings: No erythema or rash  Neurological:      Mental Status: He is alert        Coordination: Coordination normal       Deep Tendon Reflexes: Reflexes normal    Psychiatric:         Behavior: Behavior normal             Visual Acuity Screening    Right eye Left eye Both eyes   Without correction:      With correction: 20/25 20/30 20/20           Haseeb Pino [address]

## 2022-04-01 DIAGNOSIS — F31.81 BIPOLAR 2 DISORDER (HCC): ICD-10-CM

## 2022-04-01 NOTE — TELEPHONE ENCOUNTER
Follow up call made to [facility] and spoke with Moise Morales was located in the system  New prescription needed       Please send renewal of [place] to [facility] added to preferred list

## 2022-04-06 ENCOUNTER — TELEPHONE (OUTPATIENT)
Dept: PSYCHIATRY | Facility: CLINIC | Age: 57
End: 2022-04-06

## 2022-04-06 DIAGNOSIS — F31.62 BIPOLAR DISORDER, CURRENT EPISODE MIXED, MODERATE (HCC): Primary | ICD-10-CM

## 2022-04-06 RX ORDER — PALIPERIDONE 9 MG/1
9 TABLET, EXTENDED RELEASE ORAL EVERY MORNING
Qty: 30 TABLET | Refills: 1 | Status: SHIPPED | OUTPATIENT
Start: 2022-04-06 | End: 2022-06-07

## 2022-04-06 RX ORDER — PALIPERIDONE 3 MG/1
3 TABLET, EXTENDED RELEASE ORAL EVERY MORNING
Qty: 30 TABLET | Refills: 1 | Status: SHIPPED | OUTPATIENT
Start: 2022-04-06 | End: 2022-06-07

## 2022-04-06 NOTE — TELEPHONE ENCOUNTER
Follow up call made to [facility] for status of [place] requested as urgent  Request was still in process  Reviewed the urgency is that Tracy Wiley could decompensate if the injection is late  She agreed to transfer the call to the nurse reviewer, Ara Bae's VM was full, but was able to speak with a representative and transferred to the alternate nurse reviewer, Heidy Aleman  Her VM was also full  Spoke to [facility] and reviewed the request  She was able to place an "escilation email" to request an update  Nursing number given for the update  Call reference number [id number]  Left a discreet message with Tracy Wiley that the request for the injection is still in process, but am following up and will call with information as it is given  Reviewed same with Bill Arteaga's appointment was cancelled for 4/7/22

## 2022-04-06 NOTE — TELEPHONE ENCOUNTER
Juan C Rossi returned the call  Pre-certification information reviewed for Stephany Vasquez (see Encounter 3/29/22) and waiting for a decision  He verbalized understanding  If the decision takes longer and he misses the injection, he said he should get oral medication  Juan C Rossi said his previous psychiatrist told him he should have 12 mg (9 mg and 3 mg)       Juan C Rossi uses the [facility] [address]

## 2022-04-06 NOTE — TELEPHONE ENCOUNTER
Spoke with Naty Jeffery earlier and reviewed the status of the pre-cert  He was concerned he would need to take oral paliparidone pending the decision  Dr Cassandra Amaro did send a prescription to pharmacy    Received a fax noting no Precertification is needed  Spoke with Naty Jeffery and reviewed the above  He will wait to get oral paliperidone in lieu of getting the Sustenna at the [facility]  Will follow up with Naty Jeffery and he has the nursing number to call if needed  Ex-FT previously healthy vaccinated 3 m F with 3 days URI sxs 1 d fever, less frequent stools in last month. last BM yesterday with suppository. pt happy and interactive on exam, breathing comfortably, abd soft. counseled around normal frequency of stools with breastfeeding. ua ucx for fever - hayden chris md pgy1 Ex-FT previously healthy vaccinated 3 m F with 3 days URI sxs 1 d fever, less frequent stools in last month. last BM yesterday with suppository. pt happy and interactive on exam, breathing comfortably, abd soft. counseled around normal frequency of stools with breastfeeding. ua ucx for fever - hayden chris md pgy1  --  3m F with no sign pmhx here with fever to 101 at home, uri symptoms. 1 day fever. Also reports BMs only 1 time a week with suppositories, recommended by pmd. Had Bm within first 48 of life, normal bms until 2 mo vaccines, per family. Exclusively BF. On exam, patient is well appearing, NAD, HEENT: TM wnl no conjunctivitis, MMM, Neck supple, Cardiac: regular rate rhythm, Chest: CTA BL, no wheeze or crackles, Abdomen: normal BS, soft, NT, Extremity: no gross deformity, good perfusion Skin: no rash, Neuro: grossly normal Normal external rectal exam. Likely viral syndrome however given age, will obtain urine. Abd soft, nt., will refer back to pmd. - Clau Valdes MD

## 2022-04-06 NOTE — PROGRESS NOTES
Given changes in insurance coverage and difficulty regarding clearance/approval for Greg's Q3week injection of Invega Sustenna 234mg, will order 12mg of oral Paliperidone (to be taken each AM) for coverage until injection  This will help mitigate risk of psychiatric decompensation

## 2022-04-06 NOTE — TELEPHONE ENCOUNTER
No pre-cert needed for [facility]  Reviewed with Melissa Orr  He will wait to  the oral medication [facility]  Will follow up with him after communicating with the [address] [place] [address] [facility] (see other Telephone Encounter)

## 2022-04-07 NOTE — TELEPHONE ENCOUNTER
Received a call from [facility] at [address] needs prior authorization thru PBM, [employer]  Reviewed a PA was started 1/11/22, but was told no PA was needed and no pre-cert was needed for the J-code administration  She requested the decision letter be faxed to her and she would have their billing dept review or contact [employer] if needed  Information faxed to number given:  [phone]  Chandler received [place] at the [facility] 1/12/22, 2/24/22, 2/3/22, and 3/17/22 with coverage by his new insurance, [employer] of [facility] [employer], which was active at the start of the year  There was an authoriztion in place with previous plan, [employer]  Spoke with Scott Loving and reviewed coverage concerns and encouraged him to  the oral paliperidone until authorization is resolved  He verbalized understanding and said his wife was picking up the medication today  New prior authoriization request submitted to [facility] and faxed ([phone]) along with office notes from start of care, 3/22/21, and most recent visit 3/29/22

## 2022-04-08 ENCOUNTER — TELEPHONE (OUTPATIENT)
Dept: PSYCHIATRY | Facility: CLINIC | Age: 57
End: 2022-04-08

## 2022-04-08 NOTE — TELEPHONE ENCOUNTER
Spoke with [employer]  Clarified waiting for the authorization from [employer] for Eliezer Jefferson and will follow up at the start of next week if a decision is not received  He verbalized understanding and appreciated the call

## 2022-04-12 NOTE — TELEPHONE ENCOUNTER
Follow up call made to [employer] for status of P A request  Chanel Martines with Josse Lew  Review is still in progress and could take up until 4/22 for a decision  She was able to update the request as urgent  There should be a decision in 3 days  Spoke with Marisol Baron and updated him

## 2022-04-13 NOTE — TELEPHONE ENCOUNTER
Received a faxed denial from [employer] noting medication is not covered under pharmacy benefit, but contact health plan for possible coverage through medical benefit  ** This was already done with with a decision on 8/5/89 of no pre-cert required  **     Spoke with Eliana Baker at [facility]  After review of information, they are waiting for Chandler's wife to call with updated insurance information  When Annita Lugo is ready to be shipped, pharmacy will contact [address] [place] [address] directly  They will only contact the office if further information is needed

## 2022-04-14 ENCOUNTER — TELEPHONE (OUTPATIENT)
Dept: PSYCHIATRY | Facility: CLINIC | Age: 57
End: 2022-04-14

## 2022-04-14 NOTE — TELEPHONE ENCOUNTER
Miquel at [facility] called and LM  She stated she contacted [employer] and the [place] was an "override" for 90 days  This was a "benefit exclusion" and now needs to go through medical benefits"     Miquel- [phone]

## 2022-04-18 NOTE — TELEPHONE ENCOUNTER
Received a call from Jordan regarding his Invega injections  He asked for Kenan Cooney personally   Requesting a call back [phone]

## 2022-04-18 NOTE — TELEPHONE ENCOUNTER
Agueda Banerjee left a message and requested a call (added to Encounter 4/6/22)  I spoke with Agueda Banerjee and reviewed the latest information - [facility] called that the request needs to go through medical benefits, which was already done and no pre-cert was needed  Nursing was going to call all parties again to clarify service  Will call Agueda Banerjee when more information is received

## 2022-04-18 NOTE — TELEPHONE ENCOUNTER
[facility] called and spoke with Clinton Fowler  He reviewed the "override" had  in March  Lynette Lacey prior auth request sent to [employer], it noted it is not covered and should be submitted to medical (which was already done)  Spoke with [facility] billing ([phone]) and reviewed all information and that infusion said [facility] would not release the medication  She said there is a hard stop because the medication is a "medical induced drug " She requested the decision letter from [facility] of [address] be faxed to her, which was done  Will follow up

## 2022-04-20 DIAGNOSIS — F41.9 ANXIETY DISORDER, UNSPECIFIED TYPE: ICD-10-CM

## 2022-04-20 RX ORDER — CLONAZEPAM 2 MG/1
2 TABLET ORAL 2 TIMES DAILY PRN
Qty: 60 TABLET | Refills: 0 | Status: SHIPPED | OUTPATIENT
Start: 2022-04-20 | End: 2022-05-23 | Stop reason: SDUPTHER

## 2022-04-20 NOTE — TELEPHONE ENCOUNTER
PDMP website reviewed  Huseyin Torresu has been appropriately adherent to controlled psychotropic medications without evidence of abuse or misuse  As such, will send 30-day refill to pharmacy of choice and follow up as necessary

## 2022-04-22 NOTE — TELEPHONE ENCOUNTER
Spoke with [facility]  He is asking the status of [place]  Informed him we received a fax that no pre-cert was required and had [facility] Kip Boys [facility] at the [facility]  They need to resolve the billing issue and were asked to contact him  Assured him I will send another message requesting the resolve and contact him

## 2022-04-22 NOTE — TELEPHONE ENCOUNTER
[facility]  Asked if she could give him a call in regards to the Invega injection   [phone]

## 2022-04-25 DIAGNOSIS — Z98.84 BARIATRIC SURGERY STATUS: ICD-10-CM

## 2022-04-25 DIAGNOSIS — F17.200 SMOKING: ICD-10-CM

## 2022-04-25 DIAGNOSIS — Z48.815 ENCOUNTER FOR SURGICAL AFTERCARE FOLLOWING SURGERY OF DIGESTIVE SYSTEM: ICD-10-CM

## 2022-04-25 RX ORDER — PANTOPRAZOLE SODIUM 20 MG/1
TABLET, DELAYED RELEASE ORAL
Qty: 90 TABLET | Refills: 0 | Status: SHIPPED | OUTPATIENT
Start: 2022-04-25 | End: 2022-08-01 | Stop reason: SDUPTHER

## 2022-04-26 ENCOUNTER — HOSPITAL ENCOUNTER (OUTPATIENT)
Dept: INFUSION CENTER | Facility: HOSPITAL | Age: 57
Discharge: HOME | End: 2022-04-26
Payer: COMMERCIAL

## 2022-04-26 PROCEDURE — 96372 THER/PROPH/DIAG INJ SC/IM: CPT

## 2022-04-26 RX ADMIN — PALIPERIDONE PALMITATE 234 MG: 234 INJECTION INTRAMUSCULAR at 08:33

## 2022-04-26 NOTE — PROGRESS NOTES
Pt tolerated Glendia Amina Greenenna injection to R deltoid without difficulty  Next appt scheduled    Left ambulatory declining AVS

## 2022-05-17 ENCOUNTER — HOSPITAL ENCOUNTER (OUTPATIENT)
Dept: INFUSION CENTER | Facility: HOSPITAL | Age: 57
Discharge: HOME | End: 2022-05-17
Payer: COMMERCIAL

## 2022-05-17 PROCEDURE — 96372 THER/PROPH/DIAG INJ SC/IM: CPT

## 2022-05-17 RX ADMIN — PALIPERIDONE PALMITATE 234 MG: 234 INJECTION INTRAMUSCULAR at 08:22

## 2022-05-17 NOTE — PROGRESS NOTES
Pt tolerated Invega Sustenna IM in the left deltoid  Next apt scheduled  Pt left unit ambulatory with a steady gait

## 2022-05-23 DIAGNOSIS — F41.9 ANXIETY DISORDER, UNSPECIFIED TYPE: ICD-10-CM

## 2022-05-23 RX ORDER — CLONAZEPAM 2 MG/1
2 TABLET ORAL 2 TIMES DAILY PRN
Qty: 60 TABLET | Refills: 0 | Status: SHIPPED | OUTPATIENT
Start: 2022-05-23 | End: 2022-06-07 | Stop reason: SDUPTHER

## 2022-05-23 NOTE — TELEPHONE ENCOUNTER
PDMP website reviewed  Jeraline Holstein has been appropriately adherent to controlled psychotropic medications without evidence of abuse or misuse  As such, will send 30-day refill to pharmacy of choice and follow up as necessary
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
## 2022-06-07 ENCOUNTER — OFFICE VISIT (OUTPATIENT)
Dept: PSYCHIATRY | Facility: CLINIC | Age: 57
End: 2022-06-07
Payer: COMMERCIAL

## 2022-06-07 ENCOUNTER — HOSPITAL ENCOUNTER (OUTPATIENT)
Dept: INFUSION CENTER | Facility: HOSPITAL | Age: 57
Discharge: HOME | End: 2022-06-07
Payer: COMMERCIAL

## 2022-06-07 DIAGNOSIS — F41.9 ANXIETY DISORDER, UNSPECIFIED TYPE: ICD-10-CM

## 2022-06-07 DIAGNOSIS — Z72.0 TOBACCO USE: ICD-10-CM

## 2022-06-07 DIAGNOSIS — F51.01 PRIMARY INSOMNIA: ICD-10-CM

## 2022-06-07 DIAGNOSIS — F31.62 BIPOLAR DISORDER, CURRENT EPISODE MIXED, MODERATE (HCC): Primary | ICD-10-CM

## 2022-06-07 DIAGNOSIS — Z51.81 THERAPEUTIC DRUG MONITORING: ICD-10-CM

## 2022-06-07 PROBLEM — R29.818 EXTRAPYRAMIDAL SYMPTOM: Status: RESOLVED | Noted: 2021-10-18 | Resolved: 2022-06-07

## 2022-06-07 PROCEDURE — 90833 PSYTX W PT W E/M 30 MIN: CPT | Performed by: STUDENT IN AN ORGANIZED HEALTH CARE EDUCATION/TRAINING PROGRAM

## 2022-06-07 PROCEDURE — 99214 OFFICE O/P EST MOD 30 MIN: CPT | Performed by: STUDENT IN AN ORGANIZED HEALTH CARE EDUCATION/TRAINING PROGRAM

## 2022-06-07 PROCEDURE — 96374 THER/PROPH/DIAG INJ IV PUSH: CPT

## 2022-06-07 RX ORDER — TRAZODONE HYDROCHLORIDE 300 MG/1
300 TABLET ORAL
Qty: 90 TABLET | Refills: 2 | Status: SHIPPED | OUTPATIENT
Start: 2022-06-07

## 2022-06-07 RX ORDER — CLONAZEPAM 2 MG/1
2 TABLET ORAL 2 TIMES DAILY PRN
Qty: 60 TABLET | Refills: 0 | Status: SHIPPED | OUTPATIENT
Start: 2022-06-07 | End: 2022-07-05 | Stop reason: SDUPTHER

## 2022-06-07 RX ORDER — OXCARBAZEPINE 300 MG/1
300 TABLET, FILM COATED ORAL EVERY EVENING
Qty: 90 TABLET | Refills: 2 | Status: SHIPPED | OUTPATIENT
Start: 2022-06-07 | End: 2022-07-22 | Stop reason: SDUPTHER

## 2022-06-07 RX ORDER — OXCARBAZEPINE 600 MG/1
600 TABLET, FILM COATED ORAL DAILY
Qty: 90 TABLET | Refills: 2 | Status: SHIPPED | OUTPATIENT
Start: 2022-06-07 | End: 2022-07-22 | Stop reason: SDUPTHER

## 2022-06-07 RX ORDER — DESVENLAFAXINE 50 MG/1
50 TABLET, EXTENDED RELEASE ORAL DAILY
Qty: 90 TABLET | Refills: 2 | Status: SHIPPED | OUTPATIENT
Start: 2022-06-07

## 2022-06-07 RX ADMIN — PALIPERIDONE PALMITATE 234 MG: 234 INJECTION INTRAMUSCULAR at 09:50

## 2022-06-07 NOTE — PROGRESS NOTES
Patient tolerated R deltoid IM invega sustenna injection without issues  Next appointment confirmed, AVS declined

## 2022-06-27 DIAGNOSIS — E87.6 HYPOKALEMIA: ICD-10-CM

## 2022-06-27 RX ORDER — POTASSIUM CHLORIDE 20 MEQ/1
20 TABLET, EXTENDED RELEASE ORAL DAILY
Qty: 30 TABLET | Refills: 5 | Status: SHIPPED | OUTPATIENT
Start: 2022-06-27

## 2022-06-28 ENCOUNTER — HOSPITAL ENCOUNTER (OUTPATIENT)
Dept: INFUSION CENTER | Facility: HOSPITAL | Age: 57
Discharge: HOME | End: 2022-06-28
Payer: COMMERCIAL

## 2022-06-28 PROCEDURE — 96372 THER/PROPH/DIAG INJ SC/IM: CPT

## 2022-06-28 RX ADMIN — PALIPERIDONE PALMITATE 234 MG: 234 INJECTION INTRAMUSCULAR at 08:16

## 2022-06-28 NOTE — PROGRESS NOTES
Pt tolerated Loni Dryer injection to L deltoid without difficulty  Next appt scheduled    Left ambulatory declining AVS

## 2022-07-05 DIAGNOSIS — F41.9 ANXIETY DISORDER, UNSPECIFIED TYPE: ICD-10-CM

## 2022-07-05 RX ORDER — CLONAZEPAM 2 MG/1
2 TABLET ORAL 2 TIMES DAILY PRN
Qty: 60 TABLET | Refills: 0 | Status: SHIPPED | OUTPATIENT
Start: 2022-07-05 | End: 2022-07-22 | Stop reason: SDUPTHER

## 2022-07-05 NOTE — TELEPHONE ENCOUNTER
PDMP website reviewed  Diego Winn has been appropriately adherent to controlled psychotropic medications without evidence of abuse or misuse  As such, will send 30-day refill to pharmacy of choice and follow up as necessary

## 2022-07-19 ENCOUNTER — HOSPITAL ENCOUNTER (OUTPATIENT)
Dept: INFUSION CENTER | Facility: HOSPITAL | Age: 57
Discharge: HOME | End: 2022-07-19
Payer: COMMERCIAL

## 2022-07-19 PROCEDURE — 96372 THER/PROPH/DIAG INJ SC/IM: CPT

## 2022-07-19 RX ADMIN — PALIPERIDONE PALMITATE 234 MG: 234 INJECTION INTRAMUSCULAR at 08:14

## 2022-07-19 NOTE — PROGRESS NOTES
Patient tolerated L IM deltoid invega sustenna injection without issue  Next appointment made, AVS declined

## 2022-07-21 DIAGNOSIS — Z51.81 THERAPEUTIC DRUG MONITORING: Primary | ICD-10-CM

## 2022-07-22 ENCOUNTER — LAB (OUTPATIENT)
Dept: LAB | Age: 57
End: 2022-07-22
Payer: COMMERCIAL

## 2022-07-22 DIAGNOSIS — Z13.6 SCREENING FOR CARDIOVASCULAR CONDITION: ICD-10-CM

## 2022-07-22 DIAGNOSIS — R74.8 ELEVATED ALKALINE PHOSPHATASE LEVEL: Primary | ICD-10-CM

## 2022-07-22 DIAGNOSIS — Z11.59 NEED FOR HEPATITIS C SCREENING TEST: ICD-10-CM

## 2022-07-22 DIAGNOSIS — F31.62 BIPOLAR DISORDER, CURRENT EPISODE MIXED, MODERATE (HCC): ICD-10-CM

## 2022-07-22 DIAGNOSIS — Z48.815 ENCOUNTER FOR SURGICAL AFTERCARE FOLLOWING SURGERY OF DIGESTIVE SYSTEM: ICD-10-CM

## 2022-07-22 DIAGNOSIS — K91.2 POSTSURGICAL MALABSORPTION: ICD-10-CM

## 2022-07-22 DIAGNOSIS — I25.118 CORONARY ARTERY DISEASE OF NATIVE ARTERY OF NATIVE HEART WITH STABLE ANGINA PECTORIS (HCC): ICD-10-CM

## 2022-07-22 DIAGNOSIS — E78.2 MIXED HYPERLIPIDEMIA: ICD-10-CM

## 2022-07-22 DIAGNOSIS — Z98.84 BARIATRIC SURGERY STATUS: ICD-10-CM

## 2022-07-22 DIAGNOSIS — F41.9 ANXIETY DISORDER, UNSPECIFIED TYPE: ICD-10-CM

## 2022-07-22 DIAGNOSIS — Z51.81 THERAPEUTIC DRUG MONITORING: ICD-10-CM

## 2022-07-22 DIAGNOSIS — Z00.00 WELL ADULT EXAM: ICD-10-CM

## 2022-07-22 DIAGNOSIS — I95.1 ORTHOSTATIC HYPOTENSION: ICD-10-CM

## 2022-07-22 DIAGNOSIS — Z12.5 SCREENING FOR PROSTATE CANCER: ICD-10-CM

## 2022-07-22 LAB
25(OH)D3 SERPL-MCNC: 17.9 NG/ML (ref 30–100)
ALBUMIN SERPL BCP-MCNC: 3.9 G/DL (ref 3.5–5)
ALP SERPL-CCNC: 125 U/L (ref 46–116)
ALT SERPL W P-5'-P-CCNC: 31 U/L (ref 12–78)
ANION GAP SERPL CALCULATED.3IONS-SCNC: 8 MMOL/L (ref 4–13)
AST SERPL W P-5'-P-CCNC: 21 U/L (ref 5–45)
BILIRUB SERPL-MCNC: 0.68 MG/DL (ref 0.2–1)
BUN SERPL-MCNC: 6 MG/DL (ref 5–25)
CALCIUM SERPL-MCNC: 8.7 MG/DL (ref 8.3–10.1)
CHLORIDE SERPL-SCNC: 101 MMOL/L (ref 96–108)
CHOLEST SERPL-MCNC: 127 MG/DL
CO2 SERPL-SCNC: 25 MMOL/L (ref 21–32)
CREAT SERPL-MCNC: 0.83 MG/DL (ref 0.6–1.3)
EST. AVERAGE GLUCOSE BLD GHB EST-MCNC: 105 MG/DL
GFR SERPL CREATININE-BSD FRML MDRD: 98 ML/MIN/1.73SQ M
GLUCOSE P FAST SERPL-MCNC: 93 MG/DL (ref 65–99)
HBA1C MFR BLD: 5.3 %
HCV AB SER QL: NORMAL
HDLC SERPL-MCNC: 57 MG/DL
LDLC SERPL CALC-MCNC: 60 MG/DL (ref 0–100)
POTASSIUM SERPL-SCNC: 4.5 MMOL/L (ref 3.5–5.3)
PROLACTIN SERPL-MCNC: 47.8 NG/ML (ref 2.5–17.4)
PROT SERPL-MCNC: 6.9 G/DL (ref 6.4–8.4)
PSA SERPL-MCNC: 1.2 NG/ML (ref 0–4)
PTH-INTACT SERPL-MCNC: 42.9 PG/ML (ref 18.4–80.1)
SODIUM SERPL-SCNC: 134 MMOL/L (ref 135–147)
TRIGL SERPL-MCNC: 50 MG/DL
TSH SERPL DL<=0.05 MIU/L-ACNC: 1.57 UIU/ML (ref 0.45–4.5)
VIT B12 SERPL-MCNC: 490 PG/ML (ref 100–900)

## 2022-07-22 PROCEDURE — 83970 ASSAY OF PARATHORMONE: CPT

## 2022-07-22 PROCEDURE — 80183 DRUG SCRN QUANT OXCARBAZEPIN: CPT

## 2022-07-22 PROCEDURE — 80061 LIPID PANEL: CPT

## 2022-07-22 PROCEDURE — 36415 COLL VENOUS BLD VENIPUNCTURE: CPT

## 2022-07-22 PROCEDURE — 83036 HEMOGLOBIN GLYCOSYLATED A1C: CPT

## 2022-07-22 PROCEDURE — 82607 VITAMIN B-12: CPT

## 2022-07-22 PROCEDURE — 84146 ASSAY OF PROLACTIN: CPT

## 2022-07-22 PROCEDURE — 82306 VITAMIN D 25 HYDROXY: CPT

## 2022-07-22 PROCEDURE — G0103 PSA SCREENING: HCPCS

## 2022-07-22 PROCEDURE — 84630 ASSAY OF ZINC: CPT

## 2022-07-22 PROCEDURE — 80053 COMPREHEN METABOLIC PANEL: CPT

## 2022-07-22 PROCEDURE — 84443 ASSAY THYROID STIM HORMONE: CPT

## 2022-07-22 PROCEDURE — 86803 HEPATITIS C AB TEST: CPT

## 2022-07-22 PROCEDURE — 84590 ASSAY OF VITAMIN A: CPT

## 2022-07-22 NOTE — RESULT ENCOUNTER NOTE
Lipid panel and TSH are acceptable  One of the indices on the complete metabolic profile are elevated, I would like to redraw that in a month    Order in chart

## 2022-07-22 NOTE — TELEPHONE ENCOUNTER
Spoke to Jordan notifying him of the refills of Trileptal and that the Clonazepam prescription was sent to the pharmacy      FYGABI

## 2022-07-22 NOTE — TELEPHONE ENCOUNTER
Please make Spenser Santos aware he should have refills of Trileptal and Clonazepam was sent 7/5/22  Thanks!

## 2022-07-25 DIAGNOSIS — E78.2 MIXED HYPERLIPIDEMIA: ICD-10-CM

## 2022-07-25 DIAGNOSIS — I25.110 CORONARY ARTERY DISEASE INVOLVING NATIVE CORONARY ARTERY OF NATIVE HEART WITH UNSTABLE ANGINA PECTORIS (HCC): ICD-10-CM

## 2022-07-25 LAB
OXCARBAZEPINE SERPL-MCNC: 6 UG/ML (ref 10–35)
ZINC SERPL-MCNC: 72 UG/DL (ref 44–115)

## 2022-07-25 RX ORDER — OXCARBAZEPINE 600 MG/1
600 TABLET, FILM COATED ORAL DAILY
Qty: 90 TABLET | Refills: 2 | Status: SHIPPED | OUTPATIENT
Start: 2022-07-25 | End: 2022-09-08

## 2022-07-25 RX ORDER — ATORVASTATIN CALCIUM 40 MG/1
40 TABLET, FILM COATED ORAL DAILY
Qty: 90 TABLET | Refills: 1 | Status: SHIPPED | OUTPATIENT
Start: 2022-07-25

## 2022-07-25 RX ORDER — CLONAZEPAM 2 MG/1
2 TABLET ORAL 2 TIMES DAILY PRN
Qty: 60 TABLET | Refills: 0 | Status: SHIPPED | OUTPATIENT
Start: 2022-07-25 | End: 2022-08-29 | Stop reason: SDUPTHER

## 2022-07-25 RX ORDER — OXCARBAZEPINE 300 MG/1
300 TABLET, FILM COATED ORAL EVERY EVENING
Qty: 90 TABLET | Refills: 2 | Status: SHIPPED | OUTPATIENT
Start: 2022-07-25 | End: 2022-09-08

## 2022-07-25 NOTE — TELEPHONE ENCOUNTER
Narayan called Friday after 4 pm and lm stating the pharmacy told him there are no prescriptions on file         [phone]

## 2022-07-25 NOTE — TELEPHONE ENCOUNTER
PDMP website reviewed  William Spears last filled script for Klonopin on 7/5/22  As such, he is not currently due for the medication  Will send refills but he will not have the ability to fill the script until 8/3/22  Refills for Trileptal sent today

## 2022-07-29 DIAGNOSIS — Z98.84 BARIATRIC SURGERY STATUS: ICD-10-CM

## 2022-07-29 DIAGNOSIS — E55.9 VITAMIN D DEFICIENCY: Primary | ICD-10-CM

## 2022-07-29 LAB — VIT A SERPL-MCNC: 48.2 UG/DL (ref 20.1–62)

## 2022-08-01 DIAGNOSIS — Z48.815 ENCOUNTER FOR SURGICAL AFTERCARE FOLLOWING SURGERY OF DIGESTIVE SYSTEM: ICD-10-CM

## 2022-08-01 DIAGNOSIS — F17.200 SMOKING: ICD-10-CM

## 2022-08-01 DIAGNOSIS — Z98.84 BARIATRIC SURGERY STATUS: ICD-10-CM

## 2022-08-01 RX ORDER — PANTOPRAZOLE SODIUM 20 MG/1
20 TABLET, DELAYED RELEASE ORAL DAILY
Qty: 90 TABLET | Refills: 1 | Status: SHIPPED | OUTPATIENT
Start: 2022-08-01

## 2022-08-09 ENCOUNTER — HOSPITAL ENCOUNTER (OUTPATIENT)
Dept: INFUSION CENTER | Facility: HOSPITAL | Age: 57
Discharge: HOME | End: 2022-08-09
Payer: COMMERCIAL

## 2022-08-09 PROCEDURE — 96372 THER/PROPH/DIAG INJ SC/IM: CPT

## 2022-08-09 RX ADMIN — PALIPERIDONE PALMITATE 234 MG: 234 INJECTION INTRAMUSCULAR at 08:22

## 2022-08-15 ENCOUNTER — TELEPHONE (OUTPATIENT)
Dept: NEUROLOGY | Facility: CLINIC | Age: 57
End: 2022-08-15

## 2022-08-15 NOTE — TELEPHONE ENCOUNTER
patient called for a sooner appt  I reached out to [facility] and asked if I can offer the patient 8-31-22 at 1030 am and he said yes  I provided the patient with the new date and time and he accepted it

## 2022-08-29 DIAGNOSIS — F41.9 ANXIETY DISORDER, UNSPECIFIED TYPE: ICD-10-CM

## 2022-08-29 RX ORDER — CLONAZEPAM 2 MG/1
2 TABLET ORAL 2 TIMES DAILY PRN
Qty: 60 TABLET | Refills: 0 | Status: SHIPPED | OUTPATIENT
Start: 2022-08-29 | End: 2022-09-08 | Stop reason: SDUPTHER

## 2022-08-29 NOTE — TELEPHONE ENCOUNTER
Medication Refill Request     Name Estuardo Zuniga  Dose/Frequency 2mg twice a day  Quantity 60 tablets  Verified pharmacy   [x]  Verified ordering Provider   [x]  Does patient have enough for the next 3 days?  Yes [x] No []

## 2022-08-29 NOTE — TELEPHONE ENCOUNTER
PDMP website reviewed  Thuy Koffi has been appropriately adherent to controlled psychotropic medications without evidence of abuse or misuse  As such, will send 30-day refill to pharmacy of choice and follow up as necessary

## 2022-08-30 ENCOUNTER — HOSPITAL ENCOUNTER (OUTPATIENT)
Dept: INFUSION CENTER | Facility: HOSPITAL | Age: 57
Discharge: HOME | End: 2022-08-30
Payer: COMMERCIAL

## 2022-08-30 PROCEDURE — 96372 THER/PROPH/DIAG INJ SC/IM: CPT

## 2022-08-30 RX ADMIN — PALIPERIDONE PALMITATE 234 MG: 234 INJECTION INTRAMUSCULAR at 08:27

## 2022-08-30 NOTE — PROGRESS NOTES
Pt tolerated Vernestine Links Sustenna injection to R deltoid without difficulty  Next appt scheduled    Left ambulatory declining AVS

## 2022-08-31 ENCOUNTER — OFFICE VISIT (OUTPATIENT)
Dept: NEUROLOGY | Facility: CLINIC | Age: 57
End: 2022-08-31
Payer: COMMERCIAL

## 2022-08-31 VITALS
DIASTOLIC BLOOD PRESSURE: 64 MMHG | WEIGHT: 218 LBS | SYSTOLIC BLOOD PRESSURE: 114 MMHG | HEART RATE: 92 BPM | BODY MASS INDEX: 31.28 KG/M2

## 2022-08-31 DIAGNOSIS — R56.9 OBSERVED SEIZURE-LIKE ACTIVITY (HCC): ICD-10-CM

## 2022-08-31 DIAGNOSIS — G62.9 NEUROPATHY: Primary | ICD-10-CM

## 2022-08-31 PROCEDURE — 99214 OFFICE O/P EST MOD 30 MIN: CPT | Performed by: PSYCHIATRY & NEUROLOGY

## 2022-08-31 NOTE — PROGRESS NOTES
Review of Systems   Constitutional: Negative  Negative for appetite change and fever  HENT: Negative  Negative for hearing loss, tinnitus, trouble swallowing and voice change  Eyes: Positive for visual disturbance (Double vision)  Negative for photophobia and pain  Respiratory: Negative  Negative for shortness of breath  Cardiovascular: Negative  Negative for palpitations  Gastrointestinal: Negative  Negative for nausea and vomiting  Endocrine: Negative  Negative for cold intolerance  Genitourinary: Negative  Negative for dysuria, frequency and urgency  Musculoskeletal: Positive for gait problem  Negative for myalgias and neck pain  Balance Issues     Skin: Negative  Negative for rash  Allergic/Immunologic: Negative  Neurological: Positive for dizziness, tremors (hands and upper body), speech difficulty, weakness, light-headedness and numbness (Feet and lips)  Negative for seizures, syncope, facial asymmetry and headaches  Not sure if mini stroke or mini seizures and once he gets these he gets very rigid and numbness in lips    Hematological: Negative  Does not bruise/bleed easily  Psychiatric/Behavioral: Negative  Negative for confusion, hallucinations and sleep disturbance  All other systems reviewed and are negative

## 2022-08-31 NOTE — ASSESSMENT & PLAN NOTE
Appreciated sensory loss on neurological exam (see below)     So far labwork includes: A1c (5 3), B12 WNL, TSH, low Vit D    Plan:   · Ordered neuropathy labwork, f/u results w/ PCP   · F/u with PCP and take Vit D supplements, trend levels as per PCP discretion

## 2022-08-31 NOTE — PROGRESS NOTES
Patient ID: Uri Nugent is a 65 y o  male  Assessment/Plan:    Dysautonomia-like disorder  Hx of episodes of lightheadedness associated with imbalance with prior syncopal events w/ unremarkable testing including tilt table, autonomic workup  MRI brain - no evidence of neuro degenerative disorder (MSA)  Since 3/22, has had falls during "episodes" (see below)  Exam shows decreased arm swing, slow steps, +retropulsion pull test, + Romberg, but no rigidity appreciated  History, exam not suggestive of parkinsonism  Plan:   · Strongly encouraged PT for balance   · DaTSCAN not indicated- will not   · Sinemet not indicated at this time given lack of parkinsonian features    Observed seizure-like activity ([facility] [address] )  Since 3/22 visit, has daily episodes where patient is observed to either: stiffen up, "act like a drunk baby" where he screams, slings his head, flings his arms in the air, will not answer logical questions during these episodes  Aura: blurry vision b/l, unusual thoughts (hurt grandchildren, he is unable to help)  Each episodes lasts about 45 mins-1 hr  Has fallen a few times during the episodes  Able to follow commands sometimes during these episodes, but does not remember doing so  Patient reports he does not recall the details of each episode clearly  First memory after the episode is numbness in lips, occasionally numbness throughout whole body  Craves sugar after episodes  PNES vs epileptic seizures  Plan:   · Will discuss w/ epilepsy team (pt was previously followed by Dr Sorin Shelley) to consider EMU admission for spell characterization given that these "episodes" are occurring on a daily basis, of different character as compared to prior episodes (syncopal)  · Family, patient agreeable for EMU admission if epilepsy team thinks it is warranted    Neuropathy  Appreciated sensory loss on neurological exam (see below)     So far labwork includes: A1c (5 3), B12 WNL, TSH, low Vit D    Plan:   · Ordered neuropathy labwork, f/u results w/ PCP   · F/u with PCP and take Vit D supplements, trend levels as per PCP discretion        Diagnoses and all orders for this visit:    Neuropathy  -     Methylmalonic acid, serum; Future  -     Vitamin B6; Future  -     Protein electrophoresis, serum; Future  -     Protein electrophoresis, urine  -     Folate; Future    Observed seizure-like activity ([facility] [address] )  -     Epilepsy Monitoring Unit (EMU) Referral; Future       Subjective:    HPI    Mr  Ricco Vazquez is a 66 yo M presenting for follow up visit for dysautonomia like disorder  Last seen by Dr Anthony Sherman on 03/24/2022  As per Dr Nelia Encarnacion last note:  "65 y o  man with bipolar disorder and recurrent orthostatic intolerance who presents for movement disorder evaluation  Hx of multiple recurrent episodes of postural related lightheadedness, period of appearing "disconnected", and myoclonic jerks throughout to be related to hypotension  It was initially suspected  that he had post-gastric bypass orthostatic intolerance, possibly complicated by some of his psychiatric medications  Potentially offending medications have since been removed  Tilt table testing did not confirm pathological orthostatic hypotension   Routine EEG  normal  Inpatient video EEG did not capture any of the events  MRI brain 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 is retrospectively stable  No significant mass effect or associated edema  More recently noted to have drooling, gait imbalance, and slow movements so the question of MSA arose       He continues to have lightheadedness when standing or changing position  He will feel like he is going to "pass out" but he has not had a syncopal event in awhile  Speech is slurred at times  He has been drooling over the past 5-6 months  No difficulty chewing  It can be hard to swallow at times where food feel caught  Mild slowness  noted with dressing and hygiene acts   Handwriting is micrographic  He notice an intermittent tremor for example earlier today when biting his finger nails  There is no difficulty arising out of chair  Gait is off at time  He was shuffling for a period but this is no longer occurring  Sleeps well  There is mild daytime sedation  There have been no symptoms of REM sleep behavior disorder or RLS  He can be forgetful with recent and long term memories  He can perform ADL's with mild issues with socks and pants due to imbalance  He spend his days watching television  There is no difficulty with household tasks  He drives with no issues  He is noted to have slow reaction time  No hallucinations  There are no changes in olfaction  Between 11am and 1-2pm he has a change in demeanor described as : "reverting to a kid" this can be a happy or nasty mood  HE will be very "chatty" and can perform atypical behaviors  He reports feeling these episodes come on as he feels a pressure  This does not occur every day  This was also discussed with his psychiatrist  "     So far, patient has undergone extensive lab and clinical testing including tilt-table, autonomic workup which has been unremarkable an inconsistent with orthostatic hypotension or neuropathy  MRI brain negative  Exam during last visit not suggestive of parkinsonism  During the last visit, Dr Keena Alvarado did not think that a DaTSCAN would be beneficial or   Patient was advised to stay hydrated, recommended PT to improve gait, balance and reduce fall risk  Of note, patient was also noted to have episodes of child-like behavior" -a discussion with Dr Veliz was to be pursued regarding capturing these events on video EEG      Interval Hx:     Since last visit, family reports that patient is having almost daily episodes where patient is observed to either: stiffen up, "act like a drunk baby" where he screams, slings his head, flings his arms in the air, will not answer logical questions during these episodes  Aura: blurry vision b/l, unusual thoughts (hurt grandchildren, he is unable to help)  Each episodes lasts about 45 mins-1 hr  Has fallen a few times during the episodes  Able to follow commands sometimes during these episodes, but does not remember doing so  Patient reports he does not recall the details of each episode clearly  First memory after the episode is numbness in lips, occasionally numbness throughout whole body  Craves sugar after episodes  Patient also feels his b/l feet numbness has worsened  Continues to have double vision occasionally  No headaches  No nausea or vomiting  No changes in speech or swallowing  No change in bowel/bladder function  No new weakness  No recent hospitalizations  No recent infections  Discussed clinical status, plan w/ patient, family- verbalized understanding  Current Outpatient Medications   Medication Instructions    acetaminophen (TYLENOL) 975 mg, Oral, Every 8 hours    aspirin 81 mg, Oral, Daily    atorvastatin (LIPITOR) 40 mg, Oral, Daily    clonazePAM (KLONOPIN) 2 mg, Oral, 2 times daily PRN    desvenlafaxine succinate (PRISTIQ) 50 mg, Oral, Daily    OXcarbazepine (TRILEPTAL) 300 mg, Oral, Every evening    OXcarbazepine (TRILEPTAL) 600 mg, Oral, Daily    paliperidone palmitate ER (INVEGA) 234 mg, Intramuscular, Every 21 days    pantoprazole (PROTONIX) 20 mg, Oral, Daily    potassium chloride (K-DUR,KLOR-CON) 20 mEq tablet 20 mEq, Oral, Daily    ranolazine (RANEXA) 1,000 mg, Oral, 2 times daily    traZODone (DESYREL) 300 mg, Oral, Daily at bedtime       ROS:    Review of Systems  Constitutional: Negative  Negative for appetite change and fever  HENT: Negative  Negative for hearing loss, tinnitus, trouble swallowing and voice change  Eyes: Positive for visual disturbance (Double vision)  Negative for photophobia and pain  Respiratory: Negative  Negative for shortness of breath      Cardiovascular: Negative  Negative for palpitations  Gastrointestinal: Negative  Negative for nausea and vomiting  Endocrine: Negative  Negative for cold intolerance  Genitourinary: Negative  Negative for dysuria, frequency and urgency  Musculoskeletal: Positive for gait problem  Negative for myalgias and neck pain  Balance Issues     Skin: Negative  Negative for rash  Allergic/Immunologic: Negative  Neurological: Positive for dizziness, tremors (hands and upper body), speech difficulty, weakness, light-headedness and numbness (Feet and lips)  Negative for seizures, syncope, facial asymmetry and headaches  Not sure if mini stroke or mini seizures and once he gets these he gets very rigid and numbness in lips    Hematological: Negative  Does not bruise/bleed easily  Psychiatric/Behavioral: Negative  Negative for confusion, hallucinations and sleep disturbance  All other systems reviewed and are negative  Objective:   Vitals:    08/31/22 1024   BP: 114/64   Pulse: 92     Physical exam:  Vitals reviewed  General Examination: No distress, cooperative  Neurological exam:  Mental Status  A, Ox3  Able to do basic arithmetic  Follows simple, 3 step commands  Speech: fluent, normal rhythm, no dysarthria  Cranial Nerves  CN II: Visual fields full to confrontation  CN III, IV, VI: EOMI bilaterally  Normal lids and orbits bilaterally  Pupils equal round and reactive to light bilaterally  No nystagmus  CN V: Facial sensation is normal   CN VII:  Right: There is no facial weakness or asymmetry  Left: There is no facial weakness or asymmetry  CN VIII: Audition intact to finger rub bilaterally  CN IX, X: Uvula midline   Palate elevates symmetrically  CN XI:  Right: Sternocleidomastoid strength is normal  Trapezius strength is normal   Left: Sternocleidomastoid strength is normal  Trapezius strength is normal   CN XII: Tongue midline without atrophy or fasciculations with appropriate movement  Motor  Normal bulk, tone  No fasciculations present  No pronator drift   + retropulsion pull test       Right Left   Shoulder abduction 5 5   Shoulder adduction 5 5   Elbow flexion 5 5   Elbow extension 5 5   Wrist flexion 5 5   Wrist extension 5 5   Hip flexion 5 5   Hip extension 5 5   Knee flexion 5 5   Knee extension 5 5   Plantar flexion 5 5   Dorsiflexion 5 5     Sensory  Upper extremities:  Light touch, vibration, temp, sensation to pinprick, proprioception normal   Lower extremities: light touch normal, temp/ sensation to pinprick decreased upto ankles b/l  Proprioception decreased  Reflexes Right Left   Brachioradialis     +1  +1   Biceps                                   +1  +1   Triceps  +1  +1   Patellar   trace   trace      Coordination  Finger-to-nose normal R, mild intention termor on L  Gait  Casual gait - decreased arm swing, slow steps  Romberg positive  Thank you for involving me in the care of your patient  If you have any additional questions or would like to discuss further, please feel free to contact me      MICHI Ness , [facility]

## 2022-08-31 NOTE — ASSESSMENT & PLAN NOTE
Since 3/22 visit, has daily episodes where patient is observed to either: stiffen up, "act like a drunk baby" where he screams, slings his head, flings his arms in the air, will not answer logical questions during these episodes  Aura: blurry vision b/l, unusual thoughts (hurt grandchildren, he is unable to help)  Each episodes lasts about 45 mins-1 hr  Has fallen a few times during the episodes  Able to follow commands sometimes during these episodes, but does not remember doing so  Patient reports he does not recall the details of each episode clearly  First memory after the episode is numbness in lips, occasionally numbness throughout whole body  Craves sugar after episodes  PNES vs epileptic seizures      Plan:   · Will discuss w/ epilepsy team (pt was previously followed by Dr Vu Gonzalez) to consider EMU admission for spell characterization given that these "episodes" are occurring on a daily basis, of different character as compared to prior episodes (syncopal)  · Family, patient agreeable for EMU admission if epilepsy team thinks it is warranted

## 2022-08-31 NOTE — ASSESSMENT & PLAN NOTE
Hx of episodes of lightheadedness associated with imbalance with prior syncopal events w/ unremarkable testing including tilt table, autonomic workup  MRI brain - no evidence of neuro degenerative disorder (MSA)  Since 3/22, has had falls during "episodes" (see below)  Exam shows decreased arm swing, slow steps, +retropulsion pull test, + Romberg, but no rigidity appreciated  History, exam not suggestive of parkinsonism      Plan:   · Strongly encouraged PT for balance   · DaTSCAN not indicated- will not   · Sinemet not indicated at this time given lack of parkinsonian features

## 2022-09-01 ENCOUNTER — TELEPHONE (OUTPATIENT)
Dept: NEUROLOGY | Facility: CLINIC | Age: 57
End: 2022-09-01

## 2022-09-01 NOTE — TELEPHONE ENCOUNTER
Pt called and left VM asking if his blood work ordered by dr Justino Nation is requiring fasting  Called pt back ,reported that blood test that were ordered by dr Justino Nation doesn't required fasting,but if he is planning to complete blood work ordered by his PCP also 'he will need to be fasting  Pt verbalized understanding ,he stated that he was planning to go tomorrow morning

## 2022-09-02 ENCOUNTER — TELEPHONE (OUTPATIENT)
Dept: PSYCHIATRY | Facility: CLINIC | Age: 57
End: 2022-09-02

## 2022-09-02 ENCOUNTER — APPOINTMENT (OUTPATIENT)
Dept: LAB | Age: 57
End: 2022-09-02
Payer: COMMERCIAL

## 2022-09-02 DIAGNOSIS — G62.9 NEUROPATHY: ICD-10-CM

## 2022-09-02 DIAGNOSIS — K91.2 POSTSURGICAL MALABSORPTION: ICD-10-CM

## 2022-09-02 DIAGNOSIS — R74.8 ELEVATED ALKALINE PHOSPHATASE LEVEL: ICD-10-CM

## 2022-09-02 DIAGNOSIS — Z48.815 ENCOUNTER FOR SURGICAL AFTERCARE FOLLOWING SURGERY OF DIGESTIVE SYSTEM: ICD-10-CM

## 2022-09-02 DIAGNOSIS — Z98.84 BARIATRIC SURGERY STATUS: ICD-10-CM

## 2022-09-02 LAB
ALBUMIN SERPL BCP-MCNC: 3.6 G/DL (ref 3.5–5)
ALP SERPL-CCNC: 114 U/L (ref 46–116)
ALT SERPL W P-5'-P-CCNC: 31 U/L (ref 12–78)
ANION GAP SERPL CALCULATED.3IONS-SCNC: 6 MMOL/L (ref 4–13)
AST SERPL W P-5'-P-CCNC: 16 U/L (ref 5–45)
BILIRUB SERPL-MCNC: 0.7 MG/DL (ref 0.2–1)
BUN SERPL-MCNC: 7 MG/DL (ref 5–25)
CALCIUM SERPL-MCNC: 8.8 MG/DL (ref 8.3–10.1)
CHLORIDE SERPL-SCNC: 99 MMOL/L (ref 96–108)
CO2 SERPL-SCNC: 25 MMOL/L (ref 21–32)
CREAT SERPL-MCNC: 0.86 MG/DL (ref 0.6–1.3)
ERYTHROCYTE [DISTWIDTH] IN BLOOD BY AUTOMATED COUNT: 12.7 % (ref 11.6–15.1)
FERRITIN SERPL-MCNC: 163 NG/ML (ref 8–388)
FOLATE SERPL-MCNC: 17.1 NG/ML (ref 3.1–17.5)
GFR SERPL CREATININE-BSD FRML MDRD: 96 ML/MIN/1.73SQ M
GLUCOSE P FAST SERPL-MCNC: 93 MG/DL (ref 65–99)
HCT VFR BLD AUTO: 43.9 % (ref 36.5–49.3)
HGB BLD-MCNC: 15 G/DL (ref 12–17)
IRON SATN MFR SERPL: 44 % (ref 20–50)
IRON SERPL-MCNC: 137 UG/DL (ref 65–175)
MCH RBC QN AUTO: 31.8 PG (ref 26.8–34.3)
MCHC RBC AUTO-ENTMCNC: 34.2 G/DL (ref 31.4–37.4)
MCV RBC AUTO: 93 FL (ref 82–98)
PLATELET # BLD AUTO: 256 THOUSANDS/UL (ref 149–390)
PMV BLD AUTO: 9.8 FL (ref 8.9–12.7)
POTASSIUM SERPL-SCNC: 4.2 MMOL/L (ref 3.5–5.3)
PROT SERPL-MCNC: 6.5 G/DL (ref 6.4–8.4)
RBC # BLD AUTO: 4.72 MILLION/UL (ref 3.88–5.62)
SODIUM SERPL-SCNC: 130 MMOL/L (ref 135–147)
TIBC SERPL-MCNC: 313 UG/DL (ref 250–450)
WBC # BLD AUTO: 7.48 THOUSAND/UL (ref 4.31–10.16)

## 2022-09-02 PROCEDURE — 84207 ASSAY OF VITAMIN B-6: CPT

## 2022-09-02 PROCEDURE — 82728 ASSAY OF FERRITIN: CPT

## 2022-09-02 PROCEDURE — 83550 IRON BINDING TEST: CPT

## 2022-09-02 PROCEDURE — 85027 COMPLETE CBC AUTOMATED: CPT

## 2022-09-02 PROCEDURE — 80053 COMPREHEN METABOLIC PANEL: CPT

## 2022-09-02 PROCEDURE — 84165 PROTEIN E-PHORESIS SERUM: CPT

## 2022-09-02 PROCEDURE — 83918 ORGANIC ACIDS TOTAL QUANT: CPT

## 2022-09-02 PROCEDURE — 83540 ASSAY OF IRON: CPT

## 2022-09-02 PROCEDURE — 82746 ASSAY OF FOLIC ACID SERUM: CPT

## 2022-09-02 PROCEDURE — 36415 COLL VENOUS BLD VENIPUNCTURE: CPT

## 2022-09-02 PROCEDURE — 84425 ASSAY OF VITAMIN B-1: CPT

## 2022-09-02 NOTE — TELEPHONE ENCOUNTER
Oliver Huang returned the call  Reviewed low sodium and changes to watch for  She said Leopold Penman has been having episodes of concern and it's been bounced between neuro and psych  Nurse specifically reviewed if she has any concerns or status declines, she should take Leopold Penman to the ED  Laura verbalized understanding of directions and will talk with Leopold Penman  She also asked if there would be a substitute for the Trileptal  I informed her there cyndi not be a substitute immediately  The concern now is his sodium level, which Dr Morris Green will follow up on  Medication changes beyond stopping Trileptal can be discussed later

## 2022-09-02 NOTE — TELEPHONE ENCOUNTER
Message sent to Dr Pamela Araujo regarding sodium level  Direction per Dr Pamela Araujo:  Stop Trileptal completely; if possible have repeat blood work tomorrow; he will review and follow up with Thuy Rain  Called Marlys Quintanilla and wife Rhea Mendoza  Left messages for both:  Sodium level is low; stop Trileptal; repeat blood work tomorrow; Dr Pamela Araujo will review and follow up with him; given number to call  Will call again before the end of the day  Will add watch for changes in mental status and fluid intake

## 2022-09-03 ENCOUNTER — APPOINTMENT (OUTPATIENT)
Dept: LAB | Age: 57
End: 2022-09-03
Payer: COMMERCIAL

## 2022-09-03 ENCOUNTER — DOCUMENTATION (OUTPATIENT)
Dept: PSYCHIATRY | Facility: CLINIC | Age: 57
End: 2022-09-03

## 2022-09-03 DIAGNOSIS — E87.1 HYPONATREMIA: ICD-10-CM

## 2022-09-03 DIAGNOSIS — E87.1 HYPONATREMIA: Primary | ICD-10-CM

## 2022-09-03 LAB
ANION GAP SERPL CALCULATED.3IONS-SCNC: 6 MMOL/L (ref 4–13)
BUN SERPL-MCNC: 8 MG/DL (ref 5–25)
CALCIUM SERPL-MCNC: 8.8 MG/DL (ref 8.3–10.1)
CHLORIDE SERPL-SCNC: 100 MMOL/L (ref 96–108)
CO2 SERPL-SCNC: 26 MMOL/L (ref 21–32)
CREAT SERPL-MCNC: 0.91 MG/DL (ref 0.6–1.3)
GFR SERPL CREATININE-BSD FRML MDRD: 93 ML/MIN/1.73SQ M
GLUCOSE P FAST SERPL-MCNC: 90 MG/DL (ref 65–99)
POTASSIUM SERPL-SCNC: 4.3 MMOL/L (ref 3.5–5.3)
SODIUM SERPL-SCNC: 132 MMOL/L (ref 135–147)

## 2022-09-03 PROCEDURE — 36415 COLL VENOUS BLD VENIPUNCTURE: CPT

## 2022-09-03 PROCEDURE — 80048 BASIC METABOLIC PNL TOTAL CA: CPT

## 2022-09-03 PROCEDURE — 84166 PROTEIN E-PHORESIS/URINE/CSF: CPT | Performed by: PSYCHIATRY & NEUROLOGY

## 2022-09-03 NOTE — PSYCH
BMP results reviewed from today, 9/3/22  Updated sodium level is 132 (uptrending)  Communicated the results with Tirni Farooq, [address] [place] [address] Dr wife  She and [place] were encouraged to continue to reduce fluid intake and remain off Trileptal  I will place another order for BMP to be obtained in 48 hours (Monday)  Will follow-up with Greg in-office on 9/8/22 to discuss options for medication intervention for mood management  Trini Farooq was encouraged to seek emergent medical intervention via the closest ED if Chandler's mental status suddenly changes

## 2022-09-03 NOTE — TELEPHONE ENCOUNTER
Reviewed message above  Patient advised to D/C Trileptal fully  New BMP order placed today, Saturday 9/3/22  Will follow up with results this evening and contact Greg lou

## 2022-09-06 ENCOUNTER — APPOINTMENT (OUTPATIENT)
Dept: LAB | Age: 57
End: 2022-09-06
Payer: COMMERCIAL

## 2022-09-06 DIAGNOSIS — E87.1 HYPONATREMIA: ICD-10-CM

## 2022-09-06 LAB
ANION GAP SERPL CALCULATED.3IONS-SCNC: 9 MMOL/L (ref 4–13)
BUN SERPL-MCNC: 8 MG/DL (ref 5–25)
CALCIUM SERPL-MCNC: 8.9 MG/DL (ref 8.3–10.1)
CHLORIDE SERPL-SCNC: 105 MMOL/L (ref 96–108)
CO2 SERPL-SCNC: 22 MMOL/L (ref 21–32)
CREAT SERPL-MCNC: 1.07 MG/DL (ref 0.6–1.3)
GFR SERPL CREATININE-BSD FRML MDRD: 77 ML/MIN/1.73SQ M
GLUCOSE P FAST SERPL-MCNC: 211 MG/DL (ref 65–99)
POTASSIUM SERPL-SCNC: 3.9 MMOL/L (ref 3.5–5.3)
SODIUM SERPL-SCNC: 136 MMOL/L (ref 135–147)
VIT B1 BLD-SCNC: 166 NMOL/L (ref 66.5–200)

## 2022-09-06 PROCEDURE — 36415 COLL VENOUS BLD VENIPUNCTURE: CPT

## 2022-09-06 PROCEDURE — 80048 BASIC METABOLIC PNL TOTAL CA: CPT

## 2022-09-07 LAB
ALBUMIN SERPL ELPH-MCNC: 3.91 G/DL (ref 3.5–5)
ALBUMIN SERPL ELPH-MCNC: 62 % (ref 52–65)
ALBUMIN UR ELPH-MCNC: 100 %
ALPHA1 GLOB MFR UR ELPH: 0 %
ALPHA1 GLOB SERPL ELPH-MCNC: 0.33 G/DL (ref 0.1–0.4)
ALPHA1 GLOB SERPL ELPH-MCNC: 5.2 % (ref 2.5–5)
ALPHA2 GLOB MFR UR ELPH: 0 %
ALPHA2 GLOB SERPL ELPH-MCNC: 0.71 G/DL (ref 0.4–1.2)
ALPHA2 GLOB SERPL ELPH-MCNC: 11.2 % (ref 7–13)
B-GLOBULIN MFR UR ELPH: 0 %
BETA GLOB ABNORMAL SERPL ELPH-MCNC: 0.4 G/DL (ref 0.4–0.8)
BETA1 GLOB SERPL ELPH-MCNC: 6.4 % (ref 5–13)
BETA2 GLOB SERPL ELPH-MCNC: 4.4 % (ref 2–8)
BETA2+GAMMA GLOB SERPL ELPH-MCNC: 0.28 G/DL (ref 0.2–0.5)
GAMMA GLOB ABNORMAL SERPL ELPH-MCNC: 0.68 G/DL (ref 0.5–1.6)
GAMMA GLOB MFR UR ELPH: 0 %
GAMMA GLOB SERPL ELPH-MCNC: 10.8 % (ref 12–22)
IGG/ALB SER: 1.63 {RATIO} (ref 1.1–1.8)
METHYLMALONATE SERPL-SCNC: 199 NMOL/L (ref 0–378)
PROT PATTERN SERPL ELPH-IMP: ABNORMAL
PROT PATTERN UR ELPH-IMP: NORMAL
PROT SERPL-MCNC: 6.3 G/DL (ref 6.4–8.2)
PROT UR-MCNC: 6 MG/DL

## 2022-09-07 PROCEDURE — 84165 PROTEIN E-PHORESIS SERUM: CPT | Performed by: PATHOLOGY

## 2022-09-07 PROCEDURE — 84166 PROTEIN E-PHORESIS/URINE/CSF: CPT | Performed by: PATHOLOGY

## 2022-09-08 ENCOUNTER — OFFICE VISIT (OUTPATIENT)
Dept: PSYCHIATRY | Facility: CLINIC | Age: 57
End: 2022-09-08
Payer: COMMERCIAL

## 2022-09-08 DIAGNOSIS — F41.9 ANXIETY DISORDER, UNSPECIFIED TYPE: ICD-10-CM

## 2022-09-08 DIAGNOSIS — F31.62 BIPOLAR DISORDER, CURRENT EPISODE MIXED, MODERATE (HCC): Primary | ICD-10-CM

## 2022-09-08 DIAGNOSIS — F51.01 PRIMARY INSOMNIA: ICD-10-CM

## 2022-09-08 PROCEDURE — 99214 OFFICE O/P EST MOD 30 MIN: CPT | Performed by: STUDENT IN AN ORGANIZED HEALTH CARE EDUCATION/TRAINING PROGRAM

## 2022-09-08 PROCEDURE — 90833 PSYTX W PT W E/M 30 MIN: CPT | Performed by: STUDENT IN AN ORGANIZED HEALTH CARE EDUCATION/TRAINING PROGRAM

## 2022-09-08 RX ORDER — CLONAZEPAM 2 MG/1
2 TABLET ORAL 2 TIMES DAILY PRN
Qty: 60 TABLET | Refills: 0 | Status: SHIPPED | OUTPATIENT
Start: 2022-09-08 | End: 2022-10-10 | Stop reason: SDUPTHER

## 2022-09-08 RX ORDER — OXCARBAZEPINE 150 MG/1
150 TABLET, FILM COATED ORAL 2 TIMES DAILY
Qty: 60 TABLET | Refills: 2 | Status: SHIPPED | OUTPATIENT
Start: 2022-09-08 | End: 2022-09-29

## 2022-09-08 NOTE — BH TREATMENT PLAN
TREATMENT PLAN (Medication Management Only)        [facility]    Name and Date of Birth:  Eliazar Blanca 65 y o  1965  Date of Treatment Plan: September 8, 2022  Diagnosis/Diagnoses:    1  Bipolar disorder, current episode mixed, moderate ([address] )    2  Primary insomnia    3  Anxiety disorder, unspecified type      Strengths/Personal Resources for Self-Care: supportive family, supportive friends, taking medications as prescribed, ability to adapt to life changes, ability to communicate needs, ability to communicate well, ability to listen, ability to reason, general fund of knowledge, good understanding of illness, independence, motivation for treatment, ability to negotiate basic needs  Area/Areas of need (in own words): mood instability  1  Long Term Goal: improve control of mood  Target Date:6 months - 3/8/2023  Person/Persons responsible for completion of goal: Chandler  2  Short Term Objective (s) - How will we reach this goal?:   A  Provider new recommended medication/dosage changes and/or continue medication(s): continue current medications as prescribed  B  Attend medication management appointments regularly  C  Take psychiatric medications responsibly  D  Care for my pets  E  Spend time outside the home with son    Target Date:6 months - 3/8/2023  Person/Persons Responsible for Completion of Goal: Jordan  Progress Towards Goals: continuing treatment  Treatment Modality: medication management every 3 months  Review due 180 days from date of this plan: 6 months - 3/8/2023  Expected length of service: ongoing treatment  My Physician/PA/NP and I have developed this plan together and I agree to work on the goals and objectives  I understand the treatment goals that were developed for my treatment  Treatment Plan completed with assistance and input from patient and verbal consent provided   Treatment plan was not signed at time of office visit secondary to COVID-19 social distancing guidelines
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
## 2022-09-10 LAB — VIT B6 SERPL-MCNC: 35.6 UG/L (ref 3.4–65.2)

## 2022-09-14 ENCOUNTER — TELEPHONE (OUTPATIENT)
Dept: NEUROLOGY | Facility: CLINIC | Age: 57
End: 2022-09-14

## 2022-09-14 DIAGNOSIS — R56.9 OBSERVED SEIZURE-LIKE ACTIVITY (HCC): Primary | ICD-10-CM

## 2022-09-14 NOTE — TELEPHONE ENCOUNTER
Pt left VM that was hard to understand r/t voice was muffled  Please f/u       CB# [phone] Closure 2 Information: This tab is for additional flaps and grafts, including complex repair and grafts and complex repair and flaps. You can also specify a different location for the additional defect, if the location is the same you do not need to select a new one. We will insert the automated text for the repair you select below just as we do for solitary flaps and grafts. Please note that at this time if you select a location with a different insurance zone you will need to override the ICD10 and CPT if appropriate.

## 2022-09-15 NOTE — TELEPHONE ENCOUNTER
Akira Yun and Linda Packer,    This patient called and left a voicemail ask when he is supposed to go for his observation EMU that Dr Yesica Shelley had ordered for her  I don't see his name in our book so I wasn't sure if he is ready for us to start the auth       [phone]     Thank you so much,  Cailin Warren

## 2022-09-16 NOTE — TELEPHONE ENCOUNTER
Called pt to schedule EMU admission  EMU scheduled for 10/11 at 08:00  ADT21 order placed  EMU education sheet reviewed and mailed to pt  Pt added to [place] calendar  Forwarded to precert

## 2022-09-20 ENCOUNTER — HOSPITAL ENCOUNTER (OUTPATIENT)
Dept: INFUSION CENTER | Facility: HOSPITAL | Age: 57
Discharge: HOME | End: 2022-09-20
Payer: COMMERCIAL

## 2022-09-20 PROCEDURE — 96372 THER/PROPH/DIAG INJ SC/IM: CPT

## 2022-09-20 RX ADMIN — PALIPERIDONE PALMITATE 234 MG: 234 INJECTION INTRAMUSCULAR at 08:20

## 2022-09-20 NOTE — PROGRESS NOTES
Pt tolerated invega sustenna to right deltoid IM without complications, made next appt, AVS declined as patient states he uses mychart

## 2022-09-22 DIAGNOSIS — F31.62 BIPOLAR DISORDER, CURRENT EPISODE MIXED, MODERATE (HCC): ICD-10-CM

## 2022-09-22 NOTE — TELEPHONE ENCOUNTER
Medication Refill Request     Name of Medication Pristiq  Dose/Frequency 50 mg 1 tablet a day  Quantity 90 tablets  Verified pharmacy   [x]  Verified ordering Provider   [x]  Does patient have enough for the next 3 days? Yes [x] No []  Does patient have a follow-up appointment scheduled?  Yes [x] No []   If so when is appointment: 9/29/22

## 2022-09-23 RX ORDER — DESVENLAFAXINE 50 MG/1
50 TABLET, EXTENDED RELEASE ORAL DAILY
Qty: 90 TABLET | Refills: 2 | Status: SHIPPED | OUTPATIENT
Start: 2022-09-23

## 2022-09-27 DIAGNOSIS — E87.6 HYPOKALEMIA: ICD-10-CM

## 2022-09-27 RX ORDER — POTASSIUM CHLORIDE 20 MEQ/1
20 TABLET, EXTENDED RELEASE ORAL DAILY
Qty: 30 TABLET | Refills: 5 | Status: SHIPPED | OUTPATIENT
Start: 2022-09-27

## 2022-09-28 ENCOUNTER — APPOINTMENT (OUTPATIENT)
Dept: LAB | Age: 57
End: 2022-09-28
Payer: COMMERCIAL

## 2022-09-28 DIAGNOSIS — F31.62 BIPOLAR DISORDER, CURRENT EPISODE MIXED, MODERATE (HCC): ICD-10-CM

## 2022-09-28 LAB
ANION GAP SERPL CALCULATED.3IONS-SCNC: 10 MMOL/L (ref 4–13)
BUN SERPL-MCNC: 11 MG/DL (ref 5–25)
CALCIUM SERPL-MCNC: 9.3 MG/DL (ref 8.3–10.1)
CHLORIDE SERPL-SCNC: 103 MMOL/L (ref 96–108)
CO2 SERPL-SCNC: 24 MMOL/L (ref 21–32)
CREAT SERPL-MCNC: 0.93 MG/DL (ref 0.6–1.3)
GFR SERPL CREATININE-BSD FRML MDRD: 91 ML/MIN/1.73SQ M
GLUCOSE P FAST SERPL-MCNC: 97 MG/DL (ref 65–99)
POTASSIUM SERPL-SCNC: 4.4 MMOL/L (ref 3.5–5.3)
SODIUM SERPL-SCNC: 137 MMOL/L (ref 135–147)

## 2022-09-28 PROCEDURE — 36415 COLL VENOUS BLD VENIPUNCTURE: CPT

## 2022-09-28 PROCEDURE — 80048 BASIC METABOLIC PNL TOTAL CA: CPT

## 2022-09-29 ENCOUNTER — OFFICE VISIT (OUTPATIENT)
Dept: PSYCHIATRY | Facility: CLINIC | Age: 57
End: 2022-09-29
Payer: COMMERCIAL

## 2022-09-29 DIAGNOSIS — Z51.81 THERAPEUTIC DRUG MONITORING: ICD-10-CM

## 2022-09-29 DIAGNOSIS — F51.04 PSYCHOPHYSIOLOGICAL INSOMNIA: ICD-10-CM

## 2022-09-29 DIAGNOSIS — F31.62 BIPOLAR DISORDER, CURRENT EPISODE MIXED, MODERATE (HCC): Primary | ICD-10-CM

## 2022-09-29 DIAGNOSIS — F41.9 ANXIETY DISORDER, UNSPECIFIED TYPE: ICD-10-CM

## 2022-09-29 PROCEDURE — 99214 OFFICE O/P EST MOD 30 MIN: CPT | Performed by: STUDENT IN AN ORGANIZED HEALTH CARE EDUCATION/TRAINING PROGRAM
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
## 2022-10-05 ENCOUNTER — TELEPHONE (OUTPATIENT)
Dept: PSYCHIATRY | Facility: CLINIC | Age: 57
End: 2022-10-05

## 2022-10-05 NOTE — TELEPHONE ENCOUNTER
Karuna at the infusion center lm stating Osmany Gutierrez has an appointment for Slime Miranda on Monday 10/10/22 and will need new orders       [facility] [phone]

## 2022-10-07 NOTE — TELEPHONE ENCOUNTER
Called and spoke to pt to confirm EMU admission  Pt verbalized an understanding and had no questions

## 2022-10-10 ENCOUNTER — HOSPITAL ENCOUNTER (OUTPATIENT)
Dept: INFUSION CENTER | Facility: HOSPITAL | Age: 57
Discharge: HOME | End: 2022-10-10
Payer: COMMERCIAL

## 2022-10-10 DIAGNOSIS — F31.62 BIPOLAR DISORDER, CURRENT EPISODE MIXED, MODERATE (HCC): Primary | ICD-10-CM

## 2022-10-10 DIAGNOSIS — F41.9 ANXIETY DISORDER, UNSPECIFIED TYPE: ICD-10-CM

## 2022-10-10 PROCEDURE — 96372 THER/PROPH/DIAG INJ SC/IM: CPT

## 2022-10-10 RX ORDER — CLONAZEPAM 2 MG/1
2 TABLET ORAL 2 TIMES DAILY PRN
Qty: 60 TABLET | Refills: 0 | Status: SHIPPED | OUTPATIENT
Start: 2022-10-10

## 2022-10-10 RX ADMIN — PALIPERIDONE PALMITATE 234 MG: 234 INJECTION INTRAMUSCULAR at 08:15

## 2022-10-10 NOTE — TELEPHONE ENCOUNTER
PDMP website reviewed  Ailin Dominguez has been appropriately adherent to controlled psychotropic medications without evidence of abuse or misuse  As such, will send 30-day refill to pharmacy of choice and follow up as necessary

## 2022-10-10 NOTE — PROGRESS NOTES
Pt tolerated Merrily Vicky injection to L deltoid without difficulty  Next appt scheduled    Left ambulatory declining AVS

## 2022-10-10 NOTE — TELEPHONE ENCOUNTER
Medication Refill Request     Name of Medication Klonopin  Dose/Frequency 2mg 2x daily   Quantity 60  Verified pharmacy   [x]  Verified ordering Provider   [x]  Does patient have enough for the next 3 days? Yes [x] No []  Does patient have a follow-up appointment scheduled?  Yes [x] No []   If so when is appointment: 12/30/22

## 2022-10-11 ENCOUNTER — APPOINTMENT (OUTPATIENT)
Dept: NEUROLOGY | Facility: CLINIC | Age: 57
End: 2022-10-11
Payer: COMMERCIAL

## 2022-10-11 ENCOUNTER — HOSPITAL ENCOUNTER (INPATIENT)
Facility: HOSPITAL | Age: 57
LOS: 1 days | Discharge: HOME | End: 2022-10-12
Attending: PSYCHIATRY & NEUROLOGY | Admitting: PSYCHIATRY & NEUROLOGY
Payer: COMMERCIAL

## 2022-10-11 PROBLEM — H61.23 BILATERAL IMPACTED CERUMEN: Status: RESOLVED | Noted: 2022-02-21 | Resolved: 2022-10-11

## 2022-10-11 LAB
ALBUMIN SERPL BCP-MCNC: 3.4 G/DL (ref 3.5–5)
ALP SERPL-CCNC: 119 U/L (ref 46–116)
ALT SERPL W P-5'-P-CCNC: 31 U/L (ref 12–78)
ANION GAP SERPL CALCULATED.3IONS-SCNC: 6 MMOL/L (ref 4–13)
AST SERPL W P-5'-P-CCNC: 21 U/L (ref 5–45)
BASOPHILS # BLD AUTO: 0.07 THOUSANDS/ΜL (ref 0–0.1)
BASOPHILS NFR BLD AUTO: 1 % (ref 0–1)
BILIRUB SERPL-MCNC: 0.51 MG/DL (ref 0.2–1)
BUN SERPL-MCNC: 6 MG/DL (ref 5–25)
CALCIUM ALBUM COR SERPL-MCNC: 8.9 MG/DL (ref 8.3–10.1)
CALCIUM SERPL-MCNC: 8.4 MG/DL (ref 8.3–10.1)
CHLORIDE SERPL-SCNC: 102 MMOL/L (ref 96–108)
CO2 SERPL-SCNC: 24 MMOL/L (ref 21–32)
CREAT SERPL-MCNC: 0.67 MG/DL (ref 0.6–1.3)
EOSINOPHIL # BLD AUTO: 0.18 THOUSAND/ΜL (ref 0–0.61)
EOSINOPHIL NFR BLD AUTO: 2 % (ref 0–6)
ERYTHROCYTE [DISTWIDTH] IN BLOOD BY AUTOMATED COUNT: 12.3 % (ref 11.6–15.1)
GFR SERPL CREATININE-BSD FRML MDRD: 107 ML/MIN/1.73SQ M
GLUCOSE SERPL-MCNC: 144 MG/DL (ref 65–140)
GLUCOSE SERPL-MCNC: 43 MG/DL (ref 65–140)
GLUCOSE SERPL-MCNC: 67 MG/DL (ref 65–140)
GLUCOSE SERPL-MCNC: 79 MG/DL (ref 65–140)
HCT VFR BLD AUTO: 40.9 % (ref 36.5–49.3)
HGB BLD-MCNC: 14 G/DL (ref 12–17)
IMM GRANULOCYTES # BLD AUTO: 0.09 THOUSAND/UL (ref 0–0.2)
IMM GRANULOCYTES NFR BLD AUTO: 1 % (ref 0–2)
LYMPHOCYTES # BLD AUTO: 2.07 THOUSANDS/ΜL (ref 0.6–4.47)
LYMPHOCYTES NFR BLD AUTO: 20 % (ref 14–44)
MCH RBC QN AUTO: 30.6 PG (ref 26.8–34.3)
MCHC RBC AUTO-ENTMCNC: 34.2 G/DL (ref 31.4–37.4)
MCV RBC AUTO: 90 FL (ref 82–98)
MONOCYTES # BLD AUTO: 1.25 THOUSAND/ΜL (ref 0.17–1.22)
MONOCYTES NFR BLD AUTO: 12 % (ref 4–12)
NEUTROPHILS # BLD AUTO: 6.63 THOUSANDS/ΜL (ref 1.85–7.62)
NEUTS SEG NFR BLD AUTO: 64 % (ref 43–75)
NRBC BLD AUTO-RTO: 0 /100 WBCS
PLATELET # BLD AUTO: 232 THOUSANDS/UL (ref 149–390)
PMV BLD AUTO: 9.6 FL (ref 8.9–12.7)
POTASSIUM SERPL-SCNC: 4.2 MMOL/L (ref 3.5–5.3)
PROT SERPL-MCNC: 6.4 G/DL (ref 6.4–8.4)
RBC # BLD AUTO: 4.57 MILLION/UL (ref 3.88–5.62)
SODIUM SERPL-SCNC: 132 MMOL/L (ref 135–147)
WBC # BLD AUTO: 10.29 THOUSAND/UL (ref 4.31–10.16)

## 2022-10-11 PROCEDURE — 95700 EEG CONT REC W/VID EEG TECH: CPT

## 2022-10-11 PROCEDURE — 85025 COMPLETE CBC W/AUTO DIFF WBC: CPT | Performed by: NURSE PRACTITIONER

## 2022-10-11 PROCEDURE — 82948 REAGENT STRIP/BLOOD GLUCOSE: CPT

## 2022-10-11 PROCEDURE — 80183 DRUG SCRN QUANT OXCARBAZEPIN: CPT | Performed by: NURSE PRACTITIONER

## 2022-10-11 PROCEDURE — 4A10X4Z MONITORING OF CENTRAL NERVOUS ELECTRICAL ACTIVITY, EXTERNAL APPROACH: ICD-10-PCS | Performed by: PSYCHIATRY & NEUROLOGY

## 2022-10-11 PROCEDURE — 95715 VEEG EA 12-26HR INTMT MNTR: CPT

## 2022-10-11 PROCEDURE — 80053 COMPREHEN METABOLIC PANEL: CPT | Performed by: NURSE PRACTITIONER

## 2022-10-11 PROCEDURE — 99223 1ST HOSP IP/OBS HIGH 75: CPT | Performed by: PSYCHIATRY & NEUROLOGY

## 2022-10-11 RX ORDER — TRAZODONE HYDROCHLORIDE 100 MG/1
300 TABLET ORAL
Status: DISCONTINUED | OUTPATIENT
Start: 2022-10-11 | End: 2022-10-12 | Stop reason: HOSPADM

## 2022-10-11 RX ORDER — POTASSIUM CHLORIDE 20 MEQ/1
20 TABLET, EXTENDED RELEASE ORAL
Status: DISCONTINUED | OUTPATIENT
Start: 2022-10-11 | End: 2022-10-12 | Stop reason: HOSPADM

## 2022-10-11 RX ORDER — DIPHENHYDRAMINE HCL 25 MG
25 TABLET ORAL EVERY 8 HOURS PRN
Status: DISCONTINUED | OUTPATIENT
Start: 2022-10-11 | End: 2022-10-12 | Stop reason: HOSPADM

## 2022-10-11 RX ORDER — ONDANSETRON 2 MG/ML
4 INJECTION INTRAMUSCULAR; INTRAVENOUS EVERY 6 HOURS PRN
Status: DISCONTINUED | OUTPATIENT
Start: 2022-10-11 | End: 2022-10-12 | Stop reason: HOSPADM

## 2022-10-11 RX ORDER — ACETAMINOPHEN 325 MG/1
650 TABLET ORAL EVERY 6 HOURS PRN
Status: DISCONTINUED | OUTPATIENT
Start: 2022-10-11 | End: 2022-10-12 | Stop reason: HOSPADM

## 2022-10-11 RX ORDER — RANOLAZINE 500 MG/1
1000 TABLET, EXTENDED RELEASE ORAL 2 TIMES DAILY
Status: DISCONTINUED | OUTPATIENT
Start: 2022-10-11 | End: 2022-10-12 | Stop reason: HOSPADM

## 2022-10-11 RX ORDER — CLONAZEPAM 1 MG/1
2 TABLET ORAL 2 TIMES DAILY PRN
Status: DISCONTINUED | OUTPATIENT
Start: 2022-10-11 | End: 2022-10-12 | Stop reason: HOSPADM

## 2022-10-11 RX ORDER — ENOXAPARIN SODIUM 100 MG/ML
40 INJECTION SUBCUTANEOUS DAILY
Status: DISCONTINUED | OUTPATIENT
Start: 2022-10-11 | End: 2022-10-12 | Stop reason: HOSPADM

## 2022-10-11 RX ORDER — POLYETHYLENE GLYCOL 3350 17 G/17G
17 POWDER, FOR SOLUTION ORAL DAILY PRN
Status: DISCONTINUED | OUTPATIENT
Start: 2022-10-11 | End: 2022-10-12 | Stop reason: HOSPADM

## 2022-10-11 RX ORDER — NICOTINE 21 MG/24HR
21 PATCH, TRANSDERMAL 24 HOURS TRANSDERMAL DAILY
Status: DISCONTINUED | OUTPATIENT
Start: 2022-10-12 | End: 2022-10-12 | Stop reason: HOSPADM

## 2022-10-11 RX ORDER — LORAZEPAM 2 MG/ML
2 INJECTION INTRAMUSCULAR EVERY 8 HOURS PRN
Status: DISCONTINUED | OUTPATIENT
Start: 2022-10-11 | End: 2022-10-12 | Stop reason: HOSPADM

## 2022-10-11 RX ORDER — CLONAZEPAM 1 MG/1
2 TABLET ORAL 2 TIMES DAILY
Status: DISCONTINUED | OUTPATIENT
Start: 2022-10-11 | End: 2022-10-11

## 2022-10-11 RX ORDER — PANTOPRAZOLE SODIUM 20 MG/1
20 TABLET, DELAYED RELEASE ORAL
Status: DISCONTINUED | OUTPATIENT
Start: 2022-10-11 | End: 2022-10-12 | Stop reason: HOSPADM

## 2022-10-11 RX ORDER — OXCARBAZEPINE 300 MG/1
300 TABLET, FILM COATED ORAL EVERY 12 HOURS SCHEDULED
COMMUNITY

## 2022-10-11 RX ORDER — ATORVASTATIN CALCIUM 40 MG/1
40 TABLET, FILM COATED ORAL
Status: DISCONTINUED | OUTPATIENT
Start: 2022-10-11 | End: 2022-10-12 | Stop reason: HOSPADM

## 2022-10-11 RX ORDER — OXCARBAZEPINE 300 MG/1
300 TABLET, FILM COATED ORAL EVERY 12 HOURS SCHEDULED
Status: DISCONTINUED | OUTPATIENT
Start: 2022-10-11 | End: 2022-10-12 | Stop reason: HOSPADM

## 2022-10-11 RX ORDER — DESVENLAFAXINE 50 MG/1
50 TABLET, EXTENDED RELEASE ORAL DAILY
Status: DISCONTINUED | OUTPATIENT
Start: 2022-10-12 | End: 2022-10-12 | Stop reason: HOSPADM

## 2022-10-11 RX ADMIN — ATORVASTATIN CALCIUM 40 MG: 40 TABLET, FILM COATED ORAL at 16:28

## 2022-10-11 RX ADMIN — CLONAZEPAM 2 MG: 1 TABLET ORAL at 14:01

## 2022-10-11 RX ADMIN — OXCARBAZEPINE 300 MG: 300 TABLET, FILM COATED ORAL at 18:23

## 2022-10-11 RX ADMIN — RANOLAZINE 1000 MG: 500 TABLET, FILM COATED, EXTENDED RELEASE ORAL at 18:22

## 2022-10-11 RX ADMIN — ENOXAPARIN SODIUM 40 MG: 40 INJECTION SUBCUTANEOUS at 10:53

## 2022-10-11 RX ADMIN — POTASSIUM CHLORIDE 20 MEQ: 1500 TABLET, EXTENDED RELEASE ORAL at 16:28

## 2022-10-11 RX ADMIN — PANTOPRAZOLE SODIUM 20 MG: 20 TABLET, DELAYED RELEASE ORAL at 16:28

## 2022-10-11 NOTE — PROGRESS NOTES
At approximately 10:50 am pt pushed event button  States his legs felt tingly and his speech was off  The tingle then began to radiate to his hands/arms, buttocks and then to his lips  When ictal tested pt was able to state where he was and who he was, follow commands and identify objects  He was not able to recall code phrase after event  Entire event lasted about an hour  Pt states " I know when my episode is coming to an end when I start to feel hungry and crave sugar " Safety maintained during and after the event  No new orders at this time, will continue to monitor

## 2022-10-11 NOTE — H&P
Epilepsy Admission H&P  Ena Delgadillo 65 y o  male   : 1965  MRN: [id number]     Unit/Bed#: [facility] 716-01    Encounter: [id number]     -------------------------------------------------------------------------------------------------------------------                Outpatient Neurologist:  Dr Arya Montejo                  Primary Care Physician:  Luciana Sullivan DO      CC:  Episodes concerning for seizures  -------------------------------------------------------------------------------------------------------------------  HPI:    Ena Delgadillo is a 65 y o   male with episodes concerning for seizures who is admitted to the Epilepsy Monitoring Unit for evaluation of events  The patient also has a history of bariatric surgery, ROSI, bipolar disorder, migraine headaches, HLD, tobacco use, syncope, CAD, and dysautonomia-like disorder  The patient was seen most recent in the office by Dr Braeden Martin who recommended EMU admission due to episodes concerning for seizure  Of note, patient has been admitted to the EMU about a year an a half ago for episodes concerning for syncope/dysautonomia  The patient presents this morning to the EMU with his wife  They report episodes concerning for seizure occurring about every 2-3 days but sometimes daily lasting for up to an hour  It is reported that these events started after having gastric bypass about 2 years ago  These events typically start with an aura of feeling restless, his lips/hands get numb, mind may race, and may feel like he is "fading out " He describes "fading out" as if he were watching TV and can see that the TV is there but is unable to see what is going on on the TV  After the aura, it may progress to him appearing like a "happy drunk" per his wife where his legs seem to be stable but goes to stand up and is very restful and may lean back  He may fall during events  He will stare but is slow to respond  He can follow commands during this time   He will be rigid while seated and feels that he does this so that he does not stand up and fall and hurt himself  They can occur from any position (not just standing like syncopal episodes in the past)  The patient does not recall anything that occurs after the aura  After the event is over, he craves sugar and is tired/wiped out  He will typically sleep for a few hours afterwards  Yesterday was his most recent episode  In the past, he would get jumpy, yell/scream, and have very odd and abnormal behaviors but this has not occurred in about 4-5 months  The patient deny any convulsive seizures or epigastric uprising, abnormal sensory symptoms, myoclonus  The patient denies any epilepsy risk factors  Wife does feel that if he has a manic episode and does not sleep all night he will typically have an event the next day  Monster energy drinks may provoke events  He did drink 2 monster energy drinks and had an Earney Persons prior to admission to provoke events to occur  Patient is following closely with psychiatry  He does have bipolar disorder and depression  He has been on trileptal for many years for his mood which has been helpful  Recently he has had some dosing changes due to hyponatremia but does not feel that this has caused episodes concerning for seizures to occur more or less frequently  No prior psychiatric hospitalizations  The patient is on disability currently  He was a  in the past  Most recently he was working as an  but due to episodes he can not work  Feels that this all started after his bariatric surgery  The patient has been driving  He notes that his episodes typically occur between 1030 am and 1 pm and feels that he knows when it is safe to be driving  On ROS, patient does report dizziness, orthostatic hypotension, constipation, and balance difficulties  History was also obtained from wife, who was present at the time of admission       I reviewed her prior records including clinic notes from Dr Jeremy Baca and prior [facility] admission documentations, which are summarized and incorporated above      -------------------------------------------------------------------------------------------------------------------   Seizure/Spell Characteristics:     1  Aura of restlessness, lip/hand numbness, mind racing, "fading out" sensation followed by episode of staring with slow responses, appearing rigid, falls if he stands up, retained ability to follow commands but is amnestic to event  May last up to one hour  Followed by sugar cravings and feeling tired  Occurring every 2-3 days, sometimes daily  Yesterday most recent  Occurs between 1030 am and 1 pm    2  Episodes are typically provoked upon standing    The becomes lightheaded, dizzy/ sense of movement, wife and son reporte a disconnected appearance and generalized shaking, mostly hands, then upper body/ head, may progress to loss of consciousness  - resolved      -------------------------------------------------------------------------------------------------------------------  Special Features  Status epilepticus: no  Self Injury Seizures: no  Precipitating Factors: sleep deprivation, monster energy drinks    Epilepsy Risk Factors:  Abnormal pregnancy:    no  Abnormal birth/:   no  Abnormal Development:   no  Febrile seizures, simple:   no  Febrile seizures, complex:   no  CNS infection:    no  Intellectual disability:    no  Cerebral palsy:    no  Head injury (moderate/severe):  no  CNS neoplasm:    no  CNS malformation:    no  Neurosurgical procedure:   no  Stroke:     no  Alcohol abuse:    no  Drug abuse:     no  Family history Sz/epilepsy:   no  Prior physical/sexual/emotional abuse: no    Prior AEDs:  none     Prior Evaluation:  Imagin2021 - MRI brain  Small arachnoid cyst in the left cerebellopontine angle  Normal MR brain study      EEGs:  2021  Routine - normal awake and drowsy    - Video EEG 2021: "Indication for Admission: To rule out epilepsy as source of recurrent events of postural related lightheadedness, period of appearing "disconnected", and brief generalized body shaking that may progress to loss of consciousness  During admission, workup for autoimmune dysautonomia was sent out  Patient also required tilt table testing  Hospital Course: The patient was admitted to the EMU and monitored with continuous video EEG  During admission, no medication changes were made  He had tilt table testing on 3/16 which did not capture any events of clinical interest though did admit to feeling "fuzzy" but not lightheadedness  No symptoms occurred that he has with his typical events  Lowest BP during testing was 92/70, formal reading of tilt table test is pending at this time  EEG was normal throughout admission and during tilt table testing  Activation procedures:  1  Tilt table testing  2  Photic stimulation  EMU Conclusion  Summary of interictal findings: Normal EEG  Summary of event findings: N/A - no events of clinical interest were captured during admission  Seizure Classification: N/A  Epilepsy Classification: N/A  EMU findings and discussion:  Patient's events are unlikely related to seizure  Recommend that he establish with dysautonomia specialist for further evaluation  "  - PET scan brain : none  - Neuropsychologic testing: none    Psychiatric History:  Depression: yes  Anxiety: yes  Bipolar disorder: yes  Psychosis: no  Psychiatric Admissions: none      -------------------------------------------------------------------------------------------------------------------  Current Medications:   No current facility-administered medications on file prior to encounter       Current Outpatient Medications on File Prior to Encounter   Medication Sig Dispense Refill   • acetaminophen (TYLENOL) 325 mg tablet Take 3 tablets (975 mg total) by mouth every 8 (eight) hours (Patient taking differently: Take 975 mg by mouth every 8 (eight) hours as needed) 30 tablet 0   • atorvastatin (LIPITOR) 40 mg tablet Take 1 tablet (40 mg total) by mouth daily 90 tablet 1   • OXcarbazepine (TRILEPTAL) 300 mg tablet Take 300 mg by mouth every 12 (twelve) hours     • pantoprazole (PROTONIX) 20 mg tablet Take 1 tablet (20 mg total) by mouth daily 90 tablet 1   • ranolazine (RANEXA) 1000 MG SR tablet Take 1 tablet (1,000 mg total) by mouth 2 (two) times a day 180 tablet 3   • traZODone (DESYREL) 300 MG tablet Take 1 tablet (300 mg total) by mouth daily at bedtime (Patient taking differently: Take 300 mg by mouth daily at bedtime As needed) 90 tablet 2   • aspirin 81 mg chewable tablet Chew 1 tablet (81 mg total) daily (Patient not taking: No sig reported) 30 tablet 0   • paliperidone palmitate ER (INVEGA) 234 mg/1 5 mL IM injection Inject 1 5 mL (234 mg total) into a muscle every 21 days for 1 dose (Patient taking differently: Inject 234 mg into a muscle every 21 days Most recent dose 10/10) 1 mL 20       ------------------------------------------------------------------------------------------------------------------  Past Medical / Surgical History/Social History/Family History:    Past Medical History:   Diagnosis Date   • Bariatric surgery status    • CPAP (continuous positive airway pressure) dependence    • Hearing loss of aging    • Hypercholesterolemia    • Morbid obesity (HCC)    • [facility] (non-ST elevation [facility] infarction) ([facility] )     april 2019   • Postsurgical malabsorption      Past Surgical History:   Procedure Laterality Date   • CARDIAC CATHETERIZATION      no stents    • COLONOSCOPY     • EGD     • HERNIA REPAIR      umbilical hernia   • [place] LAP GASTRIC BYPASS/FRITZ-EN-Y N/A 3/2/2020    Procedure: LAPAROSCOPIC FRITZ-EN-Y GASTRIC BYPASS AND INTRAOPERATIVE EGD;  Surgeon: Kenia Cai MD;  Location: [facility];  Service: Bariatrics   • SKIN SURGERY      cyst removed from back and thumb   • TONSILLECTOMY     • TOOTH EXTRACTION       Social History     Socioeconomic History   • Marital status: /Civil Union     Spouse name: Not on file   • Number of children: Not on file   • Years of education: Not on file   • Highest education level: Not on file   Occupational History   • Not on file   Tobacco Use   • Smoking status: Former Smoker     Types: Cigars     Start date: 2021     Quit date:      Years since quittin 7   • Smokeless tobacco: Current User     Types: Chew     Last attempt to quit: 2014   • Tobacco comment: quit cigarettes    Vaping Use   • Vaping Use: Some days   • Substances: Nicotine, Flavoring   Substance and Sexual Activity   • Alcohol use: Not Currently   • Drug use: Never   • Sexual activity: Not on file   Other Topics Concern   • Not on file   Social History Narrative    Former smoker - As per [facility]    Full-time employment        · Do you currently or have you served in [employer]:   No    As per [facility]      Social Determinants of Health     Financial Resource Strain: Not on file   Food Insecurity: Not on file   Transportation Needs: Not on file   Physical Activity: Not on file   Stress: Not on file   Social Connections: Not on file   Intimate Partner Violence: Not on file   Housing Stability: Not on file     Family History   Problem Relation Age of Onset   • Lung cancer Mother    • Brain cancer Mother    • Diabetes Brother    • Bipolar disorder Maternal Grandfather    • Bipolar disorder Son        Allergies:  No Known Allergies       ------------------------------------------------------------------------------------------------------------------  ROS:  A 12 system review of system was obtained and is otherwise negative except as per HPI     ------------------------------------------------------------------------------------------------------------------  VITALS:  Blood pressure 105/68, pulse 69, temperature 98 2 °F (36 8 °C), temperature source Oral, resp   rate 17, SpO2 93 %     GENERAL EXAMINATION:   In general patient is well appearing and in no distress  Heart RRR w/o MRG  Lungs CTA w/o WRR  Abdomen soft, NT, normoactive BS  There is trace non-pitting edema of bilateral lower extremities  NEUROLOGIC EXAMINATION:   Alert and oriented to person, date, location  Fund of knowledge is full with good understanding of medical situation  Recent and remote memory were intact  Spells WORLD-JASWINDERROW  Knows 5 quarters in $1 25  Recalls 3 words at 5 minutes    Mood and affect are appropriate  Attention is intact  Language function including fluency, naming, and comprehension intact  Cranial nerves: Pupils are equal round reactive to light and accommodation  Visual Fields are full to confrontation bilaterally  Extraocular movements are intact without nystagmus  Facial sensation is intact to light touch  No facial droop, face activates symmetrically  There is no dysarthria  Hearing was intact to finger rub  Tongue and uvula are midline and palate elevates symmetrically  Shoulder shrug  5/5  Motor Exam:  No pronator drift  Bulk and tone are normal  Strength is 5/5 throughout  Deep tendon reflexes: Biceps 2+, brachioradialis 2+, patellar 2+, Achilles 2+ bilaterally  Sensation: Intact light touch in all extremities  Coordination: Finger nose finger and heel to shin testing are without dysmetria  Gait: Not assessed this morning               -------------------------------------------------------------------------------------------------------------------  Impression and Plan:  Eliazar Blanca is a 65 y o   male with episodes concerning for seizures who is admitted to the Epilepsy Monitoring Unit for evaluation of episodes  Neurologic examination was non-focal though gait was not assessed this morning  The patient reports episodes of altered awareness that have been occurring nearly daily   Differential for episodes includes epileptic seizures, psychogenic non epileptic events, of hypoglycemic episodes  Due to frequency of events, will not wean oxcarbazepine due to concerns for mood worsening  Video EEG is necessary to capture and characterize events to assist in guiding further treatment  Adilia Trimble 1)  Spells/Seizures:   1) Will start continuous video EEG monitoring to capture and characterize events  2) Will start single lead EKG monitoring   3) Medications: On oxcarbazepine 300 mg BID (for mood)  Continue current dose  4) Additional diagnostic tests:    - accuchecks TID + PRN with events  5) Seizure/fall/status epilepticus precautions  6) Will order Ativan 2 mg as needed for more than two complex partial seizures or 1 Generalized tonic clonic seizure in a 24 hour period  7) Check labs with AED drug levels, CBC and CMP    2)  Co morbidities:   1)HLD- continue home dose of lipitor 40 mg daily  2) Bipolar disorder/depression- continue clonazepam 2 mg BID, pristiq 50 mg daily, trileptal 300 mg BID  On Invega injections Q 21 days - most recent dose received yesterday 10/10  3) ROSI - non compliant with CPAP at home  4) Migraine headaches - monitor for worsening during admission  5) Hypokalemia - continue home dose of KCL 20 mEq daily  6) GERD- continue home dose of pantoprazole  7) Insomnia - continue PRN trazodone 300 mg HS  8) CAD- continue ranexa 1000 mg BID  9) Tobacco use- nicotine patch ordered  3) DVT prophylaxis: Lovenox 40 mg daily  Refused SCDs  Code status: FULL CODE    Total time spent: At least 75 minutes, with more than 50% spent counseling/coordinating care   In addition, the care of the patient was reviewed with nursing staff     -------------------------------------------------------------------------------------------------------------------    EMRE Lemon             Date: 10/11/2022     Time: 6:26 PM  [facility]

## 2022-10-11 NOTE — PLAN OF CARE
Problem: NEUROSENSORY - ADULT  Goal: Achieves stable or improved neurological status  Description: INTERVENTIONS  - Monitor and report changes in neurological status  - Monitor vital signs such as temperature, blood pressure, glucose, and any other labs ordered   - Initiate measures to prevent increased intracranial pressure  - Monitor for seizure activity and implement precautions if appropriate      Outcome: Progressing  Goal: Remains free of injury related to seizures activity  Description: INTERVENTIONS  - Maintain airway, patient safety  and administer oxygen as ordered  - Monitor patient for seizure activity, document and report duration and description of seizure to physician/advanced practitioner  - If seizure occurs,  ensure patient safety during seizure  - Reorient patient post seizure  - Seizure pads on all 4 side rails  - Instruct patient/family to notify RN of any seizure activity including if an aura is experienced  - Instruct patient/family to call for assistance with activity based on nursing assessment  - Administer anti-seizure medications if ordered    Outcome: Progressing

## 2022-10-12 ENCOUNTER — APPOINTMENT (OUTPATIENT)
Dept: NEUROLOGY | Facility: CLINIC | Age: 57
End: 2022-10-12
Payer: COMMERCIAL

## 2022-10-12 VITALS
RESPIRATION RATE: 18 BRPM | SYSTOLIC BLOOD PRESSURE: 121 MMHG | HEART RATE: 72 BPM | TEMPERATURE: 98.5 F | DIASTOLIC BLOOD PRESSURE: 76 MMHG | OXYGEN SATURATION: 96 %

## 2022-10-12 DIAGNOSIS — K91.2 HYPOGLYCEMIA AFTER GI (GASTROINTESTINAL) SURGERY: Primary | ICD-10-CM

## 2022-10-12 PROBLEM — R56.9 OBSERVED SEIZURE-LIKE ACTIVITY (HCC): Status: RESOLVED | Noted: 2021-02-02 | Resolved: 2022-10-12

## 2022-10-12 LAB
GLUCOSE SERPL-MCNC: 181 MG/DL (ref 65–140)
GLUCOSE SERPL-MCNC: 32 MG/DL (ref 65–140)
GLUCOSE SERPL-MCNC: 323 MG/DL (ref 65–140)
GLUCOSE SERPL-MCNC: 55 MG/DL (ref 65–140)
GLUCOSE SERPL-MCNC: 93 MG/DL (ref 65–140)
GLUCOSE SERPL-MCNC: 95 MG/DL (ref 65–140)
GLUCOSE SERPL-MCNC: 95 MG/DL (ref 65–140)

## 2022-10-12 PROCEDURE — NC001 PR NO CHARGE: Performed by: PSYCHIATRY & NEUROLOGY

## 2022-10-12 PROCEDURE — 99239 HOSP IP/OBS DSCHRG MGMT >30: CPT | Performed by: PSYCHIATRY & NEUROLOGY

## 2022-10-12 PROCEDURE — 95712 VEEG 2-12 HR INTMT MNTR: CPT

## 2022-10-12 PROCEDURE — 95720 EEG PHY/QHP EA INCR W/VEEG: CPT | Performed by: PSYCHIATRY & NEUROLOGY

## 2022-10-12 PROCEDURE — 82948 REAGENT STRIP/BLOOD GLUCOSE: CPT

## 2022-10-12 RX ADMIN — ATORVASTATIN CALCIUM 40 MG: 40 TABLET, FILM COATED ORAL at 17:20

## 2022-10-12 RX ADMIN — RANOLAZINE 1000 MG: 500 TABLET, FILM COATED, EXTENDED RELEASE ORAL at 08:34

## 2022-10-12 RX ADMIN — NICOTINE 21 MG: 21 PATCH, EXTENDED RELEASE TRANSDERMAL at 08:33

## 2022-10-12 RX ADMIN — OXCARBAZEPINE 300 MG: 300 TABLET, FILM COATED ORAL at 08:34

## 2022-10-12 RX ADMIN — CLONAZEPAM 2 MG: 1 TABLET ORAL at 12:57

## 2022-10-12 RX ADMIN — POTASSIUM CHLORIDE 20 MEQ: 1500 TABLET, EXTENDED RELEASE ORAL at 17:20

## 2022-10-12 RX ADMIN — RANOLAZINE 1000 MG: 500 TABLET, FILM COATED, EXTENDED RELEASE ORAL at 17:20

## 2022-10-12 RX ADMIN — DESVENLAFAXINE 50 MG: 50 TABLET, FILM COATED, EXTENDED RELEASE ORAL at 08:37

## 2022-10-12 RX ADMIN — PANTOPRAZOLE SODIUM 20 MG: 20 TABLET, DELAYED RELEASE ORAL at 17:20

## 2022-10-12 RX ADMIN — ENOXAPARIN SODIUM 40 MG: 40 INJECTION SUBCUTANEOUS at 08:33

## 2022-10-12 NOTE — CASE MANAGEMENT
Case Management Assessment & Discharge Planning Note    Patient name Linda Roman  Location [facility] 716/[facility] 328-28 MRN [id number]  : 1965 Date 10/12/2022       Current Admission Date: 10/11/2022  Current Admission Diagnosis:Observed seizure-like activity [facility])   Patient Active Problem List    Diagnosis Date Noted   • Neuropathy 2022   • Status post gastric bypass for obesity 2021   • Anxiety disorder 2021   • Observed seizure-like activity ([facility] ) 2021   • Dysautonomia-like disorder 2021   • Elevated troponin 2020   • Syncope and collapse 2020   • Infiltrate of middle lobe of right lung present on imaging study 2020   • Hypokalemia 2020   • Orthostatic hypotension 2020   • Overweight 2020   • Encounter for surgical aftercare following surgery of digestive system 2020   • Postsurgical malabsorption 2020   • Tobacco use 2020   • Bariatric surgery status 2020   • Psychophysiological insomnia 2020   • Mixed hyperlipidemia 2020   • Coronary artery disease of native artery of native heart with stable angina pectoris ([facility] ) 2019   • Former smoker 2019   • Migraine with aura and without status migrainosus, not intractable 2019   • NSTEMI (non-ST elevated myocardial infarction) ([facility] ) 05/15/2019   • Bipolar disorder, current episode mixed, moderate [facility] 2016   • ROSI (obstructive sleep apnea) 2016      LOS (days): 1  Geometric Mean LOS (GMLOS) (days):   Days to GMLOS:     OBJECTIVE:    Risk of Unplanned Readmission Score: 7 04         Current admission status: Inpatient  Referral Reason: Other (Chart review performed and the pt is screened out for CM intervention at this time )    Preferred Pharmacy:   [address] [place] [address]  [address] [address]  Phone: [phone] Fax: [address] [address]   Mary Best  Phone: [phone] Fax: [phone]    Primary Care Provider: Amilcar Levy DO    Primary Insurance: CAPITAL  Secondary Insurance:     ASSESSMENT:  Myles Bailon Proxies    There are no active Health Care Proxies on file  Readmission [employer]  30 Day Readmission: No    Patient Information  Admitted from[de-identified] Home  Mental Status: Alert         DISCHARGE DETAILS:        Additional Comments: CM performed chart review and the pt is without current needs warranting CM intervention  It is anticipated that once cleared for hospital discharge, the pt will return home without formal service referrals  CM remains available to assist with post acute care planning as needed

## 2022-10-12 NOTE — UTILIZATION REVIEW
Inpatient Admission Authorization Request   NOTIFICATION OF INPATIENT ADMISSION/INPATIENT AUTHORIZATION REQUEST   SERVICING FACILITY:   [facility]  Address: [address] [place] [address], [address] [place], [place] [id number]  Tax ID: [id number]  NPI: [id number]  Place of Service: Inpatient [address] [facility] Code: 24     ATTENDING PROVIDER:  Attending Name and NPI#: Nela Haywood Md [[id number]]  Address: [address] [place] [address], [address] [place] [address]  Phone: [phone]     UTILIZATION REVIEW CONTACT:  Payton Patel Utilization   Network Utilization Review Department  Phone: [phone]  Fax: [phone]  Email: [email or web address]     PHYSICIAN ADVISORY SERVICES:  FOR [facility] REVIEW - MEDICAL NECESSITY DENIAL  Phone: [phone]  Fax: [phone]  Email: [email or web address]     TYPE OF REQUEST:  Inpatient Status     ADMISSION INFORMATION:  ADMISSION DATE/TIME: 10/11/22  7:42 AM  PATIENT DIAGNOSIS CODE/DESCRIPTION:  Observed seizure-like activity ([facility] ) [R56 9]  DISCHARGE DATE/TIME: No discharge date for patient encounter  IMPORTANT INFORMATION:  Please contact Payton Patel directly with any questions or concerns regarding this request  Department voicemails are confidential     Send requests for admission clinical reviews, concurrent reviews, approvals, and administrative denials due to lack of clinical to fax [phone]

## 2022-10-12 NOTE — PROGRESS NOTES
[facility] Daily Progress Note   Nancy Saab 65 y o  male   : 1965  MRN: [id number]     Unit/Bed#: [facility]PHP 716-01    Encounter: [id number]  -------------------------------------------------------------------------------------------------------------------  Interval History:    Patient did have an event yesterday after admission where he started feeling numbness and tingling and like he was going to have an event  This was a mild event  Blood sugar at that time was 67  This morning he had two energy drinks and some almond joys  He did not sleep last night as well to provoke events  His blood sugar after the above and after breakfast, his blood sugar is 323  He was feeling jittery and like he may have an event later this morning  He denies any history of issues with blood sugar control, even prior to bariatric surgery  Upon re-evaluation around 11 am, he started to feel his typical event of numbness in hands and feet, vision changes, and slow speech  His blood sugar at that time was 32  After having orange juice he started to feel better and blood sugar was 55  He is now having lunch and his blood sugar will be rechecked after finishing  We discussed that his episodes are likely due to hypoglycemia s/p bariatric surgery  We discussed avoiding consuming large amounts of sugar at one time to avoid spikes in blood sugars and subsequent drops  Reviewed he will need to see endocrine as an outpatient  Plan to monitor throughout day today and tonight with plan to discharge tomorrow morning       Neurologic exam at baseline  Vital signs stable since admission  Labs since admission:   Latest Reference Range & Units 10/11/22 10:53 10/11/22 11:34 10/11/22 12:48 10/11/22 16:30 10/12/22 08:04 10/12/22 09:49   POC Glucose 65 - 140 mg/dl  67 144 (H) 79 181 (H) 323 (H)   Sodium 135 - 147 mmol/L 132 (L)        Potassium 3 5 - 5 3 mmol/L 4 2        Chloride 96 - 108 mmol/L 102        CO2 21 - 32 mmol/L 24        Anion Gap 4 - 13 mmol/L 6        BUN 5 - 25 mg/dL 6        Creatinine 0 60 - 1 30 mg/dL 0 67        Glucose, Random 65 - 140 mg/dL 43 (L)        Calcium 8 3 - 10 1 mg/dL 8 4        CORRECTED CALCIUM 8 3 - 10 1 mg/dL 8 9        AST 5 - 45 U/L 21        ALT 12 - 78 U/L 31        Alkaline Phosphatase 46 - 116 U/L 119 (H)        Total Protein 6 4 - 8 4 g/dL 6 4        Albumin 3 5 - 5 0 g/dL 3 4 (L)        TOTAL BILIRUBIN 0 20 - 1 00 mg/dL 0 51        eGFR ml/min/1 73sq m 107        WBC 4 31 - 10 16 Thousand/uL 10 29 (H)        Red Blood Cell Count 3 88 - 5 62 Million/uL 4 57        Hemoglobin 12 0 - 17 0 g/dL 14 0        HCT 36 5 - 49 3 % 40 9        MCV 82 - 98 fL 90        MCH 26 8 - 34 3 pg 30 6        MCHC 31 4 - 37 4 g/dL 34 2        RDW 11 6 - 15 1 % 12 3        Platelet Count 012 - 390 Thousands/uL 232        MPV 8 9 - 12 7 fL 9 6        nRBC /100 WBCs 0        Neutrophils % 43 - 75 % 64        Immat GRANS % 0 - 2 % 1        Lymphocytes Relative 14 - 44 % 20        Monocytes Relative 4 - 12 % 12        Eosinophils 0 - 6 % 2        Basophils Relative 0 - 1 % 1        Immature Grans Absolute 0 00 - 0 20 Thousand/uL 0 09        Absolute Neutrophils 1 85 - 7 62 Thousands/µL 6 63        Lymphocytes Absolute 0 60 - 4 47 Thousands/µL 2 07        Absolute Monocytes 0 17 - 1 22 Thousand/µL 1 25 (H)        Absolute Eosinophils 0 00 - 0 61 Thousand/µL 0 18        Basophils Absolute 0 00 - 0 10 Thousands/µL 0 07          -------------------------------------------------------------------------------------------------------------------  Past Medical history, surgical history, family history, and social history are unchanged from admission H&P     ROS:  A 12 system review of system was obtained and was unrevealing except as noted in interval history        All other review of systems negative   -------------------------------------------------------------------------------------------------------------------  Allergies: No Known Allergies   Scheduled Meds:   Current Facility-Administered Medications   Medication Dose Route Frequency Provider Last Rate   • acetaminophen  650 mg Oral Q6H PRN EMRE Olivera     • atorvastatin  40 mg Oral Daily With EMRE Hamlin     • clonazePAM  2 mg Oral BID PRN Nadeem Lopez MD     • desvenlafaxine succinate  50 mg Oral Daily EMRE Bran     • diphenhydrAMINE  25 mg Oral Q8H PRN EMRE Bran     • enoxaparin  40 mg Subcutaneous Daily EMRE Bran     • LORazepam  2 mg Intravenous Q8H PRN EMRE Olivera     • nicotine  21 mg Transdermal Daily EMRE Bran     • ondansetron  4 mg Intravenous Q6H PRN EMRE Bran     • OXcarbazepine  300 mg Oral Q12H [address] EMRE Bran     • pantoprazole  20 mg Oral Daily With EMRE Hamlin     • polyethylene glycol  17 g Oral Daily PRN EMRE Bran     • potassium chloride  20 mEq Oral Daily With EMRE Hamlin     • ranolazine  1,000 mg Oral BID EMRE Bran     • traZODone  300 mg Oral HS PRN EMRE Bran       PRN Meds: •  acetaminophen  •  clonazePAM  •  diphenhydrAMINE  •  LORazepam  •  ondansetron  •  polyethylene glycol  •  traZODone  -------------------------------------------------------------------------------------------------------------------  Physical Exam:  Vitals: Blood pressure 113/67, pulse 76, temperature (!) 97 4 °F (36 3 °C), resp  rate 17, SpO2 96 %  Physical Exam  No apparent distress  Appears well nourished  Mood appropriate for situation     Neurologic Exam  Mental status- alert and oriented to person, place, and time  Speech appropriate for conversation  Good attention and knowledge       Cranial Nerves- EOMS normal, facial sensation and muscles symmetric, hearing intact bilaterally to finger rubs, tongue midline, palate rise symmetrical, shoulder shrug symmetrical     Motor- No pronator drift  Appropriate strength  Moves all extremities freely  No tremor  Sensory-  Intact distally in all extremities to light touch  DTRs- not assessed this morning  Gait- not assessed this morning  Coordination- FNF intact     -------------------------------------------------------------------------------------------------------------------  Assessment/Plan:  Mayda Molina is a 65 y o   male with episodes concerning for seizures who is admitted to the Epilepsy Monitoring Unit for evaluation of episodes  Neurologic examination at baseline  The patient reports episodes of altered awareness that have been occurring nearly daily  The patient did have one of his typical events this morning which was associated with significant hyperglycemia (blood glucose 323) followed by significant hypoglycemia (blood glucose 32) which occurred in a little over one hour  Discussed etiology of events with patient and father  Plan to discharge tomorrow morning after monitoring overnight with endocrine follow up as an outpatient       1)  Spells/Seizures:   1) Will start continuous video EEG monitoring to capture and characterize events  2) Will start single lead EKG monitoring   3) Medications: On oxcarbazepine 300 mg BID (for mood)  Continue current dose  4) Additional diagnostic tests:               - accuchecks TID + PRN with events  5) Seizure/fall/status epilepticus precautions  6) Will order Ativan 2 mg as needed for more than two complex partial seizures or 1 Generalized tonic clonic seizure in a 24 hour period  2)  Co morbidities:   1)HLD- continue home dose of lipitor 40 mg daily  2) Bipolar disorder/depression- continue clonazepam 2 mg BID, pristiq 50 mg daily, trileptal 300 mg BID  On Invega injections Q 21 days - most recent dose received yesterday 10/10     3) ROSI - non compliant with CPAP at home  4) Migraine headaches - monitor for worsening during admission  5) Hypokalemia - continue home dose of KCL 20 mEq daily  6) GERD- continue home dose of pantoprazole  7) Insomnia - continue PRN trazodone 300 mg HS  8) CAD- continue ranexa 1000 mg BID  9) Tobacco use- nicotine patch ordered  10) hypoglycemic episodes - avoid consuming large amounts of carbohydrates at one time  Referring to endocrine to follow up as an outpatient     3) DVT prophylaxis: Lovenox 40 mg daily  Refused SCDs        Code status: FULL CODE    Total time spent: At least 25 minutes, with more than 50% spent counseling/coordinating care   In addition the care of the patient was reviewed with nursing staff      -------------------------------------------------------------------------------------------------------------------  EMRE Barragan        Date: 10/12/2022     Time: 11:47 AM   [address] [facility]

## 2022-10-12 NOTE — DISCHARGE INSTRUCTIONS
CONCLUSION  During your admission to the Epilepsy monitoring unit, we were able to catch and better characterize your events  These events due to severe drops in your blood glucose (hypoglycemia) since your gastric bypass surgery  This is termed "Late Dumping Syndrome"  When you eat a large amount of sugar/carbohydrates quickly, this causes your blood sugar to spike, but then because of the body's response, 1-3 hours later, the blood sugar drops significantly  We documented your blood sugars dropping down to 32, but this then raised up again when you drank some orange juice  PLAN:   -- It will be important to limit how much sugar/carbohydrates that you eat, especially all at once  Avoid too much sugar in your coffee or drinks with large amounts of sugar  -- If you start to have an event/symptoms again, this is a sign that your blood sugar likely is low, so I would recommend drinking some orange juice to get the sugar up  -- It will be important to follow up with your bariatric doctor and see Endocrinology for further evaluation and recommendations  Your medications were not changed during this admission  Continue all of your other medications unchanged

## 2022-10-12 NOTE — PLAN OF CARE
Problem: MOBILITY - ADULT  Goal: Maintain or return to baseline ADL function  Description: INTERVENTIONS:  -  Assess patient's ability to carry out ADLs; assess patient's baseline for ADL function and identify physical deficits which impact ability to perform ADLs (bathing, care of mouth/teeth, toileting, grooming, dressing, etc )  - Assess/evaluate cause of self-care deficits   - Assess range of motion  - Assess patient's mobility; develop plan if impaired  - Assess patient's need for assistive devices and provide as appropriate  - Encourage maximum independence but intervene and supervise when necessary  - Involve family in performance of ADLs  - Assess for home care needs following discharge   - Consider OT consult to assist with ADL evaluation and planning for discharge  - Provide patient education as appropriate  Outcome: Progressing  Goal: Maintains/Returns to pre admission functional level  Description: INTERVENTIONS:  - Perform BMAT or MOVE assessment daily    - Set and communicate daily mobility goal to care team and patient/family/caregiver  - Collaborate with rehabilitation services on mobility goals if consulted  - Perform Range of Motion 3 times a day  - Reposition patient every 2 hours    - Dangle patient 3 times a day  - Stand patient 3 times a day  - Ambulate patient 3 times a day  - Out of bed to chair 3 times a day   - Out of bed for meals 3 times a day  - Out of bed for toileting  - Record patient progress and toleration of activity level   Outcome: Progressing     Problem: NEUROSENSORY - ADULT  Goal: Achieves stable or improved neurological status  Description: INTERVENTIONS  - Monitor and report changes in neurological status  - Monitor vital signs such as temperature, blood pressure, glucose, and any other labs ordered   - Initiate measures to prevent increased intracranial pressure  - Monitor for seizure activity and implement precautions if appropriate      Outcome: Progressing  Goal: Remains free of injury related to seizures activity  Description: INTERVENTIONS  - Maintain airway, patient safety  and administer oxygen as ordered  - Monitor patient for seizure activity, document and report duration and description of seizure to physician/advanced practitioner  - If seizure occurs,  ensure patient safety during seizure  - Reorient patient post seizure  - Seizure pads on all 4 side rails  - Instruct patient/family to notify RN of any seizure activity including if an aura is experienced  - Instruct patient/family to call for assistance with activity based on nursing assessment  - Administer anti-seizure medications if ordered    Outcome: Progressing

## 2022-10-12 NOTE — UTILIZATION REVIEW
Initial Clinical Review    Admission: Date/Time/Statement:   Admission Orders (From admission, onward)     Ordered        10/11/22 0819  Inpatient Admission  Once                      Orders Placed This Encounter   Procedures   • Inpatient Admission     Standing Status:   Standing     Number of Occurrences:   1     Order Specific Question:   Level of Care     Answer:   Level 2 Stepdown / HOT [14]     Order Specific Question:   Bed Type     Answer:   EMU [8]     Order Specific Question:   Estimated length of stay     Answer:   More than 2 Midnights     Order Specific Question:   Certification     Answer:   I certify that inpatient services are medically necessary for this patient for a duration of greater than two midnights  See H&P and MD Progress Notes for additional information about the patient's course of treatment  Initial Presentation: 65 y o  male presents for elective inpatient step down admission to evaluation and treatment of seizure like events which began after gastric bypass surgery  2 years ago  PMHX: ROSI, MIGRAINE HA,BARIATRIC SURGERY, CPAP, NSTEMI  Neurological examination was non focal  Patient reports episodes of altered awareness that have been occurring nearly every day  Differential diagnosis includes - epileptic seizures, psychogenic non epileptic events , hypoglycemic episodes  Due to frequency of events will not wean oxcarbazepine  Plan to capture and characterize events to assist in guiding  further treatment, accuchecks tid and prn with events, check AED  levels, obtain Ekg  Date: 10-12-22  Day 2: inpatient step down  Continue video EEG monitoring  On seizure precautions  Regular diet  One event reported at 10:50 am yesterday  Patient reports :felt bilateral leg tingling and speech impediment  Unable to recall code phrase for ictal test   He then felt hungry and craved sugar  Glucose at 1134 is 67  Start Pristiq, continue Oxcarbazepine q12    Follow up Oxcarbazepine level which is in process      ED Triage Vitals   10/11/22 0754 10/11/22 0754 10/11/22 0754 10/11/22 0754 10/11/22 0754   97 5 °F (36 4 °C) (!) 120 16 114/81 97 %      Oral          No Pain          08/31/22 98 9 kg (218 lb)     Additional Vital Signs:     Date/Time Temp Pulse Resp BP MAP (mmHg) SpO2   10/12/22 07:42:35 97 4 °F (36 3 °C)   Abnormal  76 17 113/67 82 96 %   10/11/22 22:17:07 98 1 °F (36 7 °C) 73 16 112/67 82 93 %   10/11/22 19:54:50 98 1 °F (36 7 °C) 73 -- 110/69 83 93 %   10/11/22 14:52:22 98 2 °F (36 8 °C) 69 17 105/68 80 93 %   10/11/22 11:22:54 -- 75 -- 126/81 96 99 %   10/11/22 07:54:47 97 5 °F (36 4 °C) 120   Abnormal  16 114/81 92 97 %               Pertinent Labs/Diagnostic Test Results:       Results from last 7 days   Lab Units 10/11/22  1053   WBC Thousand/uL 10 29*   HEMOGLOBIN g/dL 14 0   HEMATOCRIT % 40 9   PLATELETS Thousands/uL 232   NEUTROS ABS Thousands/µL 6 63         Results from last 7 days   Lab Units 10/11/22  1053   SODIUM mmol/L 132*   POTASSIUM mmol/L 4 2   CHLORIDE mmol/L 102   CO2 mmol/L 24   ANION GAP mmol/L 6   BUN mg/dL 6   CREATININE mg/dL 0 67   EGFR ml/min/1 73sq m 107   CALCIUM mg/dL 8 4     Results from last 7 days   Lab Units 10/11/22  1053   AST U/L 21   ALT U/L 31   ALK PHOS U/L 119*   TOTAL PROTEIN g/dL 6 4   ALBUMIN g/dL 3 4*   TOTAL BILIRUBIN mg/dL 0 51     Results from last 7 days   Lab Units 10/12/22  0804 10/11/22  1630 10/11/22  1248 10/11/22  1134   POC GLUCOSE mg/dl 181* 79 144* 67     Results from last 7 days   Lab Units 10/11/22  1053   GLUCOSE RANDOM mg/dL 43*         Past Medical History:   Diagnosis   • Bariatric surgery status   • CPAP (continuous positive airway pressure) dependence   • Hearing loss of aging   • Hypercholesterolemia   • Morbid obesity (HCC)   • NSTEMI (non-ST elevation myocardial infarction) [facility])    april 2019   • Postsurgical malabsorption     Present on Admission:  • Bipolar disorder, current episode mixed, moderate ([address] )  • Migraine with aura and without status migrainosus, not intractable  • ROSI (obstructive sleep apnea)  • Mixed hyperlipidemia  • Syncope and collapse  • Observed seizure-like activity (HCC)  • Tobacco use  • Hypokalemia  • Coronary artery disease of native artery of native heart with stable angina pectoris (HCC)      Admitting Diagnosis: Observed seizure-like activity (HCC) [R56 9]     Age/Sex: 65 y o  male    Scheduled Medications:  atorvastatin, 40 mg, Oral, Daily With Dinner  desvenlafaxine succinate, 50 mg, Oral, Daily  enoxaparin, 40 mg, Subcutaneous, Daily  nicotine, 21 mg, Transdermal, Daily  OXcarbazepine, 300 mg, Oral, Q12H DRE  pantoprazole, 20 mg, Oral, Daily With Dinner  potassium chloride, 20 mEq, Oral, Daily With Dinner  ranolazine, 1,000 mg, Oral, BID      Continuous IV Infusions:     PRN Meds:  acetaminophen, 650 mg, Oral, Q6H PRN  clonazePAM, 2 mg, Oral, BID PRN  diphenhydrAMINE, 25 mg, Oral, Q8H PRN  LORazepam, 2 mg, Intravenous, Q8H PRN  ondansetron, 4 mg, Intravenous, Q6H PRN  polyethylene glycol, 17 g, Oral, Daily PRN  traZODone, 300 mg, Oral, HS PRN        None    Network Utilization Review Department  ATTENTION: Please call with any questions or concerns to [phone] and carefully listen to the prompts so that you are directed to the right person  All voicemails are confidential   Corneliolina Blue all requests for admission clinical reviews, approved or denied determinations and any other requests to dedicated fax number below belonging to the campus where the patient is receiving treatment   List of dedicated fax numbers for the Facilities:  [address] DENIALS (Administrative/Medical Necessity) [phone]   [address] (Maternity/NICU/Pediatrics) [facility] [phone]   [facility] [phone]   SherylClarita [phone]   [address] [place] [address] [address] [address] [place] [address] [phone] [address] [place] [address] [facility] [phone]

## 2022-10-13 ENCOUNTER — TRANSITIONAL CARE MANAGEMENT (OUTPATIENT)
Dept: FAMILY MEDICINE CLINIC | Facility: CLINIC | Age: 57
End: 2022-10-13

## 2022-10-13 LAB — OXCARBAZEPINE SERPL-MCNC: 7 UG/ML (ref 10–35)

## 2022-10-13 NOTE — UTILIZATION REVIEW
Notification of Discharge   This is a Notification of Discharge from our facility [address]  Please be advised that this patient has been discharge from our facility  Below you will find the admission and discharge date and time including the patient’s disposition  UTILIZATION REVIEW CONTACT:  Maria L Slaughter  Utilization   Network Utilization Review Department  Phone: [phone] x carefully listen to the prompts  All voicemails are confidential   Email: [email or web address]     PHYSICIAN ADVISORY SERVICES:  FOR [id number] REVIEW - MEDICAL NECESSITY DENIAL  Phone: [phone]  Fax: [phone]  Email: [email or web address]     PRESENTATION DATE: 10/11/2022  7:42 AM  OBERVATION ADMISSION DATE:   INPATIENT ADMISSION DATE: 10/11/22  7:42 AM   DISCHARGE DATE: 10/12/2022  6:09 PM  DISPOSITION: Home/Self Care Home/Self Care      IMPORTANT INFORMATION:  Send all requests for admission clinical reviews, approved or denied determinations and any other requests to dedicated fax number below belonging to the campus where the patient is receiving treatment   List of dedicated fax numbers:  [address] [place] [address] DENIALS (Administrative/Medical Necessity) [phone]   [address] [place] [address] (Maternity/NICU/Pediatrics) [phone]   [facility] [phone]   [facility] 87 [phone]   [address] [phone]   220 [facility] [phone] 90 [facility] [phone]   [address] [place] [address] [phone]   [facility]  [phone]   4050 [facility] [phone]   [address] [place] [address] [address] [facility] [phone]

## 2022-10-13 NOTE — DISCHARGE SUMMARY
Epilepsy Attending Discharge Summary  Vivian Amador 65 y o  male   : 1965  MRN: [id number]     Unit/Bed#: [facility] 716-01    Encounter: [id number]    Date of Admission:   10/11/2022  Date of Discharge: 10/12/2022   Attending on Admission: Dr Joselo Chen  Attending on Discharge: Dr Joselo Chen    Admission Diagnosis:    1  Observed seizure-like activity ([address] ) [R56 9]    Discharge Diagnosis:  1  Hypoglycemia after GI surgery [K91 2]    Secondary Diagnoses:  1  Bipolar disorder  2  Tobacco abuse  3  S/p gastric bypass surgery  4  Hyperlipidemia  5  Hypokalemia  6  GERD  7  CAD  8  Insomnia    Consultations:  None    Procedures:  1  Continuous Video EEG    Disposition:  Discharge to Home    Follow-up Appointments/Referrals:  1  Follow up with Bariactric doctor, PCP and referral to Endocrinology    Medication Changes:  None    Discharge Medications:  See after visit summary for reconciled discharge medications provided to patient and family  Recommended follow-up testing:  Eat low carbohydrate foods and avoid sugary beverages  Brief History:  Per admission H&P:  Vivian Amador is a 65 y o   male with episodes concerning for seizures who is admitted to the Epilepsy Monitoring Unit for evaluation of events  The patient also has a history of bariatric surgery, ROSI, bipolar disorder, migraine headaches, HLD, tobacco use, syncope, CAD, and dysautonomia-like disorder  The patient was seen most recent in the office by Dr Troy Steve who recommended EMU admission due to episodes concerning for seizure  Of note, patient has been admitted to the EMU about a year an a half ago for episodes concerning for syncope/dysautonomia       The patient presents this morning to the EMU with his wife  They report episodes concerning for seizure occurring about every 2-3 days but sometimes daily lasting for up to an hour  It is reported that these events started after having gastric bypass about 2 years ago    These events typically start with an aura of feeling restless, his lips/hands get numb, mind may race, and may feel like he is "fading out " He describes "fading out" as if he were watching TV and can see that the TV is there but is unable to see what is going on on the TV  After the aura, it may progress to him appearing like a "happy drunk" per his wife where his legs seem to be stable but goes to stand up and is very restful and may lean back  He may fall during events  He will stare but is slow to respond  He can follow commands during this time  He will be rigid while seated and feels that he does this so that he does not stand up and fall and hurt himself  They can occur from any position (not just standing like syncopal episodes in the past)  The patient does not recall anything that occurs after the aura  After the event is over, he craves sugar and is tired/wiped out  He will typically sleep for a few hours afterwards  Yesterday was his most recent episode  In the past, he would get jumpy, yell/scream, and have very odd and abnormal behaviors but this has not occurred in about 4-5 months  The patient deny any convulsive seizures or epigastric uprising, abnormal sensory symptoms, myoclonus       The patient denies any epilepsy risk factors  Wife does feel that if he has a manic episode and does not sleep all night he will typically have an event the next day  Monster energy drinks may provoke events  He did drink 2 monster energy drinks and had an [employer] prior to admission to provoke events to occur       Patient is following closely with psychiatry  He does have bipolar disorder and depression  He has been on trileptal for many years for his mood which has been helpful  Recently he has had some dosing changes due to hyponatremia but does not feel that this has caused episodes concerning for seizures to occur more or less frequently  No prior psychiatric hospitalizations       The patient is on disability currently   He was a police officer in the past  Most recently he was working as an  but due to episodes he can not work  Feels that this all started after his bariatric surgery  The patient has been driving  He notes that his episodes typically occur between 1030 am and 1 pm and feels that he knows when it is safe to be driving  Hospital Course:  Genny Chase was admitted to the Epilepsy Monitoring Unit and monitored on continuous video EEG  Over the course of the admission, he had one spell the morning of admission, but he felt this episode was mild and did not progress when standing up and interacting with him  His blood glucose when checked during this episode was 67  He was sleep deprived the first night after admission and drank 2 sugary beverages and candy bars the following morning  He then had a typical event of legs feeling numb, slurred speech and as if a larger event was going to happen  His blood glucose prior to the event at 09:49 was 323  During the event and when symptomatic, his BG at 11:08 was 32  He was given orange juice and started to feel better  Subsequent BG was 55, then 96, then remaining in th 90s after this  He felt back to his normal self by lunch time and did not have any other events  Activation procedures:  1  Sleep deprivation    EMU CONCLUSION  Summary interictal findings: Normal activities of wakefulness and sleep  Enhanced beta activities     Summary event findings: two events of bilateral leg numbness, slurred speech and feeling unwell were captured, without EEG changes, but with hypoglycemia noted with both events, as detailed above  Seizure Classification: N/A    Epilepsy Classification: N/A    EMU findings and discussion:  Overall, we were able to capture two of his clinical events  His blood glucose as low at 67 with his first more mild event, but was very low at 32 when he was symptomatic during his second event   These both happened the morning after having had 2 sugary energy drinks and candy bars  Given his BG drop and history that these events occur after eating a lot of sugar and started after his gastric bypass surgery, these events are most consisted with hypoglycemic episodes from late dumping syndrome  He was instructed to eat a low carbohydrate and low sugar diet  Specifically, he should avoid candy bars, energy drinks, and needs to cut back on the amount of sugar he puts into his coffee in the morning (typically putting 7 tablespoons of sugar in his coffee)  He was referred to see Endocrinology as an outpatient and will follow up with his Bariatric doctor and PCP as planned  Diet:  Low carbohydrate diet    Activity:  as tolerated    Restrictions:  none    Discharge Statement   I spent 35 minutes discharging the patient and with the patient on the day of discharge  This time was spent on the day of discharge  I had direct contact with the patient on the day of discharge  Time was spent specifically evaluating the patient during his event, discussing the results of his testing, and plan as detailed above       Carlos Willis MD   Date: 10/12/2022

## 2022-10-14 PROCEDURE — 95720 EEG PHY/QHP EA INCR W/VEEG: CPT | Performed by: PSYCHIATRY & NEUROLOGY

## 2022-10-18 ENCOUNTER — OFFICE VISIT (OUTPATIENT)
Dept: FAMILY MEDICINE CLINIC | Facility: CLINIC | Age: 57
End: 2022-10-18
Payer: COMMERCIAL

## 2022-10-18 VITALS
WEIGHT: 224 LBS | RESPIRATION RATE: 20 BRPM | HEART RATE: 102 BPM | OXYGEN SATURATION: 98 % | BODY MASS INDEX: 32.07 KG/M2 | DIASTOLIC BLOOD PRESSURE: 74 MMHG | TEMPERATURE: 98.1 F | SYSTOLIC BLOOD PRESSURE: 114 MMHG | HEIGHT: 70 IN

## 2022-10-18 DIAGNOSIS — K91.1 POSTSURGICAL DUMPING SYNDROME: Primary | ICD-10-CM

## 2022-10-18 DIAGNOSIS — E78.2 MIXED HYPERLIPIDEMIA: ICD-10-CM

## 2022-10-18 DIAGNOSIS — Z23 NEED FOR VACCINATION: ICD-10-CM

## 2022-10-18 DIAGNOSIS — I25.118 CORONARY ARTERY DISEASE OF NATIVE ARTERY OF NATIVE HEART WITH STABLE ANGINA PECTORIS (HCC): ICD-10-CM

## 2022-10-18 DIAGNOSIS — F31.62 BIPOLAR DISORDER, CURRENT EPISODE MIXED, MODERATE (HCC): ICD-10-CM

## 2022-10-18 DIAGNOSIS — K91.2 POSTSURGICAL MALABSORPTION: ICD-10-CM

## 2022-10-18 PROCEDURE — 99496 TRANSJ CARE MGMT HIGH F2F 7D: CPT | Performed by: NURSE PRACTITIONER

## 2022-10-18 PROCEDURE — 90471 IMMUNIZATION ADMIN: CPT

## 2022-10-18 PROCEDURE — 90682 RIV4 VACC RECOMBINANT DNA IM: CPT

## 2022-10-18 NOTE — PROGRESS NOTES
Assessment/Plan:    1  Postsurgical dumping syndrome  Comments:  Advised on dietary instructions, will follow with endo    2  Postsurgical malabsorption  Comments:  will be following with bariaritic    3  Need for vaccination  -     influenza vaccine, quadrivalent, recombinant, PF, 0 5 mL, for patients 18 yr+ (FLUBLOK)    4  Coronary artery disease of native artery of native heart with stable angina pectoris ([facility] [address] )  Comments:  on statin and denies any chest pain and sob currently    5  Bipolar disorder, current episode mixed, moderate ([facility] [address] )  Comments:  managed by psychatrist    6  Mixed hyperlipidemia  Comments:  complaint with statin      Patient Instructions:  Supportive care discussed and advised  Advised to RTO for any worsening and no improvement  Follow up for no improvement and worsening of conditions  Patient advised and educated when to see immediate medical care  Return if symptoms worsen or fail to improve  Future Appointments   Date Time Provider Wm Singer   10/31/2022  8:30 AM 31 Mary [facility] INF CHAIR [address] [place] [address]   12/1/2022 11:00 AM Sachin Torres MD DIAB [facility]   12/30/2022 11:00 AM Eugenia Ahuja MD PSY [facility]   1/30/2023  9:30 AM Floydene Bora EMRE [facility]           Subjective:      Patient ID: Gideon Sewell is a 65 y o  male  Chief Complaint   Patient presents with   • Transition of Care Management     Giuliana Brewster MA           Vitals:  /74   Pulse 102   Temp 98 1 °F (36 7 °C)   Resp 20   Ht 5' 10" (1 778 m)   Wt 102 kg (224 lb)   SpO2 98%   BMI 32 14 kg/m²   Wt Readings from Last 3 Encounters:   10/18/22 102 kg (224 lb)   08/31/22 98 9 kg (218 lb)   03/31/22 89 8 kg (198 lb)      HPI  Patient was recently hospitalized for seizure like activity to be evaluated and had EEG's done during visit and was found that his symptoms were related to dropping blood sugars secondary to postsurgical dumping syndrome   Was advised on dietary instructions and stated that did not have any episode since discharge  Denies any headache and dizziness  Follows  Up scheduled with endo and briaritic  Complaint with medications and tolerating it well    TCM Call     Date and time call was made  10/13/2022  1:34 PM    Hospital care reviewed  Records reviewed    Patient was hospitial[facility] at  [facility]    Date of Admission  10/11/22    Date of discharge  10/12/22    Diagnosis  Hypoglycemia after GI (gastrointestinal) surgery)    Disposition  Home    Were the patients medications reviewed and updated  Yes    Current Symptoms  None    Dizziness severity  Mild    Quality Character  Lightheadedness    Episode pattern  Intermittent    Cause  No known event    Clinical progress  Unchanged      TCM Call     Post hospital issues  None    Should patient be enrolled in anticoag monitoring? No    Scheduled for follow up?   Yes    Patients specialists  Endocrinologist    Cardiologist name  Haven Trivedi MD (Cardiology)    Other specialists names  Dr Mitchel Caldera [facility] - dysautonomia specialist     Did you obtain your prescribed medications  Yes    Why were you unable to obtain your medications  No new medications    Do you need help managing your prescriptions or medications  No    Is transportation to your appointment needed  No    Specify why  son driving due to surgery- has to be cleared to drive    I have advised the patient to call PCP with any new or worsening symptoms  [address] [place] [address] or [employer] other    Support System  Family    The type of support provided  Physical; Emotional    Do you have social support  Yes, as much as I need    Are you recieving any outpatient services  No    Are you recieving home care services  No    Are you using any community resources  No    Current waiver services  No    Have you fallen in the last 12 months  Yes    How many times  once    Interperter language line needed  No    Counseling Patient    Counseling topics  Activities of daily living; Importance of RX compliance; patient and family education; instructions for management; Risk factor reduction    Comments  I spoke with Neelima Brewster who states that he is doing fine  He offers no complaints at this time  He knows to call if any questions/concerns [facility]                          The following portions of the patient's history were reviewed and updated as appropriate: allergies, current medications, past family history, past medical history, past social history, past surgical history and problem list       Review of Systems   Constitutional: Negative  HENT: Negative  Respiratory: Negative  Cardiovascular: Negative  Gastrointestinal: Negative  Genitourinary: Negative for difficulty urinating  Skin: Negative for rash  Neurological: Negative            Objective:    Social History     Tobacco Use   Smoking Status Former Smoker   • Types: Cigars   • Quit date: 10/15/2003   • Years since quittin 0   Smokeless Tobacco Former User   • Types: Chew   • Quit date: 2022   Tobacco Comment    quit cigarettes        Allergies: No Known Allergies      Current Outpatient Medications   Medication Sig Dispense Refill   • acetaminophen (TYLENOL) 325 mg tablet Take 3 tablets (975 mg total) by mouth every 8 (eight) hours (Patient taking differently: Take 975 mg by mouth every 8 (eight) hours as needed) 30 tablet 0   • aspirin 81 mg chewable tablet Chew 1 tablet (81 mg total) daily 30 tablet 0   • atorvastatin (LIPITOR) 40 mg tablet Take 1 tablet (40 mg total) by mouth daily 90 tablet 1   • clonazePAM (KlonoPIN) 2 mg tablet Take 1 tablet (2 mg total) by mouth 2 (two) times a day as needed for anxiety (Patient taking differently: Take 2 mg by mouth 2 (two) times a day) 60 tablet 0   • desvenlafaxine succinate (PRISTIQ) 50 mg 24 hr tablet Take 1 tablet (50 mg total) by mouth daily 90 tablet 2   • OXcarbazepine (TRILEPTAL) 300 mg tablet Take 300 mg by mouth every 12 (twelve) hours     • pantoprazole (PROTONIX) 20 mg tablet Take 1 tablet (20 mg total) by mouth daily 90 tablet 1   • potassium chloride (K-DUR,KLOR-CON) 20 mEq tablet Take 1 tablet (20 mEq total) by mouth daily 30 tablet 5   • ranolazine (RANEXA) 1000 MG SR tablet Take 1 tablet (1,000 mg total) by mouth 2 (two) times a day 180 tablet 3   • traZODone (DESYREL) 300 MG tablet Take 1 tablet (300 mg total) by mouth daily at bedtime (Patient taking differently: Take 300 mg by mouth daily at bedtime As needed) 90 tablet 2   • paliperidone palmitate ER (INVEGA) 234 mg/1 5 mL IM injection Inject 1 5 mL (234 mg total) into a muscle every 21 days for 1 dose (Patient taking differently: Inject 234 mg into a muscle every 21 days Most recent dose 10/10) 1 mL 20     No current facility-administered medications for this visit  Physical Exam  Constitutional:       Appearance: Normal appearance  HENT:      Head: Normocephalic  Nose: Nose normal    Eyes:      Conjunctiva/sclera: Conjunctivae normal    Cardiovascular:      Rate and Rhythm: Normal rate and regular rhythm  Heart sounds: Normal heart sounds  Pulmonary:      Effort: Pulmonary effort is normal       Breath sounds: Normal breath sounds  Musculoskeletal:         General: No swelling or tenderness  Normal range of motion  Skin:     General: Skin is warm and dry  Findings: No rash  Neurological:      Mental Status: He is alert and oriented to person, place, and time  Psychiatric:         Mood and Affect: Mood normal          Behavior: Behavior normal          Thought Content:  Thought content normal          Judgment: Judgment normal                      Martínez Contreras

## 2022-10-18 NOTE — PATIENT INSTRUCTIONS
Here are some dietary strategies that can help maintain good nutrition and minimize your symptoms  Eat smaller meals  Try eating five or six small meals a day rather than three larger ones  Avoid fluids with meals  Drink liquids only between meals  Avoid liquids for a half-hour before eating and a half-hour after eating  Change your diet  Eat more protein -- meat, poultry, creamy peanut butter and fish -- and complex carbohydrates -- oatmeal and other whole-grain foods high in fiber  Limit high-sugar foods, such as candy, table sugar, syrup, sodas and juices  The natural sugar in dairy products (lactose) might worsen your symptoms  Try small amounts at first, or eliminate them if you think they're causing problems  You might want to see a registered dietitian for more advice about what to eat  Increase fiber intake  Psyllium, guar gum and pectin in food or supplements can delay the absorption of carbohydrates in the small intestine  He was instructed to eat a low carbohydrate and low sugar diet   Specifically, he should avoid candy bars, energy drinks, and needs to cut back on the amount of sugar he puts into his coffee in the [facility]

## 2022-10-19 NOTE — PLAN OF CARE
Problem: PAIN - ADULT  Goal: Verbalizes/displays adequate comfort level or baseline comfort level  Description  Interventions:  - Encourage patient to monitor pain and request assistance  - Assess pain using appropriate pain scale  - Administer analgesics based on type and severity of pain and evaluate response  - Implement non-pharmacological measures as appropriate and evaluate response  - Consider cultural and social influences on pain and pain management  - Notify physician/advanced practitioner if interventions unsuccessful or patient reports new pain  Outcome: Progressing     Problem: INFECTION - ADULT  Goal: Absence or prevention of progression during hospitalization  Description  INTERVENTIONS:  - Assess and monitor for signs and symptoms of infection  - Monitor lab/diagnostic results  - Monitor all insertion sites, i e  indwelling lines, tubes, and drains  - Monitor endotracheal if appropriate and nasal secretions for changes in amount and color  - [place] appropriate cooling/warming therapies per order  - Administer medications as ordered  - Instruct and encourage patient and family to use good hand hygiene technique  - Identify and instruct in appropriate isolation precautions for identified infection/condition  Outcome: Progressing  Goal: Absence of fever/infection during neutropenic period  Description  INTERVENTIONS:  - Monitor WBC    Outcome: Progressing     Problem: SAFETY ADULT  Goal: Patient will remain free of falls  Description  INTERVENTIONS:  - Assess patient frequently for physical needs  -  Identify cognitive and physical deficits and behaviors that affect risk of falls    -  [place] fall precautions as indicated by assessment   - Educate patient/family on patient safety including physical limitations  - Instruct patient to call for assistance with activity based on assessment  - Modify environment to reduce risk of injury  - Consider OT/PT consult to assist with strengthening/mobility  Outcome: Progressing  Goal: Maintain or return to baseline ADL function  Description  INTERVENTIONS:  -  Assess patient's ability to carry out ADLs; assess patient's baseline for ADL function and identify physical deficits which impact ability to perform ADLs (bathing, care of mouth/teeth, toileting, grooming, dressing, etc )  - Assess/evaluate cause of self-care deficits   - Assess range of motion  - Assess patient's mobility; develop plan if impaired  - Assess patient's need for assistive devices and provide as appropriate  - Encourage maximum independence but intervene and supervise when necessary  - Involve family in performance of ADLs  - Assess for home care needs following discharge   - Consider OT consult to assist with ADL evaluation and planning for discharge  - Provide patient education as appropriate  Outcome: Progressing  Goal: Maintain or return mobility status to optimal level  Description  INTERVENTIONS:  - Assess patient's baseline mobility status (ambulation, transfers, stairs, etc )    - Identify cognitive and physical deficits and behaviors that affect mobility  - Identify mobility aids required to assist with transfers and/or ambulation (gait belt, sit-to-stand, lift, walker, cane, etc )  - [place] fall precautions as indicated by assessment  - Record patient progress and toleration of activity level on Mobility SBAR; progress patient to next Phase/Stage  - Instruct patient to call for assistance with activity based on assessment  - Consider rehabilitation consult to assist with strengthening/weightbearing, etc   Outcome: Progressing     Problem: DISCHARGE PLANNING  Goal: Discharge to home or other facility with appropriate resources  Description  INTERVENTIONS:  - Identify barriers to discharge w/patient and caregiver  - Arrange for needed discharge resources and transportation as appropriate  - Identify discharge learning needs (meds, wound care, etc )  - Arrange for interpretive services to assist at discharge as needed  - Refer to Case Management Department for coordinating discharge planning if the patient needs post-hospital services based on physician/advanced practitioner order or complex needs related to functional status, cognitive ability, or social support system  Outcome: Progressing     Problem: Knowledge Deficit  Goal: Patient/family/caregiver demonstrates understanding of disease process, treatment plan, medications, and discharge instructions  Description  Complete learning assessment and assess knowledge base  Interventions:  - Provide teaching at level of understanding  - Provide teaching via preferred learning methods  Outcome: Progressing     Problem: Potential for Falls  Goal: Patient will remain free of falls  Description  INTERVENTIONS:  - Assess patient frequently for physical needs  -  Identify cognitive and physical deficits and behaviors that affect risk of falls    -  [place] fall precautions as indicated by assessment   - Educate patient/family on patient safety including physical limitations  - Instruct patient to call for assistance with activity based on assessment  - Modify environment to reduce risk of injury  - Consider OT/PT consult to assist with strengthening/mobility  Outcome: Progressing     Problem: CHANGE IN BODY IMAGE  Goal: Pt/Family communicate acceptance of loss or change in body image and expresses psychological comfort and peace  Description  INTERVENTIONS:  - Assess patient/family anxiety and grief process related to change in body image, loss of functional status and loss of sense of self  - Assess patient/family's coping skills and provide emotional, spiritual and psychosocial support  - Provide information about the patient's health status with consideration of family and cultural values  - Communicate willingness to discuss loss and facilitate grief process with patient/family as appropriate  - Emphasize sustaining relationships within family system and community, or mill[place]/spiritual traditions  - Refer to community support groups as appropriate  - [address], Pastoral care or other ancillary consults as needed  Outcome: Progressing     Problem: GASTROINTESTINAL - ADULT  Goal: Minimal or absence of nausea and/or vomiting  Description  INTERVENTIONS:  - Administer IV fluids if ordered to ensure adequate hydration  - Maintain NPO status until nausea and vomiting are resolved  - Nasogastric tube if ordered  - Administer ordered antiemetic medications as needed  - Provide nonpharmacologic comfort measures as appropriate  - Advance diet as tolerated, if ordered  - Consider nutrition services referral to assist patient with adequate nutrition and appropriate food choices  Outcome: Progressing  Goal: Maintains or returns to baseline bowel function  Description  INTERVENTIONS:  - Assess bowel function  - Encourage oral fluids to ensure adequate hydration  - Administer IV fluids if ordered to ensure adequate hydration  - Administer ordered medications as needed  - Encourage mobilization and activity  - Consider nutritional services referral to assist patient with adequate nutrition and appropriate food choices  Outcome: Progressing  Goal: Maintains adequate nutritional intake  Description  INTERVENTIONS:  - Monitor percentage of each meal consumed  - Identify factors contributing to decreased intake, treat as appropriate  - Assist with meals as needed  - Monitor I&O, weight, and lab values if indicated  - Obtain nutrition services referral as needed  Outcome: Progressing     Problem: METABOLIC, FLUID AND ELECTROLYTES - ADULT  Goal: Electrolytes maintained within normal limits  Description  INTERVENTIONS:  - Monitor labs and assess patient for signs and symptoms of electrolyte imbalances  - Administer electrolyte replacement as ordered  - Monitor response to electrolyte replacements, including repeat lab results as appropriate  - Instruct patient on fluid and nutrition as appropriate  Outcome: Progressing  Goal: Fluid balance maintained  Description  INTERVENTIONS:  - Monitor labs   - Monitor I/O and WT  - Instruct patient on fluid and nutrition as appropriate  - Assess for signs & symptoms of volume excess or deficit  Outcome: Progressing     Problem: SKIN/TISSUE INTEGRITY - ADULT  Goal: Skin integrity remains intact  Description  INTERVENTIONS  - Identify patients at risk for skin breakdown  - Assess and monitor skin integrity  - Assess and monitor nutrition and hydration status  - Monitor labs (i e  albumin)  - Assess for incontinence   - Turn and reposition patient  - Assist with mobility/ambulation  - Relieve pressure over bony prominences  - Avoid friction and shearing  - Provide appropriate hygiene as needed including keeping skin clean and dry  - Evaluate need for skin moisturizer/barrier cream  - Collaborate with interdisciplinary team (i e  Nutrition, Rehabilitation, etc )   - Patient/family teaching  Outcome: Progressing  Goal: Incision(s), wounds(s) or drain site(s) healing without S/S of infection  Description  INTERVENTIONS  - Assess and document risk factors for skin impairment   - Assess and document dressing, incision, wound bed, drain sites and surrounding tissue  - Consider nutrition services referral as needed  - Oral mucous membranes remain intact  - Provide patient/ family education  Outcome: Progressing  Goal: Oral mucous membranes remain intact  Description  INTERVENTIONS  - Assess oral mucosa and hygiene practices  - Implement preventative oral hygiene regimen  - Implement oral medicated treatments as ordered  - Initiate Nutrition services referral as needed  Outcome: Progressing     Problem: HEMATOLOGIC - ADULT  Goal: Maintains hematologic stability  Description  INTERVENTIONS  - Assess for signs and symptoms of bleeding or hemorrhage  - Monitor labs  - Administer supportive blood products/factors as ordered and appropriate  Outcome: Progressing 71

## 2022-10-21 ENCOUNTER — CONSULT (OUTPATIENT)
Dept: ENDOCRINOLOGY | Facility: CLINIC | Age: 57
End: 2022-10-21
Payer: COMMERCIAL

## 2022-10-21 VITALS
HEART RATE: 90 BPM | SYSTOLIC BLOOD PRESSURE: 102 MMHG | WEIGHT: 228 LBS | BODY MASS INDEX: 32.64 KG/M2 | HEIGHT: 70 IN | DIASTOLIC BLOOD PRESSURE: 64 MMHG

## 2022-10-21 DIAGNOSIS — K91.2 HYPOGLYCEMIA AFTER GI (GASTROINTESTINAL) SURGERY: ICD-10-CM

## 2022-10-21 PROCEDURE — 99244 OFF/OP CNSLTJ NEW/EST MOD 40: CPT | Performed by: INTERNAL MEDICINE

## 2022-10-21 RX ORDER — BLOOD-GLUCOSE SENSOR
1 EACH MISCELLANEOUS
Qty: 6 EACH | Refills: 3 | Status: SHIPPED | OUTPATIENT
Start: 2022-10-21

## 2022-10-21 RX ORDER — BLOOD-GLUCOSE TRANSMITTER
1 EACH MISCELLANEOUS
Qty: 1 EACH | Refills: 1 | Status: SHIPPED | OUTPATIENT
Start: 2022-10-21

## 2022-10-21 NOTE — PROGRESS NOTES
Consultation - Gideon Sewell 65 y o  male MRN: [id number]    Unit/Bed#:  Encounter: [id number]      Assessment/Plan     Assessment: This is a 65 y o  male with hypoglycemia c/w postprandial hyperinsulinemic hypoglycemia, a known though uncommon complication of RYGB surgery, as evidenced by hypoglycemia precipitated by intake of simple carbohydrates developing months to years after RYGB, which has resolved with intervention of lifestyle modification, specifically decreased intake of simple carbohydrates  Plan:  -Continue lifestyle modification of decreased simple carbohydrate intake  -Pt offered referral to dietician for additional education, however already follows with one, next appt in January  Pt will call his dietician today  to be seen sooner   -Prescribe Dexcom CGM for home BG monitoring to alert pt to lower blood sugars, set alarm to alert for BG<80  -F/u in 3 m, sooner if having episodes of hypoglycemia    CC: Hypoglycemia    History of Present Illness     HPI: Gideon Sewell is a 65 y o   male referred to Endocrinology clinic this morning for evaluation of hypoglycemia  Pt has a history of barbara-en-y gastric bypass approx 2 years ago, after which he lost approx 100 pounds, of which he has re-gained 10 lbs  Approx 6 m to 1 year after surgery, pt began to experience episodic symptoms of numbness/tingling, inattention, mood changes (anger/violent outbursts), paralysis, followed by fatigue and sugar craving  Episodes lasting 30 min to 2 h, with fatigue generally lasting the rest of the day  Episodes preceded by high sugar intake about 2 hours prior to sxs onset  Pt reports these episodes were occurring every 1 to 2 days, and was recently assessed for seizure with video EEG   During video EEG evaluation, pt was encouraged to engage in typical behaviors associated with sxs onset; after consuming two Monster energy drinks and a candy bar, pt was observed to experience an episode; EEG activity was unremarkable, however pt noted to be markedly hypoglycemic w BG of 32  Pt advised at that time to decrease intake of simple carbohydrates out of concern for postprandial hyperinsulinemic hypoglycemia (late dumping syndrome)  Today in clinic pt reports that he has experienced no episodes since changing diet--has stopped consumption of energy drinks, is drinking coffee and has decreased the amount of sugar he uses, has stopped consumption of candy bars, does continue to eat ice cream at night  Review of Systems   Constitutional: Negative for activity change, appetite change (decreased since surgery 2 y ago), diaphoresis (resolved in last 2 weeks w diet change), fatigue and unexpected weight change  HENT: Negative  Eyes: Negative  Negative for visual disturbance (resolved in last 2 weeks w diet change)  Respiratory: Negative  Cardiovascular: Negative  Gastrointestinal: Negative for abdominal pain, diarrhea, nausea and vomiting  Endocrine: Negative for cold intolerance, heat intolerance, polydipsia, polyphagia and polyuria  Genitourinary: Negative  Negative for decreased urine volume, difficulty urinating and frequency  Musculoskeletal: Negative  Skin: Negative  Negative for color change, pallor and rash  Neurological: Negative for dizziness (resolved in last 2 weeks w diet change), tremors, seizures (no seizure-like activity in last 2 weeks w diet change), syncope, speech difficulty (resolved in last 2 weeks w diet change), weakness, light-headedness, numbness (resolved in last 2 weeks w diet change) and headaches  Psychiatric/Behavioral: Negative  Negative for agitation (resolved in last 2 weeks w diet change)         Historical Information   Past Medical History:   Diagnosis Date   • Bariatric surgery status    • CPAP (continuous positive airway pressure) dependence    • Hearing loss of aging    • Hypercholesterolemia    • Morbid obesity (HCC)    • NSTEMI (non-ST elevation myocardial infarction) [facility])     2019   • Postsurgical malabsorption      Past Surgical History:   Procedure Laterality Date   • CARDIAC CATHETERIZATION      no stents    • COLONOSCOPY     • EGD     • HERNIA REPAIR      umbilical hernia   • [facility] GASTRIC BYPASS/FRITZ-EN-Y N/A 3/2/2020    Procedure: LAPAROSCOPIC FRITZ-EN-Y GASTRIC BYPASS AND INTRAOPERATIVE EGD;  Surgeon: Norm Fonseca MD;  Location: [facility];  Service: Bariatrics   • SKIN SURGERY      cyst removed from back and thumb   • TONSILLECTOMY     • TOOTH EXTRACTION       Social History   Social History     Substance and Sexual Activity   Alcohol Use Not Currently     Social History     Substance and Sexual Activity   Drug Use Never     Social History     Tobacco Use   Smoking Status Current Every Day Smoker   • Types: Cigars, E-Cigarettes   • Last attempt to quit: 10/15/2003   • Years since quittin 0   Smokeless Tobacco Former User   • Types: Chew   • Quit date: 2022   Tobacco Comment    quit cigarettes / vape     Family History:   Family History   Problem Relation Age of Onset   • Lung cancer Mother    • Brain cancer Mother    • Diabetes Brother    • Bipolar disorder Maternal Grandfather    • Bipolar disorder Son        Meds/Allergies   Current Outpatient Medications   Medication Sig Dispense Refill   • acetaminophen (TYLENOL) 325 mg tablet Take 3 tablets (975 mg total) by mouth every 8 (eight) hours (Patient taking differently: Take 975 mg by mouth every 8 (eight) hours as needed) 30 tablet 0   • atorvastatin (LIPITOR) 40 mg tablet Take 1 tablet (40 mg total) by mouth daily 90 tablet 1   • clonazePAM (KlonoPIN) 2 mg tablet Take 1 tablet (2 mg total) by mouth 2 (two) times a day as needed for anxiety (Patient taking differently: Take 2 mg by mouth 2 (two) times a day) 60 tablet 0   • desvenlafaxine succinate (PRISTIQ) 50 mg 24 hr tablet Take 1 tablet (50 mg total) by mouth daily 90 tablet 2   • OXcarbazepine (TRILEPTAL) 300 mg tablet Take 300 mg by mouth every 12 (twelve) hours     • paliperidone palmitate ER (INVEGA) 234 mg/1 5 mL IM injection Inject 1 5 mL (234 mg total) into a muscle every 21 days for 1 dose (Patient taking differently: Inject 234 mg into a muscle every 21 days Most recent dose 10/10) 1 mL 20   • pantoprazole (PROTONIX) 20 mg tablet Take 1 tablet (20 mg total) by mouth daily 90 tablet 1   • potassium chloride (K-DUR,KLOR-CON) 20 mEq tablet Take 1 tablet (20 mEq total) by mouth daily 30 tablet 5   • ranolazine (RANEXA) 1000 MG SR tablet Take 1 tablet (1,000 mg total) by mouth 2 (two) times a day 180 tablet 3   • traZODone (DESYREL) 300 MG tablet Take 1 tablet (300 mg total) by mouth daily at bedtime (Patient taking differently: Take 300 mg by mouth daily at bedtime As needed) 90 tablet 2   • aspirin 81 mg chewable tablet Chew 1 tablet (81 mg total) daily (Patient not taking: Reported on 10/21/2022) 30 tablet 0     No current facility-administered medications for this visit  No Known Allergies    Objective   Vitals: Blood pressure 102/64, pulse 90, height 5' 10" (1 778 m), weight 103 kg (228 lb)  [unfilled]  Invasive Devices  Report    None                 Physical Exam  Vitals reviewed  Constitutional:       General: He is not in acute distress  Appearance: Normal appearance  He is obese  He is not ill-appearing, toxic-appearing or diaphoretic  HENT:      Head: Normocephalic and atraumatic  Eyes:      General: No scleral icterus  Right eye: No discharge  Left eye: No discharge  Conjunctiva/sclera: Conjunctivae normal    Cardiovascular:      Rate and Rhythm: Normal rate and regular rhythm  Pulses: Normal pulses  Heart sounds: Normal heart sounds  No murmur heard  No friction rub  No gallop  Pulmonary:      Effort: Pulmonary effort is normal  No respiratory distress  Breath sounds: Normal breath sounds  No stridor  No wheezing, rhonchi or rales     Chest:      Chest wall: No tenderness  Abdominal:      General: Bowel sounds are normal  There is no distension  Palpations: Abdomen is soft  Tenderness: There is no abdominal tenderness  There is no guarding or rebound  Skin:     General: Skin is warm and dry  Coloration: Skin is not jaundiced or pale  Comments: No acanthosis   Neurological:      Mental Status: He is alert  Psychiatric:         Mood and Affect: Mood normal          Behavior: Behavior normal          The history was obtained from the review of the chart, patient and family  Lab Results:       Lab Results   Component Value Date    WBC 10 29 (H) 10/11/2022    HGB 14 0 10/11/2022    HCT 40 9 10/11/2022    MCV 90 10/11/2022     10/11/2022     Lab Results   Component Value Date/Time    BUN 6 10/11/2022 10:53 AM    BUN 17 06/01/2015 01:33 PM    K 4 2 10/11/2022 10:53 AM     10/11/2022 10:53 AM    CO2 24 10/11/2022 10:53 AM    CREATININE 0 67 10/11/2022 10:53 AM    CREATININE 0 88 06/01/2015 01:33 PM    AST 21 10/11/2022 10:53 AM    ALT 31 10/11/2022 10:53 AM    ALB 3 4 (L) 10/11/2022 10:53 AM     No results for input(s): CHOL, HDL, LDL, TRIG, VLDL in the last 72 hours  No results found for: [facility] [facility]  POC Glucose (mg/dl)   Date Value   10/12/2022 93   10/12/2022 95       Imaging Studies: I have personally reviewed pertinent reports  (Video EEG)    Portions of the record may have been created with voice recognition software

## 2022-10-21 NOTE — PROGRESS NOTES
Nancy Saab 65 y o  male MRN: [id number]    Unit/Bed#:  Encounter: [id number]      Assessment/Plan     Assessment: This is a 65y o -year-old male with ***  Plan:  -Continue lifestyle modification of diet low in simple carbohydrates  -Referral to Nutrition for further education regarding diet      CC: Hypoglycemia    History of Present Illness     HPI: Nancy Saab is a 65y o  year old male with type     Review of Systems   Constitutional: Positive for appetite change (decreased since RYGB)  Negative for activity change, diaphoresis and fatigue         Historical Information   Past Medical History:   Diagnosis Date   • Bariatric surgery status    • CPAP (continuous positive airway pressure) dependence    • Hearing loss of aging    • Hypercholesterolemia    • Morbid obesity (HCC)    • NSTEMI (non-ST elevation myocardial infarction) ([facility] [address] )     2019   • Postsurgical malabsorption      Past Surgical History:   Procedure Laterality Date   • CARDIAC CATHETERIZATION      no stents    • COLONOSCOPY     • EGD     • HERNIA REPAIR      umbilical hernia   • [place] LAP GASTRIC BYPASS/FRITZ-EN-Y N/A 3/2/2020    Procedure: LAPAROSCOPIC FRITZ-EN-Y GASTRIC BYPASS AND INTRAOPERATIVE EGD;  Surgeon: Nelly Quintero MD;  Location: [facility] OR;  Service: Bariatrics   • SKIN SURGERY      cyst removed from back and thumb   • TONSILLECTOMY     • TOOTH EXTRACTION       Social History   Social History     Substance and Sexual Activity   Alcohol Use Not Currently     Social History     Substance and Sexual Activity   Drug Use Never     Social History     Tobacco Use   Smoking Status Current Every Day Smoker   • Types: Cigars, E-Cigarettes   • Last attempt to quit: 10/15/2003   • Years since quittin 0   Smokeless Tobacco Former User   • Types: Chew   • Quit date: 2022   Tobacco Comment    quit cigarettes / vape     Family History:   Family History   Problem Relation Age of Onset   • Lung cancer Mother    • Brain cancer Mother    • Diabetes Brother    • Bipolar disorder Maternal Grandfather    • Bipolar disorder Son        Meds/Allergies   Current Outpatient Medications   Medication Sig Dispense Refill   • acetaminophen (TYLENOL) 325 mg tablet Take 3 tablets (975 mg total) by mouth every 8 (eight) hours (Patient taking differently: Take 975 mg by mouth every 8 (eight) hours as needed) 30 tablet 0   • atorvastatin (LIPITOR) 40 mg tablet Take 1 tablet (40 mg total) by mouth daily 90 tablet 1   • clonazePAM (KlonoPIN) 2 mg tablet Take 1 tablet (2 mg total) by mouth 2 (two) times a day as needed for anxiety (Patient taking differently: Take 2 mg by mouth 2 (two) times a day) 60 tablet 0   • desvenlafaxine succinate (PRISTIQ) 50 mg 24 hr tablet Take 1 tablet (50 mg total) by mouth daily 90 tablet 2   • OXcarbazepine (TRILEPTAL) 300 mg tablet Take 300 mg by mouth every 12 (twelve) hours     • paliperidone palmitate ER (INVEGA) 234 mg/1 5 mL IM injection Inject 1 5 mL (234 mg total) into a muscle every 21 days for 1 dose (Patient taking differently: Inject 234 mg into a muscle every 21 days Most recent dose 10/10) 1 mL 20   • pantoprazole (PROTONIX) 20 mg tablet Take 1 tablet (20 mg total) by mouth daily 90 tablet 1   • potassium chloride (K-DUR,KLOR-CON) 20 mEq tablet Take 1 tablet (20 mEq total) by mouth daily 30 tablet 5   • ranolazine (RANEXA) 1000 MG SR tablet Take 1 tablet (1,000 mg total) by mouth 2 (two) times a day 180 tablet 3   • traZODone (DESYREL) 300 MG tablet Take 1 tablet (300 mg total) by mouth daily at bedtime (Patient taking differently: Take 300 mg by mouth daily at bedtime As needed) 90 tablet 2   • aspirin 81 mg chewable tablet Chew 1 tablet (81 mg total) daily (Patient not taking: Reported on 10/21/2022) 30 tablet 0     No current facility-administered medications for this visit       No Known Allergies    Objective   Vitals: Blood pressure 102/64, pulse 90, height 5' 10" (1 778 m), weight 103 kg (228 lb)   [unfilled]  Invasive Devices  Report    None                 Physical Exam    The history was obtained from the review of the chart, {BBPTFAM:31988}  Lab Results:       Lab Results   Component Value Date    WBC 10 29 (H) 10/11/2022    HGB 14 0 10/11/2022    HCT 40 9 10/11/2022    MCV 90 10/11/2022     10/11/2022     Lab Results   Component Value Date/Time    BUN 6 10/11/2022 10:53 AM    BUN 17 06/01/2015 01:33 PM    K 4 2 10/11/2022 10:53 AM     10/11/2022 10:53 AM    CO2 24 10/11/2022 10:53 AM    CREATININE 0 67 10/11/2022 10:53 AM    CREATININE 0 88 06/01/2015 01:33 PM    AST 21 10/11/2022 10:53 AM    ALT 31 10/11/2022 10:53 AM    ALB 3 4 (L) 10/11/2022 10:53 AM     No results for input(s): CHOL, HDL, LDL, TRIG, VLDL in the last 72 hours  No results found for: [facility]  POC Glucose (mg/dl)   Date Value   10/12/2022 93   10/12/2022 95       Imaging Studies: {Results Review Statement:12328}    Portions of the record may have been created with voice recognition software

## 2022-10-24 ENCOUNTER — TELEPHONE (OUTPATIENT)
Dept: ENDOCRINOLOGY | Facility: CLINIC | Age: 57
End: 2022-10-24

## 2022-10-24 NOTE — TELEPHONE ENCOUNTER
Luis DEVLIN Case ID: [id number] - Rx #: [id number]  Need help? Call us at [phone]    Status   Sent to [facility] Prior Authorization Form

## 2022-10-24 NOTE — TELEPHONE ENCOUNTER
David DEVLIN Case ID: [id number] - Rx #: [id number]  Need help? Call us at [phone]    Status   Sent to [facility] Prior Authorization Form

## 2022-10-31 ENCOUNTER — HOSPITAL ENCOUNTER (OUTPATIENT)
Dept: INFUSION CENTER | Facility: HOSPITAL | Age: 57
Discharge: HOME | End: 2022-10-31
Attending: STUDENT IN AN ORGANIZED HEALTH CARE EDUCATION/TRAINING PROGRAM

## 2022-10-31 DIAGNOSIS — F31.62 BIPOLAR DISORDER, CURRENT EPISODE MIXED, MODERATE (HCC): Primary | ICD-10-CM

## 2022-10-31 RX ADMIN — PALIPERIDONE PALMITATE 234 MG: 234 INJECTION INTRAMUSCULAR at 08:54

## 2022-10-31 NOTE — PROGRESS NOTES
Patient tolerated L arm invega sustenna injection without issues  Next appointment confirmed, AVS declined

## 2022-11-01 DIAGNOSIS — F31.62 BIPOLAR DISORDER, CURRENT EPISODE MIXED, MODERATE (HCC): Primary | ICD-10-CM

## 2022-11-01 RX ORDER — OXCARBAZEPINE 300 MG/1
300 TABLET, FILM COATED ORAL 2 TIMES DAILY
Qty: 60 TABLET | Refills: 4 | Status: SHIPPED | OUTPATIENT
Start: 2022-11-01

## 2022-11-01 NOTE — TELEPHONE ENCOUNTER
Medication Refill Request     Name of Medication Trileptal  Dose/Frequency 300mg every 12 hours  Quantity   Verified pharmacy   [x]  Verified ordering Provider   [x]  Does patient have enough for the next 3 days? Yes [x] No []  Does patient have a follow-up appointment scheduled?  Yes [x] No []   If so when is appointment: 12/30/22

## 2022-11-03 DIAGNOSIS — F41.9 ANXIETY DISORDER, UNSPECIFIED TYPE: ICD-10-CM

## 2022-11-03 NOTE — TELEPHONE ENCOUNTER
Medication Refill Request     Name of Medication Klonopin  Dose/Frequency 2mg twice a day   Quantity 60 tablet  Verified pharmacy   [x]  Verified ordering Provider   [x]  Does patient have enough for the next 3 days? Yes [x] No []  Does patient have a follow-up appointment scheduled?  Yes [x] No []   If so when is appointment: 12/30

## 2022-11-04 RX ORDER — CLONAZEPAM 2 MG/1
2 TABLET ORAL 2 TIMES DAILY PRN
Qty: 60 TABLET | Refills: 1 | Status: SHIPPED | OUTPATIENT
Start: 2022-11-04

## 2022-11-04 NOTE — TELEPHONE ENCOUNTER
PDMP website reviewed  Gonsalo Ortiz has been appropriately adherent to controlled psychotropic medications without evidence of abuse or misuse  As such, will send 30-day refill to pharmacy of choice and follow up as necessary

## 2022-11-21 ENCOUNTER — HOSPITAL ENCOUNTER (OUTPATIENT)
Dept: INFUSION CENTER | Facility: HOSPITAL | Age: 57
Discharge: HOME | End: 2022-11-21
Attending: STUDENT IN AN ORGANIZED HEALTH CARE EDUCATION/TRAINING PROGRAM

## 2022-11-21 DIAGNOSIS — F31.62 BIPOLAR DISORDER, CURRENT EPISODE MIXED, MODERATE (HCC): Primary | ICD-10-CM

## 2022-11-21 RX ADMIN — PALIPERIDONE PALMITATE 234 MG: 234 INJECTION INTRAMUSCULAR at 08:26

## 2022-11-25 DIAGNOSIS — F41.9 ANXIETY DISORDER, UNSPECIFIED TYPE: ICD-10-CM

## 2022-11-25 NOTE — TELEPHONE ENCOUNTER
Medication Refill Request     Name of Medication Clonazepam  Dose/Frequency 2 mg 1 tablet two times a day  Quantity 60  Verified pharmacy   [x]  Verified ordering Provider   [x]  Does patient have enough for the next 3 days? Yes [x] No []  Does patient have a follow-up appointment scheduled? Yes [x] No []   If so when is appointment: 12/30/2022 at 11:00 p m

## 2022-11-28 RX ORDER — CLONAZEPAM 2 MG/1
2 TABLET ORAL 2 TIMES DAILY PRN
Qty: 60 TABLET | Refills: 1 | Status: SHIPPED | OUTPATIENT
Start: 2022-11-28

## 2022-11-28 NOTE — TELEPHONE ENCOUNTER
PDMP website reviewed  Melissa Orr has been appropriately adherent to controlled psychotropic medications without evidence of abuse or misuse  As such, will send 30-day refill to pharmacy of choice and follow up as necessary

## 2022-12-12 ENCOUNTER — HOSPITAL ENCOUNTER (OUTPATIENT)
Dept: INFUSION CENTER | Facility: HOSPITAL | Age: 57
Discharge: HOME | End: 2022-12-12
Attending: STUDENT IN AN ORGANIZED HEALTH CARE EDUCATION/TRAINING PROGRAM

## 2022-12-12 DIAGNOSIS — F31.62 BIPOLAR DISORDER, CURRENT EPISODE MIXED, MODERATE (HCC): Primary | ICD-10-CM

## 2022-12-12 RX ADMIN — PALIPERIDONE PALMITATE 234 MG: 234 INJECTION INTRAMUSCULAR at 08:21

## 2022-12-12 NOTE — PROGRESS NOTES
Pt tolerated Invega Sustenna IM in the right deltoid  Next apt scheduled, Declined AVS  Left unit ambulatory with a steady gait

## 2022-12-19 DIAGNOSIS — F41.9 ANXIETY DISORDER, UNSPECIFIED TYPE: ICD-10-CM

## 2022-12-19 RX ORDER — CLONAZEPAM 2 MG/1
2 TABLET ORAL 2 TIMES DAILY PRN
Qty: 60 TABLET | Refills: 1 | Status: SHIPPED | OUTPATIENT
Start: 2022-12-19

## 2022-12-19 NOTE — TELEPHONE ENCOUNTER
PDMP website reviewed  Valentino Blanks has been appropriately adherent to controlled psychotropic medications without evidence of abuse or misuse  As such, will send 30-day refill to pharmacy of choice and follow up as necessary

## 2022-12-19 NOTE — TELEPHONE ENCOUNTER
Medication Refill Request     Name of Medication Clonazepam  Dose/Frequency 2 mg/ Take 1 tablet (2 mg total) by mouth 2(two) times a day as needed for anxiety  Quantity 60  Verified pharmacy   [x]  Verified ordering Provider   [x]  Does patient have enough for the next 3 days? Yes [] No [x]  Does patient have a follow-up appointment scheduled?  Yes [x] No []   If so when is appointment: 12/30/2022

## 2022-12-30 ENCOUNTER — OFFICE VISIT (OUTPATIENT)
Dept: PSYCHIATRY | Facility: CLINIC | Age: 57
End: 2022-12-30

## 2022-12-30 DIAGNOSIS — F41.9 ANXIETY DISORDER, UNSPECIFIED TYPE: ICD-10-CM

## 2022-12-30 DIAGNOSIS — E87.1 HYPONATREMIA: ICD-10-CM

## 2022-12-30 DIAGNOSIS — F51.04 PSYCHOPHYSIOLOGICAL INSOMNIA: ICD-10-CM

## 2022-12-30 DIAGNOSIS — Z51.81 THERAPEUTIC DRUG MONITORING: ICD-10-CM

## 2022-12-30 DIAGNOSIS — F31.62 BIPOLAR DISORDER, CURRENT EPISODE MIXED, MODERATE (HCC): Primary | ICD-10-CM

## 2022-12-30 RX ORDER — OXCARBAZEPINE 300 MG/1
TABLET, FILM COATED ORAL
Qty: 90 TABLET | Refills: 3 | Status: SHIPPED | OUTPATIENT
Start: 2022-12-30 | End: 2023-03-30
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
## 2022-12-30 NOTE — BH TREATMENT PLAN
TREATMENT PLAN (Medication Management Only)        [facility]    Name and Date of Birth:  Lisa Gallardo 65 y o  1965  Date of Treatment Plan: December 30, 2022  Diagnosis/Diagnoses:    1  Bipolar disorder, current episode mixed, moderate ([address] )    2  Anxiety disorder, unspecified type    3  Psychophysiological insomnia    4  Hyponatremia    5  Therapeutic drug monitoring      Strengths/Personal Resources for Self-Care: supportive family, supportive friends, taking medications as prescribed, ability to adapt to life changes, ability to communicate needs, ability to communicate well, ability to listen, ability to reason, general fund of knowledge, good physical health, good understanding of illness, independence, motivation for treatment, ability to negotiate basic needs, being resoureceful, self-reliance, special hobby/interest   Area/Areas of need (in own words): anxiety symptoms, mood instability, sleep problems  1  Long Term Goal: maintain control of mood stability  Target Date:6 months - 6/30/2023  Person/Persons responsible for completion of goal: Chandler  2  Short Term Objective (s) - How will we reach this goal?:   A  Provider new recommended medication/dosage changes and/or continue medication(s): continue current medications as prescribed  B  Attend medication management appointments regularly  C  Take psychiatric medications responsibly  D  Monitor blood sugar more strictly   E  Monitor water intake  F  Continue to care for the dogs  Target Date:6 months - 6/30/2023  Person/Persons Responsible for Completion of Goal: Chandler  Progress Towards Goals: continuing treatment  Treatment Modality: medication management every 3 months  Review due 180 days from date of this plan: 6 months - 6/30/2023  Expected length of service: ongoing treatment  My Physician/PA/NP and I have developed this plan together and I agree to work on the goals and objectives  I understand the treatment goals that were developed for my treatment  Treatment Plan completed with assistance and input from patient and verbal consent provided  Treatment plan was not signed at time of office visit secondary to COVID-19 social distancing guidelines

## 2023-01-03 ENCOUNTER — APPOINTMENT (OUTPATIENT)
Dept: LAB | Facility: HOSPITAL | Age: 58
End: 2023-01-03
Attending: STUDENT IN AN ORGANIZED HEALTH CARE EDUCATION/TRAINING PROGRAM

## 2023-01-03 ENCOUNTER — TELEPHONE (OUTPATIENT)
Dept: PSYCHIATRY | Facility: CLINIC | Age: 58
End: 2023-01-03

## 2023-01-03 ENCOUNTER — HOSPITAL ENCOUNTER (OUTPATIENT)
Dept: INFUSION CENTER | Facility: HOSPITAL | Age: 58
Discharge: HOME | End: 2023-01-03
Attending: STUDENT IN AN ORGANIZED HEALTH CARE EDUCATION/TRAINING PROGRAM

## 2023-01-03 DIAGNOSIS — F31.62 BIPOLAR DISORDER, CURRENT EPISODE MIXED, MODERATE (HCC): Primary | ICD-10-CM

## 2023-01-03 DIAGNOSIS — Z51.81 THERAPEUTIC DRUG MONITORING: ICD-10-CM

## 2023-01-03 DIAGNOSIS — E87.1 HYPONATREMIA: ICD-10-CM

## 2023-01-03 LAB
ANION GAP SERPL CALCULATED.3IONS-SCNC: 9 MMOL/L (ref 5–14)
BUN SERPL-MCNC: 6 MG/DL (ref 5–25)
CALCIUM SERPL-MCNC: 8.9 MG/DL (ref 8.4–10.2)
CHLORIDE SERPL-SCNC: 97 MMOL/L (ref 96–108)
CO2 SERPL-SCNC: 25 MMOL/L (ref 21–32)
CREAT SERPL-MCNC: 0.64 MG/DL (ref 0.7–1.5)
GFR SERPL CREATININE-BSD FRML MDRD: 109 ML/MIN/1.73SQ M
GLUCOSE P FAST SERPL-MCNC: 84 MG/DL (ref 70–99)
POTASSIUM SERPL-SCNC: 4.5 MMOL/L (ref 3.5–5.3)
SODIUM SERPL-SCNC: 131 MMOL/L (ref 135–147)

## 2023-01-03 RX ADMIN — PALIPERIDONE PALMITATE 234 MG: 234 INJECTION INTRAMUSCULAR at 08:10

## 2023-01-03 NOTE — TELEPHONE ENCOUNTER
Spoke with Narayan  Reviewed Na level and verified he has been taking Trileptal 300 mg and 2 tabs of 300 mg (600 mg) HS  He denies s/s and said he "feels great " Lab results sent to covering provider for review in Dr Debbie Melton absence

## 2023-01-03 NOTE — TELEPHONE ENCOUNTER
Left a VM for Greg:  Requested a call to nursing; would like to verify the dose he is taking as the blood work/level is low; nursing number given

## 2023-01-03 NOTE — PROGRESS NOTES
Patient tolerated L arm IM invega injection without issues  Next appointment confirmed, AVS declined

## 2023-01-04 NOTE — TELEPHONE ENCOUNTER
See Result Note for direction of covering provider  Spoke with Jane Love and reviewed s/s and the need for emergency care  He verbalized understanding

## 2023-01-05 LAB — OXCARBAZEPINE SERPL-MCNC: 11 UG/ML (ref 10–35)

## 2023-01-23 ENCOUNTER — HOSPITAL ENCOUNTER (OUTPATIENT)
Dept: INFUSION CENTER | Facility: HOSPITAL | Age: 58
Discharge: HOME | End: 2023-01-23
Attending: STUDENT IN AN ORGANIZED HEALTH CARE EDUCATION/TRAINING PROGRAM

## 2023-01-23 ENCOUNTER — TELEPHONE (OUTPATIENT)
Dept: ADMINISTRATIVE | Facility: OTHER | Age: 58
End: 2023-01-23

## 2023-01-23 DIAGNOSIS — F31.62 BIPOLAR DISORDER, CURRENT EPISODE MIXED, MODERATE (HCC): Primary | ICD-10-CM

## 2023-01-23 RX ADMIN — PALIPERIDONE PALMITATE 234 MG: 234 INJECTION INTRAMUSCULAR at 08:36

## 2023-01-23 NOTE — PROGRESS NOTES
Pt tolerated Idelia Hare injection to L deltoid without difficulty  Next appt confirmed  AVS declined  Left ambulatory in stable condition

## 2023-01-23 NOTE — TELEPHONE ENCOUNTER
----- Message from Florin Coleman RN sent at 1/23/2023  7:16 AM EST -----  Regarding: colonoscopy  01/23/23 7:16 AM    Hello, our patient Paxton Miller has had CRC: Colonoscopy completed/performed  Please assist in updating the patient chart by pulling the Care Everywhere (CE) document  The date of service is 2016       Thank you,  Florin Coleman RN   Medical [facility] [address]

## 2023-01-23 NOTE — TELEPHONE ENCOUNTER
Upon review of the In Basket request we report already linked in HM     Any additional questions or concerns should be emailed to the Practice Liaisons via the appropriate education email address, please do not reply via In Basket      Thank you  Hugh Gonzalez MA

## 2023-01-24 ENCOUNTER — OFFICE VISIT (OUTPATIENT)
Dept: FAMILY MEDICINE CLINIC | Facility: CLINIC | Age: 58
End: 2023-01-24

## 2023-01-24 VITALS
DIASTOLIC BLOOD PRESSURE: 78 MMHG | HEART RATE: 83 BPM | WEIGHT: 240.2 LBS | BODY MASS INDEX: 34.39 KG/M2 | RESPIRATION RATE: 16 BRPM | SYSTOLIC BLOOD PRESSURE: 114 MMHG | OXYGEN SATURATION: 97 % | HEIGHT: 70 IN

## 2023-01-24 DIAGNOSIS — I25.118 CORONARY ARTERY DISEASE OF NATIVE ARTERY OF NATIVE HEART WITH STABLE ANGINA PECTORIS (HCC): ICD-10-CM

## 2023-01-24 DIAGNOSIS — Z98.84 BARIATRIC SURGERY STATUS: ICD-10-CM

## 2023-01-24 DIAGNOSIS — F51.04 PSYCHOPHYSIOLOGICAL INSOMNIA: ICD-10-CM

## 2023-01-24 DIAGNOSIS — E78.2 MIXED HYPERLIPIDEMIA: ICD-10-CM

## 2023-01-24 DIAGNOSIS — I95.1 ORTHOSTATIC HYPOTENSION: ICD-10-CM

## 2023-01-24 DIAGNOSIS — F31.62 BIPOLAR DISORDER, CURRENT EPISODE MIXED, MODERATE (HCC): ICD-10-CM

## 2023-01-24 DIAGNOSIS — M25.561 CHRONIC PAIN OF BOTH KNEES: ICD-10-CM

## 2023-01-24 DIAGNOSIS — Z76.89 ENCOUNTER TO ESTABLISH CARE: Primary | ICD-10-CM

## 2023-01-24 DIAGNOSIS — Z12.5 SCREENING FOR MALIGNANT NEOPLASM OF PROSTATE: ICD-10-CM

## 2023-01-24 DIAGNOSIS — K91.2 POSTSURGICAL MALABSORPTION: ICD-10-CM

## 2023-01-24 DIAGNOSIS — I25.110 CORONARY ARTERY DISEASE INVOLVING NATIVE CORONARY ARTERY OF NATIVE HEART WITH UNSTABLE ANGINA PECTORIS (HCC): ICD-10-CM

## 2023-01-24 DIAGNOSIS — G89.29 CHRONIC PAIN OF BOTH KNEES: ICD-10-CM

## 2023-01-24 DIAGNOSIS — E66.09 CLASS 1 OBESITY DUE TO EXCESS CALORIES WITH SERIOUS COMORBIDITY AND BODY MASS INDEX (BMI) OF 34.0 TO 34.9 IN ADULT: ICD-10-CM

## 2023-01-24 DIAGNOSIS — I21.4 NSTEMI (NON-ST ELEVATED MYOCARDIAL INFARCTION) (HCC): ICD-10-CM

## 2023-01-24 DIAGNOSIS — M25.562 CHRONIC PAIN OF BOTH KNEES: ICD-10-CM

## 2023-01-24 PROBLEM — K91.1 POSTSURGICAL DUMPING SYNDROME: Status: RESOLVED | Noted: 2022-10-18 | Resolved: 2023-01-24

## 2023-01-24 PROBLEM — Z48.815 ENCOUNTER FOR SURGICAL AFTERCARE FOLLOWING SURGERY OF DIGESTIVE SYSTEM: Status: RESOLVED | Noted: 2020-06-16 | Resolved: 2023-01-24

## 2023-01-24 RX ORDER — PANTOPRAZOLE SODIUM 20 MG/1
20 TABLET, DELAYED RELEASE ORAL DAILY
Qty: 90 TABLET | Refills: 1 | Status: SHIPPED | OUTPATIENT
Start: 2023-01-24

## 2023-01-24 RX ORDER — ATORVASTATIN CALCIUM 40 MG/1
40 TABLET, FILM COATED ORAL DAILY
Qty: 90 TABLET | Refills: 1 | Status: SHIPPED | OUTPATIENT
Start: 2023-01-24

## 2023-01-24 RX ORDER — DIPHENHYDRAMINE HCL 25 MG
25 TABLET ORAL DAILY
COMMUNITY

## 2023-01-24 NOTE — PROGRESS NOTES
Name: Raul Virk      : 1965      MRN: [id number]  Encounter Provider: Todd Mcgrath PA-C  Encounter Date: 2023   Encounter department: [facility]     1  Encounter to establish care  Comments:  Previously followed by Dr Suze Gallardo in [place], switching providers due to convenience with location  Wife also seen by this practice  2  Coronary artery disease involving native coronary artery of native heart with unstable angina pectoris [facility])  Assessment & Plan:  No longer chewing tobacco or smoking  Still with daily vaping, planning to quit  Nonobstructive per cath in 2019  Recommend follow-up with cardiologist  Continue statin and aspirin, recommend cessation from electronic cigarettes  Plans to use patches at home  Orders:  -     atorvastatin (LIPITOR) 40 mg tablet; Take 1 tablet (40 mg total) by mouth daily  -     CBC and differential; Future    3  Chronic pain of both knees  Assessment & Plan:  X2 years  With bone spurs present bilateral patella  We will check Xray b/l and consider ortho referral or PT  Orders:  -     XR knee 4+ vw right injury; Future; Expected date: 2023  -     XR knee 4+ vw left injury; Future; Expected date: 2023    4  Mixed hyperlipidemia  -     atorvastatin (LIPITOR) 40 mg tablet; Take 1 tablet (40 mg total) by mouth daily  -     Lipid panel; Future  -     Comprehensive metabolic panel; Future    5  Postsurgical malabsorption  Assessment & Plan:  Continue to follow-up bariatric surgery annually  Continue Vitamin D supplement  S/p Marii-en-Y gastric bypass in   Orders:  -     Vitamin D 25 hydroxy; Future    6  NSTEMI (non-ST elevated myocardial infarction) [facility])  Assessment & Plan:  Reviewed last cardiology consult note Dr Elmira Davis in 2021  History of cardiac catheterization which showed nonobstructive CAD in some myocardial bridging of the LAD in 2019   No longer with chest pain since starting Ranexa  Continue asa and statin  Discontinued metoprolol due to orthostatic hypotension  Recommend return to cardiology, lost to follow-up  7  Orthostatic hypotension  Assessment & Plan:  Resolved with weight gain s/p gastric bypass, previously on midodrine and florinef, no longer requiring  Follow-up with cardiology  8  Coronary artery disease of native artery of native heart with stable angina pectoris [facility])  Assessment & Plan:  No longer chewing tobacco or smoking  Still with daily vaping, planning to quit  Nonobstructive per cath in 2019  Recommend follow-up with cardiologist  Continue statin and aspirin, recommend cessation from electronic cigarettes  Plans to use patches at home  9  Class 1 obesity due to excess calories with serious comorbidity and body mass index (BMI) of 34 0 to 34 9 in adult  Assessment & Plan:  BMI Counseling: Body mass index is 34 47 kg/m²  The BMI is above normal  Nutrition recommendations include reducing portion sizes, decreasing overall calorie intake and 3-5 servings of fruits/vegetables daily  Exercise recommendations include exercising 3-5 times per week  100 pound weight loss with bariatric surgery  Has since gained back about 40 pounds  10  Bipolar disorder, current episode mixed, moderate (HCC)  Assessment & Plan:  Stable on paliperidone injections once monthly  Follow-up with psychiatry Dr June Le  Stable on Trileptal, Pristiq as well  Continue clonazepam twice daily per psychiatry  History of anger outbursts  No recent manic episodes  No hospitalization for bipolar  11  Psychophysiological insomnia  Assessment & Plan:  Patient reports not requiring trazodone recently due to improved sleep  12  Bariatric surgery status  -     pantoprazole (PROTONIX) 20 mg tablet; Take 1 tablet (20 mg total) by mouth daily    13  Screening for malignant neoplasm of prostate  -     PSA, Total Screen;  Future         Subjective Ivon Baltazar is a 63 y o  male with a h/o CAD, obesity, bipolar disorder who presents as a new patient to establish care with bilateral knee pain  No acute injury or inciting event  He was previously seen primary care Dr Tori Reynaga in [place] which was about a 40-minute commute  He decided to switch due to wife coming here  Last seen by PCP about 1 month ago  He has a long history of knee pain for past 2 years with bone spurs as he was previously working as an   He is currently on disability but would like to return to work  He sees Dr Gauri Grigsby psych regularly  He stopped drinking monster energy drinks, instead 1 cup coffee in the morning and soda once daily which has significantly improved his issue with hypertension and low fasting sugar after bariatric surgery  He is on injection antipsychotics monthly and is compliant with his last shot being yesterday  He has had EGD in the past   He has 3 kids - aged 28to 25years old  Youngest lives with him and wants to quit vaping as well  He sleeps well 8 to 9 hours daily has not required trazodone  Sees audiologist for hearing aids, wears glasses for reading  He brushes his teeth once daily  Electric cigarette vaping throughout the day  No alcohol use  No illicit drug use  Review of Systems   Constitutional: Negative for chills and fever  HENT: Negative for ear pain and sore throat  Eyes: Negative for pain and visual disturbance  Respiratory: Negative for cough and shortness of breath  Cardiovascular: Negative for chest pain and palpitations  Gastrointestinal: Negative for abdominal pain and vomiting  Genitourinary: Negative for dysuria and hematuria  Musculoskeletal: Positive for arthralgias  Negative for back pain  Skin: Negative for color change and rash  Neurological: Negative for seizures and syncope  All other systems reviewed and are negative        Past Medical History:   Diagnosis Date   • Bariatric surgery status    • CPAP (continuous positive airway pressure) dependence    • Hearing loss of aging    • Hypercholesterolemia    • Morbid obesity (HCC)    • NSTEMI (non-ST elevation myocardial infarction) [facility])     2019   • Postsurgical malabsorption      Past Surgical History:   Procedure Laterality Date   • CARDIAC CATHETERIZATION      no stents    • COLONOSCOPY      bx taken   • EGD     • HERNIA REPAIR      umbilical hernia   • MI LAPS GSTR RSTCV PX W/BYP FRITZ-EN-Y LIMB <150 CM N/A 2020    Procedure: LAPAROSCOPIC FRITZ-EN-Y GASTRIC BYPASS AND INTRAOPERATIVE EGD;  Surgeon: Adwoa Chen MD;  Location: [facility];  Service: Bariatrics   • SKIN SURGERY      cyst removed from back and thumb   • TONSILLECTOMY     • TOOTH EXTRACTION       Family History   Problem Relation Age of Onset   • Lung cancer Mother    • Brain cancer Mother    • Diabetes Brother    • Bipolar disorder Maternal Grandfather    • Bipolar disorder Son      Social History     Socioeconomic History   • Marital status: /Civil Union     Spouse name: None   • Number of children: None   • Years of education: None   • Highest education level: None   Occupational History   • None   Tobacco Use   • Smoking status: Former     Types: E-Cigarettes, Cigars     Quit date: 10/15/2003     Years since quittin 2   • Smokeless tobacco: Former     Types: Chew     Quit date: 2022   • Tobacco comments:     quit cigarettes / vape   Vaping Use   • Vaping Use: Every day   • Substances: Nicotine, Flavoring   Substance and Sexual Activity   • Alcohol use: Not Currently   • Drug use: Never   • Sexual activity: None   Other Topics Concern   • None   Social History Narrative    Former smoker - As per [facility]    Full-time employment        · Do you currently or have you served in the [employer]:   No    As per [facility]      Social Determinants of Health     Financial Resource Strain: Not on file   Food Insecurity: Not on file   Transportation Needs: Not on file   Physical Activity: Not on file   Stress: Not on file   Social Connections: Not on file   Intimate Partner Violence: Not on file   Housing Stability: Not on file     Current Outpatient Medications on File Prior to Visit   Medication Sig   • aspirin 81 mg chewable tablet Chew 1 tablet (81 mg total) daily   • clonazePAM (KlonoPIN) 2 mg tablet Take 1 tablet (2 mg total) by mouth 2 (two) times a day as needed for anxiety   • desvenlafaxine succinate (PRISTIQ) 50 mg 24 hr tablet Take 1 tablet (50 mg total) by mouth daily   • diphenhydrAMINE (BENADRYL) 25 mg tablet Take 25 mg by mouth in the morning   • OXcarbazepine (Trileptal) 300 mg tablet Take 1 tablet (300 mg total) by mouth in the morning AND 2 tablets (600 mg total) daily at bedtime  • paliperidone palmitate ER (INVEGA) 234 mg/1 5 mL IM injection Inject 1 5 mL (234 mg total) into a muscle every 21 days for 1 dose (Patient taking differently: Inject 234 mg into a muscle every 21 days Most recent dose 10/10)   • potassium chloride (K-DUR,KLOR-CON) 20 mEq tablet Take 1 tablet (20 mEq total) by mouth daily   • ranolazine (RANEXA) 1000 MG SR tablet Take 1 tablet (1,000 mg total) by mouth 2 (two) times a day   • traZODone (DESYREL) 300 MG tablet Take 1 tablet (300 mg total) by mouth daily at bedtime (Patient taking differently: Take 300 mg by mouth daily at bedtime As needed)     No Known Allergies  Immunization History   Administered Date(s) Administered   • COVID-19 MODERNA VACC 0 25 ML IM BOOSTER 11/16/2021   • COVID-19 MODERNA VACC 0 5 ML IM 04/01/2021, 05/07/2021   • Influenza, recombinant, quadrivalent,injectable, preservative free 03/15/2021, 12/20/2021, 10/18/2022   • Tdap 03/21/2017       Objective     /78 (BP Location: Left arm, Patient Position: Sitting, Cuff Size: Standard)   Pulse 83   Resp 16   Ht 5' 10" (1 778 m)   Wt 109 kg (240 lb 3 2 oz)   SpO2 97%   BMI 34 47 kg/m²     Physical Exam  Vitals and nursing note reviewed  Constitutional:       Appearance: Normal appearance  HENT:      Head: Normocephalic and atraumatic  Cardiovascular:      Rate and Rhythm: Normal rate and regular rhythm  Pulses: Normal pulses  Heart sounds: Normal heart sounds  Pulmonary:      Effort: Pulmonary effort is normal       Breath sounds: Normal breath sounds  Musculoskeletal:      Right knee: Crepitus present  No LCL laxity, MCL laxity, ACL laxity or PCL laxity  Abnormal patellar mobility  Normal pulse  Instability Tests: Anterior drawer test negative  Posterior drawer test negative  Anterior Lachman test negative  Medial Daniel test negative and lateral Daniel test negative  Left knee: Crepitus present  No LCL laxity, MCL laxity, ACL laxity or PCL laxity  Abnormal patellar mobility  Normal pulse  Instability Tests: Anterior drawer test negative  Posterior drawer test negative  Anterior Lachman test negative  Medial Daniel test negative and lateral Daniel test negative  Neurological:      Mental Status: He is alert and oriented to person, place, and time  Mental status is at baseline         Jose Quintanilla PA-C

## 2023-01-25 PROBLEM — E66.09 CLASS 1 OBESITY DUE TO EXCESS CALORIES WITH SERIOUS COMORBIDITY AND BODY MASS INDEX (BMI) OF 34.0 TO 34.9 IN ADULT: Status: ACTIVE | Noted: 2020-06-16

## 2023-01-25 PROBLEM — R79.89 ELEVATED TROPONIN: Status: RESOLVED | Noted: 2020-08-19 | Resolved: 2023-01-25

## 2023-01-25 PROBLEM — R91.8: Status: RESOLVED | Noted: 2020-08-18 | Resolved: 2023-01-25

## 2023-01-25 PROBLEM — M25.562 CHRONIC PAIN OF BOTH KNEES: Status: ACTIVE | Noted: 2023-01-25

## 2023-01-25 PROBLEM — M25.561 CHRONIC PAIN OF BOTH KNEES: Status: ACTIVE | Noted: 2023-01-25

## 2023-01-25 PROBLEM — R77.8 ELEVATED TROPONIN: Status: RESOLVED | Noted: 2020-08-19 | Resolved: 2023-01-25

## 2023-01-25 PROBLEM — E66.811 CLASS 1 OBESITY DUE TO EXCESS CALORIES WITH SERIOUS COMORBIDITY AND BODY MASS INDEX (BMI) OF 34.0 TO 34.9 IN ADULT: Status: ACTIVE | Noted: 2020-06-16

## 2023-01-25 PROBLEM — G89.29 CHRONIC PAIN OF BOTH KNEES: Status: ACTIVE | Noted: 2023-01-25

## 2023-01-25 NOTE — ASSESSMENT & PLAN NOTE
X2 years  With bone spurs present bilateral patella    We will check Xray b/l and consider ortho referral or PT

## 2023-01-25 NOTE — ASSESSMENT & PLAN NOTE
Stable on paliperidone injections once monthly  Follow-up with psychiatry Dr Gauri Grigsby  Stable on Trileptal, Pristiq as well  Continue clonazepam twice daily per psychiatry  History of anger outbursts  No recent manic episodes  No hospitalization for bipolar

## 2023-01-25 NOTE — ASSESSMENT & PLAN NOTE
Continue to follow-up bariatric surgery annually  Continue Vitamin D supplement  S/p Marii-en-Y gastric bypass in 2020

## 2023-01-25 NOTE — ASSESSMENT & PLAN NOTE
Resolved with weight gain s/p gastric bypass, previously on midodrine and florinef, no longer requiring  Follow-up with cardiology

## 2023-01-25 NOTE — ASSESSMENT & PLAN NOTE
BMI Counseling: Body mass index is 34 47 kg/m²  The BMI is above normal  Nutrition recommendations include reducing portion sizes, decreasing overall calorie intake and 3-5 servings of fruits/vegetables daily  Exercise recommendations include exercising 3-5 times per week  100 pound weight loss with bariatric surgery  Has since gained back about 40 pounds

## 2023-01-25 NOTE — ASSESSMENT & PLAN NOTE
Reviewed last cardiology consult note Dr Aleman Likes in 9/2021  History of cardiac catheterization which showed nonobstructive CAD in some myocardial bridging of the LAD in 5/2019  No longer with chest pain since starting Ranexa  Continue asa and statin  Discontinued metoprolol due to orthostatic hypotension  Recommend return to cardiology, lost to follow-up

## 2023-01-25 NOTE — ASSESSMENT & PLAN NOTE
No longer chewing tobacco or smoking  Still with daily vaping, planning to quit  Nonobstructive per cath in 2019  Recommend follow-up with cardiologist  Continue statin and aspirin, recommend cessation from electronic cigarettes  Plans to use patches at home

## 2023-01-26 ENCOUNTER — TELEPHONE (OUTPATIENT)
Dept: ENDOCRINOLOGY | Facility: CLINIC | Age: 58
End: 2023-01-26

## 2023-01-26 NOTE — TELEPHONE ENCOUNTER
Patient has appt tomorrow with you and is not feeling well    Is it ok to switch to virtual    Thank you

## 2023-01-30 ENCOUNTER — TELEMEDICINE (OUTPATIENT)
Dept: BARIATRICS | Facility: CLINIC | Age: 58
End: 2023-01-30

## 2023-01-30 VITALS — BODY MASS INDEX: 32.48 KG/M2 | WEIGHT: 232 LBS | HEIGHT: 71 IN

## 2023-01-30 DIAGNOSIS — F17.200 SMOKING: ICD-10-CM

## 2023-01-30 DIAGNOSIS — Z48.815 ENCOUNTER FOR SURGICAL AFTERCARE FOLLOWING SURGERY OF DIGESTIVE SYSTEM: Primary | ICD-10-CM

## 2023-01-30 DIAGNOSIS — Z98.84 BARIATRIC SURGERY STATUS: ICD-10-CM

## 2023-01-30 DIAGNOSIS — K91.2 POSTSURGICAL MALABSORPTION: ICD-10-CM

## 2023-01-30 DIAGNOSIS — E66.9 OBESITY, CLASS I, BMI 30-34.9: ICD-10-CM

## 2023-01-30 NOTE — PROGRESS NOTES
Virtual Regular Visit    Verification of patient location:    Patient is located in the following state in which I hold an active license [place]      Assessment/Plan:    - smoking cessation education provided  Risks associated with smoking discussed with patient as well  Patient states he will plan to quit  - continue with pantoprazole for now due to smoking and intermittent use of NSAIDs    - Routine follow up in 1 year for annual visit  - Continue with healthy lifestyle, adequate protein intake of 60 gm, fluid intake of at least 64 oz  - Continue with MVI daily  - Activity as tolerated  - Labs ordered and will adjust accordingly if any deficiency  - Follow up with RD and SW as needed  Problem List Items Addressed This Visit        Digestive    Postsurgical malabsorption    Relevant Orders    Zinc    Vitamin B12    Vitamin A    PTH, intact       Other    Bariatric surgery status    Relevant Orders    Zinc    Vitamin B12    Vitamin A    PTH, intact   Other Visit Diagnoses     Encounter for surgical aftercare following surgery of digestive system    -  Primary    Relevant Orders    Zinc    Vitamin B12    Vitamin A    PTH, intact    Obesity, Class I, BMI 30-34 9        Smoking                   Reason for visit is   Chief Complaint   Patient presents with   • Annual Exam   • Virtual Regular Visit        Encounter provider EMRE Woo    Provider located at [address] [place] [id number]      Recent Visits  Date Type Provider Dept   01/24/23 Office Visit Armaan Vargas PA-C [facility] recent visits within past 7 days and meeting all other requirements  Today's Visits  Date Type Provider Dept   01/30/23 Telemedicine EMRE Woo [facility]   Showing today's visits and meeting all other requirements  Future Appointments  No visits were found meeting these conditions    Showing future appointments within next 150 days and meeting all other requirements       The patient was identified by name and date of birth  Karo Ardon was informed that this is a telemedicine visit and that the visit is being conducted through the [employer]  He agrees to proceed     My office door was closed  No one else was in the room  He acknowledged consent and understanding of privacy and security of the video platform  The patient has agreed to participate and understands they can discontinue the visit at any time  Patient is aware this is a billable service  Subjective  Karo Ardon is a 63 y o  male s/p RNYGB with Dr Elliot Means  On 03/02/2020  Overall doing well, tolerating a regular diet  Denies having any abdominal pain, N/V/D/C, regurgitation, reflux or dysphagia  Has had 45 lb weight gain this year - reports he neeeded to gain weight because "my blood pressure became very low " Currently changing his portion sizes to help lose weight - goal of 190 lbs  Denies having any concerns, denies abdominal pain, N/V/D/C, regurgitation, reflux, or dysphagia  Tolerating a regular diet  - yes  60 gm of protein - yes  30/60 minute rule - yes  64 oz of water - yes  Exercise - no - encouraged to increase  Carbonation - no   ETOH - no  Smoking -  vaping - daily - encouraged smoking cessation  Discussed risks and complications associated with smoking  Patient is planning on quitting  Currently taking protonix daily  NSAIDs - occasionally takes his aspirin on and off  Supplements - on multivitamins  colonscopy - UTD  due in 2 years    Initial - 320 lbs lbs  Current - 232 lbs  Luis - 177 lbs    EWL - 55%       HPI     Past Medical History:   Diagnosis Date   • Bariatric surgery status    • CPAP (continuous positive airway pressure) dependence    • Hearing loss of aging    • Hypercholesterolemia    • Morbid obesity (HCC)    • NSTEMI (non-ST elevation myocardial infarction) [facility])     april 2019 • Postsurgical malabsorption        Past Surgical History:   Procedure Laterality Date   • CARDIAC CATHETERIZATION      no stents    • COLONOSCOPY  2016    bx taken   • EGD     • HERNIA REPAIR      umbilical hernia   • PA LAPS GSTR RSTCV PX W/BYP FRITZ-EN-Y LIMB <150 CM N/A 03/02/2020    Procedure: LAPAROSCOPIC FRITZ-EN-Y GASTRIC BYPASS AND INTRAOPERATIVE EGD;  Surgeon: Juan Rivera MD;  Location: [facility];  Service: Bariatrics   • SKIN SURGERY      cyst removed from back and thumb   • TONSILLECTOMY     • TOOTH EXTRACTION         Current Outpatient Medications   Medication Sig Dispense Refill   • atorvastatin (LIPITOR) 40 mg tablet Take 1 tablet (40 mg total) by mouth daily 90 tablet 1   • clonazePAM (KlonoPIN) 2 mg tablet Take 1 tablet (2 mg total) by mouth 2 (two) times a day as needed for anxiety 60 tablet 1   • desvenlafaxine succinate (PRISTIQ) 50 mg 24 hr tablet Take 1 tablet (50 mg total) by mouth daily 90 tablet 2   • diphenhydrAMINE (BENADRYL) 25 mg tablet Take 25 mg by mouth in the morning     • OXcarbazepine (Trileptal) 300 mg tablet Take 1 tablet (300 mg total) by mouth in the morning AND 2 tablets (600 mg total) daily at bedtime   90 tablet 3   • paliperidone palmitate ER (INVEGA) 234 mg/1 5 mL IM injection Inject 1 5 mL (234 mg total) into a muscle every 21 days for 1 dose (Patient taking differently: Inject 234 mg into a muscle every 21 days Most recent dose 10/10) 1 mL 20   • pantoprazole (PROTONIX) 20 mg tablet Take 1 tablet (20 mg total) by mouth daily 90 tablet 1   • potassium chloride (K-DUR,KLOR-CON) 20 mEq tablet Take 1 tablet (20 mEq total) by mouth daily 30 tablet 5   • ranolazine (RANEXA) 1000 MG SR tablet Take 1 tablet (1,000 mg total) by mouth 2 (two) times a day 180 tablet 3   • traZODone (DESYREL) 300 MG tablet Take 1 tablet (300 mg total) by mouth daily at bedtime (Patient taking differently: Take 300 mg by mouth daily at bedtime As needed) 90 tablet 2   • aspirin 81 mg chewable tablet Chew 1 tablet (81 mg total) daily (Patient not taking: Reported on 1/30/2023) 30 tablet 0     No current facility-administered medications for this visit  No Known Allergies    Review of Systems   Constitutional: Negative  Respiratory: Negative  Cardiovascular: Negative  Gastrointestinal: Negative  Musculoskeletal: Positive for arthralgias (hip pain) and back pain  Neurological: Negative  Psychiatric/Behavioral: Negative  Video Exam    Vitals:    01/30/23 0918   Weight: 105 kg (232 lb)   Height: 5' 11" (1 803 m)       Physical Exam  Vitals and nursing note reviewed  Constitutional:       Appearance: Normal appearance  Pulmonary:      Effort: Pulmonary effort is normal    Neurological:      General: No focal deficit present  Mental Status: He is alert and oriented to person, place, and time  Mental status is at baseline  Psychiatric:         Mood and Affect: Mood normal          Behavior: Behavior normal          Thought Content:  Thought content normal          Judgment: Judgment normal           I spent 20 minutes directly with the patient during this visit

## 2023-01-31 ENCOUNTER — APPOINTMENT (OUTPATIENT)
Dept: RADIOLOGY | Age: 58
End: 2023-01-31

## 2023-01-31 DIAGNOSIS — M25.561 CHRONIC PAIN OF BOTH KNEES: ICD-10-CM

## 2023-01-31 DIAGNOSIS — M25.562 CHRONIC PAIN OF BOTH KNEES: ICD-10-CM

## 2023-01-31 DIAGNOSIS — G89.29 CHRONIC PAIN OF BOTH KNEES: ICD-10-CM

## 2023-02-07 ENCOUNTER — OFFICE VISIT (OUTPATIENT)
Dept: ENDOCRINOLOGY | Facility: CLINIC | Age: 58
End: 2023-02-07

## 2023-02-07 VITALS
SYSTOLIC BLOOD PRESSURE: 122 MMHG | HEIGHT: 71 IN | BODY MASS INDEX: 34.07 KG/M2 | OXYGEN SATURATION: 98 % | WEIGHT: 243.4 LBS | DIASTOLIC BLOOD PRESSURE: 80 MMHG | HEART RATE: 77 BPM

## 2023-02-07 DIAGNOSIS — K91.2 HYPOGLYCEMIA AFTER GI (GASTROINTESTINAL) SURGERY: Primary | ICD-10-CM

## 2023-02-07 NOTE — PATIENT INSTRUCTIONS
Keep up the good work!     Purchase Relion glucose meter (or any equivalent brand)- available at [facility]

## 2023-02-07 NOTE — ASSESSMENT & PLAN NOTE
Improved since implementing diet changes  I advised patient to purchase a blood glucose monitor from [employer] to check his blood sugars at home  If he has any additional problems, I encouraged him to follow up with us at that time  For now, I recommend he continue with his new regimen

## 2023-02-07 NOTE — PROGRESS NOTES
Established Patient Progress Note    CC: Follow up for hypoglycemia post gastric bypass surgery    History of Present Illness:   Adela Medrano is a 63 y o  male with a history of hypoglycemia following Marii-en-Y gastric bypass surgery  He was hospitalized in October 2022 and found to have a blood sugar of 32  Since then, he has changed his diet by cutting out simple carbohydrates  He reports one episode of low blood sugar since doing this  He keeps orange juice or snacks with him at all times  He knows when his blood sugar goes low because he feels "out of it"  He was unable to see the nutritionist because of cost  He does not have a device to check his blood sugars at home  He was unable to afford a CGM       Patient Active Problem List   Diagnosis   • NSTEMI (non-ST elevated myocardial infarction) ([facility] )   • Bipolar disorder, current episode mixed, moderate (HCC)   • ROSI (obstructive sleep apnea)   • Coronary artery disease involving native coronary artery of native heart with unstable angina pectoris ([facility] )   • Former smoker   • Migraine with aura and without status migrainosus, not intractable   • Mixed hyperlipidemia   • Bariatric surgery status   • Psychophysiological insomnia   • Class 1 obesity due to excess calories with serious comorbidity and body mass index (BMI) of 34 0 to 34 9 in adult   • Postsurgical malabsorption   • Tobacco use   • Orthostatic hypotension   • Syncope and collapse   • Hypokalemia   • Dysautonomia-like disorder   • Anxiety disorder   • Status post gastric bypass for obesity   • Neuropathy   • Hypoglycemia after GI (gastrointestinal) surgery   • Postsurgical dumping syndrome   • Chronic pain of both knees      Past Medical History:   Diagnosis Date   • Bariatric surgery status    • CPAP (continuous positive airway pressure) dependence    • Hearing loss of aging    • Hypercholesterolemia    • Morbid obesity (HCC)    • NSTEMI (non-ST elevation myocardial infarction) [facility])     april 2019 • Postsurgical malabsorption       Past Surgical History:   Procedure Laterality Date   • CARDIAC CATHETERIZATION      no stents    • COLONOSCOPY  2016    bx taken   • EGD     • HERNIA REPAIR      umbilical hernia   • AK LAPS GSTR RSTCV PX W/BYP FRITZ-EN-Y LIMB <150 CM N/A 2020    Procedure: LAPAROSCOPIC FRITZ-EN-Y GASTRIC BYPASS AND INTRAOPERATIVE EGD;  Surgeon: Jacqui Courtney MD;  Location: [facility];  Service: Bariatrics   • SKIN SURGERY      cyst removed from back and thumb   • TONSILLECTOMY     • TOOTH EXTRACTION        Family History   Problem Relation Age of Onset   • Lung cancer Mother    • Brain cancer Mother    • Diabetes Brother    • Bipolar disorder Maternal Grandfather    • Bipolar disorder Son      Social History     Tobacco Use   • Smoking status: Former     Types: E-Cigarettes, Cigars     Quit date: 10/15/2003     Years since quittin 3   • Smokeless tobacco: Former     Types: Chew     Quit date: 2022   • Tobacco comments:     quit cigarettes / vape   Substance Use Topics   • Alcohol use: Not Currently     No Known Allergies      Current Outpatient Medications:   •  aspirin 81 mg chewable tablet, Chew 1 tablet (81 mg total) daily, Disp: 30 tablet, Rfl: 0  •  atorvastatin (LIPITOR) 40 mg tablet, Take 1 tablet (40 mg total) by mouth daily, Disp: 90 tablet, Rfl: 1  •  clonazePAM (KlonoPIN) 2 mg tablet, Take 1 tablet (2 mg total) by mouth 2 (two) times a day as needed for anxiety, Disp: 60 tablet, Rfl: 1  •  desvenlafaxine succinate (PRISTIQ) 50 mg 24 hr tablet, Take 1 tablet (50 mg total) by mouth daily, Disp: 90 tablet, Rfl: 2  •  diphenhydrAMINE (BENADRYL) 25 mg tablet, Take 25 mg by mouth in the morning, Disp: , Rfl:   •  OXcarbazepine (Trileptal) 300 mg tablet, Take 1 tablet (300 mg total) by mouth in the morning AND 2 tablets (600 mg total) daily at bedtime  , Disp: 90 tablet, Rfl: 3  •  paliperidone palmitate ER (INVEGA) 234 mg/1 5 mL IM injection, Inject 1 5 mL (234 mg total) into a muscle every 21 days for 1 dose (Patient taking differently: Inject 234 mg into a muscle every 21 days Most recent dose 10/10), Disp: 1 mL, Rfl: 20  •  pantoprazole (PROTONIX) 20 mg tablet, Take 1 tablet (20 mg total) by mouth daily, Disp: 90 tablet, Rfl: 1  •  potassium chloride (K-DUR,KLOR-CON) 20 mEq tablet, Take 1 tablet (20 mEq total) by mouth daily, Disp: 30 tablet, Rfl: 5  •  ranolazine (RANEXA) 1000 MG SR tablet, Take 1 tablet (1,000 mg total) by mouth 2 (two) times a day, Disp: 180 tablet, Rfl: 3  •  traZODone (DESYREL) 300 MG tablet, Take 1 tablet (300 mg total) by mouth daily at bedtime (Patient taking differently: Take 300 mg by mouth daily at bedtime As needed), Disp: 90 tablet, Rfl: 2    Review of Systems   Constitutional: Negative for chills and fever  HENT: Negative for ear pain and sore throat  Eyes: Negative for pain and visual disturbance  Respiratory: Negative for cough and shortness of breath  Cardiovascular: Negative for chest pain and palpitations  Gastrointestinal: Negative for abdominal pain and vomiting  Genitourinary: Negative for dysuria and hematuria  Musculoskeletal: Negative for arthralgias and back pain  Skin: Negative for color change and rash  Neurological: Negative for seizures and syncope  All other systems reviewed and are negative  Physical Exam:  Body mass index is 33 95 kg/m²  /80 (BP Location: Left arm, Patient Position: Sitting, Cuff Size: Extra-Large)   Pulse 77   Ht 5' 11" (1 803 m)   Wt 110 kg (243 lb 6 4 oz)   SpO2 98%   BMI 33 95 kg/m²    Wt Readings from Last 3 Encounters:   02/07/23 110 kg (243 lb 6 4 oz)   01/30/23 105 kg (232 lb)   01/24/23 109 kg (240 lb 3 2 oz)       Physical Exam  Vitals reviewed  Constitutional:       Appearance: Normal appearance  HENT:      Head: Normocephalic and atraumatic  Cardiovascular:      Rate and Rhythm: Normal rate and regular rhythm  Heart sounds: Normal heart sounds  Pulmonary:      Effort: Pulmonary effort is normal       Breath sounds: Normal breath sounds  Neurological:      Mental Status: He is alert and oriented to person, place, and time  Psychiatric:         Mood and Affect: Mood normal          Behavior: Behavior normal        Diabetic Foot Exam    Labs:   Lab Results   Component Value Date    HGBA1C 5 3 07/22/2022    HGBA1C 4 9 06/19/2021    HGBA1C 4 9 12/09/2020     Lab Results   Component Value Date    CREATININE 0 64 (L) 01/03/2023    CREATININE 0 67 10/11/2022    CREATININE 0 93 09/28/2022    BUN 6 01/03/2023    K 4 5 01/03/2023    CL 97 01/03/2023    CO2 25 01/03/2023     eGFR   Date Value Ref Range Status   01/03/2023 109 ml/min/1 73sq m Final     Lab Results   Component Value Date    HDL 57 07/22/2022    TRIG 50 07/22/2022     Lab Results   Component Value Date    ALT 31 10/11/2022    AST 21 10/11/2022    ALKPHOS 119 (H) 10/11/2022     Lab Results   Component Value Date    IKR9CAESOXUD 1 570 07/22/2022    XEO8FWGOJDRO 1 770 06/19/2021    JGC0TXMUUNMD 0 895 08/19/2020     No results found for: Forrest Alvarado    Impression & Plan:    Problem List Items Addressed This Visit        Other    Hypoglycemia after GI (gastrointestinal) surgery - Primary     Improved since implementing diet changes  I advised patient to purchase a blood glucose monitor from [employer] to check his blood sugars at home  If he has any additional problems, I encouraged him to follow up with us at that time  For now, I recommend he continue with his new regimen  No orders of the defined types were placed in this encounter  Patient Instructions   Keep up the good work! Purchase Relion glucose meter (or any equivalent brand)- available at [facility]        Discussed with the patient and all questioned fully answered  He will call me if any problems arise

## 2023-02-14 ENCOUNTER — HOSPITAL ENCOUNTER (OUTPATIENT)
Dept: INFUSION CENTER | Facility: HOSPITAL | Age: 58
Discharge: HOME | End: 2023-02-14
Attending: STUDENT IN AN ORGANIZED HEALTH CARE EDUCATION/TRAINING PROGRAM

## 2023-02-14 DIAGNOSIS — F31.62 BIPOLAR DISORDER, CURRENT EPISODE MIXED, MODERATE (HCC): Primary | ICD-10-CM

## 2023-02-14 DIAGNOSIS — F41.9 ANXIETY DISORDER, UNSPECIFIED TYPE: ICD-10-CM

## 2023-02-14 RX ORDER — CLONAZEPAM 2 MG/1
2 TABLET ORAL 2 TIMES DAILY PRN
Qty: 60 TABLET | Refills: 1 | Status: SHIPPED | OUTPATIENT
Start: 2023-02-14

## 2023-02-14 RX ADMIN — PALIPERIDONE PALMITATE 234 MG: 234 INJECTION INTRAMUSCULAR at 08:17

## 2023-02-14 NOTE — TELEPHONE ENCOUNTER
Medication Refill Request     Name of Medication Klonopin  Dose/Frequency 2mg 2 daily as needed  Quantity 60 tablet  Verified pharmacy   [x]  Verified ordering Provider   [x]  Does patient have enough for the next 3 days? Yes [x] No []  Does patient have a follow-up appointment scheduled?  Yes [x] No []   If so when is appointment: 3/30/23

## 2023-02-14 NOTE — TELEPHONE ENCOUNTER
PDMP website reviewed  Richard Zheng has been appropriately adherent to controlled psychotropic medications without evidence of abuse or misuse  As such, will send 30-day refill to pharmacy of choice and follow up as necessary

## 2023-02-15 ENCOUNTER — OFFICE VISIT (OUTPATIENT)
Dept: OBGYN CLINIC | Facility: MEDICAL CENTER | Age: 58
End: 2023-02-15

## 2023-02-15 VITALS
BODY MASS INDEX: 35.07 KG/M2 | WEIGHT: 245 LBS | SYSTOLIC BLOOD PRESSURE: 111 MMHG | HEIGHT: 70 IN | DIASTOLIC BLOOD PRESSURE: 79 MMHG | HEART RATE: 74 BPM

## 2023-02-15 DIAGNOSIS — G89.29 CHRONIC PAIN OF BOTH KNEES: ICD-10-CM

## 2023-02-15 DIAGNOSIS — M25.562 CHRONIC PAIN OF BOTH KNEES: ICD-10-CM

## 2023-02-15 DIAGNOSIS — M25.561 CHRONIC PAIN OF BOTH KNEES: ICD-10-CM

## 2023-02-15 NOTE — PROGRESS NOTES
Ortho Sports Medicine Knee Visit     Assesment:   Bilateral knee mild tricompartmental OA, Right inferior patellar enthesophyte, left tibial tubercle enthesophyte    Plan:    Conservative treatment:    Ice to knee for 20 minutes at least 1-2 times daily  PT for ROM/strengthening to knee, hip and core  Imaging: All imaging from today was reviewed by myself and explained to the patient  Consider MRI if doing surgery to assess joint    Injection:    No Injection planned at this time  Surgery:     No surgery is recommended at this point, continue with conservative treatment plan as noted  Consider surgery to remove bony prominences if not improved  History of Present Illness: The patient is a 63 y o  male whose occupation is , referred to me by their primary care physician, seen in clinic for consultation of right and left knee pain  Pain is located anterior  The patient rates the pain as a 4/10  The pain has been present for a few months  The mechanism of injury was uknown  The pain is characterized as dull, achy  The pain is present daily  Pain is improved by rest   Pain is aggravated by weight bearing  Symptoms include clicking  The patient has tried rest, ice and NSAIDS            Knee Surgical History:  None    Past Medical, Social and Family History:  Past Medical History:   Diagnosis Date   • Bariatric surgery status    • CPAP (continuous positive airway pressure) dependence    • Hearing loss of aging    • Hypercholesterolemia    • Morbid obesity ([facility] )    • NSTEMI (non-ST elevation myocardial infarction) [facility])     april 2019   • Postsurgical malabsorption      Past Surgical History:   Procedure Laterality Date   • CARDIAC CATHETERIZATION      no stents    • COLONOSCOPY  2016    bx taken   • EGD     • HERNIA REPAIR      umbilical hernia   • DC LAPS GSTR RSTCV PX W/BYP FRITZ-EN-Y LIMB <150 CM N/A 03/02/2020    Procedure: LAPAROSCOPIC FRITZ-EN-Y GASTRIC BYPASS AND INTRAOPERATIVE EGD;  Surgeon: Adwoa Chen MD;  Location: [facility];  Service: Bariatrics   • SKIN SURGERY      cyst removed from back and thumb   • TONSILLECTOMY     • TOOTH EXTRACTION       No Known Allergies  Current Outpatient Medications on File Prior to Visit   Medication Sig Dispense Refill   • aspirin 81 mg chewable tablet Chew 1 tablet (81 mg total) daily 30 tablet 0   • atorvastatin (LIPITOR) 40 mg tablet Take 1 tablet (40 mg total) by mouth daily 90 tablet 1   • clonazePAM (KlonoPIN) 2 mg tablet Take 1 tablet (2 mg total) by mouth 2 (two) times a day as needed for anxiety 60 tablet 1   • desvenlafaxine succinate (PRISTIQ) 50 mg 24 hr tablet Take 1 tablet (50 mg total) by mouth daily 90 tablet 2   • diphenhydrAMINE (BENADRYL) 25 mg tablet Take 25 mg by mouth in the morning     • OXcarbazepine (Trileptal) 300 mg tablet Take 1 tablet (300 mg total) by mouth in the morning AND 2 tablets (600 mg total) daily at bedtime  90 tablet 3   • pantoprazole (PROTONIX) 20 mg tablet Take 1 tablet (20 mg total) by mouth daily 90 tablet 1   • potassium chloride (K-DUR,KLOR-CON) 20 mEq tablet Take 1 tablet (20 mEq total) by mouth daily 30 tablet 5   • ranolazine (RANEXA) 1000 MG SR tablet Take 1 tablet (1,000 mg total) by mouth 2 (two) times a day 180 tablet 3   • traZODone (DESYREL) 300 MG tablet Take 1 tablet (300 mg total) by mouth daily at bedtime (Patient taking differently: Take 300 mg by mouth daily at bedtime As needed) 90 tablet 2   • paliperidone palmitate ER (INVEGA) 234 mg/1 5 mL IM injection Inject 1 5 mL (234 mg total) into a muscle every 21 days for 1 dose (Patient taking differently: Inject 234 mg into a muscle every 21 days Most recent dose 10/10) 1 mL 20     No current facility-administered medications on file prior to visit       Social History     Socioeconomic History   • Marital status: /Civil Union     Spouse name: Not on file   • Number of children: Not on file   • Years of education: Not on file   • Highest education level: Not on file   Occupational History   • Not on file   Tobacco Use   • Smoking status: Former     Types: E-Cigarettes, Cigars     Quit date: 10/15/2003     Years since quittin 3   • Smokeless tobacco: Former     Types: Chew     Quit date: 2022   • Tobacco comments:     quit cigarettes / vape   Vaping Use   • Vaping Use: Every day   • Substances: Nicotine, Flavoring   Substance and Sexual Activity   • Alcohol use: Not Currently   • Drug use: Never   • Sexual activity: Not on file   Other Topics Concern   • Not on file   Social History Narrative    Former smoker - As per [employer]    Full-time employment        · Do you currently or have you served in the [employer]:   No    As per Natalie Dye      Social Determinants of Health     Financial Resource Strain: Not on file   Food Insecurity: Not on file   Transportation Needs: Not on file   Physical Activity: Not on file   Stress: Not on file   Social Connections: Not on file   Intimate Partner Violence: Not on file   Housing Stability: Not on file         I have reviewed the past medical, surgical, social and family history, medications and allergies as documented in the EMR  Review of systems: ROS is negative other than that noted in the HPI  Constitutional: Negative for fatigue and fever  HENT: Negative for sore throat  Respiratory: Negative for shortness of breath  Cardiovascular: Negative for chest pain  Gastrointestinal: Negative for abdominal pain  Endocrine: Negative for cold intolerance and heat intolerance  Genitourinary: Negative for flank pain  Musculoskeletal: Negative for back pain  Skin: Negative for rash  Allergic/Immunologic: Negative for immunocompromised state  Neurological: Negative for dizziness  Psychiatric/Behavioral: Negative for agitation        Physical Exam:    Blood pressure 111/79, pulse 74, height 5' 10" (1 778 m), weight 111 kg (245 lb)     General/Constitutional: NAD, well developed, well nourished  HENT: Normocephalic, atraumatic  CV: Intact distal pulses, regular rate  Resp: No respiratory distress or labored breathing  Lymphatic: No lymphadenopathy palpated  Neuro: Alert and Oriented x 3, no focal deficits  Psych: Normal mood, normal affect, normal judgement, normal behavior  Skin: Warm, dry, no rashes, no erythema       Knee Exam (focused):                  RIGHT LEFT   ROM:   0-130 0-130   Palpation: Effusion negative negative     MJL tenderness Negative Negative     LJL tenderness Negative Negative   Instability: Varus stable stable     Valgus stable stable   Special Tests: Lachman Negative Negative     Posterior drawer Negative Negative     Anterior drawer Negative Negative     Pivot shift not tested not tested     Dial not tested not tested   Patella: Palpation inferior pole ttp Tibial tubercle TTP     Mobility 1/4 1/4     Apprehension Negative Negative   Other: Single leg 1/4 squat not tested not tested      LE NV Exam: +2 DP/PT pulses bilaterally  Sensation intact to light touch L2-S1 bilaterally     Bilateral hip ROM demonstrates no pain actively or passively    No calf tenderness to palpation bilaterally    Knee Imaging    X-rays of the bilateral knee were reviewed, which demonstrate right knee mild patellofemoral OA with large inferior pole enthesophyte  Left knee mild patellofemoral DJD and mild tibiofemoral OA  I have reviewed the radiology report and agree with their impression

## 2023-02-28 ENCOUNTER — EVALUATION (OUTPATIENT)
Dept: PHYSICAL THERAPY | Facility: REHABILITATION | Age: 58
End: 2023-02-28

## 2023-02-28 DIAGNOSIS — G89.29 CHRONIC PAIN OF BOTH KNEES: ICD-10-CM

## 2023-02-28 DIAGNOSIS — M25.562 CHRONIC PAIN OF BOTH KNEES: ICD-10-CM

## 2023-02-28 DIAGNOSIS — M25.561 CHRONIC PAIN OF BOTH KNEES: ICD-10-CM

## 2023-02-28 NOTE — PROGRESS NOTES
PT Evaluation     Today's date: 2023  Patient name: Jairon Arellano  : 1965  MRN: [id number]  Referring provider: Gita Flores PA-C  Dx:   Encounter Diagnosis     ICD-10-CM    1  Chronic pain of both knees  M25 561 Ambulatory Referral to Physical Therapy    M25 562     G89 [id number]                      Assessment  Assessment details: Pt, Jairon Arellano, is a 63 y o  male presenting to physical therapy on this date for an initial evaluation  Upon eval on this date, pt presented with WFL ROM, WFL strength, and overall impaired tolerance kneeling per pt report due to bony masses on his R and L knees  Throughout initial evaluation pt stated that he has no issues with his knees other than when he is kneeling and believed that completing physical therapy was a "waste of time until the bone spurs could be removed"  Objective exam revealed WFL strength, ROM, and negative special testing and no TTP over bony masses that pt presented with  Pt was offered HEP to help promote continued strength and ROM of his b/l LE's, pt declined and stated that he would complete his own exercises  Pt was educated to contact PT office with any questions or concerns, or if his symptoms would worsen, pt verbalized understanding  Goals  EVAL ONLY     Plan  Plan details: EVAL ONLY   Patient would benefit from: PT eval        Subjective Evaluation    History of Present Illness  Mechanism of injury: Pt reports that there is not much PT is able to do because he has bone spurs that are growing on the top of both of his knee caps and shin  Pt reports that he works as an  and he feels like somebody stabs him and it is excruciating  Pt reports that other than kneeling he has no limitations with activity  Pt reports that he has been on disability and not worked for the past 2 5 years  Pt denies any n/t in his legs   Pt reports that he had PT in the past for his back and there was nothing he did there that he couldn't do at home so he feels that coming today is a waste of time  Pain  Current pain ratin  Pain scale at highest: 10 5  Quality: sharp, discomfort, tight and needle-like  Exacerbated by: kneeling     Treatments  No previous or current treatments        Objective     Tenderness     Additional Tenderness Details  TTP directly over bony prominences consistent with findings on x-rays     Neurological Testing     Sensation     Knee   Left Knee   Intact: light touch    Right Knee   Intact: light touch     Active Range of Motion   Left Knee   Flexion: 120 degrees   Extension: 0 degrees     Right Knee   Flexion: 120 degrees   Extension: 0 degrees     Mobility   Patellar Mobility:   Left Knee   WFL: medial, lateral, superior and inferior  Right Knee   WFL: medial, lateral, superior and inferior    Strength/Myotome Testing     Left Hip   Planes of Motion   Flexion: 5  Extension: 4+  Abduction: 5    Right Hip   Planes of Motion   Flexion: 5  Extension: 4+  Abduction: 5    Left Knee   Flexion: 5  Extension: 5    Right Knee   Flexion: 5  Extension: 5    Tests     Left Knee   Negative anterior drawer, posterior drawer, valgus stress test at 0 degrees, valgus stress test at 30 degrees, varus stress test at 0 degrees and varus stress test at 30 degrees  Right Knee   Negative anterior drawer, posterior drawer, valgus stress test at 0 degrees, valgus stress test at 30 degrees, varus stress test at 0 degrees and varus stress test at 30 degrees       Additional Tests Details  (-) Homans              Precautions:  CAD, NSTEMI, Migraines, Orthostatic hypotension, Neuropathy, Bipolar disorder, hx syncope, anxiety     EVAL ONLY

## 2023-03-07 ENCOUNTER — HOSPITAL ENCOUNTER (OUTPATIENT)
Dept: INFUSION CENTER | Facility: HOSPITAL | Age: 58
Discharge: HOME | End: 2023-03-07
Attending: STUDENT IN AN ORGANIZED HEALTH CARE EDUCATION/TRAINING PROGRAM

## 2023-03-07 DIAGNOSIS — F31.62 BIPOLAR DISORDER, CURRENT EPISODE MIXED, MODERATE (HCC): Primary | ICD-10-CM

## 2023-03-07 RX ADMIN — PALIPERIDONE PALMITATE 234 MG: 234 INJECTION INTRAMUSCULAR at 08:20

## 2023-03-07 NOTE — PROGRESS NOTES
Pt tolerated Invega Sustenna IM in the right deltoid today with no adverse reactions  Next apt confirmed  Left unit ambulatory with a steady gait

## 2023-03-24 ENCOUNTER — TELEPHONE (OUTPATIENT)
Dept: PSYCHIATRY | Facility: CLINIC | Age: 58
End: 2023-03-24

## 2023-03-24 DIAGNOSIS — E87.6 HYPOKALEMIA: ICD-10-CM

## 2023-03-24 RX ORDER — POTASSIUM CHLORIDE 20 MEQ/1
20 TABLET, EXTENDED RELEASE ORAL DAILY
Qty: 30 TABLET | Refills: 5 | Status: SHIPPED | OUTPATIENT
Start: 2023-03-24

## 2023-03-24 NOTE — TELEPHONE ENCOUNTER
Received a message from Greg's wife  He has an appointment 3/30/23:  Asking if Dr Morris Green wants any blood work prior to the appointment?

## 2023-03-27 DIAGNOSIS — E87.1 HYPONATREMIA: Primary | ICD-10-CM

## 2023-03-27 NOTE — TELEPHONE ENCOUNTER
Left a message for Enrico regarding blood work and spoke with Mora Larson  He verbalized understanding and said he would have it drawn tomorrow

## 2023-03-27 NOTE — TELEPHONE ENCOUNTER
Yes, I would like for Altagracia Shane to have an updated BMP  Order is placed in the system, ideally he should complete it prior to our appointment

## 2023-03-28 ENCOUNTER — HOSPITAL ENCOUNTER (OUTPATIENT)
Dept: INFUSION CENTER | Facility: HOSPITAL | Age: 58
Discharge: HOME | End: 2023-03-28
Attending: STUDENT IN AN ORGANIZED HEALTH CARE EDUCATION/TRAINING PROGRAM

## 2023-03-28 DIAGNOSIS — F31.62 BIPOLAR DISORDER, CURRENT EPISODE MIXED, MODERATE (HCC): Primary | ICD-10-CM

## 2023-03-28 RX ADMIN — PALIPERIDONE PALMITATE 234 MG: 234 INJECTION INTRAMUSCULAR at 09:24

## 2023-03-28 NOTE — PROGRESS NOTES
Pt tolerated Invega Sustenna IM in the left deltoid  Next apt confirmed Left unit ambulatory with a steady gait

## 2023-03-30 DIAGNOSIS — F31.62 BIPOLAR DISORDER, CURRENT EPISODE MIXED, MODERATE (HCC): ICD-10-CM

## 2023-03-30 RX ORDER — DESVENLAFAXINE SUCCINATE 50 MG/1
50 TABLET, EXTENDED RELEASE ORAL DAILY
Qty: 90 TABLET | Refills: 2 | Status: SHIPPED | OUTPATIENT
Start: 2023-03-30

## 2023-03-30 NOTE — TELEPHONE ENCOUNTER
Medication Refill Request     Name of Medication Pristiq  Dose/Frequency 50mg 1 daily  Quantity 90 tablet  Verified pharmacy   [x]  Verified ordering Provider   [x]  Does patient have enough for the next 3 days? Yes [x] No []  Does patient have a follow-up appointment scheduled?  Yes [x] No []   If so when is appointment: 4/10/23

## 2023-04-03 ENCOUNTER — OFFICE VISIT (OUTPATIENT)
Dept: FAMILY MEDICINE CLINIC | Facility: CLINIC | Age: 58
End: 2023-04-03

## 2023-04-03 VITALS
BODY MASS INDEX: 34.5 KG/M2 | WEIGHT: 241 LBS | HEART RATE: 62 BPM | OXYGEN SATURATION: 96 % | SYSTOLIC BLOOD PRESSURE: 118 MMHG | TEMPERATURE: 96.4 F | DIASTOLIC BLOOD PRESSURE: 60 MMHG | HEIGHT: 70 IN | RESPIRATION RATE: 20 BRPM

## 2023-04-03 DIAGNOSIS — F51.04 PSYCHOPHYSIOLOGICAL INSOMNIA: ICD-10-CM

## 2023-04-03 DIAGNOSIS — F31.62 BIPOLAR DISORDER, CURRENT EPISODE MIXED, MODERATE (HCC): ICD-10-CM

## 2023-04-03 DIAGNOSIS — E66.09 CLASS 1 OBESITY DUE TO EXCESS CALORIES WITH SERIOUS COMORBIDITY AND BODY MASS INDEX (BMI) OF 34.0 TO 34.9 IN ADULT: ICD-10-CM

## 2023-04-03 DIAGNOSIS — Z23 NEED FOR PNEUMOCOCCAL 20-VALENT CONJUGATE VACCINATION: ICD-10-CM

## 2023-04-03 DIAGNOSIS — I25.110 CORONARY ARTERY DISEASE INVOLVING NATIVE CORONARY ARTERY OF NATIVE HEART WITH UNSTABLE ANGINA PECTORIS (HCC): ICD-10-CM

## 2023-04-03 DIAGNOSIS — Z00.00 ANNUAL PHYSICAL EXAM: Primary | ICD-10-CM

## 2023-04-03 DIAGNOSIS — M25.562 CHRONIC PAIN OF BOTH KNEES: ICD-10-CM

## 2023-04-03 DIAGNOSIS — E87.6 HYPOKALEMIA: ICD-10-CM

## 2023-04-03 DIAGNOSIS — M25.561 CHRONIC PAIN OF BOTH KNEES: ICD-10-CM

## 2023-04-03 DIAGNOSIS — G89.29 CHRONIC PAIN OF BOTH KNEES: ICD-10-CM

## 2023-04-03 DIAGNOSIS — E78.2 MIXED HYPERLIPIDEMIA: ICD-10-CM

## 2023-04-03 DIAGNOSIS — K91.2 POSTSURGICAL MALABSORPTION: ICD-10-CM

## 2023-04-03 DIAGNOSIS — Z87.891 FORMER SMOKER: ICD-10-CM

## 2023-04-03 PROBLEM — R55 SYNCOPE AND COLLAPSE: Status: RESOLVED | Noted: 2020-08-18 | Resolved: 2023-04-03

## 2023-04-03 PROBLEM — I21.4 NSTEMI (NON-ST ELEVATED MYOCARDIAL INFARCTION) (HCC): Status: RESOLVED | Noted: 2019-05-15 | Resolved: 2023-04-03

## 2023-04-03 NOTE — ASSESSMENT & PLAN NOTE
Quit smoking 12 years ago  Refused CT lung screening today, shared decision making, recommend cessation of e-cigarettes  Patient agreeable

## 2023-04-03 NOTE — ASSESSMENT & PLAN NOTE
Lab Results   Component Value Date    K 4 5 01/03/2023     Potassium supplement 20 meq daily  Repeat labs due

## 2023-04-03 NOTE — ASSESSMENT & PLAN NOTE
Last paliperidone injection on 3/28/23  Compliant with monthly injections per psychiatry Dr Kiet Laura, has upcoming appointment on 4/10/23  History of angry outbursts, mood stable now on trileptal, pristel  Continue clonazepam BID per psych  No recent manic episodes or hospitalizations   Pleasant on exam

## 2023-04-03 NOTE — ASSESSMENT & PLAN NOTE
Reviewed XRay b/l knees in 1/2023  Upcoming appointment with ortho for follow-up  No improvement with diclofenac gel

## 2023-04-03 NOTE — ASSESSMENT & PLAN NOTE
With history of hypoglycemic episodes, no longer due to weight regain after surgery  S/p Marii-en-Y gastric bypass in 2020  Reviewed endo consult note in 2/2023  Follow-up bariatric surgery annually  Reviewed bariatric surgery consult note in 1/2023  Plan to repeat labs tomorrow

## 2023-04-03 NOTE — ASSESSMENT & PLAN NOTE
Reviewed last cardiology consult note Dr Justine Bautista in 9/2021  Patient to schedule follow-up appointment when needs refill of medication in 2 months as no appointments available at this time? History of cardiac catheterization which showed nonobstructive CAD in some myocardial bridging of the LAD in 5/2019 and NSTEMI  No longer with chest pain since starting Ranexa  Continue asa and statin  Discontinued metoprolol due to orthostatic hypotension  Recommend return to cardiology, lost to follow-up  No longer chewing tobacco or smoking, still with daily vaping, plan to quit, decreased since last visit

## 2023-04-03 NOTE — ASSESSMENT & PLAN NOTE
Lab Results   Component Value Date    LDLCALC 60 07/22/2022     Due for labs, on atorvastatin, patient to complete tomorrow

## 2023-04-03 NOTE — PROGRESS NOTES
ADULT ANNUAL PHYSICAL   [facility] PRIMARY CARE [facility]    NAME: Atif Toussaint  AGE: 63 y o  SEX: male  : 1965     DATE: 4/3/2023     Assessment and Plan:     Problem List Items Addressed This Visit        Digestive    Postsurgical malabsorption     With history of hypoglycemic episodes, no longer due to weight regain after surgery  S/p Marii-en-Y gastric bypass in   Reviewed endo consult note in 2023  Follow-up bariatric surgery annually  Reviewed bariatric surgery consult note in 2023  Plan to repeat labs tomorrow  Cardiovascular and Mediastinum    Coronary artery disease involving native coronary artery of native heart with unstable angina pectoris [facility])     Reviewed last cardiology consult note Dr Orlin Mercado in 2021  Patient to schedule follow-up appointment when needs refill of medication in 2 months as no appointments available at this time? History of cardiac catheterization which showed nonobstructive CAD in some myocardial bridging of the LAD in 2019 and NSTEMI  No longer with chest pain since starting Ranexa  Continue asa and statin  Discontinued metoprolol due to orthostatic hypotension  Recommend return to cardiology, lost to follow-up  No longer chewing tobacco or smoking, still with daily vaping, plan to quit, decreased since last visit  Other    Bipolar disorder, current episode mixed, moderate (HCC)     Last paliperidone injection on 3/28/23  Compliant with monthly injections per psychiatry Dr Julisa Mcwilliams, has upcoming appointment on 4/10/23  History of angry outbursts, mood stable now on trileptal, pristel  Continue clonazepam BID per psych  No recent manic episodes or hospitalizations  Pleasant on exam           Chronic pain of both knees     Reviewed XRay b/l knees in 2023  Upcoming appointment with ortho for follow-up  No improvement with diclofenac gel            Class 1 obesity due to excess calories with serious comorbidity and body mass index (BMI) of 34 0 to 34 9 in adult     100 lb weight loss since bariatric surgery  Gained back 40 lb  Stable weight since last visit  Former smoker     Quit smoking 12 years ago  Refused CT lung screening today, shared decision making, recommend cessation of e-cigarettes  Patient agreeable  Hypokalemia     Lab Results   Component Value Date    K 4 5 01/03/2023     Potassium supplement 20 meq daily  Repeat labs due  Mixed hyperlipidemia     Lab Results   Component Value Date    LDLCALC 60 07/22/2022     Due for labs, on atorvastatin, patient to complete tomorrow  Psychophysiological insomnia     No longer requiring trazodone  Other Visit Diagnoses     Annual physical exam    -  Primary    Need for pneumococcal 20-valent conjugate vaccination        Relevant Orders    Pneumococcal Conjugate Vaccine 20-valent (Pcv20) (Completed)          Immunizations and preventive care screenings were discussed with patient today  Appropriate education was printed on patient's after visit summary  Discussed risks and benefits of prostate cancer screening  We discussed the controversial history of PSA screening for prostate cancer in the [place] as well as the risk of over detection and over treatment of prostate cancer by way of PSA screening  The patient understands that PSA blood testing is an imperfect way to screen for prostate cancer and that elevated PSA levels in the blood may also be caused by infection, inflammation, prostatic trauma or manipulation, urological procedures, or by benign prostatic enlargement  The role of the digital rectal examination in prostate cancer screening was also discussed and I discussed with him that there is large interobserver variability in the findings of digital rectal examination      Counseling:  Alcohol/drug use: discussed moderation in alcohol intake, the recommendations for healthy alcohol use, and avoidance of illicit drug use  Dental Health: discussed importance of regular tooth brushing, flossing, and dental visits  Injury prevention: discussed safety/seat belts, safety helmets, smoke detectors, carbon dioxide detectors, and smoking near bedding or upholstery  Sexual health: discussed sexually transmitted diseases, partner selection, use of condoms, avoidance of unintended pregnancy, and contraceptive alternatives  · Exercise: the importance of regular exercise/physical activity was discussed  Recommend exercise 3-5 times per week for at least 30 minutes  Return in about 6 months (around 10/3/2023)  Chief Complaint:     Chief Complaint   Patient presents with   • Physical Exam      History of Present Illness:     Adult Annual Physical   Patient here for a comprehensive physical exam  The patient reports no problems  Slowing down on vaping  Has upcoming with ortho 4/12  Diclofenac 1% not helping  3 dogs and 2 cats  Has upcoming appointment 4/10/23 with psych  Compliant with medications  Cooks for wife, trying to exercise  Doing very well  Diet and Physical Activity  · Diet/Nutrition: well balanced diet  · Breakfast - banana, bagel cream cheese  · Lunch - sandwich   · Snack - pretzels 3pm  · Dinner - whatever wife wants, he cooks   · Exercise: walking  Depression Screening  PHQ-2/9 Depression Screening    Little interest or pleasure in doing things: 0 - not at all  Feeling down, depressed, or hopeless: 0 - not at all  PHQ-2 Score: 0  PHQ-2 Interpretation: Negative depression screen       General Health  · Sleep: sleeps well  · Hearing: decreased - bilateral and requires use of hearing aids  · Vision: goes for regular eye exams and wears glasses  Had eye exam on Friday, new glasses this week  · Dental: regular dental visits and brushes teeth twice daily          Health  · Symptoms include: none     Review of Systems:     Review of Systems   Constitutional: Negative for chills and fever  HENT: Negative for ear pain and sore throat  Eyes: Negative for pain and visual disturbance  Respiratory: Negative for cough and shortness of breath  Cardiovascular: Negative for chest pain and palpitations  Gastrointestinal: Negative for abdominal pain and vomiting  Genitourinary: Negative for dysuria and hematuria  Musculoskeletal: Negative for arthralgias and back pain  Skin: Negative for color change and rash  Neurological: Negative for seizures and syncope  All other systems reviewed and are negative       Past Medical History:     Past Medical History:   Diagnosis Date   • Bariatric surgery status    • CPAP (continuous positive airway pressure) dependence    • Hearing loss of aging    • Hypercholesterolemia    • Morbid obesity (HCC)    • NSTEMI (non-ST elevated myocardial infarction) ([facility] [address] ) 5/15/2019   • NSTEMI (non-ST elevation myocardial infarction) [facility])     april 2019   • Pneumonia    • Postsurgical malabsorption       Past Surgical History:     Past Surgical History:   Procedure Laterality Date   • CARDIAC CATHETERIZATION      no stents    • COLONOSCOPY  2016    bx taken   • EGD     • HERNIA REPAIR      umbilical hernia   • IN LAPS GSTR RSTCV PX W/BYP FRITZ-EN-Y LIMB <150 CM N/A 03/02/2020    Procedure: LAPAROSCOPIC FRITZ-EN-Y GASTRIC BYPASS AND INTRAOPERATIVE EGD;  Surgeon: Zulema Owen MD;  Location: [facility];  Service: Bariatrics   • SKIN SURGERY      cyst removed from back and thumb   • TONSILLECTOMY     • TOOTH EXTRACTION        Family History:     Family History   Problem Relation Age of Onset   • Lung cancer Mother    • Brain cancer Mother    • Diabetes Brother    • Bipolar disorder Maternal Grandfather    • Bipolar disorder Son       Social History:     Social History     Socioeconomic History   • Marital status: /Civil Union     Spouse name: None   • Number of children: None   • Years of education: None   • Highest education level: None Occupational History   • None   Tobacco Use   • Smoking status: Former     Types: E-Cigarettes, Cigars     Quit date: 10/15/2003     Years since quittin 4   • Smokeless tobacco: Former     Types: Chew     Quit date: 2022   • Tobacco comments:     quit cigarettes / vape   Vaping Use   • Vaping Use: Every day   • Substances: Nicotine, Flavoring   Substance and Sexual Activity   • Alcohol use: Not Currently   • Drug use: Never   • Sexual activity: None   Other Topics Concern   • None   Social History Narrative    Former smoker - [facility]    Full-time employment        · Do you currently or have you served in [employer]:   No    As per [facility] [address] Determinants of Health     Financial Resource Strain: Not on file   Food Insecurity: Not on file   Transportation Needs: Not on file   Physical Activity: Not on file   Stress: Not on file   Social Connections: Not on file   Intimate Partner Violence: Not on file   Housing Stability: Not on file      Current Medications:     Current Outpatient Medications   Medication Sig Dispense Refill   • aspirin 81 mg chewable tablet Chew 1 tablet (81 mg total) daily 30 tablet 0   • atorvastatin (LIPITOR) 40 mg tablet Take 1 tablet (40 mg total) by mouth daily 90 tablet 1   • clonazePAM (KlonoPIN) 2 mg tablet Take 1 tablet (2 mg total) by mouth 2 (two) times a day as needed for anxiety 60 tablet 1   • desvenlafaxine succinate (PRISTIQ) 50 mg 24 hr tablet Take 1 tablet (50 mg total) by mouth daily 90 tablet 2   • diphenhydrAMINE (BENADRYL) 25 mg tablet Take 25 mg by mouth in the morning     • pantoprazole (PROTONIX) 20 mg tablet Take 1 tablet (20 mg total) by mouth daily 90 tablet 1   • potassium chloride (K-DUR,KLOR-CON) 20 mEq tablet Take 1 tablet (20 mEq total) by mouth daily 30 tablet 5   • ranolazine (RANEXA) 1000 MG SR tablet Take 1 tablet (1,000 mg total) by mouth 2 (two) times a day 180 tablet 3   • traZODone (DESYREL) 300 MG tablet Take 1 "tablet (300 mg total) by mouth daily at bedtime (Patient taking differently: Take 300 mg by mouth daily at bedtime As needed) 90 tablet 2   • OXcarbazepine (Trileptal) 300 mg tablet Take 1 tablet (300 mg total) by mouth in the morning AND 2 tablets (600 mg total) daily at bedtime  90 tablet 3   • paliperidone palmitate ER (INVEGA) 234 mg/1 5 mL IM injection Inject 1 5 mL (234 mg total) into a muscle every 21 days for 1 dose (Patient taking differently: Inject 234 mg into a muscle every 21 days Most recent dose 10/10) 1 mL 20     No current facility-administered medications for this visit  Allergies:     No Known Allergies   Physical Exam:     /60 (BP Location: Left arm, Patient Position: Sitting, Cuff Size: Standard)   Pulse 62   Temp (!) 96 4 °F (35 8 °C) (Temporal)   Resp 20   Ht 5' 10\" (1 778 m)   Wt 109 kg (241 lb)   SpO2 96%   BMI 34 58 kg/m²     Physical Exam  Vitals and nursing note reviewed  Constitutional:       General: He is not in acute distress  Appearance: He is well-developed  He is obese  HENT:      Head: Normocephalic and atraumatic  Ears:      Comments: Hearing aids bilaterally   Eyes:      Extraocular Movements: Extraocular movements intact  Conjunctiva/sclera: Conjunctivae normal       Pupils: Pupils are equal, round, and reactive to light  Cardiovascular:      Rate and Rhythm: Normal rate and regular rhythm  Heart sounds: No murmur heard  Pulmonary:      Effort: Pulmonary effort is normal  No respiratory distress  Breath sounds: Normal breath sounds  Abdominal:      Palpations: Abdomen is soft  Tenderness: There is no abdominal tenderness  Musculoskeletal:         General: No swelling  Cervical back: Neck supple  Skin:     General: Skin is warm and dry  Capillary Refill: Capillary refill takes less than 2 seconds  Neurological:      Mental Status: He is alert     Psychiatric:         Mood and Affect: Mood normal       " Jackelyn Shaw, VERONICA  [address]

## 2023-04-06 ENCOUNTER — LAB (OUTPATIENT)
Dept: LAB | Age: 58
End: 2023-04-06

## 2023-04-06 DIAGNOSIS — Z48.815 ENCOUNTER FOR SURGICAL AFTERCARE FOLLOWING SURGERY OF DIGESTIVE SYSTEM: ICD-10-CM

## 2023-04-06 DIAGNOSIS — Z12.5 SCREENING FOR MALIGNANT NEOPLASM OF PROSTATE: ICD-10-CM

## 2023-04-06 DIAGNOSIS — E87.1 HYPONATREMIA: ICD-10-CM

## 2023-04-06 DIAGNOSIS — K91.2 POSTSURGICAL MALABSORPTION: ICD-10-CM

## 2023-04-06 DIAGNOSIS — Z98.84 BARIATRIC SURGERY STATUS: ICD-10-CM

## 2023-04-06 DIAGNOSIS — Z51.81 THERAPEUTIC DRUG MONITORING: ICD-10-CM

## 2023-04-06 DIAGNOSIS — E55.9 VITAMIN D DEFICIENCY: ICD-10-CM

## 2023-04-06 DIAGNOSIS — E78.2 MIXED HYPERLIPIDEMIA: ICD-10-CM

## 2023-04-06 DIAGNOSIS — I25.110 CORONARY ARTERY DISEASE INVOLVING NATIVE CORONARY ARTERY OF NATIVE HEART WITH UNSTABLE ANGINA PECTORIS (HCC): ICD-10-CM

## 2023-04-06 LAB
25(OH)D3 SERPL-MCNC: 16.8 NG/ML (ref 30–100)
ALBUMIN SERPL BCP-MCNC: 3.6 G/DL (ref 3.5–5)
ALP SERPL-CCNC: 99 U/L (ref 46–116)
ALT SERPL W P-5'-P-CCNC: 30 U/L (ref 12–78)
ANION GAP SERPL CALCULATED.3IONS-SCNC: 4 MMOL/L (ref 4–13)
AST SERPL W P-5'-P-CCNC: 18 U/L (ref 5–45)
BASOPHILS # BLD AUTO: 0.07 THOUSANDS/ÂΜL (ref 0–0.1)
BASOPHILS NFR BLD AUTO: 1 % (ref 0–1)
BILIRUB SERPL-MCNC: 0.51 MG/DL (ref 0.2–1)
BUN SERPL-MCNC: 10 MG/DL (ref 5–25)
CALCIUM SERPL-MCNC: 9.3 MG/DL (ref 8.3–10.1)
CHLORIDE SERPL-SCNC: 101 MMOL/L (ref 96–108)
CHOLEST SERPL-MCNC: 133 MG/DL
CO2 SERPL-SCNC: 26 MMOL/L (ref 21–32)
CREAT SERPL-MCNC: 0.76 MG/DL (ref 0.6–1.3)
EOSINOPHIL # BLD AUTO: 0.27 THOUSAND/ÂΜL (ref 0–0.61)
EOSINOPHIL NFR BLD AUTO: 3 % (ref 0–6)
ERYTHROCYTE [DISTWIDTH] IN BLOOD BY AUTOMATED COUNT: 12.7 % (ref 11.6–15.1)
GFR SERPL CREATININE-BSD FRML MDRD: 101 ML/MIN/1.73SQ M
GLUCOSE P FAST SERPL-MCNC: 86 MG/DL (ref 65–99)
HCT VFR BLD AUTO: 43.6 % (ref 36.5–49.3)
HDLC SERPL-MCNC: 51 MG/DL
HGB BLD-MCNC: 14.3 G/DL (ref 12–17)
IMM GRANULOCYTES # BLD AUTO: 0.07 THOUSAND/UL (ref 0–0.2)
IMM GRANULOCYTES NFR BLD AUTO: 1 % (ref 0–2)
LDLC SERPL CALC-MCNC: 67 MG/DL (ref 0–100)
LYMPHOCYTES # BLD AUTO: 1.9 THOUSANDS/ÂΜL (ref 0.6–4.47)
LYMPHOCYTES NFR BLD AUTO: 23 % (ref 14–44)
MCH RBC QN AUTO: 30.8 PG (ref 26.8–34.3)
MCHC RBC AUTO-ENTMCNC: 32.8 G/DL (ref 31.4–37.4)
MCV RBC AUTO: 94 FL (ref 82–98)
MONOCYTES # BLD AUTO: 0.77 THOUSAND/ÂΜL (ref 0.17–1.22)
MONOCYTES NFR BLD AUTO: 9 % (ref 4–12)
NEUTROPHILS # BLD AUTO: 5.27 THOUSANDS/ÂΜL (ref 1.85–7.62)
NEUTS SEG NFR BLD AUTO: 63 % (ref 43–75)
NONHDLC SERPL-MCNC: 82 MG/DL
NRBC BLD AUTO-RTO: 0 /100 WBCS
PLATELET # BLD AUTO: 254 THOUSANDS/UL (ref 149–390)
PMV BLD AUTO: 10.2 FL (ref 8.9–12.7)
POTASSIUM SERPL-SCNC: 4.5 MMOL/L (ref 3.5–5.3)
PROT SERPL-MCNC: 6.6 G/DL (ref 6.4–8.4)
PSA SERPL-MCNC: 1.3 NG/ML (ref 0–4)
PTH-INTACT SERPL-MCNC: 52.4 PG/ML (ref 18.4–80.1)
RBC # BLD AUTO: 4.64 MILLION/UL (ref 3.88–5.62)
SODIUM SERPL-SCNC: 131 MMOL/L (ref 135–147)
TRIGL SERPL-MCNC: 73 MG/DL
VIT B12 SERPL-MCNC: 467 PG/ML (ref 100–900)
WBC # BLD AUTO: 8.35 THOUSAND/UL (ref 4.31–10.16)

## 2023-04-07 LAB — ZINC SERPL-MCNC: 66 UG/DL (ref 44–115)

## 2023-04-18 ENCOUNTER — HOSPITAL ENCOUNTER (OUTPATIENT)
Dept: INFUSION CENTER | Facility: HOSPITAL | Age: 58
Discharge: HOME | End: 2023-04-18
Attending: STUDENT IN AN ORGANIZED HEALTH CARE EDUCATION/TRAINING PROGRAM

## 2023-04-18 DIAGNOSIS — F31.62 BIPOLAR DISORDER, CURRENT EPISODE MIXED, MODERATE (HCC): Primary | ICD-10-CM

## 2023-04-18 RX ADMIN — PALIPERIDONE PALMITATE 234 MG: 234 INJECTION INTRAMUSCULAR at 08:14

## 2023-04-18 NOTE — PROGRESS NOTES
Patient tolerated R arm invega sustenna injection without issue  Next appointment confirmed  AVS declined

## 2023-04-20 DIAGNOSIS — F31.62 BIPOLAR DISORDER, CURRENT EPISODE MIXED, MODERATE (HCC): Primary | ICD-10-CM

## 2023-04-24 ENCOUNTER — EVALUATION (OUTPATIENT)
Dept: PHYSICAL THERAPY | Facility: REHABILITATION | Age: 58
End: 2023-04-24

## 2023-04-24 DIAGNOSIS — Z98.890 S/P RIGHT KNEE SURGERY: Primary | ICD-10-CM

## 2023-04-24 DIAGNOSIS — Z98.890 S/P LEFT KNEE SURGERY: ICD-10-CM

## 2023-04-24 NOTE — PROGRESS NOTES
PT Evaluation     Today's date: 2023  Patient name: Lore Gill  : 1965  MRN: [id number]  Referring provider: Omar Auguste DO  Dx:   Encounter Diagnosis     ICD-10-CM    1  S/P right knee surgery  Z98 890       2  S/P left knee surgery  Z98 [id number]                      Assessment  Assessment details: The patient presents with s/s consistent with referring diagnosis  The patient demonstrated slight limitations in knee extension, mild hip weakness, and difficulty with descending stairs  The patient was educated on evaluation findings and instructed in an HEP for home  Also, educated the patient on scar massage after incision is closed and healed  Patient verbalized understanding of HEP and requested to continue with exercises independently  All questions were answered to the patient's satisifaction  The patient would benefit from skilled PT to address his deficits and update his current home exercise program at initial evaluation  Impairments: abnormal muscle firing, abnormal muscle tone, abnormal or restricted ROM, activity intolerance and impaired physical strength  Understanding of Dx/Px/POC: good   Prognosis: good    Goals  IE ONLY    Plan  Patient would benefit from: skilled physical therapy  Planned therapy interventions: patient education, self care, strengthening, stretching and home exercise program  Duration in visits: 1  Plan of Care beginning date: 2023  Treatment plan discussed with: patient        Subjective Evaluation    History of Present Illness  Date of surgery: 2023  Mechanism of injury: surgery  Mechanism of injury: Lore Gill is a 63 y o  male who presents s/p right knee open removal of osteophyte off inferior pole of patella and s/p left knee open removal of osteophyte off tibia tubercle  The patient denies parasthesias or trouble sleeping at night  The patient currently struggles with stiffness in the morning and going down the stairs   PMH includes CAD, NSTEMI, "Migraines, orthostatic hypotension, Neuropathy, Bipolar disorder, hx syncope, anxiety  Recurrent probem    Pain  No pain reported  Progression: improved    Social Support  Steps to enter house: no  Stairs in house: yes   Lives in: multiple-level home    Employment status: not working ( but on disability)  Hand dominance: right    Treatments  Previous treatment: medication  Patient Goals  Patient goals for therapy: independence with ADLs/IADLs          Objective     Observations   Left Knee   Positive for incision  Right Knee   Positive for incision  Additional Observation Details  Left: all steri-strips still intact  Right: one steri-strip still intact    Active Range of Motion   Left Knee   Flexion: 131 degrees   Extension: 2 degrees     Right Knee   Flexion: 131 degrees   Extension: 1 degrees     Strength/Myotome Testing     Left Hip   Planes of Motion   Flexion: 4  Extension: 4  Abduction: 4  Adduction: 3+    Isolated Muscles   Gluteus alex: 4    Right Hip   Planes of Motion   Flexion: 4  Extension: 4  Abduction: 4  Adduction: 4    Isolated Muscles   Gluteus maximums: 4    Left Knee   Flexion: 4+  Extension: 4+  Quadriceps contraction: good    Right Knee   Flexion: 4+  Extension: 4+  Quadriceps contraction: good    Functional Assessment      Squat    Left tibial anterior translation beyond toes and right tibial anterior translation beyond toes       Single Leg Stance   Left: 18 seconds  Right: 22 seconds             Precautions: CAD, NSTEMI, Migraines, Orthostatic hypotension, Neuropathy, Bipolar disorder, hx syncope, anxiety       Manuals 4/24                                                                Neuro Re-Ed                                                                                                        Ther Ex             Supine HS St   20\"x2 ea            Supine Gastroc St   20\"x2 ea            Quad Set 5\"x10 ea            Squats 2x10                       " Ther Activity                                       Gait Training                                       Modalities

## 2023-04-28 ENCOUNTER — OFFICE VISIT (OUTPATIENT)
Dept: OBGYN CLINIC | Facility: MEDICAL CENTER | Age: 58
End: 2023-04-28

## 2023-04-28 VITALS
BODY MASS INDEX: 34.36 KG/M2 | SYSTOLIC BLOOD PRESSURE: 103 MMHG | HEART RATE: 90 BPM | DIASTOLIC BLOOD PRESSURE: 71 MMHG | HEIGHT: 70 IN | WEIGHT: 240 LBS

## 2023-04-28 DIAGNOSIS — G89.29 CHRONIC PAIN OF BOTH KNEES: Primary | ICD-10-CM

## 2023-04-28 DIAGNOSIS — M25.562 CHRONIC PAIN OF BOTH KNEES: Primary | ICD-10-CM

## 2023-04-28 DIAGNOSIS — M25.761 OSTEOPHYTE OF RIGHT KNEE: ICD-10-CM

## 2023-04-28 DIAGNOSIS — M25.561 CHRONIC PAIN OF BOTH KNEES: Primary | ICD-10-CM

## 2023-04-28 NOTE — PROGRESS NOTES
"Knee Post Operative Visit     Assesment:     63 y o  male 8 days s/p surgical arthroscopy of the bilateral knee with removal tumor bone, DOS: 4/20/2023    Plan:    Post-Operative treatment:    Ice to knee for 20 minutes at least 1-2 times daily  PT for ROM/strengthening to knee, hip and core  OTC NSAIDS prn for pain  Tylenol for pain  Let pain guide gradual return activities  Imaging: All imaging from today was reviewed by myself and explained to the patient  Weight bearing:  as tolerated     ROM:  Full    Brace:  No brace needed    DVT Prophylaxis:  Aspirin 81 mg oral twice daily x 4 weeks and Ambulation    Follow up:   6 weeks    Patient was advised that if they have any fevers, chills, chest pain, shortness of breath, redness or drainage from the incision, please let our office know immediately  Chief Complaint   Patient presents with   • Left Knee - Post-op   • Right Knee - Post-op       History of Present Illness: The patient is a 63 y o  male who is being evaluated post operatively 8 days s/p surgical arthroscopy of the bilateral knee with removal tumor bone, DOS: 4/20/2023  Since the prior visit, He reports significant improvement  Pain is well controlled  The patient is using ice to control swelling  They have started physical therapy  The patient Aspirin 81 mg oral twice daily x 4 weeks for DVT ppx  The patient has been ambulating without crutches  The patient has been ambulating without a brace  The patient denies any fevers, chills, calf pain, chest pain/shortness of breath, redness or drainage from the incision  I have reviewed the past medical, surgical, social and family history, medications and allergies as documented in the EMR  Review of systems: ROS is negative other than that noted in the HPI  Constitutional: Negative for fatigue and fever        Physical Exam:    Blood pressure 103/71, pulse 90, height 5' 10\" (1 778 m), weight 109 kg (240 " lb)     General/Constitutional: NAD, well developed, well nourished  HENT: Normocephalic, atraumatic  CV: Intact distal pulses, regular rate  Resp: No respiratory distress or labored breathing  Lymphatic: No lymphadenopathy palpated  Neuro: Alert and Oriented x 3, no focal deficits  Psych: Normal mood, normal affect, normal judgement, normal behavior  Skin: Warm, dry, no rashes, no erythema    Knee Exam (focused):                RIGHT LEFT   ROM:   0-130 0-130   Palpation: Effusion negative negative     MJL tenderness Negative Negative     LJL tenderness Negative Negative   Instability: Varus stable stable     Valgus stable stable   Special Tests: Lachman Negative Negative     Posterior drawer Negative Negative     Anterior drawer Negative Negative     Pivot shift not tested not tested     Dial not tested not tested   Patella: Palpation no tenderness no tenderness     Mobility 1/4 1/4     Apprehension Negative Negative   Other: Single leg 1/4 squat not tested not tested      Incisions show no erythema, no drainage    LE NV Exam: +2 DP/PT pulses bilaterally  Sensation intact to light touch L2-S1 bilaterally     Bilateral hip ROM demonstrates no pain actively or passively    No calf tenderness to palpation bilaterally    Scribe Attestation    I,:  Mirna Jaime am acting as a scribe while in the presence of the attending physician :       I,:  Elisabeth Sanderson, DO personally performed the services described in this documentation    as scribed in my presence :

## 2023-05-09 ENCOUNTER — HOSPITAL ENCOUNTER (OUTPATIENT)
Dept: INFUSION CENTER | Facility: HOSPITAL | Age: 58
Discharge: HOME | End: 2023-05-09
Attending: STUDENT IN AN ORGANIZED HEALTH CARE EDUCATION/TRAINING PROGRAM

## 2023-05-09 DIAGNOSIS — F31.62 BIPOLAR DISORDER, CURRENT EPISODE MIXED, MODERATE (HCC): Primary | ICD-10-CM

## 2023-05-09 DIAGNOSIS — F31.62 BIPOLAR DISORDER, CURRENT EPISODE MIXED, MODERATE (HCC): ICD-10-CM

## 2023-05-09 RX ORDER — OXCARBAZEPINE 300 MG/1
TABLET, FILM COATED ORAL
Qty: 90 TABLET | Refills: 3 | Status: SHIPPED | OUTPATIENT
Start: 2023-05-09 | End: 2023-08-07

## 2023-05-09 RX ADMIN — PALIPERIDONE PALMITATE 234 MG: 234 INJECTION INTRAMUSCULAR at 08:13

## 2023-05-09 NOTE — TELEPHONE ENCOUNTER
Medication Refill Request     Name of Medication Trileptal   Dose/Frequency 300 mg  Quantity 90  Verified pharmacy   [x]  Verified ordering Provider   [x]  Does patient have enough for the next 3 days? Yes [] No [x]  Does patient have a follow-up appointment scheduled?  Yes [x] No []   If so when is appointment: 8/8/23

## 2023-05-09 NOTE — PROGRESS NOTES
Patient tolerated L arm IM Invega injection without issue  Next appointment confirmed, AVS declined

## 2023-05-15 DIAGNOSIS — E87.1 HYPONATREMIA: Primary | ICD-10-CM

## 2023-05-25 ENCOUNTER — TELEPHONE (OUTPATIENT)
Dept: PSYCHIATRY | Facility: CLINIC | Age: 58
End: 2023-05-25

## 2023-05-25 NOTE — TELEPHONE ENCOUNTER
Jerod Vigil,    Can you please inquire as to why Veronica Santillan is taking 2 pills BID and not following recommendations on the script? Thanks

## 2023-05-25 NOTE — TELEPHONE ENCOUNTER
Patient was calling about his sodium chloride prescription, it was prescribed 1 pill 2 times a day and he has been taking 2 pills twice a day and wants a refill, writer transferred caller to nursing line for assistance

## 2023-05-26 DIAGNOSIS — E87.1 HYPONATREMIA: ICD-10-CM

## 2023-05-26 RX ORDER — SODIUM CHLORIDE 1 G/1
2 TABLET ORAL 2 TIMES DAILY
Qty: 120 TABLET | Refills: 2 | Status: SHIPPED | OUTPATIENT
Start: 2023-05-26

## 2023-05-26 NOTE — TELEPHONE ENCOUNTER
Script sent  Yasmin Peraza should not make medication changes in the future without discussing with this clinic or another provider

## 2023-05-26 NOTE — TELEPHONE ENCOUNTER
Called and spoke to Jordan and informed him prescription was sent to preferred pharmacy  Was also advised to please do not make adjustments to medications without discussing with his physician  Jordan stated he was sorry and moving forward he will not make any adjustments to his medications

## 2023-05-26 NOTE — TELEPHONE ENCOUNTER
Spoke to Jordan, he stated the reason he is taking 2 pills BID is because he had blood work completed and his sodium levels are not in the perimeters  He requested to have prescription sent to [place]

## 2023-05-30 ENCOUNTER — HOSPITAL ENCOUNTER (OUTPATIENT)
Dept: INFUSION CENTER | Facility: HOSPITAL | Age: 58
Discharge: HOME | End: 2023-05-30
Attending: STUDENT IN AN ORGANIZED HEALTH CARE EDUCATION/TRAINING PROGRAM
Payer: COMMERCIAL

## 2023-05-30 DIAGNOSIS — F31.62 BIPOLAR DISORDER, CURRENT EPISODE MIXED, MODERATE (HCC): Primary | ICD-10-CM

## 2023-05-30 PROCEDURE — 96372 THER/PROPH/DIAG INJ SC/IM: CPT

## 2023-05-30 RX ADMIN — PALIPERIDONE PALMITATE 234 MG: 234 INJECTION INTRAMUSCULAR at 08:06

## 2023-05-30 NOTE — PROGRESS NOTES
Pt tolerated invega to right deltoid IM without complications  Next appt confirmed, pt states sees  in August  AVS declined, uses [employer]

## 2023-05-31 DIAGNOSIS — F41.9 ANXIETY DISORDER, UNSPECIFIED TYPE: ICD-10-CM

## 2023-05-31 NOTE — TELEPHONE ENCOUNTER
Medication Refill Request     Name of Medication KlonoPIN  Dose/Frequency 2mg /1tablet 2times day  Quantity 61  Verified pharmacy   [x]  Verified ordering Provider   [x]  Does patient have enough for the next 3 days? Yes [] No [x]  Does patient have a follow-up appointment scheduled?  Yes [x] No []   If so when is appointment: 8/8

## 2023-06-01 RX ORDER — CLONAZEPAM 2 MG/1
2 TABLET ORAL 2 TIMES DAILY PRN
Qty: 60 TABLET | Refills: 1 | Status: SHIPPED | OUTPATIENT
Start: 2023-06-01

## 2023-06-01 NOTE — TELEPHONE ENCOUNTER
PDMP website reviewed  Arleth Gomez has been appropriately adherent to controlled psychotropic medications without evidence of abuse or misuse  As such, will send 30-day refill to pharmacy of choice and follow up as necessary

## 2023-06-07 NOTE — TELEPHONE ENCOUNTER
Addended by: Opal Sultana on: 6/7/2023 10:00 AM     Modules accepted: Orders Order faxed to Bill ([phone])

## 2023-06-08 NOTE — TELEPHONE ENCOUNTER
Patient called for a refill on Trileptal  Informed him script was sent on 5/9 with 3 refills  He will check with the pharmacy and call back if any issues

## 2023-06-09 ENCOUNTER — OFFICE VISIT (OUTPATIENT)
Dept: OBGYN CLINIC | Facility: MEDICAL CENTER | Age: 58
End: 2023-06-09

## 2023-06-09 VITALS
SYSTOLIC BLOOD PRESSURE: 104 MMHG | BODY MASS INDEX: 33.93 KG/M2 | HEART RATE: 105 BPM | DIASTOLIC BLOOD PRESSURE: 71 MMHG | HEIGHT: 70 IN | WEIGHT: 237 LBS

## 2023-06-09 DIAGNOSIS — M25.762 OSTEOPHYTE OF LEFT KNEE: ICD-10-CM

## 2023-06-09 DIAGNOSIS — M25.761 OSTEOPHYTE OF RIGHT KNEE: Primary | ICD-10-CM

## 2023-06-09 PROCEDURE — 99024 POSTOP FOLLOW-UP VISIT: CPT | Performed by: ORTHOPAEDIC SURGERY

## 2023-06-09 NOTE — TELEPHONE ENCOUNTER
I do not recall sending him to neurology recently?
Munira Zhong wife calling stating that she is trying to schedule a neurologist apt but needs a dr Shannan Vázquez in chart so she can schedule this      Please call patient when in chart     Dr Riddhi Porter patient
Ok so for Claudy Camarillo - I will put order in
Patient wife informed  Tyshawn [place]
Patients wife stated that he was seeing a neurologist prior to starting care with you and they discharged him from that practice so they need a new neurologist for migraines because he has medications that need to be followed up with      Megan Perez MA
Please advise  Lynnette Costa MA
Home

## 2023-06-12 ENCOUNTER — TELEPHONE (OUTPATIENT)
Dept: PSYCHIATRY | Facility: CLINIC | Age: 58
End: 2023-06-12

## 2023-06-12 ENCOUNTER — OFFICE VISIT (OUTPATIENT)
Dept: FAMILY MEDICINE CLINIC | Facility: CLINIC | Age: 58
End: 2023-06-12
Payer: COMMERCIAL

## 2023-06-12 VITALS
HEIGHT: 70 IN | TEMPERATURE: 98 F | OXYGEN SATURATION: 97 % | SYSTOLIC BLOOD PRESSURE: 111 MMHG | HEART RATE: 80 BPM | RESPIRATION RATE: 17 BRPM | DIASTOLIC BLOOD PRESSURE: 68 MMHG | WEIGHT: 242.8 LBS | BODY MASS INDEX: 34.76 KG/M2

## 2023-06-12 DIAGNOSIS — L03.116 CELLULITIS OF LEFT LOWER EXTREMITY: Primary | ICD-10-CM

## 2023-06-12 DIAGNOSIS — M25.562 CHRONIC PAIN OF BOTH KNEES: ICD-10-CM

## 2023-06-12 DIAGNOSIS — F31.62 BIPOLAR DISORDER, CURRENT EPISODE MIXED, MODERATE (HCC): ICD-10-CM

## 2023-06-12 DIAGNOSIS — M25.561 CHRONIC PAIN OF BOTH KNEES: ICD-10-CM

## 2023-06-12 DIAGNOSIS — G89.29 CHRONIC PAIN OF BOTH KNEES: ICD-10-CM

## 2023-06-12 PROCEDURE — 99214 OFFICE O/P EST MOD 30 MIN: CPT | Performed by: PHYSICIAN ASSISTANT

## 2023-06-12 RX ORDER — DOXYCYCLINE HYCLATE 100 MG/1
100 CAPSULE ORAL EVERY 12 HOURS SCHEDULED
Qty: 20 CAPSULE | Refills: 0 | Status: SHIPPED | OUTPATIENT
Start: 2023-06-12 | End: 2023-06-22

## 2023-06-12 NOTE — PROGRESS NOTES
Name: Kyaw Fofana      : 1965      MRN: [id number]  Encounter Provider: Rasta Carey PA-C  Encounter Date: 2023   Encounter department: [facility]     1  Cellulitis of left lower extremity  Assessment & Plan: This week noticed overlying location of L anterior lower leg tattoo, no recent revision of tattoo, with dry flaking skin, recommend gentle moisturizer  Discussed risk factor of DVT and reasons to proceed to ER, similar presentation in the past, will start doxy 100mg BID x 10 days and follow-up in 1 week  Suspect based on presentation  Orders:  -     doxycycline hyclate (VIBRAMYCIN) 100 mg capsule; Take 1 capsule (100 mg total) by mouth every 12 (twelve) hours for 10 days    2  Chronic pain of both knees  Assessment & Plan:  Reviewed b/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 on 23 with improvement in knee pain  Discussed recent surgery as risk of DVT  Patient declined stat venous US due to similar presentation of cellulitis in the past        3  Bipolar disorder, current episode mixed, moderate ([facility] [address] )  Assessment & Plan:  Plan for next Invega injection on 23, stable with monthly injections per psychiatry Dr Knowles Scale  Caution with clonazepam 2mg BID per psych  Heydi Chun is a 63 y o  male with a h/o CAD, obesity, bipolar disorder as a same day patient with L lower leg swelling  He recently had b/l knee surgery to remove osteophytes on  without complications and with improvement of pain  No recent travel or prolonged sitting  No acute injury or inciting event  He has dry skin of lower legs  He had similar episode of same leg redness, itching and swelling in the past that resolved with antibiotics  He follows regularly with psych and is doing well with mood  No other complaints  Review of Systems   Constitutional: Negative for fever and unexpected weight change     Respiratory: Negative for shortness of breath  Cardiovascular: Positive for leg swelling (L lower leg)  Negative for chest pain  Psychiatric/Behavioral: Negative for self-injury, sleep disturbance and suicidal ideas  Current Outpatient Medications on File Prior to Visit   Medication Sig   • atorvastatin (LIPITOR) 40 mg tablet Take 1 tablet (40 mg total) by mouth daily   • clonazePAM (KlonoPIN) 2 mg tablet Take 1 tablet (2 mg total) by mouth 2 (two) times a day as needed for anxiety   • desvenlafaxine succinate (PRISTIQ) 50 mg 24 hr tablet Take 1 tablet (50 mg total) by mouth daily   • diphenhydrAMINE (BENADRYL) 25 mg tablet Take 25 mg by mouth in the morning   • ergocalciferol (VITAMIN D2) 50,000 units Take 1 capsule (50,000 Units total) by mouth 2 (two) times a week   • OXcarbazepine (Trileptal) 300 mg tablet Take 1 tablet (300 mg total) by mouth in the morning AND 2 tablets (600 mg total) daily at bedtime     • oxyCODONE (ROXICODONE) 5 immediate release tablet Take 1 tablet (5 mg total) by mouth every 4 (four) hours as needed for moderate pain for up to 15 doses Max Daily Amount: 30 mg   • pantoprazole (PROTONIX) 20 mg tablet Take 1 tablet (20 mg total) by mouth daily   • potassium chloride (K-DUR,KLOR-CON) 20 mEq tablet Take 1 tablet (20 mEq total) by mouth daily   • ranolazine (RANEXA) 1000 MG SR tablet Take 1 tablet (1,000 mg total) by mouth 2 (two) times a day   • sodium chloride 1 g tablet Take 2 tablets (2 g total) by mouth 2 (two) times a day   • traZODone (DESYREL) 300 MG tablet Take 1 tablet (300 mg total) by mouth daily at bedtime   • aspirin (ECOTRIN LOW STRENGTH) 81 mg EC tablet Take 1 tablet (81 mg total) by mouth 2 (two) times a day   • paliperidone palmitate ER (INVEGA) 234 mg/1 5 mL IM injection Inject 1 5 mL (234 mg total) into a muscle every 21 days for 1 dose (Patient taking differently: Inject 234 mg into a muscle every 21 days Takes every 3 weeks)       Objective     /68 (BP Location: Left "arm, Patient Position: Sitting, Cuff Size: Standard)   Pulse 80   Temp 98 °F (36 7 °C) (Tympanic)   Resp 17   Ht 5' 10\" (1 778 m)   Wt 110 kg (242 lb 12 8 oz)   SpO2 97%   BMI 34 84 kg/m²     Physical Exam  Vitals and nursing note reviewed  Constitutional:       Appearance: Normal appearance  HENT:      Head: Normocephalic and atraumatic  Cardiovascular:      Rate and Rhythm: Normal rate and regular rhythm  Pulses: Normal pulses  Heart sounds: Murmur heard  Pulmonary:      Effort: Pulmonary effort is normal       Breath sounds: Normal breath sounds  Musculoskeletal:      Left lower leg: Edema (see attached image) present  Comments: Negative Eyal's   No significant difference in calf circumference    Neurological:      Mental Status: He is alert and oriented to person, place, and time  Mental status is at baseline                Maria Ball PA-C  "

## 2023-06-12 NOTE — TELEPHONE ENCOUNTER
Patient was calling to verify if he had refills on his invega  Prescription, writer confimed there was 20 refills on his prescription

## 2023-06-13 DIAGNOSIS — I25.118 CORONARY ARTERY DISEASE OF NATIVE ARTERY OF NATIVE HEART WITH STABLE ANGINA PECTORIS (HCC): ICD-10-CM

## 2023-06-13 PROBLEM — L03.116 CELLULITIS OF LEFT LOWER EXTREMITY: Status: ACTIVE | Noted: 2023-06-13

## 2023-06-13 RX ORDER — RANOLAZINE 1000 MG/1
1000 TABLET, EXTENDED RELEASE ORAL 2 TIMES DAILY
Qty: 180 TABLET | Refills: 3 | Status: SHIPPED | OUTPATIENT
Start: 2023-06-13

## 2023-06-13 NOTE — ASSESSMENT & PLAN NOTE
This week noticed overlying location of L anterior lower leg tattoo, no recent revision of tattoo, with dry flaking skin, recommend gentle moisturizer  Discussed risk factor of DVT and reasons to proceed to ER, similar presentation in the past, will start doxy 100mg BID x 10 days and follow-up in 1 week  Suspect based on presentation

## 2023-06-13 NOTE — ASSESSMENT & PLAN NOTE
Plan for next Invega injection on 6/20/23, stable with monthly injections per psychiatry Dr Candy Gallego  Caution with clonazepam 2mg BID per psych

## 2023-06-13 NOTE — ASSESSMENT & PLAN NOTE
Reviewed b/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 on 4/20/23 with improvement in knee pain  Discussed recent surgery as risk of DVT   Patient declined stat venous US due to similar presentation of cellulitis in the past

## 2023-06-20 ENCOUNTER — HOSPITAL ENCOUNTER (OUTPATIENT)
Dept: INFUSION CENTER | Facility: HOSPITAL | Age: 58
Discharge: HOME | End: 2023-06-20
Attending: STUDENT IN AN ORGANIZED HEALTH CARE EDUCATION/TRAINING PROGRAM
Payer: COMMERCIAL

## 2023-06-20 DIAGNOSIS — F31.62 BIPOLAR DISORDER, CURRENT EPISODE MIXED, MODERATE (HCC): Primary | ICD-10-CM

## 2023-06-20 PROCEDURE — 96372 THER/PROPH/DIAG INJ SC/IM: CPT

## 2023-06-20 RX ADMIN — PALIPERIDONE PALMITATE 234 MG: 234 INJECTION INTRAMUSCULAR at 08:18

## 2023-06-20 NOTE — PROGRESS NOTES
Pt tolerated Wilber Stefany injection to L deltoid without difficulty  Next appt confirmed  AVS declined  Left ambulatory in stable condition

## 2023-06-23 ENCOUNTER — TELEPHONE (OUTPATIENT)
Dept: FAMILY MEDICINE CLINIC | Facility: CLINIC | Age: 58
End: 2023-06-23

## 2023-06-23 NOTE — TELEPHONE ENCOUNTER
My name is Stephany Lagos i e  Date of birth [birth year]  I need my potassium refilled  That'll go to the [facility] on [address]  downstairs from you guys  You can call me at [phone], [phone]  Thank you

## 2023-07-11 ENCOUNTER — HOSPITAL ENCOUNTER (OUTPATIENT)
Dept: INFUSION CENTER | Facility: HOSPITAL | Age: 58
Discharge: HOME | End: 2023-07-11
Attending: STUDENT IN AN ORGANIZED HEALTH CARE EDUCATION/TRAINING PROGRAM
Payer: COMMERCIAL

## 2023-07-11 DIAGNOSIS — F31.62 BIPOLAR DISORDER, CURRENT EPISODE MIXED, MODERATE (HCC): Primary | ICD-10-CM

## 2023-07-11 PROCEDURE — 96372 THER/PROPH/DIAG INJ SC/IM: CPT

## 2023-07-11 RX ADMIN — PALIPERIDONE PALMITATE 234 MG: 234 INJECTION INTRAMUSCULAR at 08:01

## 2023-07-25 DIAGNOSIS — E78.2 MIXED HYPERLIPIDEMIA: ICD-10-CM

## 2023-07-25 DIAGNOSIS — I25.110 CORONARY ARTERY DISEASE INVOLVING NATIVE CORONARY ARTERY OF NATIVE HEART WITH UNSTABLE ANGINA PECTORIS (HCC): ICD-10-CM

## 2023-07-25 DIAGNOSIS — Z98.84 BARIATRIC SURGERY STATUS: ICD-10-CM

## 2023-07-25 RX ORDER — ATORVASTATIN CALCIUM 40 MG/1
40 TABLET, FILM COATED ORAL DAILY
Qty: 90 TABLET | Refills: 0 | Status: SHIPPED | OUTPATIENT
Start: 2023-07-25

## 2023-07-25 RX ORDER — PANTOPRAZOLE SODIUM 20 MG/1
20 TABLET, DELAYED RELEASE ORAL DAILY
Qty: 90 TABLET | Refills: 0 | Status: SHIPPED | OUTPATIENT
Start: 2023-07-25

## 2023-07-26 ENCOUNTER — TELEPHONE (OUTPATIENT)
Dept: FAMILY MEDICINE CLINIC | Facility: CLINIC | Age: 58
End: 2023-07-26

## 2023-07-26 DIAGNOSIS — F41.9 ANXIETY DISORDER, UNSPECIFIED TYPE: ICD-10-CM

## 2023-07-26 RX ORDER — CLONAZEPAM 2 MG/1
2 TABLET ORAL 2 TIMES DAILY PRN
Qty: 60 TABLET | Refills: 1 | Status: SHIPPED | OUTPATIENT
Start: 2023-07-26

## 2023-07-26 NOTE — TELEPHONE ENCOUNTER
This is Erin Ramos date of birth to [birth year]. I wanted to know if Doctor I recently went back to work after three years of being on disability and I wanted to know if Doctor Sindhu Mcdonald could prescribe me Flexeril for my back. My back's been bothering me a lot. You can call me at [phone], [phone]. Thank you.

## 2023-07-26 NOTE — TELEPHONE ENCOUNTER
Medication Refill Request     Name of Medication KlonoPIN  Dose/Frequency 2mg/1 tablet 2x a day as needed  Quantity 60  Verified pharmacy   [x]  Verified ordering Provider   [x]  Does patient have enough for the next 3 days? Yes [] No [x]  Does patient have a follow-up appointment scheduled? Yes [x] No []   If so when is appointment: 8/8      Pt called to get his appt for 8/8 at 2 switched to a virtual visit due to work. Writer switched appt.

## 2023-07-26 NOTE — TELEPHONE ENCOUNTER
PDMP website reviewed. Katie Domingo has been appropriately adherent to controlled psychotropic medications without evidence of abuse or misuse. As such, will send 30-day refill to pharmacy of choice and follow up as necessary.

## 2023-07-27 DIAGNOSIS — M62.830 MUSCLE SPASM OF BACK: Primary | ICD-10-CM

## 2023-07-27 RX ORDER — CYCLOBENZAPRINE HCL 10 MG
10 TABLET ORAL 3 TIMES DAILY PRN
Qty: 90 TABLET | Refills: 0 | Status: SHIPPED | OUTPATIENT
Start: 2023-07-27

## 2023-07-27 NOTE — PROGRESS NOTES
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 He is an  and was using before Flexeril working fine. Wants refill of Flexeril.   Send Flexeril to his pharmacy along with Voltaren cream.

## 2023-08-01 ENCOUNTER — HOSPITAL ENCOUNTER (OUTPATIENT)
Dept: INFUSION CENTER | Facility: HOSPITAL | Age: 58
Discharge: HOME | End: 2023-08-01
Attending: STUDENT IN AN ORGANIZED HEALTH CARE EDUCATION/TRAINING PROGRAM
Payer: COMMERCIAL

## 2023-08-01 DIAGNOSIS — F31.62 BIPOLAR DISORDER, CURRENT EPISODE MIXED, MODERATE (HCC): Primary | ICD-10-CM

## 2023-08-01 PROCEDURE — 96372 THER/PROPH/DIAG INJ SC/IM: CPT

## 2023-08-01 RX ADMIN — PALIPERIDONE PALMITATE 234 MG: 234 INJECTION INTRAMUSCULAR at 14:48
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
## 2023-08-08 ENCOUNTER — TELEMEDICINE (OUTPATIENT)
Dept: PSYCHIATRY | Facility: CLINIC | Age: 58
End: 2023-08-08

## 2023-08-08 DIAGNOSIS — F31.62 BIPOLAR DISORDER, CURRENT EPISODE MIXED, MODERATE (HCC): Primary | ICD-10-CM

## 2023-08-08 DIAGNOSIS — Z72.0 TOBACCO USE: ICD-10-CM

## 2023-08-08 DIAGNOSIS — E87.1 HYPONATREMIA: ICD-10-CM

## 2023-08-08 DIAGNOSIS — F41.9 ANXIETY DISORDER, UNSPECIFIED TYPE: ICD-10-CM

## 2023-08-08 DIAGNOSIS — F51.04 PSYCHOPHYSIOLOGICAL INSOMNIA: ICD-10-CM

## 2023-08-08 RX ORDER — SODIUM CHLORIDE 1 G/1
2 TABLET ORAL 2 TIMES DAILY
Qty: 120 TABLET | Refills: 2 | Status: SHIPPED | OUTPATIENT
Start: 2023-08-08

## 2023-08-08 RX ORDER — OXCARBAZEPINE 300 MG/1
TABLET, FILM COATED ORAL
Qty: 90 TABLET | Refills: 3 | Status: SHIPPED | OUTPATIENT
Start: 2023-08-08 | End: 2023-11-06

## 2023-08-09 ENCOUNTER — TELEPHONE (OUTPATIENT)
Dept: PSYCHIATRY | Facility: CLINIC | Age: 58
End: 2023-08-09

## 2023-08-09 NOTE — TELEPHONE ENCOUNTER
Writer spoke with patient to schedule their follow up with provider. Patient is now scheduled on 12/4 @5pm for a virtual appointment via [address] [place] [address].

## 2023-08-12 ENCOUNTER — APPOINTMENT (OUTPATIENT)
Dept: LAB | Age: 58
End: 2023-08-12
Payer: COMMERCIAL

## 2023-08-12 DIAGNOSIS — E87.1 HYPONATREMIA: ICD-10-CM

## 2023-08-12 LAB
ANION GAP SERPL CALCULATED.3IONS-SCNC: 9 MMOL/L
BUN SERPL-MCNC: 5 MG/DL (ref 5–25)
CALCIUM SERPL-MCNC: 9.1 MG/DL (ref 8.3–10.1)
CHLORIDE SERPL-SCNC: 107 MMOL/L (ref 96–108)
CO2 SERPL-SCNC: 21 MMOL/L (ref 21–32)
CREAT SERPL-MCNC: 0.72 MG/DL (ref 0.6–1.3)
GFR SERPL CREATININE-BSD FRML MDRD: 103 ML/MIN/1.73SQ M
GLUCOSE P FAST SERPL-MCNC: 99 MG/DL (ref 65–99)
POTASSIUM SERPL-SCNC: 4.1 MMOL/L (ref 3.5–5.3)
SODIUM SERPL-SCNC: 137 MMOL/L (ref 135–147)

## 2023-08-12 PROCEDURE — 36415 COLL VENOUS BLD VENIPUNCTURE: CPT

## 2023-08-12 PROCEDURE — 80048 BASIC METABOLIC PNL TOTAL CA: CPT

## 2023-08-22 ENCOUNTER — HOSPITAL ENCOUNTER (OUTPATIENT)
Dept: INFUSION CENTER | Facility: HOSPITAL | Age: 58
Discharge: HOME | End: 2023-08-22
Attending: STUDENT IN AN ORGANIZED HEALTH CARE EDUCATION/TRAINING PROGRAM
Payer: COMMERCIAL

## 2023-08-22 DIAGNOSIS — F31.62 BIPOLAR DISORDER, CURRENT EPISODE MIXED, MODERATE (HCC): Primary | ICD-10-CM

## 2023-08-22 PROCEDURE — 96372 THER/PROPH/DIAG INJ SC/IM: CPT

## 2023-08-22 RX ADMIN — PALIPERIDONE PALMITATE 234 MG: 234 INJECTION INTRAMUSCULAR at 10:45

## 2023-08-22 NOTE — PROGRESS NOTES
Patient tolerated R arm Invega sustenna injection without issue. Next appointment confirmed, AVS declined.

## 2023-08-31 DIAGNOSIS — M62.830 MUSCLE SPASM OF BACK: ICD-10-CM

## 2023-08-31 RX ORDER — CYCLOBENZAPRINE HCL 10 MG
10 TABLET ORAL 3 TIMES DAILY PRN
Qty: 90 TABLET | Refills: 0 | Status: SHIPPED | OUTPATIENT
Start: 2023-08-31

## 2023-09-12 ENCOUNTER — HOSPITAL ENCOUNTER (OUTPATIENT)
Dept: INFUSION CENTER | Facility: HOSPITAL | Age: 58
Discharge: HOME | End: 2023-09-12
Attending: STUDENT IN AN ORGANIZED HEALTH CARE EDUCATION/TRAINING PROGRAM
Payer: COMMERCIAL

## 2023-09-12 DIAGNOSIS — F31.62 BIPOLAR DISORDER, CURRENT EPISODE MIXED, MODERATE (HCC): Primary | ICD-10-CM

## 2023-09-12 PROCEDURE — 96372 THER/PROPH/DIAG INJ SC/IM: CPT

## 2023-09-12 RX ADMIN — PALIPERIDONE PALMITATE 234 MG: 234 INJECTION INTRAMUSCULAR at 11:19

## 2023-09-15 DIAGNOSIS — F31.62 BIPOLAR DISORDER, CURRENT EPISODE MIXED, MODERATE (HCC): ICD-10-CM

## 2023-09-15 RX ORDER — DESVENLAFAXINE SUCCINATE 50 MG/1
50 TABLET, EXTENDED RELEASE ORAL DAILY
Qty: 90 TABLET | Refills: 2 | Status: SHIPPED | OUTPATIENT
Start: 2023-09-15

## 2023-09-19 DIAGNOSIS — E87.6 HYPOKALEMIA: ICD-10-CM

## 2023-09-19 RX ORDER — POTASSIUM CHLORIDE 20 MEQ/1
20 TABLET, EXTENDED RELEASE ORAL DAILY
Qty: 30 TABLET | Refills: 5 | Status: SHIPPED | OUTPATIENT
Start: 2023-09-19

## 2023-09-25 DIAGNOSIS — F41.9 ANXIETY DISORDER, UNSPECIFIED TYPE: ICD-10-CM

## 2023-09-25 RX ORDER — CLONAZEPAM 2 MG/1
2 TABLET ORAL 2 TIMES DAILY PRN
Qty: 60 TABLET | Refills: 1 | Status: SHIPPED | OUTPATIENT
Start: 2023-09-25

## 2023-09-25 NOTE — TELEPHONE ENCOUNTER
PDMP website reviewed. Sara Messina has been appropriately adherent to controlled psychotropic medications without evidence of abuse or misuse. As such, will send 30-day refill to pharmacy of choice and follow up as necessary.

## 2023-09-25 NOTE — TELEPHONE ENCOUNTER
Medication Refill Request     Name of Medication Clonazepam  Dose/Frequency 2 mg/ Take 1 tablet by mouth 2 times a day as needed for anxiety. Quantity 60  Verified pharmacy   [x]  Verified ordering Provider   [x]  Does patient have enough for the next 3 days? Yes [] No [x]  Does patient have a follow-up appointment scheduled?  Yes [x] No []   If so when is appointment: 12/4/2023

## 2023-09-26 ENCOUNTER — TELEPHONE (OUTPATIENT)
Dept: PSYCHIATRY | Facility: CLINIC | Age: 58
End: 2023-09-26

## 2023-09-28 NOTE — TELEPHONE ENCOUNTER
Spoke with [employer] [facility] [employer]. He has a jury duty request and he needs a note faxed for him to NOT attend.      [facility]  Fax# [address] # [phone] [phone]

## 2023-10-02 NOTE — TELEPHONE ENCOUNTER
Letter received and faxed to [facility]. Confirmation fax received. Will scan into media and attach to encounter.

## 2023-10-03 ENCOUNTER — HOSPITAL ENCOUNTER (OUTPATIENT)
Dept: INFUSION CENTER | Facility: HOSPITAL | Age: 58
Discharge: HOME | End: 2023-10-03
Attending: STUDENT IN AN ORGANIZED HEALTH CARE EDUCATION/TRAINING PROGRAM

## 2023-10-04 ENCOUNTER — HOSPITAL ENCOUNTER (OUTPATIENT)
Dept: INFUSION CENTER | Facility: HOSPITAL | Age: 58
Discharge: HOME | End: 2023-10-04
Attending: STUDENT IN AN ORGANIZED HEALTH CARE EDUCATION/TRAINING PROGRAM
Payer: COMMERCIAL

## 2023-10-04 DIAGNOSIS — F31.62 BIPOLAR DISORDER, CURRENT EPISODE MIXED, MODERATE (HCC): Primary | ICD-10-CM

## 2023-10-04 PROCEDURE — 96372 THER/PROPH/DIAG INJ SC/IM: CPT

## 2023-10-04 RX ADMIN — PALIPERIDONE PALMITATE 234 MG: 234 INJECTION INTRAMUSCULAR at 13:46

## 2023-10-04 NOTE — TELEPHONE ENCOUNTER
Jorje Glynn left a VM asking the status of the letter excusing him from jury duty. He said he hasn't heard from the court house.

## 2023-10-05 ENCOUNTER — OFFICE VISIT (OUTPATIENT)
Dept: FAMILY MEDICINE CLINIC | Facility: CLINIC | Age: 58
End: 2023-10-05
Payer: COMMERCIAL

## 2023-10-05 VITALS
WEIGHT: 227.6 LBS | TEMPERATURE: 98 F | OXYGEN SATURATION: 97 % | HEIGHT: 65 IN | SYSTOLIC BLOOD PRESSURE: 117 MMHG | RESPIRATION RATE: 17 BRPM | BODY MASS INDEX: 37.92 KG/M2 | HEART RATE: 79 BPM | DIASTOLIC BLOOD PRESSURE: 68 MMHG

## 2023-10-05 DIAGNOSIS — E66.01 CLASS 2 SEVERE OBESITY DUE TO EXCESS CALORIES WITH SERIOUS COMORBIDITY AND BODY MASS INDEX (BMI) OF 37.0 TO 37.9 IN ADULT: ICD-10-CM

## 2023-10-05 DIAGNOSIS — F31.62 BIPOLAR DISORDER, CURRENT EPISODE MIXED, MODERATE (HCC): ICD-10-CM

## 2023-10-05 DIAGNOSIS — I25.110 CORONARY ARTERY DISEASE INVOLVING NATIVE CORONARY ARTERY OF NATIVE HEART WITH UNSTABLE ANGINA PECTORIS (HCC): ICD-10-CM

## 2023-10-05 DIAGNOSIS — E78.2 MIXED HYPERLIPIDEMIA: ICD-10-CM

## 2023-10-05 DIAGNOSIS — Z23 NEED FOR INFLUENZA VACCINATION: ICD-10-CM

## 2023-10-05 DIAGNOSIS — J30.2 SEASONAL ALLERGIES: Primary | ICD-10-CM

## 2023-10-05 PROBLEM — L03.116 CELLULITIS OF LEFT LOWER EXTREMITY: Status: RESOLVED | Noted: 2023-06-13 | Resolved: 2023-10-05

## 2023-10-05 PROBLEM — T78.40XA ALLERGIES: Status: ACTIVE | Noted: 2023-10-05

## 2023-10-05 PROBLEM — E66.812 CLASS 2 SEVERE OBESITY DUE TO EXCESS CALORIES WITH SERIOUS COMORBIDITY AND BODY MASS INDEX (BMI) OF 37.0 TO 37.9 IN ADULT (HCC): Status: ACTIVE | Noted: 2020-06-16

## 2023-10-05 PROBLEM — Z72.0 TOBACCO USE: Status: RESOLVED | Noted: 2020-06-16 | Resolved: 2023-10-05

## 2023-10-05 PROCEDURE — 99214 OFFICE O/P EST MOD 30 MIN: CPT | Performed by: PHYSICIAN ASSISTANT

## 2023-10-05 PROCEDURE — 90471 IMMUNIZATION ADMIN: CPT | Performed by: PHYSICIAN ASSISTANT

## 2023-10-05 PROCEDURE — 90686 IIV4 VACC NO PRSV 0.5 ML IM: CPT | Performed by: PHYSICIAN ASSISTANT

## 2023-10-05 RX ORDER — MELATONIN
1000 DAILY
COMMUNITY

## 2023-10-05 NOTE — ASSESSMENT & PLAN NOTE
BMI Counseling: Body mass index is 37.87 kg/m². The BMI is above normal. Nutrition recommendations include reducing portion sizes, decreasing overall calorie intake, 3-5 servings of fruits/vegetables daily and reducing fast food intake. Exercise recommendations include exercising 3-5 times per week and strength training exercises. Has been more active since returning to work as . Lost 16 pounds in the past month. Continue intentional weight loss.

## 2023-10-05 NOTE — ASSESSMENT & PLAN NOTE
Lab Results   Component Value Date    CHOLESTEROL 133 04/06/2023    CHOLESTEROL 127 07/22/2022    CHOLESTEROL 79 06/22/2020     Lab Results   Component Value Date    HDL 51 04/06/2023    HDL 57 07/22/2022    HDL 34 (L) 06/22/2020     Lab Results   Component Value Date    TRIG 73 04/06/2023    TRIG 50 07/22/2022    TRIG 52 06/22/2020     Lab Results   Component Value Date    [address] 82 04/06/2023    [address] 75 06/18/2019    [address] 141 05/15/2019     Lab Results   Component Value Date    LDLCALC 67 04/06/2023     Continue same dose atorvastatin.

## 2023-10-05 NOTE — PROGRESS NOTES
Name: Liza Adams      : 1965      MRN: [id number]  Encounter Provider: Rocio Monte PA-C  Encounter Date: 10/5/2023   Encounter department: [address] [place] [address]     1. Seasonal allergies  Assessment & Plan:  Recent worsening in the past several days. Reviewed last ENT appointment in 2023 for cerumen removal.  Continue Benadryl as needed. Flonase and Astelin with minimal improvement. Usually uses Afrin. Discussed risk of rhinitis medicamentosa. Recommend decrease use of Afrin. 2. Class 2 severe obesity due to excess calories with serious comorbidity and body mass index (BMI) of 37.0 to 37.9 in adult   Assessment & Plan:  BMI Counseling: Body mass index is 37.87 kg/m². The BMI is above normal. Nutrition recommendations include reducing portion sizes, decreasing overall calorie intake, 3-5 servings of fruits/vegetables daily and reducing fast food intake. Exercise recommendations include exercising 3-5 times per week and strength training exercises. Has been more active since returning to work as . Lost 16 pounds in the past month. Continue intentional weight loss. 3. Coronary artery disease involving native coronary artery of native heart with unstable angina pectoris [facility])  Assessment & Plan:  Reviewed last cardiology consult note in 2023. Continue annual follow-up with cardiology. History of cardiac catheterization with nonobstructive CAD. Continue Ranexa. Restart aspirin 81 mg. Continue atorvastatin. No longer on beta-blocker due to orthostatic hypotension. Daily vaping. Recommend quit.       4. Mixed hyperlipidemia  Assessment & Plan:  Lab Results   Component Value Date    CHOLESTEROL 133 2023    CHOLESTEROL 127 2022    CHOLESTEROL 79 2020     Lab Results   Component Value Date    HDL 51 2023    HDL 57 2022    HDL 34 (L) 2020     Lab Results   Component Value Date    TRIG 73 04/06/2023    TRIG 50 07/22/2022    TRIG 52 06/22/2020     Lab Results   Component Value Date    [address] 82 04/06/2023    [address] 75 06/18/2019    NONHDLC 141 05/15/2019     Lab Results   Component Value Date    LDLCALC 67 04/06/2023     Continue same dose atorvastatin. 5. Bipolar disorder, current episode mixed, moderate (HCC)  Assessment & Plan:  Last Invega injection was yesterday. Compliant with psychiatry follow-ups. Monthly injections. Continue clonazepam 2 mg twice daily per psych and desvenlafaxine. Mood stable. Enjoying his return to work. 6. Need for influenza vaccination  -     influenza vaccine, quadrivalent, 0.5 mL, preservative-free, for adult and pediatric patients 6 mos+ (Hector Meek, FLULAVAL, FLUZONE)           John Gray is a 63 y.o. male with a h/o CAD, obesity, bipolar disorder who presents for routine 6-month follow-up without complaints. He had previously scheduled follow-up but missed appointment because he fell asleep due to feeling congested with either cold or allergy symptoms. He has significant nasal congestion usually. Uses Afrin. He previously was recommended against using it and was given Flonase and Astelin by ENT. Flonase and Astelin do not help as much. He has never tried montelukast and usually takes Benadryl with improvement. He is back to work and that has been helping him lose weight. He is changing the size of his buckle on his belt. He feels better since he has been more active. He is still vaping. No alcohol use. Review of Systems   Constitutional: Negative for chills, fever and unexpected weight change. HENT: Positive for congestion. Negative for postnasal drip, rhinorrhea, sinus pressure, sinus pain, sore throat and trouble swallowing. Eyes: Negative for pain, discharge, redness and itching. Respiratory: Negative for cough, shortness of breath and wheezing. Cardiovascular: Negative for chest pain and palpitations. Gastrointestinal: Negative for abdominal pain. Neurological: Negative for headaches. Current Outpatient Medications on File Prior to Visit   Medication Sig   • aspirin (ECOTRIN LOW STRENGTH) 81 mg EC tablet Take 81 mg by mouth daily   • atorvastatin (LIPITOR) 40 mg tablet TAKE ONE TABLET BY MOUTH EVERY DAY   • azelastine (ASTELIN) 0.1 % nasal spray 2 sprays into each nostril in the morning   • cholecalciferol (VITAMIN D3) 1,000 units tablet Take 1,000 Units by mouth daily   • clonazePAM (KlonoPIN) 2 mg tablet Take 1 tablet (2 mg total) by mouth 2 (two) times a day as needed for anxiety   • cyclobenzaprine (FLEXERIL) 10 mg tablet Take 1 tablet (10 mg total) by mouth 3 (three) times a day as needed for muscle spasms   • desvenlafaxine succinate (PRISTIQ) 50 mg 24 hr tablet Take 1 tablet (50 mg total) by mouth daily   • diphenhydrAMINE (BENADRYL) 25 mg tablet Take 25 mg by mouth in the morning   • fluticasone (FLONASE) 50 mcg/act nasal spray 2 sprays into each nostril daily   • OXcarbazepine (Trileptal) 300 mg tablet Take 1 tablet (300 mg total) by mouth in the morning AND 2 tablets (600 mg total) daily at bedtime.    • pantoprazole (PROTONIX) 20 mg tablet TAKE ONE TABLET BY MOUTH DAILY   • potassium chloride (K-DUR,KLOR-CON) 20 mEq tablet Take 1 tablet (20 mEq total) by mouth daily   • ranolazine (RANEXA) 1000 MG SR tablet Take 1 tablet (1,000 mg total) by mouth 2 (two) times a day   • sodium chloride 1 g tablet Take 2 tablets (2 g total) by mouth 2 (two) times a day   • traZODone (DESYREL) 300 MG tablet Take 1 tablet (300 mg total) by mouth daily at bedtime   • [DISCONTINUED] Diclofenac Sodium (VOLTAREN) 1 % Apply 2 g topically 4 (four) times a day   • [DISCONTINUED] oxyCODONE (ROXICODONE) 5 immediate release tablet Take 1 tablet (5 mg total) by mouth every 4 (four) hours as needed for moderate pain for up to 15 doses Max Daily Amount: 30 mg   • paliperidone palmitate ER (INVEGA) 234 mg/1.5 mL IM injection Inject 1.5 mL (234 mg total) into a muscle every 21 days for 1 dose (Patient taking differently: Inject 234 mg into a muscle every 21 days Takes every 3 weeks)   • [DISCONTINUED] aspirin (ECOTRIN LOW STRENGTH) 81 mg EC tablet Take 1 tablet (81 mg total) by mouth 2 (two) times a day   • [DISCONTINUED] ergocalciferol (VITAMIN D2) 50,000 units Take 1 capsule (50,000 Units total) by mouth 2 (two) times a week       Objective     /68 (BP Location: Left arm, Patient Position: Sitting, Cuff Size: Standard)   Pulse 79   Temp 98 °F (36.7 °C) (Tympanic)   Resp 17   Ht 5' 5" (1.651 m)   Wt 103 kg (227 lb 9.6 oz)   SpO2 97%   BMI 37.87 kg/m²     Physical Exam  Vitals and nursing note reviewed. Constitutional:       Appearance: Normal appearance. HENT:      Head: Normocephalic and atraumatic. Mouth/Throat:      Mouth: Mucous membranes are moist.   Eyes:      Extraocular Movements: Extraocular movements intact. Pupils: Pupils are equal, round, and reactive to light. Cardiovascular:      Rate and Rhythm: Normal rate and regular rhythm. Pulses: Normal pulses. Heart sounds: Normal heart sounds. Pulmonary:      Effort: Pulmonary effort is normal.      Breath sounds: Normal breath sounds. Musculoskeletal:      Cervical back: Neck supple. Lymphadenopathy:      Cervical: No cervical adenopathy. Neurological:      Mental Status: He is alert and oriented to person, place, and time. Mental status is at baseline.        Jono Somers PA-C

## 2023-10-05 NOTE — TELEPHONE ENCOUNTER
Called and spoke with patient and informed writer faxed letter to [facility] on 10/2. He stated he will wait for a response from them. Advised to call the office if he needs letter faxed again.

## 2023-10-05 NOTE — ASSESSMENT & PLAN NOTE
Physical Therapy Daily Treatment    Visit Count: 7   Plan of Care: 4/3/2019 Through: 5/29/2019  Insurance Information:       Note     Therapy Benefits - PT     Payor: [employer]  Authorization Needed: No  Maximum Visit Limit Per Year: based on med nec  CoPay: $0  Call Ref #: Ghada - [id number]            Referred by: David Kimble MD; Next provider visit (if known/scheduled): 5/2/19  Medical Diagnosis (from order):    719.46 (ICD-9-CM) - M25.561 (ICD-10-CM) - Right knee pain, unspecified chronicity  Treatment Diagnosis: R knee symptoms with impaired strength, impaired range of motion, impaired muscle length/flexibility, increased swelling, impaired gait, impaired mobility, impaired activity tolerance, increased risk for falls, impaired body mechanics     Date of Surgery: 3/19/2019 Surgery Performed: RIGHT KNEE ARTHROSCOPY MEDIAL MENISCECTOMY - Right; Physician Guidelines no  Diagnosis Precautions: Post-op; see guidelines  Chart reviewed at time of initial evaluation (relevant co-morbidities, allergies, tests and medications listed):       Patient Active Problem List   Diagnosis   • Rt Gastrocnemius tear   • Chest pain at rest   • Essential hypertension, benign   • CAD (coronary artery disease)   • High cholesterol        SUBJECTIVE   Patient reports that he is a bit sore from the last session's reassessment. Notes that HEP is going much better as he is no longer pushing through knee pain.   Current Pain (0-10 scale): 5/10 right knee  Functional Change: most difficulty with closed-chain activities    OBJECTIVE   Range of Motion (degrees)    Left Right Right   Date Initial Initial 4/30/19   Hip Flexion (110-120)        Hip Extension (10-15)        Hip Abduction (30-50)        Hip Adduction (30)        Hip Internal Rotation (30-40)        Hip External Rotation (40-60)        Knee Flexion (135) 147 126 132   Knee Extension (0-5) 0 0 0   Ankle Dorsiflexion (20)        Ankle Plantar Flexion (50)        Ankle Inversion (35)    Reviewed last cardiology consult note in 4/2023. Continue annual follow-up with cardiology. History of cardiac catheterization with nonobstructive CAD. Continue Ranexa. Restart aspirin 81 mg. Continue atorvastatin. No longer on beta-blocker due to orthostatic hypotension. Daily vaping. Recommend quit.      Ankle Eversion (15)        Reported in degrees, active range of motion recorded unless noted as AA=active assistive or P=passive; standard testing positions unless otherwise noted, norms included in ( ); *=pain   Only those motions that were assessed are noted.     Strength (out of 5)    Left Right Right   Date Initial Initial 4/30/19   Hip Flexors 4+ 4+ 5   Hip Extensors 4+ 4- 4   Hip Abductors 4+ 4 4; fatigues after ~3\" hold   Hip Adductors        Hip Internal Rotators 5 3+ 4*   Hip External Rotators 4+ 4- 4+*   Knee Flexors 5 4 5   Knee Extensors 5 4 5*   Ankle Dorsiflexors 5 4+ 5   Ankle Plantar Flexors 5 4+ 5   Ankle Invertors 5 4+ 5   Ankle Evertors 5 4 5*   standard testing positions unless otherwise noted; *=pain  Only muscle strength that was assessed are noted.    Treatment     Therapeutic Exercise:   Exercise Repetitions Sets Position/Cues   Recumbent Bike; Level 1 12 minutes 1 For active warm-up   Standiing calf stretching NP     True Stretch Standing HS stretch NP 1    True Stretch Standing Quad stretch NP 1    R Single leg balance; Airex pad  NP 5    B positions for tandem balance 15-20\" 2 each position, 2 each with eyes open and eyes closed    2\" lateral step ups and on Airex pad NP 2 each    Anterior step ups 4\"  NP 2    Right hip ABDucation SLR Reviewed 2 Cues for maintaining anterior top hip/neutral pelvis to target glute medius   Right Supine SLR Reviewed 1 Cues for full knee extension throughout   B prone extension SLRs 10 3 Cues for lower height to avoid lumbar rotation   R hip ADDuction SLR 12 3 Educated to add weights as SLRs get easier    Mini-squats; 18\" + Airex pad 10 4  Educated on squatting/lifting mechanics with importance of avoiding anterior displacement of knees over toes as well as using hip and knee flexion vs. lumbar flexion. Also educated on pivoting on feet vs. twisting through spine.    Clamshells; Green theraband resistance  Reviewed 3 Cues for slow eccentric form    Heel  Raises  NP 3      Standing TKE, tugboats with red theraband resistance NP 4      DL leg press; 75# NP 4 Cues for proper alignment and eccentric form   Bridges without green theraband resistance 12 3 Cues for full bridge height   Leaning over plinth, B hip ER - fire hydrants 8 3 each    Crabwalks 10' - stopped due to pain 2 each direction Cued on leading posteriorly with hips and slight APT to maintain lumbar lordosis   Wall sits 10-15\" 5 To 90 degrees   Comments:      Review of all HEP and issued pictures of new therex     Reviewed importance of gentle stretch during above exercises and not pushing into painful ROM/contraction, especially during closed-chain activity.    Manual Therapy:   STM to R lateral calf with mild TPR   Passive stretching to R calf  PROM R knee flexion and extension  Grades I-III anterior and posterior glides to R tibia on femur in sitting  Grades I-III Posterior glides to proximal fibual    Vasopneumatic Compression / Game Ready (02590):   Location: R knee  Position: long sitting Compression: medium  Water temperature 36 °F  Duration: 15 minutes   Results: decreased pain and improved range of motion; no adverse reaction to treatment    Home Program:   See above    Writer verbally educated the patient and received verbal consent from the patient on hand placement, positioning of patient, and techniques to be performed today including clothing adjustments for techniques, therapist position for techniques, hand placement and palpation for techniques, modality application as described above and how they are pertinent to the patient's plan of care.      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  May benefit from Work Hardening  Tentative  return to work date: 5/6/19     ASSESSMENT   Patient has made good progress over 8 visits of skilled PT following a 3/19/19 R medial meniscectomy as noted by above subjective and objective improvements as well as progress through therex progression. Continues to note pain with WBing on R knee as well as advanced closed-chain therex and therefore continues to require skilled PT to progress towards PLOF. Requires reminders to avoid pushing through pain; especially notes this with lateral WBing therex.Should benefit from continued skilled PT to address remaining deficits.  Pain after treatment (patient reported, 0-10 scale): \"it's worn out, but the ice felt good.\"  Result of above outlined education: Verbalizes understanding, Demonstrates understanding and Needs reinforcement    THERAPY DAILY BILLING   Insurance: [employer] [facility] 2. N/A    Evaluation Procedures:  No evaluation codes were used on this date of service    Timed Procedures:  Therapeutic Activity, 10 minutes  Therapeutic Exercise, 30 minutes    Untimed Procedures:  Vasopneumatic Device    Total Treatment Time: 55 minutes

## 2023-10-05 NOTE — ASSESSMENT & PLAN NOTE
Last Invega injection was yesterday. Compliant with psychiatry follow-ups. Monthly injections. Continue clonazepam 2 mg twice daily per psych and desvenlafaxine. Mood stable. Enjoying his return to work.

## 2023-10-05 NOTE — ASSESSMENT & PLAN NOTE
Recent worsening in the past several days. Reviewed last ENT appointment in 9/2023 for cerumen removal.  Continue Benadryl as needed. Flonase and Astelin with minimal improvement. Usually uses Afrin. Discussed risk of rhinitis medicamentosa. Recommend decrease use of Afrin.

## 2023-10-11 DIAGNOSIS — J30.2 SEASONAL ALLERGIES: Primary | ICD-10-CM

## 2023-10-11 RX ORDER — MONTELUKAST SODIUM 10 MG/1
10 TABLET ORAL
Qty: 30 TABLET | Refills: 0 | Status: SHIPPED | OUTPATIENT
Start: 2023-10-11 | End: 2023-12-04 | Stop reason: ALTCHOICE

## 2023-10-19 ENCOUNTER — TELEPHONE (OUTPATIENT)
Dept: PSYCHIATRY | Facility: CLINIC | Age: 58
End: 2023-10-19

## 2023-10-19 DIAGNOSIS — I25.110 CORONARY ARTERY DISEASE INVOLVING NATIVE CORONARY ARTERY OF NATIVE HEART WITH UNSTABLE ANGINA PECTORIS (HCC): ICD-10-CM

## 2023-10-19 DIAGNOSIS — E78.2 MIXED HYPERLIPIDEMIA: ICD-10-CM

## 2023-10-19 DIAGNOSIS — Z98.84 BARIATRIC SURGERY STATUS: ICD-10-CM

## 2023-10-19 RX ORDER — ATORVASTATIN CALCIUM 40 MG/1
40 TABLET, FILM COATED ORAL DAILY
Qty: 90 TABLET | Refills: 1 | Status: SHIPPED | OUTPATIENT
Start: 2023-10-19

## 2023-10-19 RX ORDER — PANTOPRAZOLE SODIUM 20 MG/1
20 TABLET, DELAYED RELEASE ORAL DAILY
Qty: 90 TABLET | Refills: 0 | Status: SHIPPED | OUTPATIENT
Start: 2023-10-19

## 2023-10-19 NOTE — TELEPHONE ENCOUNTER
Fred Schulte left a VM about the letter excusing him forom [employer]. He said it was not there. Please send again:    Fax [phone] or   Email [email or web address]

## 2023-10-20 NOTE — TELEPHONE ENCOUNTER
Chelle,    Can you please assist with sending the jury duty letter out? I know you completed this task 2 weeks ago, they must not have received it. Thank you.

## 2023-10-25 ENCOUNTER — HOSPITAL ENCOUNTER (OUTPATIENT)
Dept: INFUSION CENTER | Facility: HOSPITAL | Age: 58
Discharge: HOME | End: 2023-10-25
Attending: STUDENT IN AN ORGANIZED HEALTH CARE EDUCATION/TRAINING PROGRAM
Payer: COMMERCIAL

## 2023-10-25 DIAGNOSIS — F31.62 BIPOLAR DISORDER, CURRENT EPISODE MIXED, MODERATE (HCC): Primary | ICD-10-CM

## 2023-10-25 PROCEDURE — 96372 THER/PROPH/DIAG INJ SC/IM: CPT

## 2023-10-25 RX ADMIN — PALIPERIDONE PALMITATE 234 MG: 234 INJECTION INTRAMUSCULAR at 15:11

## 2023-10-26 ENCOUNTER — TELEPHONE (OUTPATIENT)
Dept: PSYCHIATRY | Facility: CLINIC | Age: 58
End: 2023-10-26

## 2023-10-26 NOTE — TELEPHONE ENCOUNTER
Spoke to Thania, he stated that he just left court house and he was told that they have not received letter excusing him from jury duty. I advised him it was sent to them on 10/19 and 10/23. He asked to  please send again.

## 2023-10-26 NOTE — TELEPHONE ENCOUNTER
Narayan left  requesting a call back. Spiritual Care Visit, initial visit. Visited with patient at bedside. Prayed for patient's healing and health. Visit by Tete Chauhan, Staff .  Jessica., Cecilia.B., B.A.

## 2023-10-26 NOTE — TELEPHONE ENCOUNTER
Letter was re faxed again ( 3rd attempt) and send via email that was provided. Fax receipt was scan attached in this encounter.

## 2023-11-03 DIAGNOSIS — F31.62 BIPOLAR DISORDER, CURRENT EPISODE MIXED, MODERATE (HCC): ICD-10-CM

## 2023-11-03 RX ORDER — OXCARBAZEPINE 300 MG/1
TABLET, FILM COATED ORAL
Qty: 90 TABLET | Refills: 3 | Status: SHIPPED | OUTPATIENT
Start: 2023-11-03 | End: 2024-02-01

## 2023-11-03 NOTE — TELEPHONE ENCOUNTER
Medication Refill Request     Name of Medication trileptal  Dose/Frequency 300mg take 1 tablet by mouth in the morning and 2 tablets daily at bedtime  Quantity 90  Verified pharmacy   [x]  Verified ordering Provider   [x]  Does patient have enough for the next 3 days? Yes [x] No []  Does patient have a follow-up appointment scheduled?  Yes [x] No []   If so when is appointment: 12/4/2023 5pm

## 2023-11-06 ENCOUNTER — TELEPHONE (OUTPATIENT)
Dept: PSYCHIATRY | Facility: CLINIC | Age: 58
End: 2023-11-06

## 2023-11-06 DIAGNOSIS — E87.1 HYPONATREMIA: ICD-10-CM

## 2023-11-06 RX ORDER — SODIUM CHLORIDE 1 G/1
2 TABLET ORAL 2 TIMES DAILY
Qty: 120 TABLET | Refills: 2 | Status: SHIPPED | OUTPATIENT
Start: 2023-11-06

## 2023-11-06 NOTE — TELEPHONE ENCOUNTER
SLEEP EVALUATION NOTE     Rubin White is a 41 year old male presenting for evaluation of: Consultation and Office Visit   .    Referring provider: Allen Alexander MD  PCP: Allen Alexander MD       [facility]     Bedtime: ***  Awake time: ***  Sleep position: ***  Sleep onset latency: ***  Sleeping aid medications: {Yes_No:728063}  Awakenings # and episodes of nocturia: ***    []Snoring  []Witnessed apneas  []Dyspneic arousal  []Morning dry mouth  []Morning headache    []Non-restorative sleep  []Excessive daytime sleepiness or tired during the day  []Naps    []Urge to move legs and/or uncomfortable tingling or burning in legs  []Cramping or jerking of the legs during sleep    []Cataplexy  []Sleep paralysis  []Hypnogognic or hypnopompnic hallucinations  []Sleep attacks    []Parasomnias:    STOP BANG SCREENING       PRIOR SLEEP STUDY DATE:   RESULTS: 1. AHI:                   2. DESATURATION PERRY:                 CURRENT CO-MORBIDITIES:    [] HTN     [] AF/ARRHYTHMIA    [] HYPOTHYROIDISM     [] CAD     [] OBESITY                  [] INSOMNIA  [] CHF     [] DM                         [] STROKE/TIA  []OTHER:         [place] Sleepiness Scale:     0 = would never doze  1 = slight chance of dozing  2 = moderate chance of dozing  3 = high chance of dozing    Situation     Chance of Dozing       1. Sitting and reading   2  2. Watching TV    1  3. Sitting, inactive in a public place       (e.g. a theatre or a meeting)  0    4. As a passenger in a car for an hour       without a break    0    5. Lying down to rest in the afternoon      when circumstances permit  1  6. Sitting and talking to someone  0    7. Sitting quietly after lunch without      Alcohol     0    8. In a car, while stopped for a few      Minutes in traffic    0            TOTAL: 3      Past Medical History:     Past Medical History:   Diagnosis Date   • Gout    • HTN (hypertension)      Past Surgical History:   Procedure Laterality Date   • Vasectomy    Received a message requesting a refill of Sodium chloride. Please send to [facility].     No family history on file.  Social History     Tobacco Use   • Smoking status: Never Smoker   • Smokeless tobacco: Never Used   Vaping Use   • Vaping Use: never used   Substance Use Topics   • Alcohol use: Yes   • Drug use: Never       ALLERGIES:  No Known Allergies  Current Outpatient Medications   Medication Sig   • lisinopril (ZESTRIL) 10 MG tablet Take 1 tablet by mouth daily.   • methylPREDNISolone (MEDROL, MADAN,) 4 MG tablet Take 1 tablet by mouth as directed. follow package directions   • terbinafine (LamISIL) 250 MG tablet Take 1 tablet by mouth daily.     No current facility-administered medications for this visit.          Review of Systems:     ROS     Physical Examination:     Vitals:    03/08/22 0941   BP: 115/84   Pulse: 98   Resp: 20   Temp: 98 °F (36.7 °C)   SpO2: 97%   Weight: 119.7 kg (264 lb)   Height: 6' (1.829 m)     Body mass index is 35.8 kg/m².      Neck Circumference: 18.5 in  Nasal Passages: []Clear   [] Congested  Palate: Jasso [] I  [] II   [x] III   [] IV              E[place]ther[place]/edema []none []+1  []+2  []+3  []+4    Gen: No acute distress. Pleasant and interactive.  Head:  Normocephalic and atraumatic.  Eyes: Conjunctiva and sclera normal.   Heart:  Normal rate and regular rhythm, no murmur.  Lungs:  Normal respiratory rate and effort, breath sounds equal.  Extremities:  No edema in lower extremities.   Skin: Warm and dry, no rash.  Musculoskeletal:  No joint swelling or tenderness  Psych:  Affect was appropriate to situation and mood was normal.  Neuro:  Alert and oriented, attention and recall preserved, cranial nerves intact, motor strength preserved, coordination intact, gait normal.     Assessment and Plan:     PROBLEMS ADDRESSED THIS VISIT:  Problem List Items Addressed This Visit     None           Comments: ***    PLAN & Patient Counseling:     • We reviewed the nature of sleep apnea, the elevated cardiovascular risks and other health consequences associated with this  condition and reviewed treatment options in detail.  • Pt. to undergo polysomnogram with CPAP titration for an apnea-hypopnea index greater than 15 events per hour.  • The patient was cautioned not to drive while sleepy.  • Anticipated follow up visit 4-6 weeks after beginning CPAP therapy.

## 2023-11-08 DIAGNOSIS — M62.830 MUSCLE SPASM OF BACK: ICD-10-CM

## 2023-11-08 RX ORDER — CYCLOBENZAPRINE HCL 10 MG
10 TABLET ORAL 3 TIMES DAILY PRN
Qty: 30 TABLET | Refills: 0 | Status: SHIPPED | OUTPATIENT
Start: 2023-11-08

## 2023-11-15 ENCOUNTER — HOSPITAL ENCOUNTER (OUTPATIENT)
Dept: INFUSION CENTER | Facility: HOSPITAL | Age: 58
Discharge: HOME | End: 2023-11-15
Attending: STUDENT IN AN ORGANIZED HEALTH CARE EDUCATION/TRAINING PROGRAM
Payer: COMMERCIAL

## 2023-11-15 DIAGNOSIS — F31.62 BIPOLAR DISORDER, CURRENT EPISODE MIXED, MODERATE (HCC): Primary | ICD-10-CM

## 2023-11-15 PROCEDURE — 96372 THER/PROPH/DIAG INJ SC/IM: CPT

## 2023-11-15 RX ADMIN — PALIPERIDONE PALMITATE 234 MG: 234 INJECTION INTRAMUSCULAR at 15:17

## 2023-11-22 DIAGNOSIS — F41.9 ANXIETY DISORDER, UNSPECIFIED TYPE: ICD-10-CM

## 2023-11-22 RX ORDER — CLONAZEPAM 2 MG/1
2 TABLET ORAL 2 TIMES DAILY PRN
Qty: 60 TABLET | Refills: 0 | Status: SHIPPED | OUTPATIENT
Start: 2023-11-22

## 2023-11-30 ENCOUNTER — TELEPHONE (OUTPATIENT)
Dept: PSYCHIATRY | Facility: CLINIC | Age: 58
End: 2023-11-30

## 2023-11-30 NOTE — TELEPHONE ENCOUNTER
Pt called to get his appt for 12/4/23 at 5:00 pm switched to a virtual visit due to work. Writer switched appt.

## 2023-12-04 ENCOUNTER — TELEMEDICINE (OUTPATIENT)
Dept: PSYCHIATRY | Facility: CLINIC | Age: 58
End: 2023-12-04
Payer: COMMERCIAL

## 2023-12-04 ENCOUNTER — OFFICE VISIT (OUTPATIENT)
Dept: FAMILY MEDICINE CLINIC | Facility: CLINIC | Age: 58
End: 2023-12-04
Payer: COMMERCIAL

## 2023-12-04 VITALS
HEART RATE: 82 BPM | SYSTOLIC BLOOD PRESSURE: 100 MMHG | RESPIRATION RATE: 17 BRPM | DIASTOLIC BLOOD PRESSURE: 70 MMHG | TEMPERATURE: 97.9 F | WEIGHT: 210.4 LBS | OXYGEN SATURATION: 99 % | BODY MASS INDEX: 35.06 KG/M2 | HEIGHT: 65 IN

## 2023-12-04 DIAGNOSIS — J30.2 SEASONAL ALLERGIES: ICD-10-CM

## 2023-12-04 DIAGNOSIS — K91.2 HYPOGLYCEMIA AFTER GI (GASTROINTESTINAL) SURGERY: ICD-10-CM

## 2023-12-04 DIAGNOSIS — F51.04 PSYCHOPHYSIOLOGICAL INSOMNIA: ICD-10-CM

## 2023-12-04 DIAGNOSIS — F31.62 BIPOLAR DISORDER, CURRENT EPISODE MIXED, MODERATE (HCC): Primary | ICD-10-CM

## 2023-12-04 DIAGNOSIS — I95.1 ORTHOSTATIC HYPOTENSION: ICD-10-CM

## 2023-12-04 DIAGNOSIS — K91.1 POSTSURGICAL DUMPING SYNDROME: ICD-10-CM

## 2023-12-04 DIAGNOSIS — R55 RECURRENT SYNCOPE: Primary | ICD-10-CM

## 2023-12-04 DIAGNOSIS — F41.9 ANXIETY DISORDER, UNSPECIFIED TYPE: ICD-10-CM

## 2023-12-04 PROCEDURE — 99214 OFFICE O/P EST MOD 30 MIN: CPT | Performed by: PHYSICIAN ASSISTANT

## 2023-12-04 PROCEDURE — 99214 OFFICE O/P EST MOD 30 MIN: CPT | Performed by: STUDENT IN AN ORGANIZED HEALTH CARE EDUCATION/TRAINING PROGRAM

## 2023-12-04 RX ORDER — BLOOD-GLUCOSE METER
KIT MISCELLANEOUS
Qty: 1 KIT | Refills: 0 | Status: SHIPPED | OUTPATIENT
Start: 2023-12-04

## 2023-12-04 RX ORDER — BLOOD SUGAR DIAGNOSTIC
STRIP MISCELLANEOUS
Qty: 100 EACH | Refills: 3 | Status: SHIPPED | OUTPATIENT
Start: 2023-12-04

## 2023-12-04 RX ORDER — CLONAZEPAM 2 MG/1
2 TABLET ORAL 2 TIMES DAILY PRN
Qty: 60 TABLET | Refills: 2 | Status: SHIPPED | OUTPATIENT
Start: 2023-12-04

## 2023-12-04 RX ORDER — LANCETS 33 GAUGE
EACH MISCELLANEOUS
Qty: 100 EACH | Refills: 3 | Status: SHIPPED | OUTPATIENT
Start: 2023-12-04

## 2023-12-04 NOTE — ASSESSMENT & PLAN NOTE
Reviewed last endo consult note in 2/2023. Never purchased glucometer as recommended, will try to order with insurance, if not covered, patient to purchase at [facility] as previously recommended, CGM not covered.
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
## 2023-12-04 NOTE — ASSESSMENT & PLAN NOTE
No improvement with montelukast. Uses Afrin as needed. Avoid risk with rhinitis medicamentosa. No longer using Flonase due to lack of improvement. Recommend nasal saline.

## 2023-12-04 NOTE — BH TREATMENT PLAN
TREATMENT PLAN (Medication Management Only)        [address]    Name and Date of Birth:  Oliva Benoit 63 y.o. 1965  Date of Treatment Plan: December 4, 2023  Diagnosis/Diagnoses:    1. Bipolar disorder, current episode mixed, moderate ([address])    2. Psychophysiological insomnia    3. Anxiety disorder, unspecified type      Strengths/Personal Resources for Self-Care: supportive family, supportive friends, taking medications as prescribed, ability to adapt to life changes, ability to communicate needs, ability to communicate well, ability to listen, ability to reason, general fund of knowledge, good physical health, good understanding of illness, independence, motivation for treatment, ability to negotiate basic needs, being resoureceful, self-reliance, sense of humor. Area/Areas of need (in own words): anxiety symptoms, depressive symptoms, mood instability  1. Long Term Goal: maintain control of mood. Target Date:6 months - 6/4/2024  Person/Persons responsible for completion of goal: Chandler  2. Short Term Objective (s) - How will we reach this goal?:   A. Provider new recommended medication/dosage changes and/or continue medication(s): continue current medications as prescribed. B. Attend medication management appointments regularly. C. Take psychiatric medications responsibly. Target Date:6 months - 6/4/2024  Person/Persons Responsible for Completion of Goal: Chandler  Progress Towards Goals: stable, continuing treatment  Treatment Modality: medication management every 3 months  Review due 180 days from date of this plan: 6 months - 6/4/2024  Expected length of service: ongoing treatment  My Physician/PA/NP and I have developed this plan together and I agree to work on the goals and objectives. I understand the treatment goals that were developed for my treatment. Treatment Plan was completed with Chandler's assistance and input throughout today's kishan Arteaga consented to the information provided and documented above. Unfortunately, treatment plan was not signed at the time of this office visit secondary to issues related to signature keypad.

## 2023-12-04 NOTE — ASSESSMENT & PLAN NOTE
2 episodes in past week, both witnessed by wife and son. Patient reports change in diet due to not working which he attributes to the recurrence of syncopal episodes, had not had in over a year previously. Check labs. Hx of postsurgical dumping and hypoglycemia as well as orthostatic hypotension. Currently asymptomatic but previous with dizziness upon standing in the past week. No head injury but recommend STAT CT head due to LOC for up to 30 seconds. Patient not agreeable to CT head, will complete labs first. Discussed reasons to proceed to ER.

## 2023-12-04 NOTE — PROGRESS NOTES
Name: Shahida Nicholson      : 1965      MRN: [id number]  Encounter Provider: Waldemar Shah PA-C  Encounter Date: 2023   Encounter department: [address] [place] [address]     1. Recurrent syncope  Assessment & Plan:  2 episodes in past week, both witnessed by wife and son. Patient reports change in diet due to not working which he attributes to the recurrence of syncopal episodes, had not had in over a year previously. Check labs. Hx of postsurgical dumping and hypoglycemia as well as orthostatic hypotension. Currently asymptomatic but previous with dizziness upon standing in the past week. No head injury but recommend STAT CT head due to LOC for up to 30 seconds. Patient not agreeable to CT head, will complete labs first. Discussed reasons to proceed to ER. Orders:  -     Comprehensive metabolic panel; Future  -     CBC and differential; Future  -     CT head wo contrast; Future; Expected date: 2023    2. Orthostatic hypotension  Assessment & Plan:  Reviewed tilt table test from  and most recent cardiology consult note in 2023. Stable BP on exam today. Suspect cause of recent syncopal episodes. Follow-up with cardiology. 3. Hypoglycemia after GI (gastrointestinal) surgery  Assessment & Plan:  Reviewed last endo consult note in 2023. Never purchased glucometer as recommended, will try to order with insurance, if not covered, patient to purchase at [facility] as previously recommended, CGM not covered. Orders:  -     Blood Glucose Monitoring Suppl (OneTouch Verio Reflect) w/Device KIT; Check blood sugars once daily. Please substitute with appropriate alternative as covered by patient's insurance. Dx: E11.65  -     glucose blood (OneTouch Verio) test strip; Check blood sugars once daily. Please substitute with appropriate alternative as covered by patient's insurance. Dx: E11.65  -     OneTouch Delica Lancets 29F MISC;  Check blood sugars once daily. Please substitute with appropriate alternative as covered by patient's insurance. Dx: E11.65    4. Postsurgical dumping syndrome  Assessment & Plan:  S/p Marii-en-Y gastric bypass in 2020. Follow-up endo and bariatric surgery. 5. Seasonal allergies  Assessment & Plan:  No improvement with montelukast. Uses Afrin as needed. Avoid risk with rhinitis medicamentosa. No longer using Flonase due to lack of improvement. Recommend nasal saline. Serenity Villarreal is a 63 y.o. male with a h/o CAD, obesity, bipolar disorder, post-surgical dumping syndrome who presents for complaint of two syncopal episodes in past week. Last episode was yesterday witnessed by his wife who reports patient was walking into kitchen when he started waving his arm in front of him to grab hold of something then fell down without hitting his head. Wife reports he was unresponsive for 20 seconds. He had another episodes when he went to feed his cat and son witnessed it. No head injury. Patient denies any prodromal symptoms and says he only felt dizzy with standing up quickly before passing out in the past week. He describes this syncopal episode as the same as previous episodes but has been symptom free for past year until this week. He was on Midodrine and Florinef for postural hypertension in the past which did not help his BP and he has not been taking them for past year as he is no longer symptomatic. Patient had significant history of recurrent syncopal episodes last year and was seen by neurology, cardiology and endocrinology. He had inpatient study done to screen for seizures and was diagnosed with post-surgical dumping syndrome and recurrent episodes of hypoglycemia. Patient was referred to endocrinology who advised patient to purchase glucose monitor and maintain strict diet with frequent meals to avoid hypoglycemia.  Patient works as a  and had a strict diet and carries glucose tablets with him for times when he feels dizzy. He has been off of work for past week with plans to return. Since he has been home he noticed he eats less frequently and this may be the cause of his symptoms recurring. No smoking or ETOH use. Feels better today. His wife made him make appointment, he was intending to just adjust his diet. [address] [facility] Student was present for exam and obtained portion of history and physical.           Review of Systems   Constitutional:  Negative for chills and fever. HENT:  Negative for ear pain and sore throat. Eyes:  Negative for pain and visual disturbance. Respiratory:  Negative for cough and shortness of breath. Cardiovascular:  Negative for chest pain and palpitations. Gastrointestinal:  Negative for abdominal pain and vomiting. Genitourinary:  Negative for dysuria and hematuria. Musculoskeletal:  Negative for arthralgias and back pain. Skin:  Negative for color change and rash. Neurological:  Positive for dizziness and syncope (x 2 episodes in 1 week). Negative for seizures. Psychiatric/Behavioral:  Negative for confusion and sleep disturbance. All other systems reviewed and are negative.       Current Outpatient Medications on File Prior to Visit   Medication Sig    aspirin (ECOTRIN LOW STRENGTH) 81 mg EC tablet Take 81 mg by mouth daily    atorvastatin (LIPITOR) 40 mg tablet Take 1 tablet (40 mg total) by mouth daily    azelastine (ASTELIN) 0.1 % nasal spray 2 sprays into each nostril in the morning    cholecalciferol (VITAMIN D3) 1,000 units tablet Take 1,000 Units by mouth daily    clonazePAM (KlonoPIN) 2 mg tablet TAKE ONE TABLET BY MOUTH 2 TIMES A DAY AS NEEDED FOR ANXIETY    cyclobenzaprine (FLEXERIL) 10 mg tablet Take 1 tablet (10 mg total) by mouth 3 (three) times a day as needed for muscle spasms    desvenlafaxine succinate (PRISTIQ) 50 mg 24 hr tablet Take 1 tablet (50 mg total) by mouth daily    diphenhydrAMINE (BENADRYL) 25 mg tablet Take 25 mg by mouth in the morning    fluticasone (FLONASE) 50 mcg/act nasal spray 2 sprays into each nostril daily    OXcarbazepine (Trileptal) 300 mg tablet Take 1 tablet (300 mg total) by mouth in the morning AND 2 tablets (600 mg total) daily at bedtime. pantoprazole (PROTONIX) 20 mg tablet Take 1 tablet (20 mg total) by mouth daily    potassium chloride (K-DUR,KLOR-CON) 20 mEq tablet Take 1 tablet (20 mEq total) by mouth daily    ranolazine (RANEXA) 1000 MG SR tablet Take 1 tablet (1,000 mg total) by mouth 2 (two) times a day    sodium chloride 1 g tablet Take 2 tablets (2 g total) by mouth 2 (two) times a day    traZODone (DESYREL) 300 MG tablet Take 1 tablet (300 mg total) by mouth daily at bedtime    [DISCONTINUED] montelukast (SINGULAIR) 10 mg tablet Take 1 tablet (10 mg total) by mouth daily at bedtime For allergies    paliperidone palmitate ER (INVEGA) 234 mg/1.5 mL IM injection Inject 1.5 mL (234 mg total) into a muscle every 21 days for 1 dose (Patient taking differently: Inject 234 mg into a muscle every 21 days Takes every 3 weeks)       Objective     /70 (BP Location: Right arm, Patient Position: Standing, Cuff Size: Large)   Pulse 82   Temp 97.9 °F (36.6 °C) (Tympanic)   Resp 17   Ht 5' 5" (1.651 m)   Wt 95.4 kg (210 lb 6.4 oz)   SpO2 99%   BMI 35.01 kg/m²     Physical Exam  Constitutional:       Appearance: Normal appearance. HENT:      Head: Normocephalic and atraumatic. Eyes:      Extraocular Movements: Extraocular movements intact. Pupils: Pupils are equal, round, and reactive to light. Cardiovascular:      Rate and Rhythm: Normal rate and regular rhythm. Pulmonary:      Effort: Pulmonary effort is normal. No respiratory distress. Breath sounds: Normal breath sounds. Neurological:      Mental Status: He is alert. Sensory: No sensory deficit.       Coordination: Coordination normal.      Gait: Gait normal.       Amita Wallace PA-C

## 2023-12-04 NOTE — ASSESSMENT & PLAN NOTE
Reviewed tilt table test from 2021 and most recent cardiology consult note in 4/2023. Stable BP on exam today. Suspect cause of recent syncopal episodes. Follow-up with cardiology.

## 2023-12-05 ENCOUNTER — APPOINTMENT (OUTPATIENT)
Dept: LAB | Age: 58
End: 2023-12-05
Payer: COMMERCIAL

## 2023-12-05 DIAGNOSIS — R55 RECURRENT SYNCOPE: ICD-10-CM

## 2023-12-05 DIAGNOSIS — J30.2 SEASONAL ALLERGIES: Primary | ICD-10-CM

## 2023-12-05 LAB
ALBUMIN SERPL BCP-MCNC: 4.2 G/DL (ref 3.5–5)
ALP SERPL-CCNC: 93 U/L (ref 34–104)
ALT SERPL W P-5'-P-CCNC: 20 U/L (ref 7–52)
ANION GAP SERPL CALCULATED.3IONS-SCNC: 8 MMOL/L
AST SERPL W P-5'-P-CCNC: 16 U/L (ref 13–39)
BASOPHILS # BLD AUTO: 0.08 THOUSANDS/ÂΜL (ref 0–0.1)
BASOPHILS NFR BLD AUTO: 1 % (ref 0–1)
BILIRUB SERPL-MCNC: 0.49 MG/DL (ref 0.2–1)
BUN SERPL-MCNC: 11 MG/DL (ref 5–25)
CALCIUM SERPL-MCNC: 9.4 MG/DL (ref 8.4–10.2)
CHLORIDE SERPL-SCNC: 99 MMOL/L (ref 96–108)
CO2 SERPL-SCNC: 27 MMOL/L (ref 21–32)
CREAT SERPL-MCNC: 0.75 MG/DL (ref 0.6–1.3)
EOSINOPHIL # BLD AUTO: 0.91 THOUSAND/ÂΜL (ref 0–0.61)
EOSINOPHIL NFR BLD AUTO: 10 % (ref 0–6)
ERYTHROCYTE [DISTWIDTH] IN BLOOD BY AUTOMATED COUNT: 12.8 % (ref 11.6–15.1)
GFR SERPL CREATININE-BSD FRML MDRD: 101 ML/MIN/1.73SQ M
GLUCOSE P FAST SERPL-MCNC: 98 MG/DL (ref 65–99)
HCT VFR BLD AUTO: 46.2 % (ref 36.5–49.3)
HGB BLD-MCNC: 15.9 G/DL (ref 12–17)
IMM GRANULOCYTES # BLD AUTO: 0.05 THOUSAND/UL (ref 0–0.2)
IMM GRANULOCYTES NFR BLD AUTO: 1 % (ref 0–2)
LYMPHOCYTES # BLD AUTO: 1.97 THOUSANDS/ÂΜL (ref 0.6–4.47)
LYMPHOCYTES NFR BLD AUTO: 21 % (ref 14–44)
MCH RBC QN AUTO: 31.1 PG (ref 26.8–34.3)
MCHC RBC AUTO-ENTMCNC: 34.4 G/DL (ref 31.4–37.4)
MCV RBC AUTO: 90 FL (ref 82–98)
MONOCYTES # BLD AUTO: 0.75 THOUSAND/ÂΜL (ref 0.17–1.22)
MONOCYTES NFR BLD AUTO: 8 % (ref 4–12)
NEUTROPHILS # BLD AUTO: 5.43 THOUSANDS/ÂΜL (ref 1.85–7.62)
NEUTS SEG NFR BLD AUTO: 59 % (ref 43–75)
NRBC BLD AUTO-RTO: 0 /100 WBCS
PLATELET # BLD AUTO: 280 THOUSANDS/UL (ref 149–390)
PMV BLD AUTO: 10.5 FL (ref 8.9–12.7)
POTASSIUM SERPL-SCNC: 4.3 MMOL/L (ref 3.5–5.3)
PROT SERPL-MCNC: 6.8 G/DL (ref 6.4–8.4)
RBC # BLD AUTO: 5.11 MILLION/UL (ref 3.88–5.62)
SODIUM SERPL-SCNC: 134 MMOL/L (ref 135–147)
WBC # BLD AUTO: 9.19 THOUSAND/UL (ref 4.31–10.16)

## 2023-12-05 PROCEDURE — 85025 COMPLETE CBC W/AUTO DIFF WBC: CPT

## 2023-12-05 PROCEDURE — 80053 COMPREHEN METABOLIC PANEL: CPT

## 2023-12-05 RX ORDER — METHYLPREDNISOLONE 4 MG/1
TABLET ORAL
Qty: 21 EACH | Refills: 0 | Status: SHIPPED | OUTPATIENT
Start: 2023-12-05

## 2023-12-06 ENCOUNTER — HOSPITAL ENCOUNTER (OUTPATIENT)
Dept: INFUSION CENTER | Facility: HOSPITAL | Age: 58
Discharge: HOME | End: 2023-12-06
Attending: STUDENT IN AN ORGANIZED HEALTH CARE EDUCATION/TRAINING PROGRAM
Payer: COMMERCIAL

## 2023-12-06 ENCOUNTER — TELEPHONE (OUTPATIENT)
Dept: PSYCHIATRY | Facility: CLINIC | Age: 58
End: 2023-12-06

## 2023-12-06 DIAGNOSIS — F31.62 BIPOLAR DISORDER, CURRENT EPISODE MIXED, MODERATE (HCC): Primary | ICD-10-CM

## 2023-12-06 PROCEDURE — 96372 THER/PROPH/DIAG INJ SC/IM: CPT

## 2023-12-06 RX ADMIN — PALIPERIDONE PALMITATE 234 MG: 234 INJECTION INTRAMUSCULAR at 08:10

## 2023-12-06 NOTE — TELEPHONE ENCOUNTER
Received a message from Greg's wife. Jeni Bingham is asking for Dr. Blue Bey to review lab results and are medication changes needed due to recent sodium level? Nursing will follow up as directed.

## 2023-12-11 NOTE — PROGRESS NOTES
Cardiology Follow Up    Kb Saez  1965  [id number]  [facility] [place] [id number] [phone]    Office follow up visit     Interval History:   Mr René Colon was seen by DR Rena Goldberg on 7/13/20 with dizziness when standing  He was found to continued orthostatic hypotension  Florinef was increased to 0 1mg daily to 0 2mg daily  His PCP has advised Mr Bar Marrero to follow up with Psychiatry for possible medication adjustment to improve his orthostatic hypotension        On 7/14/20 Mr René Colon was seen in our office for orthostatic hypotension  He was not on cardiac medications  Mr Peralta Hays admited to lightheadedness and dizziness with standing  He was eating well and drinking up to 64 oz of fluid daily  According to Mr Peralta Hays his hypotension started in May  He underwent Gastric Bypass in March  7/15/20 - 7/16/20 Mr René Colon presented to [facility] ED with near syncope  Mr Nikole Laura was at home going from sitting to standing and experienced jerking movements  This was witnessed by his wife  He never lost consciousness  Orthostatic blood pressures did not quite meet criteria in the emergency room  Jerking movements were felt to be secondary to orthostatic hypotension versus vasovagal response  7/16/20 TTE showed   Left ventricular systolic function normal LVEF 55%  No regional wall motion abnormality  Wall thickness mildly increased  Left ventricular diastolic function parameters are normal   Right ventricular size and systolic function normal   Left atrium mildly dilated  Trace mitral valve regurgitation, trace aortic valve regurgitation, trace tricuspid valve regurgitation  He was started on Low dose Midodrine 2 5mg TID  He had minimal elevated in troponin to 0 06 and 0 05    EKG Normal         Mr René Colon presents to our office for a recent Emergency Room follow up visit  He admits to slight lightheadedness and dizziness with standing  He is not wearing compression stockings today  He is drinking fluids daily  Ailin Dominguez denies CP or dyspnea         HPI:  3/02/20 sp Marii En - Y Gastric Bypass with Dr Esther Mckenzie  Chest pain  ROSI not using CPAP after weight loss  Orthostatic hypotension   6/21/19 LVEF 60%, no Regional wall motion abnormality    Wall thickness mildly increased, grade 1 diastolic dysfunction, no  Significant valvular disease      Patient Active Problem List       Patient Active Problem List   Diagnosis    Elevated troponin    Bipolar disorder, current episode mixed, moderate (HCC)    ROSI (obstructive sleep apnea)    Coronary artery disease involving native coronary artery of native heart with unstable angina pectoris ([facility] )    Former smoker    Migraine with aura and without status migrainosus, not intractable    Benign paroxysmal positional vertigo due to bilateral vestibular disorder    Hypercholesterolemia    Bariatric surgery status    Primary insomnia    Overweight    Encounter for surgical aftercare following surgery of digestive system    Postsurgical malabsorption    Pre-diabetes    Tobacco use    Orthostatic hypotension    Myoclonic jerking    Near syncope     Past Medical History:   Diagnosis Date    Bariatric surgery status     CPAP (continuous positive airway pressure) dependence     Hearing loss of aging     Hypercholesterolemia     Morbid obesity ([facility] )     NSTEMI (non-ST elevation myocardial infarction) [facility])     april 2019    Postsurgical malabsorption      Social History     Socioeconomic History    Marital status: /Civil Union     Spouse name: Not on file    Number of children: Not on file    Years of education: Not on file    Highest education level: Not on file   Occupational History    Not on file   Social Needs    Financial resource strain: Not on file    Food insecurity:     Worry: Name band; Not on file     Inability: Not on file    Transportation needs:     Medical: Not on file     Non-medical: Not on file   Tobacco Use    Smoking status: Former Smoker     Types: Cigarettes     Last attempt to quit:      Years since quittin 5    Smokeless tobacco: Current User     Types: Chew     Last attempt to quit: 2014    Tobacco comment: quit cigarettes    Substance and Sexual Activity    Alcohol use: Not Currently     Frequency: Never    Drug use: Never    Sexual activity: Not on file   Lifestyle    Physical activity:     Days per week: Not on file     Minutes per session: Not on file    Stress: Not on file   Relationships    Social connections:     Talks on phone: Not on file     Gets together: Not on file     Attends Sikh service: Not on file     Active member of club or organization: Not on file     Attends meetings of clubs or organizations: Not on file     Relationship status: Not on file    Intimate partner violence:     Fear of current or ex partner: Not on file     Emotionally abused: Not on file     Physically abused: Not on file     Forced sexual activity: Not on file   Other Topics Concern    Not on file   Social History Narrative    Former smoker - [facility]    Full-time employment        · Do you currently or have you served in [employer]:   No    As per [address] [place] [address] [employer] History   Problem Relation Age of Onset    Lung cancer Mother     Brain cancer Mother     Diabetes Brother     Bipolar disorder Maternal Grandfather      Past Surgical History:   Procedure Laterality Date    CARDIAC CATHETERIZATION      no stents     COLONOSCOPY      EGD      HERNIA REPAIR      umbilical hernia    FL LAP GASTRIC BYPASS/FRITZ-EN-Y N/A 3/2/2020    Procedure: LAPAROSCOPIC FRITZ-EN-Y GASTRIC BYPASS AND INTRAOPERATIVE EGD;  Surgeon: Ransom Lanes, MD;  Location: [facility];  Service: Bariatrics    SKIN SURGERY      cyst removed from back and thumb    TONSILLECTOMY      TOOTH EXTRACTION         Current Outpatient Medications:     acetaminophen (TYLENOL) 325 mg tablet, Take 3 tablets (975 mg total) by mouth every 8 (eight) hours, Disp: 30 tablet, Rfl: 0    atorvastatin (LIPITOR) 40 mg tablet, Take 1 tablet by mouth once daily, Disp: 90 tablet, Rfl: 0    clonazePAM (KlonoPIN) 1 mg tablet, as needed , Disp: , Rfl:     fludrocortisone (FLORINEF) 0 1 mg tablet, Take 2 tablets (0 2 mg total) by mouth daily, Disp: 60 tablet, Rfl: 0    midodrine (PROAMATINE) 2 5 mg tablet, Take 1 tablet (2 5 mg total) by mouth 3 (three) times a day before meals, Disp: 30 tablet, Rfl: 0    OXcarbazepine (TRILEPTAL) 150 mg tablet, Take 150 mg by mouth 2 (two) times a day , Disp: , Rfl:     paliperidone palmitate ER (Invega Sustenna) 234 mg/1 5 mL IM injection, Inject 234 mg into a muscle every 30 (thirty) days, Disp: , Rfl:     pantoprazole (PROTONIX) 20 mg tablet, Take 1 tablet (20 mg total) by mouth daily, Disp: 30 tablet, Rfl: 2    traZODone (DESYREL) 300 MG tablet, Take 300 mg by mouth daily at bedtime, Disp: , Rfl:     venlafaxine (EFFEXOR) 75 mg tablet, Take 75 mg by mouth daily , Disp: , Rfl:   No Known Allergies    Labs:  Admission on 07/15/2020, Discharged on 07/16/2020   Component Date Value    Sodium 07/15/2020 136     Potassium 07/15/2020 3 7     Chloride 07/15/2020 103     CO2 07/15/2020 24     ANION GAP 07/15/2020 9     BUN 07/15/2020 8     Creatinine 07/15/2020 0 76     Glucose 07/15/2020 134     Calcium 07/15/2020 9 0     eGFR 07/15/2020 103     WBC 07/15/2020 9 50     RBC 07/15/2020 4 25     Hemoglobin 07/15/2020 13 0     Hematocrit 07/15/2020 38 7     MCV 07/15/2020 91     MCH 07/15/2020 30 6     MCHC 07/15/2020 33 6     RDW 07/15/2020 12 9     MPV 07/15/2020 10 8     Platelets 99/43/0432 203     nRBC 07/15/2020 0     Neutrophils Relative 07/15/2020 75     Immat GRANS % 07/15/2020 0     Lymphocytes Relative 07/15/2020 16     Monocytes Relative 07/15/2020 7     Eosinophils Relative 07/15/2020 1     Basophils Relative 07/15/2020 1     Neutrophils Absolute 07/15/2020 7 15     Immature Grans Absolute 07/15/2020 0 03     Lymphocytes Absolute 07/15/2020 1 47     Monocytes Absolute 07/15/2020 0 68     Eosinophils Absolute 07/15/2020 0 12     Basophils Absolute 07/15/2020 0 05     Troponin I 07/15/2020 0 06*    SARS-CoV-2 07/15/2020 Negative     Troponin I 07/15/2020 0 05*    TSH 3RD GENERATON 07/16/2020 0 875     Ventricular Rate 07/15/2020 86     Atrial Rate 07/15/2020 86     DC Interval 07/15/2020 170     QRSD Interval 07/15/2020 120     QT Interval 07/15/2020 336     QTC Interval 07/15/2020 402     P Axis 07/15/2020 37     QRS Axis 07/15/2020 73     T Wave Aurora 07/15/2020 57    Appointment on 06/22/2020   Component Date Value    HIV-1/HIV-2 Ab 06/22/2020 Non-Reactive     WBC 06/22/2020 8 54     RBC 06/22/2020 4 70     Hemoglobin 06/22/2020 14 2     Hematocrit 06/22/2020 43 6     MCV 06/22/2020 93     MCH 06/22/2020 30 2     MCHC 06/22/2020 32 6     RDW 06/22/2020 13 2     Platelets 47/65/7029 229     MPV 06/22/2020 11 1     Sodium 06/22/2020 143     Potassium 06/22/2020 3 8     Chloride 06/22/2020 111*    CO2 06/22/2020 28     ANION GAP 06/22/2020 4     BUN 06/22/2020 7     Creatinine 06/22/2020 0 66     Glucose, Fasting 06/22/2020 87     Calcium 06/22/2020 9 0     AST 06/22/2020 17     ALT 06/22/2020 39     Alkaline Phosphatase 06/22/2020 91     Total Protein 06/22/2020 6 1*    Albumin 06/22/2020 3 3*    Total Bilirubin 06/22/2020 0 56     eGFR 06/22/2020 110     Cholesterol 06/22/2020 79     Triglycerides 06/22/2020 52     HDL, Direct 06/22/2020 34*    LDL Calculated 06/22/2020 35     Ferritin 06/22/2020 201     Folate 06/22/2020 15 2     Hemoglobin A1C 06/22/2020 5 5     EAG 06/22/2020 111     PTH 06/22/2020 32 6     Vitamin A 06/22/2020 32 0     Vitamin B1, Whole Blood 06/22/2020 123 2  Vitamin B-12 06/22/2020 533     Vit D, 25-Hydroxy 06/22/2020 39 7     Zinc 06/22/2020 89     Iron Saturation 06/22/2020 44     TIBC 06/22/2020 240*    Iron 06/22/2020 105      Imaging: Xr Chest Portable    Result Date: 7/15/2020  Narrative: CHEST INDICATION:   cp  COMPARISON:  Chest radiograph from 5/15/2019 and chest CT from 1/13/2017  EXAM PERFORMED/VIEWS:  XR CHEST PORTABLE FINDINGS: Cardiomediastinal silhouette appears unremarkable  The lungs are clear  No pneumothorax or pleural effusion  Osseous structures appear within normal limits for patient age  Impression: No acute cardiopulmonary disease  Workstation performed: [id number]       Review of Systems:  Review of Systems   Neurological: Positive for dizziness and light-headedness  All other systems reviewed and are negative  Physical Exam:  Physical Exam   Constitutional: He is oriented to person, place, and time  He appears well-developed  HENT:   Head: Normocephalic  Eyes: Pupils are equal, round, and reactive to light  Neck: Normal range of motion  Neck supple  No JVD present  Cardiovascular: Normal rate, regular rhythm and normal heart sounds  Pulmonary/Chest: Effort normal and breath sounds normal    Abdominal: Soft  Bowel sounds are normal    Musculoskeletal: Normal range of motion  He exhibits no edema  Neurological: He is alert and oriented to person, place, and time  Skin: Skin is warm and dry  Capillary refill takes less than 2 seconds  Psychiatric: He has a normal mood and affect  Vitals reviewed  Discussion/Summary:  1  Orthostatic Hypotension- 3/02/20 sp Marii En - Y Gastric Bypass with Dr Mitchel Severino most likely autonomic insufficiency post Gastric bypass  Mr Figueroa Fat is not orthostatic in the office today  RUE /60  Continue on Midodrine 2 5mg TID and florinef 0 2mg daily  Continue with home BP monitoring and call the office if SBP < 100 or worsening lightheadedness and dizziness   Naveen LE compression stockings, hydrate at least 2 liters daily and liberate sodium intake   2  Palpitations- occasional palpitations, scheduled for a 48 hour Holter monitor in Augues  3  Hyperlipidemia- 6/22/20 TC 79, TG 52, HDL 34, LDL 35  Continue on Lipitor 40mg daily, low fat low cholesterol diet

## 2023-12-12 ENCOUNTER — TELEPHONE (OUTPATIENT)
Dept: PSYCHIATRY | Facility: CLINIC | Age: 58
End: 2023-12-12

## 2023-12-12 NOTE — TELEPHONE ENCOUNTER
No problem. Chart and concerns reviewed. We discussed the findings of Greg's recent sodium level (which was 134) and safe at his appointment last week. No current need for changes in sodium tablet regimen.

## 2023-12-12 NOTE — TELEPHONE ENCOUNTER
Order for Magdaline Roes 819 mg IM Q3 months faxed to [address] [place] [address], [address].

## 2023-12-15 ENCOUNTER — TELEPHONE (OUTPATIENT)
Dept: FAMILY MEDICINE CLINIC | Facility: CLINIC | Age: 58
End: 2023-12-15

## 2023-12-15 NOTE — TELEPHONE ENCOUNTER
Called patient, discussed options, already taking Flonase, Astelin, Afrin as needed, Benadryl, has a few Singulair left but was not helping, has ENT appointment on 12/27/2023, continue nasal saline, has 3 dogs and cats at home, recommend dusting and vacuuming and cleaning filters, follow-up with ENT for further recommendations.

## 2023-12-15 NOTE — TELEPHONE ENCOUNTER
Addison Kelly, this is Yadi Apo. Date of birth [birth year]. Phone number [phone]. Doctor Diana prescribed the steroid for me for my allergies and it worked great, but now the wore off her back with a vengeance. I was wondering if she could prescribe anything else to help me with my allergies. Again, if you could call me back and let me know. Thank you.

## 2023-12-18 DIAGNOSIS — F41.9 ANXIETY DISORDER, UNSPECIFIED TYPE: ICD-10-CM

## 2023-12-18 RX ORDER — CLONAZEPAM 2 MG/1
2 TABLET ORAL 2 TIMES DAILY PRN
Qty: 60 TABLET | Refills: 2 | OUTPATIENT
Start: 2023-12-18

## 2023-12-18 NOTE — TELEPHONE ENCOUNTER
Medication Refill Request     Name of Medication klonopin  Dose/Frequency 2mg take 1 tablet by mouth 2 times a day as needed  Quantity 60  Verified pharmacy   [x]  Verified ordering Provider   [x]  Does patient have enough for the next 3 days? Yes [x] No []  Does patient have a follow-up appointment scheduled? Yes [x] No []   If so when is appointment: 4/15/2024 5pm

## 2023-12-18 NOTE — TELEPHONE ENCOUNTER
Script was sent 2 weeks ago with 2 additional refills. No current need for new script. He should F/U with pharmacy

## 2023-12-27 ENCOUNTER — HOSPITAL ENCOUNTER (OUTPATIENT)
Dept: INFUSION CENTER | Facility: HOSPITAL | Age: 58
Discharge: HOME | End: 2023-12-27
Attending: STUDENT IN AN ORGANIZED HEALTH CARE EDUCATION/TRAINING PROGRAM
Payer: COMMERCIAL

## 2023-12-27 PROCEDURE — 96372 THER/PROPH/DIAG INJ SC/IM: CPT

## 2023-12-27 RX ADMIN — PALIPERIDONE PALMITATE 2.63 ML: 819 INJECTION, SUSPENSION, EXTENDED RELEASE INTRAMUSCULAR at 09:25

## 2023-12-27 NOTE — PROGRESS NOTES
Pt tolerated Invega Trinza injection to R deltoid using yellow hub needle per administration instructions.  Next appt scheduled for 3 months out.  Confirmed w/pt.  AVS declined.  Left ambulatory in stable condition.

## 2023-12-28 ENCOUNTER — APPOINTMENT (OUTPATIENT)
Dept: LAB | Age: 58
End: 2023-12-28
Payer: COMMERCIAL

## 2023-12-28 DIAGNOSIS — J34.2 DNS (DEVIATED NASAL SEPTUM): ICD-10-CM

## 2023-12-28 DIAGNOSIS — J34.3 NASAL TURBINATE HYPERTROPHY: ICD-10-CM

## 2023-12-28 DIAGNOSIS — J31.0 CHRONIC RHINITIS: ICD-10-CM

## 2023-12-28 PROCEDURE — 36415 COLL VENOUS BLD VENIPUNCTURE: CPT

## 2023-12-28 PROCEDURE — 82785 ASSAY OF IGE: CPT

## 2023-12-28 PROCEDURE — 86003 ALLG SPEC IGE CRUDE XTRC EA: CPT

## 2023-12-29 LAB

## 2024-01-08 ENCOUNTER — HOSPITAL ENCOUNTER (OUTPATIENT)
Dept: RADIOLOGY | Age: 59
Discharge: HOME | End: 2024-01-08
Payer: COMMERCIAL

## 2024-01-08 DIAGNOSIS — J31.0 CHRONIC RHINITIS: ICD-10-CM

## 2024-01-08 DIAGNOSIS — J34.3 NASAL TURBINATE HYPERTROPHY: ICD-10-CM

## 2024-01-08 DIAGNOSIS — J34.2 DNS (DEVIATED NASAL SEPTUM): ICD-10-CM

## 2024-01-08 PROCEDURE — 70486 CT MAXILLOFACIAL W/O DYE: CPT

## 2024-01-08 PROCEDURE — G1004 CDSM NDSC: HCPCS

## 2024-01-11 ENCOUNTER — TELEPHONE (OUTPATIENT)
Dept: OTOLARYNGOLOGY | Facility: CLINIC | Age: 59
End: 2024-01-11

## 2024-01-11 DIAGNOSIS — J32.4 CHRONIC PANSINUSITIS: Primary | ICD-10-CM

## 2024-01-11 DIAGNOSIS — T48.5X5A RHINITIS MEDICAMENTOSA: ICD-10-CM

## 2024-01-11 DIAGNOSIS — J31.0 RHINITIS MEDICAMENTOSA: ICD-10-CM

## 2024-01-11 DIAGNOSIS — J34.3 NASAL TURBINATE HYPERTROPHY: ICD-10-CM

## 2024-01-11 DIAGNOSIS — J34.2 DNS (DEVIATED NASAL SEPTUM): ICD-10-CM

## 2024-01-11 RX ORDER — AMOXICILLIN AND CLAVULANATE POTASSIUM 875; 125 MG/1; MG/1
1 TABLET, FILM COATED ORAL EVERY 12 HOURS SCHEDULED
Qty: 20 TABLET | Refills: 0 | Status: SHIPPED | OUTPATIENT
Start: 2024-01-11 | End: 2024-01-21

## 2024-01-11 NOTE — TELEPHONE ENCOUNTER
----- Message from Tianna Pal RN sent at 1/11/2024  2:02 PM EST -----  Regarding: result request but not read yet  Patient called yesterday for results of his CT scan.  I called him back and left him know it was not read yet and that we would look out for it.  I looked again today and it is still not read.    Just letting you know in case it comes through tomorrow or Monday.

## 2024-01-11 NOTE — TELEPHONE ENCOUNTER
I spoke with Mr. Angeles and reviewed his scan. He has sinus disease, maxillaries, ethmoids, minimal frontal mucosal thickening. DNS-L with spur. Turbinates aren't massive.    He's never been treated with antibiotic, so we will start with this.     Has been on nasal sprays and medrol.    Will get him plugged in to see one of our docs to review procedural options with him.

## 2024-01-17 ENCOUNTER — TELEPHONE (OUTPATIENT)
Dept: PSYCHIATRY | Facility: CLINIC | Age: 59
End: 2024-01-17

## 2024-01-17 NOTE — TELEPHONE ENCOUNTER
Chandler left a similar message asking to speak with provider before form is completed.     Spoke with Chandler. He is requesting only clonazepam be included in the form. He prefers to keep his Bi-polar to himself and does not want other medications on the form.

## 2024-01-17 NOTE — TELEPHONE ENCOUNTER
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 Patient stated he is going to be dropping paperwork off at the office for provider to fill out.

## 2024-01-17 NOTE — TELEPHONE ENCOUNTER
Pt was in office and drop off [employer]/ [employer] form for the provider to review and complete.     ADRIAN was signed and will be fax to  when done.      Patient would like  to talk to provider first before completing the form so please reach out beforehand.      Form and ADRIAN is scan in the chart.

## 2024-01-22 NOTE — TELEPHONE ENCOUNTER
"Chucho Molina,    I reviewed the DOT documentation today and Greg's concerns. Unfortunately, they do request information regarding a comprehensive list of medications I prescribe. I cannot exclude medications from this document as that would be unethical. Please inform Greg of this and inquire if he still wants me to complete it. I have several patients with a similar diagnosis and medication regimen that are cleared by the DOT so \"Bipolar Disorder\" and neuroleptic medications are not part of the exclusion criteria. "

## 2024-01-22 NOTE — TELEPHONE ENCOUNTER
Spoke with Greg and reviewed all feedback of Dr. Fountain and suggested Greg update the information he's submitted to reflect diagnosis and all medications. He requested a list of his medications be emailed to him at [email or web address]. Once emailed, then Greg would like Dr. Fountain to complete the form.

## 2024-02-06 DIAGNOSIS — E87.1 HYPONATREMIA: ICD-10-CM

## 2024-02-06 DIAGNOSIS — F31.62 BIPOLAR DISORDER, CURRENT EPISODE MIXED, MODERATE (HCC): ICD-10-CM

## 2024-02-06 RX ORDER — OXCARBAZEPINE 300 MG/1
TABLET, FILM COATED ORAL
Qty: 90 TABLET | Refills: 3 | Status: SHIPPED | OUTPATIENT
Start: 2024-02-06 | End: 2024-05-06

## 2024-02-06 RX ORDER — SODIUM CHLORIDE 1 G/1
2 TABLET ORAL 2 TIMES DAILY
Qty: 120 TABLET | Refills: 2 | Status: SHIPPED | OUTPATIENT
Start: 2024-02-06

## 2024-02-06 NOTE — TELEPHONE ENCOUNTER
Medication Refill Request     Name of Medication sodium chloride  Dose/Frequency 1g take 2 tablets by mouth 2 times a day  Quantity 120  Verified pharmacy   [x]  Verified ordering Provider   [x]  Does patient have enough for the next 3 days? Yes [x] No []  Does patient have a follow-up appointment scheduled? Yes [x] No []   If so when is appointment: 4/15/2024 5pm    Medication Refill Request     Name of Medication trileptal  Dose/Frequency 300mg take 1 tablet by moutrh in the morning and 2 tablet daily at bedtime  Quantity 90  Verified pharmacy   [x]  Verified ordering Provider   [x]  Does patient have enough for the next 3 days? Yes [x] No []  Does patient have a follow-up appointment scheduled? Yes [x] No []   If so when is appointment: 4/15/2024 5pm

## 2024-02-12 ENCOUNTER — TELEPHONE (OUTPATIENT)
Dept: PSYCHIATRY | Facility: CLINIC | Age: 59
End: 2024-02-12

## 2024-02-13 NOTE — TELEPHONE ENCOUNTER
Addended by: RAY RUTH on: 6/30/2021 06:13 PM     Modules accepted: Orders     Spoke with Greg. He asked if Dr. Fountain is expecting new patients, but he is not at this time. Greg is concerned about his sone and the panic attacks he's having. Information for Gio taken and will forward to appropriate office for review.

## 2024-03-11 DIAGNOSIS — E87.6 HYPOKALEMIA: ICD-10-CM

## 2024-03-11 RX ORDER — POTASSIUM CHLORIDE 20 MEQ/1
20 TABLET, EXTENDED RELEASE ORAL DAILY
Qty: 30 TABLET | Refills: 5 | Status: SHIPPED | OUTPATIENT
Start: 2024-03-11

## 2024-03-11 NOTE — TELEPHONE ENCOUNTER
Reason for call:   [x] Refill   [] Prior Auth  [] Other:     Office:   [x] PCP/Provider - [facility]  [] Specialty/Provider -     Medication: Potassium Chloride 20 mEq #90 QD    Pharmacy: [facility]    Does the patient have enough for 3 days?   [] Yes   [x] No - Send as HP to POD

## 2024-03-12 DIAGNOSIS — F41.9 ANXIETY DISORDER, UNSPECIFIED TYPE: ICD-10-CM

## 2024-03-12 RX ORDER — CLONAZEPAM 2 MG/1
2 TABLET ORAL 2 TIMES DAILY PRN
Qty: 60 TABLET | Refills: 2 | Status: SHIPPED | OUTPATIENT
Start: 2024-03-12

## 2024-03-12 NOTE — TELEPHONE ENCOUNTER
PDMP website reviewed. Chandler has been appropriately adherent to controlled psychotropic medications without evidence of abuse or misuse. As such, will send 30-day refill to pharmacy of choice and follow up as necessary.PDMP website reviewed. Chandler has been appropriately adherent to controlled psychotropic medications without evidence of abuse or misuse. As such, will send 30-day refill to pharmacy of choice and follow up as necessary.

## 2024-03-12 NOTE — TELEPHONE ENCOUNTER
Medication Refill Request     Name of Medication Klonopin 2 mg tablet  Dose/Frequency take 1 tablet by mouth 2 times a day  Quantity 2 mg tablet  Verified pharmacy   [x]  Verified ordering Provider   [x]  Does patient have enough for the next 3 days? Yes [x] No []  Does patient have a follow-up appointment scheduled? Yes [x] No []   If so when is appointment: 4/15

## 2024-03-18 ENCOUNTER — HOSPITAL ENCOUNTER (OUTPATIENT)
Dept: INFUSION CENTER | Facility: HOSPITAL | Age: 59
Discharge: HOME | End: 2024-03-18
Payer: COMMERCIAL

## 2024-03-18 PROCEDURE — 96372 THER/PROPH/DIAG INJ SC/IM: CPT

## 2024-03-18 RX ADMIN — PALIPERIDONE PALMITATE 819 MG: 819 INJECTION, SUSPENSION, EXTENDED RELEASE INTRAMUSCULAR at 11:20

## 2024-03-18 NOTE — PROGRESS NOTES
Pt tolerated Invega Trinza injection to L deltoid without difficulty.  Confirmed next appt on 6/10 at 4 pm.  AVS declined.  Left ambulatory in stable condition.

## 2024-04-12 ENCOUNTER — TELEPHONE (OUTPATIENT)
Dept: PSYCHIATRY | Facility: CLINIC | Age: 59
End: 2024-04-12

## 2024-04-12 NOTE — TELEPHONE ENCOUNTER
Patient called the office to have his appt switched to virtual for 4/15/2024. Writer was able to assist with this.

## 2024-04-15 ENCOUNTER — TELEMEDICINE (OUTPATIENT)
Dept: PSYCHIATRY | Facility: CLINIC | Age: 59
End: 2024-04-15
Payer: COMMERCIAL

## 2024-04-15 DIAGNOSIS — F51.04 PSYCHOPHYSIOLOGICAL INSOMNIA: ICD-10-CM

## 2024-04-15 DIAGNOSIS — F31.62 BIPOLAR DISORDER, CURRENT EPISODE MIXED, MODERATE (HCC): Primary | ICD-10-CM

## 2024-04-15 DIAGNOSIS — F41.9 ANXIETY DISORDER, UNSPECIFIED TYPE: ICD-10-CM

## 2024-04-15 PROCEDURE — 99214 OFFICE O/P EST MOD 30 MIN: CPT | Performed by: STUDENT IN AN ORGANIZED HEALTH CARE EDUCATION/TRAINING PROGRAM

## 2024-04-15 RX ORDER — PALIPERIDONE PALMITATE 819 MG/2.625ML
819 INJECTION, SUSPENSION, EXTENDED RELEASE INTRAMUSCULAR
COMMUNITY

## 2024-04-15 RX ORDER — OXCARBAZEPINE 300 MG/1
TABLET, FILM COATED ORAL
Qty: 90 TABLET | Refills: 3 | Status: SHIPPED | OUTPATIENT
Start: 2024-04-15 | End: 2024-07-14

## 2024-04-15 RX ORDER — DESVENLAFAXINE SUCCINATE 50 MG/1
50 TABLET, EXTENDED RELEASE ORAL DAILY
Qty: 90 TABLET | Refills: 2 | Status: SHIPPED | OUTPATIENT
Start: 2024-04-15

## 2024-04-15 NOTE — BH TREATMENT PLAN
TREATMENT PLAN (Medication Management Only)        [facility]      Name and Date of Birth:  Chandler Angeles 59 y.o. 1965 MRN: [id number]    Date of Visit: April 15, 2024    Diagnosis/Diagnoses:    1. Bipolar disorder, current episode mixed, moderate (HCC)  desvenlafaxine succinate (PRISTIQ) 50 mg 24 hr tablet    OXcarbazepine (Trileptal) 300 mg tablet      2. Anxiety disorder, unspecified type        3. Psychophysiological insomnia            Strengths/Personal Resources for Self-Care: supportive family, supportive friends, taking medications as prescribed, ability to adapt to life changes, ability to communicate needs, ability to communicate well, ability to listen, ability to reason, motivation for treatment, ability to negotiate basic needs, willingness to work on problems.    Area/Areas of need (in own words): mood instability  1. Long Term Goal: maintain control of mood.  Target Date:6 months - 10/15/2024  Person/Persons responsible for completion of goal: Chandler  2.  Short Term Objective (s) - How will we reach this goal?:   A. Provider new recommended medication/dosage changes and/or continue medication(s): continue current medications as prescribed.  B. Keep regularly scheduled psychiatric appointments  C. Maintain adherence to psychotropic medication regimen   D.Find new employment  E. Maintain appropriate dietary intake  F. Practice adequate sleep hygiene      Target Date:6 months - 10/15/2024  Person/Persons Responsible for Completion of Goal: Chandler    Progress Towards Goals: continuing treatment    Treatment Modality: medication management every 3 months    Review due 180 days from date of this plan: 6 months - 10/15/2024  Expected length of service: ongoing treatment    My Physician/PA/NP and I have developed this plan together and I agree to work on the goals and objectives. I understand the treatment goals that were developed for my treatment.  Treatment Plan was completed with Chandler's assistance and input throughout today's session. Chandler consented to the information provided and documented above. Unfortunately, treatment plan was not signed at the time of this office visit secondary to issues related to signature keypad.

## 2024-04-15 NOTE — PSYCH
MEDICATION MANAGEMENT NOTE        [facility]      Name and Date of Birth:  Chandler Angeles 59 y.o. 1965 MRN: [id number]    Date of Visit: April 15, 2024    Reason for Visit: Follow-up visit for medication management     Virtual Visit Disclaimer:       TeleMed provider: Elie Fountain MD.   Location: [place]     Verification of patient location:     Patient is currently located in the [facility]  Patient is currently located in a state in which I am licensed     After connecting through [employer], the patient was identified by name and date of birth.  Chandler Angeles was informed that this is a telemedicine visit that is being conducted through [address], and the patient was informed that this is a secure, HIPAA-compliant platform. My office door was closed. No one else was in the room. Chandler Angeles acknowledged consent and understanding of privacy and security of the video platform. Chandler understands that the online visit is based solely on information provided by the patient, and that, in the absence of a face-to-face physical evaluation by the physician, the diagnosis Chandler  receives is both limited and provisional in terms of accuracy and completeness. Chandler Angeles understands that they can discontinue the visit at any time. I informed Chandler that I have reviewed their record in EPIC and presented the opportunity for them to ask any questions regarding the visit today. Chandler Angeles voiced understanding and consented to these terms. Chandler is aware this is a billable service. Chandler is present at home.      SUBJECTIVE:    Chandler Angeles is a 59 y.o. male with past psychiatric history significant for bipolar disorder type I, anxiety disorder, insomnia and nicotine use disorder who was personally seen and evaluated today at the [facility] outpatient clinic for follow-up and medication management. Chandler  "presents as pleasant and cooperative. His thoughts are linear and organized. He completes assessment without difficulty.     Chandler endorses compliance with psychotropic medication regimen consisting of Invega Trinza, Trileptal, Pristiq, and Klonopin. He denies adverse medication concerns. [facility], no acute concerns for abuse or misuse. Greg reports some challenges since last visit today. He was subjectively \"laid off\" and has been struggling without structure, routine, and sense of purpose. He also reports financial concerns following this incident which has contributed to episodic changes in anxiousness and mood. Supportive therapy and joint problem solving utilized. He is eagerly seeking new employment, suggestive of self preservation. Acutely, Greg's sleep is \"good\". He reports poor appetite over the last week. He is hydrating and taking a multivitamin. [place] physical health has remained mostly stable but he does endorse recent episodes of dizziness (without syncope). He plans to contact his PCP this week. Greg denies SI/HI when asked. His energy and motivation have been less. Greg is without crying spells, anhedonia or feelings of hopelessness. He continues to find pleasure in spending time with family. Greg endorses some worry and nervousness regarding life stressors but it is not catastrophic or pathologically overwhelming. He is not tense, on-edge, or restless today. He continues to use Klonopin daily, which is appropriate. During today's examination, Chandler does not exhibit objective evidence of leidy/hypomania or psychosis. Chandler is not currently irritable, grandiose, labile, or pathologically euphoric. He denies pathologic manic episodes, outbursts, or aggression as of late. He is not pressured in speech or impulsive at the moment. Chandler is without perceptual disturbances, such as A/V hallucinations, paranoia, ideas of reference, or delusional beliefs. Greg last received his " SUNG 3/18/24 and denies severe or problematic side effects. Chandler denies recent ETOH or illicit substance abuse. Chandler offers no further concerns.      Current Rating Scores:     None completed today.    Review Of Systems:      Constitutional fluctuating energy level and as noted in HPI   ENT negative   Cardiovascular negative   Respiratory negative   Gastrointestinal negative   Genitourinary negative   Musculoskeletal joint pain   Integumentary negative   Neurological dizziness   Endocrine negative   Other Symptoms none, all other systems are negative       Past Psychiatric History: (unchanged information from previous note copied and italicized) - Information that is bolded has been updated.      Inpatient psychiatric admissions: Denies  Prior outpatient psychiatric linkage: Previously linked with Dr. Castro via [facility] ([place], [place])  Past/current psychotherapy: Hx of psychotherapy, no current linkage  History of suicidal attempts/gestures: Denies  History of violence/aggressive behaviors: Denies  Psychotropic medication trials: Depakote, Trileptal, Invega Sustenna, Klonopin, Effexor, Risperdal (EPS associated with this agent), Pristiq, Trazodone, Invega Trinza (now)   Substance abuse inpatient/outpatient rehabilitation: Denies     Substance Abuse History: (unchanged information from previous note copied and italicized) - Information that is bolded has been updated.      No history of ETOH or illict substance abuse. The patient does endorse ongoing tobacco abuse (chew and vaping). No past legal actions or arrests secondary to substance intoxication. The patient denies prior DWIs/DUIs. No history of outpatient/inpatient rehabilitation programs. Chandler does not exhibit objective evidence of substance withdrawal during today's examination nor does Chandler appear under the influence of any psychoactive substance.          Social History: (unchanged information from previous note copied and italicized) -  Information that is bolded has been updated.      Developmental: Denies a history of milestone/developmental delay. Denies a history of in-utero exposure to toxins/illicit substances. There is no documented history of IEP or need for special education.  Education: high school diploma/GED  Marital history:   Living arrangement, social support: wife and son, mother-in-law, and wife's 2 children  Occupational History: on temporary disability - recently laid off, was working as   Access to firearms: Denies direct access to weapons/firearms. Chandler Angeles has no history of arrests or violence with a deadly weapon. Wife confirms that firearms and knives have been removed from the home.      Traumatic History: (unchanged information from previous note copied and italicized) - Information that is bolded has been updated.      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, denies PTSD    Past Medical History:    Past Medical History:   Diagnosis Date    Anxiety     Bariatric surgery status     CPAP (continuous positive airway pressure) dependence     Depression     Hearing loss of aging     Hypercholesterolemia     Morbid obesity (HCC)     Myocardial infarction (HCC)     NSTEMI (non-ST elevated myocardial infarction) (HCC) 05/15/2019    NSTEMI (non-ST elevation myocardial infarction) (HCC)     april 2019    Pneumonia     Postsurgical malabsorption         Past Surgical History:   Procedure Laterality Date    BONE TUMOR EXCISION Bilateral 4/20/2023    Procedure: REMOVAL TUMOR BONE;  Surgeon: Dandre Sanderson DO;  Location:  MAIN OR;  Service: Orthopedics    CARDIAC CATHETERIZATION      no stents     COLONOSCOPY  2016    bx taken    EGD      HERNIA REPAIR      umbilical hernia    PA LAPS GSTR RSTCV PX W/BYP FRITZ-EN-Y LIMB <150 CM N/A 03/02/2020    Procedure: LAPAROSCOPIC FRITZ-EN-Y GASTRIC BYPASS AND INTRAOPERATIVE EGD;  Surgeon: Fitz Dodge  MD Ashvin;  Location: [facility] OR;  Service: Bariatrics    SKIN SURGERY      cyst removed from back and thumb    TONSILLECTOMY      TOOTH EXTRACTION       No Known Allergies    Substance Abuse History:    Social History     Substance and Sexual Activity   Alcohol Use Not Currently     Social History     Substance and Sexual Activity   Drug Use Never       Social History:    Social History     Socioeconomic History    Marital status: /Civil Union     Spouse name: Not on file    Number of children: Not on file    Years of education: Not on file    Highest education level: Not on file   Occupational History    Not on file   Tobacco Use    Smoking status: Former     Types: E-Cigarettes, Cigars     Quit date: 10/15/2003     Years since quittin.5    Smokeless tobacco: Former     Types: Chew     Quit date: 2022    Tobacco comments:     quit cigarettes / vape   Vaping Use    Vaping status: Every Day    Substances: Nicotine, Flavoring   Substance and Sexual Activity    Alcohol use: Not Currently    Drug use: Never    Sexual activity: Not on file   Other Topics Concern    Not on file   Social History Narrative    Former smoker - As per [employer]    Full-time employment        · Do you currently or have you served in the [employer]:   No    As per [place]      Social Determinants of Health     Financial Resource Strain: Not on file   Food Insecurity: Not on file   Transportation Needs: Not on file   Physical Activity: Not on file   Stress: Not on file   Social Connections: Not on file   Intimate Partner Violence: Not on file   Housing Stability: Not on file       Family Psychiatric History:     Family History   Problem Relation Age of Onset    Lung cancer Mother     Brain cancer Mother     Diabetes Brother     Bipolar disorder Maternal Grandfather     Bipolar disorder Son        History Review: The following portions of the patient's history were reviewed and updated as appropriate: allergies, current  medications, past family history, past medical history, past social history, past surgical history, and problem list.         OBJECTIVE:     Vital signs in last 24 hours:    There were no vitals filed for this visit.    Mental Status Evaluation:    Appearance age appropriate, casually dressed, dressed appropriately, looks stated age, bearded, tattooed   Behavior pleasant, cooperative, calm, good eye contact   Speech normal rate, normal volume, normal pitch   Mood mildly anxious   Affect normal range and intensity, appropriate   Thought Processes organized, logical, goal directed   Associations intact associations   Thought Content no overt delusions   Perceptual Disturbances: no auditory hallucinations, no visual hallucinations   Abnormal Thoughts  Risk Potential Suicidal ideation - None at present  Homicidal ideation - None at present  Potential for aggression - No   Orientation oriented to person, place, time/date, and situation   Memory recent and remote memory grossly intact   Consciousness alert and awake   Attention Span Concentration Span attention span and concentration are age appropriate   Intellect appears to be of average intelligence   Insight intact and good   Judgement intact and good   Muscle Strength and  Gait normal gait and normal balance   Motor activity no abnormal movements   Language no difficulty naming common objects   Fund of Knowledge adequate knowledge of current events   Pain mild   Pain Scale Did not ask patient to formally rate       Laboratory Results: I have personally reviewed all pertinent laboratory/tests results    Recent Labs (last 2 months):   No visits with results within 2 Month(s) from this visit.   Latest known visit with results is:   Appointment on 12/28/2023   Component Date Value    A.ALTERNATA 12/28/2023 <0.10     A.FUMIGATUS 12/28/2023 <0.10     B[place]uda Grass 12/28/2023 <0.10     [place]  12/28/2023 <0.10     Cat Epithellium-Dander 12/28/2023 <0.10     C.HERBARUM  "12/28/2023 <0.10     Cockroach 12/28/2023 <0.10     Common Silver Birch 12/28/2023 <0.10     [place] 12/28/2023 <0.10     D. farinae 12/28/2023 <0.10     D. pteronyssinus 12/28/2023 <0.10     Dog Dander 12/28/2023 <0.10     Elm IgE 12/28/2023 <0.10     Mountain Bland Tree 12/28/2023 <0.10     Mugwort 12/28/2023 <0.10     M[place]ing Tree 12/28/2023 <0.10     Oak 12/28/2023 <0.10     P.CHRYSOGENUM 12/28/2023 <0.10     Rough Pigweed  IgE 12/28/2023 <0.10     Common Ragweed 12/28/2023 <0.10     Sheep Sorrel IgE 12/28/2023 <0.10     [place] Tree 12/28/2023 <0.10     Dustin Grass 12/28/2023 <0.10     Collbran Tree 12/28/2023 <0.10     White Lucas Tree 12/28/2023 <0.10     IgE 12/28/2023 31.4     MOUSE URINE 12/28/2023 <0.10        Suicide/Homicide Risk Assessment:    The following interventions are recommended: contracts for safety at present - agrees to go to ED if feeling unsafe      Lethality Statement:    Based on today's assessment and clinical criteria, Chandler Angeles contracts for safety and is not an imminent risk of harm to self or others. Outpatient level of care is deemed appropriate at this current time. Chandler understands that if they can no longer contract for safety, they need to call the office or report to their nearest Emergency Room for immediate evaluation.      Assessment/Plan:     Chandler Angeles is a 59 y.o. male with past psychiatric history significant for bipolar disorder type I, anxiety disorder, insomnia and nicotine use disorder who was personally seen and evaluated today at the [facility] outpatient clinic for follow-up and medication management. Chandler (\"Greg\") endorses a longstanding history of bipolar disorder that was diagnosed at the age of 26. He has been trialed on numerous psychotropic medications throughout his life yet he denies prior psychiatric inpatient admissions. Prior to linkage to this clinic, he was under the care of Dr. Schroeder. When " "psychiatrically unwell, Greg describes \"episodes\" that last hours to days when he becomes increasingly more irritable, argumentative, and impulsive. Greg states during these periods he is \"unable to control his behavior\" and is often combative and disruptive. At the conclusion of these episodes, Greg is often \"exhausted\". He denies lengthy periods without sleep, increased goal-directed behavior, excessive spending or sexual promiscuity that is often seen during periods of leidy. However, he and his wife do endorse periods of euphoria and elevated/expansive mood to which they describe him as a \"happy drunk\". Aside from these periods, Greg denies extensive periods of depression, characterized by social isolation, suicidal thoughts, and poor mood. He states that he will feel \"sad\" at times but this is most often secondary to psychosocial stressors and his current lack of connectivity and purpose. He describes a recent incident in which he was worried about his dad's health and chose to disconnect from the world but sitting peacefully and listening to music. Within 24 hours, he felt back to baseline. At the moment, Greg denies most neurovegetative symptoms of depression. Wife confirms that she has removed firearms and knives from the house for safety, given his episodic periods of \"leidy\" and \"anger outbursts\". He demonstrates self-preservation as evidenced by his commitment to treatment.      Today's Plan:     Psychopharmacologically, Greg reports benefit and tolerability with current medication regimen. He denies need for medication change or intervention.      DSM-V Diagnoses:      1.) Bipolar Disorder Type I, mixed episodes   2.) Anxiety Disorder  3.) Insomnia  4.) Nicotine Use Disorder        Treatment Recommendations/Precautions:        1.) Bipolar Disorder Type I  - Continue Trileptal 300mg AM, 600mg PM              - Updated CMP (4/18/23): Na: 134                                          (8/12/23): " 137         (12/5/23): 134              - Oxcarbazepine Level: 6   (7/22/22)                                                        7 (10/11/22)                                                        10 (4/6/23)  - Continue IM Invega Trinza 819mg Q3 months   - Not interested in weekly psychotherapy at this point  - Psychoeducation provided regarding the importance of exercise and healthy dietary choices and their impact on mood, energy, and motivation  - Counseled to avoid ETOH, illict substances, and nicotine secondary to the detrimental effects of these substances on mental and physical health  - Encouraged to engage in non-verbal forms of therapy such as art therapy, music therapy, and mindfulness     2.) Anxiety Disorder  - Continue PRN Klonopin 2mg BID for ongoing anxiety  - Continue Pristiq 50mg Daily     3. Insomnia  - No active TX (no currently taking Trazodone 300mg QHS PRN)    Medication management every 4 months  Aware of need to follow up with family physician for medical issues  Aware of 24 hour and weekend coverage for urgent situations accessed by calling [facility] main practice number    Medications Risks/Benefits      Risks, Benefits And Possible Side Effects Of Medications:    Risks, benefits, and possible side effects of medications explained to Chandler including risk of parkinsonian symptoms, Tardive Dyskinesia and metabolic syndrome related to treatment with antipsychotic medications, risk of suicidality and serotonin syndrome related to treatment with antidepressants, and risks of misuse, abuse or dependence, sedation and respiratory depression related to treatment with benzodiazepine medications. He verbalizes understanding and agreement for treatment.    Controlled Medication Discussion:     Chandler has been filling controlled prescriptions on time as prescribed according to [place] Prescription Drug Monitoring Program  Discussed with Chandler the risks of  sedation, respiratory depression, impairment of ability to drive and potential for abuse and addiction related to treatment with benzodiazepine medications. He understands risk of treatment with benzodiazepine medications, agrees to not drive if feels impaired and agrees to take medications as prescribed    Psychotherapy Provided:     Individual psychotherapy provided: No     Treatment Plan:    Completed and signed during the session: Yes - Treatment Plan done but not signed at time of office visit due to:  Plan reviewed by video and verbal consent given due to virtual visit.      Visit Time    Visit Start Time: 5:15 PM  Visit Stop Time: 5:32 PM  Total Visit Duration:  17 minutes     The total visit duration detailed above includes: patient engagement, medication management, psychotherapy/counseling, discussion regarding treatment goals, documentation, review of past medical records, and coordination of care.      Elie Fountain MD  Board Certified Diplomate of the American Board of Psychiatry and Neurology  04/15/24

## 2024-04-16 ENCOUNTER — TELEPHONE (OUTPATIENT)
Dept: PSYCHIATRY | Facility: CLINIC | Age: 59
End: 2024-04-16

## 2024-04-16 NOTE — TELEPHONE ENCOUNTER
Called and left message for patient to return a call to [phone] and schedule 4 month follow up with provider (8/15/2024). Please schedule upon return call. Thank you.

## 2024-04-18 NOTE — TELEPHONE ENCOUNTER
Patient contacted the office to schedule a follow up visit with provider. Patient is now scheduled for 8/19/24  at 4pm virtually.

## 2024-04-19 ENCOUNTER — TELEPHONE (OUTPATIENT)
Age: 59
End: 2024-04-19

## 2024-04-19 DIAGNOSIS — F17.298 OTHER TOBACCO PRODUCT NICOTINE DEPENDENCE WITH OTHER NICOTINE-INDUCED DISORDER: Primary | ICD-10-CM

## 2024-04-19 RX ORDER — NICOTINE 21 MG/24HR
1 PATCH, TRANSDERMAL 24 HOURS TRANSDERMAL EVERY 24 HOURS
Qty: 28 PATCH | Refills: 2 | Status: SHIPPED | OUTPATIENT
Start: 2024-04-19

## 2024-04-19 NOTE — TELEPHONE ENCOUNTER
I refilled nicotine patches 21 mg, I called patient and made him aware, he is trying to quit vaping, no cigarette smoking, follow-up as scheduled in May

## 2024-04-19 NOTE — TELEPHONE ENCOUNTER
Patient called requesting a refill on his Nicotine patches 21 mg for 24 hrs. He advised he got them last in  and they're . He needs a new prescription. I don't see them listed in pt's meds. Forwarding message to clinical staff for refill. Confirmed patient pharmacy. Patient does not have enough for 3 days

## 2024-04-26 ENCOUNTER — TELEPHONE (OUTPATIENT)
Dept: PSYCHIATRY | Facility: CLINIC | Age: 59
End: 2024-04-26

## 2024-04-26 NOTE — TELEPHONE ENCOUNTER
Spoke with patient and rescheduled 8/19 4PM due to provider being out of the office. Scheduled 8/15 10AM virtual.

## 2024-05-02 ENCOUNTER — OFFICE VISIT (OUTPATIENT)
Dept: FAMILY MEDICINE CLINIC | Facility: CLINIC | Age: 59
End: 2024-05-02
Payer: COMMERCIAL

## 2024-05-02 VITALS
BODY MASS INDEX: 25.87 KG/M2 | DIASTOLIC BLOOD PRESSURE: 58 MMHG | SYSTOLIC BLOOD PRESSURE: 106 MMHG | RESPIRATION RATE: 17 BRPM | WEIGHT: 191 LBS | OXYGEN SATURATION: 98 % | HEART RATE: 93 BPM | TEMPERATURE: 98.5 F | HEIGHT: 72 IN

## 2024-05-02 DIAGNOSIS — E78.2 MIXED HYPERLIPIDEMIA: ICD-10-CM

## 2024-05-02 DIAGNOSIS — I25.110 CORONARY ARTERY DISEASE INVOLVING NATIVE CORONARY ARTERY OF NATIVE HEART WITH UNSTABLE ANGINA PECTORIS (HCC): ICD-10-CM

## 2024-05-02 DIAGNOSIS — E66.3 OVERWEIGHT (BMI 25.0-29.9): ICD-10-CM

## 2024-05-02 DIAGNOSIS — F31.62 BIPOLAR DISORDER, CURRENT EPISODE MIXED, MODERATE (HCC): ICD-10-CM

## 2024-05-02 DIAGNOSIS — E87.6 HYPOKALEMIA: ICD-10-CM

## 2024-05-02 DIAGNOSIS — Z00.00 ANNUAL PHYSICAL EXAM: Primary | ICD-10-CM

## 2024-05-02 DIAGNOSIS — Z12.5 SCREENING FOR MALIGNANT NEOPLASM OF PROSTATE: ICD-10-CM

## 2024-05-02 DIAGNOSIS — J32.4 CHRONIC PANSINUSITIS: ICD-10-CM

## 2024-05-02 PROBLEM — F51.04 PSYCHOPHYSIOLOGICAL INSOMNIA: Status: RESOLVED | Noted: 2020-03-05 | Resolved: 2024-05-02

## 2024-05-02 PROBLEM — J32.9 CHRONIC SINUSITIS: Status: ACTIVE | Noted: 2024-05-02

## 2024-05-02 PROCEDURE — 99396 PREV VISIT EST AGE 40-64: CPT | Performed by: PHYSICIAN ASSISTANT

## 2024-05-02 PROCEDURE — 99214 OFFICE O/P EST MOD 30 MIN: CPT | Performed by: PHYSICIAN ASSISTANT

## 2024-05-02 NOTE — ASSESSMENT & PLAN NOTE
No chest pain. Not taking aspirin, nonobstructive CAD per cardiac cath. Continue Ranexa, atorvastatin. Trying to quit vaping. Encouraged cessation.

## 2024-05-02 NOTE — ASSESSMENT & PLAN NOTE
Lab Results   Component Value Date    SODIUM 134 (L) 12/05/2023    K 4.3 12/05/2023    CL 99 12/05/2023    CO2 27 12/05/2023    AGAP 8 12/05/2023    BUN 11 12/05/2023    CREATININE 0.75 12/05/2023    GLUC 43 (L) 10/11/2022    GLUF 98 12/05/2023    CALCIUM 9.4 12/05/2023    AST 16 12/05/2023    ALT 20 12/05/2023    ALKPHOS 93 12/05/2023    TP 6.8 12/05/2023    TBILI 0.49 12/05/2023    EGFR 101 12/05/2023     Resolved with potassium supplement, repeat lab.

## 2024-05-02 NOTE — ASSESSMENT & PLAN NOTE
Lab Results   Component Value Date    LDLCALC 67 04/06/2023     Repeat lipid panel. Continue atorvastatin 40mg daily.

## 2024-05-02 NOTE — PROGRESS NOTES
ADULT ANNUAL PHYSICAL  [facility] PRIMARY CARE [facility]    NAME: Chandler Angeles  AGE: 59 y.o. SEX: male  : 1965     DATE: 2024     Assessment and Plan:     Problem List Items Addressed This Visit        Cardiovascular and Mediastinum    Coronary artery disease involving native coronary artery of native heart with unstable angina pectoris (HCC)     No chest pain. Not taking aspirin, nonobstructive CAD per cardiac cath. Continue Ranexa, atorvastatin. Trying to quit vaping. Encouraged cessation.         Relevant Medications    aspirin (ECOTRIN LOW STRENGTH) 81 mg EC tablet       Respiratory    Chronic sinusitis     Reviewed last ENT consult in 2023 and CT sinus in 2024. Improved with Augmentin. Does not tolerate azelastine. Continue flonase as needed. Normal NE allergy panel. Elevated eosinophil count. Follow-up as needed, no sinus pain/pressure today.     Lab Results   Component Value Date    WBC 9.19 2023    HGB 15.9 2023    HCT 46.2 2023    MCV 90 2023     2023               Behavioral Health    Bipolar disorder, current episode mixed, moderate (HCC)     Mood stable. Invega every 3 months per psych. Continue follow-up psychiatry.             Other    Hypokalemia     Lab Results   Component Value Date    SODIUM 134 (L) 2023    K 4.3 2023    CL 99 2023    CO2 27 2023    AGAP 8 2023    BUN 11 2023    CREATININE 0.75 2023    GLUC 43 (L) 10/11/2022    GLUF 98 2023    CALCIUM 9.4 2023    AST 16 2023    ALT 20 2023    ALKPHOS 93 2023    TP 6.8 2023    TBILI 0.49 2023    EGFR 101 2023     Resolved with potassium supplement, repeat lab.          Mixed hyperlipidemia     Lab Results   Component Value Date    LDLCALC 67 2023     Repeat lipid panel. Continue atorvastatin 40mg daily.          Relevant Orders    Lipid Panel  with Direct LDL reflex    Comprehensive metabolic panel    Overweight (BMI 25.0-29.9)     20 lb intentional weight loss since last visit. Joining the gym. History of complications with hypoglycemia, hypotension s/p gastric bypass in the past. Continue diet/exercise.         Other Visit Diagnoses     Annual physical exam    -  Primary    Screening for malignant neoplasm of prostate        Relevant Orders    PSA, Total Screen            Immunizations and preventive care screenings were discussed with patient today. Appropriate education was printed on patient's after visit summary.    Discussed risks and benefits of prostate cancer screening. We discussed the controversial history of PSA screening for prostate cancer in the [place] as well as the risk of over detection and over treatment of prostate cancer by way of PSA screening.  The patient understands that PSA blood testing is an imperfect way to screen for prostate cancer and that elevated PSA levels in the blood may also be caused by infection, inflammation, prostatic trauma or manipulation, urological procedures, or by benign prostatic enlargement.    The role of the digital rectal examination in prostate cancer screening was also discussed and I discussed with him that there is large interobserver variability in the findings of digital rectal examination.    Counseling:  Alcohol/drug use: discussed moderation in alcohol intake, the recommendations for healthy alcohol use, and avoidance of illicit drug use.  Dental Health: discussed importance of regular tooth brushing, flossing, and dental visits.  Injury prevention: discussed safety/seat belts, safety helmets, smoke detectors, carbon dioxide detectors, and smoking near bedding or upholstery.  Sexual health: discussed sexually transmitted diseases, partner selection, use of condoms, avoidance of unintended pregnancy, and contraceptive alternatives.  Exercise: the importance of regular exercise/physical  activity was discussed. Recommend exercise 3-5 times per week for at least 30 minutes.          Return in about 6 months (around 11/2/2024).     Chief Complaint:     Chief Complaint   Patient presents with   • Physical Exam      History of Present Illness:     Adult Annual Physical   Patient here for a comprehensive physical exam. The patient reports no problems.  Vaping less using patch. Trying to quit. Has not had alcohol in over 15 years. Doing well. Excited about football draft. Following with psych. No issues with sinuses recently.     Diet and Physical Activity  Diet/Nutrition: well balanced diet.   Exercise: moderate cardiovascular exercise. Just joined [employer]      Depression Screening  PHQ-2/9 Depression Screening    Little interest or pleasure in doing things: 0 - not at all  Feeling down, depressed, or hopeless: 0 - not at all  PHQ-2 Score: 0  PHQ-2 Interpretation: Negative depression screen       General Health  Sleep: sleeps well.   Hearing: requires use of hearing aids.  Vision: no vision problems and wears glasses.   Dental: regular dental visits and brushes teeth twice daily.        Health  Symptoms include: none    Advanced Care Planning  Do you have an advanced directive? no  Do you have a durable medical power of ? no  ACP document given to patient? no     Review of Systems:     Review of Systems   Constitutional:  Negative for chills and fever.   HENT:  Positive for hearing loss. Negative for ear pain and sore throat.    Eyes:  Negative for pain and visual disturbance.   Respiratory:  Negative for cough and shortness of breath.    Cardiovascular:  Negative for chest pain and palpitations.   Gastrointestinal:  Negative for abdominal pain and vomiting.   Genitourinary:  Negative for dysuria and hematuria.   Musculoskeletal:  Negative for arthralgias and back pain.   Skin:  Negative for color change and rash.   Neurological:  Negative for seizures and syncope.   All other systems  reviewed and are negative.     Past Medical History:     Past Medical History:   Diagnosis Date   • Anxiety    • Bariatric surgery status    • CPAP (continuous positive airway pressure) dependence    • Depression    • Hearing loss of aging    • Hypercholesterolemia    • Morbid obesity (HCC)    • Myocardial infarction (HCC)    • NSTEMI (non-ST elevated myocardial infarction) (HCC) 05/15/2019   • NSTEMI (non-ST elevation myocardial infarction) (HCC)     2019   • Pneumonia    • Postsurgical malabsorption       Past Surgical History:     Past Surgical History:   Procedure Laterality Date   • BONE TUMOR EXCISION Bilateral 2023    Procedure: REMOVAL TUMOR BONE;  Surgeon: Dandre Sanderson DO;  Location:  MAIN OR;  Service: Orthopedics   • CARDIAC CATHETERIZATION      no stents    • COLONOSCOPY      bx taken   • EGD     • HERNIA REPAIR      umbilical hernia   • MO LAPS GSTR RSTCV PX W/BYP FRITZ-EN-Y LIMB <150 CM N/A 2020    Procedure: LAPAROSCOPIC FRITZ-EN-Y GASTRIC BYPASS AND INTRAOPERATIVE EGD;  Surgeon: Fitz Lock MD;  Location: [facility] OR;  Service: Bariatrics   • SKIN SURGERY      cyst removed from back and thumb   • TONSILLECTOMY     • TOOTH EXTRACTION        Family History:     Family History   Problem Relation Age of Onset   • Lung cancer Mother    • Brain cancer Mother    • Diabetes Brother    • Bipolar disorder Maternal Grandfather    • Bipolar disorder Son       Social History:     Social History     Socioeconomic History   • Marital status: /Civil Union     Spouse name: None   • Number of children: None   • Years of education: None   • Highest education level: None   Occupational History   • None   Tobacco Use   • Smoking status: Former     Types: E-Cigarettes, Cigars     Quit date: 10/15/2003     Years since quittin.5   • Smokeless tobacco: Former     Types: Chew     Quit date: 2022   • Tobacco comments:     quit cigarettes / vape   Vaping Use   • Vaping status:  Every Day   • Substances: Nicotine, Flavoring   Substance and Sexual Activity   • Alcohol use: Not Currently   • Drug use: Never   • Sexual activity: None   Other Topics Concern   • None   Social History Narrative    Former smoker - [facility] per [facility]    Full-time employment        · Do you currently or have you served in the [employer]:   No    As per [place]      Social Determinants of Health     Financial Resource Strain: Not on file   Food Insecurity: Not on file   Transportation Needs: Not on file   Physical Activity: Not on file   Stress: Not on file   Social Connections: Not on file   Intimate Partner Violence: Not on file   Housing Stability: Not on file      Current Medications:     Current Outpatient Medications   Medication Sig Dispense Refill   • aspirin (ECOTRIN LOW STRENGTH) 81 mg EC tablet Take 1 tablet (81 mg total) by mouth daily 90 tablet 3   • atorvastatin (LIPITOR) 40 mg tablet Take 1 tablet (40 mg total) by mouth daily 90 tablet 1   • Blood Glucose Monitoring Suppl (OneTouch Verio Reflect) w/Device KIT Check blood sugars once daily. Please substitute with appropriate alternative as covered by patient's insurance. Dx: E11.65 1 kit 0   • cholecalciferol (VITAMIN D3) 1,000 units tablet Take 1,000 Units by mouth daily     • clonazePAM (KlonoPIN) 2 mg tablet Take 1 tablet (2 mg total) by mouth 2 (two) times a day as needed for anxiety 60 tablet 2   • desvenlafaxine succinate (PRISTIQ) 50 mg 24 hr tablet Take 1 tablet (50 mg total) by mouth daily 90 tablet 2   • diphenhydrAMINE (BENADRYL) 25 mg tablet Take 25 mg by mouth in the morning     • fluticasone (FLONASE) 50 mcg/act nasal spray 2 sprays into each nostril daily 16 g 11   • glucose blood (OneTouch Verio) test strip Check blood sugars once daily. Please substitute with appropriate alternative as covered by patient's insurance. Dx: E11.65 100 each 3   • nicotine (NICODERM CQ) 21 mg/24 hr TD 24 hr patch Place 1 patch on the skin over 24 hours  "every 24 hours 28 patch 2   • OneTouch Delica Lancets 33G MISC Check blood sugars once daily. Please substitute with appropriate alternative as covered by patient's insurance. Dx: E11.65 100 each 3   • OXcarbazepine (Trileptal) 300 mg tablet Take 1 tablet (300 mg total) by mouth in the morning AND 2 tablets (600 mg total) daily at bedtime. 90 tablet 3   • Paliperidone Palmitate ER (Invega Trinza) 819 MG/2.63ML DENISSE Inject 819 mg into a muscle every 3 (three) months     • pantoprazole (PROTONIX) 20 mg tablet Take 1 tablet (20 mg total) by mouth daily 90 tablet 1   • potassium chloride (Klor-Con M20) 20 mEq tablet Take 1 tablet (20 mEq total) by mouth daily 30 tablet 5   • ranolazine (RANEXA) 1000 MG SR tablet Take 1 tablet (1,000 mg total) by mouth 2 (two) times a day 180 tablet 3   • sodium chloride 1 g tablet Take 2 tablets (2 g total) by mouth 2 (two) times a day 120 tablet 2     No current facility-administered medications for this visit.      Allergies:     No Known Allergies   Physical Exam:     /58 (BP Location: Left arm, Patient Position: Sitting, Cuff Size: Standard)   Pulse 93   Temp 98.5 °F (36.9 °C) (Tympanic)   Resp 17   Ht 5' 11.5\" (1.816 m)   Wt 86.6 kg (191 lb)   SpO2 98%   BMI 26.27 kg/m²     Physical Exam  Vitals and nursing note reviewed.   Constitutional:       General: He is not in acute distress.     Appearance: He is well-developed.   HENT:      Head: Normocephalic and atraumatic.      Ears:      Comments: Hearing aids b/l  Eyes:      Conjunctiva/sclera: Conjunctivae normal.   Cardiovascular:      Rate and Rhythm: Normal rate and regular rhythm.      Heart sounds: No murmur heard.  Pulmonary:      Effort: Pulmonary effort is normal. No respiratory distress.      Breath sounds: Normal breath sounds.   Abdominal:      Palpations: Abdomen is soft.      Tenderness: There is no abdominal tenderness.   Musculoskeletal:         General: No swelling.      Cervical back: Neck supple. "   Skin:     General: Skin is warm and dry.      Capillary Refill: Capillary refill takes less than 2 seconds.   Neurological:      Mental Status: He is alert.   Psychiatric:         Mood and Affect: Mood normal.          Moraima Nunez PA-C  [facility] CARE [facility]

## 2024-05-02 NOTE — ASSESSMENT & PLAN NOTE
Reviewed last ENT consult in 9/2023 and CT sinus in 1/2024. Improved with Augmentin. Does not tolerate azelastine. Continue flonase as needed. Normal NE allergy panel. Elevated eosinophil count. Follow-up as needed, no sinus pain/pressure today.     Lab Results   Component Value Date    WBC 9.19 12/05/2023    HGB 15.9 12/05/2023    HCT 46.2 12/05/2023    MCV 90 12/05/2023     12/05/2023

## 2024-05-02 NOTE — ASSESSMENT & PLAN NOTE
20 lb intentional weight loss since last visit. Joining the gym. History of complications with hypoglycemia, hypotension s/p gastric bypass in the past. Continue diet/exercise.

## 2024-05-07 DIAGNOSIS — E87.1 HYPONATREMIA: ICD-10-CM

## 2024-05-07 RX ORDER — SODIUM CHLORIDE 1 G/1
2 TABLET ORAL 2 TIMES DAILY
Qty: 120 TABLET | Refills: 2 | Status: SHIPPED | OUTPATIENT
Start: 2024-05-07

## 2024-05-07 NOTE — TELEPHONE ENCOUNTER
Medication Refill Request     Name of Medication Sodium   Dose/Frequency 1g  Quantity 120  Verified pharmacy   [x]  Verified ordering Provider   [x]  Does patient have enough for the next 3 days? Yes [x] No []  Does patient have a follow-up appointment scheduled? Yes [x] No []   If so when is appointment: 8/15/24

## 2024-05-08 ENCOUNTER — TELEPHONE (OUTPATIENT)
Dept: PSYCHIATRY | Facility: CLINIC | Age: 59
End: 2024-05-08

## 2024-05-08 DIAGNOSIS — Z51.81 THERAPEUTIC DRUG MONITORING: Primary | ICD-10-CM

## 2024-05-08 NOTE — TELEPHONE ENCOUNTER
Chart and concerns reviewed. All labs ordered by PCP are appropriate. I have added a Trileptal level to the order set for him to acquire as well. Thank you.

## 2024-05-08 NOTE — TELEPHONE ENCOUNTER
Spoke to Chandler, he was informed provider order Trileptal level and he can get it done with the labs from PCP.

## 2024-05-08 NOTE — TELEPHONE ENCOUNTER
Pt left a message stating that he went to his PCP today, and he is being sent for blood work. He would like to know if he needs any blood work from Dr. Fountain so that he can get them drawn all together, please advise

## 2024-05-09 ENCOUNTER — TELEPHONE (OUTPATIENT)
Dept: PSYCHIATRY | Facility: CLINIC | Age: 59
End: 2024-05-09

## 2024-05-09 NOTE — TELEPHONE ENCOUNTER
Nurse spoke with pt and pharmacy  - question of pharmacy receiving the script for sodium chloride - script was received and per pharmacy is not covered under the insurance. Pharmacy has a discount card and cost will be approximately $14.00.   Chandler verbalized understanding of same.

## 2024-05-15 ENCOUNTER — OFFICE VISIT (OUTPATIENT)
Dept: FAMILY MEDICINE CLINIC | Facility: CLINIC | Age: 59
End: 2024-05-15
Payer: COMMERCIAL

## 2024-05-15 VITALS
DIASTOLIC BLOOD PRESSURE: 71 MMHG | TEMPERATURE: 98.6 F | BODY MASS INDEX: 25.87 KG/M2 | SYSTOLIC BLOOD PRESSURE: 111 MMHG | HEIGHT: 72 IN | WEIGHT: 191 LBS | RESPIRATION RATE: 17 BRPM | OXYGEN SATURATION: 96 % | HEART RATE: 85 BPM

## 2024-05-15 DIAGNOSIS — L02.91 ABSCESS: Primary | ICD-10-CM

## 2024-05-15 DIAGNOSIS — J30.2 SEASONAL ALLERGIES: ICD-10-CM

## 2024-05-15 DIAGNOSIS — I25.110 CORONARY ARTERY DISEASE INVOLVING NATIVE CORONARY ARTERY OF NATIVE HEART WITH UNSTABLE ANGINA PECTORIS (HCC): ICD-10-CM

## 2024-05-15 PROCEDURE — 99214 OFFICE O/P EST MOD 30 MIN: CPT | Performed by: PHYSICIAN ASSISTANT

## 2024-05-15 PROCEDURE — 10160 PNXR ASPIR ABSC HMTMA BULLA: CPT | Performed by: PHYSICIAN ASSISTANT

## 2024-05-15 PROCEDURE — 10060 I&D ABSCESS SIMPLE/SINGLE: CPT | Performed by: PHYSICIAN ASSISTANT

## 2024-05-15 PROCEDURE — 87070 CULTURE OTHR SPECIMN AEROBIC: CPT | Performed by: PHYSICIAN ASSISTANT

## 2024-05-15 PROCEDURE — 87205 SMEAR GRAM STAIN: CPT | Performed by: PHYSICIAN ASSISTANT

## 2024-05-15 PROCEDURE — 87077 CULTURE AEROBIC IDENTIFY: CPT | Performed by: PHYSICIAN ASSISTANT

## 2024-05-15 RX ORDER — DOXYCYCLINE HYCLATE 100 MG/1
100 CAPSULE ORAL EVERY 12 HOURS SCHEDULED
Qty: 14 CAPSULE | Refills: 0 | Status: SHIPPED | OUTPATIENT
Start: 2024-05-15 | End: 2024-05-22

## 2024-05-15 NOTE — ASSESSMENT & PLAN NOTE
Stable nonobstructive CAD on Ranexa and atorvastatin. No significant bleeding with I&D in office today.

## 2024-05-15 NOTE — PROGRESS NOTES
"Ambulatory Visit  Name: Chandler Angeles      : 1965      MRN: [id number]  Encounter Provider: Moraima Nunez PA-C  Encounter Date: 5/15/2024   Encounter department: [facility] CARE [facility]    Assessment & Plan   1. Abscess  Comments:  R nipple 9o'clock, significant purulent drainage with I&D, tolerated well, no complications, packing placed with wick and follow-up in 5 days, start doxy BID  Orders:  -     doxycycline hyclate (VIBRAMYCIN) 100 mg capsule; Take 1 capsule (100 mg total) by mouth every 12 (twelve) hours for 7 days  -     Incision and Drainage  -     Wound culture and Gram stain; Future  2. Coronary artery disease involving native coronary artery of native heart with unstable angina pectoris (HCC)  Assessment & Plan:  Stable nonobstructive CAD on Ranexa and atorvastatin. No significant bleeding with I&D in office today.   3. Seasonal allergies  Assessment & Plan:  Recent worsening, continue nasal sprays and OTC antihistamine as needed.        History of Present Illness   {Disappearing Hyperlinks I Encounters * My Last Note * Since Last Visit * History :00579}  Chandler is a 59 y.o. male with a h/o CAD, bipolar disorder, post-surgical dumping syndrome who presents with abscess of R breast x 1 week. Noticed it and tried squeezing it with some drainage, tender and painful.         Review of Systems   Constitutional:  Negative for fever and unexpected weight change.   Respiratory:  Negative for shortness of breath.    Cardiovascular:  Negative for chest pain.   Skin:  Negative for wound.        Abscess R nipple         Objective   {Disappearing Hyperlinks   Review Vitals * Enter New Vitals * Results Review * Labs * Imaging * Cardiology * Procedures * Lung Cancer Screening :27934}  /71 (BP Location: Left arm, Patient Position: Sitting, Cuff Size: Standard)   Pulse 85   Temp 98.6 °F (37 °C) (Tympanic)   Resp 17   Ht 5' 11.5\" (1.816 m)   Wt 86.6 kg (191 lb)   SpO2 " 96%   BMI 26.27 kg/m²     Physical Exam  Vitals reviewed.   Constitutional:       Appearance: Normal appearance.   HENT:      Head: Normocephalic and atraumatic.   Cardiovascular:      Rate and Rhythm: Normal rate and regular rhythm.   Pulmonary:      Effort: Pulmonary effort is normal.      Breath sounds: Normal breath sounds.   Chest:          Comments: With approx 4cm area of surrounding fluctuance and induration   Neurological:      Mental Status: He is alert and oriented to person, place, and time. Mental status is at baseline.       Administrative Statements {Disappearing Hyperlinks I  [id number]  I have spent a total time of 60 minutes on 05/15/24 In caring for this patient including Risks and benefits of tx options and Instructions for management.    Incision and Drainage    Date/Time: 5/15/2024 12:40 PM    Performed by: Moraima Nunez PA-C  Authorized by: Moraima Nunez PA-C  Universal Protocol:  Consent: Verbal consent obtained. Written consent not obtained.  Risks and benefits: risks, benefits and alternatives were discussed  Consent given by: patient  Timeout called at: 5/15/2024 1:38 PM.  Patient understanding: patient states understanding of the procedure being performed  Patient consent: the patient's understanding of the procedure matches consent given  Procedure consent: procedure consent matches procedure scheduled  Patient identity confirmed: verbally with patient    Patient location:  Clinic  Location:     Type:  Abscess    Location:  Trunk    Trunk location:  R breast  Pre-procedure details:     Skin preparation:  Betadine  Anesthesia (see MAR for exact dosages):     Anesthesia method:  Local infiltration    Local anesthetic:  Lidocaine 2% WITH epi  Procedure details:     Complexity:  Simple    Needle aspiration: no      Incision types:  Stab incision    Aspiration type: puncture aspiration      Scalpel blade:  15    Approach:  Open and puncture    Incision depth:   "Subcutaneous    Wound management:  Probed and deloculated, irrigated with saline and extensive cleaning    Drainage:  Bloody and purulent    Drainage amount:  Copious    Wound treatment:  Wound left open and packing placed    Packing materials:  1/4 in iodoform gauze and wick placed    Amount 1/4\" iodoform:  5cm  Post-procedure details:     Patient tolerance of procedure:  Tolerated well, no immediate complications  Comments:      [id number]  LOT/EXP  [id number]  01/2025      "

## 2024-05-16 ENCOUNTER — TELEPHONE (OUTPATIENT)
Dept: PSYCHIATRY | Facility: CLINIC | Age: 59
End: 2024-05-16

## 2024-05-16 ENCOUNTER — APPOINTMENT (OUTPATIENT)
Dept: LAB | Age: 59
End: 2024-05-16
Payer: COMMERCIAL

## 2024-05-16 DIAGNOSIS — Z12.5 SCREENING FOR MALIGNANT NEOPLASM OF PROSTATE: ICD-10-CM

## 2024-05-16 DIAGNOSIS — E78.2 MIXED HYPERLIPIDEMIA: ICD-10-CM

## 2024-05-16 DIAGNOSIS — Z51.81 THERAPEUTIC DRUG MONITORING: ICD-10-CM

## 2024-05-16 LAB
ALBUMIN SERPL BCP-MCNC: 4 G/DL (ref 3.5–5)
ALP SERPL-CCNC: 81 U/L (ref 34–104)
ALT SERPL W P-5'-P-CCNC: 19 U/L (ref 7–52)
ANION GAP SERPL CALCULATED.3IONS-SCNC: 8 MMOL/L (ref 4–13)
AST SERPL W P-5'-P-CCNC: 18 U/L (ref 13–39)
BILIRUB SERPL-MCNC: 0.55 MG/DL (ref 0.2–1)
BUN SERPL-MCNC: 8 MG/DL (ref 5–25)
CALCIUM SERPL-MCNC: 9.4 MG/DL (ref 8.4–10.2)
CHLORIDE SERPL-SCNC: 95 MMOL/L (ref 96–108)
CHOLEST SERPL-MCNC: 110 MG/DL
CO2 SERPL-SCNC: 29 MMOL/L (ref 21–32)
CREAT SERPL-MCNC: 0.77 MG/DL (ref 0.6–1.3)
GFR SERPL CREATININE-BSD FRML MDRD: 100 ML/MIN/1.73SQ M
GLUCOSE P FAST SERPL-MCNC: 95 MG/DL (ref 65–99)
HDLC SERPL-MCNC: 50 MG/DL
LDLC SERPL CALC-MCNC: 49 MG/DL (ref 0–100)
POTASSIUM SERPL-SCNC: 4.7 MMOL/L (ref 3.5–5.3)
PROT SERPL-MCNC: 6.5 G/DL (ref 6.4–8.4)
PSA SERPL-MCNC: 2.42 NG/ML (ref 0–4)
SODIUM SERPL-SCNC: 132 MMOL/L (ref 135–147)
TRIGL SERPL-MCNC: 53 MG/DL

## 2024-05-16 PROCEDURE — 80183 DRUG SCRN QUANT OXCARBAZEPIN: CPT

## 2024-05-16 PROCEDURE — 80053 COMPREHEN METABOLIC PANEL: CPT

## 2024-05-16 PROCEDURE — G0103 PSA SCREENING: HCPCS

## 2024-05-16 PROCEDURE — 36415 COLL VENOUS BLD VENIPUNCTURE: CPT

## 2024-05-16 PROCEDURE — 80061 LIPID PANEL: CPT

## 2024-05-16 NOTE — TELEPHONE ENCOUNTER
"Chandler # [phone] left a message on the Nurse Line.    \"Please as Dr. Fountain if I should increase my sodium tablets since my lab results from today  show low sodium.\"    "

## 2024-05-17 ENCOUNTER — TELEPHONE (OUTPATIENT)
Age: 59
End: 2024-05-17

## 2024-05-17 LAB
BACTERIA WND AEROBE CULT: ABNORMAL
GRAM STN SPEC: ABNORMAL
GRAM STN SPEC: ABNORMAL

## 2024-05-17 NOTE — TELEPHONE ENCOUNTER
Chart and concerns reviewed. I also reviewed recent sodium level (132). He is chronically hyponatremic and 132-134 is his baseline. No current need to increase sodium tablets at this time.

## 2024-05-17 NOTE — TELEPHONE ENCOUNTER
Patient called and was wondering if he is able to shower with the bandaging that is on his chest. Please advise.

## 2024-05-17 NOTE — TELEPHONE ENCOUNTER
I called patient and discussed wound care instructions, he can shower with regular soap and water, pat dry and keep clean afterwards.  Minimal drainage on gauze, significant improved pain from previous, no redness extending, tolerating doxycycline well, follow-up as scheduled on Tuesday.

## 2024-05-19 DIAGNOSIS — I25.118 CORONARY ARTERY DISEASE OF NATIVE ARTERY OF NATIVE HEART WITH STABLE ANGINA PECTORIS (HCC): ICD-10-CM

## 2024-05-21 ENCOUNTER — TELEPHONE (OUTPATIENT)
Age: 59
End: 2024-05-21

## 2024-05-21 ENCOUNTER — OFFICE VISIT (OUTPATIENT)
Dept: FAMILY MEDICINE CLINIC | Facility: CLINIC | Age: 59
End: 2024-05-21
Payer: COMMERCIAL

## 2024-05-21 VITALS
TEMPERATURE: 97.4 F | WEIGHT: 191.6 LBS | HEIGHT: 72 IN | OXYGEN SATURATION: 99 % | BODY MASS INDEX: 25.95 KG/M2 | HEART RATE: 87 BPM | RESPIRATION RATE: 17 BRPM | SYSTOLIC BLOOD PRESSURE: 82 MMHG | DIASTOLIC BLOOD PRESSURE: 62 MMHG

## 2024-05-21 DIAGNOSIS — L02.91 ABSCESS: Primary | ICD-10-CM

## 2024-05-21 DIAGNOSIS — R55 SYNCOPE AND COLLAPSE: ICD-10-CM

## 2024-05-21 DIAGNOSIS — I95.1 ORTHOSTATIC HYPOTENSION: ICD-10-CM

## 2024-05-21 DIAGNOSIS — I25.110 CORONARY ARTERY DISEASE INVOLVING NATIVE CORONARY ARTERY OF NATIVE HEART WITH UNSTABLE ANGINA PECTORIS (HCC): ICD-10-CM

## 2024-05-21 DIAGNOSIS — E87.1 HYPONATREMIA: ICD-10-CM

## 2024-05-21 PROCEDURE — 99214 OFFICE O/P EST MOD 30 MIN: CPT | Performed by: PHYSICIAN ASSISTANT

## 2024-05-21 RX ORDER — RANOLAZINE 1000 MG/1
1000 TABLET, EXTENDED RELEASE ORAL 2 TIMES DAILY
Qty: 180 TABLET | Refills: 0 | Status: SHIPPED | OUTPATIENT
Start: 2024-05-21

## 2024-05-21 NOTE — ASSESSMENT & PLAN NOTE
Ongoing episodes, recent worsening in past week. Significant orthostatic hypotension suspected cause of recurrent syncopal/pre-syncopal episodes. Recommend return to cardiology. BP stable on exam initially due to drinking [employer] energy. Patient declined re-trial of midodrine and florinef due to previously failed. Follow-up cardiology asap and discussed fall risk.

## 2024-05-21 NOTE — PROGRESS NOTES
Ambulatory Visit  Name: Chandler Angeles      : 1965      MRN: [id number]  Encounter Provider: Moraima Nunez PA-C  Encounter Date: 2024   Encounter department: [facility] PRIMARY CARE [facility]    Assessment & Plan   1. Abscess  Comments:  healing well, complete doxycyline today, follow-up if no further improvement  2. Syncope and collapse  Comments:  suspect due to orthostatic BP, no head injury, CT sinus in 2024, refused CT head previously ordered  3. Orthostatic hypotension  Assessment & Plan:  Ongoing episodes, recent worsening in past week. Significant orthostatic hypotension suspected cause of recurrent syncopal/pre-syncopal episodes. Recommend return to cardiology. BP stable on exam initially due to drinking monster energy. Patient declined re-trial of midodrine and florinef due to previously failed. Follow-up cardiology asap and discussed fall risk.   4. Coronary artery disease involving native coronary artery of native heart with unstable angina pectoris (HCC)  Assessment & Plan:  Stable nonobstructive CAD on Ranexa and atorvastatin. Schedule follow-up with cardiology asap.   5. Hyponatremia  Assessment & Plan:  Lab Results   Component Value Date    SODIUM 132 (L) 2024    K 4.7 2024    CL 95 (L) 2024    CO2 29 2024    AGAP 8 2024    BUN 8 2024    CREATININE 0.77 2024    GLUC 43 (L) 10/11/2022    GLUF 95 2024    CALCIUM 9.4 2024    AST 18 2024    ALT 19 2024    ALKPHOS 81 2024    TP 6.5 2024    TBILI 0.55 2024    EGFR 100 2024     Stable chronic hyponatremia on sodium tablets 2g BID per psych.        History of Present Illness   {Disappearing Hyperlinks I Encounters * My Last Note * Since Last Visit * History :25779}  Chandler is a 59 y.o. male with a h/o CAD, bipolar disorder, post-surgical dumping syndrome who presents for follow-up after I&D done 5 days ago for abscess of R breast. Christofer  "came out with gauze, some clear drainage, scabbed now, no longer tender. Did have 2 episodes of lightheadedness from sitting to standing, has happened multiple times in the past, brief loss of consciousness, a few seconds, not witnessed. No head injury, lowered himself to the ground. Previously tried and failed midodrine and florinef and was seen in [place] for concern for autonomic dysfunction. Called cardiology yesterday and plans to have follow-up with them soon. No complaints today. Took a monster energy drink prior to coming.         Review of Systems   Constitutional:  Negative for chills, fever and unexpected weight change.   Respiratory:  Negative for shortness of breath.    Cardiovascular:  Negative for chest pain.   Gastrointestinal:  Negative for constipation, diarrhea, nausea and vomiting.   Neurological:  Positive for dizziness, syncope and light-headedness.     Medical History Reviewed by provider this encounter:  Tobacco  Allergies  Meds  Problems  Med Hx  Surg Hx  Fam Hx       Objective   {Disappearing Hyperlinks   Review Vitals * Enter New Vitals * Results Review * Labs * Imaging * Cardiology * Procedures * Lung Cancer Screening :59033}  BP (!) 82/62 (BP Location: Right arm, Patient Position: Standing, Cuff Size: Standard)   Pulse 87   Temp (!) 97.4 °F (36.3 °C) (Tympanic)   Resp 17   Ht 5' 11.5\" (1.816 m)   Wt 86.9 kg (191 lb 9.6 oz)   SpO2 99%   BMI 26.35 kg/m²     Physical Exam  Vitals reviewed.   Constitutional:       Appearance: Normal appearance. He is not ill-appearing.   HENT:      Head: Normocephalic and atraumatic.   Eyes:      Extraocular Movements: Extraocular movements intact.      Pupils: Pupils are equal, round, and reactive to light.   Cardiovascular:      Rate and Rhythm: Normal rate and regular rhythm.   Pulmonary:      Effort: Pulmonary effort is normal.      Breath sounds: Normal breath sounds.   Skin:     Findings: Wound present.          Neurological:      Mental " Status: He is alert and oriented to person, place, and time. Mental status is at baseline.       Administrative Statements {Disappearing Hyperlinks I  Level of Service * [facility]:40020}  I have spent a total time of 30 minutes on 05/21/24 In caring for this patient including Diagnostic results, Prognosis, Risks and benefits of tx options, Instructions for management, Patient and family education, Importance of tx compliance, Risk factor reductions, Impressions, Counseling / Coordination of care, Documenting in the medical record, Reviewing / ordering tests, medicine, procedures  , and Obtaining or reviewing history  .

## 2024-05-21 NOTE — TELEPHONE ENCOUNTER
vd a call from patient who said that he would like Moraima to know that he passed out again today. States this is a known issue for him. He was seen yesterday by Moraima. He called his cardiologist and scheduled an appt. States he feels fine now. He took 1 extra sodium tablet. Did not check his blood pressure after passing out.

## 2024-05-21 NOTE — ASSESSMENT & PLAN NOTE
Lab Results   Component Value Date    SODIUM 132 (L) 05/16/2024    K 4.7 05/16/2024    CL 95 (L) 05/16/2024    CO2 29 05/16/2024    AGAP 8 05/16/2024    BUN 8 05/16/2024    CREATININE 0.77 05/16/2024    GLUC 43 (L) 10/11/2022    GLUF 95 05/16/2024    CALCIUM 9.4 05/16/2024    AST 18 05/16/2024    ALT 19 05/16/2024    ALKPHOS 81 05/16/2024    TP 6.5 05/16/2024    TBILI 0.55 05/16/2024    EGFR 100 05/16/2024     Stable chronic hyponatremia on sodium tablets 2g BID per psych.

## 2024-05-21 NOTE — TELEPHONE ENCOUNTER
Refill must be reviewed and completed by the office or provider. The refill is unable to be approved or denied by the medication management team.      Last seen x 1Y - Please review

## 2024-05-22 NOTE — TELEPHONE ENCOUNTER
Pt was called stated he feels fine just he want to let know Moraima Nunez that happened again and when he took the sodium tablet that help him.

## 2024-05-25 LAB — OXCARBAZEPINE SERPL-MCNC: <1 UG/ML (ref 10–35)

## 2024-05-30 ENCOUNTER — OFFICE VISIT (OUTPATIENT)
Dept: CARDIOLOGY CLINIC | Facility: CLINIC | Age: 59
End: 2024-05-30
Payer: COMMERCIAL

## 2024-05-30 VITALS
HEART RATE: 94 BPM | SYSTOLIC BLOOD PRESSURE: 90 MMHG | OXYGEN SATURATION: 97 % | DIASTOLIC BLOOD PRESSURE: 68 MMHG | WEIGHT: 190.6 LBS | BODY MASS INDEX: 25.81 KG/M2 | TEMPERATURE: 98.3 F | HEIGHT: 72 IN

## 2024-05-30 DIAGNOSIS — I25.110 CORONARY ARTERY DISEASE INVOLVING NATIVE CORONARY ARTERY OF NATIVE HEART WITH UNSTABLE ANGINA PECTORIS (HCC): Primary | ICD-10-CM

## 2024-05-30 DIAGNOSIS — I95.1 ORTHOSTATIC HYPOTENSION: ICD-10-CM

## 2024-05-30 PROCEDURE — 93000 ELECTROCARDIOGRAM COMPLETE: CPT | Performed by: INTERNAL MEDICINE

## 2024-05-30 PROCEDURE — 99214 OFFICE O/P EST MOD 30 MIN: CPT | Performed by: INTERNAL MEDICINE

## 2024-05-30 NOTE — PROGRESS NOTES
Cardiology Follow Up    Chandler Angeles  [id number]  1965  [facility] [place] [address] [facility] [id number] [phone]      1. Coronary artery disease involving native coronary artery of native heart with unstable angina pectoris (HCC)  POCT ECG    Echo complete w/ contrast if indicated      2. Orthostatic hypotension  POCT ECG    Echo complete w/ contrast if indicated          Discussion/Summary:    Orthostatic hypotension. Once again this is recurrent. He has lost 40 pounds since last seeing me. Previously, it was felt that he was dehydrated and this was the etiology. Sounds like he is drinking adequate fluid. He is trying to eat regularly and actually trying to gain back a little bit of weight. I discussed restarting Florinef and midodrine, but the patient is adamant that these were not effective for him and does not want to restart them. The salt tablets do seem to help his symptoms and he says when he takes an additional tablet occasionally this will usually raise his blood pressure and he feels better. He had discussed it with the psychiatrist who is prescribing this for him about increasing the dose further, but he was told that this was not possible or advisable. I do not have any documentation as to why and I will message him. I am not opposed to increasing the salt tablets a little further.    Myocardial bridging. He has no symptoms of angina currently. He is not on any antihypertensive or mayo blocking agent.    There has not been any cardiac testing done for a few years. I will repeat an echo to make sure there is no significant change, although his symptoms sound similar to what they were before.    Previous History:  59 year old man.  In May of 2019, he had an admission at [facility] for chest discomfort.  He had a minimally elevated troponin.  He had a cardiac catheterization which showed nonobstructive CAD and some  myocardial bridging of the LAD.  He was gradually started on cardiac medications including metoprolol, Imdur, lipid-lowering therapy with statins.  He had had chest pain off and on for several months, now improved.  In March of 2020, he had gastric bypass surgery.    A few months after his gastric bypass, he started getting orthostatic.  Significant issues with this for a while, ultimately underwent evaluation with Cristela for autonomic dysfunction although this was not clearly seen. He had been on midodrine and Florinef which gradually has been weaned off.  There were concerns for neurologic mediated episodes but this does not seem to be borne out, necessarily, as well.    When I saw him in April 2023, he was entirely off of Florinef and midodrine and he said he was feeling well. Symptoms resolution had been attributed to him gaining back some weight and staying adequately hydrated    Interval History:    Returns for follow-up again because he is having more symptoms of orthostasis. Since he saw me a year ago, he has lost another 40 pounds.    Still reports to me that he is staying adequately hydrated. He is eating a lot of food but still losing weight. He was falling several times a week but this past week, he has made a very concerted effort to take time with changing positions and wiggle his feet when he stands. He has not passed out. His blood pressure is low in the office.    Sometimes, he will take an additional sodium tablet and that seems to help him. He has no fluid overload.    He has not needed any cardiac testing. He has not had any palpitations.    Patient Active Problem List   Diagnosis    Bipolar disorder, current episode mixed, moderate (HCC)    ROSI (obstructive sleep apnea)    Coronary artery disease involving native coronary artery of native heart with unstable angina pectoris (HCC)    Former smoker    Migraine with aura and without status migrainosus, not intractable    Mixed hyperlipidemia     Bariatric surgery status    Overweight (BMI 25.0-29.9)    Postsurgical malabsorption    Orthostatic hypotension    Recurrent syncope    Hypokalemia    Dysautonomia-like disorder    Anxiety disorder    Status post gastric bypass for obesity    Neuropathy    Hypoglycemia after GI (gastrointestinal) surgery    Postsurgical dumping syndrome    Chronic pain of both knees    Seasonal allergies    Chronic sinusitis    Hyponatremia     Past Medical History:   Diagnosis Date    Anxiety     Bariatric surgery status     CPAP (continuous positive airway pressure) dependence     Depression     Dizziness     Hearing loss of aging     HL (hearing loss)     Hypercholesterolemia     Migraine     Morbid obesity (HCC)     Myocardial infarction (HCC)     Nasal congestion     Nasal obstruction     NSTEMI (non-ST elevated myocardial infarction) ([facility]) 05/15/2019    NSTEMI (non-ST elevation myocardial infarction) ([facility])     2019    Pneumonia     Postsurgical malabsorption     Sleep apnea     Tinnitus      Social History     Tobacco Use    Smoking status: Former     Current packs/day: 0.00     Average packs/day: 1 pack/day for 10.0 years (10.0 ttl pk-yrs)     Types: Cigarettes, Cigars     Quit date: 10/15/2003     Years since quittin.6    Smokeless tobacco: Former     Types: Chew     Quit date: 2022    Tobacco comments:     Currently vapes   Vaping Use    Vaping status: Every Day    Substances: Nicotine, Flavoring   Substance Use Topics    Alcohol use: Not Currently    Drug use: Never      Family History   Problem Relation Age of Onset    Lung cancer Mother     Brain cancer Mother     Cancer Mother         Brain and lung    Diabetes Brother     Bipolar disorder Maternal Grandfather     Bipolar disorder Son     Cancer Maternal Grandmother         Lung     Past Surgical History:   Procedure Laterality Date    BONE TUMOR EXCISION Bilateral 2023    Procedure: REMOVAL TUMOR BONE;  Surgeon: Dandre Sanderson DO;  Location:   MAIN OR;  Service: Orthopedics    CARDIAC CATHETERIZATION      no stents     COLONOSCOPY  2016    bx taken    EGD      HERNIA REPAIR      umbilical hernia    WA LAPS GSTR RSTCV PX W/BYP FRITZ-EN-Y LIMB <150 CM N/A 03/02/2020    Procedure: LAPAROSCOPIC FRITZ-EN-Y GASTRIC BYPASS AND INTRAOPERATIVE EGD;  Surgeon: Fitz Lock MD;  Location: [facility];  Service: Bariatrics    SKIN SURGERY      cyst removed from back and thumb    TONSILLECTOMY      TOOTH EXTRACTION         Current Outpatient Medications:     aspirin (ECOTRIN LOW STRENGTH) 81 mg EC tablet, Take 1 tablet (81 mg total) by mouth daily, Disp: 90 tablet, Rfl: 3    atorvastatin (LIPITOR) 40 mg tablet, Take 1 tablet (40 mg total) by mouth daily, Disp: 90 tablet, Rfl: 1    Blood Glucose Monitoring Suppl (OneTouch Verio Reflect) w/Device KIT, Check blood sugars once daily. Please substitute with appropriate alternative as covered by patient's insurance. Dx: E11.65, Disp: 1 kit, Rfl: 0    clonazePAM (KlonoPIN) 2 mg tablet, Take 1 tablet (2 mg total) by mouth 2 (two) times a day as needed for anxiety, Disp: 60 tablet, Rfl: 2    desvenlafaxine succinate (PRISTIQ) 50 mg 24 hr tablet, Take 1 tablet (50 mg total) by mouth daily, Disp: 90 tablet, Rfl: 2    fluticasone (FLONASE) 50 mcg/act nasal spray, 2 sprays into each nostril daily, Disp: 16 g, Rfl: 11    Fluticasone Propionate (Xhance) 93 MCG/ACT EXHU, 1 actuation into each nostril 2 (two) times a day, Disp: 16 mL, Rfl: 5    glucose blood (OneTouch Verio) test strip, Check blood sugars once daily. Please substitute with appropriate alternative as covered by patient's insurance. Dx: E11.65, Disp: 100 each, Rfl: 3    nicotine (NICODERM CQ) 21 mg/24 hr TD 24 hr patch, Place 1 patch on the skin over 24 hours every 24 hours, Disp: 28 patch, Rfl: 2    OneTouch Delica Lancets 33G MISC, Check blood sugars once daily. Please substitute with appropriate alternative as covered by patient's insurance. Dx: E11.65, Disp: 100  "each, Rfl: 3    OXcarbazepine (Trileptal) 300 mg tablet, Take 1 tablet (300 mg total) by mouth in the morning AND 2 tablets (600 mg total) daily at bedtime., Disp: 90 tablet, Rfl: 3    Paliperidone Palmitate ER (Invega Trinza) 819 MG/2.63ML DENISSE, Inject 819 mg into a muscle every 3 (three) months, Disp: , Rfl:     pantoprazole (PROTONIX) 20 mg tablet, Take 1 tablet (20 mg total) by mouth daily, Disp: 90 tablet, Rfl: 1    potassium chloride (Klor-Con M20) 20 mEq tablet, Take 1 tablet (20 mEq total) by mouth daily, Disp: 30 tablet, Rfl: 5    predniSONE 10 mg tablet, Take 50 mg daily for 2 days. Then take 40 mg daily for 2 days. Then take 30 mg daily for 2 days. Then take 20 mg daily for 2 days. Then take 10 mg daily for 2 days. Then stop., Disp: 30 tablet, Rfl: 0    ranolazine (RANEXA) 1000 MG SR tablet, Take 1 tablet (1,000 mg total) by mouth 2 (two) times a day, Disp: 180 tablet, Rfl: 0    sodium chloride 1 g tablet, Take 2 tablets (2 g total) by mouth 2 (two) times a day, Disp: 120 tablet, Rfl: 2    cholecalciferol (VITAMIN D3) 1,000 units tablet, Take 1,000 Units by mouth daily (Patient not taking: Reported on 5/30/2024), Disp: , Rfl:     diphenhydrAMINE (BENADRYL) 25 mg tablet, Take 25 mg by mouth in the morning (Patient not taking: Reported on 5/30/2024), Disp: , Rfl:   No Known Allergies    Vitals:    05/30/24 1503 05/30/24 1515 05/30/24 1518 05/30/24 1519   BP: 105/62 102/68 98/70 90/68   BP Location: Right arm Left arm Left arm Left arm   Patient Position: Sitting Supine Sitting Standing   Cuff Size: Adult Adult Adult Adult   Pulse: 78 78 87 94   Temp: 98.3 °F (36.8 °C)      SpO2: 97%      Weight: 86.5 kg (190 lb 9.6 oz)      Height: 5' 11.5\" (1.816 m)        Vitals:    05/30/24 1503   Weight: 86.5 kg (190 lb 9.6 oz)      Height: 5' 11.5\" (181.6 cm)   Body mass index is 26.21 kg/m².    Physical Exam:  GEN: Chandler Angeles appears well, alert and oriented x 3, pleasant and cooperative   HEENT: pupils equal, " "round, and reactive to light; extraocular muscles intact  NECK: supple, no carotid bruits   HEART: regular rhythm, normal S1 and S2, no murmurs, clicks, gallops or rubs   LUNGS: clear to auscultation bilaterally; no wheezes, rales, or rhonchi   ABDOMEN: normal bowel sounds, soft, no tenderness, no distention  EXTREMITIES: peripheral pulses normal; no clubbing, cyanosis, or edema  NEURO: no focal findings   SKIN: normal without suspicious lesions on exposed skin    ROS:  Positive for arthritis, knee pain. dizziness  Except as noted in HPI, is otherwise reviewed in detail and a 12 point review of systems is negative.  ROS reviewed and is unchanged    Labs:  Lab Results   Component Value Date    K 4.7 05/16/2024    CL 95 (L) 05/16/2024    CREATININE 0.77 05/16/2024    BUN 8 05/16/2024    CO2 29 05/16/2024    ALT 19 05/16/2024    AST 18 05/16/2024    INR 1.20 (H) 08/18/2020    GLUF 95 05/16/2024    HGBA1C 5.3 07/22/2022    WBC 9.19 12/05/2023    HGB 15.9 12/05/2023    HCT 46.2 12/05/2023     12/05/2023       No results found for: \"CHOL\"  Lab Results   Component Value Date    HDL 50 05/16/2024    HDL 51 04/06/2023    HDL 57 07/22/2022     Lab Results   Component Value Date    LDLCALC 49 05/16/2024    LDLCALC 67 04/06/2023    LDLCALC 60 07/22/2022     Lab Results   Component Value Date    TRIG 53 05/16/2024    TRIG 73 04/06/2023    TRIG 50 07/22/2022     Testing:  Tilt Table:  SUMMARY: Tilt table noted for bordeline hypotension but and tachycardia induced by upright tilt without reproduction of symptoms, she started from low baseline so not enough change to consider this orthostatic hypotension. He had 4 beat run of PAT and occassional PVC's not correlated to symptoms.     Echo 8/19/20:  LEFT VENTRICLE:  Systolic function was normal. Ejection fraction was estimated to be 55 %.  There were no regional wall motion abnormalities.  There was mild concentric hypertrophy.     RIGHT VENTRICLE:  The size was " normal.  Systolic function was normal.    EKG:  Sinus rhythm. 78 bpm. Rightward axis. Nonspecific ST abnormality

## 2024-05-31 DIAGNOSIS — E87.1 HYPONATREMIA: ICD-10-CM

## 2024-05-31 RX ORDER — SODIUM CHLORIDE 1 G/1
2 TABLET ORAL 3 TIMES DAILY
Qty: 180 TABLET | Refills: 3 | Status: SHIPPED | OUTPATIENT
Start: 2024-05-31

## 2024-05-31 NOTE — PROGRESS NOTES
Called pt and let him know that you spoke with his psychiatrist and agreed that increasing his sodium tablets to 2g 3 times a day was okay. Pt understood and let me know that the pharmacy called him already. No questions at this time.

## 2024-05-31 NOTE — PROGRESS NOTES
Please let patient know I discussed with his psychiatrist and we were okay with increasing the sodium tablets to 2 g 3 times a day. A new prescription was submitted for him. Thanks

## 2024-06-05 ENCOUNTER — HOSPITAL ENCOUNTER (OUTPATIENT)
Dept: INFUSION CENTER | Facility: HOSPITAL | Age: 59
Discharge: HOME | End: 2024-06-05

## 2024-06-05 NOTE — PROGRESS NOTES
Pt arrived for Invega inj. Ordered q 3 months. Last inj given on 3/18. Will need to reschedule appt

## 2024-06-06 ENCOUNTER — TELEPHONE (OUTPATIENT)
Dept: PSYCHIATRY | Facility: CLINIC | Age: 59
End: 2024-06-06

## 2024-06-06 ENCOUNTER — HOSPITAL ENCOUNTER (OUTPATIENT)
Dept: INFUSION CENTER | Facility: HOSPITAL | Age: 59
End: 2024-06-06
Payer: COMMERCIAL

## 2024-06-06 PROCEDURE — 96372 THER/PROPH/DIAG INJ SC/IM: CPT

## 2024-06-06 RX ADMIN — PALIPERIDONE PALMITATE 819 MG: 819 INJECTION, SUSPENSION, EXTENDED RELEASE INTRAMUSCULAR at 08:15

## 2024-06-06 NOTE — PROGRESS NOTES
Pt tolerated Invega Trinza injection to R deltoid without difficulty.  Reports feeling well.  Confirmed next appt on 9/5 at 0800.  AVS declined.  Left ambulatory in stable condition.

## 2024-06-06 NOTE — TELEPHONE ENCOUNTER
Received a message from [facility]:  New order needed for Invega Trinsa. A paper order is needed as this medication is not available electronically in [employer] [facility].

## 2024-06-07 ENCOUNTER — HOSPITAL ENCOUNTER (OUTPATIENT)
Dept: INFUSION CENTER | Facility: HOSPITAL | Age: 59
Discharge: HOME | End: 2024-06-07

## 2024-06-10 ENCOUNTER — HOSPITAL ENCOUNTER (OUTPATIENT)
Dept: INFUSION CENTER | Facility: HOSPITAL | Age: 59
End: 2024-06-10

## 2024-06-10 DIAGNOSIS — F41.9 ANXIETY DISORDER, UNSPECIFIED TYPE: ICD-10-CM

## 2024-06-10 RX ORDER — CLONAZEPAM 2 MG/1
2 TABLET ORAL 2 TIMES DAILY PRN
Qty: 60 TABLET | Refills: 2 | Status: SHIPPED | OUTPATIENT
Start: 2024-06-10

## 2024-06-10 NOTE — TELEPHONE ENCOUNTER
Medication Refill Request     Name of Medication Klonopin  Dose/Frequency 2mg  Quantity 60  Verified pharmacy   [x]  Verified ordering Provider   [x]  Does patient have enough for the next 3 days? Yes [x] No []  Does patient have a follow-up appointment scheduled? Yes [x] No []   If so when is appointment: 8/15/24

## 2024-06-10 NOTE — TELEPHONE ENCOUNTER
PDMP website reviewed. Chandler has been appropriately adherent to controlled psychotropic medications without evidence of abuse or misuse. As such, will send 30-day refill to pharmacy of choice and follow up as necessary.

## 2024-06-19 ENCOUNTER — TELEPHONE (OUTPATIENT)
Dept: PSYCHIATRY | Facility: CLINIC | Age: 59
End: 2024-06-19

## 2024-06-19 NOTE — TELEPHONE ENCOUNTER
Patient called in regard to medication refill for Pristiq. Writer checked chart and there are 2 refills on file, 90 day supply. Patient will check with pharmacy and if any other questions will call our office back.

## 2024-06-24 ENCOUNTER — TELEPHONE (OUTPATIENT)
Dept: PSYCHIATRY | Facility: CLINIC | Age: 59
End: 2024-06-24

## 2024-06-24 NOTE — TELEPHONE ENCOUNTER
Patient is calling regarding cancelling an appointment.    Date/Time: 8/15/2024 10am    Reason:     Patient was rescheduled: YES [x] NO []  If yes, when was Patient reschedule for: 9/30/2024 5pm    Patient requesting call back to reschedule: YES [] NO [x]

## 2024-07-01 ENCOUNTER — TELEPHONE (OUTPATIENT)
Dept: PSYCHIATRY | Facility: CLINIC | Age: 59
End: 2024-07-01

## 2024-07-01 NOTE — TELEPHONE ENCOUNTER
Received a VM from Greg:  he has new insurance and needs approval for the injection he gets every 3 months.     Next injection scheduled for 9/5/24.

## 2024-07-05 ENCOUNTER — TELEPHONE (OUTPATIENT)
Dept: PSYCHIATRY | Facility: CLINIC | Age: 59
End: 2024-07-05

## 2024-07-05 NOTE — TELEPHONE ENCOUNTER
Spoke with Greg. Reviewed his message was received and a prior authorization will be submitted in advance of the next injection and will follow up with him when a decision is received. He verbalized understanding. New insurance verified as [employer].

## 2024-07-05 NOTE — TELEPHONE ENCOUNTER
While speaking with Greg about new insurance, he requested a refill of Trileptal. Reviewed most recent prescription/refills, but he said his bottle has no remaining refills. Called  [facility] and left a voice message (not able to speak with staff directly):  gave all pertinent prescription/RF information and asked to prepare or call the office if further assistance is needed.     Spoke with Greg again and reviewed above. He said it would be okay to contact his wife if needed regarding the prescription.

## 2024-07-15 ENCOUNTER — TELEPHONE (OUTPATIENT)
Dept: PSYCHIATRY | Facility: CLINIC | Age: 59
End: 2024-07-15

## 2024-07-15 NOTE — TELEPHONE ENCOUNTER
Received a message from [facility]:  new order needed for Manoj Arteaga. Dr. Fountain completed and faxed back to [facility].

## 2024-07-16 DIAGNOSIS — I25.110 CORONARY ARTERY DISEASE INVOLVING NATIVE CORONARY ARTERY OF NATIVE HEART WITH UNSTABLE ANGINA PECTORIS (HCC): ICD-10-CM

## 2024-07-16 DIAGNOSIS — E78.2 MIXED HYPERLIPIDEMIA: ICD-10-CM

## 2024-07-16 RX ORDER — ATORVASTATIN CALCIUM 40 MG/1
40 TABLET, FILM COATED ORAL DAILY
Qty: 100 TABLET | Refills: 1 | Status: SHIPPED | OUTPATIENT
Start: 2024-07-16

## 2024-07-16 NOTE — TELEPHONE ENCOUNTER
Reason for call:   [x] Refill   [] Prior Auth  [] Other:     Office:   [x] PCP/Provider - Dr Nunez  [] Specialty/Provider -     Medication: atorvastatin (LIPITOR)     40 mg tablet, tablet daily     pantoprazole (PROTONIX)   20 mg tablet, tablet daily       Quantity: 100    Pharmacy:  [facility]    Does the patient have enough for 3 days?   [x] Yes   [] No - Send as HP to POD

## 2024-07-17 ENCOUNTER — ANESTHESIA EVENT (EMERGENCY)
Dept: PERIOP | Facility: HOSPITAL | Age: 59
DRG: 336 | End: 2024-07-17
Payer: COMMERCIAL

## 2024-07-17 ENCOUNTER — ANESTHESIA (EMERGENCY)
Dept: PERIOP | Facility: HOSPITAL | Age: 59
DRG: 336 | End: 2024-07-17
Payer: COMMERCIAL

## 2024-07-17 ENCOUNTER — HOSPITAL ENCOUNTER (INPATIENT)
Facility: HOSPITAL | Age: 59
LOS: 1 days | Discharge: HOME | DRG: 336 | End: 2024-07-18
Attending: EMERGENCY MEDICINE | Admitting: SURGERY
Payer: COMMERCIAL

## 2024-07-17 ENCOUNTER — APPOINTMENT (EMERGENCY)
Dept: CT IMAGING | Facility: HOSPITAL | Age: 59
DRG: 336 | End: 2024-07-17
Payer: COMMERCIAL

## 2024-07-17 DIAGNOSIS — K45.8 INTERNAL HERNIA: ICD-10-CM

## 2024-07-17 DIAGNOSIS — R10.9 ABDOMINAL PAIN: ICD-10-CM

## 2024-07-17 DIAGNOSIS — E87.1 HYPONATREMIA: ICD-10-CM

## 2024-07-17 DIAGNOSIS — K56.50 SMALL BOWEL OBSTRUCTION DUE TO ADHESIONS (HCC): Primary | ICD-10-CM

## 2024-07-17 LAB
2HR DELTA HS TROPONIN: -2 NG/L
ALBUMIN SERPL BCG-MCNC: 3.9 G/DL (ref 3.5–5)
ALP SERPL-CCNC: 81 U/L (ref 34–104)
ALT SERPL W P-5'-P-CCNC: 18 U/L (ref 7–52)
ANION GAP SERPL CALCULATED.3IONS-SCNC: 7 MMOL/L (ref 4–13)
ANION GAP SERPL CALCULATED.3IONS-SCNC: 7 MMOL/L (ref 4–13)
APTT PPP: 28 SECONDS (ref 23–37)
AST SERPL W P-5'-P-CCNC: 22 U/L (ref 13–39)
ATRIAL RATE: 60 BPM
ATRIAL RATE: 70 BPM
BASOPHILS # BLD AUTO: 0.05 THOUSANDS/ÂΜL (ref 0–0.1)
BASOPHILS NFR BLD AUTO: 1 % (ref 0–1)
BILIRUB DIRECT SERPL-MCNC: 0.17 MG/DL (ref 0–0.2)
BILIRUB SERPL-MCNC: 0.61 MG/DL (ref 0.2–1)
BUN SERPL-MCNC: 4 MG/DL (ref 5–25)
BUN SERPL-MCNC: 5 MG/DL (ref 5–25)
CALCIUM SERPL-MCNC: 8 MG/DL (ref 8.4–10.2)
CALCIUM SERPL-MCNC: 8.4 MG/DL (ref 8.4–10.2)
CARDIAC TROPONIN I PNL SERPL HS: 16 NG/L
CARDIAC TROPONIN I PNL SERPL HS: 18 NG/L
CHLORIDE SERPL-SCNC: 91 MMOL/L (ref 96–108)
CHLORIDE SERPL-SCNC: 96 MMOL/L (ref 96–108)
CO2 SERPL-SCNC: 21 MMOL/L (ref 21–32)
CO2 SERPL-SCNC: 23 MMOL/L (ref 21–32)
CREAT SERPL-MCNC: 0.61 MG/DL (ref 0.6–1.3)
CREAT SERPL-MCNC: 0.68 MG/DL (ref 0.6–1.3)
EOSINOPHIL # BLD AUTO: 0.22 THOUSAND/ÂΜL (ref 0–0.61)
EOSINOPHIL NFR BLD AUTO: 3 % (ref 0–6)
ERYTHROCYTE [DISTWIDTH] IN BLOOD BY AUTOMATED COUNT: 12.3 % (ref 11.6–15.1)
GFR SERPL CREATININE-BSD FRML MDRD: 105 ML/MIN/1.73SQ M
GFR SERPL CREATININE-BSD FRML MDRD: 110 ML/MIN/1.73SQ M
GLUCOSE SERPL-MCNC: 102 MG/DL (ref 65–140)
GLUCOSE SERPL-MCNC: 123 MG/DL (ref 65–140)
GLUCOSE SERPL-MCNC: 130 MG/DL (ref 65–140)
HCT VFR BLD AUTO: 37.3 % (ref 36.5–49.3)
HGB BLD-MCNC: 12.8 G/DL (ref 12–17)
IMM GRANULOCYTES # BLD AUTO: 0.02 THOUSAND/UL (ref 0–0.2)
IMM GRANULOCYTES NFR BLD AUTO: 0 % (ref 0–2)
INR PPP: 1.08 (ref 0.84–1.19)
LACTATE SERPL-SCNC: 0.6 MMOL/L (ref 0.5–2)
LIPASE SERPL-CCNC: 45 U/L (ref 11–82)
LYMPHOCYTES # BLD AUTO: 2.34 THOUSANDS/ÂΜL (ref 0.6–4.47)
LYMPHOCYTES NFR BLD AUTO: 27 % (ref 14–44)
MCH RBC QN AUTO: 31.1 PG (ref 26.8–34.3)
MCHC RBC AUTO-ENTMCNC: 34.3 G/DL (ref 31.4–37.4)
MCV RBC AUTO: 91 FL (ref 82–98)
MONOCYTES # BLD AUTO: 0.96 THOUSAND/ÂΜL (ref 0.17–1.22)
MONOCYTES NFR BLD AUTO: 11 % (ref 4–12)
NEUTROPHILS # BLD AUTO: 5.21 THOUSANDS/ÂΜL (ref 1.85–7.62)
NEUTS SEG NFR BLD AUTO: 58 % (ref 43–75)
NRBC BLD AUTO-RTO: 0 /100 WBCS
P AXIS: 10 DEGREES
P AXIS: 45 DEGREES
PLATELET # BLD AUTO: 216 THOUSANDS/UL (ref 149–390)
PMV BLD AUTO: 9.1 FL (ref 8.9–12.7)
POTASSIUM SERPL-SCNC: 3.5 MMOL/L (ref 3.5–5.3)
POTASSIUM SERPL-SCNC: 4 MMOL/L (ref 3.5–5.3)
PR INTERVAL: 176 MS
PR INTERVAL: 188 MS
PROT SERPL-MCNC: 5.9 G/DL (ref 6.4–8.4)
PROTHROMBIN TIME: 14.2 SECONDS (ref 11.6–14.5)
QRS AXIS: 106 DEGREES
QRS AXIS: 94 DEGREES
QRSD INTERVAL: 128 MS
QRSD INTERVAL: 132 MS
QT INTERVAL: 434 MS
QT INTERVAL: 438 MS
QTC INTERVAL: 434 MS
QTC INTERVAL: 473 MS
RBC # BLD AUTO: 4.11 MILLION/UL (ref 3.88–5.62)
SODIUM SERPL-SCNC: 121 MMOL/L (ref 135–147)
SODIUM SERPL-SCNC: 124 MMOL/L (ref 135–147)
T WAVE AXIS: 39 DEGREES
T WAVE AXIS: 50 DEGREES
VENTRICULAR RATE: 60 BPM
VENTRICULAR RATE: 70 BPM
WBC # BLD AUTO: 8.8 THOUSAND/UL (ref 4.31–10.16)

## 2024-07-17 PROCEDURE — 99285 EMERGENCY DEPT VISIT HI MDM: CPT

## 2024-07-17 PROCEDURE — C9290 INJ, BUPIVACAINE LIPOSOME: HCPCS | Performed by: STUDENT IN AN ORGANIZED HEALTH CARE EDUCATION/TRAINING PROGRAM

## 2024-07-17 PROCEDURE — 0DNA4ZZ RELEASE JEJUNUM, PERCUTANEOUS ENDOSCOPIC APPROACH: ICD-10-PCS | Performed by: SURGERY

## 2024-07-17 PROCEDURE — 80048 BASIC METABOLIC PNL TOTAL CA: CPT | Performed by: EMERGENCY MEDICINE

## 2024-07-17 PROCEDURE — 0DNU4ZZ RELEASE OMENTUM, PERCUTANEOUS ENDOSCOPIC APPROACH: ICD-10-PCS | Performed by: SURGERY

## 2024-07-17 PROCEDURE — 82948 REAGENT STRIP/BLOOD GLUCOSE: CPT

## 2024-07-17 PROCEDURE — 96361 HYDRATE IV INFUSION ADD-ON: CPT

## 2024-07-17 PROCEDURE — 99285 EMERGENCY DEPT VISIT HI MDM: CPT | Performed by: EMERGENCY MEDICINE

## 2024-07-17 PROCEDURE — 96375 TX/PRO/DX INJ NEW DRUG ADDON: CPT

## 2024-07-17 PROCEDURE — 71275 CT ANGIOGRAPHY CHEST: CPT

## 2024-07-17 PROCEDURE — 80076 HEPATIC FUNCTION PANEL: CPT | Performed by: EMERGENCY MEDICINE

## 2024-07-17 PROCEDURE — 93005 ELECTROCARDIOGRAM TRACING: CPT

## 2024-07-17 PROCEDURE — 84484 ASSAY OF TROPONIN QUANT: CPT | Performed by: EMERGENCY MEDICINE

## 2024-07-17 PROCEDURE — 99223 1ST HOSP IP/OBS HIGH 75: CPT | Performed by: STUDENT IN AN ORGANIZED HEALTH CARE EDUCATION/TRAINING PROGRAM

## 2024-07-17 PROCEDURE — 80048 BASIC METABOLIC PNL TOTAL CA: CPT | Performed by: NURSE ANESTHETIST, CERTIFIED REGISTERED

## 2024-07-17 PROCEDURE — 85025 COMPLETE CBC W/AUTO DIFF WBC: CPT | Performed by: EMERGENCY MEDICINE

## 2024-07-17 PROCEDURE — 74174 CTA ABD&PLVS W/CONTRAST: CPT

## 2024-07-17 PROCEDURE — 93010 ELECTROCARDIOGRAM REPORT: CPT | Performed by: STUDENT IN AN ORGANIZED HEALTH CARE EDUCATION/TRAINING PROGRAM

## 2024-07-17 PROCEDURE — 44180 LAP ENTEROLYSIS: CPT | Performed by: STUDENT IN AN ORGANIZED HEALTH CARE EDUCATION/TRAINING PROGRAM

## 2024-07-17 PROCEDURE — 96372 THER/PROPH/DIAG INJ SC/IM: CPT

## 2024-07-17 PROCEDURE — 36415 COLL VENOUS BLD VENIPUNCTURE: CPT | Performed by: EMERGENCY MEDICINE

## 2024-07-17 PROCEDURE — 83605 ASSAY OF LACTIC ACID: CPT | Performed by: EMERGENCY MEDICINE

## 2024-07-17 PROCEDURE — 85610 PROTHROMBIN TIME: CPT | Performed by: EMERGENCY MEDICINE

## 2024-07-17 PROCEDURE — 96374 THER/PROPH/DIAG INJ IV PUSH: CPT

## 2024-07-17 PROCEDURE — 85730 THROMBOPLASTIN TIME PARTIAL: CPT | Performed by: EMERGENCY MEDICINE

## 2024-07-17 PROCEDURE — 44180 LAP ENTEROLYSIS: CPT | Performed by: SURGERY

## 2024-07-17 PROCEDURE — 83690 ASSAY OF LIPASE: CPT | Performed by: EMERGENCY MEDICINE

## 2024-07-17 RX ORDER — FENTANYL CITRATE 50 UG/ML
INJECTION, SOLUTION INTRAMUSCULAR; INTRAVENOUS AS NEEDED
Status: DISCONTINUED | OUTPATIENT
Start: 2024-07-17 | End: 2024-07-17

## 2024-07-17 RX ORDER — HYDROMORPHONE HCL/PF 1 MG/ML
0.5 SYRINGE (ML) INJECTION
Status: DISCONTINUED | OUTPATIENT
Start: 2024-07-17 | End: 2024-07-17 | Stop reason: HOSPADM

## 2024-07-17 RX ORDER — ROCURONIUM BROMIDE 10 MG/ML
INJECTION, SOLUTION INTRAVENOUS AS NEEDED
Status: DISCONTINUED | OUTPATIENT
Start: 2024-07-17 | End: 2024-07-17

## 2024-07-17 RX ORDER — SODIUM CHLORIDE, SODIUM LACTATE, POTASSIUM CHLORIDE, CALCIUM CHLORIDE 600; 310; 30; 20 MG/100ML; MG/100ML; MG/100ML; MG/100ML
75 INJECTION, SOLUTION INTRAVENOUS CONTINUOUS
Status: DISCONTINUED | OUTPATIENT
Start: 2024-07-18 | End: 2024-07-18

## 2024-07-17 RX ORDER — SUCCINYLCHOLINE/SOD CL,ISO/PF 100 MG/5ML
SYRINGE (ML) INTRAVENOUS AS NEEDED
Status: DISCONTINUED | OUTPATIENT
Start: 2024-07-17 | End: 2024-07-17

## 2024-07-17 RX ORDER — SODIUM CHLORIDE, SODIUM LACTATE, POTASSIUM CHLORIDE, CALCIUM CHLORIDE 600; 310; 30; 20 MG/100ML; MG/100ML; MG/100ML; MG/100ML
100 INJECTION, SOLUTION INTRAVENOUS CONTINUOUS
Status: DISCONTINUED | OUTPATIENT
Start: 2024-07-17 | End: 2024-07-18

## 2024-07-17 RX ORDER — MAGNESIUM HYDROXIDE 1200 MG/15ML
LIQUID ORAL AS NEEDED
Status: DISCONTINUED | OUTPATIENT
Start: 2024-07-17 | End: 2024-07-17 | Stop reason: HOSPADM

## 2024-07-17 RX ORDER — OXYCODONE HCL 5 MG/5 ML
5 SOLUTION, ORAL ORAL EVERY 4 HOURS PRN
Status: DISCONTINUED | OUTPATIENT
Start: 2024-07-17 | End: 2024-07-18 | Stop reason: HOSPADM

## 2024-07-17 RX ORDER — ONDANSETRON 2 MG/ML
4 INJECTION INTRAMUSCULAR; INTRAVENOUS ONCE AS NEEDED
Status: DISCONTINUED | OUTPATIENT
Start: 2024-07-17 | End: 2024-07-17 | Stop reason: HOSPADM

## 2024-07-17 RX ORDER — ACETAMINOPHEN 10 MG/ML
1000 INJECTION, SOLUTION INTRAVENOUS EVERY 8 HOURS
Status: DISCONTINUED | OUTPATIENT
Start: 2024-07-18 | End: 2024-07-18 | Stop reason: HOSPADM

## 2024-07-17 RX ORDER — EPHEDRINE SULFATE 50 MG/ML
INJECTION INTRAVENOUS AS NEEDED
Status: DISCONTINUED | OUTPATIENT
Start: 2024-07-17 | End: 2024-07-17

## 2024-07-17 RX ORDER — LIDOCAINE HYDROCHLORIDE 10 MG/ML
INJECTION, SOLUTION EPIDURAL; INFILTRATION; INTRACAUDAL; PERINEURAL AS NEEDED
Status: DISCONTINUED | OUTPATIENT
Start: 2024-07-17 | End: 2024-07-17

## 2024-07-17 RX ORDER — CEFAZOLIN SODIUM 2 G/50ML
2000 SOLUTION INTRAVENOUS ONCE
Status: COMPLETED | OUTPATIENT
Start: 2024-07-17 | End: 2024-07-17

## 2024-07-17 RX ORDER — ONDANSETRON 2 MG/ML
INJECTION INTRAMUSCULAR; INTRAVENOUS AS NEEDED
Status: DISCONTINUED | OUTPATIENT
Start: 2024-07-17 | End: 2024-07-17

## 2024-07-17 RX ORDER — PROPOFOL 10 MG/ML
INJECTION, EMULSION INTRAVENOUS AS NEEDED
Status: DISCONTINUED | OUTPATIENT
Start: 2024-07-17 | End: 2024-07-17

## 2024-07-17 RX ORDER — FENTANYL CITRATE 50 UG/ML
75 INJECTION, SOLUTION INTRAMUSCULAR; INTRAVENOUS ONCE
Status: COMPLETED | OUTPATIENT
Start: 2024-07-17 | End: 2024-07-17

## 2024-07-17 RX ORDER — ONDANSETRON 2 MG/ML
4 INJECTION INTRAMUSCULAR; INTRAVENOUS EVERY 6 HOURS PRN
Status: DISCONTINUED | OUTPATIENT
Start: 2024-07-17 | End: 2024-07-18 | Stop reason: HOSPADM

## 2024-07-17 RX ORDER — MORPHINE SULFATE 4 MG/ML
6 INJECTION, SOLUTION INTRAMUSCULAR; INTRAVENOUS ONCE
Status: COMPLETED | OUTPATIENT
Start: 2024-07-17 | End: 2024-07-17

## 2024-07-17 RX ORDER — CLONAZEPAM 1 MG/1
2 TABLET ORAL 2 TIMES DAILY PRN
Status: DISCONTINUED | OUTPATIENT
Start: 2024-07-17 | End: 2024-07-18 | Stop reason: HOSPADM

## 2024-07-17 RX ORDER — HEPARIN SODIUM 5000 [USP'U]/ML
5000 INJECTION, SOLUTION INTRAVENOUS; SUBCUTANEOUS ONCE
Status: COMPLETED | OUTPATIENT
Start: 2024-07-17 | End: 2024-07-17

## 2024-07-17 RX ORDER — MIDAZOLAM HYDROCHLORIDE 2 MG/2ML
INJECTION, SOLUTION INTRAMUSCULAR; INTRAVENOUS AS NEEDED
Status: DISCONTINUED | OUTPATIENT
Start: 2024-07-17 | End: 2024-07-17

## 2024-07-17 RX ORDER — HYDROMORPHONE HCL/PF 1 MG/ML
1 SYRINGE (ML) INJECTION ONCE
Status: COMPLETED | OUTPATIENT
Start: 2024-07-17 | End: 2024-07-17

## 2024-07-17 RX ORDER — FENTANYL CITRATE/PF 50 MCG/ML
25 SYRINGE (ML) INJECTION
Status: DISCONTINUED | OUTPATIENT
Start: 2024-07-17 | End: 2024-07-17 | Stop reason: HOSPADM

## 2024-07-17 RX ADMIN — ROCURONIUM BROMIDE 30 MG: 10 INJECTION, SOLUTION INTRAVENOUS at 22:15

## 2024-07-17 RX ADMIN — SODIUM CHLORIDE, SODIUM LACTATE, POTASSIUM CHLORIDE, AND CALCIUM CHLORIDE 100 ML/HR: .6; .31; .03; .02 INJECTION, SOLUTION INTRAVENOUS at 21:36

## 2024-07-17 RX ADMIN — MORPHINE SULFATE 6 MG: 4 INJECTION INTRAVENOUS at 20:03

## 2024-07-17 RX ADMIN — FENTANYL CITRATE 75 MCG: 50 INJECTION, SOLUTION INTRAMUSCULAR; INTRAVENOUS at 18:30

## 2024-07-17 RX ADMIN — Medication 100 MG: at 22:01

## 2024-07-17 RX ADMIN — MIDAZOLAM 2 MG: 1 INJECTION INTRAMUSCULAR; INTRAVENOUS at 21:57

## 2024-07-17 RX ADMIN — LIDOCAINE HYDROCHLORIDE 60 MG: 10 INJECTION, SOLUTION EPIDURAL; INFILTRATION; INTRACAUDAL; PERINEURAL at 22:01

## 2024-07-17 RX ADMIN — ONDANSETRON 4 MG: 2 INJECTION INTRAMUSCULAR; INTRAVENOUS at 22:29

## 2024-07-17 RX ADMIN — PROPOFOL 200 MG: 10 INJECTION, EMULSION INTRAVENOUS at 22:01

## 2024-07-17 RX ADMIN — CEFAZOLIN SODIUM 2000 MG: 2 SOLUTION INTRAVENOUS at 21:36

## 2024-07-17 RX ADMIN — SODIUM CHLORIDE 1000 ML: 0.9 INJECTION, SOLUTION INTRAVENOUS at 18:39

## 2024-07-17 RX ADMIN — EPHEDRINE SULFATE 7.5 MG: 50 INJECTION, SOLUTION INTRAVENOUS at 22:09

## 2024-07-17 RX ADMIN — FENTANYL CITRATE 100 MCG: 50 INJECTION INTRAMUSCULAR; INTRAVENOUS at 22:01

## 2024-07-17 RX ADMIN — HEPARIN SODIUM 5000 UNITS: 5000 INJECTION INTRAVENOUS; SUBCUTANEOUS at 21:35

## 2024-07-17 RX ADMIN — SUGAMMADEX 400 MG: 100 INJECTION, SOLUTION INTRAVENOUS at 22:29

## 2024-07-17 RX ADMIN — IOHEXOL 100 ML: 350 INJECTION, SOLUTION INTRAVENOUS at 18:30

## 2024-07-17 RX ADMIN — HYDROMORPHONE HYDROCHLORIDE 1 MG: 1 INJECTION, SOLUTION INTRAMUSCULAR; INTRAVENOUS; SUBCUTANEOUS at 18:56

## 2024-07-17 NOTE — LETTER
[place] [address]  [address] [facility] [address]  Dept: [phone]    July 18, 2024     Patient: Chandler Angeles   YOB: 1965   Date of Visit: 7/17/2024       To Whom it May Concern:    Chandler Angeles is under my professional care. He was seen in the hospital from 7/17/2024 to 07/18/24. He may return to work on 1 week without limitations.    If you have any questions or concerns, please don't hesitate to call.         Sincerely,        Torito Wang MD  General and Bariatric Surgery

## 2024-07-17 NOTE — Clinical Note
Case was discussed with Bariatric and the patient's admission status was agreed to be Admission Status: inpatient status to the service of Dr. Paz

## 2024-07-17 NOTE — ED PROVIDER NOTES
History  Chief Complaint   Patient presents with    Abdominal Pain     Upper abdominal pain x 1 hour, +SOB, pale, denies N/V     58 yo M presenting for evaluation of abdominal pain.    Sudden onset pain 1 hour ago. Epigastric/mid abdomen with radiation to his back.   No history of similar  Ate just prior to sx (granola bar, microwave popcorn) and then took Pepto Bismol  Last BM earlier today and describes as normal    H/o RNYGB by Dr. Dar Lock 3/2/20, no issues/complications                Prior to Admission Medications   Prescriptions Last Dose Informant Patient Reported? Taking?   Blood Glucose Monitoring Suppl (OneTouch Verio Reflect) w/Device KIT   No No   Sig: Check blood sugars once daily. Please substitute with appropriate alternative as covered by patient's insurance. Dx: E11.65   Fluticasone Propionate (Xhance) 93 MCG/ACT EXHU   No No   Si actuation into each nostril 2 (two) times a day   OXcarbazepine (Trileptal) 300 mg tablet   No No   Sig: Take 1 tablet (300 mg total) by mouth in the morning AND 2 tablets (600 mg total) daily at bedtime.   OneTouch Delica Lancets 33G MISC   No No   Sig: Check blood sugars once daily. Please substitute with appropriate alternative as covered by patient's insurance. Dx: E11.65   Paliperidone Palmitate ER (Invega Trinza) 819 MG/2.63ML DENISSE   Yes No   Sig: Inject 819 mg into a muscle every 3 (three) months   aspirin (ECOTRIN LOW STRENGTH) 81 mg EC tablet   No No   Sig: Take 1 tablet (81 mg total) by mouth daily   atorvastatin (LIPITOR) 40 mg tablet   No No   Sig: Take 1 tablet (40 mg total) by mouth daily   cholecalciferol (VITAMIN D3) 1,000 units tablet   Yes No   Sig: Take 1,000 Units by mouth daily   Patient not taking: Reported on 2024   clonazePAM (KlonoPIN) 2 mg tablet   No No   Sig: Take 1 tablet (2 mg total) by mouth 2 (two) times a day as needed for anxiety   desvenlafaxine succinate (PRISTIQ) 50 mg 24 hr tablet   No No   Sig: Take 1 tablet (50 mg total) by  mouth daily   diphenhydrAMINE (BENADRYL) 25 mg tablet   Yes No   Sig: Take 25 mg by mouth in the morning   Patient not taking: Reported on 2024   fluticasone (FLONASE) 50 mcg/act nasal spray   No No   Si sprays into each nostril daily   glucose blood (OneTouch Verio) test strip   No No   Sig: Check blood sugars once daily. Please substitute with appropriate alternative as covered by patient's insurance. Dx: E11.65   nicotine (NICODERM CQ) 21 mg/24 hr TD 24 hr patch   No No   Sig: Place 1 patch on the skin over 24 hours every 24 hours   pantoprazole (PROTONIX) 20 mg tablet   No No   Sig: Take 1 tablet (20 mg total) by mouth daily   potassium chloride (Klor-Con M20) 20 mEq tablet   No No   Sig: Take 1 tablet (20 mEq total) by mouth daily   predniSONE 10 mg tablet   No No   Sig: Take 50 mg daily for 2 days. Then take 40 mg daily for 2 days. Then take 30 mg daily for 2 days. Then take 20 mg daily for 2 days. Then take 10 mg daily for 2 days. Then stop.   ranolazine (RANEXA) 1000 MG SR tablet   No No   Sig: Take 1 tablet (1,000 mg total) by mouth 2 (two) times a day   sodium chloride 1 g tablet   No No   Sig: Take 2 tablets (2 g total) by mouth 3 (three) times a day      Facility-Administered Medications: None       Past Medical History:   Diagnosis Date    Anxiety     Bariatric surgery status     CPAP (continuous positive airway pressure) dependence     Depression     Dizziness     Hearing loss of aging     HL (hearing loss)     Hypercholesterolemia     Migraine     Morbid obesity (HCC)     Myocardial infarction (HCC)     Nasal congestion     Nasal obstruction     NSTEMI (non-ST elevated myocardial infarction) (Formerly [facility]) 05/15/2019    NSTEMI (non-ST elevation myocardial infarction) (Formerly [facility])     2019    Pneumonia     Postsurgical malabsorption     Sleep apnea     Tinnitus        Past Surgical History:   Procedure Laterality Date    BONE TUMOR EXCISION Bilateral 2023    Procedure: REMOVAL TUMOR BONE;  Surgeon:  Dandre Sanderson DO;  Location:  MAIN OR;  Service: Orthopedics    CARDIAC CATHETERIZATION      no stents     COLONOSCOPY      bx taken    EGD      HERNIA REPAIR      umbilical hernia    MI LAPS GSTR RSTCV PX W/BYP FRITZ-EN-Y LIMB <150 CM N/A 2020    Procedure: LAPAROSCOPIC FRITZ-EN-Y GASTRIC BYPASS AND INTRAOPERATIVE EGD;  Surgeon: Fitz Lock MD;  Location: [facility];  Service: Bariatrics    SKIN SURGERY      cyst removed from back and thumb    TONSILLECTOMY      TOOTH EXTRACTION         Family History   Problem Relation Age of Onset    Lung cancer Mother     Brain cancer Mother     Cancer Mother         Brain and lung    Diabetes Brother     Bipolar disorder Maternal Grandfather     Bipolar disorder Son     Cancer Maternal Grandmother         Lung     I have reviewed and agree with the history as documented.    E-Cigarette/Vaping    E-Cigarette Use Current Every Day User     Cartridges/Day 1/2     Comments vape      E-Cigarette/Vaping Substances    Nicotine Yes     THC No     CBD No     Flavoring Yes     Other No     Unknown No      Social History     Tobacco Use    Smoking status: Former     Current packs/day: 0.00     Average packs/day: 1 pack/day for 10.0 years (10.0 ttl pk-yrs)     Types: Cigarettes, Cigars     Quit date: 10/15/2003     Years since quittin.7    Smokeless tobacco: Former     Types: Chew     Quit date: 2022    Tobacco comments:     Currently vapes   Vaping Use    Vaping status: Every Day    Substances: Nicotine, Flavoring   Substance Use Topics    Alcohol use: Not Currently    Drug use: Never       Review of Systems    Physical Exam  Physical Exam  Vitals and nursing note reviewed.   Constitutional:       General: He is not in acute distress.     Appearance: Normal appearance. He is well-developed. He is diaphoretic.   HENT:      Head: Normocephalic and atraumatic.      Nose: Nose normal.   Eyes:      Extraocular Movements: Extraocular movements intact.       Conjunctiva/sclera: Conjunctivae normal.   Cardiovascular:      Rate and Rhythm: Normal rate and regular rhythm.      Heart sounds: Normal heart sounds.   Pulmonary:      Effort: Pulmonary effort is normal. No respiratory distress.      Breath sounds: Normal breath sounds. No stridor. No wheezing or rales.   Chest:      Chest wall: No tenderness.   Abdominal:      General: There is no distension.      Palpations: Abdomen is soft.      Tenderness: There is abdominal tenderness in the epigastric area and periumbilical area. There is no guarding or rebound.   Musculoskeletal:         General: No swelling, tenderness or deformity.      Cervical back: Normal range of motion and neck supple.   Skin:     General: Skin is warm.      Findings: No rash.   Neurological:      General: No focal deficit present.      Mental Status: He is alert and oriented to person, place, and time.      Motor: No abnormal muscle tone.      Coordination: Coordination normal.   Psychiatric:         Thought Content: Thought content normal.         Judgment: Judgment normal.         Vital Signs  ED Triage Vitals   Temperature Pulse Respirations Blood Pressure SpO2   07/17/24 1808 07/17/24 1808 07/17/24 1808 07/17/24 1808 07/17/24 1808   97.7 °F (36.5 °C) (!) 45 16 (!) 81/46 95 %      Temp src Heart Rate Source Patient Position - Orthostatic VS BP Location FiO2 (%)   -- 07/17/24 1815 07/17/24 1815 07/17/24 1815 --    Monitor Sitting Right arm       Pain Score       07/17/24 1808       10 - Worst Possible Pain           Vitals:    07/17/24 1915 07/17/24 2000 07/17/24 2100 07/17/24 2115   BP: 127/78 131/77 124/77 135/89   Pulse: 65 73 67 69   Patient Position - Orthostatic VS: Lying Lying Lying Lying         Visual Acuity      ED Medications  Medications   bupivacaine liposomal (EXPAREL) 1.3 % injection 20 mL (has no administration in time range)   lactated ringers infusion ( Intravenous Anesthesia Volume Adjustment 7/17/24 2230)   fentaNYL injection  75 mcg (75 mcg Intravenous Given by Other 7/17/24 1830)   sodium chloride 0.9 % bolus 1,000 mL (0 mL Intravenous Stopped 7/17/24 1949)   iohexol (OMNIPAQUE) 350 MG/ML injection (MULTI-DOSE) 100 mL (100 mL Intravenous Given 7/17/24 1830)   HYDROmorphone (DILAUDID) injection 1 mg (1 mg Intravenous Given 7/17/24 1856)   morphine injection 6 mg (6 mg Intravenous Given 7/17/24 2003)   ceFAZolin (ANCEF) IVPB (premix in dextrose) 2,000 mg 50 mL (2,000 mg Intravenous New Bag 7/17/24 2136)   heparin (porcine) subcutaneous injection 5,000 Units (5,000 Units Subcutaneous Given 7/17/24 2135)       Diagnostic Studies  Results Reviewed       Procedure Component Value Units Date/Time    BMP Q8 hours X 3 (Hyponatremia monitoring) [[id number]] Updated: 07/17/24 2212    Lab Status: No result Specimen: Blood from Arm, Left     HS Troponin I 2hr [[id number]]  (Normal) Collected: 07/17/24 2010    Lab Status: Final result Specimen: Blood from Arm, Left Updated: 07/17/24 2046     hs TnI 2hr 16 ng/L      Delta 2hr hsTnI -2 ng/L     Lactic acid, plasma (w/reflex if result > 2.0) [[id number]]  (Normal) Collected: 07/17/24 2008    Lab Status: Final result Specimen: Blood from Arm, Left Updated: 07/17/24 2037     LACTIC ACID 0.6 mmol/L     Narrative:      Result may be elevated if tourniquet was used during collection.    HS Troponin 0hr (reflex protocol) [[id number]]  (Normal) Collected: 07/17/24 1820    Lab Status: Final result Specimen: Blood from Arm, Left Updated: 07/17/24 1850     hs TnI 0hr 18 ng/L     Basic metabolic panel [[id number]]  (Abnormal) Collected: 07/17/24 1820    Lab Status: Final result Specimen: Blood from Arm, Left Updated: 07/17/24 1849     Sodium 121 mmol/L      Potassium 3.5 mmol/L      Chloride 91 mmol/L      CO2 23 mmol/L      ANION GAP 7 mmol/L      BUN 5 mg/dL      Creatinine 0.68 mg/dL      Glucose 130 mg/dL      Calcium 8.4 mg/dL      eGFR 105 ml/min/1.73sq m     Narrative:      [employer]  guidelines for Chronic Kidney Disease (CKD):     Stage 1 with normal or high GFR (GFR > 90 mL/min/1.73 square meters)    Stage 2 Mild CKD (GFR = 60-89 mL/min/1.73 square meters)    Stage 3A Moderate CKD (GFR = 45-59 mL/min/1.73 square meters)    Stage 3B Moderate CKD (GFR = 30-44 mL/min/1.73 square meters)    Stage 4 Severe CKD (GFR = 15-29 mL/min/1.73 square meters)    Stage 5 End Stage CKD (GFR <15 mL/min/1.73 square meters)  Note: GFR calculation is accurate only with a steady state creatinine    Lipase [[id number]]  (Normal) Collected: 07/17/24 1820    Lab Status: Final result Specimen: Blood from Arm, Left Updated: 07/17/24 1849     Lipase 45 u/L     Hepatic function panel [[id number]]  (Abnormal) Collected: 07/17/24 1820    Lab Status: Final result Specimen: Blood from Arm, Left Updated: 07/17/24 1849     Total Bilirubin 0.61 mg/dL      Bilirubin, Direct 0.17 mg/dL      Alkaline Phosphatase 81 U/L      AST 22 U/L      ALT 18 U/L      Total Protein 5.9 g/dL      Albumin 3.9 g/dL     APTT [[id number]]  (Normal) Collected: 07/17/24 1820    Lab Status: Final result Specimen: Blood from Arm, Left Updated: 07/17/24 1838     PTT 28 seconds     Protime-INR [[id number]]  (Normal) Collected: 07/17/24 1820    Lab Status: Final result Specimen: Blood from Arm, Left Updated: 07/17/24 1838     Protime 14.2 seconds      INR 1.08    CBC and differential [[id number]] Collected: 07/17/24 1820    Lab Status: Final result Specimen: Blood from Arm, Left Updated: 07/17/24 1826     WBC 8.80 Thousand/uL      RBC 4.11 Million/uL      Hemoglobin 12.8 g/dL      Hematocrit 37.3 %      MCV 91 fL      MCH 31.1 pg      MCHC 34.3 g/dL      RDW 12.3 %      MPV 9.1 fL      Platelets 216 Thousands/uL      nRBC 0 /100 WBCs      Segmented % 58 %      Immature Grans % 0 %      Lymphocytes % 27 %      Monocytes % 11 %      Eosinophils Relative 3 %      Basophils Relative 1 %      Absolute Neutrophils 5.21 Thousands/µL      Absolute Immature Grans 0.02  Thousand/uL      Absolute Lymphocytes 2.34 Thousands/µL      Absolute Monocytes 0.96 Thousand/µL      Eosinophils Absolute 0.22 Thousand/µL      Basophils Absolute 0.05 Thousands/µL     Fingerstick Glucose (POCT) [[id number]]  (Normal) Collected: 07/17/24 1821    Lab Status: Final result Specimen: Blood Updated: 07/17/24 1823     POC Glucose 102 mg/dl                    CTA dissection protocol chest/abdomen/pelvis   ED Interpretation by Bonnie Barnes DO (07/17 2000)   IMPRESSION:     1.  No acute aortic pathology.  2.  Mild swirling of the mesentery and an adjacent mildly dilated short segment loop of small bowel in the left upper abdomen adjacent to the surgical anastomosis. Findings raise the concern for an internal hernia in this patient who has previously   undergone a Marii-en-Y gastric bypass. Surgical consultation is advised.        Final Result by Louis Batista MD (07/17 1956)      1.  No acute aortic pathology.   2.  Mild swirling of the mesentery and an adjacent mildly dilated short segment loop of small bowel in the left upper abdomen adjacent to the surgical anastomosis. Findings raise the concern for an internal hernia in this patient who has previously    undergone a Marii-en-Y gastric bypass. Surgical consultation is advised.            I personally discussed this study with BONNIE BARNES on 7/17/2024 7:56 PM.            Workstation performed: [id number]                    Procedures  Procedures         ED Course  ED Course as of 07/17/24 2232 Wed Jul 17, 2024 1820 EKG independently interpreted by myself:  NSR @ 60 bpm, normal axis, qtc 434, no sig st changes   1824 POC Glucose: 102   1824 Pt taken immediately for CTA prior to labs given concern for aortic dissection/AAA/other given history/physical exam/hypotension   1834 Accompanied to CT, no AAA/dissection on my read, pending rads report   1849 Pt still c/o severe pain on reassessment   1852 Sodium(!): 121  132 2 months ago   2004  Messaged Bariatric surgery and updated pt who is still having severe pain   2101 Per Bariatrics, pt going right to OR from ER and they will determine if needs admission vs. DC from PACU after surgery                                 SBIRT 22yo+      Flowsheet Row Most Recent Value   Initial Alcohol Screen: US AUDIT-C     1. How often do you have a drink containing alcohol? 0 Filed at: 07/17/2024 1817   2. How many drinks containing alcohol do you have on a typical day you are drinking?  0 Filed at: 07/17/2024 1817   3a. Male UNDER 65: How often do you have five or more drinks on one occasion? 0 Filed at: 07/17/2024 1817   Audit-C Score 0 Filed at: 07/17/2024 1817   VALERI: How many times in the past year have you...    Used an illegal drug or used a prescription medication for non-medical reasons? Never Filed at: 07/17/2024 1817                      Medical Decision Making  58 yo M with sudden onset severe pain abdomen radiating to back- evaluated immediately on arrival and very uncomfortable/ttp/hypotensive, therefore sent immediately for CTA  CBC to assess for leukocytosis/anemia, CMP to assess for elevated LFTs/RENAN/electrolyte abn, lipase to r/o pancreatitis     CT concerning for internal hernia, discussed with bariatric surgery and taken to OR from ED    Problems Addressed:  Abdominal pain: acute illness or injury  Hyponatremia: acute illness or injury  Internal hernia: acute illness or injury    Amount and/or Complexity of Data Reviewed  External Data Reviewed: labs, radiology, ECG and notes.  Labs: ordered. Decision-making details documented in ED Course.  Radiology: ordered and independent interpretation performed. Decision-making details documented in ED Course.  ECG/medicine tests: ordered and independent interpretation performed. Decision-making details documented in ED Course.  Discussion of management or test interpretation with external provider(s): Bariatric surgery    Risk  Prescription drug  management.  Parenteral controlled substances.  Decision regarding hospitalization.  Emergency major surgery.                 Disposition  Final diagnoses:   Internal hernia   Abdominal pain   Hyponatremia     Time reflects when diagnosis was documented in both MDM as applicable and the Disposition within this note       Time User Action Codes Description Comment    7/17/2024  8:15 PM Prabha Barnes Add [K45.8] Internal hernia     7/17/2024  8:15 PM Prabha Barnes Add [R10.9] Abdominal pain     7/17/2024  8:15 PM Prabha Barnes Add [E87.1] Hyponatremia           ED Disposition       ED Disposition   Send to OR    Condition   --    Date/Time   Wed Jul 17, 2024 2057    Comment   Case was discussed with Bariatric and the patient's admission status was agreed to be Admission Status: inpatient status to the service of   .               Follow-up Information    None         Current Discharge Medication List        CONTINUE these medications which have NOT CHANGED    Details   aspirin (ECOTRIN LOW STRENGTH) 81 mg EC tablet Take 1 tablet (81 mg total) by mouth daily  Qty: 90 tablet, Refills: 3    Associated Diagnoses: Coronary artery disease involving native coronary artery of native heart with unstable angina pectoris (HCC)      atorvastatin (LIPITOR) 40 mg tablet Take 1 tablet (40 mg total) by mouth daily  Qty: 100 tablet, Refills: 1    Associated Diagnoses: Mixed hyperlipidemia; Coronary artery disease involving native coronary artery of native heart with unstable angina pectoris (HCC)      Blood Glucose Monitoring Suppl (OneTouch Verio Reflect) w/Device KIT Check blood sugars once daily. Please substitute with appropriate alternative as covered by patient's insurance. Dx: E11.65  Qty: 1 kit, Refills: 0    Associated Diagnoses: Hypoglycemia after GI (gastrointestinal) surgery      cholecalciferol (VITAMIN D3) 1,000 units tablet Take 1,000 Units by mouth daily      clonazePAM (KlonoPIN) 2 mg tablet Take 1 tablet (2 mg total)  by mouth 2 (two) times a day as needed for anxiety  Qty: 60 tablet, Refills: 2    Associated Diagnoses: Anxiety disorder, unspecified type      desvenlafaxine succinate (PRISTIQ) 50 mg 24 hr tablet Take 1 tablet (50 mg total) by mouth daily  Qty: 90 tablet, Refills: 2    Associated Diagnoses: Bipolar disorder, current episode mixed, moderate (HCC)      diphenhydrAMINE (BENADRYL) 25 mg tablet Take 25 mg by mouth in the morning      fluticasone (FLONASE) 50 mcg/act nasal spray 2 sprays into each nostril daily  Qty: 16 g, Refills: 11    Associated Diagnoses: Allergic rhinitis, unspecified seasonality, unspecified trigger; Nasal turbinate hypertrophy      Fluticasone Propionate (Xhance) 93 MCG/ACT EXHU 1 actuation into each nostril 2 (two) times a day  Qty: 16 mL, Refills: 5    Comments: Tried and failed Flonase and mometasone  Associated Diagnoses: Chronic pansinusitis; DNS (deviated nasal septum); Nasal turbinate hypertrophy; Rhinitis medicamentosa      glucose blood (OneTouch Verio) test strip Check blood sugars once daily. Please substitute with appropriate alternative as covered by patient's insurance. Dx: E11.65  Qty: 100 each, Refills: 3    Associated Diagnoses: Hypoglycemia after GI (gastrointestinal) surgery      nicotine (NICODERM CQ) 21 mg/24 hr TD 24 hr patch Place 1 patch on the skin over 24 hours every 24 hours  Qty: 28 patch, Refills: 2    Associated Diagnoses: Other tobacco product nicotine dependence with other nicotine-induced disorder      OneTouch Delica Lancets 33G MISC Check blood sugars once daily. Please substitute with appropriate alternative as covered by patient's insurance. Dx: E11.65  Qty: 100 each, Refills: 3    Associated Diagnoses: Hypoglycemia after GI (gastrointestinal) surgery      OXcarbazepine (Trileptal) 300 mg tablet Take 1 tablet (300 mg total) by mouth in the morning AND 2 tablets (600 mg total) daily at bedtime.  Qty: 90 tablet, Refills: 3    Associated Diagnoses: Bipolar  disorder, current episode mixed, moderate (HCC)      Paliperidone Palmitate ER (Invega Trinza) 819 MG/2.63ML DENISSE Inject 819 mg into a muscle every 3 (three) months      pantoprazole (PROTONIX) 20 mg tablet Take 1 tablet (20 mg total) by mouth daily  Qty: 90 tablet, Refills: 1    Associated Diagnoses: Bariatric surgery status      potassium chloride (Klor-Con M20) 20 mEq tablet Take 1 tablet (20 mEq total) by mouth daily  Qty: 30 tablet, Refills: 5    Associated Diagnoses: Hypokalemia      predniSONE 10 mg tablet Take 50 mg daily for 2 days. Then take 40 mg daily for 2 days. Then take 30 mg daily for 2 days. Then take 20 mg daily for 2 days. Then take 10 mg daily for 2 days. Then stop.  Qty: 30 tablet, Refills: 0    Associated Diagnoses: Chronic pansinusitis; DNS (deviated nasal septum); Nasal turbinate hypertrophy; Rhinitis medicamentosa      ranolazine (RANEXA) 1000 MG SR tablet Take 1 tablet (1,000 mg total) by mouth 2 (two) times a day  Qty: 180 tablet, Refills: 0    Associated Diagnoses: Coronary artery disease of native artery of native heart with stable angina pectoris (HCC)      sodium chloride 1 g tablet Take 2 tablets (2 g total) by mouth 3 (three) times a day  Qty: 180 tablet, Refills: 3    Associated Diagnoses: Hyponatremia             No discharge procedures on file.    PDMP Review         Value Time User    PDMP Reviewed  Yes 6/10/2024  9:09 AM Elie Fountain MD            ED Provider  Electronically Signed by             Prabha Barnes DO  07/17/24 7549

## 2024-07-18 ENCOUNTER — TELEPHONE (OUTPATIENT)
Dept: BARIATRICS | Facility: CLINIC | Age: 59
End: 2024-07-18

## 2024-07-18 ENCOUNTER — TRANSITIONAL CARE MANAGEMENT (OUTPATIENT)
Dept: FAMILY MEDICINE CLINIC | Facility: CLINIC | Age: 59
End: 2024-07-18

## 2024-07-18 VITALS
HEART RATE: 59 BPM | OXYGEN SATURATION: 97 % | WEIGHT: 195.99 LBS | DIASTOLIC BLOOD PRESSURE: 54 MMHG | RESPIRATION RATE: 17 BRPM | BODY MASS INDEX: 27.33 KG/M2 | SYSTOLIC BLOOD PRESSURE: 97 MMHG | TEMPERATURE: 98.1 F

## 2024-07-18 PROBLEM — K56.50 SMALL BOWEL OBSTRUCTION DUE TO ADHESIONS (HCC): Status: ACTIVE | Noted: 2024-07-18

## 2024-07-18 LAB
ANION GAP SERPL CALCULATED.3IONS-SCNC: 6 MMOL/L (ref 4–13)
ANION GAP SERPL CALCULATED.3IONS-SCNC: 8 MMOL/L (ref 4–13)
BUN SERPL-MCNC: 4 MG/DL (ref 5–25)
BUN SERPL-MCNC: 4 MG/DL (ref 5–25)
CALCIUM SERPL-MCNC: 8.2 MG/DL (ref 8.4–10.2)
CALCIUM SERPL-MCNC: 8.3 MG/DL (ref 8.4–10.2)
CHLORIDE SERPL-SCNC: 102 MMOL/L (ref 96–108)
CHLORIDE SERPL-SCNC: 96 MMOL/L (ref 96–108)
CO2 SERPL-SCNC: 22 MMOL/L (ref 21–32)
CO2 SERPL-SCNC: 24 MMOL/L (ref 21–32)
CREAT SERPL-MCNC: 0.57 MG/DL (ref 0.6–1.3)
CREAT SERPL-MCNC: 0.6 MG/DL (ref 0.6–1.3)
ERYTHROCYTE [DISTWIDTH] IN BLOOD BY AUTOMATED COUNT: 12.2 % (ref 11.6–15.1)
GFR SERPL CREATININE-BSD FRML MDRD: 110 ML/MIN/1.73SQ M
GFR SERPL CREATININE-BSD FRML MDRD: 113 ML/MIN/1.73SQ M
GLUCOSE SERPL-MCNC: 110 MG/DL (ref 65–140)
GLUCOSE SERPL-MCNC: 87 MG/DL (ref 65–140)
HCT VFR BLD AUTO: 37.8 % (ref 36.5–49.3)
HGB BLD-MCNC: 13 G/DL (ref 12–17)
MCH RBC QN AUTO: 31.9 PG (ref 26.8–34.3)
MCHC RBC AUTO-ENTMCNC: 34.4 G/DL (ref 31.4–37.4)
MCV RBC AUTO: 93 FL (ref 82–98)
PLATELET # BLD AUTO: 232 THOUSANDS/UL (ref 149–390)
PMV BLD AUTO: 9.8 FL (ref 8.9–12.7)
POTASSIUM SERPL-SCNC: 3.9 MMOL/L (ref 3.5–5.3)
POTASSIUM SERPL-SCNC: 4 MMOL/L (ref 3.5–5.3)
RBC # BLD AUTO: 4.08 MILLION/UL (ref 3.88–5.62)
SODIUM SERPL-SCNC: 126 MMOL/L (ref 135–147)
SODIUM SERPL-SCNC: 132 MMOL/L (ref 135–147)
WBC # BLD AUTO: 11.25 THOUSAND/UL (ref 4.31–10.16)

## 2024-07-18 PROCEDURE — 80048 BASIC METABOLIC PNL TOTAL CA: CPT | Performed by: STUDENT IN AN ORGANIZED HEALTH CARE EDUCATION/TRAINING PROGRAM

## 2024-07-18 PROCEDURE — NC001 PR NO CHARGE: Performed by: STUDENT IN AN ORGANIZED HEALTH CARE EDUCATION/TRAINING PROGRAM

## 2024-07-18 PROCEDURE — 99024 POSTOP FOLLOW-UP VISIT: CPT | Performed by: STUDENT IN AN ORGANIZED HEALTH CARE EDUCATION/TRAINING PROGRAM

## 2024-07-18 PROCEDURE — 85027 COMPLETE CBC AUTOMATED: CPT | Performed by: STUDENT IN AN ORGANIZED HEALTH CARE EDUCATION/TRAINING PROGRAM

## 2024-07-18 RX ORDER — SODIUM CHLORIDE 9 MG/ML
75 INJECTION, SOLUTION INTRAVENOUS CONTINUOUS
Status: DISCONTINUED | OUTPATIENT
Start: 2024-07-18 | End: 2024-07-18 | Stop reason: HOSPADM

## 2024-07-18 RX ORDER — OMEPRAZOLE 20 MG/1
20 CAPSULE, DELAYED RELEASE ORAL DAILY
Qty: 90 CAPSULE | Refills: 0 | Status: SHIPPED | OUTPATIENT
Start: 2024-07-18 | End: 2024-10-16

## 2024-07-18 RX ORDER — ENOXAPARIN SODIUM 100 MG/ML
40 INJECTION SUBCUTANEOUS
Status: DISCONTINUED | OUTPATIENT
Start: 2024-07-18 | End: 2024-07-18 | Stop reason: HOSPADM

## 2024-07-18 RX ORDER — OXYCODONE HYDROCHLORIDE 5 MG/1
5 TABLET ORAL EVERY 8 HOURS
Qty: 5 TABLET | Refills: 0 | Status: SHIPPED | OUTPATIENT
Start: 2024-07-18 | End: 2024-07-19 | Stop reason: ALTCHOICE

## 2024-07-18 RX ADMIN — ENOXAPARIN SODIUM 40 MG: 40 INJECTION SUBCUTANEOUS at 10:56

## 2024-07-18 RX ADMIN — ACETAMINOPHEN 1000 MG: 10 INJECTION INTRAVENOUS at 00:03

## 2024-07-18 RX ADMIN — SODIUM CHLORIDE, SODIUM LACTATE, POTASSIUM CHLORIDE, AND CALCIUM CHLORIDE 75 ML/HR: .6; .31; .03; .02 INJECTION, SOLUTION INTRAVENOUS at 00:03

## 2024-07-18 RX ADMIN — SODIUM CHLORIDE 75 ML/HR: 0.9 INJECTION, SOLUTION INTRAVENOUS at 08:14

## 2024-07-18 RX ADMIN — ACETAMINOPHEN 1000 MG: 10 INJECTION INTRAVENOUS at 08:06

## 2024-07-18 NOTE — PROGRESS NOTES
Progress Note - Bariatric Surgery   Chandler Angeles 59 y.o. male MRN: [id number]  Unit/Bed#: E5 -01 Encounter: [id number]    Assessment:  Chandler Angeles is a 59 y.o. male with history of barbara-en-Y gastric bypass s/p emergent diagnostic laparoscopic for barbara limb obstruction on 7/17 with lysis of adhesive band.     Patient doing well, and wants to go home.      Plan:  Bariatric clear liquid diet  Pain control p.o. meds  Encourage ambulation being out of bed  Incentive spirometer  Labs and vitals were reviewed and are stable  Okay for Lovenox for VTE prophylaxis  Pepcid for GI prophylaxis  Replace electrolytes as needed  Likely discharge later today    Subjective/Objective   Chief Complaint: No complaints    Subjective: No acute overnight events, patient denies nausea or vomiting.  Patient wants to try clear liquid diet. Patient has not ambulated since overnight surgery.  Patient is voiding.     Objective:     Vitals: Blood pressure 96/54, pulse 67, temperature 98.2 °F (36.8 °C), temperature source Oral, resp. rate 18, weight 88.9 kg (195 lb 15.8 oz), SpO2 96%.,Body mass index is 27.33 kg/m².      Intake/Output Summary (Last 24 hours) at 7/18/2024 0914  Last data filed at 7/18/2024 0834  Gross per 24 hour   Intake 1750 ml   Output 3270 ml   Net -1520 ml       Invasive Devices       Peripheral Intravenous Line  Duration             Peripheral IV 07/17/24 Left Antecubital <1 day                    Physical Exam: BP 96/54 (BP Location: Right arm)   Pulse 67   Temp 98.2 °F (36.8 °C) (Oral)   Resp 18   Wt 88.9 kg (195 lb 15.8 oz)   SpO2 96%   BMI 27.33 kg/m²   General appearance: alert and oriented, in no acute distress  Head: Normocephalic, without obvious abnormality, atraumatic  Eyes: negative  Nose: no discharge  Lungs:  no apparent respiratory distress on room air  Abdomen:  soft, nontender, nondistended, laparoscopic port sites clean dry and intact  Extremities: extremities normal, warm and  well-perfused; no cyanosis, clubbing, or edema  Skin: Skin color, texture, turgor normal. No rashes or lesions    Lab, Imaging and other studies: I have personally reviewed pertinent labs.  CBC:   Lab Results   Component Value Date    WBC 11.25 (H) 07/18/2024    HGB 13.0 07/18/2024    HCT 37.8 07/18/2024    MCV 93 07/18/2024     07/18/2024    RBC 4.08 07/18/2024    MCH 31.9 07/18/2024    MCHC 34.4 07/18/2024    RDW 12.2 07/18/2024    MPV 9.8 07/18/2024    NRBC 0 07/17/2024     CMP:   Lab Results   Component Value Date    SODIUM 132 (L) 07/18/2024     07/18/2024    CO2 22 07/18/2024    BUN 4 (L) 07/18/2024    CREATININE 0.57 (L) 07/18/2024    CALCIUM 8.3 (L) 07/18/2024    AST 22 07/17/2024    ALT 18 07/17/2024    ALKPHOS 81 07/17/2024    EGFR 113 07/18/2024     VTE Pharmacologic Prophylaxis: Enoxaparin (Lovenox)  VTE Mechanical Prophylaxis: sequential compression device    Torito Wang MD  Bariatric Surgery

## 2024-07-18 NOTE — NURSING NOTE
Patient requested to leave AMA. Bariatric team made aware. Wife was able to convince patient otherwise. Patient agreeable now to wait for formal DC orders.

## 2024-07-18 NOTE — UTILIZATION REVIEW
Initial Clinical Review    Admission: Date/Time/Statement:   Admission Orders (From admission, onward)       Ordered        07/17/24 2240  Inpatient Admission  Once                          Orders Placed This Encounter   Procedures    Inpatient Admission     Standing Status:   Standing     Number of Occurrences:   1     Order Specific Question:   Level of Care     Answer:   Med Surg [16]     Order Specific Question:   Bed Type     Answer:   Bariatric [1]     Order Specific Question:   Estimated length of stay     Answer:   Inpatient Only Surgery     ED Arrival Information       Expected   -    Arrival   7/17/2024 18:04    Acuity   Emergent              Means of arrival   Walk-In    Escorted by   Spouse    Service   Surgery-General    Admission type   Emergency              Arrival complaint   abdominal pain             Chief Complaint   Patient presents with    Abdominal Pain     Upper abdominal pain x 1 hour, +SOB, pale, denies N/V       Initial Presentation: 59 y.o. male presents to ED from home with acute  onset of abdominal pain, started the evening of admission after eating popcorn and a  granola bar.  Pain epigastric  and  radiates to back. Pain constant.  Ct scan shows  SBO/possible  internal hernia.  PMH  is  RNYGB in 2020.  Admit  Ip with  SBO and plan is  surgical intervention.     SURGERY DATE: 7/17/2024   Procedure(s):  LAPAROSCOPY DIAGNOSTIC. LYSIS OF ADHESIONS    Operative Findings:  Adhesive band causing obstruction of the barbara limb    Date:   7/18      Day 2:   Continue post op care.  Requesting  d/c.  Has not yet ambulated.   Trying  cl liq diet.    ED Triage Vitals [07/17/24 1808]   Temperature Pulse Respirations Blood Pressure SpO2 Pain Score   97.7 °F (36.5 °C) (!) 45 16 (!) 81/46 95 % 10 - Worst Possible Pain     Weight (last 2 days)       Date/Time Weight    07/18/24 0006 88.9 (195.99)            Vital Signs (last 3 days)       Date/Time Temp Pulse Resp BP MAP (mmHg) SpO2 O2 Flow Rate (L/min)  O2 Device Cardiac (WDL) Patient Position - Orthostatic VS Erasto Coma Scale Score Pain    07/18/24 11:18:16 98.1 °F (36.7 °C) 59 17 97/54 68 97 % -- None (Room air) -- Lying -- --    07/18/24 07:32:37 98.2 °F (36.8 °C) 67 18 96/54 68 96 % -- None (Room air) -- Lying -- --    07/18/24 0709 -- -- -- -- -- -- -- -- -- -- -- No Pain    07/18/24 03:14:56 97.7 °F (36.5 °C) 70 -- 120/69 86 95 % -- -- -- -- -- --    07/18/24 0000 -- -- -- -- -- -- -- -- -- -- 15 No Pain    07/17/24 23:58:09 98.1 °F (36.7 °C) 65 16 112/76 88 97 % -- -- -- -- -- --    07/17/24 2315 97.9 °F (36.6 °C) 71 19 124/74 86 96 % -- None (Room air) -- -- 15 No Pain    07/17/24 2300 -- 80 18 143/79 107 95 % -- None (Room air) -- -- -- No Pain    07/17/24 2254 -- -- -- -- -- 98 % -- None (Room air) -- -- -- --    07/17/24 2249 97.8 °F (36.6 °C) 76 14 127/70 100 99 % 10 L/min Simple mask WDL Lying 15 No Pain    07/17/24 2146 -- -- -- -- -- -- -- -- -- -- -- No Pain    07/17/24 2115 -- 69 18 135/89 107 96 % -- None (Room air) -- Lying -- --    07/17/24 2100 -- 67 15 124/77 95 98 % -- None (Room air) -- Lying -- --    07/17/24 2003 -- -- -- -- -- -- -- -- -- -- -- 7 07/17/24 2000 -- 73 19 131/77 99 91 % -- None (Room air) -- Lying -- --    07/17/24 1915 -- 65 15 127/78 97 92 % -- None (Room air) -- Lying -- --    07/17/24 1856 -- -- -- -- -- -- -- -- -- -- -- 7 07/17/24 1840 -- -- -- -- -- -- -- -- -- -- -- 7 07/17/24 1839 -- 66 15 123/78 -- 95 % -- None (Room air) -- Lying -- 7 07/17/24 1830 -- 65 15 108/61 80 93 % -- None (Room air) -- Lying -- --    07/17/24 1825 -- -- -- -- -- -- -- -- -- -- 15 10 - Worst Possible Pain    07/17/24 1815 -- 61 19 126/77 97 96 % -- None (Room air) -- Sitting -- --    07/17/24 1808 97.7 °F (36.5 °C) 45 16 81/46 -- 95 % -- -- -- -- -- 10 - Worst Possible Pain              Pertinent Labs/Diagnostic Test Results:   Radiology:  CTA dissection protocol chest/abdomen/pelvis   ED Interpretation by Prabha Barnes DO  (07/17 2000)   IMPRESSION:     1.  No acute aortic pathology.  2.  Mild swirling of the mesentery and an adjacent mildly dilated short segment loop of small bowel in the left upper abdomen adjacent to the surgical anastomosis. Findings raise the concern for an internal hernia in this patient who has previously   undergone a Marii-en-Y gastric bypass. Surgical consultation is advised.        Final Interpretation by Louis Batista MD (07/17 1956)      1.  No acute aortic pathology.   2.  Mild swirling of the mesentery and an adjacent mildly dilated short segment loop of small bowel in the left upper abdomen adjacent to the surgical anastomosis. Findings raise the concern for an internal hernia in this patient who has previously    undergone a Marii-en-Y gastric bypass. Surgical consultation is advised.            I personally discussed this study with BONNIE KOTHARI on 7/17/2024 7:56 PM.            Workstation performed: [id number]           Cardiology:  ECG 12 lead   Final Result by Torito Noriega MD (07/17 2239)   Normal sinus rhythm   Rightward axis   Non-specific intra-ventricular conduction block   Abnormal ECG   When compared with ECG of 17-JUL-2024 18:16, (unconfirmed)   No significant change was found   Confirmed by Torito Noriega ([id number]) on 7/17/2024 10:39:33 PM      ECG 12 lead   Final Result by Torito Noriega MD (07/17 2240)   Normal sinus rhythm   Rightward axis   Non-specific intra-ventricular conduction block   Abnormal ECG   When compared with ECG of 19-AUG-2020 09:37,   Questionable change in QRS axis   Confirmed by Torito Noriega ([id number]) on 7/17/2024 10:40:01 PM              Results from last 7 days   Lab Units 07/18/24  0441 07/17/24  1820   WBC Thousand/uL 11.25* 8.80   HEMOGLOBIN g/dL 13.0 12.8   HEMATOCRIT % 37.8 37.3   PLATELETS Thousands/uL 232 216   TOTAL NEUT ABS Thousands/µL  --  5.21         Results from last 7 days   Lab Units 07/18/24  0441 07/18/24  0021  07/17/24 2218 07/17/24  1820   SODIUM mmol/L 132* 126* 124* 121*   POTASSIUM mmol/L 4.0 3.9 4.0 3.5   CHLORIDE mmol/L 102 96 96 91*   CO2 mmol/L 22 24 21 23   ANION GAP mmol/L 8 6 7 7   BUN mg/dL 4* 4* 4* 5   CREATININE mg/dL 0.57* 0.60 0.61 0.68   EGFR ml/min/1.73sq m 113 110 110 105   CALCIUM mg/dL 8.3* 8.2* 8.0* 8.4     Results from last 7 days   Lab Units 07/17/24  1820   AST U/L 22   ALT U/L 18   ALK PHOS U/L 81   TOTAL PROTEIN g/dL 5.9*   ALBUMIN g/dL 3.9   TOTAL BILIRUBIN mg/dL 0.61   BILIRUBIN DIRECT mg/dL 0.17     Results from last 7 days   Lab Units 07/17/24  1821   POC GLUCOSE mg/dl 102     Results from last 7 days   Lab Units 07/18/24  0441 07/18/24  0021 07/17/24  2218 07/17/24  1820   GLUCOSE RANDOM mg/dL 87 110 123 130           Results from last 7 days   Lab Units 07/17/24 2010 07/17/24  1820   HS TNI 0HR ng/L  --  18   HS TNI 2HR ng/L 16  --    HSTNI D2 ng/L -2  --          Results from last 7 days   Lab Units 07/17/24  1820   PROTIME seconds 14.2   INR  1.08   PTT seconds 28             Results from last 7 days   Lab Units 07/17/24 2008   LACTIC ACID mmol/L 0.6           Results from last 7 days   Lab Units 07/17/24  1820   LIPASE u/L 45                 ED Treatment-Medication Administration from 07/17/2024 1804 to 07/17/2024 2145         Date/Time Order Dose Route Action     07/17/2024 1830 fentaNYL injection 75 mcg 75 mcg Intravenous Given by Other     07/17/2024 1839 sodium chloride 0.9 % bolus 1,000 mL 1,000 mL Intravenous New Bag     07/17/2024 1830 iohexol (OMNIPAQUE) 350 MG/ML injection (MULTI-DOSE) 100 mL 100 mL Intravenous Given     07/17/2024 1856 HYDROmorphone (DILAUDID) injection 1 mg 1 mg Intravenous Given     07/17/2024 2003 morphine injection 6 mg 6 mg Intravenous Given     07/17/2024 2136 ceFAZolin (ANCEF) IVPB (premix in dextrose) 2,000 mg 50 mL 2,000 mg Intravenous New Bag     07/17/2024 2135 heparin (porcine) subcutaneous injection 5,000 Units 5,000 Units Subcutaneous Given      07/17/2024 2136 lactated ringers infusion 100 mL/hr Intravenous New Bag     07/17/2024 2145 lactated ringers infusion -- Intravenous Continue from Pre-op              Present on Admission:   Small bowel obstruction due to adhesions (HCC)      Admitting Diagnosis: Hyponatremia [E87.1]  Abdominal pain [R10.9]  Internal hernia [K45.8]  Age/Sex: 59 y.o. male  Admission Orders:  Scheduled Medications:  acetaminophen, 1,000 mg, Intravenous, Q8H  enoxaparin, 40 mg, Subcutaneous, Q24H DRE      Continuous IV Infusions:  sodium chloride, 75 mL/hr, Intravenous, Continuous      PRN Meds:  clonazePAM, 2 mg, Oral, BID PRN  ondansetron, 4 mg, Intravenous, Q6H PRN  oxyCODONE, 5 mg, Oral, Q4H PRN    Network Utilization Review Department  ATTENTION: Please call with any questions or concerns to [phone] and carefully listen to the prompts so that you are directed to the right person. All voicemails are confidential.   For Discharge needs, contact Care Management DC Support Team at [phone] opt. 2  Send all requests for admission clinical reviews, approved or denied determinations and any other requests to dedicated fax number below belonging to the campus where the patient is receiving treatment. List of dedicated fax numbers for the Facilities:  FACILITY NAME UR FAX NUMBER   ADMISSION DENIALS (Administrative/Medical Necessity) [phone]   DISCHARGE SUPPORT TEAM (NETWORK) [phone]   PARENT CHILD HEALTH (Maternity/NICU/Pediatrics) [phone]   [facility] [phone]   [facility] [phone]   [facility] [phone]   [facility] [phone]   [facility] [phone]   [facility] [phone]   [facility] [phone]   [facility] [phone]   [facility]  [phone]   [facility] [phone]   [facility] [phone]   [facility] [phone]

## 2024-07-18 NOTE — PLAN OF CARE
Problem: Potential for Falls  Goal: Patient will remain free of falls  Description: INTERVENTIONS:  - Educate patient/family on patient safety including physical limitations  - Instruct patient to call for assistance with activity   - Consult OT/PT to assist with strengthening/mobility   - Keep Call bell within reach  - Keep bed low and locked with side rails adjusted as appropriate  - Keep care items and personal belongings within reach  - Initiate and maintain comfort rounds  - Make Fall Risk Sign visible to staff  - Offer Toileting every 2 Hours, in advance of need  - Initiate/Maintain bed alarm  - Obtain necessary fall risk management equipment: bed alarm  - Apply yellow socks and bracelet for high fall risk patients  - Consider moving patient to room near nurses station  Outcome: Progressing     Problem: PAIN - ADULT  Goal: Verbalizes/displays adequate comfort level or baseline comfort level  Description: Interventions:  - Encourage patient to monitor pain and request assistance  - Assess pain using appropriate pain scale  - Administer analgesics based on type and severity of pain and evaluate response  - Implement non-pharmacological measures as appropriate and evaluate response  - Consider cultural and social influences on pain and pain management  - Notify physician/advanced practitioner if interventions unsuccessful or patient reports new pain  Outcome: Progressing     Problem: INFECTION - ADULT  Goal: Absence or prevention of progression during hospitalization  Description: INTERVENTIONS:  - Assess and monitor for signs and symptoms of infection  - Monitor lab/diagnostic results  - Monitor all insertion sites, i.e. indwelling lines, tubes, and drains  - Monitor endotracheal if appropriate and nasal secretions for changes in amount and color  - [place] appropriate cooling/warming therapies per order  - Administer medications as ordered  - Instruct and encourage patient and family to use good hand  hygiene technique  - Identify and instruct in appropriate isolation precautions for identified infection/condition  Outcome: Progressing     Problem: SAFETY ADULT  Goal: Patient will remain free of falls  Description: INTERVENTIONS:  - Educate patient/family on patient safety including physical limitations  - Instruct patient to call for assistance with activity   - Consult OT/PT to assist with strengthening/mobility   - Keep Call bell within reach  - Keep bed low and locked with side rails adjusted as appropriate  - Keep care items and personal belongings within reach  - Initiate and maintain comfort rounds  - Make Fall Risk Sign visible to staff  - Offer Toileting every 2 Hours, in advance of need  - Initiate/Maintain bed alarm  - Obtain necessary fall risk management equipment: bed alarm  - Apply yellow socks and bracelet for high fall risk patients  - Consider moving patient to room near nurses station  Outcome: Progressing  Goal: Maintain or return to baseline ADL function  Description: INTERVENTIONS:  -  Assess patient's ability to carry out ADLs; assess patient's baseline for ADL function and identify physical deficits which impact ability to perform ADLs (bathing, care of mouth/teeth, toileting, grooming, dressing, etc.)  - Assess/evaluate cause of self-care deficits   - Assess range of motion  - Assess patient's mobility; develop plan if impaired  - Assess patient's need for assistive devices and provide as appropriate  - Encourage maximum independence but intervene and supervise when necessary  - Involve family in performance of ADLs  - Assess for home care needs following discharge   - Consider OT consult to assist with ADL evaluation and planning for discharge  - Provide patient education as appropriate  Outcome: Progressing     Problem: DISCHARGE PLANNING  Goal: Discharge to home or other facility with appropriate resources  Description: INTERVENTIONS:  - Identify barriers to discharge w/patient and  caregiver  - Arrange for needed discharge resources and transportation as appropriate  - Identify discharge learning needs (meds, wound care, etc.)  - Arrange for interpretive services to assist at discharge as needed  - Refer to Case Management Department for coordinating discharge planning if the patient needs post-hospital services based on physician/advanced practitioner order or complex needs related to functional status, cognitive ability, or social support system  Outcome: Progressing     Problem: Knowledge Deficit  Goal: Patient/family/caregiver demonstrates understanding of disease process, treatment plan, medications, and discharge instructions  Description: Complete learning assessment and assess knowledge base.  Interventions:  - Provide teaching at level of understanding  - Provide teaching via preferred learning methods  Outcome: Progressing     Problem: NEUROSENSORY - ADULT  Goal: Achieves stable or improved neurological status  Description: INTERVENTIONS  - Monitor and report changes in neurological status  - Monitor vital signs such as temperature, blood pressure, glucose, and any other labs ordered   - Initiate measures to prevent increased intracranial pressure  - Monitor for seizure activity and implement precautions if appropriate      Outcome: Progressing     Problem: CARDIOVASCULAR - ADULT  Goal: Maintains optimal cardiac output and hemodynamic stability  Description: INTERVENTIONS:  - Monitor I/O, vital signs and rhythm  - Monitor for S/S and trends of decreased cardiac output  - Administer and titrate ordered vasoactive medications to optimize hemodynamic stability  - Assess quality of pulses, skin color and temperature  - Assess for signs of decreased coronary artery perfusion  - Instruct patient to report change in severity of symptoms  Outcome: Progressing     Problem: RESPIRATORY - ADULT  Goal: Achieves optimal ventilation and oxygenation  Description: INTERVENTIONS:  - Assess for  changes in respiratory status  - Assess for changes in mentation and behavior  - Position to facilitate oxygenation and minimize respiratory effort  - Oxygen administered by appropriate delivery if ordered  - Initiate smoking cessation education as indicated  - Encourage broncho-pulmonary hygiene including cough, deep breathe, Incentive Spirometry  - Assess the need for suctioning and aspirate as needed  - Assess and instruct to report SOB or any respiratory difficulty  - Respiratory Therapy support as indicated  Outcome: Progressing     Problem: GASTROINTESTINAL - ADULT  Goal: Minimal or absence of nausea and/or vomiting  Description: INTERVENTIONS:  - Administer IV fluids if ordered to ensure adequate hydration  - Maintain NPO status until nausea and vomiting are resolved  - Nasogastric tube if ordered  - Administer ordered antiemetic medications as needed  - Provide nonpharmacologic comfort measures as appropriate  - Advance diet as tolerated, if ordered  - Consider nutrition services referral to assist patient with adequate nutrition and appropriate food choices  Outcome: Progressing  Goal: Maintains or returns to baseline bowel function  Description: INTERVENTIONS:  - Assess bowel function  - Encourage oral fluids to ensure adequate hydration  - Administer IV fluids if ordered to ensure adequate hydration  - Administer ordered medications as needed  - Encourage mobilization and activity  - Consider nutritional services referral to assist patient with adequate nutrition and appropriate food choices  Outcome: Progressing  Goal: Maintains adequate nutritional intake  Description: INTERVENTIONS:  - Monitor percentage of each meal consumed  - Identify factors contributing to decreased intake, treat as appropriate  - Assist with meals as needed  - Monitor I&O, weight, and lab values if indicated  - Obtain nutrition services referral as needed  Outcome: Progressing     Problem: GENITOURINARY - ADULT  Goal: Maintains  or returns to baseline urinary function  Description: INTERVENTIONS:  - Assess urinary function  - Encourage oral fluids to ensure adequate hydration if ordered  - Administer IV fluids as ordered to ensure adequate hydration  - Administer ordered medications as needed  - Offer frequent toileting  - Follow urinary retention protocol if ordered  Outcome: Progressing  Goal: Absence of urinary retention  Description: INTERVENTIONS:  - Assess patient’s ability to void and empty bladder  - Monitor I/O  - Bladder scan as needed  - Discuss with physician/AP medications to alleviate retention as needed  - Discuss catheterization for long term situations as appropriate  Outcome: Progressing     Problem: METABOLIC, FLUID AND ELECTROLYTES - ADULT  Goal: Electrolytes maintained within normal limits  Description: INTERVENTIONS:  - Monitor labs and assess patient for signs and symptoms of electrolyte imbalances  - Administer electrolyte replacement as ordered  - Monitor response to electrolyte replacements, including repeat lab results as appropriate  - Instruct patient on fluid and nutrition as appropriate  Outcome: Progressing  Goal: Fluid balance maintained  Description: INTERVENTIONS:  - Monitor labs   - Monitor I/O and WT  - Instruct patient on fluid and nutrition as appropriate  - Assess for signs & symptoms of volume excess or deficit  Outcome: Progressing     Problem: SKIN/TISSUE INTEGRITY - ADULT  Goal: Incision(s), wounds(s) or drain site(s) healing without S/S of infection  Description: INTERVENTIONS  - Assess and document dressing, incision, wound bed, drain sites and surrounding tissue  - Provide patient and family education  - Perform skin care/dressing changes   Outcome: Progressing     Problem: HEMATOLOGIC - ADULT  Goal: Maintains hematologic stability  Description: INTERVENTIONS  - Assess for signs and symptoms of bleeding or hemorrhage  - Monitor labs  - Administer supportive blood products/factors as ordered and  appropriate  Outcome: Progressing

## 2024-07-18 NOTE — INCIDENTAL FINDINGS
The following findings require follow up:  Radiographic finding   Finding: small bowel obstruction   Follow up required: with surgery postop   Follow up should be done within 1  week(s)    Please notify the following clinician to assist with the follow up:   Dr. Dar Lock    This finding results were discussed with the Patient by Torito Wang MD on 07/18/24.   They expressed understanding and all questions answered.    This was not an incidental finding, this finding was addressed surgically during the same encounter.    Torito Wang MD

## 2024-07-18 NOTE — PLAN OF CARE
Problem: Potential for Falls  Goal: Patient will remain free of falls  Description: INTERVENTIONS:  - Educate patient/family on patient safety including physical limitations  - Instruct patient to call for assistance with activity   - Consult OT/PT to assist with strengthening/mobility   - Keep Call bell within reach  - Keep bed low and locked with side rails adjusted as appropriate  - Keep care items and personal belongings within reach  - Initiate and maintain comfort rounds  - Make Fall Risk Sign visible to staff  - Offer Toileting every 2 Hours, in advance of need  - Initiate/Maintain bed alarm  - Obtain necessary fall risk management equipment: bed alarm  - Apply yellow socks and bracelet for high fall risk patients  - Consider moving patient to room near nurses station  Outcome: Progressing     Problem: PAIN - ADULT  Goal: Verbalizes/displays adequate comfort level or baseline comfort level  Description: Interventions:  - Encourage patient to monitor pain and request assistance  - Assess pain using appropriate pain scale  - Administer analgesics based on type and severity of pain and evaluate response  - Implement non-pharmacological measures as appropriate and evaluate response  - Consider cultural and social influences on pain and pain management  - Notify physician/advanced practitioner if interventions unsuccessful or patient reports new pain  Outcome: Progressing     Problem: INFECTION - ADULT  Goal: Absence or prevention of progression during hospitalization  Description: INTERVENTIONS:  - Assess and monitor for signs and symptoms of infection  - Monitor lab/diagnostic results  - Monitor all insertion sites, i.e. indwelling lines, tubes, and drains  - Monitor endotracheal if appropriate and nasal secretions for changes in amount and color  - [place] appropriate cooling/warming therapies per order  - Administer medications as ordered  - Instruct and encourage patient and family to use good hand  hygiene technique  - Identify and instruct in appropriate isolation precautions for identified infection/condition  Outcome: Progressing     Problem: SAFETY ADULT  Goal: Patient will remain free of falls  Description: INTERVENTIONS:  - Educate patient/family on patient safety including physical limitations  - Instruct patient to call for assistance with activity   - Consult OT/PT to assist with strengthening/mobility   - Keep Call bell within reach  - Keep bed low and locked with side rails adjusted as appropriate  - Keep care items and personal belongings within reach  - Initiate and maintain comfort rounds  - Make Fall Risk Sign visible to staff  - Offer Toileting every 2 Hours, in advance of need  - Initiate/Maintain bed alarm  - Obtain necessary fall risk management equipment: bed alarm  - Apply yellow socks and bracelet for high fall risk patients  - Consider moving patient to room near nurses station  Outcome: Progressing  Goal: Maintain or return to baseline ADL function  Description: INTERVENTIONS:  -  Assess patient's ability to carry out ADLs; assess patient's baseline for ADL function and identify physical deficits which impact ability to perform ADLs (bathing, care of mouth/teeth, toileting, grooming, dressing, etc.)  - Assess/evaluate cause of self-care deficits   - Assess range of motion  - Assess patient's mobility; develop plan if impaired  - Assess patient's need for assistive devices and provide as appropriate  - Encourage maximum independence but intervene and supervise when necessary  - Involve family in performance of ADLs  - Assess for home care needs following discharge   - Consider OT consult to assist with ADL evaluation and planning for discharge  - Provide patient education as appropriate  Outcome: Progressing     Problem: DISCHARGE PLANNING  Goal: Discharge to home or other facility with appropriate resources  Description: INTERVENTIONS:  - Identify barriers to discharge w/patient and  caregiver  - Arrange for needed discharge resources and transportation as appropriate  - Identify discharge learning needs (meds, wound care, etc.)  - Arrange for interpretive services to assist at discharge as needed  - Refer to Case Management Department for coordinating discharge planning if the patient needs post-hospital services based on physician/advanced practitioner order or complex needs related to functional status, cognitive ability, or social support system  Outcome: Progressing     Problem: Knowledge Deficit  Goal: Patient/family/caregiver demonstrates understanding of disease process, treatment plan, medications, and discharge instructions  Description: Complete learning assessment and assess knowledge base.  Interventions:  - Provide teaching at level of understanding  - Provide teaching via preferred learning methods  Outcome: Progressing     Problem: NEUROSENSORY - ADULT  Goal: Achieves stable or improved neurological status  Description: INTERVENTIONS  - Monitor and report changes in neurological status  - Monitor vital signs such as temperature, blood pressure, glucose, and any other labs ordered   - Initiate measures to prevent increased intracranial pressure  - Monitor for seizure activity and implement precautions if appropriate      Outcome: Progressing     Problem: CARDIOVASCULAR - ADULT  Goal: Maintains optimal cardiac output and hemodynamic stability  Description: INTERVENTIONS:  - Monitor I/O, vital signs and rhythm  - Monitor for S/S and trends of decreased cardiac output  - Administer and titrate ordered vasoactive medications to optimize hemodynamic stability  - Assess quality of pulses, skin color and temperature  - Assess for signs of decreased coronary artery perfusion  - Instruct patient to report change in severity of symptoms  Outcome: Progressing     Problem: RESPIRATORY - ADULT  Goal: Achieves optimal ventilation and oxygenation  Description: INTERVENTIONS:  - Assess for  changes in respiratory status  - Assess for changes in mentation and behavior  - Position to facilitate oxygenation and minimize respiratory effort  - Oxygen administered by appropriate delivery if ordered  - Initiate smoking cessation education as indicated  - Encourage broncho-pulmonary hygiene including cough, deep breathe, Incentive Spirometry  - Assess the need for suctioning and aspirate as needed  - Assess and instruct to report SOB or any respiratory difficulty  - Respiratory Therapy support as indicated  Outcome: Progressing     Problem: GASTROINTESTINAL - ADULT  Goal: Minimal or absence of nausea and/or vomiting  Description: INTERVENTIONS:  - Administer IV fluids if ordered to ensure adequate hydration  - Maintain NPO status until nausea and vomiting are resolved  - Nasogastric tube if ordered  - Administer ordered antiemetic medications as needed  - Provide nonpharmacologic comfort measures as appropriate  - Advance diet as tolerated, if ordered  - Consider nutrition services referral to assist patient with adequate nutrition and appropriate food choices  Outcome: Progressing  Goal: Maintains or returns to baseline bowel function  Description: INTERVENTIONS:  - Assess bowel function  - Encourage oral fluids to ensure adequate hydration  - Administer IV fluids if ordered to ensure adequate hydration  - Administer ordered medications as needed  - Encourage mobilization and activity  - Consider nutritional services referral to assist patient with adequate nutrition and appropriate food choices  Outcome: Progressing  Goal: Maintains adequate nutritional intake  Description: INTERVENTIONS:  - Monitor percentage of each meal consumed  - Identify factors contributing to decreased intake, treat as appropriate  - Assist with meals as needed  - Monitor I&O, weight, and lab values if indicated  - Obtain nutrition services referral as needed  Outcome: Progressing     Problem: GENITOURINARY - ADULT  Goal: Maintains  or returns to baseline urinary function  Description: INTERVENTIONS:  - Assess urinary function  - Encourage oral fluids to ensure adequate hydration if ordered  - Administer IV fluids as ordered to ensure adequate hydration  - Administer ordered medications as needed  - Offer frequent toileting  - Follow urinary retention protocol if ordered  Outcome: Progressing  Goal: Absence of urinary retention  Description: INTERVENTIONS:  - Assess patient’s ability to void and empty bladder  - Monitor I/O  - Bladder scan as needed  - Discuss with physician/AP medications to alleviate retention as needed  - Discuss catheterization for long term situations as appropriate  Outcome: Progressing     Problem: METABOLIC, FLUID AND ELECTROLYTES - ADULT  Goal: Electrolytes maintained within normal limits  Description: INTERVENTIONS:  - Monitor labs and assess patient for signs and symptoms of electrolyte imbalances  - Administer electrolyte replacement as ordered  - Monitor response to electrolyte replacements, including repeat lab results as appropriate  - Instruct patient on fluid and nutrition as appropriate  Outcome: Progressing  Goal: Fluid balance maintained  Description: INTERVENTIONS:  - Monitor labs   - Monitor I/O and WT  - Instruct patient on fluid and nutrition as appropriate  - Assess for signs & symptoms of volume excess or deficit  Outcome: Progressing     Problem: SKIN/TISSUE INTEGRITY - ADULT  Goal: Incision(s), wounds(s) or drain site(s) healing without S/S of infection  Description: INTERVENTIONS  - Assess and document dressing, incision, wound bed, drain sites and surrounding tissue  - Provide patient and family education  - Perform skin care/dressing changes   Outcome: Progressing     Problem: HEMATOLOGIC - ADULT  Goal: Maintains hematologic stability  Description: INTERVENTIONS  - Assess for signs and symptoms of bleeding or hemorrhage  - Monitor labs  - Administer supportive blood products/factors as ordered and  appropriate  Outcome: Progressing

## 2024-07-18 NOTE — DISCHARGE INSTRUCTIONS
your medications from [facility] in Hospital Lobby   Crush or cut your pills and open capsules, mix with liquid to drink.  Take over the counter Tylenol every 8 hours around the clock, unless instructed otherwise  Take your omeprazole daily  It is important to stay hydrated and follow your discharge diet progression   Mild nausea is ok as long as you can drink fluids, sip very slowly and get up and walk during any periods of nausea  You may shower normally after 48 hours, but do not scrub incision sites, blot gently with clean towel to dry incisions  Take home medications as usual unless instructed otherwise while in hospital  Follow up with Dr. Dar Lock and your PCP within the next week

## 2024-07-18 NOTE — RESTORATIVE TECHNICIAN NOTE
Restorative Technician Note      Patient Name: Chandler Angeles     Restorative Tech Visit Date: 07/18/24  Note Type: Mobility  Patient Position Upon Consult: Supine  Activity Performed: Ambulated; Range of motion  Patient Position at End of Consult: All needs within reach; Supine

## 2024-07-18 NOTE — OP NOTE
OPERATIVE REPORT  PATIENT NAME: Chandler Angeles    :  1965  MRN: [id number]  Pt Location: [facility] OR ROOM 06    SURGERY DATE: 2024    Surgeons and Role:     * Fitz Lock MD - Primary     * Chito Oreilly MD - Assisting    Preop Diagnosis:  Internal hernia [K45.8]    Post-Op Diagnosis Codes:     * Internal hernia [K45.8]    Procedure(s):  LAPAROSCOPY DIAGNOSTIC. LYSIS OF ADHESIONS    Specimen(s):  * No specimens in log *    Estimated Blood Loss:   20 mL    Drains:  * No LDAs found *    Anesthesia Type:   General    Operative Indications:  Internal hernia [K45.8]  Abdominal pain    Operative Findings:  Adhesive band causing obstruction of the barbara limb      Complications:   None    Procedure and Technique:  The patient was placed in a supine position. The patient received a dose of IV antibiotics and a dose of subcutaneous heparin prior to the procedure.  The patient was induced and intubated under general endotracheal anesthesia. The abdominal wall was prepped and draped under sterile conditions in the usual fashion.      After calling a time-out, we started the procedure by making an incision at Long's point and inserted a Veress needle to insuflate the abdomen. Another incision was made to the left of the midline 6 inches from the xiphoid process, and the abdominal cavity was accessed using an Optiview trocar. A bilateral TAP block was performed. Three additional ports were placed: two 5mm in the right abdomen and RUQ and one 12mm trocar in the LUQ.  We immediately identified the problem as there was an adhesive band extending from the mesentery of the barbara limb to the abdominal wall causing an obstruction. There was also evidence of lymphatic fluid and exudate surrounding the barbara limb likely 2/2 to the obstruction. After taking down the adhesion with a harmonic, we ran the bowel confirming no other internal hernias were present and inspected the mesenteric defect at the JJ and pseudo-Torre's space  and both were found to be closed. We then took the omental adhesions off the abdominal wall.  There was also evidence of some turbid fluid in the abdomen which was suctioned.  Once we confirmed there was no other abnormalities, we desufflated the abdomen and closed the incision sites with 4-0 monocryl and glue.  The patient was extubated and transferred to the PACU in stable condition.     Dr Mcleod was present for the entirety of this case.    Patient Disposition:  PACU     This procedure was not performed to treat colon cancer through resection      SIGNATURE: Chito Oreilly MD  DATE: July 17, 2024  TIME: 10:40 PM

## 2024-07-18 NOTE — H&P
"Consultation - Bariatric Surgery   Chandler Angeles 59 y.o. male MRN: [id number]  Unit/Bed#: ED-26 Encounter: [id number]    Assessment & Plan     Assessment:  58 y/o M with a history of laparoscopic RYGB in 2020 who presents with acute onset abdominal pain starting at 5pm today. CT with evidence of dilated loop of bowel proximal to JJ with surrounding mesenteric swirling. Gastric pouch appears dilated with large amounts of food (potentially food bezoar).  Given clinical picture and CT imaging patient will likely need diagnostic laparoscopy to determine cause of pain.    Plan:  -NPO  -Case request for diagnostic laparoscopy  -Consented  -Preop abx, heparin    History of Present Illness     HPI:  Chandler Angeles is a 59 y.o. male There is no height or weight on file to calculate BMI. with history of laparoscopic RYGB (25mm EEA GJ) with Dr. Mcleod in 2020 who presents to the ED with complaints of acute onset abdominal pain. Pain started around 5pm this evening following ingestion of granola bar and popcorn. Describes the pain as epigastric mainly radiating to the back. Pain is constant with no relieving factors. He denies any nausea or emesis or any changes in bowel habits.  Stable in the ED with labs within normal limits.  Ct scan shows evidence of \"dilated sort segment loop of small bowel in left upper abdomen and swirling of surrounding mesentery\" suggesting possible internal hernia.      Review of Systems   All other systems reviewed and are negative.      Historical Information   Past Medical History:   Diagnosis Date    Anxiety     Bariatric surgery status     CPAP (continuous positive airway pressure) dependence     Depression     Dizziness     Hearing loss of aging     HL (hearing loss)     Hypercholesterolemia     Migraine     Morbid obesity (HCC)     Myocardial infarction (HCC)     Nasal congestion     Nasal obstruction     NSTEMI (non-ST elevated myocardial infarction) (HCC) 05/15/2019    NSTEMI (non-ST " elevation myocardial infarction) (HCC)     2019    Pneumonia     Postsurgical malabsorption     Sleep apnea     Tinnitus      Past Surgical History:   Procedure Laterality Date    BONE TUMOR EXCISION Bilateral 2023    Procedure: REMOVAL TUMOR BONE;  Surgeon: Dandre Sanderson DO;  Location:  MAIN OR;  Service: Orthopedics    CARDIAC CATHETERIZATION      no stents     COLONOSCOPY      bx taken    EGD      HERNIA REPAIR      umbilical hernia    HI LAPS GSTR RSTCV PX W/BYP FRITZ-EN-Y LIMB <150 CM N/A 2020    Procedure: LAPAROSCOPIC FRITZ-EN-Y GASTRIC BYPASS AND INTRAOPERATIVE EGD;  Surgeon: Fitz Lock MD;  Location: [facility];  Service: Bariatrics    SKIN SURGERY      cyst removed from back and thumb    TONSILLECTOMY      TOOTH EXTRACTION       Social History   Social History     Substance and Sexual Activity   Alcohol Use Not Currently     Social History     Substance and Sexual Activity   Drug Use Never     Social History     Tobacco Use   Smoking Status Former    Current packs/day: 0.00    Average packs/day: 1 pack/day for 10.0 years (10.0 ttl pk-yrs)    Types: Cigarettes, Cigars    Quit date: 10/15/2003    Years since quittin.7   Smokeless Tobacco Former    Types: Chew    Quit date: 2022   Tobacco Comments    Currently vapes     Family History: non-contributory    Meds/Allergies   all current active meds have been reviewed  No Known Allergies    Objective   First Vitals:   Blood Pressure: (!) 81/46 (24)  Pulse: (!) 45 (24)  Temperature: 97.7 °F (36.5 °C) (24)  Respirations: 16 (24)  SpO2: 95 % (24)    Current Vitals:   Blood Pressure: 131/77 (24)  Pulse: 73 (24)  Temperature: 97.7 °F (36.5 °C) (24)  Respirations: 19 (24)  SpO2: 91 % (24)      Intake/Output Summary (Last 24 hours) at 2024  Last data filed at 2024  Gross per 24 hour   Intake 1000 ml    Output --   Net 1000 ml       Invasive Devices       Peripheral Intravenous Line  Duration             Peripheral IV 07/17/24 Left Antecubital <1 day                    Physical Exam  Constitutional:       Appearance: He is well-developed.   HENT:      Head: Normocephalic and atraumatic.   Cardiovascular:      Rate and Rhythm: Normal rate.   Pulmonary:      Effort: Pulmonary effort is normal.      Breath sounds: Normal breath sounds.   Abdominal:      Palpations: Abdomen is soft.      Tenderness: There is abdominal tenderness in the right upper quadrant, epigastric area and left upper quadrant.   Skin:     General: Skin is warm.   Neurological:      General: No focal deficit present.      Mental Status: He is alert and oriented to person, place, and time.         Lab Results: CBC:   Lab Results   Component Value Date    WBC 8.80 07/17/2024    HGB 12.8 07/17/2024    HCT 37.3 07/17/2024    MCV 91 07/17/2024     07/17/2024    RBC 4.11 07/17/2024    MCH 31.1 07/17/2024    MCHC 34.3 07/17/2024    RDW 12.3 07/17/2024    MPV 9.1 07/17/2024    NRBC 0 07/17/2024     Imaging: I have personally reviewed pertinent reports.    EKG, Pathology, and Other Studies: I have personally reviewed pertinent reports.

## 2024-07-18 NOTE — DISCHARGE SUMMARY
Discharge Summary - Chandler Angeles 59 y.o. male MRN: [id number]    Unit/Bed#: E5 -01 Encounter: [id number]      Pre-Operative Diagnosis: Pre-Op Diagnosis Codes:      * Internal hernia [K45.8]    Post-Operative Diagnosis: Post-Op Diagnosis Codes:     * Internal hernia [K45.8]    Procedures Performed:  Procedure(s):  LAPAROSCOPY DIAGNOSTIC, LYSIS OF ADHESIONS    Surgeon: Fitz Lock MD    See H & P for full details of admission and Operative Note for full details of operations performed.     Patient tolerated surgery well without complications. In the morning postoperative Day 1, the patient had mild nausea and abdominal pain. Tolerated a clear liquid diet without vomiting. Able to ambulate and voiding independently. Patient was deemed ready for discharge home.     Patient was seen and examined prior to discharge.      Patient has incidental finding of hyponatremia that improved after serial BMPs. No additional recommendations, follow-up with PCP.    Provisions for Follow-Up Care:  See After Visit Summary/Discharge Instructions for information related to follow-up care and home orders.      Disposition: Home, in stable condition.     Planned Readmission: No    Discharge Medications:  See After Visit Summary/Discharge Instructions for reconciled discharge medications provided to patient and family.      Post Operative instructions: Reviewed with patient and/or family.    Signature:   Torito Wang MD  Date: 7/18/2024 Time: 9:37 AM

## 2024-07-18 NOTE — DISCHARGE INSTR - AVS FIRST PAGE
Bariatric/Weight Loss Surgery  Emergency Surgery  Hospital Discharge Instructions  ACTIVITY:  Progress as feels comfortable - a good rule is:  if you are doing something and it begins to hurt, stop doing the activity. Walk every hour while at home.  You may walk stairs if you do so slowly  You may shower 48 hours after surgery.  Do not scrub incision sites.  Blot gently with clean towel to dry incisions. (see #4 below)   Use your incentive spirometer 10 times per hour while awake for 1 week after surgery.  Do NOT drive for 48 hours after surgery. No driving 24 hours after taking certain prescription pain medications. Examples of such medication are Percocet, Darvocet, Oxycodone, Tylenol #3, and Tylenol with Codeine.     DIET  Stay on a liquid diet for 7 days after your surgery date, sipping slowly. You may have protein drinks. Make sure to drink 48 to 64 ounces per day of fluids.   You may advance to a pureed diet one week after surgery. Once you get approval from your surgeon at your first post operative visit, you may advance to the soft diet and remain on soft diet for 8 weeks unless otherwise instructed.    MEDICATIONS:  The abdominal nerve block will wear off during the first 1-2 days that you are home, and you may become sore (especially over incision site/sites where abdominal wall is sutured). This may create a pulling sensation, especially while moving around, and will fade over time.  Continue to take your Tylenol and your pain medication as instructed.   Start vitamins and minerals one week after surgery or when you start stage 3/puree diet.   Anti-acid Medication as per prescription.  Other medications as indicated on the Physician Patient Discharge Instructions form given to you at the time of discharge.  Make sure that you are splitting your pill or tablet medications in halves or fourths or even crushing them before you take them. Capsules should be opened and mixed with water or jello. You need to do  this for at least 4 weeks after surgery. Eventually you will be able to take your medications the regular way as they were prescribed.   You will need to consult with your Family Doctor in regards to all your prescribed medication, particularly those for blood pressure and diabetes.  As you lose weight, medical conditions may change, requiring an alteration or elimination of the drug dose. Monitor blood pressure closely and call PCP with any concerns.       INCISION CARE  You may shower and get incisions wet 2 days after surgery. No soaking tub baths or swimming for 30 days after surgery. Keep abdominal area and incisions clean. Use soap and water to create a good lather and rinse off.  Do not scrub incisions.   If you have a drain, empty the drain as the nurses instructed.    FOLLOW-UP APPOINTMENT should be made for one week after discharge. Call surgeon’s office at [phone] to schedule an appointment.    CALL YOUR DOCTOR FOR:  pain not controlled by pain medications, a temperature greater than 101.5° F, any increase or change in drainage or redness from any incision, any vomiting or inability to keep liquids down, shortness of breath, shoulder pain, or bleeding     Erythromycin Counseling:  I discussed with the patient the risks of erythromycin including but not limited to GI upset, allergic reaction, drug rash, diarrhea, increase in liver enzymes, and yeast infections.

## 2024-07-18 NOTE — PLAN OF CARE
Problem: Potential for Falls  Goal: Patient will remain free of falls  Description: INTERVENTIONS:  - Educate patient/family on patient safety including physical limitations  - Instruct patient to call for assistance with activity   - Consult OT/PT to assist with strengthening/mobility   - Keep Call bell within reach  - Keep bed low and locked with side rails adjusted as appropriate  - Keep care items and personal belongings within reach  - Initiate and maintain comfort rounds  - Make Fall Risk Sign visible to staff  - Offer Toileting every 2 Hours, in advance of need  - Initiate/Maintain bed alarm  - Obtain necessary fall risk management equipment: bed alarm  - Apply yellow socks and bracelet for high fall risk patients  - Consider moving patient to room near nurses station  Outcome: Progressing     Problem: PAIN - ADULT  Goal: Verbalizes/displays adequate comfort level or baseline comfort level  Description: Interventions:  - Encourage patient to monitor pain and request assistance  - Assess pain using appropriate pain scale  - Administer analgesics based on type and severity of pain and evaluate response  - Implement non-pharmacological measures as appropriate and evaluate response  - Consider cultural and social influences on pain and pain management  - Notify physician/advanced practitioner if interventions unsuccessful or patient reports new pain  Outcome: Progressing     Problem: INFECTION - ADULT  Goal: Absence or prevention of progression during hospitalization  Description: INTERVENTIONS:  - Assess and monitor for signs and symptoms of infection  - Monitor lab/diagnostic results  - Monitor all insertion sites, i.e. indwelling lines, tubes, and drains  - Monitor endotracheal if appropriate and nasal secretions for changes in amount and color  - Hickman appropriate cooling/warming therapies per order  - Administer medications as ordered  - Instruct and encourage patient and family to use good hand  hygiene technique  - Identify and instruct in appropriate isolation precautions for identified infection/condition  Outcome: Progressing     Problem: SAFETY ADULT  Goal: Patient will remain free of falls  Description: INTERVENTIONS:  - Educate patient/family on patient safety including physical limitations  - Instruct patient to call for assistance with activity   - Consult OT/PT to assist with strengthening/mobility   - Keep Call bell within reach  - Keep bed low and locked with side rails adjusted as appropriate  - Keep care items and personal belongings within reach  - Initiate and maintain comfort rounds  - Make Fall Risk Sign visible to staff  - Offer Toileting every 2 Hours, in advance of need  - Initiate/Maintain bed alarm  - Obtain necessary fall risk management equipment: bed alarm  - Apply yellow socks and bracelet for high fall risk patients  - Consider moving patient to room near nurses station  Outcome: Progressing  Goal: Maintain or return to baseline ADL function  Description: INTERVENTIONS:  -  Assess patient's ability to carry out ADLs; assess patient's baseline for ADL function and identify physical deficits which impact ability to perform ADLs (bathing, care of mouth/teeth, toileting, grooming, dressing, etc.)  - Assess/evaluate cause of self-care deficits   - Assess range of motion  - Assess patient's mobility; develop plan if impaired  - Assess patient's need for assistive devices and provide as appropriate  - Encourage maximum independence but intervene and supervise when necessary  - Involve family in performance of ADLs  - Assess for home care needs following discharge   - Consider OT consult to assist with ADL evaluation and planning for discharge  - Provide patient education as appropriate  Outcome: Progressing     Problem: DISCHARGE PLANNING  Goal: Discharge to home or other facility with appropriate resources  Description: INTERVENTIONS:  - Identify barriers to discharge w/patient and  caregiver  - Arrange for needed discharge resources and transportation as appropriate  - Identify discharge learning needs (meds, wound care, etc.)  - Arrange for interpretive services to assist at discharge as needed  - Refer to Case Management Department for coordinating discharge planning if the patient needs post-hospital services based on physician/advanced practitioner order or complex needs related to functional status, cognitive ability, or social support system  Outcome: Progressing     Problem: Knowledge Deficit  Goal: Patient/family/caregiver demonstrates understanding of disease process, treatment plan, medications, and discharge instructions  Description: Complete learning assessment and assess knowledge base.  Interventions:  - Provide teaching at level of understanding  - Provide teaching via preferred learning methods  Outcome: Progressing     Problem: NEUROSENSORY - ADULT  Goal: Achieves stable or improved neurological status  Description: INTERVENTIONS  - Monitor and report changes in neurological status  - Monitor vital signs such as temperature, blood pressure, glucose, and any other labs ordered   - Initiate measures to prevent increased intracranial pressure  - Monitor for seizure activity and implement precautions if appropriate      Outcome: Progressing     Problem: CARDIOVASCULAR - ADULT  Goal: Maintains optimal cardiac output and hemodynamic stability  Description: INTERVENTIONS:  - Monitor I/O, vital signs and rhythm  - Monitor for S/S and trends of decreased cardiac output  - Administer and titrate ordered vasoactive medications to optimize hemodynamic stability  - Assess quality of pulses, skin color and temperature  - Assess for signs of decreased coronary artery perfusion  - Instruct patient to report change in severity of symptoms  Outcome: Progressing     Problem: RESPIRATORY - ADULT  Goal: Achieves optimal ventilation and oxygenation  Description: INTERVENTIONS:  - Assess for  changes in respiratory status  - Assess for changes in mentation and behavior  - Position to facilitate oxygenation and minimize respiratory effort  - Oxygen administered by appropriate delivery if ordered  - Initiate smoking cessation education as indicated  - Encourage broncho-pulmonary hygiene including cough, deep breathe, Incentive Spirometry  - Assess the need for suctioning and aspirate as needed  - Assess and instruct to report SOB or any respiratory difficulty  - Respiratory Therapy support as indicated  Outcome: Progressing     Problem: GASTROINTESTINAL - ADULT  Goal: Minimal or absence of nausea and/or vomiting  Description: INTERVENTIONS:  - Administer IV fluids if ordered to ensure adequate hydration  - Maintain NPO status until nausea and vomiting are resolved  - Nasogastric tube if ordered  - Administer ordered antiemetic medications as needed  - Provide nonpharmacologic comfort measures as appropriate  - Advance diet as tolerated, if ordered  - Consider nutrition services referral to assist patient with adequate nutrition and appropriate food choices  Outcome: Progressing  Goal: Maintains or returns to baseline bowel function  Description: INTERVENTIONS:  - Assess bowel function  - Encourage oral fluids to ensure adequate hydration  - Administer IV fluids if ordered to ensure adequate hydration  - Administer ordered medications as needed  - Encourage mobilization and activity  - Consider nutritional services referral to assist patient with adequate nutrition and appropriate food choices  Outcome: Progressing  Goal: Maintains adequate nutritional intake  Description: INTERVENTIONS:  - Monitor percentage of each meal consumed  - Identify factors contributing to decreased intake, treat as appropriate  - Assist with meals as needed  - Monitor I&O, weight, and lab values if indicated  - Obtain nutrition services referral as needed  Outcome: Progressing     Problem: GENITOURINARY - ADULT  Goal: Maintains  or returns to baseline urinary function  Description: INTERVENTIONS:  - Assess urinary function  - Encourage oral fluids to ensure adequate hydration if ordered  - Administer IV fluids as ordered to ensure adequate hydration  - Administer ordered medications as needed  - Offer frequent toileting  - Follow urinary retention protocol if ordered  Outcome: Progressing  Goal: Absence of urinary retention  Description: INTERVENTIONS:  - Assess patient’s ability to void and empty bladder  - Monitor I/O  - Bladder scan as needed  - Discuss with physician/AP medications to alleviate retention as needed  - Discuss catheterization for long term situations as appropriate  Outcome: Progressing     Problem: METABOLIC, FLUID AND ELECTROLYTES - ADULT  Goal: Electrolytes maintained within normal limits  Description: INTERVENTIONS:  - Monitor labs and assess patient for signs and symptoms of electrolyte imbalances  - Administer electrolyte replacement as ordered  - Monitor response to electrolyte replacements, including repeat lab results as appropriate  - Instruct patient on fluid and nutrition as appropriate  Outcome: Progressing  Goal: Fluid balance maintained  Description: INTERVENTIONS:  - Monitor labs   - Monitor I/O and WT  - Instruct patient on fluid and nutrition as appropriate  - Assess for signs & symptoms of volume excess or deficit  Outcome: Progressing     Problem: SKIN/TISSUE INTEGRITY - ADULT  Goal: Incision(s), wounds(s) or drain site(s) healing without S/S of infection  Description: INTERVENTIONS  - Assess and document dressing, incision, wound bed, drain sites and surrounding tissue  - Provide patient and family education  - Perform skin care/dressing changes   Outcome: Progressing     Problem: HEMATOLOGIC - ADULT  Goal: Maintains hematologic stability  Description: INTERVENTIONS  - Assess for signs and symptoms of bleeding or hemorrhage  - Monitor labs  - Administer supportive blood products/factors as ordered and  appropriate  Outcome: Progressing

## 2024-07-18 NOTE — ANESTHESIA PREPROCEDURE EVALUATION
Procedure:  LAPAROSCOPY DIAGNOSTIC (Abdomen)    Relevant Problems   ANESTHESIA (within normal limits)      CARDIO   (+) Coronary artery disease involving native coronary artery of native heart with unstable angina pectoris (HCC)   (+) Migraine with aura and without status migrainosus, not intractable   (+) Mixed hyperlipidemia   (+) NSTEMI (non-ST elevated myocardial infarction) (HCC) (Resolved)      NEURO/PSYCH   (+) Anxiety disorder   (+) Migraine with aura and without status migrainosus, not intractable      PULMONARY   (+) ROSI (obstructive sleep apnea)      Behavioral Health   (+) Bipolar disorder, current episode mixed, moderate (HCC)   (+) Former smoker      Neurology/Sleep   (+) Dysautonomia-like disorder      Surgery/Wound/Pain   (+) Bariatric surgery status      Other   (+) Hyponatremia        Physical Exam    Airway    Mallampati score: II  TM Distance: >3 FB  Neck ROM: full     Dental   No notable dental hx     Cardiovascular  Rhythm: regular, Rate: normal, Cardiovascular exam normal    Pulmonary  Pulmonary exam normal Breath sounds clear to auscultation    Other Findings        Anesthesia Plan  ASA Score- 3 Emergent    Anesthesia Type- general with ASA Monitors.         Additional Monitors:     Airway Plan: ETT.    Comment: RSI.       Plan Factors-    Chart reviewed. EKG reviewed.  Existing labs reviewed. Patient summary reviewed.                  Induction- intravenous and rapid sequence induction.    Postoperative Plan-         Informed Consent- Anesthetic plan and risks discussed with patient.

## 2024-07-18 NOTE — UTILIZATION REVIEW
Notification of Unplanned, Urgent, or   Emergency Inpatient Admission   AUTHORIZATION REQUEST   Admitting Facility [place] [address]  Tax ID: [id number]  NPI: [id number]  Place of Service: [facility]  Admission Level of Care: Inpatient  Place of Service Code: 21     Attending Physician Information  Attending Name and NPI#: Fitz Lock Md [[id number]]  Phone: [phone]     Admission Information  Inpatient Admission Date/Time: 7/17/24 10:40 PM  Discharge Date/Time: No discharge date for patient encounter.  Admitting Diagnosis Code/Description:  Hyponatremia [E87.1]  Abdominal pain [R10.9]  Internal hernia [K45.8]     Utilization Review Contact  Niharika Jon, Utilization   Phone: [phone]  Fax: [phone]  Email: [email or web address]  Contact for approvals/pending authorizations, clinical reviews, and discharge.     Physician Advisory Services Contact  Medical Necessity Denial & Gxzx-eh-Cxom Discussion  Phone: [phone]  Fax: [phone]  Email: [email or web address]     DISCHARGE SUPPORT TEAM:  For Patients Discharge Needs & Updates  Phone: [phone] opt. 2 Fax: [phone]  Email: [email or web address]

## 2024-07-18 NOTE — ANESTHESIA POSTPROCEDURE EVALUATION
Post-Op Assessment Note    CV Status:  Stable    Pain management: adequate       Mental Status:  Alert and awake   Hydration Status:  Euvolemic   PONV Controlled:  Controlled   Airway Patency:  Patent  Airway: intubated  Two or more mitigation strategies used for obstructive sleep apnea   Post Op Vitals Reviewed: Yes    No anethesia notable event occurred.    Staff: CRNA               /70 (07/17/24 2249)    Temp 97.8 °F (36.6 °C) (07/17/24 2249)    Pulse 76 (07/17/24 2249)   Resp      SpO2 98 % (07/17/24 2254)

## 2024-07-19 ENCOUNTER — OFFICE VISIT (OUTPATIENT)
Dept: FAMILY MEDICINE CLINIC | Facility: CLINIC | Age: 59
End: 2024-07-19
Payer: COMMERCIAL

## 2024-07-19 ENCOUNTER — TELEPHONE (OUTPATIENT)
Dept: PERIOP | Facility: HOSPITAL | Age: 59
End: 2024-07-19

## 2024-07-19 VITALS
WEIGHT: 189 LBS | SYSTOLIC BLOOD PRESSURE: 100 MMHG | BODY MASS INDEX: 25.6 KG/M2 | OXYGEN SATURATION: 99 % | RESPIRATION RATE: 16 BRPM | HEIGHT: 72 IN | TEMPERATURE: 97.8 F | HEART RATE: 82 BPM | DIASTOLIC BLOOD PRESSURE: 70 MMHG

## 2024-07-19 DIAGNOSIS — R10.84 GENERALIZED ABDOMINAL PAIN: ICD-10-CM

## 2024-07-19 DIAGNOSIS — Z76.89 ENCOUNTER FOR SUPPORT AND COORDINATION OF TRANSITION OF CARE: Primary | ICD-10-CM

## 2024-07-19 PROCEDURE — 99495 TRANSJ CARE MGMT MOD F2F 14D: CPT | Performed by: FAMILY MEDICINE

## 2024-07-19 RX ORDER — HYDROCODONE BITARTRATE AND ACETAMINOPHEN 5; 325 MG/1; MG/1
1 TABLET ORAL DAILY PRN
Qty: 14 TABLET | Refills: 0 | Status: SHIPPED | OUTPATIENT
Start: 2024-07-19 | End: 2024-08-02

## 2024-07-19 NOTE — TELEPHONE ENCOUNTER
Post op follow up phone call completed.  Pt is sipping liquids, using IS as instructed, reinforced importance of using IS to help prevent pneumonia. Ambulating about home without difficulty.  Pain controlled with analgesia. He asked about a possible refil of oxycodone and I reviewed with him that he would need to walk more and use ice packs.    Reaffirmed examples of liquid diet over the next week.  Pt stated understanding about discharge instructions and medication adjustments.  Follow up appt with surgeon scheduled for next week.   Instructed to call with any additional questions or concerns.

## 2024-07-19 NOTE — PROGRESS NOTES
Name: Chandler Angeles      : 1965      MRN: [id number]  Encounter Provider: Pablo Thomas MD  Encounter Date: 2024   Encounter department: [facility]    Subjective   Follow-Up Postoperative Visit for Bowel Obstruction Surgery    Subjective:  The patient, Chandler, reports significant soreness post-surgery, particularly from the collarbone down to the belly button. He is currently taking oxycodone for pain management but is running low on his prescription. He mentions that the pain is manageable after taking oxycodone but becomes severe when he needs to get up, such as to go to the bathroom. Chandler is sleeping in a lounge chair due to discomfort when lying down. He expresses a desire to return to work as an  on Monday, despite the pain. He recalls a previous effective pain medication, Vicodin, which he prefers over oxycodone. He also notes that his current pain is exacerbated by the gas used during surgery, causing discomfort in his collarbone and neck. He plans to walk around the block to alleviate this gas pain.    No Known Allergies      Current Outpatient Medications:     HYDROcodone-acetaminophen (Norco) 5-325 mg per tablet, Take 1 tablet by mouth daily as needed for pain for up to 14 days Max Daily Amount: 1 tablet, Disp: 14 tablet, Rfl: 0    aspirin (ECOTRIN LOW STRENGTH) 81 mg EC tablet, Take 1 tablet (81 mg total) by mouth daily, Disp: 90 tablet, Rfl: 3    atorvastatin (LIPITOR) 40 mg tablet, Take 1 tablet (40 mg total) by mouth daily, Disp: 100 tablet, Rfl: 1    Blood Glucose Monitoring Suppl (OneTouch Verio Reflect) w/Device KIT, Check blood sugars once daily. Please substitute with appropriate alternative as covered by patient's insurance. Dx: E11.65, Disp: 1 kit, Rfl: 0    clonazePAM (KlonoPIN) 2 mg tablet, Take 1 tablet (2 mg total) by mouth 2 (two) times a day as needed for anxiety, Disp: 60 tablet, Rfl: 2    desvenlafaxine succinate  "(PRISTIQ) 50 mg 24 hr tablet, Take 1 tablet (50 mg total) by mouth daily, Disp: 90 tablet, Rfl: 2    Fluticasone Propionate (Xhance) 93 MCG/ACT EXHU, 1 actuation into each nostril 2 (two) times a day, Disp: 16 mL, Rfl: 5    glucose blood (OneTouch Verio) test strip, Check blood sugars once daily. Please substitute with appropriate alternative as covered by patient's insurance. Dx: E11.65, Disp: 100 each, Rfl: 3    nicotine (NICODERM CQ) 21 mg/24 hr TD 24 hr patch, Place 1 patch on the skin over 24 hours every 24 hours, Disp: 28 patch, Rfl: 2    omeprazole (PriLOSEC) 20 mg delayed release capsule, Take 1 capsule (20 mg total) by mouth daily, Disp: 90 capsule, Rfl: 0    OneTouch Delica Lancets 33G MISC, Check blood sugars once daily. Please substitute with appropriate alternative as covered by patient's insurance. Dx: E11.65, Disp: 100 each, Rfl: 3    OXcarbazepine (Trileptal) 300 mg tablet, Take 1 tablet (300 mg total) by mouth in the morning AND 2 tablets (600 mg total) daily at bedtime., Disp: 90 tablet, Rfl: 3    Paliperidone Palmitate ER (Invega Trinza) 819 MG/2.63ML DENISSE, Inject 819 mg into a muscle every 3 (three) months, Disp: , Rfl:     potassium chloride (Klor-Con M20) 20 mEq tablet, Take 1 tablet (20 mEq total) by mouth daily, Disp: 30 tablet, Rfl: 5    ranolazine (RANEXA) 1000 MG SR tablet, Take 1 tablet (1,000 mg total) by mouth 2 (two) times a day, Disp: 180 tablet, Rfl: 0    sodium chloride 1 g tablet, Take 2 tablets (2 g total) by mouth 3 (three) times a day, Disp: 180 tablet, Rfl: 3  No current facility-administered medications for this visit.    Objective:  /70 (BP Location: Left arm, Patient Position: Sitting, Cuff Size: Standard)   Pulse 82   Temp 97.8 °F (36.6 °C) (Tympanic)   Resp 16   Ht 5' 11.5\" (1.816 m)   Wt 85.7 kg (189 lb)   SpO2 99%   BMI 25.99 kg/m²     Patient appears in mild distress due to pain but is otherwise alert and oriented.  Surgical site inspection reveals no signs " of infection or complications.  Patient is ambulating independently and able to void without assistance.    Assessment and Plan:  1. Postoperative pain management: Transition from oxycodone to Vicodin for the next 2 weeks as needed. Advise the patient to use Tylenol for less severe pain to avoid constipation associated with opioid use.  2. Return to work: Patient is advised to avoid heavy lifting and strenuous activities. He should seek assistance from colleagues for any lifting tasks. A note will be provided to outline these restrictions.  3. Gas pain management: Encourage the patient to continue ambulating to help alleviate gas pain. Walking around the block is recommended.  4. Follow-up: Schedule a follow-up appointment in 2 weeks to reassess pain management and recovery progress.      TCM Call       Date and time call was made  7/18/2024  3:32 PM    Hospital care reviewed  Records reviewed    Patient was hospitialized at  [facility]    Date of Admission  07/17/24    Date of discharge  07/18/24    Diagnosis  Hypoglycemia after GI (gastrointestinal) surgery)    Disposition  Home    Were the patients medications reviewed and updated  Yes    Current Symptoms  None    Dizziness severity  Mild    Quality Character  Lightheadedness    Episode pattern  Intermittent    Cause  No known event    Clinical progress  Unchanged          TCM Call       Post hospital issues  None    Should patient be enrolled in anticoag monitoring?  No    Scheduled for follow up?  Yes    Patients specialists  Endocrinologist    Cardiologist name  Garland Sanchez MD (Cardiology)    Other specialists names  Dr. Sergo Ardon, [facility] - dysautonomia specialist     Did you obtain your prescribed medications  Yes    Why were you unable to obtain your medications  No new medications    Do you need help managing your prescriptions or medications  No    Is transportation to your appointment needed  No    Specify why  son driving due to surgery-  has to be cleared to drive    I have advised the patient to call PCP with any new or worsening symptoms  Maria Del Carmen Lou MA    Living Arrangements  Family members    Support System  Family    The type of support provided  Physical; Emotional    Do you have social support  Yes, as much as I need    Are you recieving any outpatient services  No    Are you recieving home care services  No    Are you using any community resources  No    Current waiver services  No    Have you fallen in the last 12 months  No    How many times  once    Interperter language line needed  No    Counseling  Patient    Counseling topics  Activities of daily living; Importance of RX compliance; patient and family education; instructions for management; Risk factor reduction    Comments  I spoke with Chandler who states that he is doing fine. He offers no complaints at this time. He knows to call if any questions/concerns Daniel Thomas MD   [facility] PRIMARY CARE [facility]

## 2024-07-22 NOTE — UTILIZATION REVIEW
NOTIFICATION OF ADMISSION DISCHARGE   This is a Notification of Discharge from [facility]. Please be advised that this patient has been discharge from our facility. Below you will find the admission and discharge date and time including the patient’s disposition.   UTILIZATION REVIEW CONTACT:  Niharika Jon  Utilization   Network Utilization Review Department  Phone: [phone] x carefully listen to the prompts. All voicemails are confidential.  Email: [email or web address]     ADMISSION INFORMATION  PRESENTATION DATE: 7/17/2024  6:11 PM  OBERVATION ADMISSION DATE: N/A  INPATIENT ADMISSION DATE: 7/17/24 10:40 PM   DISCHARGE DATE: 7/18/2024  1:26 PM   DISPOSITION:Home/Self Care    Network Utilization Review Department  ATTENTION: Please call with any questions or concerns to [phone] and carefully listen to the prompts so that you are directed to the right person. All voicemails are confidential.   For Discharge needs, contact Care Management DC Support Team at [phone] opt. 2  Send all requests for admission clinical reviews, approved or denied determinations and any other requests to dedicated fax number below belonging to the campus where the patient is receiving treatment. List of dedicated fax numbers for the Facilities:  FACILITY NAME UR FAX NUMBER   ADMISSION DENIALS (Administrative/Medical Necessity) [phone]   DISCHARGE SUPPORT TEAM ([facility]) [phone]   PARENT CHILD HEALTH (Maternity/NICU/Pediatrics) [phone]   [facility] [phone]   [facility] [phone]   [facility] [phone]   [facility] [phone]   [facility] [phone]   [facility] [phone]   [facility] [phone]   [facility] [phone]    [facility] [phone]   [facility] [phone]   [facility] [phone]   [facility] [phone]

## 2024-07-25 ENCOUNTER — OFFICE VISIT (OUTPATIENT)
Dept: BARIATRICS | Facility: CLINIC | Age: 59
End: 2024-07-25

## 2024-07-25 ENCOUNTER — TELEPHONE (OUTPATIENT)
Dept: BARIATRICS | Facility: CLINIC | Age: 59
End: 2024-07-25

## 2024-07-25 DIAGNOSIS — Z98.84 BARIATRIC SURGERY STATUS: Primary | ICD-10-CM

## 2024-07-25 PROCEDURE — 99024 POSTOP FOLLOW-UP VISIT: CPT | Performed by: SURGERY

## 2024-07-25 NOTE — TELEPHONE ENCOUNTER
Patient checked in at 2:05 PM for a 2:30 PM Appt. Patient came up to the window about 2:40 and didn't want to wait for the provider who was about 40 mins behind. Asked patient if he wanted to reschedule and said no and walked out.

## 2024-07-25 NOTE — PROGRESS NOTES
POST OP UP VISIT - BARIATRIC SURGERY  Chandler Angeles 59 y.o. male MRN: [id number]  Unit/Bed#:  Encounter: [id number]      HPI:  Chandler Angeles is a 59 y.o. male with history of laparoscopic barbara-en-Y gastric bypass 3/2020 with Dr. Dar Lock, now status post Diagnostic Laparoscopy and Lysis of Adhesions performed by Dr. Dar Lock on 7/17/2024 returning to office for first post op visit since emergency surgery.    Tolerating diet.  Denies nausea and vomiting.  Taking antibiotics and PPIs. Denies fevers or chills. Having good bowel function.       Review of Systems   Constitutional:  Negative for chills and fever.   HENT:  Negative for ear pain and sore throat.    Eyes:  Negative for pain and visual disturbance.   Respiratory:  Negative for cough and shortness of breath.    Cardiovascular:  Negative for chest pain and palpitations.   Gastrointestinal:  Negative for abdominal pain and vomiting.   Genitourinary:  Negative for dysuria and hematuria.   Musculoskeletal:  Negative for arthralgias and back pain.   Skin:  Negative for color change and rash.   Neurological:  Negative for seizures and syncope.   All other systems reviewed and are negative.        Historical Information   Past Medical History:   Diagnosis Date    Anxiety     Bariatric surgery status     CPAP (continuous positive airway pressure) dependence     Depression     Dizziness     Hearing loss of aging     HL (hearing loss)     Hypercholesterolemia     Migraine     Morbid obesity (HCC)     Myocardial infarction (HCC)     Nasal congestion     Nasal obstruction     NSTEMI (non-ST elevated myocardial infarction) (HCC) 05/15/2019    NSTEMI (non-ST elevation myocardial infarction) (HCC)     april 2019    Pneumonia     Postsurgical malabsorption     Sleep apnea     Small bowel obstruction (HCC)     Tinnitus      Past Surgical History:   Procedure Laterality Date    BONE TUMOR EXCISION Bilateral 4/20/2023    Procedure: REMOVAL TUMOR BONE;  Surgeon: Dandre  DO Kin;  Location:  [facility] OR;  Service: Orthopedics    CARDIAC CATHETERIZATION      no stents     COLONOSCOPY      bx taken    EGD      HERNIA REPAIR      umbilical hernia    VA LAPS ABD PRTM&OMENTUM DX W/WO SPEC BR/WA SPX N/A 2024    Procedure: LAPAROSCOPY DIAGNOSTIC, LYSIS OF ADHESIONS;  Surgeon: Fitz Lock MD;  [facility];  Service: Bariatrics    VA LAPS GSTR RSTCV PX W/BYP FRITZ-EN-Y LIMB <150 CM N/A 2020    Procedure: LAPAROSCOPIC FRITZ-EN-Y GASTRIC BYPASS AND INTRAOPERATIVE EGD;  Surgeon: Fitz Lock MD;  Location: [facility];  Service: Bariatrics    SKIN SURGERY      cyst removed from back and thumb    TONSILLECTOMY      TOOTH EXTRACTION       Social History   Social History     Substance and Sexual Activity   Alcohol Use Not Currently     Social History     Substance and Sexual Activity   Drug Use Never     Social History     Tobacco Use   Smoking Status Former    Current packs/day: 0.00    Average packs/day: 1 pack/day for 10.0 years (10.0 ttl pk-yrs)    Types: Cigarettes, Cigars    Quit date: 10/15/2003    Years since quittin.7   Smokeless Tobacco Former    Types: Chew    Quit date: 2022   Tobacco Comments    Currently vapes     Family History: non-contributory    Meds/Allergies   all medications and allergies reviewed  No Known Allergies    Objective       Current Vitals:          Invasive Devices       None                   Physical Exam  Constitutional:       General: He is not in acute distress.     Appearance: He is not ill-appearing.   HENT:      Head: Normocephalic and atraumatic.      Nose: Nose normal.      Mouth/Throat:      Mouth: Mucous membranes are dry.   Eyes:      General: No scleral icterus.  Cardiovascular:      Rate and Rhythm: Normal rate and regular rhythm.      Pulses: Normal pulses.   Pulmonary:      Effort: Pulmonary effort is normal. No respiratory distress.   Abdominal:      General: There is no distension.      Palpations: Abdomen  is soft.      Tenderness: There is no abdominal tenderness.      Hernia: No hernia is present.      Comments: Laparoscopic port sites clean dry and intact.   Musculoskeletal:         General: Normal range of motion.      Cervical back: Normal range of motion.   Skin:     General: Skin is warm.      Capillary Refill: Capillary refill takes less than 2 seconds.   Neurological:      General: No focal deficit present.      Mental Status: He is alert. Mental status is at baseline.   Psychiatric:         Mood and Affect: Mood normal.               Assessment/PLAN:    59 y.o. male status post Diagnostic Laparoscopy and Lysis of Adhesions done on 7/17/2024 by Dr. Dar Lock, doing well post op from emergency surgery. No major issues and healing well.     The pathology report was reviewed with the patient and the results were within normal limits.     Pathology, and Other Studies: I have personally reviewed pertinent reports.   and I have personally reviewed pertinent films in PACS  Lab Results   Component Value Date    FINALDX  09/11/2019     A.  Stomach, biopsy:  -  Chronic inactive oxyntic and antral gastritis.  -  Negative for Helicobacter pylori, confirmed by immunohistochemical stain, evaluated with appropriate controls.  -  Negative for atrophy, intestinal metaplasia, dysplasia or carcinoma.            Increase physical activity slowly as tolerated and instructed.  Advance diet as tolerated.  Finish antibiotics  Continue PPI    Follow up in in three months or as needed.        Torito Wang MD  Bariatric Surgery   7/25/2024  1:03 PM      .

## 2024-07-30 ENCOUNTER — TELEPHONE (OUTPATIENT)
Age: 59
End: 2024-07-30

## 2024-07-30 NOTE — TELEPHONE ENCOUNTER
Patient called to schedule his follow up after surgery with DR ESCOBAR. Please call patient at [phone]

## 2024-08-14 ENCOUNTER — TELEPHONE (OUTPATIENT)
Age: 59
End: 2024-08-14

## 2024-08-14 NOTE — TELEPHONE ENCOUNTER
Spoke with Greg. He was laid off from work and no longer has [employer] [place]. He still has coverage with his spouse through the end of the year, [facility] [place]. Will follow up with billing for Invega injection scheduled for 9/5/24.

## 2024-08-14 NOTE — TELEPHONE ENCOUNTER
Patient called office requesting to speak to nurses about injections. Writer warm transferred call but unable to connect. Please return call once able to.

## 2024-08-15 NOTE — TELEPHONE ENCOUNTER
Per message from billing, Greg Layne will need to contact [employer] [place] to review loss of benefits with other plan.     Spoke with Greg and reviewed above. He will speak with his wife and follow up. Requested he let clinical staff know when this is completed so clinical staff can follow up with [employer] [place].

## 2024-08-20 DIAGNOSIS — I25.118 CORONARY ARTERY DISEASE OF NATIVE ARTERY OF NATIVE HEART WITH STABLE ANGINA PECTORIS (HCC): ICD-10-CM

## 2024-08-20 RX ORDER — RANOLAZINE 1000 MG/1
1000 TABLET, EXTENDED RELEASE ORAL 2 TIMES DAILY
Qty: 180 TABLET | Refills: 3 | Status: SHIPPED | OUTPATIENT
Start: 2024-08-20

## 2024-08-20 NOTE — TELEPHONE ENCOUNTER
Reason for call:   [x] Refill   [] Prior Auth  [] Other:     Office:   [] PCP/Provider -   [x] Specialty/Provider - Yazan-cardio    Medication: Ranolazine 1000mg    Dose/Frequency: 1 tab bid    Quantity: 180    Pharmacy:  [facility] [address] [phone]     Does the patient have enough for 3 days?   [] Yes   [x] No - Send as HP to POD

## 2024-08-29 ENCOUNTER — HOSPITAL ENCOUNTER (OUTPATIENT)
Dept: NON INVASIVE DIAGNOSTICS | Facility: HOSPITAL | Age: 59
Discharge: HOME | End: 2024-08-29
Attending: INTERNAL MEDICINE
Payer: COMMERCIAL

## 2024-08-29 VITALS
BODY MASS INDEX: 25.2 KG/M2 | HEIGHT: 71 IN | WEIGHT: 180 LBS | SYSTOLIC BLOOD PRESSURE: 100 MMHG | HEART RATE: 82 BPM | DIASTOLIC BLOOD PRESSURE: 70 MMHG

## 2024-08-29 DIAGNOSIS — I25.110 CORONARY ARTERY DISEASE INVOLVING NATIVE CORONARY ARTERY OF NATIVE HEART WITH UNSTABLE ANGINA PECTORIS (HCC): ICD-10-CM

## 2024-08-29 DIAGNOSIS — I95.1 ORTHOSTATIC HYPOTENSION: ICD-10-CM

## 2024-08-29 LAB
AORTIC ROOT: 3.4 CM
APICAL FOUR CHAMBER EJECTION FRACTION: 50 %
BSA FOR ECHO PROCEDURE: 2.02 M2
E WAVE DECELERATION TIME: 243 MS
E/A RATIO: 0.81
FRACTIONAL SHORTENING: 25 (ref 28–44)
INTERVENTRICULAR SEPTUM IN DIASTOLE (PARASTERNAL SHORT AXIS VIEW): 0.7 CM
INTERVENTRICULAR SEPTUM: 0.7 CM (ref 0.6–1.1)
LAAS-AP2: 18 CM2
LAAS-AP4: 18.5 CM2
LEFT ATRIUM SIZE: 3.6 CM
LEFT ATRIUM VOLUME (MOD BIPLANE): 54 ML
LEFT ATRIUM VOLUME INDEX (MOD BIPLANE): 26.7 ML/M2
LEFT INTERNAL DIMENSION IN SYSTOLE: 3.9 CM (ref 2.1–4)
LEFT VENTRICLE DIASTOLIC VOLUME (MOD BIPLANE): 119 ML
LEFT VENTRICLE DIASTOLIC VOLUME INDEX (MOD BIPLANE): 58.9 ML/M2
LEFT VENTRICLE SYSTOLIC VOLUME (MOD BIPLANE): 57 ML
LEFT VENTRICLE SYSTOLIC VOLUME INDEX (MOD BIPLANE): 28.2 ML/M2
LEFT VENTRICULAR INTERNAL DIMENSION IN DIASTOLE: 5.2 CM (ref 3.5–6)
LEFT VENTRICULAR POSTERIOR WALL IN END DIASTOLE: 0.7 CM
LEFT VENTRICULAR STROKE VOLUME: 61 ML
LV EF: 52 %
LVSV (TEICH): 61 ML
MV E'TISSUE VEL-SEP: 10 CM/S
MV PEAK A VEL: 0.77 M/S
MV PEAK E VEL: 62 CM/S
MV STENOSIS PRESSURE HALF TIME: 70 MS
MV VALVE AREA P 1/2 METHOD: 3.1
RIGHT ATRIAL 2D VOLUME: 46 ML
RIGHT ATRIUM AREA SYSTOLE A4C: 16.4 CM2
RIGHT VENTRICLE ID DIMENSION: 4.1 CM
SL CV LEFT ATRIUM LENGTH A2C: 4.9 CM
SL CV PED ECHO LEFT VENTRICLE DIASTOLIC VOLUME (MOD BIPLANE) 2D: 128 ML
SL CV PED ECHO LEFT VENTRICLE SYSTOLIC VOLUME (MOD BIPLANE) 2D: 67 ML
TRICUSPID ANNULAR PLANE SYSTOLIC EXCURSION: 1.7 CM

## 2024-08-29 PROCEDURE — 93306 TTE W/DOPPLER COMPLETE: CPT | Performed by: INTERNAL MEDICINE

## 2024-08-29 PROCEDURE — 93306 TTE W/DOPPLER COMPLETE: CPT

## 2024-09-02 LAB
AORTIC ROOT: 3.4 CM
APICAL FOUR CHAMBER EJECTION FRACTION: 50 %
BSA FOR ECHO PROCEDURE: 2.02 M2
E WAVE DECELERATION TIME: 243 MS
E/A RATIO: 0.81
FRACTIONAL SHORTENING: 25 (ref 28–44)
INTERVENTRICULAR SEPTUM IN DIASTOLE (PARASTERNAL SHORT AXIS VIEW): 0.7 CM
INTERVENTRICULAR SEPTUM: 0.7 CM (ref 0.6–1.1)
LAAS-AP2: 18 CM2
LAAS-AP4: 18.5 CM2
LEFT ATRIUM SIZE: 3.6 CM
LEFT ATRIUM VOLUME (MOD BIPLANE): 54 ML
LEFT ATRIUM VOLUME INDEX (MOD BIPLANE): 26.7 ML/M2
LEFT INTERNAL DIMENSION IN SYSTOLE: 3.9 CM (ref 2.1–4)
LEFT VENTRICLE DIASTOLIC VOLUME (MOD BIPLANE): 119 ML
LEFT VENTRICLE DIASTOLIC VOLUME INDEX (MOD BIPLANE): 58.9 ML/M2
LEFT VENTRICLE SYSTOLIC VOLUME (MOD BIPLANE): 57 ML
LEFT VENTRICLE SYSTOLIC VOLUME INDEX (MOD BIPLANE): 28.2 ML/M2
LEFT VENTRICULAR INTERNAL DIMENSION IN DIASTOLE: 5.2 CM (ref 3.5–6)
LEFT VENTRICULAR POSTERIOR WALL IN END DIASTOLE: 0.7 CM
LEFT VENTRICULAR STROKE VOLUME: 61 ML
LV EF: 52 %
LVSV (TEICH): 61 ML
MV E'TISSUE VEL-SEP: 10 CM/S
MV PEAK A VEL: 0.77 M/S
MV PEAK E VEL: 62 CM/S
MV STENOSIS PRESSURE HALF TIME: 70 MS
MV VALVE AREA P 1/2 METHOD: 3.14
RIGHT ATRIAL 2D VOLUME: 46 ML
RIGHT ATRIUM AREA SYSTOLE A4C: 16.4 CM2
RIGHT VENTRICLE ID DIMENSION: 4.1 CM
SL CV LEFT ATRIUM LENGTH A2C: 4.9 CM
SL CV PED ECHO LEFT VENTRICLE DIASTOLIC VOLUME (MOD BIPLANE) 2D: 128 ML
SL CV PED ECHO LEFT VENTRICLE SYSTOLIC VOLUME (MOD BIPLANE) 2D: 67 ML
TRICUSPID ANNULAR PLANE SYSTOLIC EXCURSION: 1.7 CM

## 2024-09-04 ENCOUNTER — HOSPITAL ENCOUNTER (OUTPATIENT)
Dept: INFUSION CENTER | Facility: HOSPITAL | Age: 59
Discharge: HOME | End: 2024-09-04
Payer: COMMERCIAL

## 2024-09-04 PROCEDURE — 96372 THER/PROPH/DIAG INJ SC/IM: CPT

## 2024-09-04 RX ADMIN — PALIPERIDONE PALMITATE 819 MG: 819 INJECTION, SUSPENSION, EXTENDED RELEASE INTRAMUSCULAR at 13:56

## 2024-09-04 NOTE — PROGRESS NOTES
Patient tolerated left arm invega trinza injection without issue. Next appointment scheduled 12/4 at 4pm- infusion center. AVS declined.

## 2024-09-05 ENCOUNTER — OFFICE VISIT (OUTPATIENT)
Dept: BARIATRICS | Facility: CLINIC | Age: 59
End: 2024-09-05

## 2024-09-05 ENCOUNTER — HOSPITAL ENCOUNTER (OUTPATIENT)
Dept: INFUSION CENTER | Facility: HOSPITAL | Age: 59
Discharge: HOME | End: 2024-09-05

## 2024-09-05 VITALS
HEART RATE: 78 BPM | BODY MASS INDEX: 25.48 KG/M2 | SYSTOLIC BLOOD PRESSURE: 110 MMHG | TEMPERATURE: 98.3 F | OXYGEN SATURATION: 97 % | WEIGHT: 182 LBS | HEIGHT: 71 IN | DIASTOLIC BLOOD PRESSURE: 64 MMHG

## 2024-09-05 DIAGNOSIS — F31.62 BIPOLAR DISORDER, CURRENT EPISODE MIXED, MODERATE (HCC): ICD-10-CM

## 2024-09-05 DIAGNOSIS — Z09 POSTOP CHECK: Primary | ICD-10-CM

## 2024-09-05 PROCEDURE — 99024 POSTOP FOLLOW-UP VISIT: CPT | Performed by: SURGERY

## 2024-09-05 RX ORDER — OXCARBAZEPINE 300 MG/1
TABLET, FILM COATED ORAL
Qty: 90 TABLET | Refills: 0 | Status: SHIPPED | OUTPATIENT
Start: 2024-09-05 | End: 2024-12-04

## 2024-09-05 NOTE — PROGRESS NOTES
Date of surgery: 3/2/2020  /  7/17/2024  Procedure: RNY  /  diagnostic lap  Performing surgeon: Dr. Dar Lock    Initial Weight - 296.1 lbs  Current Weight - 182.0 lbs  Luis Weight - 180.0 lbs  Total Body Weight Loss (EWL) - 114.2 lbs  EWL% - 97%  TWB % - 39%

## 2024-09-05 NOTE — PROGRESS NOTES
OFFICE VISIT - BARIATRIC SURGERY  Chandler Angeles 59 y.o. male MRN: [id number]    HPI:  Chandler Angeles is a 59 y.o. male with prior Marii-en-Y gastric bypass who underwent diagnostic laparoscopy notable for adhesive band causing obstruction of the Marii limb.  Procedure was done on 7-    Assessment/PLAN:    -Patient to follow-up as needed.  -Patient follows up with his family medicine doctor for labs.    Subjective     The patient reports his pain is resolved and he is at his baseline.  He is tolerating food and having bowel movements.    Review of Systems   Constitutional:  Negative for chills and fever.   HENT:  Negative for ear pain and sore throat.    Eyes:  Negative for pain and visual disturbance.   Respiratory:  Negative for cough and shortness of breath.    Cardiovascular:  Negative for chest pain and palpitations.   Gastrointestinal:  Negative for abdominal pain and vomiting.   Genitourinary:  Negative for dysuria and hematuria.   Musculoskeletal:  Negative for arthralgias and back pain.   Skin:  Negative for color change and rash.   Neurological:  Negative for seizures and syncope.   All other systems reviewed and are negative.        Historical Information   Past Medical History:   Diagnosis Date    Anxiety     Bariatric surgery status     CPAP (continuous positive airway pressure) dependence     Depression     Dizziness     Hearing loss of aging     HL (hearing loss)     Hypercholesterolemia     Migraine     Morbid obesity (HCC)     Myocardial infarction (HCC)     Nasal congestion     Nasal obstruction     NSTEMI (non-ST elevated myocardial infarction) (HCC) 05/15/2019    NSTEMI (non-ST elevation myocardial infarction) (HCC)     april 2019    Pneumonia     Postsurgical malabsorption     Sleep apnea     Small bowel obstruction (HCC)     Tinnitus      Past Surgical History:   Procedure Laterality Date    BONE TUMOR EXCISION Bilateral 4/20/2023    Procedure: REMOVAL TUMOR BONE;  Surgeon: Dandre  DO Kin;  Location:  [facility] OR;  Service: Orthopedics    CARDIAC CATHETERIZATION      no stents     COLONOSCOPY  2016    bx taken    EGD      HERNIA REPAIR      umbilical hernia    OK LAPS ABD PRTM&OMENTUM DX W/WO SPEC BR/WA SPX N/A 2024    Procedure: LAPAROSCOPY DIAGNOSTIC, LYSIS OF ADHESIONS;  Surgeon: Fitz Lock MD;  [facility];  Service: Bariatrics    OK LAPS GSTR RSTCV PX W/BYP FRITZ-EN-Y LIMB <150 CM N/A 2020    Procedure: LAPAROSCOPIC FRITZ-EN-Y GASTRIC BYPASS AND INTRAOPERATIVE EGD;  Surgeon: Fitz Lock MD;  [facility];  Service: Bariatrics    SKIN SURGERY      cyst removed from back and thumb    TONSILLECTOMY      TOOTH EXTRACTION       Social History   Social History     Substance and Sexual Activity   Alcohol Use Not Currently     Social History     Substance and Sexual Activity   Drug Use Never     Social History     Tobacco Use   Smoking Status Former    Current packs/day: 0.00    Average packs/day: 1 pack/day for 10.0 years (10.0 ttl pk-yrs)    Types: Cigarettes, Cigars    Quit date: 10/15/2003    Years since quittin.9   Smokeless Tobacco Former    Types: Chew    Quit date: 2022   Tobacco Comments    Currently vapes       Objective       Current Vitals:        Invasive Devices       None                   Physical Exam  Constitutional:       Appearance: Normal appearance.   HENT:      Head: Normocephalic.      Nose: Nose normal.   Eyes:      Extraocular Movements: Extraocular movements intact.   Cardiovascular:      Rate and Rhythm: Normal rate.   Pulmonary:      Effort: Pulmonary effort is normal.   Abdominal:      General: Abdomen is flat.      Palpations: Abdomen is soft.      Comments: Surgical sites are clean dry and intact.   Musculoskeletal:         General: Normal range of motion.   Skin:     General: Skin is warm.   Psychiatric:         Mood and Affect: Mood normal.         Behavior: Behavior normal.             Pathology, and Other  Studies: I have personally reviewed pertinent reports.              Davion Pike MD  Bariatric Surgery  9/5/2024  4:30 PM

## 2024-09-05 NOTE — TELEPHONE ENCOUNTER
Patient is requesting a 90-day supply    Reason for call:   [x] Refill   [] Prior Auth  [] Other:     Office:   [x] Specialty/Provider - psych / Radick    Medication: oxcarbazepine    Dose/Frequency: 300mg (1 tab qam + 2 tabs qhs)    Quantity: 270    Pharmacy:  [facility] - [address] [place] [address]     Does the patient have enough for 3 days?   [] Yes   [x] No - Send as HP to POD

## 2024-09-05 NOTE — TELEPHONE ENCOUNTER
Patient has updated Ox level and CMP within the last 4 months. There's no current need for new blood levels. He also recently completed a BMP which was reviewed today. Thanks.

## 2024-09-09 DIAGNOSIS — F41.9 ANXIETY DISORDER, UNSPECIFIED TYPE: ICD-10-CM

## 2024-09-09 RX ORDER — CLONAZEPAM 2 MG/1
2 TABLET ORAL 2 TIMES DAILY PRN
Qty: 60 TABLET | Refills: 2 | Status: SHIPPED | OUTPATIENT
Start: 2024-09-09

## 2024-09-09 NOTE — TELEPHONE ENCOUNTER
Medication:  PDMP  08/10/2024 06/10/2024 clonazePAM (Tablet) 60.0 30 2 MG NA [facility] Commercial Insurance 2 / 2 PA   1 [id number] 07/19/2024 07/19/2024 HYDROcodone BITARTRATE 5 MG ORAL TABLET/ACETAMINOPHEN 325 MG (Tablet) 14.0 14 325 MG-5 MG 5.0 [facility] Commercial Insurance 0 / 0 PA   1 [id number] 07/18/2024 07/18/2024 oxyCODONE HCL (Tablet) 5.0 2 5 MG 18.75 SABINO HERRERA [facility] Commercial Insurance 0 / 0 PA   1 [id number] 07/11/2024 06/10/2024 clonazePAM (Tablet) 60.0 30 2 MG NA [facility] Commercial Insurance 1 / 2 PA   1 [id number] 06/11/2024 06/10/2024 clonazePAM (Tablet) 60.0 30 2 MG [facility]  Active agreement on file -

## 2024-09-09 NOTE — TELEPHONE ENCOUNTER
Reason for call:   [x] Refill   [] Prior Auth  [] Other:     Office:   [x] PCP/Provider -   [] Specialty/Provider -     Medication: clonazePAM (KlonoPIN) 2 mg tablet     Dose/Frequency: Take 1 tablet (2 mg total) by mouth 2 (two) times a day as needed for anxiety,     Quantity:  60 tablet     [facility] [address] [phone]     Does the patient have enough for 3 days?   [] Yes   [x] No - Send as HP to POD

## 2024-09-11 ENCOUNTER — OFFICE VISIT (OUTPATIENT)
Dept: CARDIOLOGY CLINIC | Facility: CLINIC | Age: 59
End: 2024-09-11
Payer: COMMERCIAL

## 2024-09-11 ENCOUNTER — TELEPHONE (OUTPATIENT)
Age: 59
End: 2024-09-11

## 2024-09-11 VITALS
HEART RATE: 80 BPM | DIASTOLIC BLOOD PRESSURE: 68 MMHG | TEMPERATURE: 97.8 F | BODY MASS INDEX: 26.07 KG/M2 | OXYGEN SATURATION: 98 % | HEIGHT: 71 IN | WEIGHT: 186.2 LBS | SYSTOLIC BLOOD PRESSURE: 98 MMHG

## 2024-09-11 DIAGNOSIS — E87.6 HYPOKALEMIA: ICD-10-CM

## 2024-09-11 DIAGNOSIS — I95.1 ORTHOSTATIC HYPOTENSION: ICD-10-CM

## 2024-09-11 DIAGNOSIS — I25.110 CORONARY ARTERY DISEASE INVOLVING NATIVE CORONARY ARTERY OF NATIVE HEART WITH UNSTABLE ANGINA PECTORIS (HCC): Primary | ICD-10-CM

## 2024-09-11 DIAGNOSIS — E87.1 HYPONATREMIA: ICD-10-CM

## 2024-09-11 PROCEDURE — 99214 OFFICE O/P EST MOD 30 MIN: CPT | Performed by: INTERNAL MEDICINE

## 2024-09-11 RX ORDER — SODIUM CHLORIDE 1 G/1
2 TABLET ORAL 3 TIMES DAILY
Qty: 540 TABLET | Refills: 1 | Status: SHIPPED | OUTPATIENT
Start: 2024-09-11

## 2024-09-11 RX ORDER — POTASSIUM CHLORIDE 1500 MG/1
20 TABLET, EXTENDED RELEASE ORAL DAILY
Qty: 90 TABLET | Refills: 1 | Status: SHIPPED | OUTPATIENT
Start: 2024-09-11

## 2024-09-11 NOTE — TELEPHONE ENCOUNTER
Patient called requesting refill for   sodium chloride 1 g tablet . Patient made aware medication was refilled on 5/31/24 for 180 Tablets  with 3 refills to  [facility]  pharmacy. Patient instructed to contact the pharmacy to obtain refills of medication. Patient verbalized understanding.

## 2024-09-11 NOTE — TELEPHONE ENCOUNTER
Reason for call:   [x] Refill   [] Prior Auth  [] Other:     Office:   [x] PCP/Provider -   [] Specialty/Provider -     Medication:   Potassium Chloride 20 mEq- take 1 tablet by mouth daily      Pharmacy:  [facility]    Does the patient have enough for 3 days?   [x] Yes   [] No - Send as HP to POD

## 2024-09-11 NOTE — PROGRESS NOTES
Cardiology Follow Up    Chandler Angeles  [id number]  1965  [facility] [place] [id number] [address] [phone]      1. Coronary artery disease involving native coronary artery of native heart with unstable angina pectoris (HCC)        2. Orthostatic hypotension        3. Hyponatremia  sodium chloride 1 g tablet          Discussion/Summary:    Orthostatic hypotension. Patient reports that midodrine and Florinef were not effective for him and he is not interested in going back on these. We are using salt tablets at a high dose. There was concern from the patient's and that his psychiatrist was not agreeable to have him on this, but I had a discussion with him after the last visit and I have him on 2 g 3 times a day. He will occasionally take an extra tablet and this seems to be effective at this point. He has no evidence of volume overload. He was also having some hyponatremia which seems to be a little bit better than this.    Myocardial bridging. He has no symptoms of angina currently. He is not on any antihypertensive or mayo blocking agent. He did have a slight regional wall motion abnormality on his last echocardiogram with a low normal LV function overall.    Previous History:  59 year old man.  In May of 2019, he had an admission at [facility] for chest discomfort.  He had a minimally elevated troponin.  He had a cardiac catheterization which showed nonobstructive CAD and some myocardial bridging of the LAD.  He was gradually started on cardiac medications including metoprolol, Imdur, lipid-lowering therapy with statins.  He had had chest pain off and on for several months, now improved.  In March of 2020, he had gastric bypass surgery.    A few months after his gastric bypass, he started getting orthostatic.  Significant issues with this for a while, ultimately underwent evaluation with Cristela for autonomic  dysfunction although this was not clearly seen. He had been on midodrine and Florinef which gradually has been weaned off.  There were concerns for neurologic mediated episodes but this does not seem to be the case    When I saw him in April 2023, he was entirely off of Florinef and midodrine and he said he was feeling well. Symptoms resolution had been attributed to him gaining back some weight and staying adequately hydrated.  Salt tablets seem to help.    Interval History:    Since last visit with me, we increased his salt tablets he now takes 2 g 3 times a day and occasionally takes an extra tablet when his blood pressure is low and he is feeling lightheaded. In general this is working more effectively than his previous strategy. He says he is fallen only 2 times since the last visit with me which is about 3 months ago.    He has no chest pain or shortness of breath. He had a repeat echocardiogram which showed low normal LV function and some mild regional wall motion abnormalities essentially unchanged from before. No significant valvular disease..    Patient Active Problem List   Diagnosis    Bipolar disorder, current episode mixed, moderate (HCC)    ROSI (obstructive sleep apnea)    Coronary artery disease involving native coronary artery of native heart with unstable angina pectoris (HCC)    Former smoker    Migraine with aura and without status migrainosus, not intractable    Mixed hyperlipidemia    Bariatric surgery status    Overweight (BMI 25.0-29.9)    Postsurgical malabsorption    Orthostatic hypotension    Recurrent syncope    Hypokalemia    Dysautonomia-like disorder    Anxiety disorder    Status post gastric bypass for obesity    Neuropathy    Hypoglycemia after GI (gastrointestinal) surgery    Postsurgical dumping syndrome    Chronic pain of both knees    Seasonal allergies    Chronic sinusitis    Hyponatremia    Small bowel obstruction due to adhesions (HCC)     Past Medical History:   Diagnosis  Date    Anxiety     Bariatric surgery status     CPAP (continuous positive airway pressure) dependence     Depression     Dizziness     Hearing loss of aging     HL (hearing loss)     Hypercholesterolemia     Migraine     Morbid obesity (HCC)     Myocardial infarction (HCC)     Nasal congestion     Nasal obstruction     NSTEMI (non-ST elevated myocardial infarction) (HCC) 05/15/2019    NSTEMI (non-ST elevation myocardial infarction) (HCC)     2019    Pneumonia     Postsurgical malabsorption     Sleep apnea     Small bowel obstruction (HCC)     Tinnitus      Social History     Tobacco Use    Smoking status: Former     Current packs/day: 0.00     Average packs/day: 1 pack/day for 10.0 years (10.0 ttl pk-yrs)     Types: Cigarettes, Cigars     Quit date: 10/15/2003     Years since quittin.9    Smokeless tobacco: Former     Types: Chew     Quit date: 2022    Tobacco comments:     Currently vapes   Vaping Use    Vaping status: Every Day    Substances: Nicotine, Flavoring   Substance Use Topics    Alcohol use: Not Currently    Drug use: Never      Family History   Problem Relation Age of Onset    Lung cancer Mother     Brain cancer Mother     Cancer Mother         Brain and lung    Diabetes Brother     Bipolar disorder Maternal Grandfather     Bipolar disorder Son     Cancer Maternal Grandmother         Lung     Past Surgical History:   Procedure Laterality Date    BONE TUMOR EXCISION Bilateral 2023    Procedure: REMOVAL TUMOR BONE;  Surgeon: Dandre Sanderson DO;  Location:  MAIN OR;  Service: Orthopedics    CARDIAC CATHETERIZATION      no stents     COLONOSCOPY      bx taken    EGD      HERNIA REPAIR      umbilical hernia    UT LAPS ABD PRTM&OMENTUM DX W/WO SPEC BR/WA SPX N/A 2024    Procedure: LAPAROSCOPY DIAGNOSTIC, LYSIS OF ADHESIONS;  Surgeon: Fitz Lock MD;  Location: [facility];  Service: Bariatrics    UT LAPS GSTR RSTCV PX W/BYP FRITZ-EN-Y LIMB <150 CM N/A 2020    Procedure:  LAPAROSCOPIC FRITZ-EN-Y GASTRIC BYPASS AND INTRAOPERATIVE EGD;  Surgeon: Fitz Lock MD;  Location: [facility];  Service: Bariatrics    SKIN SURGERY      cyst removed from back and thumb    TONSILLECTOMY      TOOTH EXTRACTION         Current Outpatient Medications:     aspirin (ECOTRIN LOW STRENGTH) 81 mg EC tablet, Take 1 tablet (81 mg total) by mouth daily, Disp: 90 tablet, Rfl: 3    atorvastatin (LIPITOR) 40 mg tablet, Take 1 tablet (40 mg total) by mouth daily, Disp: 100 tablet, Rfl: 1    Blood Glucose Monitoring Suppl (OneTouch Verio Reflect) w/Device KIT, Check blood sugars once daily. Please substitute with appropriate alternative as covered by patient's insurance. Dx: E11.65, Disp: 1 kit, Rfl: 0    clonazePAM (KlonoPIN) 2 mg tablet, Take 1 tablet (2 mg total) by mouth 2 (two) times a day as needed for anxiety, Disp: 60 tablet, Rfl: 2    desvenlafaxine succinate (PRISTIQ) 50 mg 24 hr tablet, Take 1 tablet (50 mg total) by mouth daily, Disp: 90 tablet, Rfl: 2    Fluticasone Propionate (Xhance) 93 MCG/ACT EXHU, 1 actuation into each nostril 2 (two) times a day, Disp: 16 mL, Rfl: 5    glucose blood (OneTouch Verio) test strip, Check blood sugars once daily. Please substitute with appropriate alternative as covered by patient's insurance. Dx: E11.65, Disp: 100 each, Rfl: 3    omeprazole (PriLOSEC) 20 mg delayed release capsule, Take 1 capsule (20 mg total) by mouth daily, Disp: 90 capsule, Rfl: 0    OneTouch Delica Lancets 33G MISC, Check blood sugars once daily. Please substitute with appropriate alternative as covered by patient's insurance. Dx: E11.65, Disp: 100 each, Rfl: 3    OXcarbazepine (Trileptal) 300 mg tablet, Take 1 tablet (300 mg total) by mouth in the morning AND 2 tablets (600 mg total) daily at bedtime., Disp: 90 tablet, Rfl: 0    Paliperidone Palmitate ER (Invega Trinza) 819 MG/2.63ML DENISSE, Inject 819 mg into a muscle every 3 (three) months, Disp: , Rfl:     potassium chloride (Klor-Con M20)  "20 mEq tablet, Take 1 tablet (20 mEq total) by mouth daily, Disp: 90 tablet, Rfl: 1    ranolazine (RANEXA) 1000 MG SR tablet, Take 1 tablet (1,000 mg total) by mouth 2 (two) times a day, Disp: 180 tablet, Rfl: 3    sodium chloride 1 g tablet, Take 2 tablets (2 g total) by mouth 3 (three) times a day, Disp: 540 tablet, Rfl: 1    nicotine (NICODERM CQ) 21 mg/24 hr TD 24 hr patch, Place 1 patch on the skin over 24 hours every 24 hours (Patient not taking: Reported on 9/5/2024), Disp: 28 patch, Rfl: 2  No Known Allergies    Vitals:    09/11/24 1532   BP: 98/68   BP Location: Left arm   Patient Position: Sitting   Cuff Size: Adult   Pulse: 80   Temp: 97.8 °F (36.6 °C)   SpO2: 98%   Weight: 84.5 kg (186 lb 3.2 oz)   Height: 5' 11\" (1.803 m)     Vitals:    09/11/24 1532   Weight: 84.5 kg (186 lb 3.2 oz)      Height: 5' 11\" (180.3 cm)   Body mass index is 25.97 kg/m².    Physical Exam:  GEN: Chandler Agneles appears well, alert and oriented x 3, pleasant and cooperative   HEENT: pupils equal, round, and reactive to light; extraocular muscles intact  NECK: supple, no carotid bruits   HEART: regular rhythm, normal S1 and S2, no murmurs, clicks, gallops or rubs   LUNGS: clear to auscultation bilaterally; no wheezes, rales, or rhonchi   ABDOMEN: normal bowel sounds, soft, no tenderness, no distention  EXTREMITIES: peripheral pulses normal; no clubbing, cyanosis, or edema  NEURO: no focal findings   SKIN: normal without suspicious lesions on exposed skin    ROS:  Positive for arthritis, knee pain. dizziness  Except as noted in HPI, is otherwise reviewed in detail and a 12 point review of systems is negative.  ROS reviewed and is unchanged    Labs:  Lab Results   Component Value Date    K 4.0 07/18/2024     07/18/2024    CREATININE 0.57 (L) 07/18/2024    BUN 4 (L) 07/18/2024    CO2 22 07/18/2024    ALT 18 07/17/2024    AST 22 07/17/2024    INR 1.08 07/17/2024    GLUF 95 05/16/2024    HGBA1C 5.3 07/22/2022    WBC 11.25 (H) " "07/18/2024    HGB 13.0 07/18/2024    HCT 37.8 07/18/2024     07/18/2024       No results found for: \"CHOL\"  Lab Results   Component Value Date    HDL 50 05/16/2024    HDL 51 04/06/2023    HDL 57 07/22/2022     Lab Results   Component Value Date    LDLCALC 49 05/16/2024    LDLCALC 67 04/06/2023    LDLCALC 60 07/22/2022     Lab Results   Component Value Date    TRIG 53 05/16/2024    TRIG 73 04/06/2023    TRIG 50 07/22/2022     Testing:  Tilt Table:  SUMMARY: Tilt table noted for bordeline hypotension but and tachycardia induced by upright tilt without reproduction of symptoms, she started from low baseline so not enough change to consider this orthostatic hypotension. He had 4 beat run of PAT and occassional PVC's not correlated to symptoms.     Echo 8/19/20:  LEFT VENTRICLE:  Systolic function was normal. Ejection fraction was estimated to be 55 %.  There were no regional wall motion abnormalities.  There was mild concentric hypertrophy.     RIGHT VENTRICLE:  The size was normal.  Systolic function was normal.    "

## 2024-09-17 ENCOUNTER — TELEPHONE (OUTPATIENT)
Dept: CARDIOLOGY CLINIC | Facility: CLINIC | Age: 59
End: 2024-09-17

## 2024-09-17 ENCOUNTER — APPOINTMENT (OUTPATIENT)
Dept: LAB | Age: 59
End: 2024-09-17
Payer: COMMERCIAL

## 2024-09-17 DIAGNOSIS — Z01.818 PRE-OP TESTING: ICD-10-CM

## 2024-09-17 DIAGNOSIS — Z01.818 PRE-OPERATIVE EXAMINATION: ICD-10-CM

## 2024-09-17 LAB
ANION GAP SERPL CALCULATED.3IONS-SCNC: 8 MMOL/L (ref 4–13)
BASOPHILS # BLD AUTO: 0.05 THOUSANDS/ΜL (ref 0–0.1)
BASOPHILS NFR BLD AUTO: 1 % (ref 0–1)
BUN SERPL-MCNC: 12 MG/DL (ref 5–25)
CALCIUM SERPL-MCNC: 8.7 MG/DL (ref 8.4–10.2)
CHLORIDE SERPL-SCNC: 98 MMOL/L (ref 96–108)
CO2 SERPL-SCNC: 28 MMOL/L (ref 21–32)
CREAT SERPL-MCNC: 0.72 MG/DL (ref 0.6–1.3)
EOSINOPHIL # BLD AUTO: 0.16 THOUSAND/ΜL (ref 0–0.61)
EOSINOPHIL NFR BLD AUTO: 2 % (ref 0–6)
ERYTHROCYTE [DISTWIDTH] IN BLOOD BY AUTOMATED COUNT: 12.6 % (ref 11.6–15.1)
GFR SERPL CREATININE-BSD FRML MDRD: 102 ML/MIN/1.73SQ M
GLUCOSE P FAST SERPL-MCNC: 89 MG/DL (ref 65–99)
HCT VFR BLD AUTO: 42.2 % (ref 36.5–49.3)
HGB BLD-MCNC: 14.3 G/DL (ref 12–17)
IMM GRANULOCYTES # BLD AUTO: 0.04 THOUSAND/UL (ref 0–0.2)
IMM GRANULOCYTES NFR BLD AUTO: 0 % (ref 0–2)
LYMPHOCYTES # BLD AUTO: 2.27 THOUSANDS/ΜL (ref 0.6–4.47)
LYMPHOCYTES NFR BLD AUTO: 22 % (ref 14–44)
MCH RBC QN AUTO: 31.2 PG (ref 26.8–34.3)
MCHC RBC AUTO-ENTMCNC: 33.9 G/DL (ref 31.4–37.4)
MCV RBC AUTO: 92 FL (ref 82–98)
MONOCYTES # BLD AUTO: 0.81 THOUSAND/ΜL (ref 0.17–1.22)
MONOCYTES NFR BLD AUTO: 8 % (ref 4–12)
NEUTROPHILS # BLD AUTO: 6.83 THOUSANDS/ΜL (ref 1.85–7.62)
NEUTS SEG NFR BLD AUTO: 67 % (ref 43–75)
NRBC BLD AUTO-RTO: 0 /100 WBCS
PLATELET # BLD AUTO: 236 THOUSANDS/UL (ref 149–390)
PMV BLD AUTO: 10.6 FL (ref 8.9–12.7)
POTASSIUM SERPL-SCNC: 4 MMOL/L (ref 3.5–5.3)
RBC # BLD AUTO: 4.59 MILLION/UL (ref 3.88–5.62)
SODIUM SERPL-SCNC: 134 MMOL/L (ref 135–147)
WBC # BLD AUTO: 10.16 THOUSAND/UL (ref 4.31–10.16)

## 2024-09-17 PROCEDURE — 36415 COLL VENOUS BLD VENIPUNCTURE: CPT

## 2024-09-17 PROCEDURE — 80048 BASIC METABOLIC PNL TOTAL CA: CPT

## 2024-09-17 PROCEDURE — 85025 COMPLETE CBC W/AUTO DIFF WBC: CPT

## 2024-09-17 RX ORDER — MULTIVITAMIN
1 TABLET ORAL DAILY
COMMUNITY

## 2024-09-17 NOTE — PRE-PROCEDURE INSTRUCTIONS
Pre-Surgery Instructions:   Medication Instructions    atorvastatin (LIPITOR) 40 mg tablet Take night before surgery    clonazePAM (KlonoPIN) 2 mg tablet Take as directed    desvenlafaxine succinate (PRISTIQ) 50 mg 24 hr tablet Take as directed    Fluticasone Propionate (Xhance) 93 MCG/ACT EXHU Take as directed    Multiple Vitamin (multivitamin) tablet Stop taking 7 days prior to surgery. LD 9/16/24    omeprazole (PriLOSEC) 20 mg delayed release capsule Take as directed    OXcarbazepine (Trileptal) 300 mg tablet Take as directed    Paliperidone Palmitate ER (Invega Trinza) 819 MG/2.63ML DENISSE Every 3 month injection    potassium chloride (Klor-Con M20) 20 mEq tablet Take night before surgery    ranolazine (RANEXA) 1000 MG SR tablet Take as directed    sodium chloride 1 g tablet Take as directed      Medication instructions for day surgery reviewed. Please use only a sip of water to take your instructed medications. Avoid all over the counter vitamins, supplements and NSAIDS for one week prior to surgery per anesthesia guidelines. Tylenol is ok to take as needed.     You will receive a call one business day prior to surgery with an arrival time and hospital directions. If your surgery is scheduled on a Monday, the hospital will be calling you on the Friday prior to your surgery. If you have not heard from anyone by 8pm, please call the hospital supervisor through the hospital  at [phone]. ([place] [phone] or [place] [phone]).    Do not eat or drink anything after midnight the night before your surgery, including candy, mints, lifesavers, or chewing gum. Do not drink alcohol 24hrs before your surgery. Try not to smoke at least 24hrs before your surgery.       Follow the pre surgery showering instructions as listed in the “My Surgical Experience Booklet” or otherwise provided by your surgeon's office. Do not use a blade to shave the surgical area 1 week before surgery. It is okay to use a clean  electric clippers up to 24 hours before surgery. Do not apply any lotions, creams, including makeup, cologne, deodorant, or perfumes after showering on the day of your surgery. Do not use dry shampoo, hair spray, hair gel, or any type of hair products.     No contact lenses, eye make-up, or artificial eyelashes. Remove nail polish, including gel polish, and any artificial, gel, or acrylic nails if possible. Remove all jewelry including rings and body piercing jewelry.     Wear causal clothing that is easy to take on and off. Consider your type of surgery.    Keep any valuables, jewelry, piercings at home. Please bring any specially ordered equipment (sling, braces) if indicated.    Arrange for a responsible person to drive you to and from the hospital on the day of your surgery. Please confirm the visitor policy for the day of your procedure when you receive your phone call with an arrival time.     Call the surgeon's office with any new illnesses, exposures, or additional questions prior to surgery.    Please reference your “My Surgical Experience Booklet” for additional information to prepare for your upcoming surgery.

## 2024-09-17 NOTE — TELEPHONE ENCOUNTER
Patient called the [facility] and stated that he has blood work done today. Odium level is low and wants to know if you want to increase his sodium medication.    Please contact patient at [phone]

## 2024-09-18 NOTE — TELEPHONE ENCOUNTER
Called pt to inform him that Dr. Hand said his sodium levels are better than before so he wants to keep pt's medications the same. Pt understood. No more questions at this time.

## 2024-09-20 ENCOUNTER — HOSPITAL ENCOUNTER (OUTPATIENT)
Facility: HOSPITAL | Age: 59
Setting detail: OUTPATIENT SURGERY
Discharge: HOME | End: 2024-09-20
Attending: OTOLARYNGOLOGY | Admitting: OTOLARYNGOLOGY
Payer: COMMERCIAL

## 2024-09-20 ENCOUNTER — ANESTHESIA EVENT (OUTPATIENT)
Dept: PERIOP | Facility: HOSPITAL | Age: 59
End: 2024-09-20
Payer: COMMERCIAL

## 2024-09-20 ENCOUNTER — ANESTHESIA (OUTPATIENT)
Dept: PERIOP | Facility: HOSPITAL | Age: 59
End: 2024-09-20
Payer: COMMERCIAL

## 2024-09-20 VITALS
WEIGHT: 180 LBS | TEMPERATURE: 96.1 F | SYSTOLIC BLOOD PRESSURE: 159 MMHG | RESPIRATION RATE: 16 BRPM | HEIGHT: 71 IN | DIASTOLIC BLOOD PRESSURE: 84 MMHG | BODY MASS INDEX: 25.2 KG/M2 | OXYGEN SATURATION: 96 % | HEART RATE: 94 BPM

## 2024-09-20 DIAGNOSIS — J32.0 CHRONIC MAXILLARY SINUSITIS: ICD-10-CM

## 2024-09-20 DIAGNOSIS — Z98.890 S/P FESS (FUNCTIONAL ENDOSCOPIC SINUS SURGERY): Primary | ICD-10-CM

## 2024-09-20 PROCEDURE — 88311 DECALCIFY TISSUE: CPT | Performed by: STUDENT IN AN ORGANIZED HEALTH CARE EDUCATION/TRAINING PROGRAM

## 2024-09-20 PROCEDURE — 88305 TISSUE EXAM BY PATHOLOGIST: CPT | Performed by: STUDENT IN AN ORGANIZED HEALTH CARE EDUCATION/TRAINING PROGRAM

## 2024-09-20 PROCEDURE — 31255 NSL/SINS NDSC W/TOT ETHMDCT: CPT | Performed by: OTOLARYNGOLOGY

## 2024-09-20 PROCEDURE — 30520 REPAIR OF NASAL SEPTUM: CPT | Performed by: OTOLARYNGOLOGY

## 2024-09-20 PROCEDURE — 31256 EXPLORATION MAXILLARY SINUS: CPT | Performed by: OTOLARYNGOLOGY

## 2024-09-20 PROCEDURE — 30802 ABLATE INF TURBINATE SUBMUC: CPT | Performed by: OTOLARYNGOLOGY

## 2024-09-20 PROCEDURE — 61782 SCAN PROC CRANIAL EXTRA: CPT | Performed by: OTOLARYNGOLOGY

## 2024-09-20 RX ORDER — GINSENG 100 MG
CAPSULE ORAL AS NEEDED
Status: DISCONTINUED | OUTPATIENT
Start: 2024-09-20 | End: 2024-09-20 | Stop reason: HOSPADM

## 2024-09-20 RX ORDER — GLYCOPYRROLATE 0.2 MG/ML
INJECTION INTRAMUSCULAR; INTRAVENOUS AS NEEDED
Status: DISCONTINUED | OUTPATIENT
Start: 2024-09-20 | End: 2024-09-20

## 2024-09-20 RX ORDER — ROCURONIUM BROMIDE 10 MG/ML
INJECTION, SOLUTION INTRAVENOUS AS NEEDED
Status: DISCONTINUED | OUTPATIENT
Start: 2024-09-20 | End: 2024-09-20

## 2024-09-20 RX ORDER — HYDROMORPHONE HCL IN WATER/PF 6 MG/30 ML
0.2 PATIENT CONTROLLED ANALGESIA SYRINGE INTRAVENOUS
Status: DISCONTINUED | OUTPATIENT
Start: 2024-09-20 | End: 2024-09-20 | Stop reason: HOSPADM

## 2024-09-20 RX ORDER — ONDANSETRON 2 MG/ML
INJECTION INTRAMUSCULAR; INTRAVENOUS AS NEEDED
Status: DISCONTINUED | OUTPATIENT
Start: 2024-09-20 | End: 2024-09-20

## 2024-09-20 RX ORDER — EPHEDRINE SULFATE 50 MG/ML
INJECTION INTRAVENOUS AS NEEDED
Status: DISCONTINUED | OUTPATIENT
Start: 2024-09-20 | End: 2024-09-20

## 2024-09-20 RX ORDER — FENTANYL CITRATE 50 UG/ML
INJECTION, SOLUTION INTRAMUSCULAR; INTRAVENOUS AS NEEDED
Status: DISCONTINUED | OUTPATIENT
Start: 2024-09-20 | End: 2024-09-20

## 2024-09-20 RX ORDER — DEXAMETHASONE SODIUM PHOSPHATE 10 MG/ML
INJECTION, SOLUTION INTRAMUSCULAR; INTRAVENOUS AS NEEDED
Status: DISCONTINUED | OUTPATIENT
Start: 2024-09-20 | End: 2024-09-20

## 2024-09-20 RX ORDER — PROPOFOL 10 MG/ML
INJECTION, EMULSION INTRAVENOUS AS NEEDED
Status: DISCONTINUED | OUTPATIENT
Start: 2024-09-20 | End: 2024-09-20

## 2024-09-20 RX ORDER — SODIUM CHLORIDE, SODIUM LACTATE, POTASSIUM CHLORIDE, CALCIUM CHLORIDE 600; 310; 30; 20 MG/100ML; MG/100ML; MG/100ML; MG/100ML
100 INJECTION, SOLUTION INTRAVENOUS CONTINUOUS
Status: CANCELLED | OUTPATIENT
Start: 2024-09-20

## 2024-09-20 RX ORDER — LIDOCAINE HYDROCHLORIDE 10 MG/ML
INJECTION, SOLUTION EPIDURAL; INFILTRATION; INTRACAUDAL; PERINEURAL AS NEEDED
Status: DISCONTINUED | OUTPATIENT
Start: 2024-09-20 | End: 2024-09-20

## 2024-09-20 RX ORDER — SODIUM CHLORIDE, SODIUM LACTATE, POTASSIUM CHLORIDE, CALCIUM CHLORIDE 600; 310; 30; 20 MG/100ML; MG/100ML; MG/100ML; MG/100ML
20 INJECTION, SOLUTION INTRAVENOUS CONTINUOUS
Status: DISCONTINUED | OUTPATIENT
Start: 2024-09-20 | End: 2024-09-20 | Stop reason: HOSPADM

## 2024-09-20 RX ORDER — MIDAZOLAM HYDROCHLORIDE 2 MG/2ML
INJECTION, SOLUTION INTRAMUSCULAR; INTRAVENOUS AS NEEDED
Status: DISCONTINUED | OUTPATIENT
Start: 2024-09-20 | End: 2024-09-20

## 2024-09-20 RX ORDER — OXYCODONE AND ACETAMINOPHEN 5; 325 MG/1; MG/1
1 TABLET ORAL EVERY 6 HOURS PRN
Qty: 15 TABLET | Refills: 0 | Status: SHIPPED | OUTPATIENT
Start: 2024-09-20 | End: 2024-09-30

## 2024-09-20 RX ORDER — ONDANSETRON 2 MG/ML
4 INJECTION INTRAMUSCULAR; INTRAVENOUS ONCE AS NEEDED
Status: DISCONTINUED | OUTPATIENT
Start: 2024-09-20 | End: 2024-09-20 | Stop reason: HOSPADM

## 2024-09-20 RX ORDER — ACETAMINOPHEN 500 MG
1000 TABLET ORAL EVERY 6 HOURS PRN
Qty: 30 TABLET | Refills: 0 | Status: SHIPPED | OUTPATIENT
Start: 2024-09-20 | End: 2024-09-20

## 2024-09-20 RX ORDER — FENTANYL CITRATE/PF 50 MCG/ML
50 SYRINGE (ML) INJECTION
Status: DISCONTINUED | OUTPATIENT
Start: 2024-09-20 | End: 2024-09-20 | Stop reason: HOSPADM

## 2024-09-20 RX ORDER — LIDOCAINE HYDROCHLORIDE AND EPINEPHRINE 10; 10 MG/ML; UG/ML
INJECTION, SOLUTION INFILTRATION; PERINEURAL AS NEEDED
Status: DISCONTINUED | OUTPATIENT
Start: 2024-09-20 | End: 2024-09-20 | Stop reason: HOSPADM

## 2024-09-20 RX ORDER — EPINEPHRINE NASAL SOLUTION 1 MG/ML
SOLUTION NASAL AS NEEDED
Status: DISCONTINUED | OUTPATIENT
Start: 2024-09-20 | End: 2024-09-20 | Stop reason: HOSPADM

## 2024-09-20 RX ORDER — SODIUM CHLORIDE, SODIUM LACTATE, POTASSIUM CHLORIDE, CALCIUM CHLORIDE 600; 310; 30; 20 MG/100ML; MG/100ML; MG/100ML; MG/100ML
INJECTION, SOLUTION INTRAVENOUS CONTINUOUS PRN
Status: DISCONTINUED | OUTPATIENT
Start: 2024-09-20 | End: 2024-09-20

## 2024-09-20 RX ORDER — NEOSTIGMINE METHYLSULFATE 1 MG/ML
INJECTION INTRAVENOUS AS NEEDED
Status: DISCONTINUED | OUTPATIENT
Start: 2024-09-20 | End: 2024-09-20

## 2024-09-20 RX ORDER — OXYMETAZOLINE HYDROCHLORIDE 0.05 G/100ML
SPRAY NASAL AS NEEDED
Status: DISCONTINUED | OUTPATIENT
Start: 2024-09-20 | End: 2024-09-20 | Stop reason: HOSPADM

## 2024-09-20 RX ADMIN — FENTANYL CITRATE 50 MCG: 50 INJECTION INTRAMUSCULAR; INTRAVENOUS at 16:05

## 2024-09-20 RX ADMIN — DEXAMETHASONE SODIUM PHOSPHATE 10 MG: 10 INJECTION, SOLUTION INTRAMUSCULAR; INTRAVENOUS at 14:18

## 2024-09-20 RX ADMIN — SODIUM CHLORIDE, SODIUM LACTATE, POTASSIUM CHLORIDE, AND CALCIUM CHLORIDE: .6; .31; .03; .02 INJECTION, SOLUTION INTRAVENOUS at 13:51

## 2024-09-20 RX ADMIN — PROPOFOL 200 MG: 10 INJECTION, EMULSION INTRAVENOUS at 13:59

## 2024-09-20 RX ADMIN — PHENYLEPHRINE HYDROCHLORIDE 100 MCG: 10 INJECTION INTRAVENOUS at 14:13

## 2024-09-20 RX ADMIN — PHENYLEPHRINE HYDROCHLORIDE 30 MCG/MIN: 10 INJECTION INTRAVENOUS at 14:09

## 2024-09-20 RX ADMIN — EPHEDRINE SULFATE 5 MG: 50 INJECTION, SOLUTION INTRAVENOUS at 15:08

## 2024-09-20 RX ADMIN — GLYCOPYRROLATE 0.4 MG: 0.2 INJECTION, SOLUTION INTRAMUSCULAR; INTRAVENOUS at 16:35

## 2024-09-20 RX ADMIN — ROCURONIUM 40 MG: 50 INJECTION, SOLUTION INTRAVENOUS at 13:59

## 2024-09-20 RX ADMIN — EPHEDRINE SULFATE 5 MG: 50 INJECTION, SOLUTION INTRAVENOUS at 14:09

## 2024-09-20 RX ADMIN — PROPOFOL 30 MG: 10 INJECTION, EMULSION INTRAVENOUS at 16:25

## 2024-09-20 RX ADMIN — LIDOCAINE HYDROCHLORIDE 40 MG: 10 INJECTION, SOLUTION EPIDURAL; INFILTRATION; INTRACAUDAL; PERINEURAL at 13:59

## 2024-09-20 RX ADMIN — GLYCOPYRROLATE 0.2 MG: 0.2 INJECTION, SOLUTION INTRAMUSCULAR; INTRAVENOUS at 14:09

## 2024-09-20 RX ADMIN — FENTANYL CITRATE 50 MCG: 50 INJECTION INTRAMUSCULAR; INTRAVENOUS at 13:59

## 2024-09-20 RX ADMIN — EPHEDRINE SULFATE 5 MG: 50 INJECTION, SOLUTION INTRAVENOUS at 14:13

## 2024-09-20 RX ADMIN — NEOSTIGMINE METHYLSULFATE 2 MG: 1 INJECTION INTRAVENOUS at 16:35

## 2024-09-20 RX ADMIN — EPHEDRINE SULFATE 5 MG: 50 INJECTION, SOLUTION INTRAVENOUS at 14:56

## 2024-09-20 RX ADMIN — ROCURONIUM 20 MG: 50 INJECTION, SOLUTION INTRAVENOUS at 15:35

## 2024-09-20 RX ADMIN — ROCURONIUM 20 MG: 50 INJECTION, SOLUTION INTRAVENOUS at 14:18

## 2024-09-20 RX ADMIN — ONDANSETRON 4 MG: 2 INJECTION INTRAMUSCULAR; INTRAVENOUS at 14:18

## 2024-09-20 RX ADMIN — SODIUM CHLORIDE, SODIUM LACTATE, POTASSIUM CHLORIDE, AND CALCIUM CHLORIDE 20 ML/HR: .6; .31; .03; .02 INJECTION, SOLUTION INTRAVENOUS at 12:01

## 2024-09-20 RX ADMIN — MIDAZOLAM 2 MG: 1 INJECTION INTRAMUSCULAR; INTRAVENOUS at 13:51

## 2024-09-20 RX ADMIN — PROPOFOL 30 MCG/KG/MIN: 10 INJECTION, EMULSION INTRAVENOUS at 14:00

## 2024-09-20 NOTE — H&P
Surgery Pre-op note/Updated History and Physical    Date of service: 9/20/20241:30 PM      No changes from most recent clinic H&P note. Patient to OR for FUNCTIONAL ENDOSCOPIC SINUS SURGERY (FESS) IMAGED GUIDED BILATERAL MAXILLARY ANTROSTOMY TOTAL ETHMOIDECTOMY SEPTOPLASTY, TURBINOPLASTY .      Pmh: Reviewed  Pshx: Reviewed  Social history: Reviewed  Medications: Reviewed  ROS: as above      Vitals:    09/20/24 1133   BP: 125/79   Pulse: 78   Resp: 18   Temp: (!) 97.4 °F (36.3 °C)   SpO2: 96%       ENT: oropharynx clear  Chest/Heart/Lungs: Breathing, perfused, unremarkable  Abd: Unremarkable  Ext: Unremarkable        The procedure was discussed with the patient, including risks, benefits, and alternatives and all questions were answered. Consent signed and in the chart.    Melissa Hinson M.D.  PGY-1  Otolaryngology, Head and Neck Surgery   9/20/2024 1:30 PM

## 2024-09-20 NOTE — ANESTHESIA PREPROCEDURE EVALUATION
Procedure:  FUNCTIONAL ENDOSCOPIC SINUS SURGERY (FESS) IMAGED GUIDED BILATERAL MAXILLARY ANTROSTOMY TOTAL ETHMOIDECTOMY SEPTOPLASTY, TURBINOPLASTY (Nose)    Relevant Problems   CARDIO   (+) Coronary artery disease involving native coronary artery of native heart with unstable angina pectoris (HCC)   (+) Migraine with aura and without status migrainosus, not intractable   (+) Mixed hyperlipidemia      GI/HEPATIC   (+) Small bowel obstruction due to adhesions (HCC)      NEURO/PSYCH   (+) Anxiety disorder   (+) Migraine with aura and without status migrainosus, not intractable      PULMONARY   (+) ROSI (obstructive sleep apnea)      Neurology/Sleep   (+) Dysautonomia-like disorder      NSTEMI (non-ST elevation myocardial infarction) ([facility]) april 2019 CPAP (continuous positive airway pressure) dependence   The left ventricular size and wall thickness, low normal left ventricular  systolic function.  There is mild regional wall motion abnormality.    Ejection fraction is estimated as around 52%.  Grade 1 diastolic  dysfunction with normal estimated left atrial pressure.  Normal right ventricular size and systolic function.  Normal left and right atrial cavity size.  Small mobile interatrial  septum.  No evidence of interatrial shunt by color-flow Doppler  evaluation.  Aortic valve was not well-visualized.  No significant aortic valve  stenosis or regurgitation noted.  Normal mitral valve structure, trace mitral valve regurgitation.  Trace tricuspid valve regurgitation.  No obvious pulmonary hypertension.  No pericardial effusion.     Compared to report of previous echocardiogram from 7/16/2020 there is  overall no significant change although like regional wall motion  abnormality was not noted previously.        Physical Exam    Airway    Mallampati score: II  TM Distance: >3 FB  Neck ROM: full     Dental       Cardiovascular      Pulmonary      Other Findings        Anesthesia Plan  ASA Score- 2     Anesthesia Type-  general with ASA Monitors.         Additional Monitors:     Airway Plan: ETT.           Plan Factors-    Chart reviewed.                      Induction- intravenous.    Postoperative Plan-         Informed Consent- Anesthetic plan and risks discussed with patient.  I personally reviewed this patient with the CRNA. Discussed and agreed on the Anesthesia Plan with the CRNA..

## 2024-09-20 NOTE — DISCHARGE INSTR - AVS FIRST PAGE
POST-OPERATIVE CARE INSTRUCTION SHEET      ABOUT POST-OPERATIVE CARE VISITS    Post-operative visits are an indispensable part of the surgery, since they help promote healing and prevent persistent or recurrent disease. You should plan on remaining within the [facility] area for at least one day following surgery. You will usually be seen within 7-10 days post-operatively for debridement (removal of crusts from your nose).    You should anticipate 2 office visits over the first 4 weeks after surgery. Continued debridement will be done at these visits, and persistent inflammation or scar tissue will be removed under local anesthesia. Although chances of complications from these manipulations are rare, the potential risks are the same as the surgery itself.     WHAT YOU CAN EXPECT TO EXPERIENCE    You can expect some bleeding from your nose for several days after the surgery and again after each office debridement. When bleeding occurs down the front of your nose or into the back of your throat, you should tilt your head back while sitting up and breathe gently through your nose. If bleeding persists for an extended period of time notify our office.  Over-the-counter Afrin nose spray (oxymetazoline) can be used to open your nose  and reduce bleeding during the first two nights after surgery if needed.    As the sinuses begin to clear themselves, you can expect to have some thick brown drainage from your nose. This is mucus and old blood and does not indicate an infection.    You may experience some discomfort post-operatively due to manipulation and inflammation. Take your pain medication as directed. Often extra-strength Tylenol is sufficient. You may wish to take medication for pain prior to your post-operative visits (particularly early on, when the nose is most sensitive). If the medication is sedating, be sure to have someone available to drive you.      SOME IMPORTANT POST-OPERATIVE DO'S AND DO NOT'S    DO  NOT blow your nose until you have been given permission to do so.    DO NOT bend, lift or strain for at least one week after surgery. These activities will promote bleeding from your nose. You should not plan on participating in rigorous activity until healing is completed.    DO NOT suppress the need to cough or sneeze, but cough/sneeze with your mouth open.    DO NOT resume use of any aspirin-containing products or other blood thinners until after discussing this with us. Typically, you can restart after 7-10 days.    DO use nasal saline spray (without decongestant) every hour while you are awake beginning the day after surgery.  This helps moisten your nose and prevents large crusts from forming.  The preferred brand is Simply Saline due to lack of a preservative which is irritating to some people.    DO use nasal saline irrigation 4-6 times daily beginning on post-operative day three if there is no nasal packing.    DO continue your steroids (Medrol or Prednisone) and antibiotics (if they have been prescribed for you). Diarrhea from antibiotic usage can lead to a serious health problem. This can often be prevented by taking probiotics daily, which is found in yogurt with active cultures or as tablets in a health food store. If you should experience diarrhea, stop the antibiotic and notify us. Further evaluation may be required.    DO notify us for any of the following: temperature elevations above 100.5 F, clear watery drainage from your nose, changes in vision, swelling of the eyes, worsening headache or neck stiffness.                                Contact our office for an emergency or go to the emergency room nearest to you.

## 2024-09-22 ENCOUNTER — HOSPITAL ENCOUNTER (EMERGENCY)
Facility: HOSPITAL | Age: 59
Discharge: HOME | End: 2024-09-22
Attending: EMERGENCY MEDICINE
Payer: COMMERCIAL

## 2024-09-22 VITALS
OXYGEN SATURATION: 96 % | DIASTOLIC BLOOD PRESSURE: 70 MMHG | SYSTOLIC BLOOD PRESSURE: 130 MMHG | TEMPERATURE: 98.2 F | HEART RATE: 93 BPM | RESPIRATION RATE: 18 BRPM

## 2024-09-22 DIAGNOSIS — R04.0 EPISTAXIS: Primary | ICD-10-CM

## 2024-09-22 DIAGNOSIS — Z98.890 STATUS POST OPERATION ON NASAL SINUS: ICD-10-CM

## 2024-09-22 PROCEDURE — 99284 EMERGENCY DEPT VISIT MOD MDM: CPT

## 2024-09-22 PROCEDURE — 99283 EMERGENCY DEPT VISIT LOW MDM: CPT

## 2024-09-22 RX ORDER — OXYMETAZOLINE HYDROCHLORIDE 0.05 G/100ML
2 SPRAY NASAL ONCE
Status: COMPLETED | OUTPATIENT
Start: 2024-09-22 | End: 2024-09-22

## 2024-09-22 RX ADMIN — OXYMETAZOLINE HYDROCHLORIDE 2 SPRAY: 0.05 SPRAY NASAL at 19:22

## 2024-09-22 NOTE — ED PROVIDER NOTES
1. Epistaxis    2. Status post operation on nasal sinus      ED Disposition       ED Disposition   Discharge    Condition   Stable    Date/Time   Sun Sep 22, 2024  8:00 PM    Comment   Chandler Angeles discharge to home/self care.                   Assessment & Plan       Medical Decision Making  59-year-old male presents to ED for evaluation of postoperative epistaxis as seen in HPI.  On physical examination patient vital signs stable. Normotensive. Nontachycardic. Afebrile.  Significant bilateral epistaxis most prominent in the left nare.  Large blood clot seen in the left nare.  Right nare with smaller blood clot. Both nares occluded by the blood clots. Reached out to on call ENT attending to discuss patient. ENT attending advises removal of the clots and assess for rebleed. If epistaxis resumes after removal of blood clots, apply Afrin on gauze.     Returned to patient and used forceps to remove blood clots from both nares. No significant rebleed occurred after clot removal, only a small amount of bleeding from the left nare which was amenable to pressure with afrin soaked gauze. Patient clinically improved after clot removal. Patient feels less occluded bilaterally. Patient stable for discharge with prompt ENT surgeon follow up. Advised patient to call his ENT surgeon in the morning to make a same day appointment. Advised patient to return to ED for any significant re bleed overnight. Patient agreeable to plan. Patient discharged.     Prior to discharge, discharge instructions were discussed with patient at bedside. Patient was provided both verbal and written instructions. Patient is understanding of the discharge instructions and is agreeable to plan of care. Return precautions were discussed with patient bedside, patient verbalized understanding of signs and symptoms that would necessitate return to the ED. All questions were answered. Patient was comfortable with the plan of care and discharged to  "home.    Portions of this chart may have been written with voice recognition software.  Occasional grammatical errors, wrong word or \"sound a like\" substitutions may have occurred due to software limitations.  Please read carefully and use context to recognize where substitutions have occurred.     Risk  OTC drugs.                     Medications   oxymetazoline (AFRIN) 0.05 % nasal spray 2 spray (2 sprays Each Nare Given 9/22/24 1922)       History of Present Illness       59-year-old male with history of CAD, hyperlipidemia, chronic maxillary sinusitis presents to ED for evaluation of epistaxis.  Patient states that on Friday he had sinus surgery.  This evening he was performing a dressing change.  In the process he started experiencing heavy amounts of nasal bleeding.  The bleeding was most prominent from his left nare.  Does endorse bleeding from the right nare as well.  Bleeding persisted despite direct pressure.  Denies use of blood thinners.  No other concerns today.               Review of Systems   HENT:  Positive for nosebleeds.    All other systems reviewed and are negative.          Objective     ED Triage Vitals [09/22/24 1758]   Temperature Pulse Blood Pressure Respirations SpO2 Patient Position - Orthostatic VS   98.2 °F (36.8 °C) 93 130/70 18 96 % Sitting      Temp src Heart Rate Source BP Location FiO2 (%) Pain Score    -- Monitor Right arm -- --        Physical Exam  Vitals and nursing note reviewed.   Constitutional:       General: He is not in acute distress.     Appearance: He is well-developed. He is not toxic-appearing or diaphoretic.   HENT:      Head: Normocephalic and atraumatic.      Nose:      Right Nostril: Epistaxis present.      Left Nostril: Epistaxis and occlusion present.   Eyes:      Conjunctiva/sclera: Conjunctivae normal.   Cardiovascular:      Rate and Rhythm: Normal rate and regular rhythm.      Heart sounds: No murmur heard.  Pulmonary:      Effort: Pulmonary effort is normal. " No respiratory distress.      Breath sounds: Normal breath sounds.   Musculoskeletal:      Cervical back: Neck supple.   Skin:     General: Skin is warm and dry.      Capillary Refill: Capillary refill takes less than 2 seconds.   Neurological:      Mental Status: He is alert.   Psychiatric:         Mood and Affect: Mood normal.         Labs Reviewed - No data to display  No orders to display       Procedures    ED Medication and Procedure Management   Prior to Admission Medications   Prescriptions Last Dose Informant Patient Reported? Taking?   Blood Glucose Monitoring Suppl (OneTouch Verio Reflect) w/Device KIT   No No   Sig: Check blood sugars once daily. Please substitute with appropriate alternative as covered by patient's insurance. Dx: E11.65   Fluticasone Propionate (Xhance) 93 MCG/ACT EXHU   No No   Si actuation into each nostril 2 (two) times a day   Multiple Vitamin (multivitamin) tablet   Yes No   Sig: Take 1 tablet by mouth daily   OXcarbazepine (Trileptal) 300 mg tablet   No No   Sig: Take 1 tablet (300 mg total) by mouth in the morning AND 2 tablets (600 mg total) daily at bedtime.   OneTouch Delica Lancets 33G MISC   No No   Sig: Check blood sugars once daily. Please substitute with appropriate alternative as covered by patient's insurance. Dx: E11.65   Paliperidone Palmitate ER (Invega Trinza) 819 MG/2.63ML DENISSE   Yes No   Sig: Inject 819 mg into a muscle every 3 (three) months   amoxicillin-clavulanate (AUGMENTIN) 875-125 mg per tablet   No No   Sig: Take 1 tablet by mouth every 12 (twelve) hours for 7 days   aspirin (ECOTRIN LOW STRENGTH) 81 mg EC tablet   No No   Sig: Take 1 tablet (81 mg total) by mouth daily   atorvastatin (LIPITOR) 40 mg tablet   No No   Sig: Take 1 tablet (40 mg total) by mouth daily   clonazePAM (KlonoPIN) 2 mg tablet   No No   Sig: Take 1 tablet (2 mg total) by mouth 2 (two) times a day as needed for anxiety   desvenlafaxine succinate (PRISTIQ) 50 mg 24 hr tablet   No  No   Sig: Take 1 tablet (50 mg total) by mouth daily   glucose blood (OneTouch Verio) test strip   No No   Sig: Check blood sugars once daily. Please substitute with appropriate alternative as covered by patient's insurance. Dx: E11.65   nicotine (NICODERM CQ) 21 mg/24 hr TD 24 hr patch   No No   Sig: Place 1 patch on the skin over 24 hours every 24 hours   Patient not taking: Reported on 9/5/2024   omeprazole (PriLOSEC) 20 mg delayed release capsule   No No   Sig: Take 1 capsule (20 mg total) by mouth daily   oxyCODONE-acetaminophen (Percocet) 5-325 mg per tablet   No No   Sig: Take 1 tablet by mouth every 6 (six) hours as needed for moderate pain for up to 10 days Max Daily Amount: 4 tablets   potassium chloride (Klor-Con M20) 20 mEq tablet   No No   Sig: Take 1 tablet (20 mEq total) by mouth daily   ranolazine (RANEXA) 1000 MG SR tablet   No No   Sig: Take 1 tablet (1,000 mg total) by mouth 2 (two) times a day   sodium chloride 1 g tablet   No No   Sig: Take 2 tablets (2 g total) by mouth 3 (three) times a day      Facility-Administered Medications: None     Discharge Medication List as of 9/22/2024  8:03 PM        CONTINUE these medications which have NOT CHANGED    Details   amoxicillin-clavulanate (AUGMENTIN) 875-125 mg per tablet Take 1 tablet by mouth every 12 (twelve) hours for 7 days, Starting Fri 9/20/2024, Until Fri 9/27/2024, Normal      aspirin (ECOTRIN LOW STRENGTH) 81 mg EC tablet Take 1 tablet (81 mg total) by mouth daily, Starting Thu 5/2/2024, Normal      atorvastatin (LIPITOR) 40 mg tablet Take 1 tablet (40 mg total) by mouth daily, Starting Tue 7/16/2024, Normal      Blood Glucose Monitoring Suppl (OneTouch Verio Reflect) w/Device KIT Check blood sugars once daily. Please substitute with appropriate alternative as covered by patient's insurance. Dx: E11.65, Normal      clonazePAM (KlonoPIN) 2 mg tablet Take 1 tablet (2 mg total) by mouth 2 (two) times a day as needed for anxiety, Starting Mon  9/9/2024, Normal      desvenlafaxine succinate (PRISTIQ) 50 mg 24 hr tablet Take 1 tablet (50 mg total) by mouth daily, Starting Mon 4/15/2024, Normal      Fluticasone Propionate (Xhance) 93 MCG/ACT EXHU 1 actuation into each nostril 2 (two) times a day, Starting Mon 7/29/2024, Normal      glucose blood (OneTouch Verio) test strip Check blood sugars once daily. Please substitute with appropriate alternative as covered by patient's insurance. Dx: E11.65, Normal      Multiple Vitamin (multivitamin) tablet Take 1 tablet by mouth daily, Historical Med      nicotine (NICODERM CQ) 21 mg/24 hr TD 24 hr patch Place 1 patch on the skin over 24 hours every 24 hours, Starting Fri 4/19/2024, Normal      omeprazole (PriLOSEC) 20 mg delayed release capsule Take 1 capsule (20 mg total) by mouth daily, Starting Thu 7/18/2024, Until Wed 10/16/2024, Normal      OneTouch Delica Lancets 33G MISC Check blood sugars once daily. Please substitute with appropriate alternative as covered by patient's insurance. Dx: E11.65, Normal      OXcarbazepine (Trileptal) 300 mg tablet Multiple Dosages:Starting Thu 9/5/2024, Until Wed 12/4/2024 at 2359Take 1 tablet (300 mg total) by mouth in the morning AND 2 tablets (600 mg total) daily at bedtime., Normal      oxyCODONE-acetaminophen (Percocet) 5-325 mg per tablet Take 1 tablet by mouth every 6 (six) hours as needed for moderate pain for up to 10 days Max Daily Amount: 4 tablets, Starting Fri 9/20/2024, Until Mon 9/30/2024 at 2359, Normal      Paliperidone Palmitate ER (Invega Trinza) 819 MG/2.63ML DENISSE Inject 819 mg into a muscle every 3 (three) months, Historical Med      potassium chloride (Klor-Con M20) 20 mEq tablet Take 1 tablet (20 mEq total) by mouth daily, Starting Wed 9/11/2024, Normal      ranolazine (RANEXA) 1000 MG SR tablet Take 1 tablet (1,000 mg total) by mouth 2 (two) times a day, Starting Tue 8/20/2024, Normal      sodium chloride 1 g tablet Take 2 tablets (2 g total) by mouth 3  (three) times a day, Starting Wed 9/11/2024, Normal           No discharge procedures on file.     Armando Oquendo PA-C  09/23/24 1662

## 2024-09-23 NOTE — DISCHARGE INSTRUCTIONS
Please call your surgeon's office in the morning to make a same day appointment.   Please do return for new or worsening symptoms.

## 2024-09-25 PROCEDURE — 88305 TISSUE EXAM BY PATHOLOGIST: CPT | Performed by: STUDENT IN AN ORGANIZED HEALTH CARE EDUCATION/TRAINING PROGRAM

## 2024-09-25 PROCEDURE — 88311 DECALCIFY TISSUE: CPT | Performed by: STUDENT IN AN ORGANIZED HEALTH CARE EDUCATION/TRAINING PROGRAM

## 2024-09-30 NOTE — PLAN OF CARE
Problem: Knowledge Deficit  Goal: Patient/family/caregiver demonstrates understanding of disease process, treatment plan, medications, and discharge instructions  Description: Complete learning assessment and assess knowledge base.  Interventions:  - Provide teaching at level of understanding  - Provide teaching via preferred learning methods  Outcome: Progressing      Detail Level: Detailed Biopsy Type: H and E Bill As?: Malignant Destruction Size Of Lesion In Cm (Optional): 0 Size Of Lesion After Curettage: 1.3 Anesthesia Type: 1% lidocaine with epinephrine Anesthesia Volume In Cc: 2 Hemostasis: Electrocautery Destruction Type: electrodesiccation Number Of Curettages: 3 Wound Care: Petrolatum Lab: [address] Notes In Note?: No Consent: Written consent was obtained and risks were reviewed including but not limited to scarring, infection, bleeding, scabbing, incomplete removal, nerve damage and allergy to anesthesia. Render Post-Care Instructions In Note?: yes Post-Care Instructions: I reviewed with the patient in detail post-care instructions. Patient is to keep the biopsy site dry overnight, and then apply vaseline daily until healed. Patient may apply hydrogen peroxide soaks to remove any crusting. Notification Instructions: Patient will be notified of biopsy results. However, patient instructed to call the office if not contacted within 2 weeks. Billing Type: Third-Party Bill Size Of Lesion After Curettage: 1.2

## 2024-10-01 ENCOUNTER — TELEPHONE (OUTPATIENT)
Dept: PSYCHIATRY | Facility: CLINIC | Age: 59
End: 2024-10-01

## 2024-10-07 DIAGNOSIS — Z98.84 STATUS POST GASTRIC BYPASS FOR OBESITY: Primary | ICD-10-CM

## 2024-10-07 DIAGNOSIS — K56.50 SMALL BOWEL OBSTRUCTION DUE TO ADHESIONS (HCC): ICD-10-CM

## 2024-10-07 DIAGNOSIS — F31.62 BIPOLAR DISORDER, CURRENT EPISODE MIXED, MODERATE (HCC): ICD-10-CM

## 2024-10-07 RX ORDER — OXCARBAZEPINE 300 MG/1
TABLET, FILM COATED ORAL
Qty: 90 TABLET | Refills: 3 | Status: SHIPPED | OUTPATIENT
Start: 2024-10-07 | End: 2025-01-05

## 2024-10-07 NOTE — TELEPHONE ENCOUNTER
Reason for call:   [x] Refill   [] Prior Auth  [] Other:     Office:   [] PCP/Provider -   [x] Specialty/Provider - Psych    Medication: Trileptal    Dose/Frequency: 300 mg    Quantity: 90    Pharmacy:  [facility] - [facility] - [address] [place] [address]     Does the patient have enough for 3 days?   [] Yes   [x] No - Send as HP to POD

## 2024-10-19 NOTE — OP NOTE
OPERATIVE REPORT  PATIENT NAME: Chandler Angeles    :  1965  MRN: [id number]  Pt Location: BE OR ROOM 04    SURGERY DATE: 2024    Surgeons and Role:     * Gray Mo MD - Primary     * Melissa Hinson MD - Assisting    Preop Diagnosis:  Chronic maxillary sinusitis [J32.0]  Chronic ethmoid sinusitis  Deviated nasal septum  Nasal obstruction  Nasal congestion  Inferior turbinate hypertrophy    Post-Op Diagnosis Codes:  Chronic maxillary sinusitis [J32.0]  Chronic ethmoid sinusitis  Deviated nasal septum  Nasal obstruction  Nasal congestion  Inferior turbinate hypertrophy    Procedure(s):  Functional endoscopic sinus surgery including:  Bilateral maxillary antrostomy  Bilateral total ethmoidectomy  Septoplasty  Endoscopic submucous resection of the inferior turbinates with therapeutic outfracture, bilateral  Use of intraoperative naviagtion      Specimen(s):  ID Type Source Tests Collected by Time Destination   1 : Sinus contents Tissue Maxillary Sinus TISSUE EXAM Gray Mo MD 2024  4:38 PM        Estimated Blood Loss:   Less than 100 mL     Anesthesia Type:   General    Operative Indications:  Chronic maxillary sinusitis [J32.0]  Chronic maxillary sinusitis [J32.0]  Chronic ethmoid sinusitis  Deviated nasal septum  Nasal obstruction  Nasal congestion  Inferior turbinate hypertrophy    Operative Findings:  Deviated nasal septum to the left side with spur, bilateral inferior turbinate hypertrophy      Complications:   None    Procedure and Technique:  After the patient was allowed to ask any remaining questions in the preoperative area, the patient was escorted to the operating suite by both ENT and anesthesia.  There, surgical timeout was performed to ensure the proper patient and procedure.  Once this was done, general endotracheal anesthesia was induced without incident.  Once given go-ahead by anesthesia, the head of the bed was rotated 90 degrees.  The iCetana navigation device was  applied to the patient's forehead and registered without incident.  Afrin-soaked pledgets were placed bilaterally.  After time was given, the pledgets were removed.  1% lidocaine with epinephrine 1-100,000 was injected into the patient's nasal septum and inferior turbinates.  The Afrin-soaked pledgets were replaced.  The patient was then prepped and draped in normal fashion for the above procedures.    After enough time was given, the pledgets were removed.  A 15 blade was used to make a left-sided hemitransfixion incision.  A Brookings and Claritza elevator was then used to raise a submucoperichondrial and submucoperiosteal flap over the underlying cartilage and bone.  The patient did have a left-sided bony septal spur and so the mucosa was carefully raised around this area.  A cartilage knife was then used to make just anterior to the start of the deviation.  [place]land elevator was then used to raise a flap on the right side in a similar fashion.  Once this was done, endoscopic scissors was used to make a cut above and below the areas of the deviated portions of the cartilage.  This was removed using Daniel Kinsey fragment forceps.  Once this was removed, the bony deviation was addressed using [place] forceps, as well as the [place]field Kinesy fragment forceps.  Once this was done, the patient's deviation was addressed and much better.  The nasal airway was improved.  Attention was then turned to the sinus portion of the procedure.  On the left side, the middle turbinate was gently medialized with a Brookings.  The uncinate process was back fractured using a ball-tipped seeker and removed in its entirety using the microdebrider.  The natural ostium of the maxillary sinus was then identified and enlarged using backbiter, through cut forceps, and down biter.  I made sure to incorporate the natural ostium of the maxillary sinus to the rest of the antrostomy using both angled endoscopes as well as the image guidance navigation  system.  Once this was completed, dissection proceeded posteriorly through the ethmoid bulla as well as the basal lamella of the middle turbinate.  This allowed us to enter into the posterior ethmoid cavity.  The skull base was identified and using microdebrider as well as curettes, the ethmoid cells were taken down from the posterior skull base all the way to the anterior ethmoids and frontal area.  I made sure to take down all the partitions between the skull base, middle turbinate, and lamina papyracea.  Attention was then turned to the right sided sinuses where a similar procedure with similar findings were performed.  Maxillary trust me and total ethmoidectomy were performed in similar fashion.  Attention was then turned to the turbinate portion of the procedure.  15 blade was used to make an anterior stab incision into the head of the inferior turbinate.  Garland elevator was then used to raise a submucous tunnel.  The [employer] microdebrider with a turbinate shaver was then inserted into the tunnel and activated until adequate reduction was achieved.  The turbinate was then outfractured using a [place], Brant elevator, and long nasal speculum.  All bleeding was controlled with Afrin and or epinephrine pledgets.  After time was given, the pledgets were removed.  The hemitransfixion incision was closed with chromic gut suture.  PosiSepX was placed into the bilateral middle meatuses.  Bacitracin coated Birch splints were then inserted endoscopically set the splints lied lateral to the middle turbinate to prevent lateralization of the middle turbinate.  An OG tube was then used to suction out the patient's stomach contents.  The patient was then turned over to anesthesia and allowed to awaken without incident.   I was present for the entire procedure.    Patient Disposition:  PACU              SIGNATURE: Gray Mo MD

## 2024-10-21 ENCOUNTER — TELEPHONE (OUTPATIENT)
Age: 59
End: 2024-10-21

## 2024-10-21 NOTE — TELEPHONE ENCOUNTER
Patient called requesting refill for Atorvastatin. Patient made aware medication was refilled on 7/16/24 for 100 with 1 refills to  pharmacy. Patient instructed to contact the pharmacy to obtain refills of medication. Patient verbalized understanding.

## 2024-11-04 ENCOUNTER — OFFICE VISIT (OUTPATIENT)
Dept: FAMILY MEDICINE CLINIC | Facility: CLINIC | Age: 59
End: 2024-11-04
Payer: COMMERCIAL

## 2024-11-04 VITALS
WEIGHT: 193 LBS | SYSTOLIC BLOOD PRESSURE: 97 MMHG | TEMPERATURE: 97.5 F | HEART RATE: 93 BPM | DIASTOLIC BLOOD PRESSURE: 55 MMHG | OXYGEN SATURATION: 96 % | HEIGHT: 71 IN | RESPIRATION RATE: 18 BRPM | BODY MASS INDEX: 27.02 KG/M2

## 2024-11-04 DIAGNOSIS — F31.62 BIPOLAR DISORDER, CURRENT EPISODE MIXED, MODERATE (HCC): ICD-10-CM

## 2024-11-04 DIAGNOSIS — E87.1 HYPONATREMIA: ICD-10-CM

## 2024-11-04 DIAGNOSIS — I25.10 CORONARY ARTERY DISEASE INVOLVING NATIVE CORONARY ARTERY OF NATIVE HEART WITHOUT ANGINA PECTORIS: ICD-10-CM

## 2024-11-04 DIAGNOSIS — I95.1 ORTHOSTATIC HYPOTENSION: ICD-10-CM

## 2024-11-04 DIAGNOSIS — J32.4 CHRONIC PANSINUSITIS: Primary | ICD-10-CM

## 2024-11-04 DIAGNOSIS — Z23 ENCOUNTER FOR IMMUNIZATION: ICD-10-CM

## 2024-11-04 PROCEDURE — 99214 OFFICE O/P EST MOD 30 MIN: CPT | Performed by: PHYSICIAN ASSISTANT

## 2024-11-04 PROCEDURE — 90471 IMMUNIZATION ADMIN: CPT | Performed by: PHYSICIAN ASSISTANT

## 2024-11-04 PROCEDURE — 90673 RIV3 VACCINE NO PRESERV IM: CPT | Performed by: PHYSICIAN ASSISTANT

## 2024-11-04 RX ORDER — ASPIRIN 81 MG/1
81 TABLET ORAL DAILY
Qty: 90 TABLET | Refills: 3 | Status: SHIPPED | OUTPATIENT
Start: 2024-11-04

## 2024-11-04 NOTE — ASSESSMENT & PLAN NOTE
Patient reports running out of aspirin 81 and noncompliance, will start again, denies any angina or equivalents.  History of NSTEMI in the past.  Has follow-up appointment with cardiology in 4/2025.  Continue atorvastatin 40 mg and Ranexa.  Orders:    aspirin 81 mg EC tablet; Take 1 tablet (81 mg total) by mouth daily

## 2024-11-04 NOTE — PROGRESS NOTES
Ambulatory Visit  Name: Chandler Angeles      : 1965      MRN: [id number]  Encounter Provider: Moraima Nunez PA-C  Encounter Date: 2024   Encounter department: [facility] CARE [facility]    Assessment & Plan  Chronic pansinusitis  Reviewed last ENT consult note in 10/2024. S/p functional endoscopic sinus surgery with bilateral maxillary antrostomy, ethmoidectomy, septoplasty and turbinoplasty in .  Significant improved since then.  Continue nasal rinses per ENT.         Coronary artery disease involving native coronary artery of native heart without angina pectoris  Patient reports running out of aspirin 81 and noncompliance, will start again, denies any angina or equivalents.  History of NSTEMI in the past.  Has follow-up appointment with cardiology in 2025.  Continue atorvastatin 40 mg and Ranexa.  Orders:    aspirin 81 mg EC tablet; Take 1 tablet (81 mg total) by mouth daily    Orthostatic hypotension  Improved, continue sodium tablets, no recent episodes.         Hyponatremia  Lab Results   Component Value Date    SODIUM 134 (L) 2024    K 4.0 2024    CL 98 2024    CO2 28 2024    AGAP 8 2024    BUN 12 2024    CREATININE 0.72 2024    GLUC 87 2024    GLUF 89 2024    CALCIUM 8.7 2024    AST 22 2024    ALT 18 2024    ALKPHOS 81 2024    TP 5.9 (L) 2024    TBILI 0.61 2024    EGFR 102 2024     Stable continue sodium tablets per psychiatry.         Bipolar disorder, current episode mixed, moderate (HCC)  Stable, mood pleasant on exam.  Invega every 3 months per psych.  Continue follow-up psychiatry, recently missed appointment due to forgetting.  Rescheduled for December.         Encounter for immunization    Orders:    influenza vaccine, recombinant, PF, 0.5 mL IM (Flublok)       History of Present Illness     Chandler is a 59 y.o. male with a h/o CAD, bipolar disorder, post-surgical  "dumping syndrome who presents for routine follow-up, hospitalization in July for small bowel obstruction and had surgery for lysis of adhesions.  Also had surgery in September for sinus. Feeling well overall, no abdominal pain, diarrhea or constipation.  Sinuses improved.  No significant dizziness with standing.  Still vaping, lowest nicotine dose.  Patches did not help.  No smoking cigarettes or any other drugs.          Review of Systems   Constitutional:  Negative for chills, fever and unexpected weight change.   HENT:  Negative for sinus pressure (Improved).    Respiratory:  Negative for shortness of breath.    Cardiovascular:  Negative for chest pain.   Gastrointestinal:  Negative for constipation, diarrhea and nausea.           Objective     BP 97/55 (BP Location: Left arm, Patient Position: Sitting, Cuff Size: Standard)   Pulse 93   Temp 97.5 °F (36.4 °C) (Temporal)   Resp 18   Ht 5' 11\" (1.803 m)   Wt 87.5 kg (193 lb)   SpO2 96%   BMI 26.92 kg/m²     Physical Exam  Vitals reviewed.   Constitutional:       Appearance: Normal appearance.   HENT:      Head: Normocephalic and atraumatic.   Cardiovascular:      Rate and Rhythm: Normal rate and regular rhythm.   Pulmonary:      Effort: Pulmonary effort is normal.      Breath sounds: Normal breath sounds.   Neurological:      Mental Status: He is alert and oriented to person, place, and time. Mental status is at baseline.         "

## 2024-11-05 NOTE — ASSESSMENT & PLAN NOTE
Reviewed last ENT consult note in 10/2024. S/p functional endoscopic sinus surgery with bilateral maxillary antrostomy, ethmoidectomy, septoplasty and turbinoplasty in 9/24.  Significant improved since then.  Continue nasal rinses per ENT.

## 2024-11-05 NOTE — ASSESSMENT & PLAN NOTE
Stable, mood pleasant on exam.  Invega every 3 months per psych.  Continue follow-up psychiatry, recently missed appointment due to forgetting.  Rescheduled for December.

## 2024-12-04 ENCOUNTER — HOSPITAL ENCOUNTER (OUTPATIENT)
Dept: INFUSION CENTER | Facility: HOSPITAL | Age: 59
Discharge: HOME | End: 2024-12-04
Payer: COMMERCIAL

## 2024-12-04 PROCEDURE — 96372 THER/PROPH/DIAG INJ SC/IM: CPT

## 2024-12-04 RX ADMIN — PALIPERIDONE PALMITATE 819 MG: 819 INJECTION, SUSPENSION, EXTENDED RELEASE INTRAMUSCULAR at 08:15

## 2024-12-04 NOTE — PROGRESS NOTES
Pt tolerated Invega Trinza injection to R deltoid without difficulty.  Confirmed next appt on 2/26 at 0800.  AVS declined.  Left ambulatory in stable condition.

## 2024-12-05 DIAGNOSIS — F41.9 ANXIETY DISORDER, UNSPECIFIED TYPE: ICD-10-CM

## 2024-12-05 RX ORDER — CLONAZEPAM 2 MG/1
2 TABLET ORAL 2 TIMES DAILY PRN
Qty: 60 TABLET | Refills: 2 | Status: SHIPPED | OUTPATIENT
Start: 2024-12-05

## 2024-12-05 NOTE — TELEPHONE ENCOUNTER
11/09/2024 09/09/2024 clonazePAM (Tablet) 60.0 30 2 MG NA [facility] Commercial Insurance 2 / 2 PA   1 [id number] 10/10/2024 09/09/2024 clonazePAM (Tablet) 60.0 30 2 MG NA [facility] Commercial Insurance 1 / 2 PA   1 [id number] 09/20/2024 09/20/2024 oxyCODONE HYDROCHLORIDE 5 MG ORAL TABLET/ACETAMINOPHEN 325 MG (Tablet) 15.0 4 325 MG-5 [facility] 28.12 [facility] 0 / 0 PA   1 [id number] 09/09/2024 09/09/2024 clonazePAM (Tablet) 60.0 30 2 MG [facility] Commercial Insurance 0 / 2 PA   1 [id number] 08/10/2024 06/10/2024 clonazePAM (Tablet) 60.0 30 2 MG [facility] Commercial Insurance 2 / 2 PA   1 [id number] 07/19/2024 07/19/2024 HYDROcodone BITARTRATE 5 MG ORAL TABLET/ACETAMINOPHEN 325 MG (Tablet) 14.0 14 325 MG-5 MG 5.0 [facility] Commercial Insurance 0 / 0 PA   1 [id number] 07/18/2024 07/18/2024 oxyCODONE HCL (Tablet) 5.0 2 5 MG 18.75 SABINO HERRERA [facility] Commercial Insurance 0 / 0 PA   1 [id number] 07/11/2024 06/10/2024 clonazePAM (Tablet)

## 2024-12-05 NOTE — TELEPHONE ENCOUNTER
Reason for call:   [x] Refill   [] Prior Auth  [] Other:     Office:   [] PCP/Provider -   [x] Specialty/Provider - Psychiatry    Medication: clonazePAM (KlonoPIN) 2 mg tablet Take 1 tablet (2 mg total) by mouth 2 (two) times a day as needed for anxiety,       Pharmacy:  [facility] - [address] [place] [address]      Does the patient have enough for 3 days?   [x] Yes   [] No - Send as HP to POD

## 2024-12-11 NOTE — PSYCH
MEDICATION MANAGEMENT NOTE        [facility]      Name and Date of Birth:  Chandler Angeles 59 y.o. 1965 MRN: [id number]    Date of Visit: December 12, 2024    Reason for Visit: Follow-up visit for medication management     Virtual Visit Disclaimer:       TeleMed provider: Elie Fountain MD.   Location: [place]     Verification of patient location:     Patient is currently located in the [facility]  Patient is currently located in a state in which I am licensed     After connecting through Zillow, the patient was identified by name and date of birth.  Chandler Angeles was informed that this is a telemedicine visit that is being conducted through NOTIK, and the patient was informed that this is a secure, HIPAA-compliant platform. My office door was closed. No one else was in the room. Chandler Angeles acknowledged consent and understanding of privacy and security of the video platform. Chandler understands that the online visit is based solely on information provided by the patient, and that, in the absence of a face-to-face physical evaluation by the physician, the diagnosis Chandler  receives is both limited and provisional in terms of accuracy and completeness. Chandler Angeles understands that they can discontinue the visit at any time. I informed Chandler that I have reviewed their record in EPIC and presented the opportunity for them to ask any questions regarding the visit today. Chandler Angeles voiced understanding and consented to these terms. Chandler is aware this is a billable service. Chandler is present at home.      SUBJECTIVE:    Chandler Angeles is a 59 y.o. male with past psychiatric history significant for bipolar disorder type I, anxiety disorder, insomnia and nicotine use disorder who was personally seen and evaluated today at the [facility] outpatient clinic for follow-up and medication management. Chandler  "presents as calm, pleasant, and cooperative. His thoughts are linear and organized. He completes assessment without difficulty.      Chandler endorses compliance with psychotropic medication regimen consisting of Trileptal, Trinza, Pristiq, and Klonopin. He denies adverse medication side effects. Greg reports psychiatric stability since last visit. He rates his overall state of MH today as \"8/10\" (10 being best he ever felt, 0 being worst). His sleep and appetite are stable. He denies SI/HI. His energy and motivation wax and wane. He believes this will improve with employment, as a job would offer structure, routine, and a \"reason to get up in the morning\". He is pending 2 job contracts at the moment, one for a [employer] business and the other for a [email or web address] business. Acutely, Greg denies recent changes in focus or concentrations. He has not experienced any syncopal episodes or dizziness since starting sodium tablets. Last sodium level was 132 in July 2024. Greg denies crying spells or anhedonia. He is eager to spend [place] with his family and watch the [employer] game this weekend with his son. Chandler currently endorses occasional and appropriate anxiety that is not pathologic in nature. Chandler denies recent periods of extreme or excessive nervousness, irrational worry, or overt anxiousness. Chandler is not currently restless or tense nor does Chandler feel \"keyed up\" or on-edge. Chandler denies new-onset panic symptomatology or maladaptive behaviors. He continues to use Klonopin appropriately. PDMP website reviewed, no acute concerns for abuse or misuse. Throughout today's session, Chandler does not appear visibly perturbed. Acutely, Chandler does not exhibit objective evidence of leidy/hypomania or psychosis. Chandler is not currently irritable, grandiose, loud, or pathologically labile. He denies recent anger outbursts or \"spells\" that he experienced in the past. Chandler is without perceptual disturbances, such " as A/V hallucinations, paranoia, ideas of reference, or delusional beliefs. He is tolerating Trinza well. His last injection was 12/4. His next injection is 2/26/25. Chandler denies recent ETOH or illicit substance abuse. Chandler offers no further concerns.       Current Rating Scores:     None completed today.    Review Of Systems:      Constitutional fluctuating energy level and as noted in HPI   ENT negative   Cardiovascular negative   Respiratory negative   Gastrointestinal negative   Genitourinary negative   Musculoskeletal negative   Integumentary negative   Neurological negative   Endocrine negative   Other Symptoms none, all other systems are negative       Past Psychiatric History: (unchanged information from previous note copied and italicized) - Information that is bolded has been updated.      Inpatient psychiatric admissions: Denies  Prior outpatient psychiatric linkage: Previously linked with Dr. Castro via [facility] ([place], [place])  Past/current psychotherapy: Hx of psychotherapy, no current linkage  History of suicidal attempts/gestures: Denies  History of violence/aggressive behaviors: Denies  Psychotropic medication trials: Depakote, Trileptal, Invega Sustenna, Klonopin, Effexor, Risperdal (EPS associated with this agent), Pristiq, Trazodone, Invega Trinza (now)   Substance abuse inpatient/outpatient rehabilitation: Denies     Substance Abuse History: (unchanged information from previous note copied and italicized) - Information that is bolded has been updated.      No history of ETOH or illict substance abuse. The patient does endorse ongoing tobacco abuse (chew and vaping). No past legal actions or arrests secondary to substance intoxication. The patient denies prior DWIs/DUIs. No history of outpatient/inpatient rehabilitation programs. Chandler does not exhibit objective evidence of substance withdrawal during today's examination nor does Chandler appear under the influence of any psychoactive  substance.          Social History: (unchanged information from previous note copied and italicized) - Information that is bolded has been updated.      Developmental: Denies a history of milestone/developmental delay. Denies a history of in-utero exposure to toxins/illicit substances. There is no documented history of IEP or need for special education.  Education: high school diploma/GED  Marital history:   Living arrangement, social support: wife and son, mother-in-law, and wife's 2 children  Occupational History: on temporary disability - recently laid off, was working as   Access to firearms: Denies direct access to weapons/firearms. Chandler Angeles has no history of arrests or violence with a deadly weapon. Wife confirms that firearms and knives have been removed from the home.      Traumatic History: (unchanged information from previous note copied and italicized) - Information that is bolded has been updated.      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, denies PTSD    Past Medical History:    Past Medical History:   Diagnosis Date    Anxiety     Bariatric surgery status     CPAP (continuous positive airway pressure) dependence     Depression     Dizziness     Hearing loss of aging     HL (hearing loss)     Hypercholesterolemia     Migraine     Morbid obesity (HCC)     Myocardial infarction ([facility])     2018    Nasal congestion     Nasal obstruction     NSTEMI (non-ST elevated myocardial infarction) ([facility]) 05/15/2019    NSTEMI (non-ST elevation myocardial infarction) ([facility])     april 2019    Pneumonia     Postsurgical malabsorption     Sleep apnea     Small bowel obstruction ([facility])     8/2024    Tinnitus         Past Surgical History:   Procedure Laterality Date    BONE TUMOR EXCISION Bilateral 4/20/2023    Procedure: REMOVAL TUMOR BONE;  Surgeon: Dandre Sanderson DO;  Location:  MAIN OR;  Service: Orthopedics    CARDIAC  CATHETERIZATION      no stents     COLONOSCOPY      bx taken    EGD      HERNIA REPAIR      umbilical hernia    KY LAPS ABD PRTM&OMENTUM DX W/WO SPEC BR/WA SPX N/A 2024    Procedure: LAPAROSCOPY DIAGNOSTIC, LYSIS OF ADHESIONS;  Surgeon: Fitz Lock MD;  Location: [facility];  Service: Bariatrics    KY LAPS GSTR RSTCV PX W/BYP FRITZ-EN-Y LIMB <150 CM N/A 2020    Procedure: LAPAROSCOPIC FRITZ-EN-Y GASTRIC BYPASS AND INTRAOPERATIVE EGD;  Surgeon: Fitz Lock MD;  Location: [facility];  Service: Bariatrics    KY NSL/SINUS NDSC MAX ANTROST W/RMVL TISS MAX SINUS N/A 2024    Procedure: FUNCTIONAL ENDOSCOPIC SINUS SURGERY (FESS) IMAGED GUIDED BILATERAL MAXILLARY ANTROSTOMY TOTAL ETHMOIDECTOMY SEPTOPLASTY, TURBINOPLASTY;  Surgeon: Gray Mo MD;  Location: [facility];  Service: ENT    SKIN SURGERY      cyst removed from back and thumb    TONSILLECTOMY      TOOTH EXTRACTION       No Known Allergies    Substance Abuse History:    Social History     Substance and Sexual Activity   Alcohol Use Not Currently     Social History     Substance and Sexual Activity   Drug Use Never       Social History:    Social History     Socioeconomic History    Marital status: /Civil Union     Spouse name: Not on file    Number of children: Not on file    Years of education: Not on file    Highest education level: Not on file   Occupational History    Not on file   Tobacco Use    Smoking status: Former     Current packs/day: 0.00     Average packs/day: 1 pack/day for 10.0 years (10.0 ttl pk-yrs)     Types: Cigarettes, Cigars     Quit date: 10/15/2003     Years since quittin.1    Smokeless tobacco: Former     Types: Chew     Quit date: 2022    Tobacco comments:     Currently vapes   Vaping Use    Vaping status: Every Day    Substances: Nicotine, Flavoring   Substance and Sexual Activity    Alcohol use: Not Currently    Drug use: Never    Sexual activity: Not Currently     Partners: Female   Other  "Topics Concern    Not on file   Social History Narrative    Former smoker - As per Marcelorieileen    Full-time employment        · Do you currently or have you served in the [employer]:   No    As per Trudy      [employer] Drivers of Health     Financial Resource Strain: Not on file   Food Insecurity: Not on file   Transportation Needs: Not on file   Physical Activity: Not on file   Stress: Not on file   Social Connections: Not on file   Intimate Partner Violence: Not on file   Housing Stability: Not on file       Family Psychiatric History:     Family History   Problem Relation Age of Onset    Lung cancer Mother     Brain cancer Mother     Cancer Mother         Brain and lung    Diabetes Brother     Bipolar disorder Maternal Grandfather     Bipolar disorder Son     Cancer Maternal Grandmother         Lung       History Review: The following portions of the patient's history were reviewed and updated as appropriate: allergies, current medications, past family history, past medical history, past social history, past surgical history, and problem list.         OBJECTIVE:     Vital signs in last 24 hours:    There were no vitals filed for this visit.    Mental Status Evaluation:    Appearance age appropriate, casually dressed, dressed appropriately, looks stated age, bearded   Behavior pleasant, cooperative, calm   Speech normal rate, normal volume, normal pitch   Mood \"Good\"   Affect normal range and intensity, appropriate   Thought Processes organized, coherent, goal directed   Associations intact associations   Thought Content no overt delusions   Perceptual Disturbances: no auditory hallucinations, no visual hallucinations   Abnormal Thoughts  Risk Potential Suicidal ideation - None at present  Homicidal ideation - None at present  Potential for aggression - No   Orientation oriented to person, place, time/date, and situation   Memory recent and remote memory grossly intact   Consciousness alert and awake   Attention " Span Concentration Span attention span and concentration are age appropriate   Intellect appears to be of average intelligence   Insight intact and good   Judgement intact and good   Muscle Strength and  Gait unable to assess today due to virtual visit   Motor activity no abnormal movements   Language no difficulty naming common objects   Fund of Knowledge adequate knowledge of current events   Pain none   Pain Scale Did not ask patient to formally rate       Laboratory Results: I have personally reviewed all pertinent laboratory/tests results    Recent Labs:   No visits with results within 1 Month(s) from this visit.   Latest known visit with results is:   Admission on 09/20/2024, Discharged on 09/20/2024   Component Date Value    Case Report 09/20/2024                      Value:Surgical Pathology Report                         Case: [id number]                                  Authorizing Provider:  Gray Mo MD      Collected:           09/20/2024 1638              Ordering Location:     [facility]      Received:            09/20/2024 2101                                     Hospital Operating Room                                                      Pathologist:           Francisco Elizalde DO                                                           Specimen:    Maxillary Sinus, Sinus contents                                                            Final Diagnosis 09/20/2024                      Value:A. Sinus contents, biopsy:  - Chronic sinusitis with rare eosinophils.       Additional Information 09/20/2024                      Value:All reported additional testing was performed with appropriately reactive controls.  These tests were developed and their performance characteristics determined by [facility] or appropriate performing facility, though some tests may be performed on tissues which have not been validated for performance characteristics (such as staining  "performed on alcohol exposed cell blocks and decalcified tissues).  Results should be interpreted with caution and in the context of the patients’ clinical condition. These tests may not be cleared or approved by the U.S. Food and Drug Administration, though the FDA has determined that such clearance or approval is not necessary. These tests are used for clinical purposes and they should not be regarded as investigational or for research. This laboratory has been approved by [facility], designated as a high-complexity laboratory and is qualified to perform these tests.    Interpretation performed at [facility] [address] [address]       Gross Description 09/20/2024                      Value:A. The specimen is received in formalin, labeled with the patient's name and hospital number, and is designated \" sinus contents\".  The specimen consists of a 3.0 x 2.5 x 0.4 cm aggregate of tan-brown soft tissue.  Representative sections are submitted in an embedding bag in 1 cassette.    Note: The estimated total formalin fixation time based upon information provided by the submitting clinician and the standard processing schedule is over 72 hours.  RRavotti         Suicide/Homicide Risk Assessment:    The following interventions are recommended: continue medication management, no intervention changes needed      Lethality Statement:    Based on today's assessment and clinical criteria, Chandler Angeles contracts for safety and is not an imminent risk of harm to self or others. Outpatient level of care is deemed appropriate at this current time. Chandler understands that if they can no longer contract for safety, they need to call the office or report to their nearest Emergency Room for immediate evaluation.      Assessment/Plan:     Chandler Angeles is a 59 y.o. male with past psychiatric history significant for bipolar disorder type I, anxiety disorder, insomnia and nicotine " "use disorder who was personally seen and evaluated today at the [facility] outpatient clinic for follow-up and medication management. Chanlder (\"Greg\") endorses a longstanding history of bipolar disorder that was diagnosed at the age of 26. He has been trialed on numerous psychotropic medications throughout his life yet he denies prior psychiatric inpatient admissions. Prior to linkage to this clinic, he was under the care of Dr. Schroeder. When psychiatrically unwell, Greg describes \"episodes\" that last hours to days when he becomes increasingly more irritable, argumentative, and impulsive. Greg states during these periods he is \"unable to control his behavior\" and is often combative and disruptive. At the conclusion of these episodes, Greg is often \"exhausted\". He denies lengthy periods without sleep, increased goal-directed behavior, excessive spending or sexual promiscuity that is often seen during periods of leidy. However, he and his wife do endorse periods of euphoria and elevated/expansive mood to which they describe him as a \"happy drunk\". Aside from these periods, Greg denies extensive periods of depression, characterized by social isolation, suicidal thoughts, and poor mood. He states that he will feel \"sad\" at times but this is most often secondary to psychosocial stressors and his current lack of connectivity and purpose. He describes a recent incident in which he was worried about his dad's health and chose to disconnect from the world but sitting peacefully and listening to music. Within 24 hours, he felt back to baseline. At the moment, Greg denies most neurovegetative symptoms of depression. Wife confirms that she has removed firearms and knives from the house for safety, given his episodic periods of \"leidy\" and \"anger outbursts\". He demonstrates self-preservation as evidenced by his commitment to treatment.      Today's Plan:     Psychopharmacologically, Greg reports " benefit and tolerability with current regimen. He denies need for medication change or intervention.        DSM-V Diagnoses:      1.) Bipolar Disorder Type I, mixed episodes   2.) Anxiety Disorder  3.) Insomnia  4.) Nicotine Use Disorder        Treatment Recommendations/Precautions:        1.) Bipolar Disorder Type I  - Continue Trileptal 300mg AM, 600mg PM              - Updated CMP (4/18/23): Na: 134                                          (8/12/23): 137                                          (12/5/23): 134         July 2024 - 132              - Oxcarbazepine Level: 6   (7/22/22)                                                        7 (10/11/22)                                                        10 (4/6/23)  - Continue IM Invega Trinza 819mg Q3 months    - Next injection: 2/26/25     2.) Anxiety Disorder  - Continue PRN Klonopin 2mg BID for ongoing anxiety  - Continue Pristiq 50mg Daily     3. Insomnia  - No active TX (no currently taking Trazodone 300mg QHS PRN)      Assessment & Plan  Bipolar disorder, current episode mixed, moderate (HCC)    Orders:    desvenlafaxine succinate (PRISTIQ) 50 mg 24 hr tablet; Take 1 tablet (50 mg total) by mouth daily    Anxiety disorder, unspecified type             Does not want any medication changes  Aware of need to follow up with family physician for medical issues  Aware of 24 hour and weekend coverage for urgent situations accessed by calling [facility] main practice number    Medications Risks/Benefits      Risks, Benefits And Possible Side Effects Of Medications:    Risks, benefits, and possible side effects of medications explained to Chandler including risk of hyponatremia related to treatment with Trileptal, risk of parkinsonian symptoms, Tardive Dyskinesia and metabolic syndrome related to treatment with antipsychotic medications, risk of cardiovascular events in elderly related to treatment with antipsychotic medications, risk of  suicidality and serotonin syndrome related to treatment with antidepressants, and risks of misuse, abuse or dependence, sedation and respiratory depression related to treatment with benzodiazepine medications. He verbalizes understanding and agreement for treatment.    Controlled Medication Discussion:     Chandler has been filling controlled prescriptions on time as prescribed according to [place] Prescription Drug Monitoring Program  Discussed with Chandler the risks of sedation, respiratory depression, impairment of ability to drive and potential for abuse and addiction related to treatment with benzodiazepine medications. He understands risk of treatment with benzodiazepine medications, agrees to not drive if feels impaired and agrees to take medications as prescribed    Psychotherapy Provided:     Individual psychotherapy provided: No     Treatment Plan:    Completed and signed during the session: Yes - Treatment Plan done but not signed at time of office visit due to:  Plan reviewed by video and verbal consent given due to virtual visit.      Visit Time    Visit Start Time: 10:02 AM  Visit Stop Time: 10:20 AM  Total Visit Duration:  18 minutes     The total visit duration detailed above includes: patient engagement, medication management, psychotherapy/counseling, discussion regarding treatment goals, documentation, review of past medical records, and coordination of care.      Note Share Disclaimer:     This note was not shared with the patient due to reasonable likelihood of causing patient harm      Elie Fountain MD  Board Certified Diplomate of the American Board of Psychiatry and Neurology  12/12/24

## 2024-12-12 ENCOUNTER — TELEPHONE (OUTPATIENT)
Age: 59
End: 2024-12-12

## 2024-12-12 ENCOUNTER — TELEPHONE (OUTPATIENT)
Dept: PSYCHIATRY | Facility: CLINIC | Age: 59
End: 2024-12-12

## 2024-12-12 ENCOUNTER — TELEMEDICINE (OUTPATIENT)
Dept: PSYCHIATRY | Facility: CLINIC | Age: 59
End: 2024-12-12
Payer: COMMERCIAL

## 2024-12-12 DIAGNOSIS — F31.62 BIPOLAR DISORDER, CURRENT EPISODE MIXED, MODERATE (HCC): Primary | ICD-10-CM

## 2024-12-12 DIAGNOSIS — F41.9 ANXIETY DISORDER, UNSPECIFIED TYPE: ICD-10-CM

## 2024-12-12 PROCEDURE — 99214 OFFICE O/P EST MOD 30 MIN: CPT | Performed by: STUDENT IN AN ORGANIZED HEALTH CARE EDUCATION/TRAINING PROGRAM

## 2024-12-12 RX ORDER — DESVENLAFAXINE 50 MG/1
50 TABLET, FILM COATED, EXTENDED RELEASE ORAL DAILY
Qty: 90 TABLET | Refills: 2 | Status: SHIPPED | OUTPATIENT
Start: 2024-12-12

## 2024-12-12 NOTE — BH TREATMENT PLAN
TREATMENT PLAN (Medication Management Only)        [facility]      Name and Date of Birth:  Chandler Angeles 59 y.o. 1965 MRN: [id number]    Date of Visit: December 12, 2024    Diagnosis/Diagnoses:    1. Bipolar disorder, current episode mixed, moderate (HCC)  desvenlafaxine succinate (PRISTIQ) 50 mg 24 hr tablet      2. Anxiety disorder, unspecified type            Strengths/Personal Resources for Self-Care: supportive family, supportive friends, taking medications as prescribed, ability to adapt to life changes, ability to communicate needs, ability to communicate well, ability to listen, ability to reason, financial security, general fund of knowledge, good understanding of illness.    Area/Areas of need (in own words): mood instability    1. Long Term Goal: maintain control of mood.  Target Date:6 months - 6/12/2025  Person/Persons responsible for completion of goal: Chandler    2.  Short Term Objective (s) - How will we reach this goal?:   A. Provider new recommended medication/dosage changes and/or continue medication(s): continue current medications as prescribed.  B. Keep regularly scheduled psychiatric appointments  C. Maintain adherence to psychotropic medication regimen   D. Spend time with family over the holidays  E. Maintain appropriate dietary intake  F. Practice adequate sleep hygiene    G. 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 I understand the treatment goals that were developed for my treatment. Treatment  Plan was completed with Chandler's assistance and input throughout today's session. Chandler consented to the information provided and documented above. Unfortunately, treatment plan was not physically signed at the time of this office visit secondary to time constraints and issues related to signature keypad. Again, Chandler did verbally consent.

## 2024-12-12 NOTE — TELEPHONE ENCOUNTER
Patient contacted the office to schedule a follow up visit with provider. Patient is now scheduled for 4/3/2025  at 7;30 am virtually.

## 2025-01-02 ENCOUNTER — OFFICE VISIT (OUTPATIENT)
Dept: FAMILY MEDICINE CLINIC | Facility: CLINIC | Age: 60
End: 2025-01-02
Payer: COMMERCIAL

## 2025-01-02 VITALS
DIASTOLIC BLOOD PRESSURE: 78 MMHG | SYSTOLIC BLOOD PRESSURE: 110 MMHG | HEART RATE: 91 BPM | WEIGHT: 200.4 LBS | OXYGEN SATURATION: 97 % | HEIGHT: 71 IN | BODY MASS INDEX: 28.06 KG/M2 | RESPIRATION RATE: 18 BRPM

## 2025-01-02 DIAGNOSIS — L85.3 DRY SKIN: ICD-10-CM

## 2025-01-02 DIAGNOSIS — K91.2 POSTSURGICAL MALABSORPTION: ICD-10-CM

## 2025-01-02 DIAGNOSIS — F31.62 BIPOLAR DISORDER, CURRENT EPISODE MIXED, MODERATE (HCC): ICD-10-CM

## 2025-01-02 DIAGNOSIS — E87.1 HYPONATREMIA: ICD-10-CM

## 2025-01-02 DIAGNOSIS — R20.2 PARESTHESIA OF BOTH FEET: Primary | ICD-10-CM

## 2025-01-02 PROCEDURE — 99214 OFFICE O/P EST MOD 30 MIN: CPT | Performed by: PHYSICIAN ASSISTANT

## 2025-01-02 RX ORDER — AMMONIUM LACTATE 12 G/100G
LOTION TOPICAL 2 TIMES DAILY PRN
Qty: 225 G | Refills: 1 | Status: SHIPPED | OUTPATIENT
Start: 2025-01-02

## 2025-01-02 NOTE — PROGRESS NOTES
Name: Chandler Angeles      : 1965      MRN: [id number]  Encounter Provider: Moraima Nunez PA-C  Encounter Date: 2025   Encounter department: [facility]  :  Assessment & Plan  Paresthesia of both feet  No weakness, normal pulses on exam, with history of gastric bypass, possibly due to vitamin deficiency.  Check labs.  No pain or weakness.  Consider EMG, patient not agreeable to complete, has been ongoing for the past 5 months, no recent worsening.  Sensation of numbness/tingling plantar aspect of toes, minimally diminished sensation with monofilament test on exam, no skin changes or discoloration.    Orders:  •  TSH, 3rd generation with Free T4 reflex; Future  •  Vitamin B12; Future  •  NISREEN Screen w/Reflex Cascade; Future  •  C-reactive protein  •  Sedimentation rate, automated; Future  •  Vitamin B6; Future    Dry skin  Start ammonium lactate lotion as needed.  Orders:  •  ammonium lactate (LAC-HYDRIN) 12 % lotion; Apply topically 2 (two) times a day as needed for dry skin    Postsurgical malabsorption  Check labs.  Continue multivitamin supplement.  Orders:  •  Vitamin B12; Future  •  Vitamin B6; Future  •  Zinc; Future  •  Vitamin A; Future    Hyponatremia  Lab Results   Component Value Date    SODIUM 134 (L) 2024    K 4.0 2024    CL 98 2024    CO2 28 2024    BUN 12 2024    CREATININE 0.72 2024    GLUC 87 2024    CALCIUM 8.7 2024     Continue monitor, taking sodium tablets per cardiology.  Reviewed cardiology consult note from 2024.   Orders:  •  Basic metabolic panel; Future    Bipolar disorder, current episode mixed, moderate (HCC)  Stable mood, continue follow-up psychiatry, continue Invega injections every 3 months.               History of Present Illness     Chandler is a 59 y.o. male with a h/o CAD, bipolar disorder, post-surgical dumping syndrome who presents for numbness sensation bilateral feet.  Has  "been ongoing for the past 5 months but has not remember to mention it, decided to address in the new year, from arch of foot to tips of toes sometimes feeling numbness, sometimes at night, usually in the shower when water touches feet will feel tingling.  Always cold since surgery.  No change in color of toes.  Normal mobility and strength in feet.  Sometimes feels off balance due to numbness in feet.      Review of Systems   Constitutional:  Negative for chills, fever and unexpected weight change.   Respiratory:  Negative for shortness of breath.    Cardiovascular:  Negative for chest pain.   Neurological:  Positive for numbness. Negative for tremors and weakness.       Objective   /78 (BP Location: Left arm, Patient Position: Sitting, Cuff Size: Standard)   Pulse 91   Resp 18   Ht 5' 11\" (1.803 m)   Wt 90.9 kg (200 lb 6.4 oz)   SpO2 97%   BMI 27.95 kg/m²      Physical Exam  Vitals reviewed.   Constitutional:       Appearance: Normal appearance.   HENT:      Head: Normocephalic and atraumatic.   Cardiovascular:      Rate and Rhythm: Normal rate and regular rhythm.      Pulses:           Dorsalis pedis pulses are 2+ on the right side and 2+ on the left side.        Posterior tibial pulses are 2+ on the right side and 2+ on the left side.   Pulmonary:      Effort: Pulmonary effort is normal.      Breath sounds: Normal breath sounds.   Musculoskeletal:        Feet:    Feet:      Right foot:      Skin integrity: Dry skin present.      Left foot:      Skin integrity: Dry skin present.   Neurological:      Mental Status: He is alert and oriented to person, place, and time. Mental status is at baseline.         "

## 2025-01-02 NOTE — ASSESSMENT & PLAN NOTE
Check labs.  Continue multivitamin supplement.  Orders:  •  Vitamin B12; Future  •  Vitamin B6; Future  •  Zinc; Future  •  Vitamin A; Future

## 2025-01-02 NOTE — ASSESSMENT & PLAN NOTE
Lab Results   Component Value Date    SODIUM 134 (L) 09/17/2024    K 4.0 09/17/2024    CL 98 09/17/2024    CO2 28 09/17/2024    BUN 12 09/17/2024    CREATININE 0.72 09/17/2024    GLUC 87 07/18/2024    CALCIUM 8.7 09/17/2024     Continue monitor, taking sodium tablets per cardiology.  Reviewed cardiology consult note from 9/2024.   Orders:  •  Basic metabolic panel; Future

## 2025-01-02 NOTE — ASSESSMENT & PLAN NOTE
No weakness, normal pulses on exam, with history of gastric bypass, possibly due to vitamin deficiency.  Check labs.  No pain or weakness.  Consider EMG, patient not agreeable to complete, has been ongoing for the past 5 months, no recent worsening.  Sensation of numbness/tingling plantar aspect of toes, minimally diminished sensation with monofilament test on exam, no skin changes or discoloration.    Orders:  •  TSH, 3rd generation with Free T4 reflex; Future  •  Vitamin B12; Future  •  NISREEN Screen w/Reflex Cascade; Future  •  C-reactive protein  •  Sedimentation rate, automated; Future  •  Vitamin B6; Future

## 2025-01-04 ENCOUNTER — APPOINTMENT (OUTPATIENT)
Dept: LAB | Age: 60
End: 2025-01-04
Payer: COMMERCIAL

## 2025-01-04 DIAGNOSIS — R20.2 PARESTHESIA OF BOTH FEET: ICD-10-CM

## 2025-01-04 DIAGNOSIS — K91.2 POSTSURGICAL MALABSORPTION: ICD-10-CM

## 2025-01-04 DIAGNOSIS — E87.1 HYPONATREMIA: ICD-10-CM

## 2025-01-07 ENCOUNTER — APPOINTMENT (OUTPATIENT)
Dept: LAB | Age: 60
End: 2025-01-07
Payer: COMMERCIAL

## 2025-01-07 LAB
ANION GAP SERPL CALCULATED.3IONS-SCNC: 8 MMOL/L (ref 4–13)
BUN SERPL-MCNC: 14 MG/DL (ref 5–25)
CALCIUM SERPL-MCNC: 9.3 MG/DL (ref 8.4–10.2)
CHLORIDE SERPL-SCNC: 97 MMOL/L (ref 96–108)
CO2 SERPL-SCNC: 29 MMOL/L (ref 21–32)
CREAT SERPL-MCNC: 0.67 MG/DL (ref 0.6–1.3)
CRP SERPL QL: 1 MG/L
ERYTHROCYTE [SEDIMENTATION RATE] IN BLOOD: 6 MM/HOUR (ref 0–19)
GFR SERPL CREATININE-BSD FRML MDRD: 105 ML/MIN/1.73SQ M
GLUCOSE P FAST SERPL-MCNC: 90 MG/DL (ref 65–99)
POTASSIUM SERPL-SCNC: 4.5 MMOL/L (ref 3.5–5.3)
SODIUM SERPL-SCNC: 134 MMOL/L (ref 135–147)
TSH SERPL DL<=0.05 MIU/L-ACNC: 1.77 UIU/ML (ref 0.45–4.5)
VIT B12 SERPL-MCNC: 373 PG/ML (ref 180–914)

## 2025-01-07 PROCEDURE — 86038 ANTINUCLEAR ANTIBODIES: CPT

## 2025-01-07 PROCEDURE — 86140 C-REACTIVE PROTEIN: CPT | Performed by: PHYSICIAN ASSISTANT

## 2025-01-07 PROCEDURE — 84207 ASSAY OF VITAMIN B-6: CPT

## 2025-01-07 PROCEDURE — 84443 ASSAY THYROID STIM HORMONE: CPT

## 2025-01-07 PROCEDURE — 36415 COLL VENOUS BLD VENIPUNCTURE: CPT

## 2025-01-07 PROCEDURE — 80048 BASIC METABOLIC PNL TOTAL CA: CPT

## 2025-01-07 PROCEDURE — 82607 VITAMIN B-12: CPT

## 2025-01-07 PROCEDURE — 84630 ASSAY OF ZINC: CPT

## 2025-01-07 PROCEDURE — 84590 ASSAY OF VITAMIN A: CPT

## 2025-01-07 PROCEDURE — 86225 DNA ANTIBODY NATIVE: CPT

## 2025-01-07 PROCEDURE — 85652 RBC SED RATE AUTOMATED: CPT

## 2025-01-08 LAB
DSDNA IGG SERPL IA-ACNC: <0.9 IU/ML (ref ?–15)
NUCLEAR IGG SER IA-RTO: <0.09 RATIO (ref ?–1)

## 2025-01-09 LAB — ZINC SERPL-MCNC: 57 UG/DL (ref 44–115)

## 2025-01-10 LAB — VIT B6 SERPL-MCNC: 119 UG/L (ref 3.4–65.2)

## 2025-01-11 DIAGNOSIS — E78.2 MIXED HYPERLIPIDEMIA: ICD-10-CM

## 2025-01-11 DIAGNOSIS — I25.110 CORONARY ARTERY DISEASE INVOLVING NATIVE CORONARY ARTERY OF NATIVE HEART WITH UNSTABLE ANGINA PECTORIS (HCC): ICD-10-CM

## 2025-01-13 ENCOUNTER — RESULTS FOLLOW-UP (OUTPATIENT)
Dept: FAMILY MEDICINE CLINIC | Facility: CLINIC | Age: 60
End: 2025-01-13

## 2025-01-13 RX ORDER — ATORVASTATIN CALCIUM 40 MG/1
40 TABLET, FILM COATED ORAL DAILY
Qty: 90 TABLET | Refills: 1 | Status: SHIPPED | OUTPATIENT
Start: 2025-01-13

## 2025-01-16 LAB — VIT A SERPL-MCNC: 22.4 UG/DL (ref 20.1–62)

## 2025-02-03 DIAGNOSIS — F31.62 BIPOLAR DISORDER, CURRENT EPISODE MIXED, MODERATE (HCC): ICD-10-CM

## 2025-02-03 RX ORDER — OXCARBAZEPINE 300 MG/1
TABLET, FILM COATED ORAL
Qty: 270 TABLET | Refills: 0 | Status: SHIPPED | OUTPATIENT
Start: 2025-02-03 | End: 2025-05-04

## 2025-02-03 NOTE — TELEPHONE ENCOUNTER
Patient is requesting a 90-day supply     Reason for call:   [x] Refill   [] Prior Auth  [] Other:     Office:   [x] Specialty/Provider - psych / [facility]    Medication: oxcarbazepine    Dose/Frequency: 300mg; 1 tab qam + 2 tabs qhs    Quantity: 270    [facility]:  [facility] - [address] [place] [address]     Does the patient have enough for 3 days?   [] Yes   [x] No - Send as HP to POD

## 2025-02-11 DIAGNOSIS — E87.1 HYPONATREMIA: ICD-10-CM

## 2025-02-11 NOTE — TELEPHONE ENCOUNTER
Reason for call:   [x] Refill   [] Prior Auth  [] Other:     Office:   [] PCP/Provider -   [x] Specialty/Provider - [facility] / Michel Hand MD     Medication:   ~ sodium chloride 1 g tablet - Take 2 tablets (2 g total) by mouth 3 (three) times a day     Pharmacy:    [facility] [place] - [address] [place] [address]     Does the patient have enough for 3 days?   [x] Yes   [] No - Send as HP to POD

## 2025-02-12 RX ORDER — SODIUM CHLORIDE 1 G/1
2 TABLET ORAL 3 TIMES DAILY
Qty: 180 TABLET | Refills: 0 | Status: SHIPPED | OUTPATIENT
Start: 2025-02-12

## 2025-02-21 ENCOUNTER — TELEPHONE (OUTPATIENT)
Dept: PSYCHIATRY | Facility: CLINIC | Age: 60
End: 2025-02-21

## 2025-02-21 NOTE — TELEPHONE ENCOUNTER
Spoke with Greg and confirmed insurance coverage. He only has coverage with [facility] [place]. Informed him the auth was requested and clinical will follow up with insurance and give him an update prior to his apt for the injection next Wednesday.

## 2025-02-24 ENCOUNTER — TELEPHONE (OUTPATIENT)
Dept: PSYCHIATRY | Facility: CLINIC | Age: 60
End: 2025-02-24

## 2025-02-24 NOTE — TELEPHONE ENCOUNTER
Received a message from Karuna WORTHINGTON at [facility]. Updated orders needed for Manoj Garza's appointment is 2/26/25.

## 2025-02-25 NOTE — TELEPHONE ENCOUNTER
Patient called in requesting a call back from nurse.     Patient would like to know if he should go for his shot 2/26 @8am.     Writer informed patient msg will be relay.

## 2025-02-25 NOTE — TELEPHONE ENCOUNTER
Called [employer] [place] for an update on authorization process. Was on hold for >90 min and could not continue to hold. Will try again.

## 2025-02-25 NOTE — TELEPHONE ENCOUNTER
Akhil FERGUSON    2/25/25 11:48 AM  Note     Called [employer] [place] for an update on authorization process. Was on hold for >90 min and could not continue to hold. Will try again.

## 2025-02-25 NOTE — TELEPHONE ENCOUNTER
[facility] and scanned to [facility]. Message from [facility]:  order received.

## 2025-02-25 NOTE — TELEPHONE ENCOUNTER
The PA was submitted 2/21 as Urgent, which would be a 72 hour time for the decision - if they deem it urgent. If not urgent, it could take 2 weeks, which would be 3/7/25.

## 2025-02-25 NOTE — TELEPHONE ENCOUNTER
Additional call placed to [facility] and was on hold again, >90 min.     Spoke with Karuna at  [facility] and cancelled the apt for tomorrow. Will call to schedule when decision is received.     Spoke with Greg. Reviewed authorization is not in place and apt for tomorrow was cancelled. Advised will let Dr. Fountain know the status and call him with direction. He verbalized understanding.     Greg's preferred pharmacy would be  Ph[facility]acy, [address]

## 2025-02-26 ENCOUNTER — HOSPITAL ENCOUNTER (OUTPATIENT)
Dept: INFUSION CENTER | Facility: HOSPITAL | Age: 60
Discharge: HOME | End: 2025-02-26

## 2025-02-26 NOTE — TELEPHONE ENCOUNTER
If it's not approved and administered by the weekend, we can initiate treatment with oral Invega. Otherwise, a 1 week hiatus should not be problematic.

## 2025-02-28 ENCOUNTER — TELEPHONE (OUTPATIENT)
Age: 60
End: 2025-02-28

## 2025-02-28 NOTE — TELEPHONE ENCOUNTER
Decision not received from [facility]. Encounter 2/21 notes 1 week hiatus should not be problematic. Injection was to have been given Wednesday, 2/26/25. Will follow up with insurance again at the start of the week.

## 2025-03-03 DIAGNOSIS — F31.62 BIPOLAR DISORDER, CURRENT EPISODE MIXED, MODERATE (HCC): Primary | ICD-10-CM

## 2025-03-03 DIAGNOSIS — F41.9 ANXIETY DISORDER, UNSPECIFIED TYPE: ICD-10-CM

## 2025-03-03 RX ORDER — CLONAZEPAM 2 MG/1
2 TABLET ORAL 2 TIMES DAILY PRN
Qty: 60 TABLET | Refills: 0 | Status: SHIPPED | OUTPATIENT
Start: 2025-03-03

## 2025-03-03 RX ORDER — PALIPERIDONE 3 MG/1
3 TABLET, EXTENDED RELEASE ORAL EVERY MORNING
Qty: 21 TABLET | Refills: 0 | Status: SHIPPED | OUTPATIENT
Start: 2025-03-03 | End: 2025-03-07 | Stop reason: SDUPTHER

## 2025-03-03 NOTE — TELEPHONE ENCOUNTER
Reason for call:   [x] Refill   [] Prior Auth  [] Other:     Office:   [] PCP/Provider -   [x] Specialty/Provider - [facility] / Elie Fountain MD     Medication:   ~ clonazePAM (KlonoPIN) 2 mg tablet - Take 1 tablet (2 mg total) by mouth 2 (two) times a day as needed for anxiety     Pharmacy:    [facility] - [address] [place] [address]     Does the patient have enough for 3 days?   [] Yes   [x] No - Send as HP to POD

## 2025-03-03 NOTE — TELEPHONE ENCOUNTER
Spoke with Greg. Reviewed need for oral medication until decision from insurance is received. Reviewed prescription sent for Invega 3 mg HS. He verbalized understanding.

## 2025-03-03 NOTE — TELEPHONE ENCOUNTER
Given that Invega Trinza is yet to be approved, I would like to start an oral bridge (Invega 3mg Daily) to mitigate risk of psychiatric decompensation. I e-scripted 21 tablets of Invega 3mg Daily today. Please make Greg aware. Thank you.

## 2025-03-03 NOTE — TELEPHONE ENCOUNTER
02/03/2025 12/05/2024 clonazePAM (Tablet) 60.0 30 2 MG NA [facility] Commercial Insurance 2 / 2 [place]   1 [id number] 01/06/2025 12/05/2024 clonazePAM (Tablet) 60.0 30 2 MG [facility] Commercial Insurance 1 / 2 [place]   1 [id number] 12/07/2024 12/05/2024 clonazePAM (Tablet) 60.0 30 2 MG [facility] Commercial [place] 0 / 2 [place]   1 [id number] 11/09/2024 09/09/2024 clonazePAM (Tablet) 60.0 30 2 MG [facility] Commercial Insurance 2 / 2 [place]   1 [id number] 10/10/2024 09/09/2024 clonazePAM (Tablet) 60.0 30 2 MG [facility] Commercial Insurance 1 / 2 [place]   1 [id number] 09/20/2024 09/20/2024 oxyCODONE HYDROCHLORIDE 5 MG ORAL TABLET/ACETAMINOPHEN 325 MG (Tablet) 15.0 4 325 MG-5 MG 28.12 [facility] Commercial [place] 0 / 0 [place]   1 [id number] 09/09/2024 09/09/2024 clonazePAM (Tablet) 60.0 30 2 MG [facility] Commercial Insurance 0 / 2 [place]   1 [id number] 08/10/2024 06/10/2024 clonazePAM (Tablet) 60.0 30 2 MG [facility] Commercial Insurance 2 / 2 [place]   1 [id number] 07/19/2024 07/19/2024 HYDROcodone BITARTRATE 5 MG ORAL TABLET/ACETAMINOPHEN 325 MG (Tablet) 14.0 14 325 MG-5 MG 5.0 [facility] Commercial Insurance 0 / 0 [place]   1 [id number] 07/18/2024 07/18/2024 oxyCODONE HCL (Tablet)

## 2025-03-06 ENCOUNTER — TELEPHONE (OUTPATIENT)
Dept: PSYCHIATRY | Facility: CLINIC | Age: 60
End: 2025-03-06

## 2025-03-06 NOTE — TELEPHONE ENCOUNTER
"Received a message from Greg's wife. She was following up on the authorization for Invega Trinza and that he feels weird and doesn't think the oral medication isn't working.     Spoke with Greg. He is feeling agitated, foggy and lightheaded. He has no appetite, but is trying to maintain his routine. He got a call for a job (he's an ), but is worried he wouldn't be able to manage until he can resume the shot. He would be okay with Invega increase or \"something.\"     Forwarding to covering provider in Dr. Fountain's absence.     Also reviewed insurance with Greg. He currently has a [employer] plan listed as primary with [facility] as secondary. Greg was laid off and lost coverage with what is listed as primary. Reviewed calling [employer] to review coverage. He asked that Laura be called to follow up on insurance. Called and left a VM for her.  "

## 2025-03-07 ENCOUNTER — TELEPHONE (OUTPATIENT)
Age: 60
End: 2025-03-07

## 2025-03-07 DIAGNOSIS — F31.62 BIPOLAR DISORDER, CURRENT EPISODE MIXED, MODERATE (HCC): ICD-10-CM

## 2025-03-07 RX ORDER — PALIPERIDONE 6 MG/1
6 TABLET, EXTENDED RELEASE ORAL EVERY MORNING
Qty: 14 TABLET | Refills: 0 | Status: SHIPPED | OUTPATIENT
Start: 2025-03-07

## 2025-03-07 NOTE — TELEPHONE ENCOUNTER
Was able to speak with Amy with Utilization Review at [facility]. The request had been sent to the medical director for review. Invega Triza was approved, 2/21/25-2/21/26. Cert # [id number].    Greg is scheduled at [facility] for 3/12/25 at 1 PM. Per covering provider, Greg should continue oral Invega until the injection. (See Encounter 3/7/25.)    Called and left a VM for Greg:  was able to reach insurance and auth was given for 1 year; gave apt date/time; cont oral medication until hs apt; call the office with questions/concerns.

## 2025-03-07 NOTE — TELEPHONE ENCOUNTER
Was able to reach Utilzation review with [employer] [place]. Magui was approved. Greg is scheduled for 3/12. Was able to review with Dr. Pat:  Greg should continue PO Invega 6 mg until he receives the injection.

## 2025-03-07 NOTE — TELEPHONE ENCOUNTER
See new prescription for Invega 6 mg tab.     Spoke with Greg and reviewed increased dose sent to pharmacy and that he can take 2 of the 3 mg tbs he currently has. He will call if he needs assistance obtaining the medication.

## 2025-03-07 NOTE — TELEPHONE ENCOUNTER
Called insurance earlier. Ws on hold for 1 hr 40 min when a message was received that the office was closed or having difficulty.

## 2025-03-07 NOTE — TELEPHONE ENCOUNTER
Writer received a call from [facility] regarding a prior auth update. Writer transferred them to nurse for assistance.

## 2025-03-12 ENCOUNTER — HOSPITAL ENCOUNTER (OUTPATIENT)
Dept: INFUSION CENTER | Facility: HOSPITAL | Age: 60
Discharge: HOME | End: 2025-03-12
Payer: COMMERCIAL

## 2025-03-12 PROCEDURE — 96372 THER/PROPH/DIAG INJ SC/IM: CPT

## 2025-03-12 RX ADMIN — PALIPERIDONE PALMITATE 819 MG: 819 INJECTION, SUSPENSION, EXTENDED RELEASE INTRAMUSCULAR at 12:52

## 2025-03-12 NOTE — PROGRESS NOTES
Pt received Invega Trinza IM inj in LEFT deltoid. Tolerated well, offers no complaints. Confirmed next appt, declined AVS

## 2025-03-17 DIAGNOSIS — E87.1 HYPONATREMIA: ICD-10-CM

## 2025-03-17 RX ORDER — SODIUM CHLORIDE 1 G/1
2 TABLET ORAL 3 TIMES DAILY
Qty: 540 TABLET | Refills: 1 | Status: SHIPPED | OUTPATIENT
Start: 2025-03-17

## 2025-03-17 NOTE — TELEPHONE ENCOUNTER
Patient called to request a refill for their Sodium Chloride 1 gr advised a refill was requested on 03/17/25 and is pending approval. Patient verbalized understanding and is in agreement.   Patient is concerned because he is out of the medication. Please review  Does the patient have enough for 3 days?   [] Yes   [x] No - Send as HP to POD

## 2025-03-17 NOTE — TELEPHONE ENCOUNTER
Reason for call: Patient is requesting a 90 days supply  [x] Refill   [] Prior Auth  [] Other:     Office:   [] PCP/Provider -   [x] Specialty/Provider - Cardio    Medication: sodium chloride 1 g tablet     Dose/Frequency: Take 2 tablets (2 g total) by mouth 3 (three) times a day,     Quantity: 540    Pharmacy:  [facility] [place] - [address] [place] [address] Pharmacy   Does the patient have enough for 3 days?   [] Yes   [x] No - Send as HP to POD    [facility] Away Pharmacy   Does the patient have enough for 10 days?   [] Yes   [] No - Send as HP to POD

## 2025-03-20 DIAGNOSIS — E87.6 HYPOKALEMIA: ICD-10-CM

## 2025-03-20 NOTE — TELEPHONE ENCOUNTER
Reason for call:   [x] Refill   [] Prior Auth  [] Other:     Office:   [x] PCP/Provider -  Moraima Nunez PA-C   [] Specialty/Provider -     Medication:     potassium chloride (Klor-Con M20) 20 mEq tablet       Dose/Frequency:      20 mEq, Oral, Daily                       Quantity: 90    Pharmacy:   [facility] - [address] [place] [address] Pharmacy   Does the patient have enough for 3 days?   [x] Yes   [] No - Send as HP to POD    [facility] Pharmacy   Does the patient have enough for 10 days?   [] Yes   [] No - Send as HP to POD

## 2025-03-21 RX ORDER — POTASSIUM CHLORIDE 1500 MG/1
20 TABLET, EXTENDED RELEASE ORAL DAILY
Qty: 90 TABLET | Refills: 1 | Status: SHIPPED | OUTPATIENT
Start: 2025-03-21

## 2025-03-28 DIAGNOSIS — F41.9 ANXIETY DISORDER, UNSPECIFIED TYPE: ICD-10-CM

## 2025-03-28 RX ORDER — CLONAZEPAM 2 MG/1
2 TABLET ORAL 2 TIMES DAILY PRN
Qty: 60 TABLET | Refills: 2 | Status: SHIPPED | OUTPATIENT
Start: 2025-03-28

## 2025-03-28 NOTE — TELEPHONE ENCOUNTER
Refill must be reviewed and completed by the office or provider. The refill is unable to be approved or denied by the medication management team.      03/03/2025 03/03/2025 clonazePAM (Tablet) 60.0 30 2 MG NA [facility] Commercial [place] 0 / 0 [place]   1 [id number] 02/03/2025 12/05/2024 clonazePAM (Tablet) 60.0 30 2 MG NA [facility] Commercial [place] 2 / 2 [place]   1 [id number] 01/06/2025 12/05/2024 clonazePAM (Tablet) 60.0 30 2 MG NA [facility] Commercial Insurance 1 / 2

## 2025-03-28 NOTE — TELEPHONE ENCOUNTER
Reason for call:   [x] Refill   [] Prior Auth  [] Other:     Office:   [] PCP/Provider -   [x] Specialty/Provider -   Elie Fountain / adilson [facility]     Medication: clonazePAM (KlonoPIN) 2 mg tablet     Dose/Frequency: Take 1 tablet (2 mg total) by mouth 2 (two) times a day as needed for anxiety     Quantity: 60    Pharmacy:  [facility] - [address] [place] [address] Pharmacy   Does the patient have enough for 3 days?   [x] Yes   [] No - Send as HP to POD

## 2025-04-01 ENCOUNTER — OFFICE VISIT (OUTPATIENT)
Dept: CARDIOLOGY CLINIC | Facility: CLINIC | Age: 60
End: 2025-04-01
Payer: COMMERCIAL

## 2025-04-01 VITALS
WEIGHT: 198 LBS | HEIGHT: 71 IN | TEMPERATURE: 97.7 F | SYSTOLIC BLOOD PRESSURE: 120 MMHG | BODY MASS INDEX: 27.72 KG/M2 | HEART RATE: 88 BPM | OXYGEN SATURATION: 97 % | DIASTOLIC BLOOD PRESSURE: 70 MMHG

## 2025-04-01 DIAGNOSIS — I95.1 ORTHOSTATIC HYPOTENSION: ICD-10-CM

## 2025-04-01 DIAGNOSIS — I25.110 CORONARY ARTERY DISEASE INVOLVING NATIVE CORONARY ARTERY OF NATIVE HEART WITH UNSTABLE ANGINA PECTORIS (HCC): Primary | ICD-10-CM

## 2025-04-01 DIAGNOSIS — K91.1 POSTSURGICAL DUMPING SYNDROME: ICD-10-CM

## 2025-04-01 DIAGNOSIS — R55 RECURRENT SYNCOPE: ICD-10-CM

## 2025-04-01 PROCEDURE — 99214 OFFICE O/P EST MOD 30 MIN: CPT | Performed by: INTERNAL MEDICINE

## 2025-04-01 NOTE — PROGRESS NOTES
Cardiology Follow Up    Chandler Angeles  [id number]  1965  [facility] [place] [address] [facility] [id number] [phone]      1. Coronary artery disease involving native coronary artery of native heart with unstable angina pectoris (HCC)        2. Postsurgical dumping syndrome        3. Orthostatic hypotension        4. Recurrent syncope          Discussion/Summary:    Orthostatic hypotension.   Did not do well with midodrine or Florinef. Salt tablets seem to be doing very well for him.    Hyponatremia also seems improved with the salt tablets.    History of mild nonobstructive coronary artery disease and myocardial bridging. Currently without any symptoms of angina. Can continue the current regimen.    Previous History:  60 year old man.  In May of 2019, he had an admission at [facility] for chest discomfort.  He had a minimally elevated troponin.  He had a cardiac catheterization which showed nonobstructive CAD and some myocardial bridging of the LAD.  He was gradually started on cardiac medications including metoprolol, Imdur, lipid-lowering therapy with statins.  He had had chest pain off and on for several months, now improved.  In March of 2020, he had gastric bypass surgery.    A few months after his gastric bypass, he started getting orthostatic.  Significant issues with this for a while, ultimately underwent evaluation with Cristela for autonomic dysfunction although this was not clearly seen. He had been on midodrine and Florinef which gradually has been weaned off.  There were concerns for neurologic mediated episodes but this does not seem to be the case    When I saw him in April 2023, he was entirely off of Florinef and midodrine and he said he was feeling well. Symptoms resolution had been attributed to him gaining back some weight and staying adequately hydrated.  Salt tablets seem to help.    Interval History:    He  returns for follow-up and says he is actually doing great. He has not had any recent episodes of lightheadedness or falls. No orthostatic hypotension. He seems to be responding very well to the salt tablets. He is contemplating going back to work.    He has not had any chest discomfort or pain    Patient Active Problem List   Diagnosis    Bipolar disorder, current episode mixed, moderate ([facility])    ROSI (obstructive sleep apnea)    Coronary artery disease involving native coronary artery of native heart with unstable angina pectoris ([facility])    Former smoker    Migraine with aura and without status migrainosus, not intractable    Mixed hyperlipidemia    Bariatric surgery status    Overweight (BMI 25.0-29.9)    Postsurgical malabsorption    Orthostatic hypotension    Recurrent syncope    Hypokalemia    Dysautonomia-like disorder    Anxiety disorder    Status post gastric bypass for obesity    Neuropathy    Hypoglycemia after GI (gastrointestinal) surgery    Postsurgical dumping syndrome    Chronic pain of both knees    Seasonal allergies    Chronic sinusitis    Hyponatremia    Small bowel obstruction due to adhesions ([facility])    Paresthesia of both feet     Past Medical History:   Diagnosis Date    Anxiety     Bariatric surgery status     CPAP (continuous positive airway pressure) dependence     Depression     Dizziness     Hearing loss of aging     HL (hearing loss)     Hypercholesterolemia     Migraine     Morbid obesity ([facility])     Myocardial infarction ([facility])     2018    Nasal congestion     Nasal obstruction     NSTEMI (non-ST elevated myocardial infarction) ([facility]) 05/15/2019    NSTEMI (non-ST elevation myocardial infarction) ([facility])     april 2019    Pneumonia     Postsurgical malabsorption     Sleep apnea     Small bowel obstruction ([facility])     8/2024    Tinnitus      Social History     Tobacco Use    Smoking status: Former     Current packs/day: 0.00     Average packs/day: 1 pack/day for 10.0 years (10.0 ttl pk-yrs)      Types: Cigarettes, Cigars     Quit date: 10/15/2003     Years since quittin.4    Smokeless tobacco: Former     Types: Chew     Quit date: 2022    Tobacco comments:     Currently vapes   Vaping Use    Vaping status: Every Day    Substances: Nicotine, Flavoring   Substance Use Topics    Alcohol use: Not Currently    Drug use: Never      Family History   Problem Relation Age of Onset    Lung cancer Mother     Brain cancer Mother     Cancer Mother         Brain and lung    Diabetes Brother     Bipolar disorder Maternal Grandfather     Bipolar disorder Son     Cancer Maternal Grandmother         Lung     Past Surgical History:   Procedure Laterality Date    BONE TUMOR EXCISION Bilateral 2023    Procedure: REMOVAL TUMOR BONE;  Surgeon: Dandre Sanderson DO;  Location:  MAIN OR;  Service: Orthopedics    CARDIAC CATHETERIZATION      no stents     COLONOSCOPY      bx taken    EGD      HERNIA REPAIR      umbilical hernia    AK LAPS ABD PRTM&OMENTUM DX W/WO SPEC BR/WA SPX N/A 2024    Procedure: LAPAROSCOPY DIAGNOSTIC, LYSIS OF ADHESIONS;  Surgeon: Fitz Lock MD;  Location: [facility];  Service: Bariatrics    AK LAPS GSTR RSTCV PX W/BYP FRITZ-EN-Y LIMB <150 CM N/A 2020    Procedure: LAPAROSCOPIC FRITZ-EN-Y GASTRIC BYPASS AND INTRAOPERATIVE EGD;  Surgeon: Fitz Lock MD;  Location: [facility];  Service: Bariatrics    AK NSL/SINUS NDSC MAX ANTROST W/RMVL TISS MAX SINUS N/A 2024    Procedure: FUNCTIONAL ENDOSCOPIC SINUS SURGERY (FESS) IMAGED GUIDED BILATERAL MAXILLARY ANTROSTOMY TOTAL ETHMOIDECTOMY SEPTOPLASTY, TURBINOPLASTY;  Surgeon: Gray Mo MD;  Location: [facility];  Service: ENT    SKIN SURGERY      cyst removed from back and thumb    TONSILLECTOMY      TOOTH EXTRACTION         Current Outpatient Medications:     ammonium lactate (LAC-HYDRIN) 12 % lotion, Apply topically 2 (two) times a day as needed for dry skin, Disp: 225 g, Rfl: 1    aspirin 81 mg EC tablet, Take 1  tablet (81 mg total) by mouth daily, Disp: 90 tablet, Rfl: 3    atorvastatin (LIPITOR) 40 mg tablet, Take 1 tablet (40 mg total) by mouth daily, Disp: 90 tablet, Rfl: 1    Blood Glucose Monitoring Suppl (OneTouch Verio Reflect) w/Device KIT, Check blood sugars once daily. Please substitute with appropriate alternative as covered by patient's insurance. Dx: E11.65, Disp: 1 kit, Rfl: 0    clonazePAM (KlonoPIN) 2 mg tablet, Take 1 tablet (2 mg total) by mouth 2 (two) times a day as needed for anxiety, Disp: 60 tablet, Rfl: 2    desvenlafaxine succinate (PRISTIQ) 50 mg 24 hr tablet, Take 1 tablet (50 mg total) by mouth daily, Disp: 90 tablet, Rfl: 2    Fluticasone Propionate (Xhance) 93 MCG/ACT EXHU, 1 actuation into each nostril 2 (two) times a day, Disp: 16 mL, Rfl: 5    glucose blood (OneTouch Verio) test strip, Check blood sugars once daily. Please substitute with appropriate alternative as covered by patient's insurance. Dx: E11.65, Disp: 100 each, Rfl: 3    Multiple Vitamin (multivitamin) tablet, Take 1 tablet by mouth daily, Disp: , Rfl:     omeprazole (PriLOSEC) 20 mg delayed release capsule, Take 1 capsule (20 mg total) by mouth daily, Disp: 90 capsule, Rfl: 1    OneTouch Delica Lancets 33G MISC, Check blood sugars once daily. Please substitute with appropriate alternative as covered by patient's insurance. Dx: E11.65, Disp: 100 each, Rfl: 3    OXcarbazepine (Trileptal) 300 mg tablet, Take 1 tablet (300 mg total) by mouth in the morning AND 2 tablets (600 mg total) daily at bedtime., Disp: 270 tablet, Rfl: 0    paliperidone (INVEGA) 6 MG 24 hr tablet, Take 1 tablet (6 mg total) by mouth every morning, Disp: 14 tablet, Rfl: 0    Paliperidone Palmitate ER (Invega Trinza) 819 MG/2.63ML DENISSE, Inject 819 mg into a muscle every 3 (three) months, Disp: , Rfl:     potassium chloride (Klor-Con M20) 20 mEq tablet, Take 1 tablet (20 mEq total) by mouth daily, Disp: 90 tablet, Rfl: 1    ranolazine (RANEXA) 1000 MG SR  "tablet, Take 1 tablet (1,000 mg total) by mouth 2 (two) times a day, Disp: 180 tablet, Rfl: 3    sodium chloride 1 g tablet, Take 2 tablets (2 g total) by mouth 3 (three) times a day, Disp: 540 tablet, Rfl: 1  No Known Allergies    Vitals:    04/01/25 1403   BP: 120/70   BP Location: Left arm   Patient Position: Sitting   Cuff Size: Adult   Pulse: 88   Temp: 97.7 °F (36.5 °C)   TempSrc: Tympanic   SpO2: 97%   Weight: 89.8 kg (198 lb)   Height: 5' 11\" (1.803 m)     Vitals:    04/01/25 1403   Weight: 89.8 kg (198 lb)      Height: 5' 11\" (180.3 cm)   Body mass index is 27.62 kg/m².    Physical Exam:  GEN: Chandler Angeles appears well, alert and oriented x 3, pleasant and cooperative   HEENT: pupils equal, round, and reactive to light; extraocular muscles intact  NECK: supple, no carotid bruits   HEART: regular rhythm, normal S1 and S2, no murmurs, clicks, gallops or rubs   LUNGS: clear to auscultation bilaterally; no wheezes, rales, or rhonchi   ABDOMEN: normal bowel sounds, soft, no tenderness, no distention  EXTREMITIES: peripheral pulses normal; no clubbing, cyanosis, or edema  NEURO: no focal findings   SKIN: normal without suspicious lesions on exposed skin    ROS:  Positive for arthritis, knee pain.   Except as noted in HPI, is otherwise reviewed in detail and a 12 point review of systems is negative.  ROS reviewed and is unchanged    Labs:  Lab Results   Component Value Date    K 4.5 01/07/2025    CL 97 01/07/2025    CREATININE 0.67 01/07/2025    BUN 14 01/07/2025    CO2 29 01/07/2025    ALT 18 07/17/2024    AST 22 07/17/2024    INR 1.08 07/17/2024    GLUF 90 01/07/2025    HGBA1C 5.3 07/22/2022    WBC 10.16 09/17/2024    HGB 14.3 09/17/2024    HCT 42.2 09/17/2024     09/17/2024       No results found for: \"CHOL\"  Lab Results   Component Value Date    HDL 50 05/16/2024    HDL 51 04/06/2023    HDL 57 07/22/2022     Lab Results   Component Value Date    LDLCALC 49 05/16/2024    LDLCALC 67 04/06/2023    " LDLCALC 60 07/22/2022     Lab Results   Component Value Date    TRIG 53 05/16/2024    TRIG 73 04/06/2023    TRIG 50 07/22/2022     Testing:  Tilt Table:  SUMMARY: Tilt table noted for bordeline hypotension but and tachycardia induced by upright tilt without reproduction of symptoms, she started from low baseline so not enough change to consider this orthostatic hypotension. He had 4 beat run of PAT and occassional PVC's not correlated to symptoms.     Echo 8/19/20:  LEFT VENTRICLE:  Systolic function was normal. Ejection fraction was estimated to be 55 %.  There were no regional wall motion abnormalities.  There was mild concentric hypertrophy.     RIGHT VENTRICLE:  The size was normal.  Systolic function was normal.

## 2025-04-03 ENCOUNTER — TELEMEDICINE (OUTPATIENT)
Dept: PSYCHIATRY | Facility: CLINIC | Age: 60
End: 2025-04-03
Payer: COMMERCIAL

## 2025-04-03 DIAGNOSIS — F31.62 BIPOLAR DISORDER, CURRENT EPISODE MIXED, MODERATE (HCC): Primary | ICD-10-CM

## 2025-04-03 DIAGNOSIS — Z51.81 THERAPEUTIC DRUG MONITORING: ICD-10-CM

## 2025-04-03 DIAGNOSIS — E87.1 HYPONATREMIA: ICD-10-CM

## 2025-04-03 DIAGNOSIS — Z79.899 LONG TERM CURRENT USE OF ANTIPSYCHOTIC MEDICATION: ICD-10-CM

## 2025-04-03 DIAGNOSIS — F41.9 ANXIETY DISORDER, UNSPECIFIED TYPE: ICD-10-CM

## 2025-04-03 PROCEDURE — 99214 OFFICE O/P EST MOD 30 MIN: CPT | Performed by: STUDENT IN AN ORGANIZED HEALTH CARE EDUCATION/TRAINING PROGRAM

## 2025-04-03 NOTE — BH TREATMENT PLAN
TREATMENT PLAN (Medication Management Only)        [facility]      Name and Date of Birth:  Chandler Angeles 60 y.o. 1965 MRN: [id number]    Date of Visit: April 3, 2025    Diagnosis/Diagnoses:    1. Therapeutic drug monitoring  Oxcarbazepine level      2. Long term current use of antipsychotic medication  Lipid panel    Hemoglobin A1C      3. Hyponatremia  Basic metabolic panel      4. Bipolar disorder, current episode mixed, moderate (HCC)        5. Anxiety disorder, unspecified type            Strengths/Personal Resources for Self-Care: supportive family, supportive friends, taking medications as prescribed, ability to adapt to life changes, ability to communicate needs, ability to communicate well, ability to listen, ability to reason.    Area/Areas of need (in own words): mood instability    1. Long Term Goal: maintain control of mood.  Target Date:6 months - 10/3/2025  Person/Persons responsible for completion of goal: Chandler    2.  Short Term Objective (s) - How will we reach this goal?:   A. Provider new recommended medication/dosage changes and/or continue medication(s): continue current medications as prescribed.  B. Keep regularly scheduled psychiatric appointments  C. Maintain adherence to psychotropic medication regimen   D. Get a job at [employer]  E. Maintain appropriate dietary intake  F. Practice adequate sleep hygiene      Target Date:6 months - 10/3/2025  Person/Persons Responsible for Completion of Goal: Chandler    Progress Towards Goals: continuing treatment    Treatment Modality: medication management every 3 months    Review due 180 days from date of this plan: 6 months - 10/3/2025  Expected length of service: ongoing treatment    My Physician/PA/NP and I have developed this plan together and I agree to work on the goals and objectives. I understand the treatment goals that were developed for my treatment. Treatment Plan was  completed with Chandler's assistance and input throughout today's session. Chandler consented to the information provided and documented above. Unfortunately, treatment plan was not physically signed at the time of this office visit secondary to time constraints and issues related to signature keypad. Again, Chandler did verbally consent.

## 2025-04-07 DIAGNOSIS — Z98.84 STATUS POST GASTRIC BYPASS FOR OBESITY: ICD-10-CM

## 2025-04-07 RX ORDER — OMEPRAZOLE 20 MG/1
20 CAPSULE, DELAYED RELEASE ORAL DAILY
Qty: 90 CAPSULE | Refills: 1 | Status: SHIPPED | OUTPATIENT
Start: 2025-04-07 | End: 2025-10-04

## 2025-04-07 NOTE — TELEPHONE ENCOUNTER
Reason for call:   [x] Refill   [] Prior Auth  [] Other:     Office:   [x] PCP/Provider - Moraima Nunez   [] Specialty/Provider -     Medication: omeprazole (PriLOSEC) 20 mg delayed release capsule     Dose/Frequency: Take 1 capsule (20 mg total) by mouth daily     Quantity: 90    Pharmacy:  [facility] [place]     Local Pharmacy   Does the patient have enough for 3 days?   [x] Yes   [] No - Send as HP to POD

## 2025-05-05 ENCOUNTER — OFFICE VISIT (OUTPATIENT)
Dept: FAMILY MEDICINE CLINIC | Facility: CLINIC | Age: 60
End: 2025-05-05
Payer: COMMERCIAL

## 2025-05-05 VITALS
OXYGEN SATURATION: 96 % | DIASTOLIC BLOOD PRESSURE: 61 MMHG | SYSTOLIC BLOOD PRESSURE: 115 MMHG | HEIGHT: 71 IN | RESPIRATION RATE: 17 BRPM | HEART RATE: 99 BPM | BODY MASS INDEX: 27.72 KG/M2 | TEMPERATURE: 97.5 F | WEIGHT: 198 LBS

## 2025-05-05 DIAGNOSIS — I25.110 CORONARY ARTERY DISEASE INVOLVING NATIVE CORONARY ARTERY OF NATIVE HEART WITH UNSTABLE ANGINA PECTORIS (HCC): ICD-10-CM

## 2025-05-05 DIAGNOSIS — F31.62 BIPOLAR DISORDER, CURRENT EPISODE MIXED, MODERATE (HCC): ICD-10-CM

## 2025-05-05 DIAGNOSIS — Z98.84 STATUS POST GASTRIC BYPASS FOR OBESITY: ICD-10-CM

## 2025-05-05 DIAGNOSIS — Z12.5 SCREENING FOR MALIGNANT NEOPLASM OF PROSTATE: ICD-10-CM

## 2025-05-05 DIAGNOSIS — Z00.00 ANNUAL PHYSICAL EXAM: Primary | ICD-10-CM

## 2025-05-05 DIAGNOSIS — K59.00 CONSTIPATION, UNSPECIFIED CONSTIPATION TYPE: ICD-10-CM

## 2025-05-05 DIAGNOSIS — E66.3 OVERWEIGHT (BMI 25.0-29.9): ICD-10-CM

## 2025-05-05 DIAGNOSIS — L20.9 ATOPIC DERMATITIS, UNSPECIFIED TYPE: ICD-10-CM

## 2025-05-05 DIAGNOSIS — R55 RECURRENT SYNCOPE: ICD-10-CM

## 2025-05-05 PROBLEM — K56.50 SMALL BOWEL OBSTRUCTION DUE TO ADHESIONS (HCC): Status: RESOLVED | Noted: 2024-07-18 | Resolved: 2025-05-05

## 2025-05-05 PROBLEM — Z72.0 CURRENT EVERY DAY NICOTINE VAPING: Status: ACTIVE | Noted: 2019-06-11

## 2025-05-05 PROCEDURE — 99214 OFFICE O/P EST MOD 30 MIN: CPT | Performed by: PHYSICIAN ASSISTANT

## 2025-05-05 PROCEDURE — 99396 PREV VISIT EST AGE 40-64: CPT | Performed by: PHYSICIAN ASSISTANT

## 2025-05-05 RX ORDER — OMEPRAZOLE 20 MG/1
20 CAPSULE, DELAYED RELEASE ORAL DAILY
Qty: 90 CAPSULE | Refills: 1 | Status: CANCELLED | OUTPATIENT
Start: 2025-05-05 | End: 2025-11-01

## 2025-05-05 RX ORDER — CLOTRIMAZOLE AND BETAMETHASONE DIPROPIONATE 10; .64 MG/G; MG/G
CREAM TOPICAL
Qty: 30 G | Refills: 0 | Status: SHIPPED | OUTPATIENT
Start: 2025-05-05

## 2025-05-05 RX ORDER — OXCARBAZEPINE 300 MG/1
TABLET, FILM COATED ORAL
Qty: 270 TABLET | Refills: 0 | Status: SHIPPED | OUTPATIENT
Start: 2025-05-05 | End: 2025-08-03

## 2025-05-05 NOTE — TELEPHONE ENCOUNTER
The wrong medication was pended and removed. Pended the correct medication needed and rerouting to correct pod.

## 2025-05-05 NOTE — ASSESSMENT & PLAN NOTE
Stable mood. Following with psychiatry. Continue Pristiq 50mg, Trileptal 300mg and 600mg, clonazepam 2mg, Invega every 3 months per psych.

## 2025-05-05 NOTE — TELEPHONE ENCOUNTER
Reason for call:   [x] Refill   [] Prior Auth  [] Other:     Office:   [x] PCP/Provider -  [facility]  Authorized By: Elie Fountain MD  [] Specialty/Provider -     Medication: OXcarbazepine (Trileptal) 300 mg tablet     Dose/Frequency: 1 tablet (300 mg total) by mouth in the morning AND 2 tablets (600 mg total) daily at bedtime     Quantity: 270    [facility] [address] [facility]   Does the patient have enough for 3 days?   [] Yes   [x] No - Send as HP to POD    Mail Away Pharmacy   Does the patient have enough for 10 days?   [] Yes   [] No - Send as HP to POD

## 2025-05-05 NOTE — ASSESSMENT & PLAN NOTE
Typically having bowel movements 2-3x a week. Recommend metamucil daily. Due for colonoscopy in 7/2026.

## 2025-05-05 NOTE — PROGRESS NOTES
Adult Annual Physical  Name: Chandler Angeles      : 1965      MRN: [id number]  Encounter Provider: Moraima Nunez PA-C  Encounter Date: 2025   Encounter department: [facility]    :  Assessment & Plan  Annual physical exam    Orders:  •  CBC and differential; Future    Atopic dermatitis, unspecified type  Approximately 1 cm circular maculopapular lesions across neck similar to previous atopic dermatitis, no significant pruritus. Start topical Lotrisone BID x 2 weeks due to ring-like appearance.   Orders:  •  clotrimazole-betamethasone (LOTRISONE) 1-0.05 % cream; Apply topical over affected areas twice a day    Constipation, unspecified constipation type  Typically having bowel movements 2-3x a week. Recommend metamucil daily. Due for colonoscopy in 2026.        Coronary artery disease involving native coronary artery of native heart with unstable angina pectoris (HCC)  Continue aspirin 81mg and atorvastatin 40mg. Reviewed last cardiology consult note from 2025.          Overweight (BMI 25.0-29.9)  BMI Counseling: Body mass index is 27.62 kg/m². The BMI            Recurrent syncope  No recent episodes, reviewed last cardiology consult note in 2025.          Bipolar disorder, current episode mixed, moderate (HCC)  Stable mood. Following with psychiatry. Continue Pristiq 50mg, Trileptal 300mg and 600mg, clonazepam 2mg, Invega every 3 months per psych.        Screening for malignant neoplasm of prostate    Orders:  •  PSA, Total Screen; Future        Preventive Screenings:  - Diabetes Screening: screening up-to-date  - Cholesterol Screening: has hyperlipidemia and orders placed   - Hepatitis C screening: screening up-to-date   - HIV screening: screening up-to-date   - Colon cancer screening: screening up-to-date   - Lung cancer screening: screening not indicated   - Prostate cancer screening: screening up-to-date     Immunizations:  - Immunizations due: Zoster  (Shingrix)    Counseling/Anticipatory Guidance:  - Alcohol: discussed moderation in alcohol intake and recommendations for healthy alcohol use.   - Drug use: discussed harms of illicit drug use and how it can negatively impact mental/physical health.   - Tobacco use: discussed harms of tobacco use and management options for quitting.   - Dental health: discussed importance of regular tooth brushing, flossing, and dental visits.   - Sexual health: discussed sexually transmitted diseases, partner selection, use of condoms, avoidance of unintended pregnancy, and contraceptive alternatives.   - Diet: discussed recommendations for a healthy/well-balanced diet.   - Exercise: the importance of regular exercise/physical activity was discussed. Recommend exercise 3-5 times per week for at least 30 minutes.   - Injury prevention: discussed safety/seat belts, safety helmets, smoke detectors, carbon monoxide detectors, and smoking near bedding or upholstery.       Tobacco Cessation Counseling: Tobacco cessation counseling was provided. The patient is sincerely urged to quit consumption of tobacco. He is ready to quit tobacco. Medication options and side effects of medication not discussed. Patient agreed to medication.         History of Present Illness     Adult Annual Physical:  Patient presents for annual physical. Chandler is a 59 y.o. male with a h/o CAD, bipolar disorder, post-surgical dumping syndrome who presents routine annual physical. Feeling well, no complaints, taking medications as prescribed. .     Diet and Physical Activity:  - Diet/Nutrition: well balanced diet.  - Exercise: walking.    Depression Screening:  - PHQ-2 Score: 0    General Health:  - Sleep: sleeps well.  - Hearing: normal hearing bilateral ears and requires use of hearing aids.  - Vision: wears glasses.  - Dental: no dental visits for > 1 year.     Health:  - History of STDs: no.   - Urinary symptoms: none.     Advanced Care Planning:  - Has an  "advanced directive?: no    - Has a durable medical POA?: no    - ACP document given to patient?: no      Review of Systems   Constitutional:  Negative for chills and fever.   HENT:  Negative for ear pain and sore throat.    Eyes:  Negative for pain and visual disturbance.   Respiratory:  Negative for cough and shortness of breath.    Cardiovascular:  Negative for chest pain and palpitations.   Gastrointestinal:  Positive for constipation. Negative for abdominal pain and vomiting.   Genitourinary:  Negative for dysuria and hematuria.   Musculoskeletal:  Negative for arthralgias and back pain.   Skin:  Positive for rash. Negative for color change.   Neurological:  Negative for seizures and syncope.   All other systems reviewed and are negative.        Objective   /61 (BP Location: Left arm, Patient Position: Sitting, Cuff Size: Standard)   Pulse 99   Temp 97.5 °F (36.4 °C) (Temporal)   Resp 17   Ht 5' 11\" (1.803 m)   Wt 89.8 kg (198 lb)   SpO2 96%   BMI 27.62 kg/m²     Physical Exam  Vitals and nursing note reviewed.   Constitutional:       General: He is not in acute distress.     Appearance: He is well-developed.   HENT:      Head: Normocephalic and atraumatic.      Right Ear: Tympanic membrane, ear canal and external ear normal.      Left Ear: Tympanic membrane, ear canal and external ear normal.      Ears:      Comments: Hearing aids b/l      Mouth/Throat:      Mouth: Mucous membranes are moist.   Eyes:      Conjunctiva/sclera: Conjunctivae normal.   Cardiovascular:      Rate and Rhythm: Normal rate and regular rhythm.      Heart sounds: No murmur heard.  Pulmonary:      Effort: Pulmonary effort is normal. No respiratory distress.      Breath sounds: Normal breath sounds.   Abdominal:      Palpations: Abdomen is soft.      Tenderness: There is no abdominal tenderness.   Musculoskeletal:         General: No swelling.      Cervical back: Neck supple.   Skin:     General: Skin is warm and dry.      " Capillary Refill: Capillary refill takes less than 2 seconds.      Findings: Rash present. Rash is macular and papular.          Neurological:      Mental Status: He is alert.   Psychiatric:         Mood and Affect: Mood normal.

## 2025-05-05 NOTE — ASSESSMENT & PLAN NOTE
Continue aspirin 81mg and atorvastatin 40mg. Reviewed last cardiology consult note from 4/2025.

## 2025-05-31 ENCOUNTER — APPOINTMENT (OUTPATIENT)
Dept: LAB | Age: 60
End: 2025-05-31
Payer: COMMERCIAL

## 2025-05-31 DIAGNOSIS — E87.1 HYPONATREMIA: ICD-10-CM

## 2025-05-31 DIAGNOSIS — Z79.899 LONG TERM CURRENT USE OF ANTIPSYCHOTIC MEDICATION: ICD-10-CM

## 2025-05-31 DIAGNOSIS — Z51.81 THERAPEUTIC DRUG MONITORING: ICD-10-CM

## 2025-05-31 DIAGNOSIS — Z00.00 ANNUAL PHYSICAL EXAM: ICD-10-CM

## 2025-05-31 DIAGNOSIS — Z12.5 SCREENING FOR MALIGNANT NEOPLASM OF PROSTATE: ICD-10-CM

## 2025-05-31 LAB
ANION GAP SERPL CALCULATED.3IONS-SCNC: 11 MMOL/L (ref 4–13)
BASOPHILS # BLD AUTO: 0.06 THOUSANDS/ÂΜL (ref 0–0.1)
BASOPHILS NFR BLD AUTO: 1 % (ref 0–1)
BUN SERPL-MCNC: 10 MG/DL (ref 5–25)
CALCIUM SERPL-MCNC: 9.1 MG/DL (ref 8.4–10.2)
CHLORIDE SERPL-SCNC: 93 MMOL/L (ref 96–108)
CHOLEST SERPL-MCNC: 106 MG/DL (ref ?–200)
CO2 SERPL-SCNC: 26 MMOL/L (ref 21–32)
CREAT SERPL-MCNC: 0.69 MG/DL (ref 0.6–1.3)
EOSINOPHIL # BLD AUTO: 0.11 THOUSAND/ÂΜL (ref 0–0.61)
EOSINOPHIL NFR BLD AUTO: 1 % (ref 0–6)
ERYTHROCYTE [DISTWIDTH] IN BLOOD BY AUTOMATED COUNT: 13 % (ref 11.6–15.1)
EST. AVERAGE GLUCOSE BLD GHB EST-MCNC: 100 MG/DL
GFR SERPL CREATININE-BSD FRML MDRD: 103 ML/MIN/1.73SQ M
GLUCOSE P FAST SERPL-MCNC: 91 MG/DL (ref 65–99)
HBA1C MFR BLD: 5.1 %
HCT VFR BLD AUTO: 43.1 % (ref 36.5–49.3)
HDLC SERPL-MCNC: 56 MG/DL
HGB BLD-MCNC: 14.5 G/DL (ref 12–17)
IMM GRANULOCYTES # BLD AUTO: 0.06 THOUSAND/UL (ref 0–0.2)
IMM GRANULOCYTES NFR BLD AUTO: 1 % (ref 0–2)
LDLC SERPL CALC-MCNC: 41 MG/DL (ref 0–100)
LYMPHOCYTES # BLD AUTO: 1.75 THOUSANDS/ÂΜL (ref 0.6–4.47)
LYMPHOCYTES NFR BLD AUTO: 22 % (ref 14–44)
MCH RBC QN AUTO: 31 PG (ref 26.8–34.3)
MCHC RBC AUTO-ENTMCNC: 33.6 G/DL (ref 31.4–37.4)
MCV RBC AUTO: 92 FL (ref 82–98)
MONOCYTES # BLD AUTO: 0.68 THOUSAND/ÂΜL (ref 0.17–1.22)
MONOCYTES NFR BLD AUTO: 8 % (ref 4–12)
NEUTROPHILS # BLD AUTO: 5.4 THOUSANDS/ÂΜL (ref 1.85–7.62)
NEUTS SEG NFR BLD AUTO: 67 % (ref 43–75)
NONHDLC SERPL-MCNC: 50 MG/DL
NRBC BLD AUTO-RTO: 0 /100 WBCS
PLATELET # BLD AUTO: 259 THOUSANDS/UL (ref 149–390)
PMV BLD AUTO: 10 FL (ref 8.9–12.7)
POTASSIUM SERPL-SCNC: 4 MMOL/L (ref 3.5–5.3)
PSA SERPL-MCNC: 2.19 NG/ML (ref 0–4)
RBC # BLD AUTO: 4.68 MILLION/UL (ref 3.88–5.62)
SODIUM SERPL-SCNC: 130 MMOL/L (ref 135–147)
TRIGL SERPL-MCNC: 45 MG/DL (ref ?–150)
WBC # BLD AUTO: 8.06 THOUSAND/UL (ref 4.31–10.16)

## 2025-05-31 PROCEDURE — G0103 PSA SCREENING: HCPCS

## 2025-05-31 PROCEDURE — 83036 HEMOGLOBIN GLYCOSYLATED A1C: CPT

## 2025-05-31 PROCEDURE — 80183 DRUG SCRN QUANT OXCARBAZEPIN: CPT

## 2025-05-31 PROCEDURE — 80061 LIPID PANEL: CPT

## 2025-05-31 PROCEDURE — 80048 BASIC METABOLIC PNL TOTAL CA: CPT

## 2025-05-31 PROCEDURE — 36415 COLL VENOUS BLD VENIPUNCTURE: CPT

## 2025-05-31 PROCEDURE — 85025 COMPLETE CBC W/AUTO DIFF WBC: CPT

## 2025-06-03 LAB — OXCARBAZEPINE SERPL-MCNC: 11 UG/ML (ref 10–35)

## 2025-06-04 ENCOUNTER — HOSPITAL ENCOUNTER (OUTPATIENT)
Dept: INFUSION CENTER | Facility: HOSPITAL | Age: 60
Discharge: HOME | End: 2025-06-04
Payer: COMMERCIAL

## 2025-06-04 ENCOUNTER — RESULTS FOLLOW-UP (OUTPATIENT)
Dept: FAMILY MEDICINE CLINIC | Facility: CLINIC | Age: 60
End: 2025-06-04

## 2025-06-04 PROCEDURE — 96372 THER/PROPH/DIAG INJ SC/IM: CPT

## 2025-06-04 RX ADMIN — PALIPERIDONE PALMITATE 819 MG: 819 INJECTION, SUSPENSION, EXTENDED RELEASE INTRAMUSCULAR at 08:13

## 2025-06-04 NOTE — PROGRESS NOTES
Patient tolerated Invega Trinza  LEFT deltoid IM injection without complications. AVS declined. Next appt confirmed for August 27th, at 8:00 am. Left ambulatory with a steady gait.

## 2025-06-10 ENCOUNTER — TELEPHONE (OUTPATIENT)
Dept: OTHER | Facility: HOSPITAL | Age: 60
End: 2025-06-10

## 2025-06-10 NOTE — TELEPHONE ENCOUNTER
Patient called requesting refill for potassium chloride. Patient made aware medication was refilled on 3/21/25 for 90 with 1 refills to [facility] pharmacy. Patient instructed to contact the pharmacy and speak with someone directly to obtain refills of medication. Patient advised to call back for refill if their pharmacy is unable to assist them. Patient verbalized understanding.

## 2025-06-13 ENCOUNTER — TELEPHONE (OUTPATIENT)
Dept: PSYCHIATRY | Facility: CLINIC | Age: 60
End: 2025-06-13

## 2025-06-13 NOTE — TELEPHONE ENCOUNTER
Patient called requesting refill for Pristiq 50mg . Patient made aware medication was refilled on 12/12/24 for 90 with 2 refills to  Pharmacy [facility] pharmacy. Patient instructed to contact the pharmacy and speak with someone directly to obtain refills of medication. Patient advised to call back for refill if their pharmacy is unable to assist them. Patient verbalized understanding.

## 2025-06-24 DIAGNOSIS — F41.9 ANXIETY DISORDER, UNSPECIFIED TYPE: ICD-10-CM

## 2025-06-24 RX ORDER — CLONAZEPAM 2 MG/1
2 TABLET ORAL 2 TIMES DAILY PRN
Qty: 60 TABLET | Refills: 0 | Status: SHIPPED | OUTPATIENT
Start: 2025-06-24

## 2025-06-24 NOTE — TELEPHONE ENCOUNTER
Reason for call:   [x] Refill   [] Prior Auth  [] Other:     Office:   [] PCP/Provider -   [x] Specialty/Provider -     Medication: clonazePAM (KlonoPIN) 2 mg tablet     Dose/Frequency: Take 1 tablet (2 mg total) by mouth 2 (two) times a day as needed     Quantity: 60    Pharmacy:  [facility]    Local Pharmacy   Does the patient have enough for 3 days?   [] Yes   [x] No - Send as HP to POD

## 2025-06-24 NOTE — TELEPHONE ENCOUNTER
05/30/2025 03/28/2025 clonazePAM (Tablet) 60.0 30 2 MG NA [facility] Commercial Insurance 2 / 2 [place]       1 [id number] ** 04/30/2025 03/28/2025 clonazePAM (Tablet) 60.0 30 2 MG NA [facility] Commercial Insurance 1 / 2 [place]    1 [id number] ** 04/01/2025 03/28/2025 clonazePAM (Tablet) 60.0 30 2 MG [facility]

## 2025-07-08 DIAGNOSIS — I25.10 CORONARY ARTERY DISEASE INVOLVING NATIVE CORONARY ARTERY OF NATIVE HEART WITHOUT ANGINA PECTORIS: ICD-10-CM

## 2025-07-08 DIAGNOSIS — I25.110 CORONARY ARTERY DISEASE INVOLVING NATIVE CORONARY ARTERY OF NATIVE HEART WITH UNSTABLE ANGINA PECTORIS (HCC): ICD-10-CM

## 2025-07-08 DIAGNOSIS — E78.2 MIXED HYPERLIPIDEMIA: ICD-10-CM

## 2025-07-08 DIAGNOSIS — Z98.84 STATUS POST GASTRIC BYPASS FOR OBESITY: ICD-10-CM

## 2025-07-09 RX ORDER — ATORVASTATIN CALCIUM 40 MG/1
40 TABLET, FILM COATED ORAL DAILY
Qty: 90 TABLET | Refills: 0 | Status: SHIPPED | OUTPATIENT
Start: 2025-07-09

## 2025-07-09 RX ORDER — OMEPRAZOLE 20 MG/1
20 CAPSULE, DELAYED RELEASE ORAL DAILY
Qty: 90 CAPSULE | Refills: 0 | Status: SHIPPED | OUTPATIENT
Start: 2025-07-09 | End: 2026-01-05

## 2025-07-09 RX ORDER — ASPIRIN 81 MG/1
81 TABLET ORAL DAILY
Qty: 90 TABLET | Refills: 0 | Status: SHIPPED | OUTPATIENT
Start: 2025-07-09

## 2025-07-10 RX ORDER — ATORVASTATIN CALCIUM 40 MG/1
40 TABLET, FILM COATED ORAL DAILY
Qty: 90 TABLET | Refills: 0 | OUTPATIENT
Start: 2025-07-10

## 2025-07-23 ENCOUNTER — APPOINTMENT (EMERGENCY)
Dept: CT IMAGING | Facility: HOSPITAL | Age: 60
End: 2025-07-23
Payer: COMMERCIAL

## 2025-07-23 ENCOUNTER — APPOINTMENT (EMERGENCY)
Dept: RADIOLOGY | Facility: HOSPITAL | Age: 60
End: 2025-07-23
Payer: COMMERCIAL

## 2025-07-23 ENCOUNTER — HOSPITAL ENCOUNTER (OUTPATIENT)
Facility: HOSPITAL | Age: 60
Setting detail: OUTPATIENT SURGERY
Discharge: HOME | End: 2025-07-24
Attending: EMERGENCY MEDICINE | Admitting: STUDENT IN AN ORGANIZED HEALTH CARE EDUCATION/TRAINING PROGRAM
Payer: COMMERCIAL

## 2025-07-23 ENCOUNTER — APPOINTMENT (EMERGENCY)
Dept: ULTRASOUND IMAGING | Facility: HOSPITAL | Age: 60
End: 2025-07-23
Payer: COMMERCIAL

## 2025-07-23 ENCOUNTER — ANESTHESIA EVENT (OUTPATIENT)
Dept: PERIOP | Facility: HOSPITAL | Age: 60
End: 2025-07-23
Payer: COMMERCIAL

## 2025-07-23 ENCOUNTER — ANESTHESIA (OUTPATIENT)
Dept: PERIOP | Facility: HOSPITAL | Age: 60
End: 2025-07-23
Payer: COMMERCIAL

## 2025-07-23 DIAGNOSIS — R79.89 ELEVATED TROPONIN: ICD-10-CM

## 2025-07-23 DIAGNOSIS — F41.9 ANXIETY DISORDER, UNSPECIFIED TYPE: ICD-10-CM

## 2025-07-23 DIAGNOSIS — K81.0 ACUTE CHOLECYSTITIS: Primary | ICD-10-CM

## 2025-07-23 LAB
2HR DELTA HS TROPONIN: -12 NG/L
4HR DELTA HS TROPONIN: 0 NG/L
ALBUMIN SERPL BCG-MCNC: 4.2 G/DL (ref 3.5–5)
ALP SERPL-CCNC: 84 U/L (ref 34–104)
ALT SERPL W P-5'-P-CCNC: 21 U/L (ref 7–52)
ANION GAP SERPL CALCULATED.3IONS-SCNC: 9 MMOL/L (ref 4–13)
AST SERPL W P-5'-P-CCNC: 20 U/L (ref 13–39)
ATRIAL RATE: 59 BPM
ATRIAL RATE: 67 BPM
ATRIAL RATE: 85 BPM
BASOPHILS # BLD AUTO: 0.04 THOUSANDS/ÂΜL (ref 0–0.1)
BASOPHILS NFR BLD AUTO: 0 % (ref 0–1)
BILIRUB SERPL-MCNC: 0.52 MG/DL (ref 0.2–1)
BUN SERPL-MCNC: 15 MG/DL (ref 5–25)
CALCIUM SERPL-MCNC: 9 MG/DL (ref 8.4–10.2)
CARDIAC TROPONIN I PNL SERPL HS: 59 NG/L (ref ?–50)
CARDIAC TROPONIN I PNL SERPL HS: 71 NG/L (ref ?–50)
CARDIAC TROPONIN I PNL SERPL HS: 71 NG/L (ref ?–50)
CHLORIDE SERPL-SCNC: 97 MMOL/L (ref 96–108)
CO2 SERPL-SCNC: 24 MMOL/L (ref 21–32)
CREAT SERPL-MCNC: 0.99 MG/DL (ref 0.6–1.3)
EOSINOPHIL # BLD AUTO: 0.14 THOUSAND/ÂΜL (ref 0–0.61)
EOSINOPHIL NFR BLD AUTO: 1 % (ref 0–6)
ERYTHROCYTE [DISTWIDTH] IN BLOOD BY AUTOMATED COUNT: 12.7 % (ref 11.6–15.1)
GFR SERPL CREATININE-BSD FRML MDRD: 83 ML/MIN/1.73SQ M
GLUCOSE SERPL-MCNC: 112 MG/DL (ref 65–140)
HCT VFR BLD AUTO: 42.9 % (ref 36.5–49.3)
HGB BLD-MCNC: 15.1 G/DL (ref 12–17)
IMM GRANULOCYTES # BLD AUTO: 0.05 THOUSAND/UL (ref 0–0.2)
IMM GRANULOCYTES NFR BLD AUTO: 0 % (ref 0–2)
LACTATE SERPL-SCNC: 1 MMOL/L (ref 0.5–2)
LIPASE SERPL-CCNC: 10 U/L (ref 11–82)
LYMPHOCYTES # BLD AUTO: 1.48 THOUSANDS/ÂΜL (ref 0.6–4.47)
LYMPHOCYTES NFR BLD AUTO: 13 % (ref 14–44)
MCH RBC QN AUTO: 31.5 PG (ref 26.8–34.3)
MCHC RBC AUTO-ENTMCNC: 35.2 G/DL (ref 31.4–37.4)
MCV RBC AUTO: 89 FL (ref 82–98)
MONOCYTES # BLD AUTO: 0.91 THOUSAND/ÂΜL (ref 0.17–1.22)
MONOCYTES NFR BLD AUTO: 8 % (ref 4–12)
NEUTROPHILS # BLD AUTO: 8.82 THOUSANDS/ÂΜL (ref 1.85–7.62)
NEUTS SEG NFR BLD AUTO: 78 % (ref 43–75)
NRBC BLD AUTO-RTO: 0 /100 WBCS
P AXIS: 51 DEGREES
P AXIS: 52 DEGREES
P AXIS: 53 DEGREES
PLATELET # BLD AUTO: 225 THOUSANDS/UL (ref 149–390)
PMV BLD AUTO: 9.7 FL (ref 8.9–12.7)
POTASSIUM SERPL-SCNC: 4.2 MMOL/L (ref 3.5–5.3)
PR INTERVAL: 176 MS
PR INTERVAL: 198 MS
PR INTERVAL: 202 MS
PROT SERPL-MCNC: 6.5 G/DL (ref 6.4–8.4)
QRS AXIS: 106 DEGREES
QRS AXIS: 111 DEGREES
QRS AXIS: 114 DEGREES
QRSD INTERVAL: 114 MS
QRSD INTERVAL: 130 MS
QRSD INTERVAL: 130 MS
QT INTERVAL: 366 MS
QT INTERVAL: 436 MS
QT INTERVAL: 456 MS
QTC INTERVAL: 435 MS
QTC INTERVAL: 451 MS
QTC INTERVAL: 460 MS
RBC # BLD AUTO: 4.8 MILLION/UL (ref 3.88–5.62)
SODIUM SERPL-SCNC: 130 MMOL/L (ref 135–147)
T WAVE AXIS: 60 DEGREES
T WAVE AXIS: 63 DEGREES
T WAVE AXIS: 63 DEGREES
VENTRICULAR RATE: 59 BPM
VENTRICULAR RATE: 67 BPM
VENTRICULAR RATE: 85 BPM
WBC # BLD AUTO: 11.44 THOUSAND/UL (ref 4.31–10.16)

## 2025-07-23 PROCEDURE — 96361 HYDRATE IV INFUSION ADD-ON: CPT

## 2025-07-23 PROCEDURE — 96376 TX/PRO/DX INJ SAME DRUG ADON: CPT

## 2025-07-23 PROCEDURE — 93010 ELECTROCARDIOGRAM REPORT: CPT | Performed by: INTERNAL MEDICINE

## 2025-07-23 PROCEDURE — 84484 ASSAY OF TROPONIN QUANT: CPT

## 2025-07-23 PROCEDURE — 80053 COMPREHEN METABOLIC PANEL: CPT

## 2025-07-23 PROCEDURE — 76705 ECHO EXAM OF ABDOMEN: CPT

## 2025-07-23 PROCEDURE — 47562 LAPAROSCOPIC CHOLECYSTECTOMY: CPT | Performed by: STUDENT IN AN ORGANIZED HEALTH CARE EDUCATION/TRAINING PROGRAM

## 2025-07-23 PROCEDURE — 83605 ASSAY OF LACTIC ACID: CPT

## 2025-07-23 PROCEDURE — 74177 CT ABD & PELVIS W/CONTRAST: CPT

## 2025-07-23 PROCEDURE — 71046 X-RAY EXAM CHEST 2 VIEWS: CPT

## 2025-07-23 PROCEDURE — 99285 EMERGENCY DEPT VISIT HI MDM: CPT

## 2025-07-23 PROCEDURE — 36415 COLL VENOUS BLD VENIPUNCTURE: CPT

## 2025-07-23 PROCEDURE — 85025 COMPLETE CBC W/AUTO DIFF WBC: CPT

## 2025-07-23 PROCEDURE — 93005 ELECTROCARDIOGRAM TRACING: CPT

## 2025-07-23 PROCEDURE — 96374 THER/PROPH/DIAG INJ IV PUSH: CPT

## 2025-07-23 PROCEDURE — S2900 ROBOTIC SURGICAL SYSTEM: HCPCS | Performed by: PHYSICIAN ASSISTANT

## 2025-07-23 PROCEDURE — S2900 ROBOTIC SURGICAL SYSTEM: HCPCS | Performed by: STUDENT IN AN ORGANIZED HEALTH CARE EDUCATION/TRAINING PROGRAM

## 2025-07-23 PROCEDURE — 99204 OFFICE O/P NEW MOD 45 MIN: CPT | Performed by: STUDENT IN AN ORGANIZED HEALTH CARE EDUCATION/TRAINING PROGRAM

## 2025-07-23 PROCEDURE — 96375 TX/PRO/DX INJ NEW DRUG ADDON: CPT

## 2025-07-23 PROCEDURE — 88304 TISSUE EXAM BY PATHOLOGIST: CPT | Performed by: STUDENT IN AN ORGANIZED HEALTH CARE EDUCATION/TRAINING PROGRAM

## 2025-07-23 PROCEDURE — 83690 ASSAY OF LIPASE: CPT

## 2025-07-23 PROCEDURE — 47562 LAPAROSCOPIC CHOLECYSTECTOMY: CPT | Performed by: PHYSICIAN ASSISTANT

## 2025-07-23 RX ORDER — PROPOFOL 10 MG/ML
INJECTION, EMULSION INTRAVENOUS AS NEEDED
Status: DISCONTINUED | OUTPATIENT
Start: 2025-07-23 | End: 2025-07-23

## 2025-07-23 RX ORDER — NICOTINE 21 MG/24HR
14 PATCH, TRANSDERMAL 24 HOURS TRANSDERMAL DAILY
Status: DISCONTINUED | OUTPATIENT
Start: 2025-07-23 | End: 2025-07-24 | Stop reason: HOSPADM

## 2025-07-23 RX ORDER — INDOCYANINE GREEN AND WATER 25 MG
2.5 KIT INJECTION ONCE
Status: COMPLETED | OUTPATIENT
Start: 2025-07-23 | End: 2025-07-23

## 2025-07-23 RX ORDER — FENTANYL CITRATE 50 UG/ML
50 INJECTION, SOLUTION INTRAMUSCULAR; INTRAVENOUS ONCE
Refills: 0 | Status: COMPLETED | OUTPATIENT
Start: 2025-07-23 | End: 2025-07-23

## 2025-07-23 RX ORDER — OXCARBAZEPINE 300 MG/1
600 TABLET, FILM COATED ORAL
Status: DISCONTINUED | OUTPATIENT
Start: 2025-07-23 | End: 2025-07-24 | Stop reason: HOSPADM

## 2025-07-23 RX ORDER — CLONAZEPAM 1 MG/1
2 TABLET ORAL 2 TIMES DAILY PRN
Status: DISCONTINUED | OUTPATIENT
Start: 2025-07-23 | End: 2025-07-24 | Stop reason: HOSPADM

## 2025-07-23 RX ORDER — HYDROMORPHONE HCL IN WATER/PF 6 MG/30 ML
0.2 PATIENT CONTROLLED ANALGESIA SYRINGE INTRAVENOUS
Refills: 0 | Status: DISCONTINUED | OUTPATIENT
Start: 2025-07-23 | End: 2025-07-24 | Stop reason: HOSPADM

## 2025-07-23 RX ORDER — ATORVASTATIN CALCIUM 40 MG/1
40 TABLET, FILM COATED ORAL DAILY
Status: DISCONTINUED | OUTPATIENT
Start: 2025-07-24 | End: 2025-07-24 | Stop reason: HOSPADM

## 2025-07-23 RX ORDER — SODIUM CHLORIDE, SODIUM LACTATE, POTASSIUM CHLORIDE, CALCIUM CHLORIDE 600; 310; 30; 20 MG/100ML; MG/100ML; MG/100ML; MG/100ML
125 INJECTION, SOLUTION INTRAVENOUS CONTINUOUS
Status: DISCONTINUED | OUTPATIENT
Start: 2025-07-23 | End: 2025-07-24

## 2025-07-23 RX ORDER — OXYCODONE HYDROCHLORIDE 5 MG/1
5 TABLET ORAL EVERY 4 HOURS PRN
Refills: 0 | Status: DISCONTINUED | OUTPATIENT
Start: 2025-07-23 | End: 2025-07-24 | Stop reason: HOSPADM

## 2025-07-23 RX ORDER — HYDROMORPHONE HYDROCHLORIDE 2 MG/ML
INJECTION, SOLUTION INTRAMUSCULAR; INTRAVENOUS; SUBCUTANEOUS AS NEEDED
Status: DISCONTINUED | OUTPATIENT
Start: 2025-07-23 | End: 2025-07-23

## 2025-07-23 RX ORDER — ONDANSETRON 2 MG/ML
4 INJECTION INTRAMUSCULAR; INTRAVENOUS EVERY 6 HOURS PRN
Status: DISCONTINUED | OUTPATIENT
Start: 2025-07-23 | End: 2025-07-24 | Stop reason: HOSPADM

## 2025-07-23 RX ORDER — NICOTINE 21 MG/24HR
14 PATCH, TRANSDERMAL 24 HOURS TRANSDERMAL DAILY
Status: DISCONTINUED | OUTPATIENT
Start: 2025-07-24 | End: 2025-07-23

## 2025-07-23 RX ORDER — FENTANYL CITRATE 50 UG/ML
50 INJECTION, SOLUTION INTRAMUSCULAR; INTRAVENOUS
Status: DISCONTINUED | OUTPATIENT
Start: 2025-07-23 | End: 2025-07-23 | Stop reason: HOSPADM

## 2025-07-23 RX ORDER — CEFAZOLIN SODIUM 2 G/50ML
2000 SOLUTION INTRAVENOUS
Status: COMPLETED | OUTPATIENT
Start: 2025-07-23 | End: 2025-07-23

## 2025-07-23 RX ORDER — MAGNESIUM HYDROXIDE 1200 MG/15ML
LIQUID ORAL AS NEEDED
Status: DISCONTINUED | OUTPATIENT
Start: 2025-07-23 | End: 2025-07-23 | Stop reason: HOSPADM

## 2025-07-23 RX ORDER — MORPHINE SULFATE 4 MG/ML
4 INJECTION, SOLUTION INTRAMUSCULAR; INTRAVENOUS ONCE
Status: COMPLETED | OUTPATIENT
Start: 2025-07-23 | End: 2025-07-23

## 2025-07-23 RX ORDER — DESVENLAFAXINE 50 MG/1
50 TABLET, FILM COATED, EXTENDED RELEASE ORAL DAILY
Status: DISCONTINUED | OUTPATIENT
Start: 2025-07-24 | End: 2025-07-24 | Stop reason: HOSPADM

## 2025-07-23 RX ORDER — METRONIDAZOLE 500 MG/100ML
500 INJECTION, SOLUTION INTRAVENOUS EVERY 12 HOURS
Status: DISCONTINUED | OUTPATIENT
Start: 2025-07-23 | End: 2025-07-24 | Stop reason: HOSPADM

## 2025-07-23 RX ORDER — LIDOCAINE HYDROCHLORIDE 10 MG/ML
INJECTION, SOLUTION EPIDURAL; INFILTRATION; INTRACAUDAL; PERINEURAL AS NEEDED
Status: DISCONTINUED | OUTPATIENT
Start: 2025-07-23 | End: 2025-07-23

## 2025-07-23 RX ORDER — ONDANSETRON 2 MG/ML
4 INJECTION INTRAMUSCULAR; INTRAVENOUS ONCE
Status: COMPLETED | OUTPATIENT
Start: 2025-07-23 | End: 2025-07-23

## 2025-07-23 RX ORDER — PANTOPRAZOLE SODIUM 40 MG/10ML
40 INJECTION, POWDER, LYOPHILIZED, FOR SOLUTION INTRAVENOUS ONCE
Status: COMPLETED | OUTPATIENT
Start: 2025-07-23 | End: 2025-07-23

## 2025-07-23 RX ORDER — ASPIRIN 325 MG
325 TABLET ORAL ONCE
Status: COMPLETED | OUTPATIENT
Start: 2025-07-23 | End: 2025-07-23

## 2025-07-23 RX ORDER — FENTANYL CITRATE 50 UG/ML
INJECTION, SOLUTION INTRAMUSCULAR; INTRAVENOUS AS NEEDED
Status: DISCONTINUED | OUTPATIENT
Start: 2025-07-23 | End: 2025-07-23

## 2025-07-23 RX ORDER — RANOLAZINE 500 MG/1
1000 TABLET, EXTENDED RELEASE ORAL 2 TIMES DAILY
Status: DISCONTINUED | OUTPATIENT
Start: 2025-07-23 | End: 2025-07-24 | Stop reason: HOSPADM

## 2025-07-23 RX ORDER — BUPIVACAINE HYDROCHLORIDE 2.5 MG/ML
INJECTION, SOLUTION EPIDURAL; INFILTRATION; INTRACAUDAL; PERINEURAL AS NEEDED
Status: DISCONTINUED | OUTPATIENT
Start: 2025-07-23 | End: 2025-07-23 | Stop reason: HOSPADM

## 2025-07-23 RX ORDER — ONDANSETRON 2 MG/ML
INJECTION INTRAMUSCULAR; INTRAVENOUS AS NEEDED
Status: DISCONTINUED | OUTPATIENT
Start: 2025-07-23 | End: 2025-07-23

## 2025-07-23 RX ORDER — PHENYLEPHRINE HCL IN 0.9% NACL 1 MG/10 ML
SYRINGE (ML) INTRAVENOUS AS NEEDED
Status: DISCONTINUED | OUTPATIENT
Start: 2025-07-23 | End: 2025-07-23

## 2025-07-23 RX ORDER — OXCARBAZEPINE 300 MG/1
300 TABLET, FILM COATED ORAL DAILY
Status: DISCONTINUED | OUTPATIENT
Start: 2025-07-24 | End: 2025-07-24 | Stop reason: HOSPADM

## 2025-07-23 RX ORDER — ROCURONIUM BROMIDE 10 MG/ML
INJECTION, SOLUTION INTRAVENOUS AS NEEDED
Status: DISCONTINUED | OUTPATIENT
Start: 2025-07-23 | End: 2025-07-23

## 2025-07-23 RX ORDER — CLONAZEPAM 2 MG/1
2 TABLET ORAL 2 TIMES DAILY PRN
Qty: 60 TABLET | Refills: 2 | Status: SHIPPED | OUTPATIENT
Start: 2025-07-23

## 2025-07-23 RX ORDER — ACETAMINOPHEN 10 MG/ML
INJECTION, SOLUTION INTRAVENOUS AS NEEDED
Status: DISCONTINUED | OUTPATIENT
Start: 2025-07-23 | End: 2025-07-23

## 2025-07-23 RX ORDER — ACETAMINOPHEN 325 MG/1
975 TABLET ORAL EVERY 8 HOURS SCHEDULED
Status: DISCONTINUED | OUTPATIENT
Start: 2025-07-23 | End: 2025-07-24 | Stop reason: HOSPADM

## 2025-07-23 RX ORDER — HYDROMORPHONE HCL/PF 1 MG/ML
0.5 SYRINGE (ML) INJECTION
Status: DISCONTINUED | OUTPATIENT
Start: 2025-07-23 | End: 2025-07-23 | Stop reason: HOSPADM

## 2025-07-23 RX ORDER — MIDAZOLAM HYDROCHLORIDE 2 MG/2ML
INJECTION, SOLUTION INTRAMUSCULAR; INTRAVENOUS AS NEEDED
Status: DISCONTINUED | OUTPATIENT
Start: 2025-07-23 | End: 2025-07-23

## 2025-07-23 RX ORDER — SODIUM CHLORIDE, SODIUM LACTATE, POTASSIUM CHLORIDE, CALCIUM CHLORIDE 600; 310; 30; 20 MG/100ML; MG/100ML; MG/100ML; MG/100ML
125 INJECTION, SOLUTION INTRAVENOUS CONTINUOUS
OUTPATIENT
Start: 2025-07-23

## 2025-07-23 RX ORDER — ONDANSETRON 2 MG/ML
4 INJECTION INTRAMUSCULAR; INTRAVENOUS EVERY 6 HOURS PRN
Status: DISCONTINUED | OUTPATIENT
Start: 2025-07-23 | End: 2025-07-23 | Stop reason: HOSPADM

## 2025-07-23 RX ORDER — PANTOPRAZOLE SODIUM 40 MG/1
40 TABLET, DELAYED RELEASE ORAL
Status: DISCONTINUED | OUTPATIENT
Start: 2025-07-24 | End: 2025-07-24 | Stop reason: HOSPADM

## 2025-07-23 RX ORDER — ENOXAPARIN SODIUM 100 MG/ML
40 INJECTION SUBCUTANEOUS DAILY
Status: DISCONTINUED | OUTPATIENT
Start: 2025-07-23 | End: 2025-07-24 | Stop reason: HOSPADM

## 2025-07-23 RX ADMIN — MORPHINE SULFATE 4 MG: 4 INJECTION INTRAVENOUS at 05:49

## 2025-07-23 RX ADMIN — IOHEXOL 25 ML: 240 INJECTION, SOLUTION INTRATHECAL; INTRAVASCULAR; INTRAVENOUS; ORAL at 06:11

## 2025-07-23 RX ADMIN — CEFAZOLIN SODIUM 2000 MG: 2 SOLUTION INTRAVENOUS at 14:12

## 2025-07-23 RX ADMIN — DEXTROSE 1000 MG: 50 INJECTION, SOLUTION INTRAVENOUS at 14:33

## 2025-07-23 RX ADMIN — IOHEXOL 100 ML: 350 INJECTION, SOLUTION INTRAVENOUS at 06:11

## 2025-07-23 RX ADMIN — RANOLAZINE 1000 MG: 500 TABLET, FILM COATED, EXTENDED RELEASE ORAL at 18:43

## 2025-07-23 RX ADMIN — Medication 100 MCG: at 14:24

## 2025-07-23 RX ADMIN — SUGAMMADEX 200 MG: 100 INJECTION, SOLUTION INTRAVENOUS at 15:46

## 2025-07-23 RX ADMIN — MORPHINE SULFATE 2 MG: 2 INJECTION, SOLUTION INTRAMUSCULAR; INTRAVENOUS at 10:15

## 2025-07-23 RX ADMIN — SODIUM CHLORIDE 1000 ML: 0.9 INJECTION, SOLUTION INTRAVENOUS at 04:59

## 2025-07-23 RX ADMIN — ROCURONIUM 50 MG: 50 INJECTION, SOLUTION INTRAVENOUS at 14:12

## 2025-07-23 RX ADMIN — PROPOFOL 200 MG: 10 INJECTION, EMULSION INTRAVENOUS at 14:12

## 2025-07-23 RX ADMIN — SODIUM CHLORIDE, SODIUM LACTATE, POTASSIUM CHLORIDE, AND CALCIUM CHLORIDE: .6; .31; .03; .02 INJECTION, SOLUTION INTRAVENOUS at 14:44

## 2025-07-23 RX ADMIN — ONDANSETRON 4 MG: 2 INJECTION INTRAMUSCULAR; INTRAVENOUS at 15:43

## 2025-07-23 RX ADMIN — Medication 100 MCG: at 14:53

## 2025-07-23 RX ADMIN — ROCURONIUM 10 MG: 50 INJECTION, SOLUTION INTRAVENOUS at 15:28

## 2025-07-23 RX ADMIN — ONDANSETRON 4 MG: 2 INJECTION INTRAMUSCULAR; INTRAVENOUS at 05:01

## 2025-07-23 RX ADMIN — METRONIDAZOLE 500 MG: 500 INJECTION, SOLUTION INTRAVENOUS at 11:52

## 2025-07-23 RX ADMIN — FENTANYL CITRATE 50 MCG: 50 INJECTION INTRAMUSCULAR; INTRAVENOUS at 14:12

## 2025-07-23 RX ADMIN — SODIUM CHLORIDE, SODIUM LACTATE, POTASSIUM CHLORIDE, AND CALCIUM CHLORIDE 125 ML/HR: .6; .31; .03; .02 INJECTION, SOLUTION INTRAVENOUS at 23:50

## 2025-07-23 RX ADMIN — FENTANYL CITRATE 50 MCG: 50 INJECTION INTRAMUSCULAR; INTRAVENOUS at 05:03

## 2025-07-23 RX ADMIN — LIDOCAINE HYDROCHLORIDE 100 MG: 10 INJECTION, SOLUTION EPIDURAL; INFILTRATION; INTRACAUDAL at 14:12

## 2025-07-23 RX ADMIN — INDOCYANINE GREEN AND WATER 2.5 MG: KIT at 14:43

## 2025-07-23 RX ADMIN — ASPIRIN 325 MG ORAL TABLET 325 MG: 325 PILL ORAL at 05:51

## 2025-07-23 RX ADMIN — SODIUM CHLORIDE, SODIUM LACTATE, POTASSIUM CHLORIDE, AND CALCIUM CHLORIDE 125 ML/HR: .6; .31; .03; .02 INJECTION, SOLUTION INTRAVENOUS at 11:51

## 2025-07-23 RX ADMIN — MORPHINE SULFATE 2 MG: 2 INJECTION, SOLUTION INTRAMUSCULAR; INTRAVENOUS at 07:06

## 2025-07-23 RX ADMIN — HYDROMORPHONE HYDROCHLORIDE 0.5 MG: 2 INJECTION INTRAMUSCULAR; INTRAVENOUS; SUBCUTANEOUS at 14:44

## 2025-07-23 RX ADMIN — METRONIDAZOLE 500 MG: 500 INJECTION, SOLUTION INTRAVENOUS at 23:52

## 2025-07-23 RX ADMIN — PANTOPRAZOLE SODIUM 40 MG: 40 INJECTION, POWDER, FOR SOLUTION INTRAVENOUS at 07:09

## 2025-07-23 RX ADMIN — ACETAMINOPHEN 1000 MG: 10 INJECTION INTRAVENOUS at 14:52

## 2025-07-23 RX ADMIN — OXYCODONE HYDROCHLORIDE 5 MG: 5 TABLET ORAL at 11:50

## 2025-07-23 RX ADMIN — MORPHINE SULFATE 2 MG: 2 INJECTION, SOLUTION INTRAMUSCULAR; INTRAVENOUS at 07:54

## 2025-07-23 RX ADMIN — Medication 100 MCG: at 14:17

## 2025-07-23 RX ADMIN — ACETAMINOPHEN 975 MG: 325 TABLET ORAL at 21:07

## 2025-07-23 RX ADMIN — NICOTINE 14 MG: 14 PATCH, EXTENDED RELEASE TRANSDERMAL at 18:43

## 2025-07-23 RX ADMIN — MIDAZOLAM HYDROCHLORIDE 2 MG: 1 INJECTION, SOLUTION INTRAMUSCULAR; INTRAVENOUS at 14:05

## 2025-07-23 RX ADMIN — FENTANYL CITRATE 50 MCG: 50 INJECTION INTRAMUSCULAR; INTRAVENOUS at 14:43

## 2025-07-23 RX ADMIN — OXCARBAZEPINE 600 MG: 300 TABLET, FILM COATED ORAL at 21:07

## 2025-07-23 NOTE — OP NOTE
OPERATIVE REPORT  PATIENT NAME: Chandler Angeles    :  1965  MRN: [id number]  Pt Location: [facility] OR ROOM 08    SURGERY DATE: 2025    Surgeons and Role:     * Shamar Yin DO - Primary     * Luis Carlos Cannon MD - Observing     * Henry Yepez PA-C - Assisting         Preop Diagnosis:  Acute cholecystitis [K81.0]    Post-Op Diagnosis Codes:     * Acute cholecystitis [K81.0]    Procedure(s):  CHOLECYSTECTOMY LAPAROSCOPIC W/ ROBOTICS    Specimen(s):  ID Type Source Tests Collected by Time Destination   1 : GALLBLADDER Tissue Gallbladder TISSUE EXAM Shamar YinDO 2025 1515        Estimated Blood Loss:   50 mL    Drains:  * No LDAs found *    Anesthesia Type:   Choice    Operative Indications:  Acute cholecystitis [K81.0]      Operative Findings:  Acute cholecystitis       Complications:   None    Procedure and Technique:  The patient was seen again in the Holding Room. The risks, benefits, complications, treatment options, and expected outcomes were discussed with the patient. The possibilities of reaction to medication, pulmonary aspiration, perforation of viscus, bleeding, recurrent infection, finding a normal gallbladder, the need for additional procedures, failure to diagnose a condition, the possible need to convert to an open procedure, and creating a complication requiring transfusion or operation were discussed with the patient. The patient and/or family concurred with the proposed plan, giving informed consent. The site of surgery properly noted/marked. The patient was taken to Operating Room, identified as Chandler Angeles  and the procedure verified as Robotic Cholecystectomy with possible Intraoperative Cholangiogram. A Time Out was held after prepping and draping in sterile fashion.  The above information was confirmed.    Prior to the induction of general anesthesia, antibiotic prophylaxis was administered. General endotracheal anesthesia was then administered and  tolerated well. After the induction, the abdomen was prepped in the usual sterile fashion. The patient was positioned in the supine position.    Local anesthetic agent was injected into the skin near the umbilicus and an incision made. Veress needle was used to gain access to peritoneal cavity after confirming entry with saline drop test. Pneumoperitoneum was then created with CO2 and was tolerated well without any adverse changes in the patient's vital signs. Under direct visualization, additional ports were placed in right lateral, right paraumbilical and left lateral sites. Robot instruments were placed. The robot was docked.    The gallbladder was identified, the fundus grasped and retracted cephalad. Adhesions were lysed bluntly and with the electrocautery where indicated, taking care not to injure any adjacent organs or viscus. The infundibulum was grasped and retracted laterally, exposing the peritoneum overlying the triangle of [place]. This was then dissected anteriorily and posteriorly and exposed in a blunt fashion or using cautery where appropriate. The cystic duct was clearly identified and  dissected circumferentially, as was the cystic artery, as the only two tubular structures leading into the gallbladder .  The critical view of the [place] of [place] was identified.  The posterior aspect of the gallbladder was lifted off the cystic plate, to insure that there were no posterior structres behind the gallbladder.      Once this was all clearly identified, the cystic duct was then doubly ligated with clips on the patient's side and 1 on the gallbladder side.  The cystic artery was then also clipped and transected.    The gallbladder was dissected from the liver bed in retrograde fashion with the electrocautery. Hemostasis was ensured at the liver bed. The gallbladder was placed into an endocatch bag, secured and removed via left lateral port. The fascia was then closed using 2-0 vicryl suture using suture  passer device.  Pneumoperitoneum was completely reduced after viewing removal of the trocars under direct vision.  The wound was thoroughly irrigated. The skin was then closed with 4-0 monocryl and surgical glue applied.    Instrument, sponge, and needle counts were correct at closure and at the conclusion of the case.      I was present for the entire procedure., A qualified resident physician was not available., and A physician assistant was required during the procedure for retraction, tissue handling, dissection and suturing.    Patient Disposition:  PACU          SIGNATURE: Shamar Yin DO  DATE: July 23, 2025  TIME: 3:55 PM

## 2025-07-23 NOTE — ANESTHESIA PREPROCEDURE EVALUATION
Procedure:  CHOLECYSTECTOMY LAPAROSCOPIC W/ ROBOTICS, possible laparoscopic (Abdomen)    Relevant Problems   ANESTHESIA (within normal limits)      CARDIO  Left Ventricle Normal left ventricular cavity size, normal wall thickness, low normal left ventricular systolic function.  There is mild apical lateral hypokinesis.  Ejection fraction is estimated as around 52%.  Grade 1 diastolic dysfunction with normal left atrial pressure.  Right Ventricle Top normal right ventricular cavity size, normal right ventricular systolic function.  Right ventricular systolic pressure could not be estimated due to inadequate tricuspid regurgitation profile.  Left Atrium Normal left atrial cavity size.  Right Atrium Normal right atrial cavity size.  Atrial Septum There is a small, mobile septal aneurysm.  It is predominately within the left atrial cavity. The septum bows into the left atrium.  Aortic Valve The aortic valve was not well visualized.  There is no significant aortic valve sclerosis.  There is adequate cuspal suppression.  No aortic valve stenosis or regurgitation noted.  Mitral Valve Normal mitral valve structure. Normal leaflet mobility. Trace mitral valve regurgitation.  Tricuspid Valve Normal tricuspid valve structure and leaflet mobility.  Trace tricuspid valve regurgitation.       (+) Coronary artery disease involving native coronary artery of native heart with unstable angina pectoris (HCC)   (+) Migraine with aura and without status migrainosus, not intractable   (+) Mixed hyperlipidemia      NEURO/PSYCH   (+) Anxiety disorder   (+) Migraine with aura and without status migrainosus, not intractable   (+) Paresthesia of both feet      PULMONARY   (+) ROSI (obstructive sleep apnea)      Behavioral Health   (+) Bipolar disorder, current episode mixed, moderate (HCC)   (+) Current every day nicotine vaping      Neurology/Sleep   (+) Neuropathy      Surgery/Wound/Pain   (+) Bariatric surgery status   (+) Status post  gastric bypass for obesity        Physical Exam    Airway     Mallampati score: II          Cardiovascular  Cardiovascular exam normal    Dental   No notable dental hx     Pulmonary   Breath sounds clear to auscultation    Neurological      Other Findings        Anesthesia Plan  ASA Score- 3     Anesthesia Type- general with ASA Monitors.         Additional Monitors:     Airway Plan: Oral ETT.           Plan Factors-    Chart reviewed.   Existing labs reviewed. Patient summary reviewed.    Patient is a current smoker.  Patient did not smoke on day of surgery.            Induction-     Postoperative Plan- .   Monitoring Plan - Monitoring plan - standard ASA monitoring      Perioperative Resuscitation Plan - Level 1 - Full Code.       Informed Consent- Anesthetic plan and risks discussed with patient.        NPO Status:  Vitals Value Taken Time   Date of last liquid 07/22/25 07/23/25 13:17   Time of last liquid 1900 07/23/25 13:17   Date of last solid 07/22/25 07/23/25 13:17   Time of last solid 1900 07/23/25 13:17

## 2025-07-23 NOTE — ANESTHESIA POSTPROCEDURE EVALUATION
Post-Op Assessment Note    CV Status:  Stable    Pain management: adequate       Mental Status:  Alert and awake   Hydration Status:  Euvolemic   PONV Controlled:  Controlled   Airway Patency:  Patent     Post Op Vitals Reviewed: Yes    No anethesia notable event occurred.    Staff: Anesthesiologist           Last Filed PACU Vitals:  Vitals Value Taken Time   Temp 98.3 °F (36.8 °C) 07/23/25 16:15   Pulse 78 07/23/25 16:34   /59 07/23/25 16:23   Resp 18 07/23/25 16:15   SpO2 100 % 07/23/25 16:34   Vitals shown include unfiled device data.    Modified Josué:     Vitals Value Taken Time   Activity 0 07/23/25 16:09   Respiration 2 07/23/25 16:09   Circulation 2 07/23/25 16:09   Consciousness 0 07/23/25 16:09   Oxygen Saturation 1 07/23/25 16:09     Modified Josué Score: 5

## 2025-07-23 NOTE — ASSESSMENT & PLAN NOTE
- Hx of Marii-en-Y gastric bypass in 2020, internal hernia s/p Aminta in 7/2024  - Presenting with acute onset abdominal pain  - CT scan and ultrasound consistent with acute cholecystitis, 1.8 cm nonmobile gallstone at the neck with GB dilation    T. bili and LFTs within normal limits.    Plan  - Admit to general surgery  - N.p.o., laparoscopic cholecystectomy today pending OR availability  -IV fluids  - IV antibiotics  - As needed analgesia and antiemetics

## 2025-07-23 NOTE — H&P
H&P - Surgery-General   Name: Chandler Angeles 60 y.o. male I MRN: [id number]  Unit/Bed#: ED-28 I Date of Admission: 7/23/2025   Date of Service: 7/23/2025 I Hospital Day: 0     Assessment & Plan  Acute cholecystitis  - Hx of Marii-en-Y gastric bypass in 2020, internal hernia s/p Aminta in 7/2024  - Presenting with acute onset abdominal pain  - CT scan and ultrasound consistent with acute cholecystitis, 1.8 cm nonmobile gallstone at the neck with GB dilation    T. bili and LFTs within normal limits.    Plan  - Admit to general surgery  - N.p.o., laparoscopic cholecystectomy today pending OR availability  -IV fluids  - IV antibiotics  - As needed analgesia and antiemetics    History of Present Illness   Chandler Angeles is a 60 y.o. male with above history as well as CAD, BPD who presents with acute onset abdominal pain associated with nausea without vomiting.  Patient reports pain began at 0330 after which she presented to the ED.  He denies vomiting, fevers, chills, chest pain, shortness of breath.  Pain is located in the epigastric-right upper quadrant area.  CT scan and right upper quadrant ultrasound were consistent with acute cholecystitis without choledocholithiasis.  He has no hyperbilirubinemia or transaminitis.  WBC 11.4.  He is currently comfortable, in no acute distress.    Review of Systems  Medical History Review: I have reviewed the patient's PMH, PSH, Social History, Family History, Meds, and Allergies     Objective :  Temp:  [97.8 °F (36.6 °C)] 97.8 °F (36.6 °C)  HR:  [55-90] 90  BP: (105-145)/(64-83) 123/70  Resp:  [16] 16  SpO2:  [96 %-99 %] 96 %  O2 Device: None (Room air)      Physical Exam  General: NAD  HENT: NCAT MMM  Neck: supple, no JVD  CV: nl rate  Lungs: nl wob. No resp distress  ABD: Soft, mild epigastric tenderness, nondistended  Extrem: No CCE  Neuro: AAOx3      Lab Results: I have reviewed the following results:  Recent Labs     07/23/25  0457 07/23/25  0702   WBC 11.44*  --    HGB 15.1   --    HCT 42.9  --      --    SODIUM 130*  --    K 4.2  --    CL 97  --    CO2 24  --    BUN 15  --    CREATININE 0.99  --    GLUC 112  --    AST 20  --    ALT 21  --    ALB 4.2  --    TBILI 0.52  --    ALKPHOS 84  --    HSTNI0 71*  --    HSTNI2  --  59*   LACTICACID 1.0  --              VTE Pharmacologic Prophylaxis: VTE covered by:  enoxaparin, Subcutaneous     VTE Mechanical Prophylaxis: sequential compression device

## 2025-07-23 NOTE — ED PROVIDER NOTES
ED Disposition       None          Assessment & Plan       Medical Decision Making  Patient with history as below presenting to the ED for evaluation of epigastric, mid abdominal pain that is sharp, constant, and nonradiating.  States feels same to when he had internal hernia with SBO 1 year ago.  Associated nausea without vomiting.  Last BM 2 days ago.  Denies chest pain or shortness of breath.    On physical examination, patient uncomfortable appearing secondary to pain.  Lungs clear to auscultation.  Epigastric abdominal tenderness to palpation with guarding.    Of note, patient was seen 1 year ago for same symptoms.  On CTA was found to have findings suggestive for internal hernia.  Had laparoscopy performed which revealed adhesive band causing obstruction of the barbara limb causing obstruction.    Plan: Will order CBC for eval of anemia and leukocytosis, CMP for eval of LFTs, kidney function and electrolyte abnormalities.  Lipase for eval of acute pancreatitis. CT A/P for eval of acute intra-abd/pelv abnormalities. Troponin for evaluation of heart strain. EKG for evaluation of arrhythmia and ACS.     Initial therapeutics to include fluids, Zofran, and fentanyl.    WBC 11.44.  Sodium 130.  Creatinine 0.99, BUN 15.  eGFR 83.  Lipase unremarkable.  Lactic acid WNL.  0-hour troponin 71.  EKG as interpreted in ED course.    No evidence of acute ischemic changes on EKG, given elevated troponin will give aspirin and obtain chest x-ray.    Patient with continued pain, will give morphine.  Pending CT read, delta troponin and EKG, and chest x-ray, will sign out patient to Zina Keith PA-C.    Amount and/or Complexity of Data Reviewed  Labs: ordered.  Radiology: ordered.  ECG/medicine tests:  Decision-making details documented in ED Course.    Risk  OTC drugs.  Prescription drug management.        ED Course as of 07/23/25 0641   Wed Jul 23, 2025   4213 ECG 12 lead  EKG Interpretation    Rate: 59 BPM  Rhythm: Sinus  Bradycardia  Axis: RAD  Intervals: Normal, no blocks, QTc 451 ms  Q waves: No  T waves: Normal  ST segments: No ALIDA/STD    Impression: No acute ischemic changes on EKG. EKG for comparison: No significant change  EKG interpreted by me.          Medications   sodium chloride 0.9 % bolus 1,000 mL (0 mL Intravenous Stopped 7/23/25 0552)   fentaNYL injection 50 mcg (50 mcg Intravenous Given 7/23/25 0503)   ondansetron (ZOFRAN) injection 4 mg (4 mg Intravenous Given 7/23/25 0501)   aspirin tablet 325 mg (325 mg Oral Given 7/23/25 0551)   morphine injection 4 mg (4 mg Intravenous Given 7/23/25 0549)   iohexol (OMNIPAQUE) 350 MG/ML injection (MULTI-DOSE) 100 mL (100 mL Intravenous Given 7/23/25 0611)   iohexol (OMNIPAQUE) 240 MG/ML solution 25 mL (25 mL Oral Given 7/23/25 0611)       ED Risk Strat Scores                    No data recorded        SBIRT 22yo+      Flowsheet Row Most Recent Value   Initial Alcohol Screen: US AUDIT-C     1. How often do you have a drink containing alcohol? 0 Filed at: 07/23/2025 0430   2. How many drinks containing alcohol do you have on a typical day you are drinking?  0 Filed at: 07/23/2025 0430   3a. Male UNDER 65: How often do you have five or more drinks on one occasion? 0 Filed at: 07/23/2025 0430   3b. FEMALE Any Age, or MALE 65+: How often do you have 4 or more drinks on one occassion? 0 Filed at: 07/23/2025 0430   Audit-C Score 0 Filed at: 07/23/2025 0430                            History of Present Illness       Chief Complaint   Patient presents with    Abdominal Pain     Pt c/o abdominal pain that started around 330 this morning , pain is similar to when he had a hernia from his bypass. Pt denies n/v/d       Past Medical History[1]   Past Surgical History[2]   Family History[3]   Social History[4]   E-Cigarette/Vaping    E-Cigarette Use Current Every Day User     Cartridges/Day 1/2     Comments vape       E-Cigarette/Vaping Substances    Nicotine Yes     THC No     CBD No      Flavoring Yes     Other No     Unknown No       I have reviewed and agree with the history as documented.     Patient is a 60-year-old male with PMH of gastric bypass, MI, SBO, presenting to the ED for evaluation of abdominal pain.  Patient states that around 3:30 AM this morning he began having sudden onset severe abdominal pain that is constant and nonradiating.  States pain is located in his mid abdomen and feels the same to when he had an internal hernia with SBO requiring surgery approximately 1 year ago.  Associated nausea without vomiting.  Denies any diarrhea or blood in the stools.  Had last bowel movement 2 days ago.  Denies fevers, chills, sweats, chest pain, shortness of breath, dysuria, hematuria, testicular pain, headache, vision changes, cough congestion, sore throat, runny nose, etc.  Denies taking anything at home for pain.      Abdominal Pain  Associated symptoms: constipation and nausea    Associated symptoms: no chest pain, no chills, no cough, no diarrhea, no dysuria, no fever, no hematuria, no shortness of breath, no sore throat and no vomiting        Review of Systems   Constitutional:  Negative for chills and fever.   HENT:  Negative for congestion, ear pain, rhinorrhea and sore throat.    Eyes:  Negative for pain and visual disturbance.   Respiratory:  Negative for cough and shortness of breath.    Cardiovascular:  Negative for chest pain and palpitations.   Gastrointestinal:  Positive for abdominal pain, constipation and nausea. Negative for blood in stool, diarrhea and vomiting.   Genitourinary:  Negative for dysuria, hematuria, penile pain and testicular pain.   Musculoskeletal:  Negative for arthralgias and back pain.   Skin:  Negative for color change and rash.   Neurological:  Negative for dizziness, seizures, syncope, weakness, light-headedness, numbness and headaches.   All other systems reviewed and are negative.          Objective       ED Triage Vitals   Temperature Pulse Blood  Pressure Respirations SpO2 Patient Position - Orthostatic VS   07/23/25 0429 07/23/25 0429 07/23/25 0433 07/23/25 0429 07/23/25 0429 07/23/25 0543   97.8 °F (36.6 °C) 55 105/64 16 99 % Sitting      Temp src Heart Rate Source BP Location FiO2 (%) Pain Score    -- 07/23/25 0543 07/23/25 0543 -- 07/23/25 0503     Monitor Left arm  10 - Worst Possible Pain      Vitals      Date and Time Temp Pulse SpO2 Resp BP Pain Score FACES Pain Rating User   07/23/25 0549 -- -- -- -- -- 10 - Worst Possible Pain -- TM   07/23/25 0543 -- 66 99 % 16 145/78 -- -- TM   07/23/25 0503 -- -- -- -- -- 10 - Worst Possible Pain -- TM   07/23/25 0433 -- -- -- -- 105/64 -- -- BA   07/23/25 0429 97.8 °F (36.6 °C) 55 99 % 16 -- -- -- BA            Physical Exam  Vitals and nursing note reviewed.   Constitutional:       General: He is not in acute distress.     Appearance: He is well-developed.   HENT:      Head: Normocephalic and atraumatic.      Right Ear: External ear normal.      Left Ear: External ear normal.      Nose: Nose normal.      Mouth/Throat:      Mouth: Mucous membranes are moist.     Eyes:      Conjunctiva/sclera: Conjunctivae normal.       Cardiovascular:      Rate and Rhythm: Normal rate and regular rhythm.      Pulses: Normal pulses.      Heart sounds: Normal heart sounds. No murmur heard.  Pulmonary:      Effort: Pulmonary effort is normal. No respiratory distress.      Breath sounds: Normal breath sounds. No stridor. No wheezing, rhonchi or rales.   Abdominal:      Palpations: Abdomen is soft.      Tenderness: There is abdominal tenderness in the epigastric area and periumbilical area. There is guarding. There is no right CVA tenderness, left CVA tenderness or rebound.     Musculoskeletal:         General: No swelling.      Cervical back: Normal range of motion and neck supple.     Skin:     General: Skin is warm and dry.      Capillary Refill: Capillary refill takes less than 2 seconds.     Neurological:      General: No focal  deficit present.      Mental Status: He is alert and oriented to person, place, and time.     Psychiatric:         Mood and Affect: Mood normal.         Results Reviewed       Procedure Component Value Units Date/Time    HS Troponin I 2hr [[id number]]     Lab Status: No result Specimen: Blood     HS Troponin 0hr (reflex protocol) [[id number]]  (Abnormal) Collected: 07/23/25 0457    Lab Status: Final result Specimen: Blood from Arm, Right Updated: 07/23/25 0531     hs TnI 0hr 71 ng/L     Lipase [[id number]]  (Abnormal) Collected: 07/23/25 0457    Lab Status: Final result Specimen: Blood from Arm, Right Updated: 07/23/25 0525     Lipase 10 u/L     Lactic acid, plasma (w/reflex if result > 2.0) [[id number]]  (Normal) Collected: 07/23/25 0457    Lab Status: Final result Specimen: Blood from Arm, Right Updated: 07/23/25 0525     LACTIC ACID 1.0 mmol/L     Narrative:      Result may be elevated if tourniquet was used during collection.    Comprehensive metabolic panel [[id number]]  (Abnormal) Collected: 07/23/25 0457    Lab Status: Final result Specimen: Blood from Arm, Right Updated: 07/23/25 0525     Sodium 130 mmol/L      Potassium 4.2 mmol/L      Chloride 97 mmol/L      CO2 24 mmol/L      ANION GAP 9 mmol/L      BUN 15 mg/dL      Creatinine 0.99 mg/dL      Glucose 112 mg/dL      Calcium 9.0 mg/dL      AST 20 U/L      ALT 21 U/L      Alkaline Phosphatase 84 U/L      Total Protein 6.5 g/dL      Albumin 4.2 g/dL      Total Bilirubin 0.52 mg/dL      eGFR 83 ml/min/1.73sq m     Narrative:      National Kid[employer] Foundation guidelines for Chronic Kidney Disease (CKD):     Stage 1 with normal or high GFR (GFR > 90 mL/min/1.73 square meters)    Stage 2 Mild CKD (GFR = 60-89 mL/min/1.73 square meters)    Stage 3A Moderate CKD (GFR = 45-59 mL/min/1.73 square meters)    Stage 3B Moderate CKD (GFR = 30-44 mL/min/1.73 square meters)    Stage 4 Severe CKD (GFR = 15-29 mL/min/1.73 square meters)    Stage 5 End Stage CKD (GFR <15  mL/min/1.73 square meters)  Note: GFR calculation is accurate only with a steady state creatinine    CBC and differential [[id number]]  (Abnormal) Collected: 07/23/25 1853    Lab Status: Final result Specimen: Blood from Arm, Right Updated: 07/23/25 0511     WBC 11.44 Thousand/uL      RBC 4.80 Million/uL      Hemoglobin 15.1 g/dL      Hematocrit 42.9 %      MCV 89 fL      MCH 31.5 pg      MCHC 35.2 g/dL      RDW 12.7 %      MPV 9.7 fL      Platelets 225 Thousands/uL      nRBC 0 /100 WBCs      Segmented % 78 %      Immature Grans % 0 %      Lymphocytes % 13 %      Monocytes % 8 %      Eosinophils Relative 1 %      Basophils Relative 0 %      Absolute Neutrophils 8.82 Thousands/µL      Absolute Immature Grans 0.05 Thousand/uL      Absolute Lymphocytes 1.48 Thousands/µL      Absolute Monocytes 0.91 Thousand/µL      Eosinophils Absolute 0.14 Thousand/µL      Basophils Absolute 0.04 Thousands/µL             CT abdomen pelvis with contrast   Final Interpretation by Enzo Gay MD (07/23 0635)      2 cm noncalcified gallstone in the neck of the distended gallbladder and mild prominence of the biliary tree. No radiopaque choledocholithiasis. No pericholecystic inflammatory changes. Correlate with clinical findings. Consider follow-up with    gallbladder ultrasound if it would alter patient management.      Equivocal mild gastritis involving the excluded stomach. If there is clinical concern for biliary reflux, follow-up with HIDA scan might be beneficial.      Additional chronic findings and negatives as above.      The study was marked in EPIC for immediate notification.         Workstation performed: [id number]         XR chest 2 views    (Results Pending)       Procedures    ED Medication and Procedure Management   Prior to Admission Medications   Prescriptions Last Dose Informant Patient Reported? Taking?   Blood Glucose Monitoring Suppl (OneTouch Verio Reflect) w/Device KIT   No No   Sig: Check blood  sugars once daily. Please substitute with appropriate alternative as covered by patient's insurance. Dx: E11.65   Fluticasone Propionate (Xhance) 93 MCG/ACT EXHU   No No   Si actuation into each nostril in the morning and 1 actuation before bedtime.   Multiple Vitamin (multivitamin) tablet   Yes No   Sig: Take 1 tablet by mouth daily   OXcarbazepine (Trileptal) 300 mg tablet   No No   Sig: Take 1 tablet (300 mg total) by mouth in the morning AND 2 tablets (600 mg total) daily at bedtime.   OneTouch Delica Lancets 33G MISC   No No   Sig: Check blood sugars once daily. Please substitute with appropriate alternative as covered by patient's insurance. Dx: E11.65   Paliperidone Palmitate ER (Invega Trinza) 819 MG/2.63ML DENISSE   Yes No   Sig: Inject 819 mg into a muscle every 3 (three) months   aspirin 81 mg EC tablet   No No   Sig: Take 1 tablet (81 mg total) by mouth daily   atorvastatin (LIPITOR) 40 mg tablet   No No   Sig: Take 1 tablet (40 mg total) by mouth daily   clonazePAM (KlonoPIN) 2 mg tablet   No No   Sig: Take 1 tablet (2 mg total) by mouth 2 (two) times a day as needed for anxiety   clotrimazole-betamethasone (LOTRISONE) 1-0.05 % cream   No No   Sig: Apply topical over affected areas twice a day   desvenlafaxine succinate (PRISTIQ) 50 mg 24 hr tablet   No No   Sig: Take 1 tablet (50 mg total) by mouth daily   glucose blood (OneTouch Verio) test strip   No No   Sig: Check blood sugars once daily. Please substitute with appropriate alternative as covered by patient's insurance. Dx: E11.65   omeprazole (PriLOSEC) 20 mg delayed release capsule   No No   Sig: Take 1 capsule (20 mg total) by mouth daily   potassium chloride (Klor-Con M20) 20 mEq tablet   No No   Sig: Take 1 tablet (20 mEq total) by mouth daily   ranolazine (RANEXA) 1000 MG SR tablet   No No   Sig: Take 1 tablet (1,000 mg total) by mouth 2 (two) times a day   sodium chloride 1 g tablet   No No   Sig: Take 2 tablets (2 g total) by mouth 3  (three) times a day      Facility-Administered Medications: None     Patient's Medications   Discharge Prescriptions    No medications on file     No discharge procedures on file.  ED SEPSIS DOCUMENTATION              [1]   Past Medical History:  Diagnosis Date    Anxiety     Bariatric surgery status     CPAP (continuous positive airway pressure) dependence     Depression     Dizziness     Hearing loss of aging     HL (hearing loss)     Hypercholesterolemia     Migraine     Morbid obesity (HCC)     Myocardial infarction ([facility])     2018    Nasal congestion     Nasal obstruction     NSTEMI (non-ST elevated myocardial infarction) ([facility]) 05/15/2019    NSTEMI (non-ST elevation myocardial infarction) ([facility])     april 2019    Pneumonia     Postsurgical malabsorption     Sleep apnea     Small bowel obstruction ([facility])     8/2024    Small bowel obstruction due to adhesions ([facility]) 07/18/2024    Hospitalized in 7/2024.      Tinnitus    [2]   Past Surgical History:  Procedure Laterality Date    BONE TUMOR EXCISION Bilateral 4/20/2023    Procedure: REMOVAL TUMOR BONE;  Surgeon: Dandre Sanderson DO;  Location:  MAIN OR;  Service: Orthopedics    CARDIAC CATHETERIZATION      no stents     COLONOSCOPY  2016    bx taken    EGD      HERNIA REPAIR      umbilical hernia    AK LAPS ABD PRTM&OMENTUM DX W/WO SPEC BR/WA SPX N/A 7/17/2024    Procedure: LAPAROSCOPY DIAGNOSTIC, LYSIS OF ADHESIONS;  Surgeon: Fitz Lock MD;  Location: [facility];  Service: Bariatrics    AK LAPS GSTR RSTCV PX W/BYP FRITZ-EN-Y LIMB <150 CM N/A 03/02/2020    Procedure: LAPAROSCOPIC FRITZ-EN-Y GASTRIC BYPASS AND INTRAOPERATIVE EGD;  Surgeon: Fitz Lock MD;  Location: [facility];  Service: Bariatrics    AK NSL/SINUS NDSC MAX ANTROST W/RMVL TISS MAX SINUS N/A 9/20/2024    Procedure: FUNCTIONAL ENDOSCOPIC SINUS SURGERY (FESS) IMAGED GUIDED BILATERAL MAXILLARY ANTROSTOMY TOTAL ETHMOIDECTOMY SEPTOPLASTY, TURBINOPLASTY;  Surgeon: Gray Mo MD;  Location:   MAIN OR;  Service: ENT    SKIN SURGERY      cyst removed from back and thumb    TONSILLECTOMY      TOOTH EXTRACTION     [3]   Family History  Problem Relation Name Age of Onset    Lung cancer Mother Khushi Angeles.      Brain cancer Mother Khushi Angeles.      Cancer Mother Khushi Angeles.          Brain and lung    Diabetes Brother Oskar Angeles     Bipolar disorder Maternal Grandfather      Bipolar disorder Son      Cancer Maternal Grandmother Khushi Chaney.          Lung   [4]   Social History  Tobacco Use    Smoking status: Former     Current packs/day: 0.00     Average packs/day: 1 pack/day for 10.8 years (10.8 ttl pk-yrs)     Types: Cigarettes, Cigars     Start date: 1993     Quit date: 10/15/2003     Years since quittin.7    Smokeless tobacco: Former     Types: Chew     Quit date: 2022    Tobacco comments:     Currently vapes   Vaping Use    Vaping status: Every Day    Substances: Nicotine, Flavoring   Substance Use Topics    Alcohol use: Not Currently    Drug use: Never        Nina Mcintyre PA-C  [phone]

## 2025-07-23 NOTE — QUICK NOTE
"Zeferino"Harry Cisneros was seen and treated in our emergency department on 8/16/2024.  He may return to work on 08/19/2024.       If you have any questions or concerns, please don't hesitate to call.      Alejandra Rogers, JOHNP" Post Op Check:    60 y.o. male * Day of Surgery * s/p robotic cholecystectomy.  Patients pain is well controlled. They denied any nausea, chest pain, or shortness of breath.    Temp:  [97.8 °F (36.6 °C)-100.5 °F (38.1 °C)] 98.3 °F (36.8 °C)  HR:  [55-99] 92  BP: (102-145)/(55-83) 116/58  Resp:  [16-20] 20  SpO2:  [93 %-100 %] 97 %  O2 Device: None (Room air)      Physical Exam:  General: No acute distress, alert and oriented  CV: Well perfused, regular rate  Lungs: Normal work of breathing, no increased respiratory effort  Abdomen: Soft, appropriately tender, non-distended. Incision(s) clean, dry and intact.  Extremities: No edema, clubbing or cyanosis  Skin: Warm, dry      Plan:  Diet GI; Lo Fat; No Carbonation  Continue to monitor  Pain and nausea control PRN    Luis Carlos Cannon MD  General Surgery   07/23/25

## 2025-07-23 NOTE — ED CARE HANDOFF
Emergency Department Sign Out Note        Sign out and transfer of care from [facility]. See Separate Emergency Department note.     The patient, Chandler Angeles, was evaluated by the previous provider for eopigastric pain .    Workup Completed:  Labs signed to myself pending ct   Gastric bypass done about 5 yrs ago - tg elchar   2 cm noncalcified gallstone in the neck of the distended gallbladder and mild prominence of the biliary tree. No radiopaque choledocholithiasis. No pericholecystic inflammatory changes. Correlate with clinical findings. Consider follow-up with   gallbladder ultrasound if it would alter patient management.     Equivocal mild gastritis involving the excluded stomach. If there is clinical concern for biliary reflux, follow-up with HIDA scan might be beneficial.         surgery saw pt will take to OR for lap aris                   ED Course as of 07/23/25 1541   Wed Jul 23, 2025   0617 Pt presents to the ED of abdominal / epigastric pain - hx internal hernia - feels similar   Nausea no vomiting.   Elevated troponin - given asa  Signed to myself waiting on ct and delta troponin and EKG    0707 Pt having abdominal pain - on re eval - RUQ - will give additional meds and get US, and reach out to bariatrics    0757 Delta 2hr hsTnI: -12  Improved    1007 Pt going to OR - cholecystectomy - discussed with DR Cannon - under DR Oconnor      ECG 12 Lead Documentation Only    Date/Time: 7/23/2025 7:20 AM    Performed by: Zina Keith PA-C  Authorized by: Zina Keith PA-C    Indications / Diagnosis:  2 hr repeat  ECG reviewed by me, the ED Provider: yes    Patient location:  ED  Previous ECG:     Previous ECG:  Compared to current    Comparison ECG info:  2 hr repeat    Similarity:  No change  Rate:     ECG rate:  67    ECG rate assessment: normal    Rhythm:     Rhythm: sinus rhythm    Ectopy:     Ectopy: none    QRS:     QRS axis:  Normal  Conduction:     Conduction: normal    ST  segments:     ST segments:  Normal  T waves:     T waves: normal      Medical Decision Making  Amount and/or Complexity of Data Reviewed  Labs: ordered. Decision-making details documented in ED Course.  Radiology: ordered.    Risk  OTC drugs.  Prescription drug management.  Decision regarding hospitalization.            Disposition  Final diagnoses:   Acute cholecystitis   Elevated troponin     Time reflects when diagnosis was documented in both MDM as applicable and the Disposition within this note       Time User Action Codes Description Comment    7/23/2025  9:42 AM Luis Carlos Cannon [K81.0] Acute cholecystitis     7/23/2025 10:06 AM Zina Keith [K81.0] Acute cholecystitis     7/23/2025 10:07 AM Zina Keith [R79.89] Elevated troponin     7/23/2025  3:15 PM Halie Terrazas [K81.0] Acute cholecystitis           ED Disposition       ED Disposition   Send to OR    Condition   --    Date/Time   Wed Jul 23, 2025 10:06 AM    Comment   --             Follow-up Information       Follow up With Specialties Details Why Contact Info    Shamar Yin DO General Surgery Follow up in 2 week(s)  [address] [place] [address]  [phone]            Current Discharge Medication List        START taking these medications    Details   clonazePAM (KlonoPIN) 2 mg tablet Take 1 tablet (2 mg total) by mouth 2 (two) times a day as needed for anxiety  Qty: 60 tablet, Refills: 2    Associated Diagnoses: Anxiety disorder, unspecified type           CONTINUE these medications which have NOT CHANGED    Details   aspirin 81 mg EC tablet Take 1 tablet (81 mg total) by mouth daily  Qty: 90 tablet, Refills: 0    Associated Diagnoses: Coronary artery disease involving native coronary artery of native heart without angina pectoris      atorvastatin (LIPITOR) 40 mg tablet Take 1 tablet (40 mg total) by mouth daily  Qty: 90 tablet, Refills: 0    Associated Diagnoses: Mixed hyperlipidemia;  Coronary artery disease involving native coronary artery of native heart with unstable angina pectoris (HCC)      clotrimazole-betamethasone (LOTRISONE) 1-0.05 % cream Apply topical over affected areas twice a day  Qty: 30 g, Refills: 0    Associated Diagnoses: Atopic dermatitis, unspecified type      desvenlafaxine succinate (PRISTIQ) 50 mg 24 hr tablet Take 1 tablet (50 mg total) by mouth daily  Qty: 90 tablet, Refills: 2    Associated Diagnoses: Bipolar disorder, current episode mixed, moderate (HCC)      Fluticasone Propionate (Xhance) 93 MCG/ACT EXHU 1 actuation into each nostril in the morning and 1 actuation before bedtime.  Qty: 16 mL, Refills: 5    Comments: Tried and failed Flonase and mometasone  Associated Diagnoses: Chronic pansinusitis; DNS (deviated nasal septum); Nasal turbinate hypertrophy      Multiple Vitamin (multivitamin) tablet Take 1 tablet by mouth in the morning.      omeprazole (PriLOSEC) 20 mg delayed release capsule Take 1 capsule (20 mg total) by mouth daily  Qty: 90 capsule, Refills: 0    Associated Diagnoses: Status post gastric bypass for obesity      OXcarbazepine (Trileptal) 300 mg tablet Take 1 tablet (300 mg total) by mouth in the morning AND 2 tablets (600 mg total) daily at bedtime.  Qty: 270 tablet, Refills: 0    Comments: Patient has [facility] prescription coverage  Associated Diagnoses: Bipolar disorder, current episode mixed, moderate (HCC)      Paliperidone Palmitate ER (Invega Trinza) 819 MG/2.63ML DENISSE Inject 819 mg into a muscle every 3 (three) months      potassium chloride (Klor-Con M20) 20 mEq tablet Take 1 tablet (20 mEq total) by mouth daily  Qty: 90 tablet, Refills: 1    Associated Diagnoses: Hypokalemia      ranolazine (RANEXA) 1000 MG SR tablet Take 1 tablet (1,000 mg total) by mouth 2 (two) times a day  Qty: 180 tablet, Refills: 3    Associated Diagnoses: Coronary artery disease of native artery of native heart with stable angina pectoris (HCC)      sodium  chloride 1 g tablet Take 2 tablets (2 g total) by mouth 3 (three) times a day  Qty: 540 tablet, Refills: 1    Associated Diagnoses: Hyponatremia      Blood Glucose Monitoring Suppl (OneTouch Verio Reflect) w/Device KIT Check blood sugars once daily. Please substitute with appropriate alternative as covered by patient's insurance. Dx: E11.65  Qty: 1 kit, Refills: 0    Associated Diagnoses: Hypoglycemia after GI (gastrointestinal) surgery      glucose blood (OneTouch Verio) test strip Check blood sugars once daily. Please substitute with appropriate alternative as covered by patient's insurance. Dx: E11.65  Qty: 100 each, Refills: 3    Associated Diagnoses: Hypoglycemia after GI (gastrointestinal) surgery      OneTouch Delica Lancets 33G MISC Check blood sugars once daily. Please substitute with appropriate alternative as covered by patient's insurance. Dx: E11.65  Qty: 100 each, Refills: 3    Associated Diagnoses: Hypoglycemia after GI (gastrointestinal) surgery           No discharge procedures on file.       ED Provider  Electronically Signed by     Zina Keith PA-C  07/23/25 7957

## 2025-07-23 NOTE — TELEPHONE ENCOUNTER
Refill cannot be delegated    1 [id number] ** 06/27/2025 06/24/2025 clonazePAM (Tablet) 60.0 30 2 MG [facility] Private Pay 0 / 0 PA   1 [id number] ** 05/30/2025 03/28/2025 clonazePAM (Tablet) 60.0 30 2 MG [facility] Commercial Insurance 2 / 2 PA   1 [id number] ** 04/30/2025 03/28/2025 clonazePAM (Tablet) 60.0 30 2 MG NA [facility] Commercial Insurance 1 / 2 PA   1 [id number] ** 04/01/2025 03/28/2025 clonazePAM (Tablet) 60.0 30 2 MG NA [facility] Commercial Insurance 0 / 2 PA   1 [id number] ** 03/03/2025 03/03/2025 clonazePAM (Tablet) 60.0 30 2 MG [facility] Commercial Insurance 0 / 0 PA   1 [id number] ** 02/03/2025 12/05/2024 clonazePAM (Tablet) 60.0 30 2 MG [facility] Commercial Insurance 2 / 2 PA   1 [id number] ** 01/06/2025 12/05/2024 clonazePAM (Tablet) 60.0 30 2 MG [facility] Commercial Insurance 1 / 2 PA   1 [id number] ** 12/07/2024 12/05/2024 clonazePAM (Tablet) 60.0 30 2 MG [facility] Commercial Insurance 0 / 2 PA   1 [id number] ** 11/09/2024 09/09/2024 clonazePAM (Tablet) 60.0 30 2 MG [facility] Commercial Insurance 2 / 2 PA   1 [id number] ** 10/10/2024 09/09/2024 clonazePAM (Tablet) 60.0 30 2 MG [facility] Commercial Insurance 1 / 2 PA

## 2025-07-23 NOTE — INTERVAL H&P NOTE
H&P reviewed. After examining the patient I find no changes in the patients condition since the H&P had been written.    Vitals:    07/23/25 1230   BP:    Pulse:    Resp:    Temp:    SpO2: 93%

## 2025-07-23 NOTE — TELEPHONE ENCOUNTER
Reason for call:   [x] Refill   [] Prior Auth  [] Other:     Office:   [] PCP/Provider -   [x] Specialty/Provider -     Medication: clonazePAM (KlonoPIN) 2 mg tablet     Dose/Frequency: Take 1 tablet (2 mg total) by mouth 2 (two) times a day as needed for anxiety     Quantity: 60    Pharmacy: [facility]   Does the patient have enough for 3 days?   [] Yes   [x] No - Send as HP to POD    Mail Away Pharmacy   Does the patient have enough for 10 days?   [] Yes   [] No - Send as HP to POD

## 2025-07-24 VITALS
BODY MASS INDEX: 27.35 KG/M2 | HEIGHT: 72 IN | SYSTOLIC BLOOD PRESSURE: 94 MMHG | WEIGHT: 201.94 LBS | DIASTOLIC BLOOD PRESSURE: 62 MMHG | OXYGEN SATURATION: 94 % | RESPIRATION RATE: 18 BRPM | HEART RATE: 74 BPM | TEMPERATURE: 98.9 F

## 2025-07-24 PROBLEM — K81.0 ACUTE CHOLECYSTITIS: Status: RESOLVED | Noted: 2025-07-23 | Resolved: 2025-07-24

## 2025-07-24 LAB
ALBUMIN SERPL BCG-MCNC: 3.2 G/DL (ref 3.5–5)
ALP SERPL-CCNC: 62 U/L (ref 34–104)
ALT SERPL W P-5'-P-CCNC: 67 U/L (ref 7–52)
ANION GAP SERPL CALCULATED.3IONS-SCNC: 4 MMOL/L (ref 4–13)
AST SERPL W P-5'-P-CCNC: 51 U/L (ref 13–39)
BASOPHILS # BLD AUTO: 0.07 THOUSANDS/ÂΜL (ref 0–0.1)
BASOPHILS NFR BLD AUTO: 0 % (ref 0–1)
BILIRUB SERPL-MCNC: 0.6 MG/DL (ref 0.2–1)
BUN SERPL-MCNC: 11 MG/DL (ref 5–25)
CALCIUM ALBUM COR SERPL-MCNC: 8.7 MG/DL (ref 8.3–10.1)
CALCIUM SERPL-MCNC: 8.1 MG/DL (ref 8.4–10.2)
CHLORIDE SERPL-SCNC: 103 MMOL/L (ref 96–108)
CO2 SERPL-SCNC: 26 MMOL/L (ref 21–32)
CREAT SERPL-MCNC: 0.69 MG/DL (ref 0.6–1.3)
EOSINOPHIL # BLD AUTO: 0.01 THOUSAND/ÂΜL (ref 0–0.61)
EOSINOPHIL NFR BLD AUTO: 0 % (ref 0–6)
ERYTHROCYTE [DISTWIDTH] IN BLOOD BY AUTOMATED COUNT: 13.3 % (ref 11.6–15.1)
GFR SERPL CREATININE-BSD FRML MDRD: 103 ML/MIN/1.73SQ M
GLUCOSE P FAST SERPL-MCNC: 103 MG/DL (ref 65–99)
GLUCOSE SERPL-MCNC: 103 MG/DL (ref 65–140)
HCT VFR BLD AUTO: 33.4 % (ref 36.5–49.3)
HGB BLD-MCNC: 11.8 G/DL (ref 12–17)
IMM GRANULOCYTES # BLD AUTO: 0.21 THOUSAND/UL (ref 0–0.2)
IMM GRANULOCYTES NFR BLD AUTO: 1 % (ref 0–2)
LYMPHOCYTES # BLD AUTO: 1.3 THOUSANDS/ÂΜL (ref 0.6–4.47)
LYMPHOCYTES NFR BLD AUTO: 7 % (ref 14–44)
MCH RBC QN AUTO: 32.1 PG (ref 26.8–34.3)
MCHC RBC AUTO-ENTMCNC: 35.3 G/DL (ref 31.4–37.4)
MCV RBC AUTO: 91 FL (ref 82–98)
MONOCYTES # BLD AUTO: 1.12 THOUSAND/ÂΜL (ref 0.17–1.22)
MONOCYTES NFR BLD AUTO: 6 % (ref 4–12)
NEUTROPHILS # BLD AUTO: 16.76 THOUSANDS/ÂΜL (ref 1.85–7.62)
NEUTS SEG NFR BLD AUTO: 86 % (ref 43–75)
NRBC BLD AUTO-RTO: 0 /100 WBCS
PLATELET # BLD AUTO: 198 THOUSANDS/UL (ref 149–390)
PMV BLD AUTO: 9.4 FL (ref 8.9–12.7)
POTASSIUM SERPL-SCNC: 3.9 MMOL/L (ref 3.5–5.3)
PROT SERPL-MCNC: 5 G/DL (ref 6.4–8.4)
RBC # BLD AUTO: 3.68 MILLION/UL (ref 3.88–5.62)
SODIUM SERPL-SCNC: 133 MMOL/L (ref 135–147)
WBC # BLD AUTO: 19.47 THOUSAND/UL (ref 4.31–10.16)

## 2025-07-24 PROCEDURE — 80053 COMPREHEN METABOLIC PANEL: CPT

## 2025-07-24 PROCEDURE — 85025 COMPLETE CBC W/AUTO DIFF WBC: CPT

## 2025-07-24 PROCEDURE — NC001 PR NO CHARGE: Performed by: STUDENT IN AN ORGANIZED HEALTH CARE EDUCATION/TRAINING PROGRAM

## 2025-07-24 RX ORDER — OXYCODONE AND ACETAMINOPHEN 5; 325 MG/1; MG/1
1 TABLET ORAL EVERY 4 HOURS PRN
Qty: 6 TABLET | Refills: 0 | Status: SHIPPED | OUTPATIENT
Start: 2025-07-24 | End: 2025-07-27

## 2025-07-24 RX ADMIN — OXYCODONE HYDROCHLORIDE 5 MG: 5 TABLET ORAL at 05:19

## 2025-07-24 RX ADMIN — ACETAMINOPHEN 975 MG: 325 TABLET ORAL at 05:18

## 2025-07-24 NOTE — PLAN OF CARE
Problem: Potential for Falls  Goal: Patient will remain free of falls  Description: INTERVENTIONS:  - Educate patient/family on patient safety including physical limitations  - Instruct patient to call for assistance with activity   - Consider consulting OT/PT to assist with strengthening/mobility based on AM PAC & JH-HLM score  - Consult OT/PT to assist with strengthening/mobility   - Keep Call bell within reach  - Keep bed low and locked with side rails adjusted as appropriate  - Keep care items and personal belongings within reach  - Initiate and maintain comfort rounds  - Make Fall Risk Sign visible to staff  - Offer Toileting every \ Hours, in advance of need  - Initiate/Maintain \alarm  - Obtain necessary fall risk management equipment: \  - Apply yellow socks and bracelet for high fall risk patients  - Consider moving patient to room near nurses station  Outcome: Progressing

## 2025-07-24 NOTE — PROGRESS NOTES
Progress Note - Surgery-General   Name: Chandler Angeles 60 y.o. male I MRN: [id number]  Unit/Bed#: E5 -01 I Date of Admission: 7/23/2025   Date of Service: 7/24/2025 I Hospital Day: 0    Assessment & Plan  Acute cholecystitis  - Hx of Marii-en-Y gastric bypass in 2020, internal hernia s/p Aminta in 7/2024  - Presenting with acute onset abdominal pain  - CT scan and ultrasound consistent with acute cholecystitis, 1.8 cm nonmobile gallstone at the neck with GB dilation    S/p robotic cholecystectomy on 7/23.    Plan  - low fat diet  - d/c IV fluids  - IV antibiotics. Continue PO on discharge to complete 4 day course  - As needed analgesia and antiemetics        Subjective   No acute events overnight.  Pain controlled.  Tolerating diet.  Out of bed and ambulating.  No acute complaints or concerns-wants to go home    Objective :  Temp:  [98 °F (36.7 °C)-100.5 °F (38.1 °C)] 98.9 °F (37.2 °C)  HR:  [66-99] 89  BP: (102-145)/(55-83) 105/64  Resp:  [16-20] 18  SpO2:  [93 %-100 %] 95 %  O2 Device: None (Room air)    I/O         07/22 0701 07/23 0700 07/23 0701  07/24 0700    I.V. (mL/kg)  1300 (14.2)    IV Piggyback 1000     Total Intake(mL/kg) 1000 (10.9) 1300 (14.2)    Urine (mL/kg/hr)  2075 (0.9)    Blood  50    Total Output  2125    Net +1000 -825                  Physical Exam  General: NAD  HENT: NCAT MMM  Neck: supple, no JVD  CV: nl rate  Lungs: nl wob. No resp distress  ABD: Soft, appropriately tender, nondistended.  Incisions CDI  Extrem: No CCE  Neuro: AAOx3      Lab Results: I have reviewed the following results:  Recent Labs     07/23/25  0457 07/23/25  0702   WBC 11.44*  --    HGB 15.1  --    HCT 42.9  --      --    SODIUM 130*  --    K 4.2  --    CL 97  --    CO2 24  --    BUN 15  --    CREATININE 0.99  --    GLUC 112  --    AST 20  --    ALT 21  --    ALB 4.2  --    TBILI 0.52  --    ALKPHOS 84  --    HSTNI0 71*  --    HSTNI2  --  59*   LACTICACID 1.0  --              VTE Pharmacologic Prophylaxis:  VTE covered by:  enoxaparin, Subcutaneous     VTE Mechanical Prophylaxis: sequential compression device

## 2025-07-24 NOTE — ASSESSMENT & PLAN NOTE
- Hx of Marii-en-Y gastric bypass in 2020, internal hernia s/p Aminta in 7/2024  - Presenting with acute onset abdominal pain  - CT scan and ultrasound consistent with acute cholecystitis, 1.8 cm nonmobile gallstone at the neck with GB dilation    S/p robotic cholecystectomy on 7/23.    Plan  - low fat diet  - d/c IV fluids  - IV antibiotics. Continue PO on discharge to complete 4 day course  - As needed analgesia and antiemetics

## 2025-07-25 ENCOUNTER — TELEPHONE (OUTPATIENT)
Age: 60
End: 2025-07-25

## 2025-07-25 ENCOUNTER — OFFICE VISIT (OUTPATIENT)
Dept: FAMILY MEDICINE CLINIC | Facility: CLINIC | Age: 60
End: 2025-07-25
Payer: COMMERCIAL

## 2025-07-25 ENCOUNTER — TRANSITIONAL CARE MANAGEMENT (OUTPATIENT)
Dept: FAMILY MEDICINE CLINIC | Facility: CLINIC | Age: 60
End: 2025-07-25

## 2025-07-25 VITALS
TEMPERATURE: 98.3 F | SYSTOLIC BLOOD PRESSURE: 128 MMHG | OXYGEN SATURATION: 95 % | DIASTOLIC BLOOD PRESSURE: 69 MMHG | WEIGHT: 208 LBS | BODY MASS INDEX: 28.21 KG/M2 | RESPIRATION RATE: 18 BRPM | HEART RATE: 80 BPM

## 2025-07-25 DIAGNOSIS — K59.00 CONSTIPATION, UNSPECIFIED CONSTIPATION TYPE: ICD-10-CM

## 2025-07-25 DIAGNOSIS — F31.62 BIPOLAR DISORDER, CURRENT EPISODE MIXED, MODERATE (HCC): ICD-10-CM

## 2025-07-25 DIAGNOSIS — E78.2 MIXED HYPERLIPIDEMIA: ICD-10-CM

## 2025-07-25 DIAGNOSIS — K81.9 CHOLECYSTITIS: Primary | ICD-10-CM

## 2025-07-25 PROCEDURE — 99496 TRANSJ CARE MGMT HIGH F2F 7D: CPT

## 2025-07-25 RX ORDER — HYDROCODONE BITARTRATE AND ACETAMINOPHEN 5; 325 MG/1; MG/1
1 TABLET ORAL EVERY 6 HOURS PRN
Qty: 8 TABLET | Refills: 0 | Status: SHIPPED | OUTPATIENT
Start: 2025-07-25 | End: 2025-07-27

## 2025-07-25 RX ORDER — DOCUSATE SODIUM 100 MG/1
100 CAPSULE, LIQUID FILLED ORAL 2 TIMES DAILY
Qty: 14 CAPSULE | Refills: 0 | Status: SHIPPED | OUTPATIENT
Start: 2025-07-25 | End: 2025-08-01

## 2025-07-25 NOTE — PROGRESS NOTES
Transition of Care Visit:  Name: Chandler Angeles      : 1965      MRN: [id number]  Encounter Provider: Clint Fournier MD  Encounter Date: 2025   Encounter department: [facility]    Assessment & Plan  Cholecystitis  Status post laparoscopic cholecystectomy 2025.  Patient should be finishing Augmentin twice daily on 2025.  Percocet not helping with pain (pt only given 6 pills and down to his last pill).  Will order Norco 5-325 mg every 6 as needed for 2 days as this previously helped pain control with previous surgeries.  Advised against using additional Tylenol as Norco already has acetaminophen in it.  Stressed the need to maintain appropriate hydration and fiber intake to avoid worsening of constipation.  Have ordered Colace 100 mg to be taken twice daily to aid in bowel movement.  Patient aware of need to schedule surgery follow-up and he will be doing this at his earliest convenience.  Orders:    HYDROcodone-acetaminophen (NORCO) 5-325 mg per tablet; Take 1 tablet by mouth every 6 (six) hours as needed for pain for up to 2 days Max Daily Amount: 4 tablets    Mixed hyperlipidemia  Continue atorvastatin 40 mg daily       Bipolar disorder, current episode mixed, moderate (HCC)  Stable mood. Following with psychiatry. Continue Pristiq 50mg, Trileptal 300mg and 600mg, clonazepam 2mg, Invega every 3 months per psych.        Constipation, unspecified constipation type  Continue daily MiraLAX use and I have added Colace 100 mg twice daily for 7 days.  Orders:    docusate sodium (COLACE) 100 mg capsule; Take 1 capsule (100 mg total) by mouth 2 (two) times a day for 7 days         History of Present Illness     Transitional Care Management Review:   Chandler Angeles is a 60 y.o. male here for TCM follow up.     During the TCM phone call patient stated:  TCM Call (since 2025)       Date and time call was made  2025  8:25 AM    Patient  was hospitialized at  [facility]    Date of Admission  07/23/25    Date of discharge  07/24/25    Disposition  Home    Were the patients medications reviewed and updated  Yes          TCM Call (since 7/11/2025)       Scheduled for follow up?  Yes    Did you obtain your prescribed medications  Yes    Do you need help managing your prescriptions or medications  No    Is transportation to your appointment needed  No    I have advised the patient to call PCP with any new or worsening symptoms  Akila STINSON MA    Living Arrangements  Family members          60-year-old male with CAD, migraines, dysautonomia like disorder, bipolar, anxiety, hyperlipidemia presents to the clinic for TCM follow-up.  Patient was admitted at [facility] from 7/23/2025 to 7/24/2025 for laparoscopic cholecystectomy.  Patient had presented for acute onset abdominal pain with associated nausea.  CT evaluation and right upper quadrant ultrasound findings consistent with acute cholecystitis without choledocholithiasis.  Patient tolerated procedure well and was ultimately discharged with Augmentin 875-125 mg twice daily for 4 days.  He has not yet scheduled appointment for follow-up surgical eval.    Today pt endorsing continued abdominal pain to the point he was awoken from sleep this morning and couldn't move. Pain is localized to epigastric region, non-radiating, stabbing in nature, made worse while not moving and moving helps minimally improve pain. Able to tolerate PO stating he ate chinese food yesterday. Using the percocet prescribed and tylenol for pain but minimal relief. States that the percocet makes him nauseous.      Review of Systems   Constitutional:  Negative for chills and fever.   Respiratory:  Negative for shortness of breath.    Cardiovascular:  Negative for chest pain.   Gastrointestinal:  Positive for abdominal pain, constipation and nausea (when taking percocet). Negative for blood in stool, diarrhea  and vomiting.   Genitourinary:  Negative for dysuria and hematuria.     Objective   /69 (BP Location: Left arm, Patient Position: Sitting, Cuff Size: Standard)   Pulse 80   Temp 98.3 °F (36.8 °C) (Tympanic)   Resp 18   Wt 94.3 kg (208 lb)   SpO2 95%   BMI 28.21 kg/m²     Physical Exam  Vitals and nursing note reviewed.   Constitutional:       General: He is not in acute distress.     Appearance: Normal appearance. He is not ill-appearing, toxic-appearing or diaphoretic.     Eyes:      Conjunctiva/sclera: Conjunctivae normal.       Cardiovascular:      Rate and Rhythm: Normal rate and regular rhythm.      Pulses: Normal pulses.      Heart sounds: Normal heart sounds.   Pulmonary:      Effort: Pulmonary effort is normal.      Breath sounds: Normal breath sounds.   Abdominal:      Comments: Surgical incisions healing well. No signs of infection.     Skin:     General: Skin is warm and dry.     Neurological:      General: No focal deficit present.      Mental Status: He is alert.     Psychiatric:         Mood and Affect: Mood normal.         Behavior: Behavior normal.       Medications have been reviewed by provider in current encounter

## 2025-07-25 NOTE — ASSESSMENT & PLAN NOTE
Continue daily MiraLAX use and I have added Colace 100 mg twice daily for 7 days.  Orders:    docusate sodium (COLACE) 100 mg capsule; Take 1 capsule (100 mg total) by mouth 2 (two) times a day for 7 days

## 2025-07-25 NOTE — TELEPHONE ENCOUNTER
Patient calling to schedule s/p surgical follow up visit. Patient was admitted overnight for cholecystectomy. Please reach out to patient to schedule transition of care visit.

## 2025-07-29 DIAGNOSIS — F31.62 BIPOLAR DISORDER, CURRENT EPISODE MIXED, MODERATE (HCC): ICD-10-CM

## 2025-07-29 PROCEDURE — 88304 TISSUE EXAM BY PATHOLOGIST: CPT | Performed by: STUDENT IN AN ORGANIZED HEALTH CARE EDUCATION/TRAINING PROGRAM

## 2025-07-30 RX ORDER — OXCARBAZEPINE 300 MG/1
TABLET, FILM COATED ORAL
Qty: 270 TABLET | Refills: 0 | Status: SHIPPED | OUTPATIENT
Start: 2025-07-30 | End: 2025-08-05 | Stop reason: SDUPTHER

## 2025-08-05 ENCOUNTER — TELEMEDICINE (OUTPATIENT)
Dept: PSYCHIATRY | Facility: CLINIC | Age: 60
End: 2025-08-05
Payer: COMMERCIAL

## 2025-08-05 DIAGNOSIS — F41.9 ANXIETY DISORDER, UNSPECIFIED TYPE: ICD-10-CM

## 2025-08-05 DIAGNOSIS — F31.62 BIPOLAR DISORDER, CURRENT EPISODE MIXED, MODERATE (HCC): ICD-10-CM

## 2025-08-05 PROCEDURE — 99214 OFFICE O/P EST MOD 30 MIN: CPT | Performed by: STUDENT IN AN ORGANIZED HEALTH CARE EDUCATION/TRAINING PROGRAM

## 2025-08-05 RX ORDER — DESVENLAFAXINE 50 MG/1
50 TABLET, FILM COATED, EXTENDED RELEASE ORAL DAILY
Qty: 90 TABLET | Refills: 2 | Status: SHIPPED | OUTPATIENT
Start: 2025-08-05

## 2025-08-05 RX ORDER — CLONAZEPAM 2 MG/1
2 TABLET ORAL 2 TIMES DAILY PRN
Qty: 60 TABLET | Refills: 2 | Status: SHIPPED | OUTPATIENT
Start: 2025-08-05

## 2025-08-05 RX ORDER — OXCARBAZEPINE 300 MG/1
TABLET, FILM COATED ORAL
Qty: 270 TABLET | Refills: 0 | Status: SHIPPED | OUTPATIENT
Start: 2025-08-05 | End: 2025-11-03

## 2025-08-08 ENCOUNTER — OFFICE VISIT (OUTPATIENT)
Dept: SURGERY | Facility: CLINIC | Age: 60
End: 2025-08-08

## 2025-08-08 VITALS
DIASTOLIC BLOOD PRESSURE: 78 MMHG | BODY MASS INDEX: 25.95 KG/M2 | WEIGHT: 195.8 LBS | HEIGHT: 73 IN | TEMPERATURE: 99.2 F | OXYGEN SATURATION: 96 % | HEART RATE: 98 BPM | SYSTOLIC BLOOD PRESSURE: 118 MMHG

## 2025-08-08 DIAGNOSIS — Z90.49 S/P LAPAROSCOPIC CHOLECYSTECTOMY: Primary | ICD-10-CM

## 2025-08-08 PROCEDURE — 99024 POSTOP FOLLOW-UP VISIT: CPT | Performed by: PHYSICIAN ASSISTANT

## 2025-08-22 ENCOUNTER — TELEPHONE (OUTPATIENT)
Dept: PSYCHIATRY | Facility: CLINIC | Age: 60
End: 2025-08-22

## (undated) DEVICE — BLADE 1883519HR RAD90 3PK M4 3.5MM ROTAT: Brand: RAD

## (undated) DEVICE — STAPLER GIA HANDLE STAND 4MM

## (undated) DEVICE — ELECTRODE LAP SPATULA STR E-Z CLEAN 33CM -0018

## (undated) DEVICE — 3M™ DURAPORE™ SURGICAL TAPE 1538-3, 3 INCH X 10 YARD (7,5CM X 9,1M), 4 ROLLS/BOX: Brand: 3M™ DURAPORE™

## (undated) DEVICE — GLOVE SRG BIOGEL 7.5

## (undated) DEVICE — INTENDED FOR TISSUE SEPARATION, AND OTHER PROCEDURES THAT REQUIRE A SHARP SURGICAL BLADE TO PUNCTURE OR CUT.: Brand: BARD-PARKER SAFETY BLADES SIZE 15, STERILE

## (undated) DEVICE — Device

## (undated) DEVICE — TUBING SMOKE EVAC W/FILTRATION DEVICE PLUMEPORT ACTIV

## (undated) DEVICE — DRAPE SURGIKIT SADDLE BAG

## (undated) DEVICE — ABSORBABLE WOUND CLOSURE DEVICE: Brand: V-LOC 90

## (undated) DEVICE — DISPOSABLE OR TOWEL: Brand: CARDINAL HEALTH

## (undated) DEVICE — ENDO STITCH 2-0 SURGIDAC 48 IN

## (undated) DEVICE — TIBURON LAPAROSCOPIC ABDOMINAL DRAPE: Brand: CONVERTORS

## (undated) DEVICE — TROCARS: Brand: KII® BALLOON BLUNT TIP SYSTEM

## (undated) DEVICE — SUT VICRYL PLUS 0 UR-6 27IN VCP603H

## (undated) DEVICE — TRAVELKIT CONTAINS FIRST STEP KIT (200ML EP-4 KIT) AND SOILED SCOPE BAG - 1 KIT: Brand: TRAVELKIT CONTAINS FIRST STEP KIT AND SOILED SCOPE BAG

## (undated) DEVICE — BLUE HEAT SCOPE WARMER

## (undated) DEVICE — PENCIL ELECTROSURG E-Z CLEAN -0035H

## (undated) DEVICE — [HIGH FLOW INSUFFLATOR,  DO NOT USE IF PACKAGE IS DAMAGED,  KEEP DRY,  KEEP AWAY FROM SUNLIGHT,  PROTECT FROM HEAT AND RADIOACTIVE SOURCES.]: Brand: PNEUMOSURE

## (undated) DEVICE — SUT MONOCRYL 4-0 PS-2 27 IN Y426H

## (undated) DEVICE — SUT CHROMIC 4-0 RB-1 27 IN U203H

## (undated) DEVICE — ASTOUND STANDARD SURGICAL GOWN, XL: Brand: CONVERTORS

## (undated) DEVICE — GLOVE SRG BIOGEL 8

## (undated) DEVICE — ENDOPOUCH RETRIEVER SPECIMEN RETRIEVAL BAGS: Brand: ENDOPOUCH RETRIEVER

## (undated) DEVICE — ALLENTOWN LAP CHOLE APP PACK: Brand: CARDINAL HEALTH

## (undated) DEVICE — SURGICAL GOWN, XL SMARTSLEEVE: Brand: CONVERTORS

## (undated) DEVICE — SHEATH 1912010 5PK 4MM/30DEG STORZ XOMED: Brand: ENDO-SCRUB®

## (undated) DEVICE — INSTRUMENT TRACKER 9733533XOM ENT 1PK

## (undated) DEVICE — ENDO STITCH DEVICE 10 MM

## (undated) DEVICE — BLADELESS OBTURATOR: Brand: WECK VISTA

## (undated) DEVICE — MAYO STAND COVER: Brand: CONVERTORS

## (undated) DEVICE — DRAPE EQUIPMENT RF WAND

## (undated) DEVICE — NEEDLE SPINAL18G X 3.5 IN QUINCKE

## (undated) DEVICE — ADHESIVE SKIN CLSR DERMABOND NX

## (undated) DEVICE — ANTI-FOG SOLUTION WITH FOAM PAD: Brand: DEVON

## (undated) DEVICE — COLUMN DRAPE

## (undated) DEVICE — CHLORAPREP HI-LITE 26ML ORANGE

## (undated) DEVICE — VIOLET BRAIDED (POLYGLACTIN 910), SYNTHETIC ABSORBABLE SUTURE: Brand: COATED VICRYL

## (undated) DEVICE — TROCAR: Brand: KII FIOS FIRST ENTRY

## (undated) DEVICE — PATIENT TRACKER 9734887XOM NON-INVASIVE

## (undated) DEVICE — PBT NON ABSORBABLE WOUND CLOSURE DEVICE: Brand: V-LOC

## (undated) DEVICE — PMI DISPOSABLE PUNCTURE CLOSURE DEVICE / SUTURE GRASPER: Brand: PMI

## (undated) DEVICE — SPLINT 1524050 5PK PAIR DOYLE II AIRWAY: Brand: DOYLE II ™

## (undated) DEVICE — ENDOPATH 5MM CURVED SCISSORS WITH MONOPOLAR CAUTERY: Brand: ENDOPATH

## (undated) DEVICE — 3000CC GUARDIAN II: Brand: GUARDIAN

## (undated) DEVICE — SPINAL NEEDLE 22 G X 2 1/2

## (undated) DEVICE — IRRIG ENDO FLO TUBING

## (undated) DEVICE — PROGRASP FORCEPS: Brand: ENDOWRIST

## (undated) DEVICE — SCD SEQUENTIAL COMPRESSION COMFORT SLEEVE MEDIUM KNEE LENGTH: Brand: KENDALL SCD

## (undated) DEVICE — EXOFIN PRECISION PEN HIGH VISCOSITY TOPICAL SKIN ADHESIVE: Brand: EXOFIN PRECISION PEN, 1G

## (undated) DEVICE — ARM DRAPE

## (undated) DEVICE — DRAPE TOWEL: Brand: CONVERTORS

## (undated) DEVICE — BLADE 1882040 5PK INFERIOR TURB 2MM

## (undated) DEVICE — BETHLEHEM UNIVERSAL MINOR GEN: Brand: CARDINAL HEALTH

## (undated) DEVICE — BLADE 1884380EM QUADCUT 4.3MMX13CM ROHS: Brand: ROTATABLE FUSION®

## (undated) DEVICE — 10 MM BABCOCKS WITH RATCHET HANDLES: Brand: ENDOPATH

## (undated) DEVICE — WEBRIL 6 IN UNSTERILE

## (undated) DEVICE — STAPLER ENDO GIA ROTICULATOR 60-2.5

## (undated) DEVICE — REM POLYHESIVE ADULT PATIENT RETURN ELECTRODE: Brand: VALLEYLAB

## (undated) DEVICE — GAUZE SPONGES,16 PLY: Brand: CURITY

## (undated) DEVICE — PERMANENT CAUTERY HOOK: Brand: ENDOWRIST

## (undated) DEVICE — INSUFFLATION NEEDLE TO ESTABLISH PNEUMOPERITONEUM.: Brand: INSUFFLATION NEEDLE

## (undated) DEVICE — HARMONIC ACE +7 LAPAROSCOPIC SHEARS ADVANCED HEMOSTASIS 5MM DIAMETER 36CM SHAFT LENGTH  FOR USE WITH GRAY HAND PIECE ONLY: Brand: HARMONIC ACE

## (undated) DEVICE — BAG DECANTER

## (undated) DEVICE — LAPAROSCOPIC TROCAR SLEEVE/SINGLE USE: Brand: KII® SLEEVE

## (undated) DEVICE — DECANTER: Brand: UNBRANDED

## (undated) DEVICE — SPLINT INTRANASAL 0.6 X 2 IN POSISEP X

## (undated) DEVICE — PACK PBDS LAP CHOLE RF

## (undated) DEVICE — CADIERE FORCEPS: Brand: ENDOWRIST

## (undated) DEVICE — TUBING SUCTION 5MM X 12 FT

## (undated) DEVICE — TUBING 1895522 5PK STRAIGHTSHOT TO XPS: Brand: STRAIGHTSHOT®

## (undated) DEVICE — Device: Brand: DEFENDO AIR/WATER/SUCTION AND BIOPSY VALVE

## (undated) DEVICE — SYRINGE 20ML LL

## (undated) DEVICE — KENDALL SCD SEQUENTIAL COMPRESSION COMFORT SLEEVES, KNEE LENGTH, MEDIUM: Brand: KENDALL SCD

## (undated) DEVICE — TISSUE RETRIEVAL SYSTEM: Brand: INZII RETRIEVAL SYSTEM

## (undated) DEVICE — STAPLER EEA 25 MM COVIDIEN

## (undated) DEVICE — COVIDIEN ENDO GIA PURPLE (MED) RELOAD 60MM

## (undated) DEVICE — ENDOPATH PNEUMONEEDLE INSUFFLATION NEEDLES WITH LUER LOCK CONNECTORS 120MM: Brand: ENDOPATH

## (undated) DEVICE — ENDOPATH XCEL UNIVERSAL TROCAR STABLILITY SLEEVES: Brand: ENDOPATH XCEL

## (undated) DEVICE — SUT PROLENE 3-0 SH 36 IN 8522H

## (undated) DEVICE — SYRINGE 30ML LL

## (undated) DEVICE — URETERAL CATHETER ADAPTOR TIP

## (undated) DEVICE — STERILE NASAL PACK: Brand: CARDINAL HEALTH

## (undated) DEVICE — NEURO PATTIES 1/2 X 3

## (undated) DEVICE — 3M™ TEGADERM™ TRANSPARENT FILM DRESSING FRAME STYLE, 1626W, 4 IN X 4-3/4 IN (10 CM X 12 CM), 50/CT 4CT/CASE: Brand: 3M™ TEGADERM™

## (undated) DEVICE — SEAL

## (undated) DEVICE — BLADE 1884080EM TRICUT 4MMX13CM M4 ROHS: Brand: FUSION®

## (undated) DEVICE — 2000CC GUARDIAN II: Brand: GUARDIAN

## (undated) DEVICE — MEDIUM-LARGE CLIP APPLIER: Brand: ENDOWRIST

## (undated) DEVICE — CLIP APPLIER WITH CLIP LOGIC TECHNOLOGY: Brand: ENDO CLIP III

## (undated) DEVICE — PLUMEPEN PRO 10FT